# Patient Record
Sex: FEMALE | Race: WHITE | NOT HISPANIC OR LATINO | Employment: OTHER | URBAN - METROPOLITAN AREA
[De-identification: names, ages, dates, MRNs, and addresses within clinical notes are randomized per-mention and may not be internally consistent; named-entity substitution may affect disease eponyms.]

---

## 2017-01-03 ENCOUNTER — HOSPITAL ENCOUNTER (OUTPATIENT)
Dept: RADIOLOGY | Facility: CLINIC | Age: 67
Discharge: HOME | End: 2017-01-03
Payer: MEDICARE

## 2017-01-03 ENCOUNTER — ALLSCRIPTS OFFICE VISIT (OUTPATIENT)
Dept: OTHER | Facility: OTHER | Age: 67
End: 2017-01-03

## 2017-01-03 DIAGNOSIS — K44.9 DIAPHRAGMATIC HERNIA WITHOUT OBSTRUCTION OR GANGRENE: ICD-10-CM

## 2017-01-03 PROCEDURE — 71020 HB CHEST X-RAY 2VW FRONTAL&LATL: CPT

## 2017-01-13 ENCOUNTER — HOSPITAL ENCOUNTER (OUTPATIENT)
Facility: HOSPITAL | Age: 67
Setting detail: OUTPATIENT SURGERY
Discharge: HOME | End: 2017-01-13
Attending: SURGERY | Admitting: SURGERY
Payer: MEDICARE

## 2017-01-13 ENCOUNTER — ANESTHESIA (OUTPATIENT)
Dept: PERIOP | Facility: HOSPITAL | Age: 67
End: 2017-01-13
Payer: MEDICARE

## 2017-01-13 ENCOUNTER — ANESTHESIA EVENT (OUTPATIENT)
Dept: PERIOP | Facility: HOSPITAL | Age: 67
End: 2017-01-13
Payer: MEDICARE

## 2017-01-13 VITALS
HEIGHT: 66 IN | BODY MASS INDEX: 31.82 KG/M2 | HEART RATE: 58 BPM | RESPIRATION RATE: 20 BRPM | TEMPERATURE: 97.4 F | SYSTOLIC BLOOD PRESSURE: 140 MMHG | DIASTOLIC BLOOD PRESSURE: 80 MMHG | OXYGEN SATURATION: 93 % | WEIGHT: 198 LBS

## 2017-01-13 PROCEDURE — C1781 MESH (IMPLANTABLE): HCPCS | Performed by: SURGERY

## 2017-01-13 DEVICE — VENTRALEX HERNIA PATCH, 6.4 CM (2.5"), MEDIUM CIRCLE WITH STRAP
Type: IMPLANTABLE DEVICE | Site: ABDOMEN | Status: FUNCTIONAL
Brand: VENTRALEX

## 2017-01-13 RX ORDER — KETOROLAC TROMETHAMINE 30 MG/ML
INJECTION, SOLUTION INTRAMUSCULAR; INTRAVENOUS AS NEEDED
Status: DISCONTINUED | OUTPATIENT
Start: 2017-01-13 | End: 2017-01-13 | Stop reason: SURG

## 2017-01-13 RX ORDER — LIDOCAINE HYDROCHLORIDE 10 MG/ML
INJECTION, SOLUTION INFILTRATION; PERINEURAL AS NEEDED
Status: DISCONTINUED | OUTPATIENT
Start: 2017-01-13 | End: 2017-01-13 | Stop reason: SURG

## 2017-01-13 RX ORDER — FENTANYL CITRATE/PF 50 MCG/ML
25 SYRINGE (ML) INJECTION
Status: DISCONTINUED | OUTPATIENT
Start: 2017-01-13 | End: 2017-01-13 | Stop reason: HOSPADM

## 2017-01-13 RX ORDER — BUPIVACAINE HYDROCHLORIDE AND EPINEPHRINE 2.5; 5 MG/ML; UG/ML
INJECTION, SOLUTION EPIDURAL; INFILTRATION; INTRACAUDAL; PERINEURAL AS NEEDED
Status: DISCONTINUED | OUTPATIENT
Start: 2017-01-13 | End: 2017-01-13 | Stop reason: HOSPADM

## 2017-01-13 RX ORDER — SODIUM CHLORIDE, SODIUM LACTATE, POTASSIUM CHLORIDE, CALCIUM CHLORIDE 600; 310; 30; 20 MG/100ML; MG/100ML; MG/100ML; MG/100ML
100 INJECTION, SOLUTION INTRAVENOUS CONTINUOUS
Status: DISCONTINUED | OUTPATIENT
Start: 2017-01-13 | End: 2017-01-13 | Stop reason: HOSPADM

## 2017-01-13 RX ORDER — FENTANYL CITRATE 50 UG/ML
INJECTION, SOLUTION INTRAMUSCULAR; INTRAVENOUS AS NEEDED
Status: DISCONTINUED | OUTPATIENT
Start: 2017-01-13 | End: 2017-01-13 | Stop reason: SURG

## 2017-01-13 RX ORDER — OXYCODONE HYDROCHLORIDE AND ACETAMINOPHEN 5; 325 MG/1; MG/1
2 TABLET ORAL EVERY 4 HOURS PRN
Qty: 40 TABLET | Refills: 0 | Status: ON HOLD | OUTPATIENT
Start: 2017-01-13 | End: 2017-10-30 | Stop reason: ALTCHOICE

## 2017-01-13 RX ORDER — MIDAZOLAM HYDROCHLORIDE 1 MG/ML
INJECTION INTRAMUSCULAR; INTRAVENOUS AS NEEDED
Status: DISCONTINUED | OUTPATIENT
Start: 2017-01-13 | End: 2017-01-13 | Stop reason: SURG

## 2017-01-13 RX ORDER — PROPOFOL 10 MG/ML
INJECTION, EMULSION INTRAVENOUS AS NEEDED
Status: DISCONTINUED | OUTPATIENT
Start: 2017-01-13 | End: 2017-01-13 | Stop reason: SURG

## 2017-01-13 RX ORDER — GLYCOPYRROLATE 0.2 MG/ML
INJECTION INTRAMUSCULAR; INTRAVENOUS AS NEEDED
Status: DISCONTINUED | OUTPATIENT
Start: 2017-01-13 | End: 2017-01-13 | Stop reason: SURG

## 2017-01-13 RX ORDER — SODIUM CHLORIDE, SODIUM LACTATE, POTASSIUM CHLORIDE, CALCIUM CHLORIDE 600; 310; 30; 20 MG/100ML; MG/100ML; MG/100ML; MG/100ML
125 INJECTION, SOLUTION INTRAVENOUS CONTINUOUS
Status: DISCONTINUED | OUTPATIENT
Start: 2017-01-13 | End: 2017-01-13 | Stop reason: HOSPADM

## 2017-01-13 RX ORDER — ROCURONIUM BROMIDE 10 MG/ML
INJECTION, SOLUTION INTRAVENOUS AS NEEDED
Status: DISCONTINUED | OUTPATIENT
Start: 2017-01-13 | End: 2017-01-13 | Stop reason: SURG

## 2017-01-13 RX ORDER — MAGNESIUM HYDROXIDE 1200 MG/15ML
LIQUID ORAL AS NEEDED
Status: DISCONTINUED | OUTPATIENT
Start: 2017-01-13 | End: 2017-01-13 | Stop reason: HOSPADM

## 2017-01-13 RX ORDER — OXYCODONE HYDROCHLORIDE AND ACETAMINOPHEN 5; 325 MG/1; MG/1
2 TABLET ORAL EVERY 4 HOURS PRN
Status: DISCONTINUED | OUTPATIENT
Start: 2017-01-13 | End: 2017-01-13 | Stop reason: HOSPADM

## 2017-01-13 RX ORDER — ONDANSETRON 2 MG/ML
4 INJECTION INTRAMUSCULAR; INTRAVENOUS EVERY 4 HOURS PRN
Status: DISCONTINUED | OUTPATIENT
Start: 2017-01-13 | End: 2017-01-13 | Stop reason: HOSPADM

## 2017-01-13 RX ORDER — SODIUM CHLORIDE, SODIUM LACTATE, POTASSIUM CHLORIDE, CALCIUM CHLORIDE 600; 310; 30; 20 MG/100ML; MG/100ML; MG/100ML; MG/100ML
INJECTION, SOLUTION INTRAVENOUS CONTINUOUS PRN
Status: DISCONTINUED | OUTPATIENT
Start: 2017-01-13 | End: 2017-01-13

## 2017-01-13 RX ORDER — ONDANSETRON 2 MG/ML
INJECTION INTRAMUSCULAR; INTRAVENOUS AS NEEDED
Status: DISCONTINUED | OUTPATIENT
Start: 2017-01-13 | End: 2017-01-13 | Stop reason: SURG

## 2017-01-13 RX ORDER — ONDANSETRON 2 MG/ML
4 INJECTION INTRAMUSCULAR; INTRAVENOUS ONCE AS NEEDED
Status: DISCONTINUED | OUTPATIENT
Start: 2017-01-13 | End: 2017-01-13 | Stop reason: HOSPADM

## 2017-01-13 RX ORDER — OXYCODONE HYDROCHLORIDE AND ACETAMINOPHEN 5; 325 MG/1; MG/1
TABLET ORAL
Status: DISCONTINUED
Start: 2017-01-13 | End: 2017-01-13 | Stop reason: HOSPADM

## 2017-01-13 RX ORDER — MORPHINE SULFATE 4 MG/ML
4 INJECTION, SOLUTION INTRAMUSCULAR; INTRAVENOUS
Status: DISCONTINUED | OUTPATIENT
Start: 2017-01-13 | End: 2017-01-13 | Stop reason: HOSPADM

## 2017-01-13 RX ADMIN — FENTANYL CITRATE 100 MCG: 50 INJECTION INTRAMUSCULAR; INTRAVENOUS at 07:55

## 2017-01-13 RX ADMIN — NEOSTIGMINE METHYLSULFATE 2 MG: 1 INJECTION INTRAMUSCULAR; INTRAVENOUS; SUBCUTANEOUS at 08:30

## 2017-01-13 RX ADMIN — LIDOCAINE HYDROCHLORIDE 50 MG: 10 INJECTION, SOLUTION INFILTRATION; PERINEURAL at 07:55

## 2017-01-13 RX ADMIN — PROPOFOL 200 MG: 10 INJECTION, EMULSION INTRAVENOUS at 07:55

## 2017-01-13 RX ADMIN — KETOROLAC TROMETHAMINE 15 MG: 30 INJECTION, SOLUTION INTRAMUSCULAR at 08:24

## 2017-01-13 RX ADMIN — GLYCOPYRROLATE 0.4 MG: 0.2 INJECTION INTRAMUSCULAR; INTRAVENOUS at 08:30

## 2017-01-13 RX ADMIN — CEFAZOLIN SODIUM 2000 MG: 2 SOLUTION INTRAVENOUS at 07:50

## 2017-01-13 RX ADMIN — DEXAMETHASONE SODIUM PHOSPHATE 10 MG: 10 INJECTION INTRAMUSCULAR; INTRAVENOUS at 07:55

## 2017-01-13 RX ADMIN — ROCURONIUM BROMIDE 30 MG: 10 INJECTION, SOLUTION INTRAVENOUS at 07:55

## 2017-01-13 RX ADMIN — SODIUM CHLORIDE, SODIUM LACTATE, POTASSIUM CHLORIDE, AND CALCIUM CHLORIDE: .6; .31; .03; .02 INJECTION, SOLUTION INTRAVENOUS at 07:03

## 2017-01-13 RX ADMIN — ONDANSETRON 4 MG: 2 INJECTION INTRAMUSCULAR; INTRAVENOUS at 08:20

## 2017-01-13 RX ADMIN — MIDAZOLAM HYDROCHLORIDE 2 MG: 1 INJECTION, SOLUTION INTRAMUSCULAR; INTRAVENOUS at 07:49

## 2017-01-13 RX ADMIN — OXYCODONE HYDROCHLORIDE AND ACETAMINOPHEN 2 TABLET: 5; 325 TABLET ORAL at 09:41

## 2017-01-26 ENCOUNTER — LAB CONVERSION - ENCOUNTER (OUTPATIENT)
Dept: OTHER | Facility: OTHER | Age: 67
End: 2017-01-26

## 2017-01-26 LAB — TSH SERPL DL<=0.05 MIU/L-ACNC: 0.69 MIU/L (ref 0.4–4.5)

## 2017-02-07 ENCOUNTER — ALLSCRIPTS OFFICE VISIT (OUTPATIENT)
Dept: OTHER | Facility: OTHER | Age: 67
End: 2017-02-07

## 2017-03-17 ENCOUNTER — ALLSCRIPTS OFFICE VISIT (OUTPATIENT)
Dept: OTHER | Facility: OTHER | Age: 67
End: 2017-03-17

## 2017-03-17 DIAGNOSIS — I10 ESSENTIAL (PRIMARY) HYPERTENSION: ICD-10-CM

## 2017-03-17 DIAGNOSIS — E03.9 HYPOTHYROIDISM: ICD-10-CM

## 2017-03-17 DIAGNOSIS — E78.5 HYPERLIPIDEMIA: ICD-10-CM

## 2017-05-04 ENCOUNTER — LAB CONVERSION - ENCOUNTER (OUTPATIENT)
Dept: OTHER | Facility: OTHER | Age: 67
End: 2017-05-04

## 2017-05-04 LAB
A/G RATIO (HISTORICAL): 1.2 (CALC) (ref 1–2.5)
ALBUMIN SERPL BCP-MCNC: 3.7 G/DL (ref 3.6–5.1)
ALP SERPL-CCNC: 114 U/L (ref 33–130)
ALT SERPL W P-5'-P-CCNC: 16 U/L (ref 6–29)
AST SERPL W P-5'-P-CCNC: 19 U/L (ref 10–35)
BASOPHILS # BLD AUTO: 1.4 %
BASOPHILS # BLD AUTO: 67 CELLS/UL (ref 0–200)
BILIRUB SERPL-MCNC: 0.5 MG/DL (ref 0.2–1.2)
BUN SERPL-MCNC: 14 MG/DL (ref 7–25)
BUN/CREA RATIO (HISTORICAL): ABNORMAL (CALC) (ref 6–22)
CALCIUM SERPL-MCNC: 9.2 MG/DL (ref 8.6–10.4)
CHLORIDE SERPL-SCNC: 104 MMOL/L (ref 98–110)
CHOLEST SERPL-MCNC: 163 MG/DL (ref 125–200)
CHOLEST/HDLC SERPL: 2.4 (CALC)
CO2 SERPL-SCNC: 28 MMOL/L (ref 20–31)
CREAT SERPL-MCNC: 0.84 MG/DL (ref 0.5–0.99)
DEPRECATED RDW RBC AUTO: 14.5 % (ref 11–15)
EGFR AFRICAN AMERICAN (HISTORICAL): 84 ML/MIN/1.73M2
EGFR-AMERICAN CALC (HISTORICAL): 72 ML/MIN/1.73M2
EOSINOPHIL # BLD AUTO: 154 CELLS/UL (ref 15–500)
EOSINOPHIL # BLD AUTO: 3.2 %
GAMMA GLOBULIN (HISTORICAL): 3.1 G/DL (CALC) (ref 1.9–3.7)
GLUCOSE (HISTORICAL): 105 MG/DL (ref 65–99)
HCT VFR BLD AUTO: 37.2 % (ref 35–45)
HDLC SERPL-MCNC: 69 MG/DL
HGB BLD-MCNC: 12.3 G/DL (ref 11.7–15.5)
LDL CHOLESTEROL (HISTORICAL): 72 MG/DL (CALC)
LYMPHOCYTES # BLD AUTO: 1392 CELLS/UL (ref 850–3900)
LYMPHOCYTES # BLD AUTO: 29 %
MCH RBC QN AUTO: 29 PG (ref 27–33)
MCHC RBC AUTO-ENTMCNC: 33.1 G/DL (ref 32–36)
MCV RBC AUTO: 87.7 FL (ref 80–100)
MONOCYTES # BLD AUTO: 566 CELLS/UL (ref 200–950)
MONOCYTES (HISTORICAL): 11.8 %
NEUTROPHILS # BLD AUTO: 2621 CELLS/UL (ref 1500–7800)
NEUTROPHILS # BLD AUTO: 54.6 %
NON-HDL-CHOL (CHOL-HDL) (HISTORICAL): 94 MG/DL (CALC)
PLATELET # BLD AUTO: 192 THOUSAND/UL (ref 140–400)
PMV BLD AUTO: 7.8 FL (ref 7.5–12.5)
POTASSIUM SERPL-SCNC: 3.8 MMOL/L (ref 3.5–5.3)
RBC # BLD AUTO: 4.24 MILLION/UL (ref 3.8–5.1)
SODIUM SERPL-SCNC: 139 MMOL/L (ref 135–146)
TOTAL PROTEIN (HISTORICAL): 6.8 G/DL (ref 6.1–8.1)
TRIGL SERPL-MCNC: 108 MG/DL
TSH SERPL DL<=0.05 MIU/L-ACNC: 0.23 MIU/L (ref 0.4–4.5)
WBC # BLD AUTO: 4.8 THOUSAND/UL (ref 3.8–10.8)

## 2017-05-23 DIAGNOSIS — E78.5 HYPERLIPIDEMIA: ICD-10-CM

## 2017-05-23 DIAGNOSIS — I10 ESSENTIAL (PRIMARY) HYPERTENSION: ICD-10-CM

## 2017-05-23 DIAGNOSIS — E03.9 HYPOTHYROIDISM: ICD-10-CM

## 2017-06-05 ENCOUNTER — HOSPITAL ENCOUNTER (OUTPATIENT)
Dept: SLEEP CENTER | Facility: CLINIC | Age: 67
Discharge: HOME | End: 2017-06-05
Payer: MEDICARE

## 2017-06-05 ENCOUNTER — TRANSCRIBE ORDERS (OUTPATIENT)
Dept: SLEEP CENTER | Facility: CLINIC | Age: 67
End: 2017-06-05

## 2017-06-05 DIAGNOSIS — G47.33 OSA (OBSTRUCTIVE SLEEP APNEA): ICD-10-CM

## 2017-06-05 DIAGNOSIS — G47.33 OBSTRUCTIVE SLEEP APNEA (ADULT) (PEDIATRIC): Primary | ICD-10-CM

## 2017-07-11 ENCOUNTER — ALLSCRIPTS OFFICE VISIT (OUTPATIENT)
Dept: OTHER | Facility: OTHER | Age: 67
End: 2017-07-11

## 2017-09-15 ENCOUNTER — ALLSCRIPTS OFFICE VISIT (OUTPATIENT)
Dept: OTHER | Facility: OTHER | Age: 67
End: 2017-09-15

## 2017-09-20 ENCOUNTER — LAB CONVERSION - ENCOUNTER (OUTPATIENT)
Dept: OTHER | Facility: OTHER | Age: 67
End: 2017-09-20

## 2017-09-20 LAB
A/G RATIO (HISTORICAL): 1.2 (CALC) (ref 1–2.5)
ALBUMIN SERPL BCP-MCNC: 3.9 G/DL (ref 3.6–5.1)
ALP SERPL-CCNC: 99 U/L (ref 33–130)
ALT SERPL W P-5'-P-CCNC: 17 U/L (ref 6–29)
AST SERPL W P-5'-P-CCNC: 19 U/L (ref 10–35)
BILIRUB SERPL-MCNC: 0.6 MG/DL (ref 0.2–1.2)
BUN SERPL-MCNC: 12 MG/DL (ref 7–25)
BUN/CREA RATIO (HISTORICAL): ABNORMAL (CALC) (ref 6–22)
CALCIUM SERPL-MCNC: 9.3 MG/DL (ref 8.6–10.4)
CHLORIDE SERPL-SCNC: 107 MMOL/L (ref 98–110)
CHOLEST SERPL-MCNC: 159 MG/DL
CHOLEST/HDLC SERPL: 2.5 (CALC)
CO2 SERPL-SCNC: 28 MMOL/L (ref 20–31)
CREAT SERPL-MCNC: 0.85 MG/DL (ref 0.5–0.99)
EGFR AFRICAN AMERICAN (HISTORICAL): 82 ML/MIN/1.73M2
EGFR-AMERICAN CALC (HISTORICAL): 71 ML/MIN/1.73M2
GAMMA GLOBULIN (HISTORICAL): 3.2 G/DL (CALC) (ref 1.9–3.7)
GLUCOSE (HISTORICAL): 101 MG/DL (ref 65–99)
HDLC SERPL-MCNC: 63 MG/DL
LDL CHOLESTEROL (HISTORICAL): 77 MG/DL (CALC)
NON-HDL-CHOL (CHOL-HDL) (HISTORICAL): 96 MG/DL (CALC)
POTASSIUM SERPL-SCNC: 3.9 MMOL/L (ref 3.5–5.3)
SODIUM SERPL-SCNC: 141 MMOL/L (ref 135–146)
TOTAL PROTEIN (HISTORICAL): 7.1 G/DL (ref 6.1–8.1)
TRIGL SERPL-MCNC: 108 MG/DL
TSH SERPL DL<=0.05 MIU/L-ACNC: 0.33 MIU/L (ref 0.4–4.5)

## 2017-09-21 ENCOUNTER — GENERIC CONVERSION - ENCOUNTER (OUTPATIENT)
Dept: OTHER | Facility: OTHER | Age: 67
End: 2017-09-21

## 2017-10-06 ENCOUNTER — TRANSCRIBE ORDERS (OUTPATIENT)
Dept: ADMINISTRATIVE | Facility: HOSPITAL | Age: 67
End: 2017-10-06

## 2017-10-06 DIAGNOSIS — K43.9 ABDOMINAL WALL HERNIA: Primary | ICD-10-CM

## 2017-10-12 ENCOUNTER — HOSPITAL ENCOUNTER (OUTPATIENT)
Dept: CT IMAGING | Facility: HOSPITAL | Age: 67
Discharge: HOME | End: 2017-10-12
Attending: SURGERY
Payer: MEDICARE

## 2017-10-12 DIAGNOSIS — K43.9 ABDOMINAL WALL HERNIA: ICD-10-CM

## 2017-10-12 PROCEDURE — 74176 CT ABD & PELVIS W/O CONTRAST: CPT

## 2017-10-18 ENCOUNTER — HOSPITAL ENCOUNTER (OUTPATIENT)
Dept: RADIOLOGY | Facility: HOSPITAL | Age: 67
Discharge: HOME | End: 2017-10-18
Attending: OBSTETRICS & GYNECOLOGY
Payer: MEDICARE

## 2017-10-18 DIAGNOSIS — Z12.31 ENCOUNTER FOR SCREENING MAMMOGRAM FOR MALIGNANT NEOPLASM OF BREAST: ICD-10-CM

## 2017-10-18 PROCEDURE — G0202 SCR MAMMO BI INCL CAD: HCPCS

## 2017-10-29 ENCOUNTER — ANESTHESIA EVENT (OUTPATIENT)
Dept: PERIOP | Facility: HOSPITAL | Age: 67
End: 2017-10-29
Payer: MEDICARE

## 2017-10-29 NOTE — ANESTHESIA PREPROCEDURE EVALUATION
Review of Systems/Medical History  Patient summary reviewed    No history of anesthetic complications     Cardiovascular  EKG reviewed, Exercise tolerance: good,  Hyperlipidemia, Hypertension well controlled,    Pulmonary  Negative pulmonary ROS Sleep apnea CPAP, ,        GI/Hepatic  Dysphagia,   GERD well controlled, Liver disease (liver cyst no dysfunction), ,  Hiatal hernia,        Negative  ROS        Endo/Other  Negative endo/other ROS History of thyroid disease , hypothyroidism,      GYN  Negative gynecology ROS          Hematology  Negative hematology ROS      Musculoskeletal  Negative musculoskeletal ROS        Neurology  Negative neurology ROS   Neuromuscular disease ,    Psychology   Negative psychology ROS            Physical Exam    Airway    Mallampati score: I  TM Distance: >3 FB  Neck ROM: full     Dental   No notable dental hx     Cardiovascular  Cardiovascular exam normal    Pulmonary  Pulmonary exam normal     Other Findings        Anesthesia Plan  ASA Score- 2       Anesthesia Type- general with ASA Monitors  Additional Monitors:   Airway Plan: ETT  Comment:     Induction- intravenous  Informed Consent- Anesthetic plan and risks discussed with patient

## 2017-10-30 ENCOUNTER — ANESTHESIA (OUTPATIENT)
Dept: PERIOP | Facility: HOSPITAL | Age: 67
End: 2017-10-30
Payer: MEDICARE

## 2017-10-30 ENCOUNTER — HOSPITAL ENCOUNTER (OUTPATIENT)
Facility: HOSPITAL | Age: 67
Setting detail: OUTPATIENT SURGERY
Discharge: HOME | End: 2017-10-30
Attending: SURGERY | Admitting: SURGERY
Payer: MEDICARE

## 2017-10-30 VITALS
OXYGEN SATURATION: 95 % | TEMPERATURE: 98.3 F | DIASTOLIC BLOOD PRESSURE: 71 MMHG | HEART RATE: 71 BPM | RESPIRATION RATE: 18 BRPM | SYSTOLIC BLOOD PRESSURE: 124 MMHG

## 2017-10-30 LAB
ANION GAP SERPL CALCULATED.3IONS-SCNC: 9 MMOL/L (ref 4–13)
BASOPHILS # BLD AUTO: 0.02 THOUSANDS/ΜL (ref 0–0.1)
BASOPHILS NFR BLD AUTO: 0 % (ref 0–1)
BUN SERPL-MCNC: 14 MG/DL (ref 5–25)
CALCIUM SERPL-MCNC: 9.1 MG/DL (ref 8.3–10.1)
CHLORIDE SERPL-SCNC: 104 MMOL/L (ref 100–108)
CO2 SERPL-SCNC: 25 MMOL/L (ref 21–32)
CREAT SERPL-MCNC: 0.87 MG/DL (ref 0.6–1.3)
EOSINOPHIL # BLD AUTO: 0.19 THOUSAND/ΜL (ref 0–0.61)
EOSINOPHIL NFR BLD AUTO: 4 % (ref 0–6)
ERYTHROCYTE [DISTWIDTH] IN BLOOD BY AUTOMATED COUNT: 13.7 % (ref 11.6–15.1)
GFR SERPL CREATININE-BSD FRML MDRD: 69 ML/MIN/1.73SQ M
GLUCOSE P FAST SERPL-MCNC: 100 MG/DL (ref 65–99)
GLUCOSE SERPL-MCNC: 100 MG/DL (ref 65–140)
HCT VFR BLD AUTO: 38.2 % (ref 34.8–46.1)
HGB BLD-MCNC: 12.3 G/DL (ref 11.5–15.4)
LYMPHOCYTES # BLD AUTO: 1.49 THOUSANDS/ΜL (ref 0.6–4.47)
LYMPHOCYTES NFR BLD AUTO: 32 % (ref 14–44)
MCH RBC QN AUTO: 29.4 PG (ref 26.8–34.3)
MCHC RBC AUTO-ENTMCNC: 32.2 G/DL (ref 31.4–37.4)
MCV RBC AUTO: 91 FL (ref 82–98)
MONOCYTES # BLD AUTO: 0.5 THOUSAND/ΜL (ref 0.17–1.22)
MONOCYTES NFR BLD AUTO: 11 % (ref 4–12)
NEUTROPHILS # BLD AUTO: 2.48 THOUSANDS/ΜL (ref 1.85–7.62)
NEUTS SEG NFR BLD AUTO: 53 % (ref 43–75)
PLATELET # BLD AUTO: 188 THOUSANDS/UL (ref 149–390)
PMV BLD AUTO: 10 FL (ref 8.9–12.7)
POTASSIUM SERPL-SCNC: 3.7 MMOL/L (ref 3.5–5.3)
RBC # BLD AUTO: 4.18 MILLION/UL (ref 3.81–5.12)
SODIUM SERPL-SCNC: 138 MMOL/L (ref 136–145)
WBC # BLD AUTO: 4.68 THOUSAND/UL (ref 4.31–10.16)

## 2017-10-30 PROCEDURE — C1781 MESH (IMPLANTABLE): HCPCS | Performed by: SURGERY

## 2017-10-30 PROCEDURE — 80048 BASIC METABOLIC PNL TOTAL CA: CPT | Performed by: SURGERY

## 2017-10-30 PROCEDURE — 85025 COMPLETE CBC W/AUTO DIFF WBC: CPT | Performed by: SURGERY

## 2017-10-30 DEVICE — VENTRALIGHT ST MESH
Type: IMPLANTABLE DEVICE | Site: ABDOMEN | Status: FUNCTIONAL
Brand: VENTRALIGHT ST

## 2017-10-30 RX ORDER — LIDOCAINE HYDROCHLORIDE 10 MG/ML
INJECTION, SOLUTION INFILTRATION; PERINEURAL AS NEEDED
Status: DISCONTINUED | OUTPATIENT
Start: 2017-10-30 | End: 2017-10-30 | Stop reason: SURG

## 2017-10-30 RX ORDER — OXYCODONE HYDROCHLORIDE AND ACETAMINOPHEN 5; 325 MG/1; MG/1
1 TABLET ORAL EVERY 4 HOURS PRN
Status: DISCONTINUED | OUTPATIENT
Start: 2017-10-30 | End: 2017-10-30 | Stop reason: HOSPADM

## 2017-10-30 RX ORDER — ONDANSETRON 2 MG/ML
4 INJECTION INTRAMUSCULAR; INTRAVENOUS EVERY 4 HOURS PRN
Status: DISCONTINUED | OUTPATIENT
Start: 2017-10-30 | End: 2017-10-30 | Stop reason: HOSPADM

## 2017-10-30 RX ORDER — FENTANYL CITRATE 50 UG/ML
INJECTION, SOLUTION INTRAMUSCULAR; INTRAVENOUS AS NEEDED
Status: DISCONTINUED | OUTPATIENT
Start: 2017-10-30 | End: 2017-10-30 | Stop reason: SURG

## 2017-10-30 RX ORDER — ONDANSETRON 2 MG/ML
4 INJECTION INTRAMUSCULAR; INTRAVENOUS ONCE AS NEEDED
Status: DISCONTINUED | OUTPATIENT
Start: 2017-10-30 | End: 2017-10-30 | Stop reason: HOSPADM

## 2017-10-30 RX ORDER — SODIUM CHLORIDE, SODIUM LACTATE, POTASSIUM CHLORIDE, CALCIUM CHLORIDE 600; 310; 30; 20 MG/100ML; MG/100ML; MG/100ML; MG/100ML
INJECTION, SOLUTION INTRAVENOUS CONTINUOUS PRN
Status: DISCONTINUED | OUTPATIENT
Start: 2017-10-30 | End: 2017-10-30 | Stop reason: SURG

## 2017-10-30 RX ORDER — OXYCODONE HYDROCHLORIDE AND ACETAMINOPHEN 5; 325 MG/1; MG/1
2 TABLET ORAL EVERY 4 HOURS PRN
Qty: 28 TABLET | Refills: 0 | Status: SHIPPED | OUTPATIENT
Start: 2017-10-30 | End: 2018-04-17 | Stop reason: ALTCHOICE

## 2017-10-30 RX ORDER — MAGNESIUM HYDROXIDE 1200 MG/15ML
LIQUID ORAL AS NEEDED
Status: DISCONTINUED | OUTPATIENT
Start: 2017-10-30 | End: 2017-10-30 | Stop reason: HOSPADM

## 2017-10-30 RX ORDER — GLYCOPYRROLATE 0.2 MG/ML
INJECTION INTRAMUSCULAR; INTRAVENOUS AS NEEDED
Status: DISCONTINUED | OUTPATIENT
Start: 2017-10-30 | End: 2017-10-30 | Stop reason: SURG

## 2017-10-30 RX ORDER — EPHEDRINE SULFATE 50 MG/ML
INJECTION, SOLUTION INTRAVENOUS AS NEEDED
Status: DISCONTINUED | OUTPATIENT
Start: 2017-10-30 | End: 2017-10-30 | Stop reason: SURG

## 2017-10-30 RX ORDER — SUCCINYLCHOLINE/SOD CL,ISO/PF 100 MG/5ML
SYRINGE (ML) INTRAVENOUS AS NEEDED
Status: DISCONTINUED | OUTPATIENT
Start: 2017-10-30 | End: 2017-10-30 | Stop reason: SURG

## 2017-10-30 RX ORDER — MORPHINE SULFATE 10 MG/ML
4 INJECTION, SOLUTION INTRAMUSCULAR; INTRAVENOUS EVERY 4 HOURS PRN
Status: DISCONTINUED | OUTPATIENT
Start: 2017-10-30 | End: 2017-10-30 | Stop reason: HOSPADM

## 2017-10-30 RX ORDER — PROPOFOL 10 MG/ML
INJECTION, EMULSION INTRAVENOUS AS NEEDED
Status: DISCONTINUED | OUTPATIENT
Start: 2017-10-30 | End: 2017-10-30 | Stop reason: SURG

## 2017-10-30 RX ORDER — ACETAMINOPHEN 325 MG/1
650 TABLET ORAL EVERY 6 HOURS PRN
Status: DISCONTINUED | OUTPATIENT
Start: 2017-10-30 | End: 2017-10-30 | Stop reason: HOSPADM

## 2017-10-30 RX ORDER — BUPIVACAINE HYDROCHLORIDE AND EPINEPHRINE 2.5; 5 MG/ML; UG/ML
INJECTION, SOLUTION EPIDURAL; INFILTRATION; INTRACAUDAL; PERINEURAL AS NEEDED
Status: DISCONTINUED | OUTPATIENT
Start: 2017-10-30 | End: 2017-10-30 | Stop reason: HOSPADM

## 2017-10-30 RX ORDER — FENTANYL CITRATE/PF 50 MCG/ML
25 SYRINGE (ML) INJECTION
Status: DISCONTINUED | OUTPATIENT
Start: 2017-10-30 | End: 2017-10-30 | Stop reason: HOSPADM

## 2017-10-30 RX ORDER — ROCURONIUM BROMIDE 10 MG/ML
INJECTION, SOLUTION INTRAVENOUS AS NEEDED
Status: DISCONTINUED | OUTPATIENT
Start: 2017-10-30 | End: 2017-10-30 | Stop reason: SURG

## 2017-10-30 RX ORDER — KETOROLAC TROMETHAMINE 30 MG/ML
INJECTION, SOLUTION INTRAMUSCULAR; INTRAVENOUS AS NEEDED
Status: DISCONTINUED | OUTPATIENT
Start: 2017-10-30 | End: 2017-10-30 | Stop reason: SURG

## 2017-10-30 RX ADMIN — DEXAMETHASONE SODIUM PHOSPHATE 10 MG: 10 INJECTION INTRAMUSCULAR; INTRAVENOUS at 12:21

## 2017-10-30 RX ADMIN — FENTANYL CITRATE 25 MCG: 50 INJECTION INTRAMUSCULAR; INTRAVENOUS at 13:39

## 2017-10-30 RX ADMIN — OXYCODONE HYDROCHLORIDE AND ACETAMINOPHEN 1 TABLET: 5; 325 TABLET ORAL at 14:31

## 2017-10-30 RX ADMIN — Medication 160 MG: at 12:11

## 2017-10-30 RX ADMIN — ROCURONIUM BROMIDE 10 MG: 10 INJECTION, SOLUTION INTRAVENOUS at 12:11

## 2017-10-30 RX ADMIN — LIDOCAINE HYDROCHLORIDE 50 MG: 10 INJECTION, SOLUTION INFILTRATION; PERINEURAL at 12:11

## 2017-10-30 RX ADMIN — FENTANYL CITRATE 100 MCG: 50 INJECTION INTRAMUSCULAR; INTRAVENOUS at 12:07

## 2017-10-30 RX ADMIN — EPHEDRINE SULFATE 10 MG: 50 INJECTION, SOLUTION INTRAMUSCULAR; INTRAVENOUS; SUBCUTANEOUS at 12:18

## 2017-10-30 RX ADMIN — GLYCOPYRROLATE 0.6 MG: 0.2 INJECTION, SOLUTION INTRAMUSCULAR; INTRAVENOUS at 13:12

## 2017-10-30 RX ADMIN — SODIUM CHLORIDE, SODIUM LACTATE, POTASSIUM CHLORIDE, AND CALCIUM CHLORIDE: .6; .31; .03; .02 INJECTION, SOLUTION INTRAVENOUS at 12:07

## 2017-10-30 RX ADMIN — NEOSTIGMINE METHYLSULFATE 3 MG: 1 INJECTION, SOLUTION INTRAMUSCULAR; INTRAVENOUS; SUBCUTANEOUS at 13:12

## 2017-10-30 RX ADMIN — FENTANYL CITRATE 25 MCG: 50 INJECTION INTRAMUSCULAR; INTRAVENOUS at 13:49

## 2017-10-30 RX ADMIN — KETOROLAC TROMETHAMINE 30 MG: 30 INJECTION, SOLUTION INTRAMUSCULAR at 13:12

## 2017-10-30 RX ADMIN — ROCURONIUM BROMIDE 20 MG: 10 INJECTION, SOLUTION INTRAVENOUS at 12:16

## 2017-10-30 RX ADMIN — PROPOFOL 200 MG: 10 INJECTION, EMULSION INTRAVENOUS at 12:11

## 2017-10-30 RX ADMIN — FENTANYL CITRATE 25 MCG: 50 INJECTION INTRAMUSCULAR; INTRAVENOUS at 13:43

## 2017-10-30 RX ADMIN — FENTANYL CITRATE 25 MCG: 50 INJECTION INTRAMUSCULAR; INTRAVENOUS at 13:46

## 2017-10-30 RX ADMIN — CEFAZOLIN SODIUM 2000 MG: 2 SOLUTION INTRAVENOUS at 12:07

## 2017-10-30 NOTE — ANESTHESIA POSTPROCEDURE EVALUATION
Post-Op Assessment Note      CV Status:  Stable    Mental Status:  Alert and awake    Hydration Status:  Stable and euvolemic    PONV Controlled:  Controlled    Airway Patency:  Patent and adequate    Post Op Vitals Reviewed: Yes          Staff: CRNA           /74 (10/30/17 1322)    Temp      Pulse 68 (10/30/17 1322)   Resp 20 (10/30/17 1322)    SpO2 98 % (10/30/17 1322)

## 2017-10-30 NOTE — DISCHARGE INSTRUCTIONS
Please call the office when you leave to schedule an appointment to be seen in 2-3 weeks    Activity:    Do not lift more than 10 pounds (a gallon of milk) for 1-2 weeks post-operatively                 Walking is encouraged  Normal daily activities including climbing steps are okay  Do not engage in strenuous activity or contact sports for 4 weeks post-operatively    Return to work:   Return to work to be discussed at first post-operative visit    Diet:   You may return to your normal heart healthy diet    Wound Care: Your wound is closed with histoacryl  It is okay to shower  Wash incision gently with soap and water and pat dry  Do not soak incisions in bath water or swim for two weeks  Do not apply any creams or ointments  Apply ice as needed to wounds    Pain Medication:   Please take as directed  No driving while taking narcotic pain medications    Other:  If you have questions after discharge please call the office      If you have increased pain, fever >101 5, increased drainage, redness or a bad smell at your surgery site, please call us immediately or come directly to the Emergency Room

## 2017-10-30 NOTE — OP NOTE
OPERATIVE REPORT  PATIENT NAME: Orestes Colin    :  1950  MRN: [id number]  Pt Location: [facility] OR ROOM 01    SURGERY DATE: 10/30/2017    Surgeon(s) and Role:     * Ramon Opitz, DO - Primary     * Renetta Hubbard MD - Assisting    Preop Diagnosis:  Incisional hernia , x2    Post-Op Diagnosis Codes:     * Incisional hernia used in a subcuticular fashion of each of the into, x2    Procedure(s) (LRB):  LAPAROSCOPIC INCISIONAL HERNIA REPAIR X 2 WITH ECHO MESH (N/A)  6 x 8 inch mesh    Specimen(s):  * No specimens in log *    Estimated Blood Loss:   Minimal    Drains:       Anesthesia Type:   General/local    Operative Indications:  Incisional hernia       Operative Findings:  2 incisional hernias as expected  One was near the falciform ligament requiring me to take down the falciform  The other was just above a former piece of mesh at the umbilicus    Complications:   None    Procedure and Technique:  Patient was brought the ER proceed identified by visualization, conversation, by armband  Sequential compression pumps were placed  She received antibiotics perioperatively  Once under anesthesia abdomen is then prepped and draped in a sterile fashion  Time-out was performed and was assured that the prep was dry  Local was initially some left upper quadrant year old scar  Skin incision was made  Using a 0 degree scope in a 5 mm Optiview trocar, the trocar was inserted into the abdomen  Pneumoperitoneum was established  Upon inspection of the underlying viscera this was all intact without any injury  Then looked across the abdomen and another 5 mm trocars placed in the right upper quadrant under direct visualization  I then changed the initial left upper quadrant trocar to an 11 mm trocar  Finally a 5 mm trocar was placed under direct visualization the right lower quadrant  She had some omental adhesions up towards the umbilical area these were taken down with the blunt dissection use of some hot cautery  Could clearly see the hernia noted  Then looked more superiorly and found hernia just to the right of the falciform ligament  This necessitated the removing of the falciform ligament off the abdominal wall to properly see the defect  With both defects adequately dissected out a 6 x 8 cm ventral light mesh from [employer] was chosen  This had the echo deployment system  The mesh was inserted via the left upper quadrant trocar  Using a laparoscopic suture Passer the enclosed tubing was grasped in the middle of the defects  This was brought through the abdominal wall  The Echo deployment system was then insufflated  The mesh was in proper orientation adequately covering both defects  This was anchored at the edge of the mesh in 1 cm increments using both a secure strap as well as the Sorbafix tackers  With the mesh adequately and placed the echo deployment system was then removed via the left upper quadrant trocar site  Again the mesh covered both sites quite well  I did look at the underlying viscera omentum no signs of bleeding were noted  Using 0 Vicryl on the endoscopic suture Passer the left upper quadrant trocar site was then closed  Pneumoperitoneum was then released  Four Monocryl was used in a subcuticular fashion to close the skin of all wounds  Wounds washed and dried  Sterile histoacryl was applied  She was awakened in the operating returned to the recovery area in stable condition having tolerated procedure well     I was present for the entire procedure    Patient Disposition:  PACU     SIGNATURE: Stepan Leal DO  DATE: October 30, 2017  TIME: 1:16 PM

## 2017-10-31 DIAGNOSIS — E03.9 HYPOTHYROIDISM: ICD-10-CM

## 2017-11-14 ENCOUNTER — LAB CONVERSION - ENCOUNTER (OUTPATIENT)
Dept: OTHER | Facility: OTHER | Age: 67
End: 2017-11-14

## 2017-11-14 LAB — TSH SERPL DL<=0.05 MIU/L-ACNC: 2.42 MIU/L (ref 0.4–4.5)

## 2017-11-15 ENCOUNTER — GENERIC CONVERSION - ENCOUNTER (OUTPATIENT)
Dept: OTHER | Facility: OTHER | Age: 67
End: 2017-11-15

## 2017-11-16 ENCOUNTER — GENERIC CONVERSION - ENCOUNTER (OUTPATIENT)
Dept: OTHER | Facility: OTHER | Age: 67
End: 2017-11-16

## 2017-11-27 ENCOUNTER — GENERIC CONVERSION - ENCOUNTER (OUTPATIENT)
Dept: OTHER | Facility: OTHER | Age: 67
End: 2017-11-27

## 2017-12-04 ENCOUNTER — ALLSCRIPTS OFFICE VISIT (OUTPATIENT)
Dept: OTHER | Facility: OTHER | Age: 67
End: 2017-12-04

## 2017-12-21 ENCOUNTER — TRANSCRIBE ORDERS (OUTPATIENT)
Dept: ADMINISTRATIVE | Facility: HOSPITAL | Age: 67
End: 2017-12-21

## 2017-12-21 DIAGNOSIS — J91.8 PLEURAL EFFUSION ASSOCIATED WITH HEPATIC DISORDER: Primary | ICD-10-CM

## 2017-12-21 DIAGNOSIS — K76.9 PLEURAL EFFUSION ASSOCIATED WITH HEPATIC DISORDER: Primary | ICD-10-CM

## 2017-12-27 ENCOUNTER — GENERIC CONVERSION - ENCOUNTER (OUTPATIENT)
Dept: OTHER | Facility: OTHER | Age: 67
End: 2017-12-27

## 2017-12-27 ENCOUNTER — HOSPITAL ENCOUNTER (OUTPATIENT)
Dept: ULTRASOUND IMAGING | Facility: HOSPITAL | Age: 67
Discharge: HOME | End: 2017-12-27
Attending: INTERNAL MEDICINE
Payer: MEDICARE

## 2017-12-27 DIAGNOSIS — K76.9 PLEURAL EFFUSION ASSOCIATED WITH HEPATIC DISORDER: ICD-10-CM

## 2017-12-27 DIAGNOSIS — J91.8 PLEURAL EFFUSION ASSOCIATED WITH HEPATIC DISORDER: ICD-10-CM

## 2017-12-27 PROCEDURE — 76705 ECHO EXAM OF ABDOMEN: CPT

## 2018-01-03 ENCOUNTER — TRANSCRIBE ORDERS (OUTPATIENT)
Dept: ADMINISTRATIVE | Facility: HOSPITAL | Age: 68
End: 2018-01-03

## 2018-01-03 ENCOUNTER — GENERIC CONVERSION - ENCOUNTER (OUTPATIENT)
Dept: FAMILY MEDICINE CLINIC | Facility: CLINIC | Age: 68
End: 2018-01-03

## 2018-01-03 DIAGNOSIS — K76.89 LIVER CYST: Primary | ICD-10-CM

## 2018-01-08 ENCOUNTER — HOSPITAL ENCOUNTER (OUTPATIENT)
Dept: RADIOLOGY | Facility: HOSPITAL | Age: 68
Discharge: HOME | End: 2018-01-08
Attending: INTERNAL MEDICINE
Payer: MEDICARE

## 2018-01-08 ENCOUNTER — GENERIC CONVERSION - ENCOUNTER (OUTPATIENT)
Dept: OTHER | Facility: OTHER | Age: 68
End: 2018-01-08

## 2018-01-08 DIAGNOSIS — K76.89 LIVER CYST: ICD-10-CM

## 2018-01-08 PROCEDURE — A9585 GADOBUTROL INJECTION: HCPCS | Performed by: INTERNAL MEDICINE

## 2018-01-08 PROCEDURE — 74183 MRI ABD W/O CNTR FLWD CNTR: CPT

## 2018-01-08 RX ADMIN — GADOBUTROL 7 ML: 604.72 INJECTION INTRAVENOUS at 16:21

## 2018-01-09 ENCOUNTER — TRANSCRIBE ORDERS (OUTPATIENT)
Dept: ADMINISTRATIVE | Facility: HOSPITAL | Age: 68
End: 2018-01-09

## 2018-01-09 ENCOUNTER — ALLSCRIPTS OFFICE VISIT (OUTPATIENT)
Dept: OTHER | Facility: OTHER | Age: 68
End: 2018-01-09

## 2018-01-09 DIAGNOSIS — IMO0001 LIVER REGENERATION: Primary | ICD-10-CM

## 2018-01-09 NOTE — MISCELLANEOUS
Assessment    1  History of Esophagogastric Fundoplasty Fundoplication Laparoscopic   2  Hiatal hernia (553 3) (K44 9)   3  Hypertension (401 9) (I10)   4  Hypothyroidism (244 9) (E03 9)    Plan  Need for prophylactic vaccination and inoculation against influenza    · Fluzone Quadrivalent 0 5 ML Intramuscular Suspension   For: Need for prophylactic vaccination and inoculation against influenza; Ordered By:Allison Mancia; Effective Date:11Nov2016; Administered by: Katie Georges: 11/11/2016 10:15:00 AM    Discussion/Summary  Discussion Summary:   Patient presents for follow-up after recent hospitalization for hiatal hernia repair  I strongly advised her to follow-up with thoracic surgery and gastroenterology  I advise her to follow bland diet, small portions  Reassurance is provided about expected abdominal post-op bloating  Patient will restart Nexium 20 mg once a day for the next 4-6 weeks  As long as GI and surgery are Marie Micki may discontinue PPI afterwards  Patient will continue same medication regimen for hypertension, hyperlipidemia and hypothyroidism  She will be proceeding with blood work including CBC, CMP, TSH and lipid profile in mid December, follow-up over the phone  Health maintenance-flu vaccine administered today  Counseling Documentation With Imm: The was counseled regarding diagnostic results, instructions for management, impressions  Chief Complaint  Chief Complaint Free Text Note Form: ТАТЬЯНА: Pt was admitted to [facility] from 11/03/2016 through 11/05/2016  Dx: Paraesophageal hernia  spoke with pt who reports she is doing pretty good and was able t o tie her shoelaces this am and walk grand daughter to bus stop  Pt denies N/V, abdominal pain, dysphagia  Incision sites are dry and clean   Follow up with pcp scheduled for Fri, 11/11/16 at 9:30am       History of Present Illness  TCM Communication [facility]: The patient is being contacted for follow-up after hospitalization and 11/07/2016  She was [facility] at [facility]  The date of admission: 11/03/2016, date of discharge: 11/05/2016  Diagnosis: see note  She was discharged to home  Medications were not reviewed today  She scheduled a follow up appointment  The patient is currently asymptomatic  Counseling was provided to the patient  Topics counseled included importance of compliance with treatment  Communication performed and completed by Woo Diamond       HPI: Patient presents for follow-up after recent surgery for hiatal hernia repair  Surgery postop period went well  She is here today complaining of intermittent abdominal bloating   no N/V , no diarrhea or fever  Appetite has improved  passed few BMs  Appetite is not back to normal  off Nexium and famotidine, he should stop discontinue both medications  She denies burping, belching, acid reflux  She remains on the same medications for hypertension, hypothyroidism, hyperlipidemia  Patient does not have pending appointments with surgery or gastroenterology at present time  Review of Systems  Complete-Female:   Constitutional: No fever, no chills, feels well, no tiredness, no recent weight gain or weight loss  Eyes: No complaints of eye pain, no red eyes, no eyesight problems, no discharge, no dry eyes, no itching of eyes  ENT: no complaints of earache, no loss of hearing, no nose bleeds, no nasal discharge, no sore throat, no hoarseness  Cardiovascular: No complaints of slow heart rate, no fast heart rate, no chest pain, no palpitations, no leg claudication, no lower extremity edema  Respiratory: No complaints of shortness of breath, no wheezing, no cough, no SOB on exertion, no orthopnea, no PND  Gastrointestinal: as noted in HPI  Genitourinary: No complaints of dysuria, no incontinence, no pelvic pain, no dysmenorrhea, no vaginal discharge or bleeding     Musculoskeletal: No complaints of arthralgias, no myalgias, no joint swelling or stiffness, no limb pain or swelling  Integumentary: No complaints of skin rash or lesions, no itching, no skin wounds, no breast pain or lump  Neurological: No complaints of headache, no confusion, no convulsions, no numbness, no dizziness or fainting, no tingling, no limb weakness, no difficulty walking  Psychiatric: Not suicidal, no sleep disturbance, no anxiety or depression, no change in personality, no emotional problems  Endocrine: No complaints of proptosis, no hot flashes, no muscle weakness, no deepening of the voice, no feelings of weakness  Hematologic/Lymphatic: No complaints of swollen glands, no swollen glands in the neck, does not bleed easily, does not bruise easily  Active Problems    1  Acute sinusitis (461 9) (J01 90)   2  Allergic rhinitis (477 9) (J30 9)   3  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)   4  Benign essential hypertension (401 1) (I10)   5  Cervical cancer screening (V76 2) (Z12 4)   6  Disc degeneration, lumbar (722 52) (M51 36)   7  Dizziness of unknown cause (780 4) (R42)   8  Dyspareunia (625 0)   9  Dysphagia (787 20) (R13 10)   10  Dystrophy of 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 09) (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 4)   11  Encounter for routine gynecological examination (V72 31) (Z01 419)   12  Encounter for screening mammogram for malignant neoplasm of breast (V76 12)    (Z12 31)   13  Esophageal reflux (530 81) (K21 9)   14  Fatigue (780 79) (R53 83)   15  Glossitis (529 0) (K14 0)   16  Hepatic cyst (573 8) (K76 89)   17  Hiatal hernia (553 3) (K44 9)   18  Hyperlipidemia (272 4) (E78 5)   19  Hypertension (401 9) (I10)   20  Hypothyroidism (244 9) (E03 9)   21  Insomnia (780 52) (G47 00)   22  Iron deficiency anemia due to chronic blood loss (280 0) (D50 0)   23  Limb pain (729 5) (M79 609)   24  Liver enlargement (789 1) (R16 0)   25  Lumbar radiculopathy (724 4) (M54 16)   26  Need for prophylactic vaccination and inoculation against influenza (V04 81) (Z23)   27   Need for vaccination with 13-polyvalent pneumococcal conjugate vaccine (V03 82) (Z23)   28  Osteoporosis (733 00) (M81 0)   29  Pain in joint of right hip (719 45) (M25 551)   30  Pain, joint, shoulder (719 41) (M25 519)   31  Severe obstructive sleep apnea (327 23) (G47 33)   32  Shoulder impingement (726 2) (M75 40)   33  Shoulder pain (719 41) (M25 519)   34  Tinea corporis (110 5) (B35 4)   35  Upper respiratory infection (465 9) (J06 9)   36  Visit for screening mammogram (V76 12) (Z12 31)   37  Vulvar atrophy (624 1) (N90 5)   38  Vulvar itching (698 1) (L29 2)    Past Medical History    1  History of Cystic breast (610 1) (N60 19)   2  History of Fall at home (A702 0,T117 3) ([address]  XXXA,Y92 099)   3  History of hyperlipidemia (V12 29) (Z86 39)   4  History of thyroid disease (V12 29) (Z86 39)   5  Hypertension (401 9) (I10)   6  Insomnia (780 52) (G47 00)   7  History of Varicose Veins Of Lower Extremities (454 9)   8  History of Visit for screening mammogram (V76 12) (Z12 31)    Surgical History    1  History of Esophagogastric Fundoplasty Fundoplication Laparoscopic   2  History of Excision Of Neuroma  Surgical History Reviewed: The surgical history was reviewed and updated today  Family History  Mother    1  Family history of cerebral aneurysm (V17 1) (Z82 49)  Father    2  Family history of Congestive Heart Failure   3  Family history of Hypertension (V17 49)  Family History    4  Family history of Cancer  Family History Reviewed: The family history was reviewed and updated today  Social History    · Being A Social Drinker   · Coffee   · Exercise: Walking   ·    · Never A Smoker   · No drug use  Social History Reviewed: The social history was reviewed and updated today  Current Meds   1  AmLODIPine Besylate 5 MG Oral Tablet; Take 1 tablet daily; Therapy: 36SJT3567 to (Evaluate:28Apr2017)  Requested for: 31Oct2016; Last   Rx:30Oct2016 Ordered   2  Atorvastatin Calcium 10 MG Oral Tablet; Take 1 tablet daily;    Therapy: 21GBD4864 to (53 583 09 76)  Requested for: 91YDB0957; Last   Rx:76Xpe3416 Ordered   3  B-Complex Balanced TABS; Therapy: (Recorded:07Ibj0300) to Recorded   4  Centrum Silver Oral Tablet; Therapy: (Recorded:24May2013) to Recorded   5  Clotrimazole-Betamethasone 1-0 05 % External Cream; APPLY SPARINGLY TO THE   AFFECTED AREA(S) TWICE DAILY; Therapy: 91Yvy4362 to (Renew:15Sep2016)  Requested for: 50Yog4890; Last   Rx:55Lmq0724 Ordered   6  CoQ-10 200 MG CAPS; Therapy: (Recorded:04Sxp0928) to Recorded   7  Estrace 0 1 MG/GM Vaginal Cream; APPLY A PEA-SIZED AMOUNT TO VAGINAL OPENING   EVERY MONDAY, WEDNESDAY, AND FRIDAY EVENING; Therapy: 64Odx3943 to (Last Rx:42Ejf2865)  Requested for: 19Qof9423 Ordered   8  HydroCHLOROthiazide 12 5 MG Oral Capsule; TAKE 1 TO 2 CAPSULES BY MOUTH   DAILY WITH AMLODIPINE; Therapy: 68HJV6698 to (Evaluate:12Oct2016)  Requested for: 24GRA1802; Last   Rx:14Jun2016 Ordered   9  Multivitamins CAPS; Therapy: (Recorded:81Hqe4250) to Recorded   10  Synthroid 150 MCG Oral Tablet; as directed daily; Therapy: 65DDT2654 to (Evaluate:49Sjh1408)  Requested for: 72LCL6535; Last    Rx:28Jun2016 Ordered   11  Vitamin D 2000 UNIT Oral Tablet Recorded  Medication List Reviewed: The medication list was reviewed and updated today  Allergies    1  No Known Drug Allergies    2  Latex    Vitals  Signs   Recorded: 57XGB7010 70:20GP   Systolic: 130  Diastolic: 82  Heart Rate: 84  Respiration: 16  Temperature: 99 8 F  Height: 5 ft 6 in  Weight: 215 lb   BMI Calculated: 34 7  BSA Calculated: 2 07    Physical Exam    Constitutional   General appearance: No acute distress, well appearing and well nourished  Eyes   Conjunctiva and lids: No swelling, erythema or discharge  Pulmonary   Respiratory effort: No increased work of breathing or signs of respiratory distress  Auscultation of lungs: Clear to auscultation      Cardiovascular   Auscultation of heart: Normal rate and rhythm, normal S1 and S2, without murmurs  Examination of extremities for edema and/or varicosities: Normal     Carotid pulses: Normal     Abdomen   Abdomen: Non-tender, no masses  Well-healing incisions, clean, dry, abdomen is soft, nontender, nondistended, normal bowel movements, no abdominal bruit  Musculoskeletal   Gait and station: Normal     Neurologic   Cranial nerves: Cranial nerves 2-12 intact  Psychiatric   Orientation to person, place, and time: Normal     Mood and affect: Normal          Health Management  Cervical cancer screening   (1) THIN PREP PAP WITH IMAGING; every 5 years; Last 26QXZ1384; Next Due: 20Lmb8953; Overdue  Visit for screening mammogram   Digital Bilateral Screening Mammogram With CAD; every 1 year; Last 57Can3532; Next Due:  93Zec8929; Active  Health Maintenance   Medicare Annual Wellness Visit; every 1 year; Next Due: 04YQA6211; Active  PELVIC EXAM; every 1 year; Next Due: 93Gke4744; Overdue    Future Appointments    Date/Time Provider Specialty Site   11/28/2016 07:30 AM LINDA Girard  Family Medicine [address] [place] [address]   03/17/2017 07:30 AM LINDA Girard  [place] [address]   11/22/2016 10:45 AM LINDA Tobias  Thoracic Surgery CT [facility] OFF[facility]   09/20/2017 10:40 AM LINDA Jiang  Obstetrics/Gynecology [facility]     Signatures   Electronically signed by :  LINDA Payne ; Nov 15 2016 11:36AM EST                       (Author)

## 2018-01-09 NOTE — RESULT NOTES
Verified Results  (Q) TSH, 3RD GENERATION 67AKP5736 07:07AM Mark Cruz     Test Name Result Flag Reference   TSH 2 42 mIU/L  0 40-4 50       Signatures   Electronically signed by :  LINDA Martinez ; Nov 16 2017  5:58PM EST                       (Author)

## 2018-01-10 NOTE — CONSULTS
Assessment   1  Hepatic cyst (573 8) (K76 89)    Plan   Hepatic cyst    · (1) BUN; Status:Active; Requested for:01Apr2018; Perform:Formerly [facility]; Due:01Apr2019;Ordered;For:Hepatic cyst; Ordered By:Uriel Gonzalez;   · (1) CREATININE; Status:Active; Requested for:01Apr2018; Perform:Formerly [facility]; Due:01Apr2019;Ordered;For:Hepatic cyst; Ordered By:Uriel Gonzalez;   · * MRI ABDOMEN W WO CONTRAST; Status:Need Information - Financial Authorization; Requested for:01Apr2018; Perform:Formerly [facility] Radiology; PIX:[id number];NRQFHCB;GOI:VSYGVPV cyst; Ordered By:Uriel Gonzalez;   · Follow-up visit in 3 months Evaluation and Treatment  Follow-up  Status: Hold For -    Scheduling  Requested for: 01Apr2018   Ordered; For: Hepatic cyst; Ordered By: Heath Powell Performed:  Due: 91UJB4820    Discussion/Summary   Discussion Summary:    80-year-old female with an enlarging right hepatic lobe cyst that has thin septations  The septations are nonenhancing  There is no soft tissue component  There was also a question of a cholangiocarcinoma related to a dilated duct  I suspect this is related to Surgical Trauma from her hiatal hernia repair  I suspect the cyst is benign and we are not dealing with cystadenoma or cystadenocarcinoma since there is no enhancing nodules  We discussed multiple treatment options including observation, unroofing of the cystic lesion and sending this to pathology because this may be symptomatic in causing her early satiety  We also discussed resection/enucleation of the cyst  Since I suspect this is benign, I have recommended short-term follow-up  I will repeat her imaging when she returns from [place] if the end of March  At that time we can finalize any treatment plans  She is agreeable to this  All of her questions were answered  Goals and Barriers: The patient has the current Goals: Surveillance  The patent has the current Barriers: None      Patient's Capacity to Self-Care: Patient is able to Self-Care  Medication SE Review and Pt Understands Tx: The treatment plan was reviewed with the patient/guardian  The patient/guardian understands and agrees with the treatment plan    Self Referrals:    Self Referrals: No      Chief Complaint   Chief Complaint Free Text Note Form: Pt here for consult for large liver cyst  She reports RUQ pressure, denies any other symptoms  History of Present Illness   HPI: 59-year-old female who was initially noted he in February of 2013 to have a 15 cm simple hepatic cyst  At that time this was asymptomatic  She had yearly follow-up imaging with ultrasound  Her most recent ultrasound from December 27, 2017 revealed that the cyst now measured nearly 25 cm in maximal dimension and appeared to be a complex cyst with multiple thick septations  Follow-up MRI on January 8, 2018 reveals a large right hepatic lobe cyst with multiple thin septations  There was no solid enhancement  The possibility of cystadenoma was raised  There was also a tubular structure extending to the margin of the liver there was a dilated intrahepatic duct which raised the suspicion of cholangiocarcinoma, although no enhancing mass was seen  I personally reviewed the films  Patient comes in now to discuss further therapy  She has had a recent incisional hernia repair with mesh and also had a hiatal hernia repair prior to this MRI  She denies any significant abdominal pain, nausea, vomiting  She does have some early satiety which is new  No unintentional weight loss  Review of Systems   Complete Female ROS SurgOnc:      Constitutional: The patient denies new or recent history of general fatigue, no recent weight loss, no change in appetite  Eyes: No complaints of visual problems, no scleral icterus  ENT: no complaints of ear pain, no hoarseness, no difficulty swallowing,-- no tinnitus-- and-- no new masses in head, oral cavity, or neck        Cardiovascular: No complaints of chest pain, no palpitations, no ankle edema  Respiratory: No complaints of shortness of breath, no cough  Gastrointestinal: No complaints of jaundice, no bloody stools, no pale stools  Genitourinary: No complaints of dysuria, no hematuria, no nocturia, no frequent urination, no urethral discharge  Musculoskeletal: No complaints of weakness, paralysis, joint stiffness or arthralgias,  Integumentary: No complaints of rash, no new lesions  Neurological: No complaints of convulsions, no seizures, no dizziness  Hematologic/Lymphatic: No complaints of easy bruising  ROS Reviewed:    ROS reviewed  Active Problems   1  Allergic rhinitis (477 9) (J30 9)   2  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)   3  Benign essential hypertension (401 1) (I10)   4  Disc degeneration, lumbar (722 52) (M51 36)   5  Dysphagia (787 20) (R13 10)   6  Encounter for routine gynecological examination (V72 31) (Z01 419)   7  Encounter for screening mammogram for malignant neoplasm of breast (V76 12)     (Z12 31)   8  Glossitis (529 0) (K14 0)   9  Hepatic cyst (573 8) (K76 89)   10  Hiatal hernia (553 3) (K44 9)   11  Hyperlipidemia (272 4) (E78 5)   12  Hypertension (401 9) (I10)   13  Hypothyroidism (244 9) (E03 9)   14  Incisional hernia, without obstruction or gangrene (553 21) (K43 2)   15  Infection, upper respiratory (465 9) (J06 9)   16  Insomnia (780 52) (G47 00)   17  Iron deficiency anemia due to chronic blood loss (280 0) (D50 0)   18  Limb pain (729 5) (M79 609)   19  Liver enlargement (789 1) (R16 0)   20  Lumbar radiculopathy (724 4) (M54 16)   21  Need for pneumococcal vaccination (V03 82) (Z23)   22  Need for prophylactic vaccination and inoculation against influenza (V04 81) (Z23)   23  Need for vaccination with 13-polyvalent pneumococcal conjugate vaccine (V03 82) (Z23)   24  Osteoporosis (733 00) (M81 0)   25  Pain in joint of right hip (719 45) (M25 551)   26   Pain, joint, shoulder (719 41) (M25 519)   27  Port-site hernia (553 21) (K45 8)   28  Severe obstructive sleep apnea (327 23) (G47 33)   29  Shoulder impingement (726 2) (M75 40)   30  Shoulder pain (719 41) (M25 519)   31  Tinea corporis (110 5) (B35 4)   32  Visit for screening mammogram (V76 12) (Z12 31)   33  Vulvar atrophy (624 1) (N90 5)   34  Vulvar itching (698 1) (L29 2)    Past Medical History   1  History of Cystic breast (610 1) (N60 19)   2  History of Dizziness of unknown cause (780 4) (R42)   3  History of Fall at home (C819 0,I414 8) ([address]  XXXA,Y92 099)   4  History of dyspareunia (V13 29)   5  History of esophagogastroduodenoscopy (EGD) (V15 29) (Z98 890)   6  History of gastroesophageal reflux (GERD) (V12 79) (Z87 19)   7  History of hyperlipidemia (V12 29) (Z86 39)   8  History of thyroid disease (V12 29) (Z86 39)   9  Hypertension (401 9) (I10)   10  Insomnia (780 52) (G47 00)   11  History of Varicose Veins Of Lower Extremities (454 9)   12  History of Visit for screening mammogram (X96 69) (Z12 31)  Active Problems And Past Medical History Reviewed: The active problems and past medical history were reviewed and updated today  Surgical History   1  History of Complete Colonoscopy   2  History of Esophagogastric Fundoplasty Fundoplication Laparoscopic   3  History of Excision Of Neuroma   4  History of Hernia Repair  Surgical History Reviewed: The surgical history was reviewed and updated today  Family History   Mother    1  Family history of cerebral aneurysm (V17 1) (Z82 49)  Father    2  Family history of Congestive Heart Failure   3  Family history of Hypertension (V17 49)  Family History    4  Family history of Cancer  Family History Reviewed: The family history was reviewed and updated today  Social History    · Being A Social Drinker   · Coffee   · Exercise: Walking   ·    · Never A Smoker   · No drug use  Social History Reviewed: The social history was reviewed and updated today  Current Meds    1  AmLODIPine Besylate 5 MG Oral Tablet; Take 1 tablet daily; Therapy: 45ZDQ4417 to (Gene Ann)  Requested for: 35Hzu2860; Last     Rx:60Jdz8460 Ordered   2  Atorvastatin Calcium 10 MG Oral Tablet; Take 1 tablet daily; Therapy: 14ZVG9172 to (Evaluate:01Qpa1710)  Requested for: 74ZDI3771; Last     Rx:25Jan2017 Ordered   3  B-Complex Balanced TABS; TAKE 1 TABLET DAILY AS DIRECTED; Therapy: (Recorded:28Nov2016) to Recorded   4  Calcium + D3 TABS; 1 tab daily, D3- 2000 units; Therapy: (Recorded:02Kky5655) to Recorded   5  Centrum Silver Oral Tablet; TAKE 1 TABLET DAILY; Therapy: (Recorded:28Nov2016) to Recorded   6  Clotrimazole-Betamethasone 1-0 05 % External Cream; APPLY SPARINGLY TO THE     AFFECTED AREA(S) TWICE DAILY; Therapy: 19Rpq2960 to (Renew:36Nbn2723)  Requested for: 71Hoi9448; Last     Rx:98Kzf5620 Ordered   7  CoQ-10 200 MG CAPS; Take 1 tablet daily; Therapy: (Recorded:17Mar2017) to Recorded   8  Estrace 0 1 MG/GM Vaginal Cream; APPLY A PEA-SIZED AMOUNT TO VAGINAL OPENING     EVERY MONDAY, WEDNESDAY, AND FRIDAY EVENING; Therapy: 12Eqk7289 to (Last Rx:36Tcm7970)  Requested for: 35Wlt4503 Ordered   9  HydroCHLOROthiazide 12 5 MG Oral Capsule; TAKE 1 OR 2 CAPSULES DAILY WITH     AMLODIPINE; Therapy: 70ZGS2607 to (Evaluate:73Ktp2990)  Requested for: 63FAD2743; Last     Rx:13Rca9516 Ordered   10  Levothyroxine Sodium 125 MCG Oral Tablet; TAKE 1 TABLET DAILY ON EMPTY      STOMACH; Therapy: 37ZBN2557 to (Evaluate:17Jun2018)  Requested for: 34Dzt7697; Last      Rx:60Mgl3178 Ordered  Medication List Reviewed: The medication list was reviewed and updated today  Allergies   1  No Known Drug Allergies  2  Adhesive Tape  Denied    3   Latex    Vitals   Vital Signs    Recorded: 35KBU5374 09:39AM   Temperature 97 8 F   Heart Rate 108   Respiration 16   Systolic 407   Diastolic 84   Height 5 ft 6 in   Weight 216 lb 8 0 oz   BMI Calculated 34 94 BSA Calculated 2 08   O2 Saturation 91     Physical Exam        Constitutional: General appearance: The Patient is well-developed, well-nourished female who appears her stated age in no acute distress  She is pleasant and talkative  HEENT: JUDIE, EOMI and the sclerae are anicteric  -- There is no oral pathology noted  -- There is no nasal pathology noted  -- The thyroid is normal to inspection and palpation  -- There is no appreciable cervical adenopathy   -- There are no carotid bruits  -- The Cranial Nerves II - XII are grossly intact  -- Neck is supple without adenopathy  Chest: The chest is normal to inspection  -- The lungs are clear to auscultation  -- There are bilaterally symmetrical breath sounds  -- There is a RRR, normal S1 and S2, without murmurs, rub or gallop  -- The pulses are normal at the radial and dorsalis pedis and symmetrical        Abdomen: The abdomen is normal to inspection and percussion  It is soft, non-tender  There are normal bowel sounds  There are no abnormal masses  -- There is no evidence of hepatosplenomegaly  -- She does not have an umbilical hernia  Extremities: There is no clubbing or cyanosis  -- There is no edema  -- Sensation is intact to light touch  Neuro: Grossly nonfocal        Lymphatic: no evidence of cervical adenopathy bilaterally  -- no evidence of axillary adenopathy bilaterally  -- no evidence of inguinal adenopathy bilaterally  Skin: Warm, anicteric  Results/Data   * MRI ABDOMEN W 222 [facility] 81CHW6790 03:03PM EPIC, Provider   Test ordered by: Brook Schwarz      Test Name Result Flag Reference   MRI ABDOMEN W WO CONTRAST (Report)     This is a summary report  The complete report is available in the patient's medical record  If you cannot access the medical record, please contact the sending organization for a detailed fax or copy             MRI OF THE ABDOMEN (LIVER) WITH AND WITHOUT CONTRAST           INDICATION: Large hepatic cystic lesion  COMPARISON: Ultrasound performed December 27, 2017  CT performed October 12, 2017  MRI performed February 23, 2013  TECHNIQUE: The following pulse sequences were obtained on a 1 5 T scanner: Coronal and axial T2 with TE of 90 and 180 respectively, axial T2 with fat saturation, axial FIESTA fat-sat, axial T1-weighted in-and-out-of phase, axial DWI/ADC, pre-contrast       axial T1 with fat saturation, post-contrast dynamic axial T1 with fat saturation at 20, 70, and 180 seconds, followed by coronal and 7 minute delayed axial T1 with fat saturation  IV Contrast: 7 mL of Gadobutrol injection (SINGLE-DOSE)            FINDINGS:           LIVER:       General: Normal in size and contour  No loss of signal on out-of-phase images to suggest hepatic steatosis  Lesions: Hemangioma is reidentified in segment 1, 2 0 cm in size, increased from 1 7 cm in February 2013  Additional small hepatic cysts           There is a very large mass with its epicenter in the lateral right hepatic lobe measuring 19 x 15 cm in transverse dimensions x 22 cm in craniocaudal dimension increased from 14 x 11 x 15 cm when measured using similar technique on February 23, 2013  The      lesion was done on T1 imaging in February 2013  In addition, the lesion currently contains multiple thin septations  There is, however, no associated solid soft tissue enhancement  There is a tubular curvilinear bright T2 signal structure in the posterior upper left hepatic lobe which appears to extend to the hepatic margin adjacent to the patient's hiatal hernia repair  This extends to the margin of a small left hepatic lobe cyst,      9 mm in size  This cyst was present in 2013 however, the curvilinear tubular structure extending to the margin of the liver was not present  Vasculature: Portal and hepatic veins patent without evidence of thrombosis             BILIARY TREE: See above description of tubular curvilinear bright T2 signal structure which probably represents a solitary dilated bile duct  Otherwise no intra-or extrahepatic biliary dilatation  GALLBLADDER: Collapsed but otherwise unremarkable  PANCREAS: Normal            ADRENAL GLANDS: Normal            SPLEEN: Multiple splenic hemangiomata are similar when compared to February 2013  KIDNEYS: Left renal cysts, largest exophytic at the lower pole measuring up to 4 3 cm  ABDOMINAL CAVITY: No lymphadenopathy or mass  No ascites  BOWEL: Unremarkable MRI appearance  OSSEOUS STRUCTURES: No osseous destruction  EXTRAHEPATIC VASCULAR STRUCTURES: Visualized vasculature is normal            ABDOMINAL WALL: Postsurgical changes noted in the abdominal wall  LOWER CHEST: Unremarkable MRI appearance  IMPRESSION:           Very large right hepatic lobe cyst has increased in size and now demonstrates bright signal on precontrast T1 as well as the presence of multiple thin septations  There is, however, no associated solid enhancement  Hemorrhage into very large otherwise       benign cyst is favored  As hemorrhage into a simple cyst is a rare recurrence in the absence of known trauma, the possibility that this represents cystadenoma cannot be excluded and at minimum, close interval MR follow-up is recommended  Tubular bright T2 signal in the posterior aspect of the left hepatic lobe extending to the margin of the liver adjacent to the patient's hiatal hernia repair is a concerning finding of uncertain etiology  Though possibly related to surgery, focally       dilated intrahepatic duct raises the suspicion of otherwise appreciated intrahepatic mass such as cholangiocarcinoma  However, no such enhancing mass is identified on this examination   Therefore, reaction to focal surgical trauma is considered more       likely but very close three-month interval MRI follow-up for this finding is also recommended  Benign left hepatic lobe hemangioma and numerous splenic granulomata are reidentified  Workstation performed: [id number]           Signed by:      Evie Renner MD      1/8/18      Future Appointments      Date/Time Provider Specialty Site   03/15/2018 09:30 AM Leigh Curling, M D   Family Medicine [address] [place] [address]     Signatures    Electronically signed by : LINDA Madera ; Jan 9 2018 10:24AM EST                       (Author)

## 2018-01-13 VITALS
WEIGHT: 206 LBS | HEIGHT: 66 IN | SYSTOLIC BLOOD PRESSURE: 110 MMHG | RESPIRATION RATE: 16 BRPM | DIASTOLIC BLOOD PRESSURE: 60 MMHG | HEART RATE: 82 BPM | TEMPERATURE: 98.8 F | BODY MASS INDEX: 33.11 KG/M2

## 2018-01-13 VITALS
SYSTOLIC BLOOD PRESSURE: 158 MMHG | HEIGHT: 66 IN | BODY MASS INDEX: 32.78 KG/M2 | OXYGEN SATURATION: 95 % | HEART RATE: 81 BPM | DIASTOLIC BLOOD PRESSURE: 92 MMHG | WEIGHT: 204 LBS | TEMPERATURE: 97.9 F

## 2018-01-13 VITALS
HEIGHT: 66 IN | HEART RATE: 80 BPM | WEIGHT: 214.38 LBS | RESPIRATION RATE: 18 BRPM | TEMPERATURE: 98.4 F | DIASTOLIC BLOOD PRESSURE: 90 MMHG | BODY MASS INDEX: 34.45 KG/M2 | SYSTOLIC BLOOD PRESSURE: 130 MMHG

## 2018-01-13 NOTE — PROGRESS NOTES
Chief Complaint  Pt dropped off Hemoccult slides  Positive x's 3 result  We will discuss with patient and request to schedule ASAP endoscopies  Patient is under care of Dr Chritsel Gr  Active Problems    1  Acute sinusitis (461 9) (J01 90)   2  Allergic rhinitis (477 9) (J30 9)   3  Anemia (285 9) (D64 9)   4  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)   5  Benign essential hypertension (401 1) (I10)   6  Cervical cancer screening (V76 2) (Z12 4)   7  Disc degeneration, lumbar (722 52) (M51 36)   8  Dizziness of unknown cause (780 4) (R42)   9  Dyspareunia (625 0)   10  Dysphagia (787 20) (R13 10)   11  Dystrophy of vulva (624 09) (N90 4)   12  Encounter for routine gynecological examination (V72 31) (Z01 419)   13  Esophageal reflux (530 81) (K21 9)   14  Fatigue (780 79) (R53 83)   15  Glossitis (529 0) (K14 0)   16  Hepatic cyst (573 8) (K76 89)   17  Hiatal hernia (553 3) (K44 9)   18  Hyperlipidemia (272 4) (E78 5)   19  Hypertension (401 9) (I10)   20  Hypothyroidism (244 9) (E03 9)   21  Insomnia (780 52) (G47 00)   22  Limb pain (729 5) (M79 609)   23  Liver enlargement (789 1) (R16 0)   24  Lumbar radiculopathy (724 4) (M54 16)   25  Need for prophylactic vaccination and inoculation against influenza (V04 81) (Z23)   26  Need for vaccination with 13-polyvalent pneumococcal conjugate vaccine (V03 82) (Z23)   27  Osteoporosis (733 00) (M81 0)   28  Pain in joint of right hip (719 45) (M25 551)   29  Pain, joint, shoulder (719 41) (M25 519)   30  Severe obstructive sleep apnea (327 23) (G47 33)   31  Shoulder impingement (726 2) (M75 40)   32  Shoulder pain (719 41) (M25 519)   33  Upper respiratory infection (465 9) (J06 9)   34  Visit for screening mammogram (V76 12) (Z12 31)   35  Vulvar atrophy (624 1) (N90 5)   36  Vulvar itching (698 1) (L29 2)    Current Meds   1  AmLODIPine Besylate 5 MG Oral Tablet; Take 1 tablet daily;    Therapy: 95BZY9652 to (Evaluate:50Mtl4389)  Requested for: 89WGN2787; Last Rx:14Mar2016 Ordered   2  Atorvastatin Calcium 10 MG Oral Tablet; Take 1 tablet daily; Therapy: 29EIM0947 to (Uzma Oris)  Requested for: 78CYL8750; Last   Rx:28Sep2015 Ordered   3  B-Complex Balanced TABS; Therapy: (Recorded:80Jey5595) to Recorded   4  Centrum Silver Oral Tablet; Therapy: (Recorded:88Lms4530) to Recorded   5  Esomeprazole Magnesium 40 MG Oral Capsule Delayed Release; take 1 capsule daily; Therapy: 53Tim6170 to (Evaluate:01Oct2016)  Requested for: 04Apr2016; Last   Rx:04Apr2016 Ordered   6  Famotidine 20 MG Oral Tablet; TAKE 1 TABLET DAILY AT BEDTIME; Therapy: 82HQS0921 to (Complete:79Oam3347)  Requested for: 74AGE0526; Last   Rx:19Jan2016 Ordered   7  Hydrochlorothiazide 12 5 MG Oral Capsule; TAKE 1-2  CAPSULEs  DAILY WITH   AMLODIPINE; Therapy: 47KEX8820 to (Lesly Huddleston)  Requested for: 12LQK4259; Last   Rx:01Jun2015 Ordered   8  Levothyroxine Sodium 150 MCG Oral Tablet; Take 1 tablet daily; Therapy: 97GOC5414 to (Evaluate:28Jun2015)  Requested for: 29Apr2015; Last   Rx:29Apr2015 Ordered   9  Multivitamins CAPS; Therapy: (Recorded:82Kta8486) to Recorded   10  Synthroid 150 MCG Oral Tablet; Take 1 tablet daily; Therapy: 62ZNN7116 to (Uzma Oris)  Requested for: 08WON4196; Last    Rx:23Mar2016 Ordered    Allergies    1  No Known Drug Allergies    2   Latex    Results/Data  Hemoccult Screening - POC 15Apr2016 10:13AM [employer]     Test Name Result Flag Reference   Hemoccult Positive       Hemoccult Screening - POC 15Apr2016 10:13AM [employer]     Test Name Result Flag Reference   Hemoccult Positive       (Q) CBC (H/H, RBC, INDICES, WBC, PLT) 93QUA2274 07:24AM [employer]     Test Name Result Flag Reference   WHITE BLOOD CELL COUNT 5 1 Thousand/uL  3 8-10 8   RED BLOOD CELL COUNT 4 17 Million/uL  3 80-5 10   HEMOGLOBIN 11 5 g/dL L 11 7-15 5   HEMATOCRIT 35 3 %  35 0-45 0   MCV 84 8 fL  80 0-100 0   MCH 27 7 pg  27 0-33 0   MCHC 32 7 g/dL  32 0-36 0   RDW 15 9 % H 11 0-15 0   PLATELET COUNT 113 Thousand/uL  140-400   MPV 8 3 fL  7 5-11 5       Plan  Anemia    · Hemoccult Screening - POC; Status:Resulted - Requires Verification;   Done: 20IID7937  10:13AM    Future Appointments    Date/Time Provider Specialty Site   07/22/2016 07:30 AM LINDA Shah  Family Medicine [address] [place] [address]     Signatures   Electronically signed by :  LINDA Meyer ; Apr 15 2016 11:01AM EST                       (Author)

## 2018-01-14 VITALS
DIASTOLIC BLOOD PRESSURE: 82 MMHG | HEART RATE: 80 BPM | SYSTOLIC BLOOD PRESSURE: 128 MMHG | TEMPERATURE: 99.4 F | OXYGEN SATURATION: 99 % | WEIGHT: 201.25 LBS | BODY MASS INDEX: 32.34 KG/M2 | HEIGHT: 66 IN

## 2018-01-14 VITALS
HEART RATE: 84 BPM | DIASTOLIC BLOOD PRESSURE: 72 MMHG | HEIGHT: 66 IN | OXYGEN SATURATION: 95 % | WEIGHT: 215 LBS | SYSTOLIC BLOOD PRESSURE: 126 MMHG | BODY MASS INDEX: 34.55 KG/M2

## 2018-01-15 NOTE — RESULT NOTES
Verified Results  * MRA HEAD WO CONTRAST 19Apr2016 08:36AM Incoencia Craig Order Number: [id number]     Test Name Result Flag Reference   MRA HEAD WO CONTRAST (Report)     MRA BRAIN     INDICATION: Family history aneurysm     COMPARISON: None  TECHNIQUE: Axial 3-D time-of-flight imaging with 3-D reconstructions  FINDINGS:     IMAGE QUALITY: Diagnostic  ANATOMY     INTERNAL CAROTID ARTERIES: Normal flow related enhancement of the distal cervical, petrous and cavernous segments of the internal carotid arteries  Normal ICA terminus  ANTERIOR CIRCULATION: A1 segments are symmetric  Azygos type AII segment  As the vessel wraps around the anterior course corpus callosum it becomes slightly ectatic at where it gives rise to the contralateral distally 3 vessels discrete aneurysmal is    not identified  MIDDLE CEREBRAL ARTERY CIRCULATION: The M1 segment and middle cerebral artery branches demonstrate normal flow-related enhancement  DISTAL VERTEBRAL ARTERIES: Distal vertebral arteries are patient with a normal vertebrobasilar junction  The posterior inferior cerebellar artery origins are normal       BASILAR ARTERY: Normal      POSTERIOR CEREBRAL ARTERIES: Posterior cerebral arteries are normal  Prominent right posterior communicating artery with hypoplasia right P1 segment  Left P-comm is not identified with certainty  ]       IMPRESSION:     No large vessel flow restrictive disease  Although there is ectasia at termination of the azygos type A2 segment, no discrete aneurysm is detected  Workstation performed: [id number]     Signed by: Austin Smith MD   4/19/16       Signatures   Electronically signed by :  LINDA Rust ; Apr 21 2016  6:29PM EST                       (Author)

## 2018-01-15 NOTE — MISCELLANEOUS
Message  I spoke with patient  Slight decrease of hemoglobin  Positive Hemoccult today  She did notice bright red blood per rectum  I advised patient to contact her gastroenterologist, Dr Main Graham, and schedule EGD and colonoscopy  Patient understands instructions and agrees  Information faxed to Dr Yaneth Ware office      Signatures   Electronically signed by :  LINDA Butterfield ; Apr 15 2016 11:08AM EST                       (Author)

## 2018-01-22 VITALS
BODY MASS INDEX: 34.72 KG/M2 | DIASTOLIC BLOOD PRESSURE: 80 MMHG | WEIGHT: 216 LBS | HEIGHT: 66 IN | SYSTOLIC BLOOD PRESSURE: 130 MMHG

## 2018-01-22 VITALS
SYSTOLIC BLOOD PRESSURE: 122 MMHG | OXYGEN SATURATION: 91 % | DIASTOLIC BLOOD PRESSURE: 84 MMHG | HEART RATE: 108 BPM | RESPIRATION RATE: 16 BRPM | WEIGHT: 216.5 LBS | BODY MASS INDEX: 34.79 KG/M2 | TEMPERATURE: 97.8 F | HEIGHT: 66 IN

## 2018-01-23 VITALS
HEIGHT: 66 IN | WEIGHT: 214.5 LBS | TEMPERATURE: 97.5 F | SYSTOLIC BLOOD PRESSURE: 120 MMHG | DIASTOLIC BLOOD PRESSURE: 80 MMHG | BODY MASS INDEX: 34.47 KG/M2 | HEART RATE: 82 BPM

## 2018-03-01 DIAGNOSIS — E78.5 HYPERLIPIDEMIA: ICD-10-CM

## 2018-03-01 DIAGNOSIS — Z00.00 ENCOUNTER FOR GENERAL ADULT MEDICAL EXAMINATION WITHOUT ABNORMAL FINDINGS: ICD-10-CM

## 2018-03-01 DIAGNOSIS — E03.9 HYPOTHYROIDISM: ICD-10-CM

## 2018-03-01 DIAGNOSIS — I10 ESSENTIAL (PRIMARY) HYPERTENSION: ICD-10-CM

## 2018-04-01 DIAGNOSIS — K76.89 OTHER SPECIFIED DISEASES OF LIVER (CODE): ICD-10-CM

## 2018-04-05 ENCOUNTER — TELEPHONE (OUTPATIENT)
Dept: FAMILY MEDICINE CLINIC | Facility: CLINIC | Age: 68
End: 2018-04-05

## 2018-04-05 LAB
ALBUMIN SERPL-MCNC: 3.8 G/DL (ref 3.6–5.1)
ALBUMIN/GLOB SERPL: 1.2 (CALC) (ref 1–2.5)
ALP SERPL-CCNC: 130 U/L (ref 33–130)
ALT SERPL-CCNC: 15 U/L (ref 6–29)
AST SERPL-CCNC: 18 U/L (ref 10–35)
BILIRUB SERPL-MCNC: 0.8 MG/DL (ref 0.2–1.2)
BUN SERPL-MCNC: 15 MG/DL (ref 7–25)
BUN/CREAT SERPL: ABNORMAL (CALC) (ref 6–22)
CALCIUM SERPL-MCNC: 9.2 MG/DL (ref 8.6–10.4)
CHLORIDE SERPL-SCNC: 105 MMOL/L (ref 98–110)
CHOLEST SERPL-MCNC: 158 MG/DL
CHOLEST/HDLC SERPL: 2.6 (CALC)
CO2 SERPL-SCNC: 29 MMOL/L (ref 20–31)
CREAT SERPL-MCNC: 0.99 MG/DL (ref 0.5–0.99)
ERYTHROCYTE [DISTWIDTH] IN BLOOD BY AUTOMATED COUNT: 13.2 % (ref 11–15)
GLOBULIN SER CALC-MCNC: 3.2 G/DL (CALC) (ref 1.9–3.7)
GLUCOSE SERPL-MCNC: 103 MG/DL (ref 65–99)
HCT VFR BLD AUTO: 34.2 % (ref 35–45)
HDLC SERPL-MCNC: 61 MG/DL
HGB BLD-MCNC: 11.1 G/DL (ref 11.7–15.5)
LDLC SERPL CALC-MCNC: 79 MG/DL (CALC)
MCH RBC QN AUTO: 28.5 PG (ref 27–33)
MCHC RBC AUTO-ENTMCNC: 32.5 G/DL (ref 32–36)
MCV RBC AUTO: 87.7 FL (ref 80–100)
NONHDLC SERPL-MCNC: 97 MG/DL (CALC)
PLATELET # BLD AUTO: 192 THOUSAND/UL (ref 140–400)
PMV BLD REES-ECKER: 10.2 FL (ref 7.5–12.5)
POTASSIUM SERPL-SCNC: 3.8 MMOL/L (ref 3.5–5.3)
PROT SERPL-MCNC: 7 G/DL (ref 6.1–8.1)
RBC # BLD AUTO: 3.9 MILLION/UL (ref 3.8–5.1)
SL AMB EGFR AFRICAN AMERICAN: 68 ML/MIN/1.73M2
SL AMB EGFR NON AFRICAN AMERICAN: 59 ML/MIN/1.73M2
SODIUM SERPL-SCNC: 140 MMOL/L (ref 135–146)
TRIGL SERPL-MCNC: 96 MG/DL
TSH SERPL-ACNC: 1.06 MIU/L (ref 0.4–4.5)
WBC # BLD AUTO: 5.1 THOUSAND/UL (ref 3.8–10.8)

## 2018-04-05 NOTE — TELEPHONE ENCOUNTER
----- Message from Emily Schwartz MD sent at 4/5/2018  1:00 PM EDT -----  Please contact patient  Her blood work was normal aside from slightly decreased hemoglobin, mild anemia  Please schedule checkup office visit within next few weeks  Please continue same daily medications    Thanks

## 2018-04-09 ENCOUNTER — HOSPITAL ENCOUNTER (OUTPATIENT)
Dept: MRI IMAGING | Facility: HOSPITAL | Age: 68
Discharge: HOME | End: 2018-04-09
Attending: SURGERY
Payer: MEDICARE

## 2018-04-09 DIAGNOSIS — IMO0001 LIVER REGENERATION: ICD-10-CM

## 2018-04-09 PROCEDURE — 74183 MRI ABD W/O CNTR FLWD CNTR: CPT

## 2018-04-09 PROCEDURE — A9577 INJ MULTIHANCE: HCPCS | Performed by: SURGERY

## 2018-04-09 RX ADMIN — GADOBENATE DIMEGLUMINE 18 ML: 529 INJECTION, SOLUTION INTRAVENOUS at 08:36

## 2018-04-16 RX ORDER — ESTRADIOL 0.1 MG/G
CREAM VAGINAL
COMMUNITY
Start: 2016-09-15

## 2018-04-17 ENCOUNTER — OFFICE VISIT (OUTPATIENT)
Dept: SURGICAL ONCOLOGY | Facility: CLINIC | Age: 68
End: 2018-04-17
Payer: MEDICARE

## 2018-04-17 VITALS
TEMPERATURE: 98.4 F | BODY MASS INDEX: 33.11 KG/M2 | SYSTOLIC BLOOD PRESSURE: 132 MMHG | HEIGHT: 66 IN | DIASTOLIC BLOOD PRESSURE: 82 MMHG | RESPIRATION RATE: 14 BRPM | HEART RATE: 86 BPM | WEIGHT: 206 LBS

## 2018-04-17 DIAGNOSIS — K76.89 HEPATIC CYST: Primary | ICD-10-CM

## 2018-04-17 PROCEDURE — 99215 OFFICE O/P EST HI 40 MIN: CPT | Performed by: SURGERY

## 2018-04-17 NOTE — LETTER
April 17, 2018     Jesse Patrick MD  [address]    Patient: Yarely Muhammad   YOB: 1950   Date of Visit: 4/17/2018       Dear Dr Patria Rees: Thank you for referring Hayder Mccall to me for evaluation  Below are my notes for this consultation  If you have questions, please do not hesitate to call me  I look forward to following your patient along with you  Sincerely,        Shaheed Costa MD        CC: No Recipients  Shaheed Costa MD  4/17/2018 10:12 AM  Sign at close encounter               Surgical Oncology Follow Up       [address]  1950  [id number]  [address] [facility] [address] [address]  Diagnoses and all orders for this visit:    Hepatic cyst  -     Case request operating room: Laparoscopic marsupialization of liver cyst, possible open; Standing  -     Type and screen; Future  -     Comprehensive metabolic panel; Future  -     CBC and Platelet; Future  -     Protime-INR; Future  -     APTT; Future  -     EKG 12 lead; Future  -     XR chest pa & lateral; Future  -     Case request operating room: Laparoscopic marsupialization of liver cyst, possible open    Other orders  -     Calcium Carb-Cholecalciferol 600-800 MG-UNIT TABS; Take by mouth  -     estradiol (ESTRACE VAGINAL) 0 1 mg/g vaginal cream; Insert into the vagina  -     Incentive spirometry; Standing  -     Insert and maintain IV line; Standing  -     Void On-Call to O R ; Standing  -     Place sequential compression device; Standing  -     ceFAZolin (ANCEF) 2,000 mg in dextrose 5 % 100 mL IVPB; Infuse 2,000 mg into a venous catheter once       Chief Complaint   Patient presents with    Follow-up     Pt here for 3 mo f/u for liver cyst   MRI was done on 4/9  No history exists  History of Present Illness:   Patient returns in follow-up of hepatic cyst   This was initially discovered in February 2013 when it was 15 cm  This has slowly increased in size  MRI from April 9, 2018 reveals that this is nonenhancing and now measures 21 x 16 x 24 cm  This has increased in size by 1-2 cm over the last 3 months  There are several small nonenhancing septations  There are no enhancing or solid components  I personally reviewed the films  She does complain of some he intermittent twinges of right upper quadrant pain just below the ribs  These occur once every 2-3 days  They resolved spontaneously after a few minutes  Review of Systems  Complete ROS Surg Onc:   Complete ROS Surg Onc:   Constitutional: The patient denies new or recent history of general fatigue, no recent weight loss, no change in appetite  Eyes: No complaints of visual problems, no scleral icterus  ENT: no complaints of ear pain, no hoarseness, no difficulty swallowing,  no tinnitus and no new masses in head, oral cavity, or neck  Cardiovascular: No complaints of chest pain, no palpitations, no ankle edema  Respiratory: No complaints of shortness of breath, no cough  Gastrointestinal: No complaints of jaundice, no bloody stools, no pale stools  Genitourinary: No complaints of dysuria, no hematuria, no nocturia, no frequent urination, no urethral discharge  Musculoskeletal: No complaints of weakness, paralysis, joint stiffness or arthralgias  Integumentary: No complaints of rash, no new lesions  Neurological: No complaints of convulsions, no seizures, no dizziness  Hematologic/Lymphatic: No complaints of easy bruising  Endocrine:  No hot or cold intolerance  No polydipsia, polyphagia, or polyuria  Allergy/immunology:  No environmental allergies  No food allergies  Not immunocompromised  Skin:  No pallor or rash  No wound          Patient Active Problem List   Diagnosis    Paraesophageal hernia    Benign essential hypertension    Disc degeneration, lumbar    Dysphagia    Hepatic cyst    Hyperlipidemia    Hypothyroidism    Iron deficiency anemia due to chronic blood loss    Liver enlargement    Osteoporosis    Severe obstructive sleep apnea    Shoulder impingement    Tinea corporis     Past Medical History:   Diagnosis Date    Cystic breast     Disease of thyroid gland     Dyspareunia, female     last assessed 9/4/14    Dysphagia     Esophageal reflux     Hiatal hernia     Hyperlipidemia     Hypertension     Insomnia     Osteoporosis     Sleep apnea     Thyroid disease     Varicose veins of lower extremity     last assessed 1/4/13     Past Surgical History:   Procedure Laterality Date    COLONOSCOPY      complete 5/2016 - Dr Rdz Noe due in 2019 - last assessed  3/17/17    NEUROMA EXCISION      VT ESOPHAGOGASTRODUODENOSCOPY TRANSORAL DIAGNOSTIC N/A 8/10/2016    Procedure: ESOPHAGOGASTRODUODENOSCOPY (EGD); Surgeon: Lukas Buck MD;  Location: [facility]; Service: Gastroenterology    VT ESOPHAGOSCOPY FLEXIBLE TRANSORAL WITH BIOPSY N/A 11/3/2016    Procedure: ESOPHAGOGASTRODUODENOSCOPY (EGD);   Surgeon: Lory Coreas MD;  [facility];  Service: Thoracic    VT LAP, REPAIR PARAESOPHAGEAL HERNIA, INCL FUNDOPLASTY W/ MESH Left 11/3/2016    Procedure: LAPAROSCOPIC PARAESOPHAGEAL HERNIA REPAIR WITH MESH;NISSEN FUNDOPLICATION ;  Surgeon: Lory Coreas MD;  [facility];  Service: Thoracic    VT LAP, VENTRAL HERNIA REPAIR,REDUCIBLE N/A 10/30/2017    Procedure: LAPAROSCOPIC INCISIONAL HERNIA REPAIR X 2 WITH ECHO MESH;  Surgeon: Anne Tierney DO;  [facility];  Service: General    VT REPAIR INCISIONAL HERNIA,REDUCIBLE N/A 1/13/2017    Procedure: INCISIONAL HERNIA REPAIR ;  Surgeon: Anne Tierney DO;  [facility];  Service: General    TUBAL LIGATION       Family History   Problem Relation Age of Onset    Heart disease Mother    Dionna Padron Aneurysm Mother      cerebral    Alcohol abuse Father     Cancer Father     COPD Father     Heart failure Father     Hypertension Father     Cancer Family      Social History     Social History    Marital status: /Civil Union     Spouse name: N/A    Number of children: N/A    Years of education: N/A     Occupational History    Not on file  Social History Main Topics    Smoking status: Never Smoker    Smokeless tobacco: Never Used    Alcohol use Yes      Comment: social    Drug use: No    Sexual activity: Yes     Partners: Male     Birth control/ protection: None     Other Topics Concern    Not on file     Social History Narrative    Coffee    Exercise - walking       Current Outpatient Prescriptions:     estradiol (ESTRACE VAGINAL) 0 1 mg/g vaginal cream, Insert into the vagina, Disp: , Rfl:     amLODIPine (NORVASC) 5 mg tablet, Take 5 mg by mouth daily  , Disp: , Rfl:     atorvastatin (LIPITOR) 10 mg tablet, Take 10 mg by mouth daily  , Disp: , Rfl:     B Complex-C (SUPER B COMPLEX PO), Take 1 capsule by mouth , Disp: , Rfl:     Calcium Carb-Cholecalciferol 600-800 MG-UNIT TABS, Take by mouth, Disp: , Rfl:     Cholecalciferol (VITAMIN D3) 2000 UNITS TABS, Take 1 tablet by mouth daily  , Disp: , Rfl:     co-enzyme Q-10 30 MG capsule, Take 30 mg by mouth 3 (three) times a day , Disp: , Rfl:     hydrochlorothiazide (HYDRODIURIL) 12 5 mg tablet, Take 12 5 mg by mouth daily  , Disp: , Rfl:     levothyroxine 150 mcg tablet, Take 150 mcg by mouth daily  , Disp: , Rfl:     Multiple Vitamins-Minerals (CENTRUM SILVER PO), Take 1 tablet by mouth daily  , Disp: , Rfl:   Allergies   Allergen Reactions    Latex Anaphylaxis and Other (See Comments)     Glue from bandaide, can wear regular clothing and eat bananns    Adhesive  [Medical Tape]     Other Other (See Comments)     Skin breakdown from bandaid on for long time- reddness     Vitals:    04/17/18 0942   BP: 132/82   Pulse: 86   Resp: 14 Temp: 98 4 °F (36 9 °C)       Physical Exam  Constitutional: General appearance: The Patient is well-developed and well-nourished who appears the stated age in no acute distress  Patient is pleasant and talkative  HEENT:  Normocephalic  Sclerae are anicteric  Mucous membranes are moist  Neck is supple without adenopathy  No JVD  Chest: The lungs are clear to auscultation  Cardiac: Heart is regular rate  Abdomen: Abdomen is soft, non-tender, non-distended and without masses  Extremities: There is no clubbing or cyanosis  There is no edema  Symmetric  Neuro: Grossly nonfocal  Gait is normal      Lymphatic: No evidence of cervical adenopathy bilaterally  No evidence of axillary adenopathy bilaterally  No evidence of inguinal adenopathy bilaterally  Skin: Warm, anicteric  Psych:  Patient is pleasant and talkative  Breasts:        Pathology:      Labs:      Imaging  Mri Abdomen W Wo Contrast    Result Date: 4/10/2018  Narrative: MRI OF THE ABDOMEN (LIVER) WITH AND WITHOUT CONTRAST INDICATION:  79-year-old female for follow-up hepatic cyst  COMPARISON:  1/8/2018  TECHNIQUE:  The following pulse sequences were obtained on a 1 5 T scanner:  Coronal and axial T2 with TE of 90 and 180 respectively, axial T2 with fat saturation, axial FIESTA fat-sat, axial T1-weighted in-and-out-of phase, axial DWI/ADC, pre-contrast axial T1 with fat saturation, post-contrast dynamic axial T1 with fat saturation at 20, 70, and 180 seconds, followed by coronal and 7 minute delayed axial T1 with fat saturation  IV Contrast:  18 mL of gadobenate dimeglumine (MULTIHANCE) FINDINGS: LIVER:  General:  Normal in size and contour  No loss of signal on out-of-phase images to suggest hepatic steatosis  2 cm cavernous hemangioma within the left hepatic lobe is a stable finding   Lesions: A very large nonenhancing cyst replacing the majority of the right hepatic lobe is again noted measuring 21 x 16 x 24 cm having measured 19 x 15 x 22 cm previously  There are several thin nonenhancing septations throughout the cyst with no enhancing solid component  As described previously, there are several intrahepatic dilated ducts in the left hepatic lobe at the site of the hiatal hernia repair which do not enhance and may be a sequela of prior surgery  Surveillance recommended  Vasculature:  Portal and hepatic veins patent without evidence of thrombosis  BILIARY TREE:  Normal   GALLBLADDER:  Normal  PANCREAS:  Normal  ADRENAL GLANDS:  Normal  SPLEEN:  Multiple hemangiomas throughout the spleen are stable  KIDNEYS:  No hydronephrosis or solid mass lesions  Stable left hepatic cyst   The right kidney is displaced both inferiorly and medially and very to the large cyst  ABDOMINAL CAVITY:  No lymphadenopathy or mass  No ascites  BOWEL:  Unremarkable MRI appearance  OSSEOUS STRUCTURES:  No osseous destruction  EXTRAHEPATIC VASCULAR STRUCTURES:  Visualized vasculature is normal  ABDOMINAL WALL:  Stable postoperative changes of the ventral abdominal wall  LOWER CHEST:  Unremarkable MRI appearance  Impression: 1  Slight interval enlargement of 21 x 16 x 24 cm nonenhancing right hepatic lobe cyst containing very thin septations  2   Stable nonenhancing focal intrahepatic biliary duct dilatation in the left hepatic lobe abutting the site of hiatal hernia repair, likely postsurgical   Recommend surveillance  Workstation performed: [id number]     I reviewed the above laboratory and imaging data  Discussion/Summary:   42-year-old female with an enlarging right hepatic lobe cyst that has thin septations  The septations are nonenhancing  There is no soft tissue component  There was also a question of a cholangiocarcinoma related to a dilated duct on previous imaging  I suspect this is related to Surgical Trauma from her hiatal hernia repair   I suspect the cyst is benign and we are not dealing with cystadenoma or cystadenocarcinoma since there is no enhancing nodules  We discussed multiple treatment options including observation, unroofing of the cystic lesion and sending this to pathology because this may be symptomatic in causing her early satiety  We also discussed resection/enucleation of the cyst  Since this is increasing in size, even that we feel it is benign we have elected to proceed with laparoscopic marsupialization of the cyst, possible open marsupialization/resection  Risks were explained including bleeding, infection, recurrence, need for further surgery, wound complications, adjacent organ injury, bile leak, sepsis, mi, DVT, stroke, pulmonary embolism, and death  Informed consent was obtained  We will schedule this at our earliest mutual convenience  She does have a history incisional hernia repair with mesh  I told her we will try to avoid the mesh in terms of our port placement  She is agreeable to this  All of her questions were answered

## 2018-04-17 NOTE — PROGRESS NOTES
Surgical Oncology Follow Up       [address] [place] [address] CARE [facility] SURGICAL ONCOLOGY [place] [id number]    Arrie Drilling  1950  [id number]  [address] [place] [address] SURGICAL ONCOLOGY [address] [address]  Diagnoses and all orders for this visit:    Hepatic cyst  -     Case request operating room: Laparoscopic marsupialization of liver cyst, possible open; Standing  -     Type and screen; Future  -     Comprehensive metabolic panel; Future  -     CBC and Platelet; Future  -     Protime-INR; Future  -     APTT; Future  -     EKG 12 lead; Future  -     XR chest pa & lateral; Future  -     Case request operating room: Laparoscopic marsupialization of liver cyst, possible open    Other orders  -     Calcium Carb-Cholecalciferol 600-800 MG-UNIT TABS; Take by mouth  -     estradiol (ESTRACE VAGINAL) 0 1 mg/g vaginal cream; Insert into the vagina  -     Incentive spirometry; Standing  -     Insert and maintain IV line; Standing  -     Void On-Call to O R ; Standing  -     Place sequential compression device; Standing  -     ceFAZolin (ANCEF) 2,000 mg in dextrose 5 % 100 mL IVPB; Infuse 2,000 mg into a venous catheter once       Chief Complaint   Patient presents with    Follow-up     Pt here for 3 mo f/u for liver cyst   MRI was done on 4/9  No history exists  History of Present Illness:   Patient returns in follow-up of hepatic cyst   This was initially discovered in February 2013 when it was 15 cm  This has slowly increased in size  MRI from April 9, 2018 reveals that this is nonenhancing and now measures 21 x 16 x 24 cm  This has increased in size by 1-2 cm over the last 3 months  There are several small nonenhancing septations  There are no enhancing or solid components  I personally reviewed the films    She does complain of some he intermittent twinges of right upper quadrant pain just below the ribs  These occur once every 2-3 days  They resolved spontaneously after a few minutes  Review of Systems  Complete ROS Surg Onc:   Complete ROS Surg Onc:   Constitutional: The patient denies new or recent history of general fatigue, no recent weight loss, no change in appetite  Eyes: No complaints of visual problems, no scleral icterus  ENT: no complaints of ear pain, no hoarseness, no difficulty swallowing,  no tinnitus and no new masses in head, oral cavity, or neck  Cardiovascular: No complaints of chest pain, no palpitations, no ankle edema  Respiratory: No complaints of shortness of breath, no cough  Gastrointestinal: No complaints of jaundice, no bloody stools, no pale stools  Genitourinary: No complaints of dysuria, no hematuria, no nocturia, no frequent urination, no urethral discharge  Musculoskeletal: No complaints of weakness, paralysis, joint stiffness or arthralgias  Integumentary: No complaints of rash, no new lesions  Neurological: No complaints of convulsions, no seizures, no dizziness  Hematologic/Lymphatic: No complaints of easy bruising  Endocrine:  No hot or cold intolerance  No polydipsia, polyphagia, or polyuria  Allergy/immunology:  No environmental allergies  No food allergies  Not immunocompromised  Skin:  No pallor or rash  No wound          Patient Active Problem List   Diagnosis    Paraesophageal hernia    Benign essential hypertension    Disc degeneration, lumbar    Dysphagia    Hepatic cyst    Hyperlipidemia    Hypothyroidism    Iron deficiency anemia due to chronic blood loss    Liver enlargement    Osteoporosis    Severe obstructive sleep apnea    Shoulder impingement    Tinea corporis     Past Medical History:   Diagnosis Date    Cystic breast     Disease of thyroid gland     Dyspareunia, female     last assessed 9/4/14    Dysphagia     Esophageal reflux     Hiatal hernia     Hyperlipidemia     Hypertension     Insomnia  Osteoporosis     Sleep apnea     Thyroid disease     Varicose veins of lower extremity     last assessed 1/4/13     Past Surgical History:   Procedure Laterality Date    COLONOSCOPY      complete 5/2016 - Dr Rena Wasserman due in 2019 - last assessed  3/17/17    NEUROMA EXCISION      MS ESOPHAGOGASTRODUODENOSCOPY TRANSORAL DIAGNOSTIC N/A 8/10/2016    Procedure: ESOPHAGOGASTRODUODENOSCOPY (EGD); Surgeon: Glendy Cadena MD;  [facility]; Service: Gastroenterology    MS ESOPHAGOSCOPY FLEXIBLE TRANSORAL WITH BIOPSY N/A 11/3/2016    Procedure: ESOPHAGOGASTRODUODENOSCOPY (EGD); Surgeon: Annika Ryan MD;  [facility];  Service: Thoracic    MS LAP, REPAIR PARAESOPHAGEAL HERNIA, INCL FUNDOPLASTY W/ MESH Left 11/3/2016    Procedure: LAPAROSCOPIC PARAESOPHAGEAL HERNIA REPAIR WITH MESH;NISSEN FUNDOPLICATION ;  Surgeon: Annika Ryan MD;  [facility];  Service: Thoracic    MS LAP, VENTRAL HERNIA REPAIR,REDUCIBLE N/A 10/30/2017    Procedure: LAPAROSCOPIC INCISIONAL HERNIA REPAIR X 2 WITH ECHO MESH;  Surgeon: Rafia Strong DO;  [facility];  Service: General    MS REPAIR INCISIONAL HERNIA,REDUCIBLE N/A 1/13/2017    Procedure: INCISIONAL HERNIA REPAIR ;  Surgeon: Rafia Strong DO;  [facility];  Service: General    TUBAL LIGATION       Family History   Problem Relation Age of Onset    Heart disease Mother     Aneurysm Mother      cerebral    Alcohol abuse Father     Cancer Father     COPD Father     Heart failure Father     Hypertension Father     Cancer Family      Social History     Social History    Marital status: /Civil Union     Spouse name: N/A    Number of children: N/A    Years of education: N/A     Occupational History    Not on file       Social History Main Topics    Smoking status: Never Smoker    Smokeless tobacco: Never Used    Alcohol use Yes      Comment: social    Drug use: No    Sexual activity: Yes     Partners: Male Birth control/ protection: None     Other Topics Concern    Not on file     Social History Narrative    Coffee    Exercise - walking       Current Outpatient Prescriptions:     estradiol (ESTRACE VAGINAL) 0 1 mg/g vaginal cream, Insert into the vagina, Disp: , Rfl:     amLODIPine (NORVASC) 5 mg tablet, Take 5 mg by mouth daily  , Disp: , Rfl:     atorvastatin (LIPITOR) 10 mg tablet, Take 10 mg by mouth daily  , Disp: , Rfl:     B Complex-C (SUPER B COMPLEX PO), Take 1 capsule by mouth , Disp: , Rfl:     Calcium Carb-Cholecalciferol 600-800 MG-UNIT TABS, Take by mouth, Disp: , Rfl:     Cholecalciferol (VITAMIN D3) 2000 UNITS TABS, Take 1 tablet by mouth daily  , Disp: , Rfl:     co-enzyme Q-10 30 MG capsule, Take 30 mg by mouth 3 (three) times a day , Disp: , Rfl:     hydrochlorothiazide (HYDRODIURIL) 12 5 mg tablet, Take 12 5 mg by mouth daily  , Disp: , Rfl:     levothyroxine 150 mcg tablet, Take 150 mcg by mouth daily  , Disp: , Rfl:     Multiple Vitamins-Minerals (CENTRUM SILVER PO), Take 1 tablet by mouth daily  , Disp: , Rfl:   Allergies   Allergen Reactions    Latex Anaphylaxis and Other (See Comments)     Glue from bandaide, can wear regular clothing and eat bananns    Adhesive  [Medical Tape]     Other Other (See Comments)     Skin breakdown from bandaid on for long time- reddness     Vitals:    04/17/18 0942   BP: 132/82   Pulse: 86   Resp: 14   Temp: 98 4 °F (36 9 °C)       Physical Exam  Constitutional: General appearance: The Patient is well-developed and well-nourished who appears the stated age in no acute distress  Patient is pleasant and talkative  HEENT:  Normocephalic  Sclerae are anicteric  Mucous membranes are moist  Neck is supple without adenopathy  No JVD  Chest: The lungs are clear to auscultation  Cardiac: Heart is regular rate  Abdomen: Abdomen is soft, non-tender, non-distended and without masses  Extremities: There is no clubbing or cyanosis   There is no edema   Symmetric  Neuro: Grossly nonfocal  Gait is normal      Lymphatic: No evidence of cervical adenopathy bilaterally  No evidence of axillary adenopathy bilaterally  No evidence of inguinal adenopathy bilaterally  Skin: Warm, anicteric  Psych:  Patient is pleasant and talkative  Breasts:        Pathology:      Labs:      Imaging  Mri Abdomen W Wo Contrast    Result Date: 4/10/2018  Narrative: MRI OF THE ABDOMEN (LIVER) WITH AND WITHOUT CONTRAST INDICATION:  70-year-old female for follow-up hepatic cyst  COMPARISON:  1/8/2018  TECHNIQUE:  The following pulse sequences were obtained on a 1 5 T scanner:  Coronal and axial T2 with TE of 90 and 180 respectively, axial T2 with fat saturation, axial FIESTA fat-sat, axial T1-weighted in-and-out-of phase, axial DWI/ADC, pre-contrast axial T1 with fat saturation, post-contrast dynamic axial T1 with fat saturation at 20, 70, and 180 seconds, followed by coronal and 7 minute delayed axial T1 with fat saturation  IV Contrast:  18 mL of gadobenate dimeglumine (MULTIHANCE) FINDINGS: LIVER:  General:  Normal in size and contour  No loss of signal on out-of-phase images to suggest hepatic steatosis  2 cm cavernous hemangioma within the left hepatic lobe is a stable finding  Lesions: A very large nonenhancing cyst replacing the majority of the right hepatic lobe is again noted measuring 21 x 16 x 24 cm having measured 19 x 15 x 22 cm previously  There are several thin nonenhancing septations throughout the cyst with no enhancing solid component  As described previously, there are several intrahepatic dilated ducts in the left hepatic lobe at the site of the hiatal hernia repair which do not enhance and may be a sequela of prior surgery  Surveillance recommended  Vasculature:  Portal and hepatic veins patent without evidence of thrombosis   BILIARY TREE:  Normal   GALLBLADDER:  Normal  PANCREAS:  Normal  ADRENAL GLANDS:  Normal  SPLEEN:  Multiple hemangiomas throughout the spleen are stable  KIDNEYS:  No hydronephrosis or solid mass lesions  Stable left hepatic cyst   The right kidney is displaced both inferiorly and medially and very to the large cyst  ABDOMINAL CAVITY:  No lymphadenopathy or mass  No ascites  BOWEL:  Unremarkable MRI appearance  OSSEOUS STRUCTURES:  No osseous destruction  EXTRAHEPATIC VASCULAR STRUCTURES:  Visualized vasculature is normal  ABDOMINAL WALL:  Stable postoperative changes of the ventral abdominal wall  LOWER CHEST:  Unremarkable MRI appearance  Impression: 1  Slight interval enlargement of 21 x 16 x 24 cm nonenhancing right hepatic lobe cyst containing very thin septations  2   Stable nonenhancing focal intrahepatic biliary duct dilatation in the left hepatic lobe abutting the site of hiatal hernia repair, likely postsurgical   Recommend surveillance  Workstation performed: [id number]     I reviewed the above laboratory and imaging data  Discussion/Summary:   51-year-old female with an enlarging right hepatic lobe cyst that has thin septations  The septations are nonenhancing  There is no soft tissue component  There was also a question of a cholangiocarcinoma related to a dilated duct on previous imaging  I suspect this is related to Surgical Trauma from her hiatal hernia repair  I suspect the cyst is benign and we are not dealing with cystadenoma or cystadenocarcinoma since there is no enhancing nodules  We discussed multiple treatment options including observation, unroofing of the cystic lesion and sending this to pathology because this may be symptomatic in causing her early satiety  We also discussed resection/enucleation of the cyst  Since this is increasing in size, even that we feel it is benign we have elected to proceed with laparoscopic marsupialization of the cyst, possible open marsupialization/resection    Risks were explained including bleeding, infection, recurrence, need for further surgery, wound complications, adjacent organ injury, bile leak, sepsis, mi, DVT, stroke, pulmonary embolism, and death  Informed consent was obtained  We will schedule this at our earliest mutual convenience  She does have a history incisional hernia repair with mesh  I told her we will try to avoid the mesh in terms of our port placement  She is agreeable to this  All of her questions were answered

## 2018-04-18 ENCOUNTER — OFFICE VISIT (OUTPATIENT)
Dept: FAMILY MEDICINE CLINIC | Facility: CLINIC | Age: 68
End: 2018-04-18
Payer: MEDICARE

## 2018-04-18 VITALS
WEIGHT: 206 LBS | TEMPERATURE: 97.9 F | HEART RATE: 80 BPM | RESPIRATION RATE: 16 BRPM | DIASTOLIC BLOOD PRESSURE: 80 MMHG | HEIGHT: 65 IN | SYSTOLIC BLOOD PRESSURE: 130 MMHG | BODY MASS INDEX: 34.32 KG/M2

## 2018-04-18 DIAGNOSIS — E78.5 HYPERLIPIDEMIA, UNSPECIFIED HYPERLIPIDEMIA TYPE: ICD-10-CM

## 2018-04-18 DIAGNOSIS — I10 BENIGN ESSENTIAL HYPERTENSION: Primary | ICD-10-CM

## 2018-04-18 DIAGNOSIS — E03.9 HYPOTHYROIDISM, UNSPECIFIED TYPE: ICD-10-CM

## 2018-04-18 DIAGNOSIS — Z00.00 ENCOUNTER FOR MEDICARE ANNUAL WELLNESS EXAM: ICD-10-CM

## 2018-04-18 DIAGNOSIS — K76.89 HEPATIC CYST: ICD-10-CM

## 2018-04-18 DIAGNOSIS — D64.9 ANEMIA, UNSPECIFIED TYPE: ICD-10-CM

## 2018-04-18 PROCEDURE — G0439 PPPS, SUBSEQ VISIT: HCPCS | Performed by: FAMILY MEDICINE

## 2018-04-18 PROCEDURE — 99214 OFFICE O/P EST MOD 30 MIN: CPT | Performed by: FAMILY MEDICINE

## 2018-04-18 NOTE — PROGRESS NOTES
HPI:  eJnnifer Merrill is a 79 y o  female here for her Subsequent Wellness Visit  Patient Active Problem List   Diagnosis    Benign essential hypertension    Disc degeneration, lumbar    Dysphagia    Hepatic cyst    Hyperlipidemia    Hypothyroidism    Liver enlargement    Osteoporosis    Severe obstructive sleep apnea    Shoulder impingement    Tinea corporis     Past Medical History:   Diagnosis Date    Cystic breast     Disease of thyroid gland     Dyspareunia, female     last assessed 9/4/14    Dysphagia     Esophageal reflux     Hiatal hernia     Hyperlipidemia     Hypertension     Insomnia     Osteoporosis     Sleep apnea     Thyroid disease     Varicose veins of lower extremity     last assessed 1/4/13     Past Surgical History:   Procedure Laterality Date    COLONOSCOPY      complete 5/2016 - Dr Lucy Shane due in 2019 - last assessed  3/17/17    NEUROMA EXCISION      AZ ESOPHAGOGASTRODUODENOSCOPY TRANSORAL DIAGNOSTIC N/A 8/10/2016    Procedure: ESOPHAGOGASTRODUODENOSCOPY (EGD); Surgeon: Marixa Perez MD;  [facility]; Service: Gastroenterology    AZ ESOPHAGOSCOPY FLEXIBLE TRANSORAL WITH BIOPSY N/A 11/3/2016    Procedure: ESOPHAGOGASTRODUODENOSCOPY (EGD);   Surgeon: Jaycob Daley MD;  [facility];  Service: Thoracic    AZ LAP, REPAIR PARAESOPHAGEAL HERNIA, INCL FUNDOPLASTY W/ MESH Left 11/3/2016    Procedure: LAPAROSCOPIC PARAESOPHAGEAL HERNIA REPAIR WITH MESH;NISSEN FUNDOPLICATION ;  Surgeon: Jaycob Daley MD;  [facility];  Service: Thoracic    AZ LAP, VENTRAL HERNIA REPAIR,REDUCIBLE N/A 10/30/2017    Procedure: LAPAROSCOPIC INCISIONAL HERNIA REPAIR X 2 WITH ECHO MESH;  Surgeon: Aiyana Herman DO;  [facility];  Service: General    AZ REPAIR INCISIONAL HERNIA,REDUCIBLE N/A 1/13/2017    Procedure: INCISIONAL HERNIA REPAIR ;  Surgeon: Aiyana Herman DO;  [facility];  Service: General    TUBAL LIGATION       Family History Problem Relation Age of Onset    Heart disease Mother     Aneurysm Mother      cerebral    Alcohol abuse Father     Cancer Father     COPD Father     Heart failure Father     Hypertension Father     Cancer Family      History   Smoking Status    Never Smoker   Smokeless Tobacco    Never Used     History   Alcohol Use    Yes     Comment: social      History   Drug Use No     /80 (BP Location: Left arm, Patient Position: Sitting, Cuff Size: Adult)   Pulse 80   Temp 97 9 °F (36 6 °C) (Tympanic)   Resp 16   Ht 5' 5 35" (1 66 m)   Wt 93 4 kg (206 lb)   BMI 33 91 kg/m²       Current Outpatient Prescriptions   Medication Sig Dispense Refill    amLODIPine (NORVASC) 5 mg tablet Take 5 mg by mouth daily   atorvastatin (LIPITOR) 10 mg tablet Take 10 mg by mouth daily   B Complex-C (SUPER B COMPLEX PO) Take 1 capsule by mouth   Calcium Carb-Cholecalciferol 600-800 MG-UNIT TABS Take by mouth      Cholecalciferol (VITAMIN D3) 2000 UNITS TABS Take 1 tablet by mouth daily   co-enzyme Q-10 30 MG capsule Take 30 mg by mouth 3 (three) times a day   estradiol (ESTRACE VAGINAL) 0 1 mg/g vaginal cream Insert into the vagina      hydrochlorothiazide (HYDRODIURIL) 12 5 mg tablet Take 12 5 mg by mouth daily   levothyroxine 150 mcg tablet Take 150 mcg by mouth daily   Multiple Vitamins-Minerals (CENTRUM SILVER PO) Take 1 tablet by mouth daily  No current facility-administered medications for this visit        Allergies   Allergen Reactions    Latex      Added based on information entered during case entry, please review and add reactions, type, and severity as needed    Other Other (See Comments)     Skin breakdown from bandaid on for long time- reddness     Immunization History   Administered Date(s) Administered    Influenza Quadrivalent Preservative Free 3 years and older IM 11/11/2016    Influenza Split High Dose Preservative Free IM 09/21/2015, 09/15/2017    Influenza TIV (IM) 11/01/2010, 01/24/2013, 09/22/2014    Pneumococcal Conjugate 13-Valent 01/19/2016    Pneumococcal Polysaccharide PPV23 09/15/2017    Tdap 11/09/2010       Patient Care Team:  Cherylene Quarry, MD as PCP - General  Hannah Mills, MD Cherylene Quarry, MD Floresita Desai, DO  Phil Disla, MD Jaylen Lux, DO    Medicare Screening Tests and Risk Assessments:  AWV Clinical     ISAR:   Previous hospitalizations?:  Yes   How many hospitalizations have you had in the last year?:  1-2   Additional Comments:  Hernia Surgery       Once in a Lifetime Medicare Screening:   EKG performed?:  No    AAA screening performed? (if performed, please add date to Health Maintenance):  No       Medicare Screening Tests and Risk Assessment:   AAA Risk Assessment    None Indicated:  Yes    Osteoporosis Risk Assessment     Female:  Yes   :  Yes :  No   Age over 48:  Yes Low body weight (<127lbs):  No   Tobacco use:  No Alcohol use:  Yes   Low calcium diet:  No PMHX of fractures:  No   FHX of fractures:  No    HIV Risk Assessment    None indicated:  Yes        Drug and Alcohol Use:   Tobacco use    Cigarettes:  never smoker    Tobacco use duration    Tobacco Cessation Readiness    Alcohol use    Alcohol use:  frequent use    Amount of alcohol consumed:  3 times a week   Concern about alcohol use:  No    Alcohol Treatment Readiness   Illicit Drug Use    Drug use:  never    Drug type:  no sedative use       Diet & Exercise:   Diet   What is your diet?:  Regular   How many servings a day of the following:   Fruits and Vegetables:  3-4 Meat:  1-2   Whole Grains:  1 Simple Carbs:  1   Dairy:  1 Soda:  0   Coffee:  2 Tea:  1   Exercise    Do you currently exercise?:  yes   Times per week:  4 Minutes per week:  900    Type of exercise:  walking       Cognitive Impairment Screening:   Depression screening preformed:  Yes     PHQ-9 Depression scale score:  0   Depression screening results:  negative for symptoms   Cognitive Impairment Screening    Do you have difficulty learning or retaining new information?:  No Do you have difficulty handling new tasks?:  No   Do you have difficulty with reasoning?:  No Do you have difficulty with spatial ability and orientation?:  No   Do you have difficulty with language?:  No Do you have difficulty with behavior?:  No       Functional Ability/Level of Safety:   Hearing    Hearing difficulties:  No Bilateral:  normal   Hearing aid:  No    Hearing Impairment Assessment    Hearing status:  No impairment   Current Activities    Status:  unlimited ADL's, unlimited IADL's, unlimited social activities, unlimited driving   Help needed with the folllowing:    Using the phone:  No Transportation:  No   Shopping:  No Preparing Meals:  No   Doing Housework:  No Doing Laundry:  No   Managing Medications:  No Managing Money:  No   ADL    Feeding:  Independant   Oral hygiene and Facial grooming:  Independant   Bathing:  Independant   Upper Body Dressing:  Independant   Lower Body Dressing:  Independant   Toileting:  Independant   Bed Mobility:  Independant   Fall Risk   Have you fallen in the last 12 months?:  Yes Are you unsteady on your feet?:  No   How many times?:  1 Are you taking any medications that may cause fatigue or dizziness?:  Yes    Do you rush to the bathroom potentially risking a fall?:  No   Injury History   Polypharmacy:  Yes Antidepressant Use:  No   Sedative Use:  No Antihypertensive Use:  Yes   Previous Fall:  Yes Alcohol Use:  Yes   Deconditioning:  No Visual Impairment:  No   Cogitive Impairment:  No Mmobility Impairment:  No   Postural Hypotension:  No Urinary Incontinence:  No       Home Safety:   Are there hazards in your environment?:  No   Home Safety Risk Factors   Unfamilar with surroundings:  No Uneven floors:  No   Stairs or handrail saftey risk:  No Loose rugs:  No   Household clutter:  No Poor household lighting:  No   No grab bars in bathroom:  No Further evaluation needed:  No       Advanced Directives:   Advanced Directives    Living Will:  No Durable POA for healthcare:  No   Advanced directive:  No    Patient's End of Life Decisions    End of life decisions comments:  Papers given and explained  Urinary Incontinence:   Do you have urinary incontinence?:  No        Glaucoma:            Provider Screening     Preventative Screening/Counseling:   Cardiovascular Screening/Counseling:   (Labs Q5 years, EKG optional one-time)   General:  Risks and Benefits Discussed           Diabetes Screening/Counseling:   (2 tests/year if Pre-Diabetes or 1 test/year if no Diabetes)   General:  Screening Current, Risks and Benefits Discussed           Colorectal Cancer Screening/Counseling:   (FOBT Q1 yr; Flex Sig Q4 yrs or Q10 yrs after Screening Colonoscopy; Screening Colonoscpy Q2 yrs High Risk or Q10 yrs Low Risk; Barium Enema Q2 yrs High Risk or Q4 yrs Low Risk)   General:  Risks and Benefits Discussed, Screening Current Counseling:  high fiber diet          Prostate Cancer Screening/Counseling:   (Annual)          Breast Cancer Screening/Counseling:   (Baseline Age 28 - 43; Annual Age 36+)   General:  Risks and Benefits Discussed, Screening Current          Cervical Cancer Screening/Counseling:   (Annual for High Risk or Childbearing Age with Abnormal Pap in Last 3 yrs; Every 2 all others)   General:  Screening Current, Risks and Benefits Discussed           Osteoporosis Screening/Counseling:   (Every 2 Yrs if at risk or more if medically necessary)         AAA Screening/Counseling:   (Once per Lifetime with risk factors)          Glaucoma Screening/Counseling:   (Annual)   General:  Screening Current          HIV Screening/Counseling:   (Voluntary; Once annually for high risk OR 3 times for Pregnancy at diagnosis of IUP; 3rd trimester; and at Labor         Hepatitis C Screening:             Immunizations:   Influenza (annual):   Influenza Recommended Annually, Influenza UTD This Year   Pneumococcal (Once in a Lifetime):  Lifetime Vaccine Completed   Tdap (Non-Medicare Wellness Visit required): Tdap Vaccine UTD       Other Preventative Couseling (Non-Medicare Wellness Visit Required):   nutrition counseling performed, weight reduction was discussed       Referrals (Non-Medicare Wellness Visit Required):   Surgeon (comment)       Medical Equipment/Suppliers:           No exam data present    Physical Exam : MMSE=30  Physical Exam  Patient presents for Medicare wellness exam   She is up-to-date with mammography, colonoscopy and gynecological exam   She has completed pneumonia vaccination  Recent blood work indicates mild anemia, she will proceed with Hemoccult testing now  Reviewed Updated [facility] Prior Wellness Visits:   Last Medicare wellness visit information was reviewed, patient interviewed , no change since last AWVyes  Last Medicare wellness visit information was reviewed, patient interviewed and updates made to the record today yes    Assessment and Plan:  1  Benign essential hypertension     2  Hypothyroidism, unspecified type     3  Hepatic cyst     4  Anemia, unspecified type  Iron Panel    Vitamin B12   5  Hyperlipidemia, unspecified hyperlipidemia type     6   Encounter for Medicare annual wellness exam         Health Maintenance Due   Topic Date Due    Hepatitis C Screening  1950    PAP SMEAR  1950    SLP PLAN OF CARE  1950    COLONOSCOPY  1950    Depression Screening PHQ-9  08/25/1962    Fall Risk  08/25/2015    Urinary Incontinence Screening  08/25/2015    GLAUCOMA SCREENING 67+ YR  08/25/2017

## 2018-04-20 DIAGNOSIS — I10 ESSENTIAL HYPERTENSION: Primary | ICD-10-CM

## 2018-04-20 RX ORDER — AMLODIPINE BESYLATE 5 MG/1
TABLET ORAL
Qty: 30 TABLET | Refills: 6 | Status: ON HOLD | OUTPATIENT
Start: 2018-04-20 | End: 2019-01-10

## 2018-04-21 DIAGNOSIS — E78.5 HYPERLIPIDEMIA, UNSPECIFIED HYPERLIPIDEMIA TYPE: Primary | ICD-10-CM

## 2018-04-21 RX ORDER — ATORVASTATIN CALCIUM 10 MG/1
TABLET, FILM COATED ORAL
Qty: 30 TABLET | Refills: 6 | Status: ON HOLD | OUTPATIENT
Start: 2018-04-21 | End: 2019-01-10

## 2018-04-24 ENCOUNTER — TRANSCRIBE ORDERS (OUTPATIENT)
Dept: LAB | Facility: HOSPITAL | Age: 68
End: 2018-04-24

## 2018-04-24 ENCOUNTER — HOSPITAL ENCOUNTER (OUTPATIENT)
Dept: RADIOLOGY | Facility: HOSPITAL | Age: 68
Discharge: HOME | End: 2018-04-24
Payer: MEDICARE

## 2018-04-24 ENCOUNTER — APPOINTMENT (OUTPATIENT)
Dept: LAB | Facility: HOSPITAL | Age: 68
End: 2018-04-24
Payer: MEDICARE

## 2018-04-24 ENCOUNTER — APPOINTMENT (OUTPATIENT)
Dept: LAB | Facility: HOSPITAL | Age: 68
End: 2018-04-24
Attending: SURGERY
Payer: MEDICARE

## 2018-04-24 ENCOUNTER — ANESTHESIA EVENT (OUTPATIENT)
Dept: PERIOP | Facility: HOSPITAL | Age: 68
DRG: 407 | End: 2018-04-24
Payer: MEDICARE

## 2018-04-24 DIAGNOSIS — K76.89 HEPATIC CYST: ICD-10-CM

## 2018-04-24 DIAGNOSIS — E78.5 HYPERLIPIDEMIA: ICD-10-CM

## 2018-04-24 DIAGNOSIS — I10 ESSENTIAL (PRIMARY) HYPERTENSION: ICD-10-CM

## 2018-04-24 DIAGNOSIS — E03.9 HYPOTHYROIDISM: ICD-10-CM

## 2018-04-24 DIAGNOSIS — K76.89 OTHER SPECIFIED DISEASES OF LIVER (CODE): ICD-10-CM

## 2018-04-24 DIAGNOSIS — R30.0 SCALDING PAIN ON URINATION: Primary | ICD-10-CM

## 2018-04-24 DIAGNOSIS — Z00.00 ENCOUNTER FOR GENERAL ADULT MEDICAL EXAMINATION WITHOUT ABNORMAL FINDINGS: ICD-10-CM

## 2018-04-24 LAB
ABO GROUP BLD: NORMAL
ALBUMIN SERPL BCP-MCNC: 3.5 G/DL (ref 3.5–5)
ALP SERPL-CCNC: 133 U/L (ref 46–116)
ALT SERPL W P-5'-P-CCNC: 22 U/L (ref 12–78)
ANION GAP SERPL CALCULATED.3IONS-SCNC: 6 MMOL/L (ref 4–13)
AST SERPL W P-5'-P-CCNC: 23 U/L (ref 5–45)
BACTERIA UR QL AUTO: ABNORMAL /HPF
BILIRUB SERPL-MCNC: 0.77 MG/DL (ref 0.2–1)
BILIRUB UR QL STRIP: NEGATIVE
BLD GP AB SCN SERPL QL: NEGATIVE
BUN SERPL-MCNC: 14 MG/DL (ref 5–25)
CALCIUM SERPL-MCNC: 9.2 MG/DL (ref 8.3–10.1)
CHLORIDE SERPL-SCNC: 106 MMOL/L (ref 100–108)
CHOLEST SERPL-MCNC: 132 MG/DL (ref 50–200)
CLARITY UR: ABNORMAL
CO2 SERPL-SCNC: 29 MMOL/L (ref 21–32)
COLOR UR: ABNORMAL
CREAT SERPL-MCNC: 0.95 MG/DL (ref 0.6–1.3)
ERYTHROCYTE [DISTWIDTH] IN BLOOD BY AUTOMATED COUNT: 14.5 % (ref 11.6–15.1)
GFR SERPL CREATININE-BSD FRML MDRD: 62 ML/MIN/1.73SQ M
GLUCOSE P FAST SERPL-MCNC: 94 MG/DL (ref 65–99)
GLUCOSE UR STRIP-MCNC: NEGATIVE MG/DL
HCT VFR BLD AUTO: 36.9 % (ref 34.8–46.1)
HDLC SERPL-MCNC: 62 MG/DL (ref 40–60)
HGB BLD-MCNC: 11.5 G/DL (ref 11.5–15.4)
HGB UR QL STRIP.AUTO: ABNORMAL
HYALINE CASTS #/AREA URNS LPF: ABNORMAL /LPF
INR PPP: 1.16 (ref 0.86–1.16)
KETONES UR STRIP-MCNC: ABNORMAL MG/DL
LDLC SERPL CALC-MCNC: 58 MG/DL (ref 0–100)
LEUKOCYTE ESTERASE UR QL STRIP: ABNORMAL
MCH RBC QN AUTO: 28.3 PG (ref 26.8–34.3)
MCHC RBC AUTO-ENTMCNC: 31.2 G/DL (ref 31.4–37.4)
MCV RBC AUTO: 91 FL (ref 82–98)
NITRITE UR QL STRIP: POSITIVE
NON-SQ EPI CELLS URNS QL MICRO: ABNORMAL /HPF
PH UR STRIP.AUTO: 6 [PH] (ref 4.5–8)
PLATELET # BLD AUTO: 196 THOUSANDS/UL (ref 149–390)
PMV BLD AUTO: 9.4 FL (ref 8.9–12.7)
POTASSIUM SERPL-SCNC: 4.1 MMOL/L (ref 3.5–5.3)
PROT SERPL-MCNC: 7.6 G/DL (ref 6.4–8.2)
PROT UR STRIP-MCNC: ABNORMAL MG/DL
PROTHROMBIN TIME: 14.8 SECONDS (ref 12.1–14.4)
RBC # BLD AUTO: 4.07 MILLION/UL (ref 3.81–5.12)
RBC #/AREA URNS AUTO: ABNORMAL /HPF
RH BLD: POSITIVE
SODIUM SERPL-SCNC: 141 MMOL/L (ref 136–145)
SP GR UR STRIP.AUTO: 1.02 (ref 1–1.03)
SPECIMEN EXPIRATION DATE: NORMAL
TRIGL SERPL-MCNC: 62 MG/DL
TSH SERPL DL<=0.05 MIU/L-ACNC: 0.39 UIU/ML (ref 0.36–3.74)
UROBILINOGEN UR QL STRIP.AUTO: 1 E.U./DL
WBC # BLD AUTO: 4.25 THOUSAND/UL (ref 4.31–10.16)
WBC #/AREA URNS AUTO: ABNORMAL /HPF

## 2018-04-24 PROCEDURE — 87077 CULTURE AEROBIC IDENTIFY: CPT

## 2018-04-24 PROCEDURE — 83550 IRON BINDING TEST: CPT

## 2018-04-24 PROCEDURE — 85610 PROTHROMBIN TIME: CPT

## 2018-04-24 PROCEDURE — 81001 URINALYSIS AUTO W/SCOPE: CPT

## 2018-04-24 PROCEDURE — 85730 THROMBOPLASTIN TIME PARTIAL: CPT

## 2018-04-24 PROCEDURE — 83540 ASSAY OF IRON: CPT

## 2018-04-24 PROCEDURE — 86850 RBC ANTIBODY SCREEN: CPT

## 2018-04-24 PROCEDURE — 85027 COMPLETE CBC AUTOMATED: CPT

## 2018-04-24 PROCEDURE — 36415 COLL VENOUS BLD VENIPUNCTURE: CPT

## 2018-04-24 PROCEDURE — 87086 URINE CULTURE/COLONY COUNT: CPT

## 2018-04-24 PROCEDURE — 71046 X-RAY EXAM CHEST 2 VIEWS: CPT

## 2018-04-24 PROCEDURE — 84443 ASSAY THYROID STIM HORMONE: CPT

## 2018-04-24 PROCEDURE — 86900 BLOOD TYPING SEROLOGIC ABO: CPT

## 2018-04-24 PROCEDURE — 87186 SC STD MICRODIL/AGAR DIL: CPT

## 2018-04-24 PROCEDURE — 93010 ELECTROCARDIOGRAM REPORT: CPT | Performed by: INTERNAL MEDICINE

## 2018-04-24 PROCEDURE — 80061 LIPID PANEL: CPT

## 2018-04-24 PROCEDURE — 86901 BLOOD TYPING SEROLOGIC RH(D): CPT

## 2018-04-24 PROCEDURE — 82728 ASSAY OF FERRITIN: CPT

## 2018-04-24 PROCEDURE — 93005 ELECTROCARDIOGRAM TRACING: CPT

## 2018-04-24 PROCEDURE — 80053 COMPREHEN METABOLIC PANEL: CPT

## 2018-04-24 PROCEDURE — 82607 VITAMIN B-12: CPT

## 2018-04-24 NOTE — PRE-PROCEDURE INSTRUCTIONS
Pre-Surgery Instructions:   Medication Instructions    amLODIPine (NORVASC) 5 mg tablet Instructed patient per Anesthesia Guidelines   atorvastatin (LIPITOR) 10 mg tablet Instructed patient per Anesthesia Guidelines   hydrochlorothiazide (HYDRODIURIL) 12 5 mg tablet Instructed patient per Anesthesia Guidelines   levothyroxine 150 mcg tablet Instructed patient per Anesthesia Guidelines  Diuretic Med Class     Continue this medication up to the evening before surgery/procedure, but do not take the morning of the day of surgery  Calcium Channel Blocker Med Class     Continue to take this heart medication on your normal schedule  If this is an oral medication and you take it in the morning, then you may take this medicine with a sip of water  Herbal Med Class     Stop taking this herbal medications at least one week prior to surgery/procedure  HRT Med Class     Continue to take this medication on your normal schedule  If this is an oral medication and you take it in the morning, then you may take this medicine with a sip of water  This medication may need to be discontinued for a least 4 weeks prior to your surgery if the surgical procedure is associated with a high risk of blod clots as in hip or knee replacement for example  Please consult with your Surgeon to discuss discontinuing this medication before your surgery  Statin Med Class     Continue to take this medication on your normal schedule  If this is an oral medication and you take it in the morning, then you may take this medicine with a sip of water  Thyroxine Med Class     Continue to take this medication on your normal schedule  If this is an oral medication and you take it in the morning, then you may take this medicine with a sip of water  Pre op instructions reviewed with office visit on 4/24  Meds bathing and hospital instructions reviewed at this time with understanding

## 2018-04-25 ENCOUNTER — CLINICAL SUPPORT (OUTPATIENT)
Dept: FAMILY MEDICINE CLINIC | Facility: CLINIC | Age: 68
End: 2018-04-25
Payer: MEDICARE

## 2018-04-25 DIAGNOSIS — N30.01 ACUTE CYSTITIS WITH HEMATURIA: Primary | ICD-10-CM

## 2018-04-25 DIAGNOSIS — D64.9 ANEMIA, UNSPECIFIED TYPE: Primary | ICD-10-CM

## 2018-04-25 PROBLEM — N39.0 URINARY TRACT INFECTION: Status: ACTIVE | Noted: 2018-04-25

## 2018-04-25 LAB
ATRIAL RATE: 73 BPM
P AXIS: 28 DEGREES
PR INTERVAL: 144 MS
QRS AXIS: -11 DEGREES
QRSD INTERVAL: 86 MS
QT INTERVAL: 382 MS
QTC INTERVAL: 420 MS
SL AMB POCT FECES OCC BLD: NORMAL
T WAVE AXIS: 15 DEGREES
VENTRICULAR RATE: 73 BPM

## 2018-04-25 PROCEDURE — 82270 OCCULT BLOOD FECES: CPT | Performed by: FAMILY MEDICINE

## 2018-04-25 RX ORDER — CIPROFLOXACIN 500 MG/1
500 TABLET, FILM COATED ORAL EVERY 12 HOURS SCHEDULED
Qty: 6 TABLET | Refills: 0 | Status: SHIPPED | OUTPATIENT
Start: 2018-04-25 | End: 2018-04-28

## 2018-04-27 ENCOUNTER — TELEPHONE (OUTPATIENT)
Dept: FAMILY MEDICINE CLINIC | Facility: CLINIC | Age: 68
End: 2018-04-27

## 2018-04-27 DIAGNOSIS — E61.1 IRON DEFICIENCY: Primary | ICD-10-CM

## 2018-04-27 LAB
BACTERIA UR CULT: ABNORMAL
BACTERIA UR CULT: ABNORMAL

## 2018-04-27 NOTE — TELEPHONE ENCOUNTER
Please contact patient  Her hemoccults were negative x3 which is good! Her hemoglobin on recent recheck him up from 11 1 to 11 5 which is good!    Rest of her blood work was normal   Her iron level is just slightly decreased  I advised her to start using iron tablets over-the-counter every other day    I would advise to repeat CBC and iron level in 2-3 months

## 2018-05-07 ENCOUNTER — HOSPITAL ENCOUNTER (INPATIENT)
Facility: HOSPITAL | Age: 68
LOS: 1 days | Discharge: HOME | DRG: 407 | End: 2018-05-08
Attending: SURGERY | Admitting: SURGERY
Payer: MEDICARE

## 2018-05-07 ENCOUNTER — ANESTHESIA (OUTPATIENT)
Dept: PERIOP | Facility: HOSPITAL | Age: 68
DRG: 407 | End: 2018-05-07
Payer: MEDICARE

## 2018-05-07 DIAGNOSIS — K76.89 HEPATIC CYST: ICD-10-CM

## 2018-05-07 LAB
PLATELET # BLD AUTO: 146 THOUSANDS/UL (ref 149–390)
PMV BLD AUTO: 9.7 FL (ref 8.9–12.7)

## 2018-05-07 PROCEDURE — 0FB04ZZ EXCISION OF LIVER, PERCUTANEOUS ENDOSCOPIC APPROACH: ICD-10-PCS | Performed by: SURGERY

## 2018-05-07 PROCEDURE — 47300 SURGERY FOR LIVER LESION: CPT | Performed by: SURGERY

## 2018-05-07 PROCEDURE — 85049 AUTOMATED PLATELET COUNT: CPT | Performed by: SURGERY

## 2018-05-07 PROCEDURE — 88307 TISSUE EXAM BY PATHOLOGIST: CPT | Performed by: PATHOLOGY

## 2018-05-07 PROCEDURE — C1886 CATHETER, ABLATION: HCPCS | Performed by: SURGERY

## 2018-05-07 PROCEDURE — 94762 N-INVAS EAR/PLS OXIMTRY CONT: CPT

## 2018-05-07 RX ORDER — FENTANYL CITRATE 50 UG/ML
INJECTION, SOLUTION INTRAMUSCULAR; INTRAVENOUS AS NEEDED
Status: DISCONTINUED | OUTPATIENT
Start: 2018-05-07 | End: 2018-05-07 | Stop reason: SURG

## 2018-05-07 RX ORDER — GLYCOPYRROLATE 0.2 MG/ML
INJECTION INTRAMUSCULAR; INTRAVENOUS AS NEEDED
Status: DISCONTINUED | OUTPATIENT
Start: 2018-05-07 | End: 2018-05-07 | Stop reason: SURG

## 2018-05-07 RX ORDER — SODIUM CHLORIDE 9 MG/ML
100 INJECTION, SOLUTION INTRAVENOUS CONTINUOUS
Status: DISCONTINUED | OUTPATIENT
Start: 2018-05-07 | End: 2018-05-08

## 2018-05-07 RX ORDER — MORPHINE SULFATE 2 MG/ML
2 INJECTION, SOLUTION INTRAMUSCULAR; INTRAVENOUS
Status: DISCONTINUED | OUTPATIENT
Start: 2018-05-07 | End: 2018-05-07

## 2018-05-07 RX ORDER — HEPARIN SODIUM 5000 [USP'U]/ML
5000 INJECTION, SOLUTION INTRAVENOUS; SUBCUTANEOUS EVERY 8 HOURS SCHEDULED
Status: DISCONTINUED | OUTPATIENT
Start: 2018-05-07 | End: 2018-05-08 | Stop reason: HOSPADM

## 2018-05-07 RX ORDER — PROPOFOL 10 MG/ML
INJECTION, EMULSION INTRAVENOUS AS NEEDED
Status: DISCONTINUED | OUTPATIENT
Start: 2018-05-07 | End: 2018-05-07 | Stop reason: SURG

## 2018-05-07 RX ORDER — MAGNESIUM HYDROXIDE 1200 MG/15ML
LIQUID ORAL AS NEEDED
Status: DISCONTINUED | OUTPATIENT
Start: 2018-05-07 | End: 2018-05-07 | Stop reason: HOSPADM

## 2018-05-07 RX ORDER — DEXMEDETOMIDINE HYDROCHLORIDE 100 UG/ML
INJECTION, SOLUTION INTRAVENOUS AS NEEDED
Status: DISCONTINUED | OUTPATIENT
Start: 2018-05-07 | End: 2018-05-07 | Stop reason: SURG

## 2018-05-07 RX ORDER — ATORVASTATIN CALCIUM 10 MG/1
10 TABLET, FILM COATED ORAL DAILY
Status: DISCONTINUED | OUTPATIENT
Start: 2018-05-08 | End: 2018-05-08 | Stop reason: HOSPADM

## 2018-05-07 RX ORDER — SODIUM CHLORIDE, SODIUM LACTATE, POTASSIUM CHLORIDE, CALCIUM CHLORIDE 600; 310; 30; 20 MG/100ML; MG/100ML; MG/100ML; MG/100ML
20 INJECTION, SOLUTION INTRAVENOUS CONTINUOUS
Status: DISCONTINUED | OUTPATIENT
Start: 2018-05-07 | End: 2018-05-07

## 2018-05-07 RX ORDER — ONDANSETRON 2 MG/ML
4 INJECTION INTRAMUSCULAR; INTRAVENOUS EVERY 4 HOURS PRN
Status: DISCONTINUED | OUTPATIENT
Start: 2018-05-07 | End: 2018-05-08 | Stop reason: HOSPADM

## 2018-05-07 RX ORDER — SODIUM CHLORIDE 9 MG/ML
INJECTION, SOLUTION INTRAVENOUS CONTINUOUS PRN
Status: DISCONTINUED | OUTPATIENT
Start: 2018-05-07 | End: 2018-05-07 | Stop reason: SURG

## 2018-05-07 RX ORDER — MEPERIDINE HYDROCHLORIDE 25 MG/ML
12.5 INJECTION INTRAMUSCULAR; INTRAVENOUS; SUBCUTANEOUS
Status: DISCONTINUED | OUTPATIENT
Start: 2018-05-07 | End: 2018-05-07

## 2018-05-07 RX ORDER — ROCURONIUM BROMIDE 10 MG/ML
INJECTION, SOLUTION INTRAVENOUS AS NEEDED
Status: DISCONTINUED | OUTPATIENT
Start: 2018-05-07 | End: 2018-05-07 | Stop reason: SURG

## 2018-05-07 RX ORDER — LEVOTHYROXINE SODIUM 0.07 MG/1
150 TABLET ORAL
Status: DISCONTINUED | OUTPATIENT
Start: 2018-05-07 | End: 2018-05-08 | Stop reason: HOSPADM

## 2018-05-07 RX ORDER — ONDANSETRON 2 MG/ML
4 INJECTION INTRAMUSCULAR; INTRAVENOUS ONCE AS NEEDED
Status: DISCONTINUED | OUTPATIENT
Start: 2018-05-07 | End: 2018-05-07

## 2018-05-07 RX ORDER — OXYCODONE HYDROCHLORIDE 5 MG/1
5 TABLET ORAL EVERY 4 HOURS PRN
Status: DISCONTINUED | OUTPATIENT
Start: 2018-05-07 | End: 2018-05-08 | Stop reason: HOSPADM

## 2018-05-07 RX ORDER — MIDAZOLAM HYDROCHLORIDE 1 MG/ML
INJECTION INTRAMUSCULAR; INTRAVENOUS AS NEEDED
Status: DISCONTINUED | OUTPATIENT
Start: 2018-05-07 | End: 2018-05-07 | Stop reason: SURG

## 2018-05-07 RX ORDER — ACETAMINOPHEN 325 MG/1
650 TABLET ORAL EVERY 4 HOURS PRN
Status: DISCONTINUED | OUTPATIENT
Start: 2018-05-07 | End: 2018-05-08 | Stop reason: HOSPADM

## 2018-05-07 RX ORDER — EPHEDRINE SULFATE 50 MG/ML
INJECTION, SOLUTION INTRAVENOUS AS NEEDED
Status: DISCONTINUED | OUTPATIENT
Start: 2018-05-07 | End: 2018-05-07 | Stop reason: SURG

## 2018-05-07 RX ORDER — BUPIVACAINE HYDROCHLORIDE 5 MG/ML
INJECTION, SOLUTION EPIDURAL; INTRACAUDAL AS NEEDED
Status: DISCONTINUED | OUTPATIENT
Start: 2018-05-07 | End: 2018-05-07 | Stop reason: HOSPADM

## 2018-05-07 RX ORDER — AMLODIPINE BESYLATE 5 MG/1
5 TABLET ORAL DAILY
Status: DISCONTINUED | OUTPATIENT
Start: 2018-05-08 | End: 2018-05-08 | Stop reason: HOSPADM

## 2018-05-07 RX ORDER — SODIUM CHLORIDE 9 MG/ML
20 INJECTION, SOLUTION INTRAVENOUS CONTINUOUS
Status: DISCONTINUED | OUTPATIENT
Start: 2018-05-07 | End: 2018-05-07

## 2018-05-07 RX ORDER — OXYCODONE HYDROCHLORIDE 5 MG/1
10 TABLET ORAL EVERY 4 HOURS PRN
Status: DISCONTINUED | OUTPATIENT
Start: 2018-05-07 | End: 2018-05-08 | Stop reason: HOSPADM

## 2018-05-07 RX ORDER — ONDANSETRON 2 MG/ML
INJECTION INTRAMUSCULAR; INTRAVENOUS AS NEEDED
Status: DISCONTINUED | OUTPATIENT
Start: 2018-05-07 | End: 2018-05-07 | Stop reason: SURG

## 2018-05-07 RX ORDER — HYDROCHLOROTHIAZIDE 12.5 MG/1
12.5 TABLET ORAL DAILY
Status: DISCONTINUED | OUTPATIENT
Start: 2018-05-08 | End: 2018-05-08 | Stop reason: HOSPADM

## 2018-05-07 RX ADMIN — ROCURONIUM BROMIDE 40 MG: 10 INJECTION INTRAVENOUS at 11:59

## 2018-05-07 RX ADMIN — SODIUM CHLORIDE: 0.9 INJECTION, SOLUTION INTRAVENOUS at 12:03

## 2018-05-07 RX ADMIN — FENTANYL CITRATE 50 MCG: 50 INJECTION, SOLUTION INTRAMUSCULAR; INTRAVENOUS at 11:59

## 2018-05-07 RX ADMIN — MIDAZOLAM HYDROCHLORIDE 2 MG: 1 INJECTION, SOLUTION INTRAMUSCULAR; INTRAVENOUS at 11:48

## 2018-05-07 RX ADMIN — SODIUM CHLORIDE, SODIUM LACTATE, POTASSIUM CHLORIDE, AND CALCIUM CHLORIDE: .6; .31; .03; .02 INJECTION, SOLUTION INTRAVENOUS at 14:15

## 2018-05-07 RX ADMIN — SODIUM CHLORIDE 100 ML/HR: 0.9 INJECTION, SOLUTION INTRAVENOUS at 15:21

## 2018-05-07 RX ADMIN — DEXMEDETOMIDINE HYDROCHLORIDE 12 MCG: 100 INJECTION, SOLUTION INTRAVENOUS at 12:29

## 2018-05-07 RX ADMIN — Medication 2000 MG: at 12:15

## 2018-05-07 RX ADMIN — FENTANYL CITRATE 25 MCG: 50 INJECTION, SOLUTION INTRAMUSCULAR; INTRAVENOUS at 14:11

## 2018-05-07 RX ADMIN — MORPHINE SULFATE 2 MG: 2 INJECTION, SOLUTION INTRAMUSCULAR; INTRAVENOUS at 14:56

## 2018-05-07 RX ADMIN — FENTANYL CITRATE 50 MCG: 50 INJECTION, SOLUTION INTRAMUSCULAR; INTRAVENOUS at 12:24

## 2018-05-07 RX ADMIN — NEOSTIGMINE METHYLSULFATE 3 MG: 1 INJECTION, SOLUTION INTRAMUSCULAR; INTRAVENOUS; SUBCUTANEOUS at 14:25

## 2018-05-07 RX ADMIN — HEPARIN SODIUM 5000 UNITS: 5000 INJECTION, SOLUTION INTRAVENOUS; SUBCUTANEOUS at 21:40

## 2018-05-07 RX ADMIN — LIDOCAINE HYDROCHLORIDE 100 MG: 20 INJECTION, SOLUTION INTRAVENOUS at 11:59

## 2018-05-07 RX ADMIN — LEVOTHYROXINE SODIUM 150 MCG: 75 TABLET ORAL at 21:40

## 2018-05-07 RX ADMIN — DEXAMETHASONE SODIUM PHOSPHATE 8 MG: 10 INJECTION INTRAMUSCULAR; INTRAVENOUS at 12:05

## 2018-05-07 RX ADMIN — ROCURONIUM BROMIDE 10 MG: 10 INJECTION INTRAVENOUS at 13:43

## 2018-05-07 RX ADMIN — PROPOFOL 200 MG: 10 INJECTION, EMULSION INTRAVENOUS at 11:59

## 2018-05-07 RX ADMIN — ROCURONIUM BROMIDE 10 MG: 10 INJECTION INTRAVENOUS at 12:23

## 2018-05-07 RX ADMIN — ONDANSETRON 4 MG: 2 INJECTION INTRAMUSCULAR; INTRAVENOUS at 14:14

## 2018-05-07 RX ADMIN — EPHEDRINE SULFATE 10 MG: 50 INJECTION, SOLUTION INTRAMUSCULAR; INTRAVENOUS; SUBCUTANEOUS at 12:22

## 2018-05-07 RX ADMIN — SODIUM CHLORIDE, SODIUM LACTATE, POTASSIUM CHLORIDE, AND CALCIUM CHLORIDE 20 ML/HR: .6; .31; .03; .02 INJECTION, SOLUTION INTRAVENOUS at 09:10

## 2018-05-07 RX ADMIN — GLYCOPYRROLATE 0.4 MG: 0.2 INJECTION, SOLUTION INTRAMUSCULAR; INTRAVENOUS at 14:25

## 2018-05-07 RX ADMIN — ROCURONIUM BROMIDE 10 MG: 10 INJECTION INTRAVENOUS at 13:10

## 2018-05-07 RX ADMIN — SODIUM CHLORIDE 100 ML/HR: 0.9 INJECTION, SOLUTION INTRAVENOUS at 21:46

## 2018-05-07 NOTE — OP NOTE
OPERATIVE REPORT  PATIENT NAME: Corin Mcneal    :  1950  MRN: [id number]  Pt Location: [facility] OR ROOM 08    SURGERY DATE: 2018    Surgeon(s) and Role:     * Aneesh Jensen MD - Primary     * Ada Shah MD - Assisting    Preop Diagnosis:  Hepatic cyst [K76 89]    Post-Op Diagnosis Codes:     * Hepatic cyst [K76 89]    Procedure(s) (LRB):  Laparoscopic marsupialization of liver cyst (N/A)    Specimen(s):  ID Type Source Tests Collected by Time Destination   1 : hepatic cyst wall Tissue Cyst TISSUE EXAM Aneesh Jensen MD 2018 1352        Estimated Blood Loss:   Minimal    Drains:  [REMOVED] Urethral Catheter Non-latex 16 Fr  (Removed)   Number of days: 0       Anesthesia Type:   General    Operative Indications:  Hepatic cyst []  27-year-old female with an enlarging hepatic cyst   This was felt to be benign on recent imaging  It was recommended this cyst to be unroofed  Risks and benefits were explained  Informed consent was obtained  Patient was brought to the operating  Operative Findings:  A large hepatic cyst that was contained non bilious fluid  Complications:   None    Procedure and Technique:  After identifying the patient, general anesthesia was achieved  A Pickett catheter was placed  Lines were placed by Anesthesia  Patient was prepped and draped in the usual sterile fashion  A time-out was performed  We started by placing an infraumbilical port using the son technique  The fascia was identified and incised  Once we entered the peritoneal cavity through the trocar was placed and the abdomen was insufflated to 15 mm of mercury of carbon dioxide  Under direct vision 2 right-sided trocars were now placed  The cyst was visualized  We lysed several adhesions of falciform ligament from the mesh using sharp dissection  Once we were able to visualize the cysts better we used the Harmonic scalpel to get into the cyst   The cyst was now aspirated of non bilious fluid    Once we did this we now grasp the cyst and using the Harmonic scalpel we unroofed the vast majority of the cyst   We did not go posteriorly secondary to the patient's body habitus but we unroofed at least 15 cm of cyst wall  Once we completely unroof this medially as well as inferiorly and laterally we took this superiorly as far superiorly as we could go with the trocars in the position they were in and we then connected these 2 areas  The specimen was then placed in an endobag and this was removed through our umbilical port  I did look at the specimen and cyst wall specimen was 15 x 15 cm  At this point the wound was now copiously irrigated  There was excellent hemostasis along the edges of the marsupialization  There was no evidence of any obvious bile draining  At this point I did feel we had done an adequate unroofing of this cyst   Given the size of the unroofing the I would think the risk of recurrence would be low  There was 1 small area of cyst wall that was easily able to be excised and this was excised as well  At this point the wound was copiously irrigated and there was excellent hemostasis and no drainage of bile  The trocars were all removed under direct vision and the abdomen was desufflated  The infraumbilical fascia was approximated with 0 Vicryl suture in a figure-of-eight fashion under direct vision  Skin was approximated with 4 Monocryl suture in a subcuticular fashion  Histocryl was used on the skin  Patient was then extubated having tolerated the procedure well  I was present and participated in all aspects of this procedure         I was present for the entire procedure    Patient Disposition:  PACU     SIGNATURE: Nils Downs MD  DATE: May 7, 2018  TIME: 3:04 PM

## 2018-05-07 NOTE — ANESTHESIA PREPROCEDURE EVALUATION
Review of Systems/Medical History  Patient summary reviewed  Chart reviewed      Cardiovascular  EKG reviewed, Exercise tolerance: good,  Hyperlipidemia, Hypertension controlled,    Pulmonary  Sleep apnea CPAP,        GI/Hepatic    Liver disease ,             Endo/Other  History of thyroid disease , hypothyroidism,      GYN       Hematology   Musculoskeletal    Arthritis     Neurology   Psychology           Physical Exam    Airway    Mallampati score: II  TM Distance: >3 FB  Neck ROM: full     Dental   No notable dental hx     Cardiovascular  Cardiovascular exam normal    Pulmonary  Pulmonary exam normal Breath sounds clear to auscultation,     Other Findings        Anesthesia Plan  ASA Score- 2     Anesthesia Type- general with ASA Monitors  Additional Monitors: arterial line  Airway Plan: ETT  Plan Factors-Patient not instructed to abstain from smoking on day of procedure  Patient did not smoke on day of surgery  Induction- intravenous  Postoperative Plan- Plan for postoperative opioid use  Planned trial extubation    Informed Consent- Anesthetic plan and risks discussed with patient  I personally reviewed this patient with the CRNA  Discussed and agreed on the Anesthesia Plan with the CRNA  Shahbaz Quiroga

## 2018-05-07 NOTE — H&P (VIEW-ONLY)
Surgical Oncology Follow Up       [address] [place] [address]  [facility]ER [facility]ARE [facility] SURGICAL ONCOLOGY [place] [id number]    Joy Goldberg  1950  [id number]  [address] [place] [address]  CANCER [facility] SURGICAL ONCOLOGY [address] [address]  Diagnoses and all orders for this visit:    Hepatic cyst  -     Case request operating room: Laparoscopic marsupialization of liver cyst, possible open; Standing  -     Type and screen; Future  -     Comprehensive metabolic panel; Future  -     CBC and Platelet; Future  -     Protime-INR; Future  -     APTT; Future  -     EKG 12 lead; Future  -     XR chest pa & lateral; Future  -     Case request operating room: Laparoscopic marsupialization of liver cyst, possible open    Other orders  -     Calcium Carb-Cholecalciferol 600-800 MG-UNIT TABS; Take by mouth  -     estradiol (ESTRACE VAGINAL) 0 1 mg/g vaginal cream; Insert into the vagina  -     Incentive spirometry; Standing  -     Insert and maintain IV line; Standing  -     Void On-Call to O R ; Standing  -     Place sequential compression device; Standing  -     ceFAZolin (ANCEF) 2,000 mg in dextrose 5 % 100 mL IVPB; Infuse 2,000 mg into a venous catheter once       Chief Complaint   Patient presents with    Follow-up     Pt here for 3 mo f/u for liver cyst   MRI was done on 4/9  No history exists  History of Present Illness:   Patient returns in follow-up of hepatic cyst   This was initially discovered in February 2013 when it was 15 cm  This has slowly increased in size  MRI from April 9, 2018 reveals that this is nonenhancing and now measures 21 x 16 x 24 cm  This has increased in size by 1-2 cm over the last 3 months  There are several small nonenhancing septations  There are no enhancing or solid components  I personally reviewed the films    She does complain of some he intermittent twinges of right upper quadrant pain just below the ribs  These occur once every 2-3 days  They resolved spontaneously after a few minutes  Review of Systems  Complete ROS Surg Onc:   Complete ROS Surg Onc:   Constitutional: The patient denies new or recent history of general fatigue, no recent weight loss, no change in appetite  Eyes: No complaints of visual problems, no scleral icterus  ENT: no complaints of ear pain, no hoarseness, no difficulty swallowing,  no tinnitus and no new masses in head, oral cavity, or neck  Cardiovascular: No complaints of chest pain, no palpitations, no ankle edema  Respiratory: No complaints of shortness of breath, no cough  Gastrointestinal: No complaints of jaundice, no bloody stools, no pale stools  Genitourinary: No complaints of dysuria, no hematuria, no nocturia, no frequent urination, no urethral discharge  Musculoskeletal: No complaints of weakness, paralysis, joint stiffness or arthralgias  Integumentary: No complaints of rash, no new lesions  Neurological: No complaints of convulsions, no seizures, no dizziness  Hematologic/Lymphatic: No complaints of easy bruising  Endocrine:  No hot or cold intolerance  No polydipsia, polyphagia, or polyuria  Allergy/immunology:  No environmental allergies  No food allergies  Not immunocompromised  Skin:  No pallor or rash  No wound          Patient Active Problem List   Diagnosis    Paraesophageal hernia    Benign essential hypertension    Disc degeneration, lumbar    Dysphagia    Hepatic cyst    Hyperlipidemia    Hypothyroidism    Iron deficiency anemia due to chronic blood loss    Liver enlargement    Osteoporosis    Severe obstructive sleep apnea    Shoulder impingement    Tinea corporis     Past Medical History:   Diagnosis Date    Cystic breast     Disease of thyroid gland     Dyspareunia, female     last assessed 9/4/14    Dysphagia     Esophageal reflux     Hiatal hernia     Hyperlipidemia     Hypertension     Insomnia  Osteoporosis     Sleep apnea     Thyroid disease     Varicose veins of lower extremity     last assessed 1/4/13     Past Surgical History:   Procedure Laterality Date    COLONOSCOPY      complete 5/2016 - Dr Rdz Noe due in 2019 - last assessed  3/17/17    NEUROMA EXCISION      WI ESOPHAGOGASTRODUODENOSCOPY TRANSORAL DIAGNOSTIC N/A 8/10/2016    Procedure: ESOPHAGOGASTRODUODENOSCOPY (EGD); Surgeon: Lukas Buck MD;  Location: [facility]; Service: Gastroenterology    WI ESOPHAGOSCOPY FLEXIBLE TRANSORAL WITH BIOPSY N/A 11/3/2016    Procedure: ESOPHAGOGASTRODUODENOSCOPY (EGD); Surgeon: Lory Coreas MD;  Location[facility];  Service: Thoracic    WI LAP, REPAIR PARAESOPHAGEAL HERNIA, INCL FUNDOPLASTY W/ MESH Left 11/3/2016    Procedure: LAPAROSCOPIC PARAESOPHAGEAL HERNIA REPAIR WITH MESH;NISSEN FUNDOPLICATION ;  Surgeon: Lory Coreas MD;  [facility];  Service: Thoracic    WI LAP, VENTRAL HERNIA REPAIR,REDUCIBLE N/A 10/30/2017    Procedure: LAPAROSCOPIC INCISIONAL HERNIA REPAIR X 2 WITH ECHO MESH;  Surgeon: Anne Tierney DO;  [facility];  Service: General    WI REPAIR INCISIONAL HERNIA,REDUCIBLE N/A 1/13/2017    Procedure: INCISIONAL HERNIA REPAIR ;  Surgeon: Anne Tierney DO;  [facility];  Service: General    TUBAL LIGATION       Family History   Problem Relation Age of Onset    Heart disease Mother     Aneurysm Mother      cerebral    Alcohol abuse Father     Cancer Father     COPD Father     Heart failure Father     Hypertension Father     Cancer Family      Social History     Social History    Marital status: /Civil Union     Spouse name: N/A    Number of children: N/A    Years of education: N/A     Occupational History    Not on file       Social History Main Topics    Smoking status: Never Smoker    Smokeless tobacco: Never Used    Alcohol use Yes      Comment: social    Drug use: No    Sexual activity: Yes     Partners: Male Birth control/ protection: None     Other Topics Concern    Not on file     Social History Narrative    Coffee    Exercise - walking       Current Outpatient Prescriptions:     estradiol (ESTRACE VAGINAL) 0 1 mg/g vaginal cream, Insert into the vagina, Disp: , Rfl:     amLODIPine (NORVASC) 5 mg tablet, Take 5 mg by mouth daily  , Disp: , Rfl:     atorvastatin (LIPITOR) 10 mg tablet, Take 10 mg by mouth daily  , Disp: , Rfl:     B Complex-C (SUPER B COMPLEX PO), Take 1 capsule by mouth , Disp: , Rfl:     Calcium Carb-Cholecalciferol 600-800 MG-UNIT TABS, Take by mouth, Disp: , Rfl:     Cholecalciferol (VITAMIN D3) 2000 UNITS TABS, Take 1 tablet by mouth daily  , Disp: , Rfl:     co-enzyme Q-10 30 MG capsule, Take 30 mg by mouth 3 (three) times a day , Disp: , Rfl:     hydrochlorothiazide (HYDRODIURIL) 12 5 mg tablet, Take 12 5 mg by mouth daily  , Disp: , Rfl:     levothyroxine 150 mcg tablet, Take 150 mcg by mouth daily  , Disp: , Rfl:     Multiple Vitamins-Minerals (CENTRUM SILVER PO), Take 1 tablet by mouth daily  , Disp: , Rfl:   Allergies   Allergen Reactions    Latex Anaphylaxis and Other (See Comments)     Glue from bandaide, can wear regular clothing and eat bananns    Adhesive  [Medical Tape]     Other Other (See Comments)     Skin breakdown from bandaid on for long time- reddness     Vitals:    04/17/18 0942   BP: 132/82   Pulse: 86   Resp: 14   Temp: 98 4 °F (36 9 °C)       Physical Exam  Constitutional: General appearance: The Patient is well-developed and well-nourished who appears the stated age in no acute distress  Patient is pleasant and talkative  HEENT:  Normocephalic  Sclerae are anicteric  Mucous membranes are moist  Neck is supple without adenopathy  No JVD  Chest: The lungs are clear to auscultation  Cardiac: Heart is regular rate  Abdomen: Abdomen is soft, non-tender, non-distended and without masses  Extremities: There is no clubbing or cyanosis   There is no edema   Symmetric  Neuro: Grossly nonfocal  Gait is normal      Lymphatic: No evidence of cervical adenopathy bilaterally  No evidence of axillary adenopathy bilaterally  No evidence of inguinal adenopathy bilaterally  Skin: Warm, anicteric  Psych:  Patient is pleasant and talkative  Breasts:        Pathology:      Labs:      Imaging  Mri Abdomen W Wo Contrast    Result Date: 4/10/2018  Narrative: MRI OF THE ABDOMEN (LIVER) WITH AND WITHOUT CONTRAST INDICATION:  59-year-old female for follow-up hepatic cyst  COMPARISON:  1/8/2018  TECHNIQUE:  The following pulse sequences were obtained on a 1 5 T scanner:  Coronal and axial T2 with TE of 90 and 180 respectively, axial T2 with fat saturation, axial FIESTA fat-sat, axial T1-weighted in-and-out-of phase, axial DWI/ADC, pre-contrast axial T1 with fat saturation, post-contrast dynamic axial T1 with fat saturation at 20, 70, and 180 seconds, followed by coronal and 7 minute delayed axial T1 with fat saturation  IV Contrast:  18 mL of gadobenate dimeglumine (MULTIHANCE) FINDINGS: LIVER:  General:  Normal in size and contour  No loss of signal on out-of-phase images to suggest hepatic steatosis  2 cm cavernous hemangioma within the left hepatic lobe is a stable finding  Lesions: A very large nonenhancing cyst replacing the majority of the right hepatic lobe is again noted measuring 21 x 16 x 24 cm having measured 19 x 15 x 22 cm previously  There are several thin nonenhancing septations throughout the cyst with no enhancing solid component  As described previously, there are several intrahepatic dilated ducts in the left hepatic lobe at the site of the hiatal hernia repair which do not enhance and may be a sequela of prior surgery  Surveillance recommended  Vasculature:  Portal and hepatic veins patent without evidence of thrombosis   BILIARY TREE:  Normal   GALLBLADDER:  Normal  PANCREAS:  Normal  ADRENAL GLANDS:  Normal  SPLEEN:  Multiple hemangiomas throughout the spleen are stable  KIDNEYS:  No hydronephrosis or solid mass lesions  Stable left hepatic cyst   The right kidney is displaced both inferiorly and medially and very to the large cyst  ABDOMINAL CAVITY:  No lymphadenopathy or mass  No ascites  BOWEL:  Unremarkable MRI appearance  OSSEOUS STRUCTURES:  No osseous destruction  EXTRAHEPATIC VASCULAR STRUCTURES:  Visualized vasculature is normal  ABDOMINAL WALL:  Stable postoperative changes of the ventral abdominal wall  LOWER CHEST:  Unremarkable MRI appearance  Impression: 1  Slight interval enlargement of 21 x 16 x 24 cm nonenhancing right hepatic lobe cyst containing very thin septations  2   Stable nonenhancing focal intrahepatic biliary duct dilatation in the left hepatic lobe abutting the site of hiatal hernia repair, likely postsurgical   Recommend surveillance  Workstation performed: [id number]     I reviewed the above laboratory and imaging data  Discussion/Summary:   25-year-old female with an enlarging right hepatic lobe cyst that has thin septations  The septations are nonenhancing  There is no soft tissue component  There was also a question of a cholangiocarcinoma related to a dilated duct on previous imaging  I suspect this is related to Surgical Trauma from her hiatal hernia repair  I suspect the cyst is benign and we are not dealing with cystadenoma or cystadenocarcinoma since there is no enhancing nodules  We discussed multiple treatment options including observation, unroofing of the cystic lesion and sending this to pathology because this may be symptomatic in causing her early satiety  We also discussed resection/enucleation of the cyst  Since this is increasing in size, even that we feel it is benign we have elected to proceed with laparoscopic marsupialization of the cyst, possible open marsupialization/resection    Risks were explained including bleeding, infection, recurrence, need for further surgery, wound complications, adjacent organ injury, bile leak, sepsis, mi, DVT, stroke, pulmonary embolism, and death  Informed consent was obtained  We will schedule this at our earliest mutual convenience  She does have a history incisional hernia repair with mesh  I told her we will try to avoid the mesh in terms of our port placement  She is agreeable to this  All of her questions were answered

## 2018-05-07 NOTE — ANESTHESIA POSTPROCEDURE EVALUATION
Post-Op Assessment Note      CV Status:  Stable    Mental Status:  Alert and awake    Hydration Status:  Euvolemic    PONV Controlled:  Controlled    Airway Patency:  Patent and adequate    Post Op Vitals Reviewed: Yes          Staff: CRNA           BP   133/76   Temp   99 1   Pulse  71   Resp   15   SpO2   99%

## 2018-05-08 VITALS
WEIGHT: 195 LBS | HEIGHT: 66 IN | TEMPERATURE: 98.1 F | BODY MASS INDEX: 31.34 KG/M2 | DIASTOLIC BLOOD PRESSURE: 72 MMHG | HEART RATE: 63 BPM | RESPIRATION RATE: 20 BRPM | SYSTOLIC BLOOD PRESSURE: 132 MMHG | OXYGEN SATURATION: 95 %

## 2018-05-08 LAB
ANION GAP SERPL CALCULATED.3IONS-SCNC: 8 MMOL/L (ref 4–13)
BASOPHILS # BLD AUTO: 0.01 THOUSANDS/ΜL (ref 0–0.1)
BASOPHILS NFR BLD AUTO: 0 % (ref 0–1)
BUN SERPL-MCNC: 8 MG/DL (ref 5–25)
CALCIUM SERPL-MCNC: 8.8 MG/DL (ref 8.3–10.1)
CHLORIDE SERPL-SCNC: 110 MMOL/L (ref 100–108)
CO2 SERPL-SCNC: 22 MMOL/L (ref 21–32)
CREAT SERPL-MCNC: 0.82 MG/DL (ref 0.6–1.3)
EOSINOPHIL # BLD AUTO: 0 THOUSAND/ΜL (ref 0–0.61)
EOSINOPHIL NFR BLD AUTO: 0 % (ref 0–6)
ERYTHROCYTE [DISTWIDTH] IN BLOOD BY AUTOMATED COUNT: 14.2 % (ref 11.6–15.1)
GFR SERPL CREATININE-BSD FRML MDRD: 74 ML/MIN/1.73SQ M
GLUCOSE SERPL-MCNC: 117 MG/DL (ref 65–140)
HCT VFR BLD AUTO: 34.8 % (ref 34.8–46.1)
HGB BLD-MCNC: 11 G/DL (ref 11.5–15.4)
LYMPHOCYTES # BLD AUTO: 0.99 THOUSANDS/ΜL (ref 0.6–4.47)
LYMPHOCYTES NFR BLD AUTO: 12 % (ref 14–44)
MAGNESIUM SERPL-MCNC: 2.3 MG/DL (ref 1.6–2.6)
MCH RBC QN AUTO: 28.4 PG (ref 26.8–34.3)
MCHC RBC AUTO-ENTMCNC: 31.6 G/DL (ref 31.4–37.4)
MCV RBC AUTO: 90 FL (ref 82–98)
MONOCYTES # BLD AUTO: 0.57 THOUSAND/ΜL (ref 0.17–1.22)
MONOCYTES NFR BLD AUTO: 7 % (ref 4–12)
NEUTROPHILS # BLD AUTO: 6.99 THOUSANDS/ΜL (ref 1.85–7.62)
NEUTS SEG NFR BLD AUTO: 81 % (ref 43–75)
NRBC BLD AUTO-RTO: 0 /100 WBCS
PLATELET # BLD AUTO: 172 THOUSANDS/UL (ref 149–390)
PMV BLD AUTO: 10 FL (ref 8.9–12.7)
POTASSIUM SERPL-SCNC: 4.2 MMOL/L (ref 3.5–5.3)
RBC # BLD AUTO: 3.87 MILLION/UL (ref 3.81–5.12)
SODIUM SERPL-SCNC: 140 MMOL/L (ref 136–145)
WBC # BLD AUTO: 8.59 THOUSAND/UL (ref 4.31–10.16)

## 2018-05-08 PROCEDURE — 80048 BASIC METABOLIC PNL TOTAL CA: CPT | Performed by: SURGERY

## 2018-05-08 PROCEDURE — 85025 COMPLETE CBC W/AUTO DIFF WBC: CPT | Performed by: SURGERY

## 2018-05-08 PROCEDURE — 99024 POSTOP FOLLOW-UP VISIT: CPT | Performed by: SURGERY

## 2018-05-08 PROCEDURE — 83735 ASSAY OF MAGNESIUM: CPT | Performed by: SURGERY

## 2018-05-08 RX ADMIN — HYDROCHLOROTHIAZIDE 12.5 MG: 12.5 TABLET ORAL at 10:03

## 2018-05-08 RX ADMIN — OXYCODONE HYDROCHLORIDE 5 MG: 5 TABLET ORAL at 01:27

## 2018-05-08 RX ADMIN — AMLODIPINE BESYLATE 5 MG: 5 TABLET ORAL at 10:03

## 2018-05-08 RX ADMIN — ATORVASTATIN CALCIUM 10 MG: 10 TABLET, FILM COATED ORAL at 10:03

## 2018-05-08 RX ADMIN — HEPARIN SODIUM 5000 UNITS: 5000 INJECTION, SOLUTION INTRAVENOUS; SUBCUTANEOUS at 05:51

## 2018-05-08 NOTE — PLAN OF CARE
GASTROINTESTINAL - ADULT     Maintains or returns to baseline bowel function Progressing     Maintains adequate nutritional intake Progressing        GENITOURINARY - ADULT     Absence of urinary retention Progressing        RESPIRATORY - ADULT     Achieves optimal ventilation and oxygenation Progressing        SKIN/TISSUE INTEGRITY - ADULT     Incision(s), wounds(s) or drain site(s) healing without S/S of infection Progressing

## 2018-05-08 NOTE — PHYSICIAN ADVISOR
Current patient class: Inpatient  The patient is currently on Hospital Day: 2      The patient was admitted to the hospital  on 5/7/18 at [phone] for the following diagnosis:  Hepatic cyst [K76 89]     After review of the relevant documentation, labs, vital signs and test results, the patient is a provider liable case and is most appropriate for OBSERVATION STATUS  In this particular case the patient was admitted to the hospital as an inpatient  The patient however fails to satisfy the 2 midnight benchmark and closer scrutiny of the case is warranted  After review of the patient presentation and relevant labs the patient was most appropriate for observation status on admission  Given that this patient has already been discharged prior to this review they become a provider liable case  Rationale is as follows: The patient is a 27-year-old female who presented to the hospital on May 7, 2018  She had an enlarging hepatic cyst which was felt to be benign on imaging  She proceeded to the OR for elective laparoscopic marsupialization of the liver cyst   This contained nonbilious fluid  This was not an inpatient designated surgery  She did well postoperatively and stabilized for discharge home after one midnight, 27 hour stay  There was no documented anticipated length of stay or early recovery that was unexpected  The patient was most appropriate for outpatient status  She did not require intravenous medication beyond the immediate perioperative period      The patients vitals on arrival were ED Triage Vitals   Temperature Pulse Respirations Blood Pressure SpO2   05/07/18 0800 05/07/18 0800 05/07/18 0800 05/07/18 0800 05/07/18 0800   99 7 °F (37 6 °C) 73 16 142/91 96 %      Temp Source Heart Rate Source Patient Position - Orthostatic VS BP Location FiO2 (%)   05/07/18 0800 05/07/18 0800 05/07/18 1728 05/07/18 1728 --   Tymp Core Monitor Lying Right arm       Pain Score       05/07/18 0844       1 Past Medical History:   Diagnosis Date    Cystic breast     Disease of thyroid gland     Dyspareunia, female     last assessed 9/4/14    Dysphagia     Hyperlipidemia     Hypertension     Insomnia     Osteoporosis     Sleep apnea     Thyroid disease     Varicose veins of lower extremity     last assessed 1/4/13     Past Surgical History:   Procedure Laterality Date    COLONOSCOPY      complete 5/2016 - Dr Franchesca Gross due in 2019 - last assessed  3/17/17    HERNIA REPAIR      LIVER BIOPSY LAPAROSCOPIC N/A 5/7/2018    Procedure: Laparoscopic marsupialization of liver cyst;  Surgeon: Aleja De La Cruz MD;  [facility];  Service: Surgical Oncology    NEUROMA EXCISION      IA ESOPHAGOGASTRODUODENOSCOPY TRANSORAL DIAGNOSTIC N/A 8/10/2016    Procedure: ESOPHAGOGASTRODUODENOSCOPY (EGD); Surgeon: Kiel Young MD;  [facility]; Service: Gastroenterology    IA ESOPHAGOSCOPY FLEXIBLE TRANSORAL WITH BIOPSY N/A 11/3/2016    Procedure: ESOPHAGOGASTRODUODENOSCOPY (EGD);   Surgeon: Shemar Maria MD;  [facility];  Service: Thoracic    IA LAP, REPAIR PARAESOPHAGEAL HERNIA, INCL FUNDOPLASTY W/ MESH Left 11/3/2016    Procedure: LAPAROSCOPIC PARAESOPHAGEAL HERNIA REPAIR WITH MESH;NISSEN FUNDOPLICATION ;  Surgeon: Shemar Maria MD;  [facility];  Service: Thoracic    IA LAP, VENTRAL HERNIA REPAIR,REDUCIBLE N/A 10/30/2017    Procedure: LAPAROSCOPIC INCISIONAL HERNIA REPAIR X 2 WITH ECHO MESH;  Surgeon: Shruti Lancaster DO;  [facility];  Service: General    IA REPAIR INCISIONAL HERNIA,REDUCIBLE N/A 1/13/2017    Procedure: INCISIONAL HERNIA REPAIR ;  Surgeon: Shruti Lancaster DO;  [facility];  Service: General    TUBAL LIGATION             Consults have been placed to:   None    Vitals:    05/07/18 2155 05/07/18 2244 05/08/18 0300 05/08/18 0824   BP:  123/71 137/77 132/72   BP Location:  Right arm Left arm    Pulse:  83 76 63   Resp:  20 20 20   Temp:  98 2 °F (36 8 °C) 97 9 °F (36 6 °C) 98 1 °F (36 7 °C)   TempSrc:  Oral Oral Oral   SpO2: 95% 96% 96% 95%   Weight:       Height:           Most recent labs:    Recent Labs      05/08/18   0603   WBC  8 59   HGB  11 0*   HCT  34 8   PLT  172   K  4 2   NA  140   CALCIUM  8 8   BUN  8   CREATININE  0 82       Scheduled Meds:  Continuous Infusions:  No current facility-administered medications for this encounter  PRN Meds:      Surgical procedures (if appropriate):  Procedure(s):  Laparoscopic marsupialization of liver cyst

## 2018-05-08 NOTE — DISCHARGE SUMMARY
Discharge Summary - Surgical Oncology   Damaris Dobson 79 y o  female MRN: [id number]  Unit/Bed#: [facility] 172-27 Encounter: [id number]    Admission Date: 5/7/2018     Admitting Diagnosis: Hepatic cyst [K76 89]    HPI:  Michael Drew is a pleasant 80 yo woman who returns in follow-up of hepatic cyst   This was initially discovered in February 2013 when it was 15 cm  This has slowly increased in size  MRI from April 9, 2018 reveals that this is nonenhancing and now measures 21 x 16 x 24 cm  This has increased in size by 1-2 cm over the last 3 months  There are several small nonenhancing septations  There are no enhancing or solid components  I personally reviewed the films  She does complain of some he intermittent twinges of right upper quadrant pain just below the ribs  These occur once every 2-3 days  They resolved spontaneously after a few minutes  Procedures Performed:   5/7/18 Laparoscopic marsupialization hepatic cyst - Dr Benjamin Bermudez Course: Patient has done well post operatively  She has very little incisional pain  There is no nausea, no emesis, she is tolerating a house diet well  Patient is not using pain medication  Michael Drew will be discharge to home this am and followed in the office on 5/22/18 at 11:30 am at the [facility] office  Patient refuses a pain medication script and will use tylenol for the discomfort  Patient's trochar incisions are clean and dry  Complications: None    Discharge Diagnosis: Hepatic cyst    Discharge Date:5/8/18    Condition at Discharge: Good     Discharge instructions/Information to patient and family:   See after visit summary for information provided to patient and family  Provisions for Follow-Up Care:  See after visit summary for information related to follow-up care and any pertinent home health orders  Disposition: Home    Planned Readmission: No    Discharge Statement   I spent 20 minutes discharging the patient   This time was spent on the day of discharge  I had direct contact with the patient on the day of discharge  Additional documentation is required if more than 30 minutes were spent on discharge  Discharge Medications:  See after visit summary for reconciled discharge medications provided to patient and family

## 2018-05-08 NOTE — CASE MANAGEMENT
Initial Clinical Review    Age/Sex: 79 y o  female    Surgery DEIJ:[phone]    Procedure: Preop Diagnosis:  Hepatic cyst [K76 89]     Post-Op Diagnosis Codes:     * Hepatic cyst [K76 89]     Procedure(s) (LRB):  Laparoscopic marsupialization of liver cyst (N/A)    Anesthesia: general  Operative Findings:  A large hepatic cyst that was contained non bilious fluid      Admission Orders: Date/Time/Statement: 5/7/18 @ 1455     Orders Placed This Encounter   Procedures    Inpatient Admission     Standing Status:   Standing     Number of Occurrences:   1     Order Specific Question:   Admitting Physician     Answer:   Catalina Slade     Order Specific Question:   Level of Care     Answer:   Med Surg [16]     Order Specific Question:   Estimated length of stay     Answer:   More than 2 Midnights     Order Specific Question:   Certification     Answer:   I certify that inpatient services are medically necessary for this patient for a duration of greater than two midnights  See H&P and MD Progress Notes for additional information about the patient's course of treatment  Vital Signs: /72   Pulse 63   Temp 98 1 °F (36 7 °C) (Oral)   Resp 20   Ht 5' 5 5" (1 664 m)   Wt 88 5 kg (195 lb)   SpO2 95%   BMI 31 96 kg/m²     Diet:        Diet Orders            Start     Ordered    05/08/18 0641  Diet Regular; Regular House  Diet effective now     Question Answer Comment   Diet Type Regular    Regular Regular House    RD to adjust diet per protocol?  Yes        05/08/18 0641          Mobility: ambulate x3    DVT Prophylaxis:scd      Pain Control:   Pain Medications

## 2018-05-08 NOTE — PROGRESS NOTES
Progress Note - General Surgery   Ligia Mayo 79 y o  female MRN: [id number]  Unit/Bed#: [facility] 619-01 Encounter: [id number]    Assessment:  67F w/hepatic cyst s/p lap marsupialization 5/7    Plan:  - advance to reg diet  - dc IVF  - prn pain control  - PT/OT/CM evals      Subjective/Objective   Subjective: tolerating clears, only used one dosage of pain meds since OR  no bowel function yet    Objective:    Blood pressure 137/77, pulse 76, temperature 97 9 °F (36 6 °C), temperature source Oral, resp  rate 20, height 5' 5 5" (1 664 m), weight 88 5 kg (195 lb), SpO2 96 %  ,Body mass index is 31 96 kg/m²  I/O last 24 hours:   In: 4238 3 [P O :1060; I V :3178 3]  Out: 3325 [Urine:3325]    Invasive Devices     Peripheral Intravenous Line            Peripheral IV 05/07/18 Left Wrist less than 1 day                Physical Exam:   NAD  Norm resp effort  RRR  Abd soft, NT/ND, some eccymosis around midline incision, otherwise incisions cdi closed with histoacryl    Lab, Imaging and other studies:  Lab Results   Component Value Date    WBC 8 59 05/08/2018    HGB 11 0 (L) 05/08/2018    HCT 34 8 05/08/2018    MCV 90 05/08/2018     05/08/2018      Lab Results   Component Value Date    GLUCOSE 117 05/08/2018    CALCIUM 8 8 05/08/2018     05/08/2018    K 4 2 05/08/2018    CO2 22 05/08/2018     (H) 05/08/2018    BUN 8 05/08/2018    CREATININE 0 82 05/08/2018       VTE Pharmacologic Prophylaxis: Heparin  VTE Mechanical Prophylaxis: sequential compression device

## 2018-05-09 ENCOUNTER — TRANSITIONAL CARE MANAGEMENT (OUTPATIENT)
Dept: FAMILY MEDICINE CLINIC | Facility: CLINIC | Age: 68
End: 2018-05-09

## 2018-05-22 ENCOUNTER — OFFICE VISIT (OUTPATIENT)
Dept: SURGICAL ONCOLOGY | Facility: CLINIC | Age: 68
End: 2018-05-22

## 2018-05-22 VITALS
WEIGHT: 190 LBS | TEMPERATURE: 98.2 F | RESPIRATION RATE: 12 BRPM | SYSTOLIC BLOOD PRESSURE: 122 MMHG | DIASTOLIC BLOOD PRESSURE: 84 MMHG | HEIGHT: 66 IN | BODY MASS INDEX: 30.53 KG/M2 | HEART RATE: 72 BPM

## 2018-05-22 DIAGNOSIS — K76.89 HEPATIC CYST: Primary | ICD-10-CM

## 2018-05-22 PROBLEM — K43.2 INCISIONAL HERNIA, WITHOUT OBSTRUCTION OR GANGRENE: Status: ACTIVE | Noted: 2017-09-17

## 2018-05-22 PROBLEM — N39.0 URINARY TRACT INFECTION: Status: RESOLVED | Noted: 2018-04-25 | Resolved: 2018-05-22

## 2018-05-22 PROCEDURE — 99024 POSTOP FOLLOW-UP VISIT: CPT | Performed by: SURGERY

## 2018-05-22 NOTE — PROGRESS NOTES
Surgical Oncology Follow Up       [address] [place], [place] [address]Th [facility]  [facility] [address] [address]    [facility]  1950  [id number]  [address] [place] [address],[address] [place] [address]  [facility] [address] [address]    Diagnoses and all orders for this visit:    Hepatic cyst        Chief Complaint   Patient presents with    Post-op     Pt here for post-op visit following marsupialization of liver cyst   Incision is healing well, pt has no complaints  No Follow-up on file  No history exists  History of Present Illness:   Patient returns in follow-up of her laparoscopic marsupialization of her liver cyst   She is doing well at this time with no complaints  There are no fevers or chills  She is no longer having any of the discomfort that she had preoperatively  Her pathology was benign  Review of Systems   Skin: Positive for wound (midline abd incision)  Complete ROS Surg Onc:   Complete ROS Surg Onc:   Constitutional: The patient denies new or recent history of general fatigue, no recent weight loss, no change in appetite  Eyes: No complaints of visual problems, no scleral icterus  ENT: no complaints of ear pain, no hoarseness, no difficulty swallowing,  no tinnitus and no new masses in head, oral cavity, or neck  Cardiovascular: No complaints of chest pain, no palpitations, no ankle edema  Respiratory: No complaints of shortness of breath, no cough  Gastrointestinal: No complaints of jaundice, no bloody stools, no pale stools  Genitourinary: No complaints of dysuria, no hematuria, no nocturia, no frequent urination, no urethral discharge  Musculoskeletal: No complaints of weakness, paralysis, joint stiffness or arthralgias  Integumentary: No complaints of rash, no new lesions  Neurological: No complaints of convulsions, no seizures, no dizziness     Hematologic/Lymphatic: No complaints of easy bruising  Endocrine:  No hot or cold intolerance  No polydipsia, polyphagia, or polyuria  Allergy/immunology:  No environmental allergies  No food allergies  Not immunocompromised  Skin:  No pallor or rash  No wound  Patient Active Problem List   Diagnosis    Benign essential hypertension    Disc degeneration, lumbar    Dysphagia    Hepatic cyst    Hyperlipidemia    Hypothyroidism    Liver enlargement    Osteoporosis    Severe obstructive sleep apnea    Shoulder impingement    Tinea corporis    Incisional hernia, without obstruction or gangrene    Hiatal hernia    Lumbar radiculopathy     Past Medical History:   Diagnosis Date    Cystic breast     Dyspareunia, female     last assessed 9/4/14    Dysphagia     Hyperlipidemia     Insomnia     Osteoporosis     Thyroid disease     Varicose veins of lower extremity     last assessed 1/4/13     Past Surgical History:   Procedure Laterality Date    COLONOSCOPY      complete 5/2016 - Dr Ramesh sommer in 2019 - last assessed  3/17/17    HERNIA REPAIR      LIVER BIOPSY LAPAROSCOPIC N/A 5/7/2018    Procedure: Laparoscopic marsupialization of liver cyst;  Surgeon: Aron Grande MD;  [facility];  Service: Surgical Oncology    NEUROMA EXCISION      OK ESOPHAGOGASTRODUODENOSCOPY TRANSORAL DIAGNOSTIC N/A 8/10/2016    Procedure: ESOPHAGOGASTRODUODENOSCOPY (EGD); Surgeon: Manuela Strauss MD;  [facility]; Service: Gastroenterology    OK ESOPHAGOSCOPY FLEXIBLE TRANSORAL WITH BIOPSY N/A 11/3/2016    Procedure: ESOPHAGOGASTRODUODENOSCOPY (EGD);   Surgeon: Renita Kuhn MD;  [facility];  Service: Thoracic    OK LAP, REPAIR PARAESOPHAGEAL HERNIA, INCL FUNDOPLASTY W/ MESH Left 11/3/2016    Procedure: LAPAROSCOPIC PARAESOPHAGEAL HERNIA REPAIR WITH MESH;NISSEN FUNDOPLICATION ;  Surgeon: Renita Kuhn MD;  [facility];  Service: Thoracic    OK LAP, VENTRAL HERNIA REPAIR,REDUCIBLE N/A 10/30/2017    Procedure: LAPAROSCOPIC INCISIONAL HERNIA REPAIR X 2 WITH ECHO MESH;  Surgeon: Pramod Ly DO;  [facility];  Service: General    HI REPAIR INCISIONAL HERNIA,REDUCIBLE N/A 1/13/2017    Procedure: INCISIONAL HERNIA REPAIR ;  Surgeon: Pramod Ly DO;  [facility];  Service: General    TUBAL LIGATION       Family History   Problem Relation Age of Onset    Heart disease Mother     Aneurysm Mother      cerebral    Alcohol abuse Father     Cancer Father     COPD Father     Heart failure Father     Hypertension Father     Cancer Family      Social History     Social History    Marital status: /Civil Union     Spouse name: N/A    Number of children: N/A    Years of education: N/A     Occupational History    Not on file  Social History Main Topics    Smoking status: Never Smoker    Smokeless tobacco: Never Used    Alcohol use Yes      Comment: social    Drug use: No    Sexual activity: Yes     Partners: Male     Birth control/ protection: None     Other Topics Concern    Not on file     Social History Narrative    Coffee    Exercise - walking       Current Outpatient Prescriptions:     amLODIPine (NORVASC) 5 mg tablet, TAKE 1 TABLET DAILY, Disp: 30 tablet, Rfl: 6    atorvastatin (LIPITOR) 10 mg tablet, TAKE 1 TABLET DAILY (Patient taking differently: TAKE 1 TABlet Mon Wed Fri), Disp: 30 tablet, Rfl: 6    B Complex-C (SUPER B COMPLEX PO), Take 1 capsule by mouth , Disp: , Rfl:     Calcium Carb-Cholecalciferol 600-800 MG-UNIT TABS, Take by mouth, Disp: , Rfl:     Cholecalciferol (VITAMIN D3) 2000 UNITS TABS, Take 1 tablet by mouth daily  , Disp: , Rfl:     co-enzyme Q-10 30 MG capsule, Take 30 mg by mouth 3 (three) times a day , Disp: , Rfl:     estradiol (ESTRACE VAGINAL) 0 1 mg/g vaginal cream, Insert into the vagina, Disp: , Rfl:     hydrochlorothiazide (HYDRODIURIL) 12 5 mg tablet, Take 12 5 mg by mouth daily  , Disp: , Rfl:     levothyroxine 150 mcg tablet, Take 150 mcg by mouth daily at bedtime  , Disp: , Rfl:     Multiple Vitamins-Minerals (CENTRUM SILVER PO), Take 1 tablet by mouth daily  , Disp: , Rfl:   Allergies   Allergen Reactions    Other Other (See Comments)     Skin breakdown from bandaid on for long time- reddness     Vitals:    05/22/18 1124   BP: 122/84   Pulse: 72   Resp: 12   Temp: 98 2 °F (36 8 °C)       Physical Exam  Constitutional: General appearance: The Patient is well-developed and well-nourished who appears the stated age in no acute distress  Patient is pleasant and talkative  HEENT:  Normocephalic  Sclerae are anicteric  Mucous membranes are moist  Neck is supple without adenopathy  No JVD  Abdomen: Abdomen is soft, non-tender, non-distended and without masses  Incisions are C/D/I  Extremities: There is no clubbing or cyanosis  There is no edema  Symmetric  Neuro: Grossly nonfocal  Gait is normal      Skin: Warm, anicteric  Psych:  Patient is pleasant and talkative  Breasts:        Pathology:  Case Report   Surgical Pathology Report                         Case: [id number]                                    Authorizing Provider: Simin Holguin MD           Collected:           05/07/2018 1352               Ordering Location:     [address]      Received:            05/07/2018 [facility]                                      Hospital Operating Room                                                       Pathologist:           Bo Matson MD                                                                Specimen:    Cyst, hepatic cyst wall                                                                    Final Diagnosis   A  Liver cyst (excision):  - Predominantly denuded cyst wall with associated clot and hemosiderin-laden macrophages, most consistent with simple bilary cyst  - No carcinoma identified     Comment:   - Associated liver parenchyma shows mild steatosis      Electronically signed by Bo Matson MD on 5/9/2018 at  1:07 PM         Labs:      Imaging  Xr Chest Pa & Lateral    Result Date: 4/28/2018  Narrative: CHEST INDICATION:   K76 89: Other specified diseases of liver  surgery scheduled for 5/7/18 to have cyst removed from liver; preoperative evaluation  COMPARISON:  1/3/2017 EXAM PERFORMED/VIEWS:  XR CHEST PA & LATERAL FINDINGS: Heart size normal   Aorta uncoiled and atherosclerotic  The lungs are clear  No pneumothorax or pleural effusion  Osseous structures appear within normal limits for patient age  Impression: Atherosclerotic, uncoiled thoracic aorta  Workstation performed: [id number]     I reviewed the above laboratory and imaging data  Discussion/Summary:   28-year-old female with an enlarging right hepatic lobe cyst with thin septations  She underwent laparoscopic marsupialization  The pathology the cyst wall was benign  She is no longer having the discomfort that she had preoperatively  I have recommended observing her  If she develops any abdominal discomfort her notices any of the symptoms that she had preoperatively, I will see her again at that time for another clinical exam   She is agreeable to this  All her questions were answered

## 2018-05-22 NOTE — LETTER
May 22, 2018     Edie Cruz MD  [address]    Patient: David Heaton   YOB: 1950   Date of Visit: 5/22/2018       Dear Dr Jeff Park: Thank you for referring Ligia Whitley to me for evaluation  Below are my notes for this consultation  If you have questions, please do not hesitate to call me  I look forward to following your patient along with you  Sincerely,        Milind Chauhan MD        CC: MD Milind Duque MD  5/22/2018 11:45 AM  Sign at close encounter               Surgical Oncology Follow Up       [address] [place] [address] Tere  1950  [id number]  [address] [facility] SURGICAL ONCOLOGY [address] [address]    Diagnoses and all orders for this visit:    Hepatic cyst        Chief Complaint   Patient presents with    Post-op     Pt here for post-op visit following marsupialization of liver cyst   Incision is healing well, pt has no complaints  No Follow-up on file  No history exists  History of Present Illness:   Patient returns in follow-up of her laparoscopic marsupialization of her liver cyst   She is doing well at this time with no complaints  There are no fevers or chills  She is no longer having any of the discomfort that she had preoperatively  Her pathology was benign  Review of Systems   Skin: Positive for wound (midline abd incision)  Complete ROS Surg Onc:   Complete ROS Surg Onc:   Constitutional: The patient denies new or recent history of general fatigue, no recent weight loss, no change in appetite  Eyes: No complaints of visual problems, no scleral icterus  ENT: no complaints of ear pain, no hoarseness, no difficulty swallowing,  no tinnitus and no new masses in head, oral cavity, or neck     Cardiovascular: No complaints of chest pain, no palpitations, no ankle edema  Respiratory: No complaints of shortness of breath, no cough  Gastrointestinal: No complaints of jaundice, no bloody stools, no pale stools  Genitourinary: No complaints of dysuria, no hematuria, no nocturia, no frequent urination, no urethral discharge  Musculoskeletal: No complaints of weakness, paralysis, joint stiffness or arthralgias  Integumentary: No complaints of rash, no new lesions  Neurological: No complaints of convulsions, no seizures, no dizziness  Hematologic/Lymphatic: No complaints of easy bruising  Endocrine:  No hot or cold intolerance  No polydipsia, polyphagia, or polyuria  Allergy/immunology:  No environmental allergies  No food allergies  Not immunocompromised  Skin:  No pallor or rash  No wound  Patient Active Problem List   Diagnosis    Benign essential hypertension    Disc degeneration, lumbar    Dysphagia    Hepatic cyst    Hyperlipidemia    Hypothyroidism    Liver enlargement    Osteoporosis    Severe obstructive sleep apnea    Shoulder impingement    Tinea corporis    Incisional hernia, without obstruction or gangrene    Hiatal hernia    Lumbar radiculopathy     Past Medical History:   Diagnosis Date    Cystic breast     Dyspareunia, female     last assessed 9/4/14    Dysphagia     Hyperlipidemia     Insomnia     Osteoporosis     Thyroid disease     Varicose veins of lower extremity     last assessed 1/4/13     Past Surgical History:   Procedure Laterality Date    COLONOSCOPY      complete 5/2016 - Dr Jason Garcia due in 2019 - last assessed  3/17/17    HERNIA REPAIR      LIVER BIOPSY LAPAROSCOPIC N/A 5/7/2018    Procedure: Laparoscopic marsupialization of liver cyst;  Surgeon: Skye Bartlett MD;  [facility];  Service: Surgical Oncology    NEUROMA EXCISION      VT ESOPHAGOGASTRODUODENOSCOPY TRANSORAL DIAGNOSTIC N/A 8/10/2016    Procedure: ESOPHAGOGASTRODUODENOSCOPY (EGD);   Surgeon: Ronan Maria MD Dio;  Location: [facility]; Service: Gastroenterology    WA ESOPHAGOSCOPY FLEXIBLE TRANSORAL WITH BIOPSY N/A 11/3/2016    Procedure: ESOPHAGOGASTRODUODENOSCOPY (EGD); Surgeon: Randal Mchugh MD;  [facility];  Service: Thoracic    WA LAP, REPAIR PARAESOPHAGEAL HERNIA, INCL FUNDOPLASTY W/ MESH Left 11/3/2016    Procedure: LAPAROSCOPIC PARAESOPHAGEAL HERNIA REPAIR WITH MESH;NISSEN FUNDOPLICATION ;  Surgeon: Randal Mchugh MD;  [facility];  Service: Thoracic    WA LAP, VENTRAL HERNIA REPAIR,REDUCIBLE N/A 10/30/2017    Procedure: LAPAROSCOPIC INCISIONAL HERNIA REPAIR X 2 WITH ECHO MESH;  Surgeon: Marieta Cushing, DO;  [facility];  Service: General    WA REPAIR INCISIONAL HERNIA,REDUCIBLE N/A 1/13/2017    Procedure: INCISIONAL HERNIA REPAIR ;  Surgeon: Marieta Cushing, DO;  [facility];  Service: General    TUBAL LIGATION       Family History   Problem Relation Age of Onset    Heart disease Mother     Aneurysm Mother      cerebral    Alcohol abuse Father     Cancer Father     COPD Father     Heart failure Father     Hypertension Father     Cancer Family      Social History     Social History    Marital status: /Civil Union     Spouse name: N/A    Number of children: N/A    Years of education: N/A     Occupational History    Not on file       Social History Main Topics    Smoking status: Never Smoker    Smokeless tobacco: Never Used    Alcohol use Yes      Comment: social    Drug use: No    Sexual activity: Yes     Partners: Male     Birth control/ protection: None     Other Topics Concern    Not on file     Social History Narrative    Coffee    Exercise - walking       Current Outpatient Prescriptions:     amLODIPine (NORVASC) 5 mg tablet, TAKE 1 TABLET DAILY, Disp: 30 tablet, Rfl: 6    atorvastatin (LIPITOR) 10 mg tablet, TAKE 1 TABLET DAILY (Patient taking differently: TAKE 1 TABlet Mon Wed Fri), Disp: 30 tablet, Rfl: 6    B Complex-C (SUPER B COMPLEX PO), Take 1 capsule by mouth , Disp: , Rfl:     Calcium Carb-Cholecalciferol 600-800 MG-UNIT TABS, Take by mouth, Disp: , Rfl:     Cholecalciferol (VITAMIN D3) 2000 UNITS TABS, Take 1 tablet by mouth daily  , Disp: , Rfl:     co-enzyme Q-10 30 MG capsule, Take 30 mg by mouth 3 (three) times a day , Disp: , Rfl:     estradiol (ESTRACE VAGINAL) 0 1 mg/g vaginal cream, Insert into the vagina, Disp: , Rfl:     hydrochlorothiazide (HYDRODIURIL) 12 5 mg tablet, Take 12 5 mg by mouth daily  , Disp: , Rfl:     levothyroxine 150 mcg tablet, Take 150 mcg by mouth daily at bedtime  , Disp: , Rfl:     Multiple Vitamins-Minerals (CENTRUM SILVER PO), Take 1 tablet by mouth daily  , Disp: , Rfl:   Allergies   Allergen Reactions    Other Other (See Comments)     Skin breakdown from bandaid on for long time- reddness     Vitals:    05/22/18 1124   BP: 122/84   Pulse: 72   Resp: 12   Temp: 98 2 °F (36 8 °C)       Physical Exam  Constitutional: General appearance: The Patient is well-developed and well-nourished who appears the stated age in no acute distress  Patient is pleasant and talkative  HEENT:  Normocephalic  Sclerae are anicteric  Mucous membranes are moist  Neck is supple without adenopathy  No JVD  Abdomen: Abdomen is soft, non-tender, non-distended and without masses  Incisions are C/D/I  Extremities: There is no clubbing or cyanosis  There is no edema  Symmetric  Neuro: Grossly nonfocal  Gait is normal      Skin: Warm, anicteric  Psych:  Patient is pleasant and talkative    Breasts:        Pathology:  Case Report   Surgical Pathology Report                         Case: [id number]                                    Authorizing Provider: Ani Pacheco MD           Collected:           05/07/2018 1352               Ordering Location:     [address]      Received:            05/07/2018 [facility]                                      [facility] Operating Room                                                       Pathologist:           Fabiola Loza MD                                                                Specimen:    Cyst, hepatic cyst wall                                                                    Final Diagnosis   A  Liver cyst (excision):  - Predominantly denuded cyst wall with associated clot and hemosiderin-laden macrophages, most consistent with simple bilary cyst  - No carcinoma identified     Comment:   - Associated liver parenchyma shows mild steatosis  Electronically signed by Fabiola Loza MD on 5/9/2018 at  1:07 PM         Labs:      Imaging  Xr Chest Pa & Lateral    Result Date: 4/28/2018  Narrative: CHEST INDICATION:   K76 89: Other specified diseases of liver  surgery scheduled for 5/7/18 to have cyst removed from liver; preoperative evaluation  COMPARISON:  1/3/2017 EXAM PERFORMED/VIEWS:  XR CHEST PA & LATERAL FINDINGS: Heart size normal   Aorta uncoiled and atherosclerotic  The lungs are clear  No pneumothorax or pleural effusion  Osseous structures appear within normal limits for patient age  Impression: Atherosclerotic, uncoiled thoracic aorta  Workstation performed: [id number]     I reviewed the above laboratory and imaging data  Discussion/Summary:   57-year-old female with an enlarging right hepatic lobe cyst with thin septations  She underwent laparoscopic marsupialization  The pathology the cyst wall was benign  She is no longer having the discomfort that she had preoperatively  I have recommended observing her  If she develops any abdominal discomfort her notices any of the symptoms that she had preoperatively, I will see her again at that time for another clinical exam   She is agreeable to this  All her questions were answered

## 2018-06-18 ENCOUNTER — OFFICE VISIT (OUTPATIENT)
Dept: SLEEP CENTER | Facility: CLINIC | Age: 68
End: 2018-06-18
Payer: MEDICARE

## 2018-06-18 VITALS
HEIGHT: 66 IN | BODY MASS INDEX: 30.37 KG/M2 | DIASTOLIC BLOOD PRESSURE: 80 MMHG | HEART RATE: 60 BPM | SYSTOLIC BLOOD PRESSURE: 122 MMHG | WEIGHT: 189 LBS

## 2018-06-18 DIAGNOSIS — E66.09 CLASS 1 OBESITY DUE TO EXCESS CALORIES WITHOUT SERIOUS COMORBIDITY WITH BODY MASS INDEX (BMI) OF 30.0 TO 30.9 IN ADULT: ICD-10-CM

## 2018-06-18 DIAGNOSIS — G47.33 OSA ON CPAP: Primary | ICD-10-CM

## 2018-06-18 DIAGNOSIS — Z99.89 OSA ON CPAP: Primary | ICD-10-CM

## 2018-06-18 PROCEDURE — 99214 OFFICE O/P EST MOD 30 MIN: CPT | Performed by: PSYCHIATRY & NEUROLOGY

## 2018-06-18 RX ORDER — LEVOTHYROXINE SODIUM 0.12 MG/1
TABLET ORAL
COMMUNITY
Start: 2018-05-24 | End: 2018-07-28 | Stop reason: SDUPTHER

## 2018-06-18 NOTE — PROGRESS NOTES
Progress Note - Matt [age] y o  female   :1950, MRN: [id number]  2018          Follow Up Evaluation / Problem:     Obstructive Sleep Apnea    HPI: Yuli Guillory is a 79y o  year old female  She is currently using nasal CPAP  Over the last 3 years she has been doing well  Levels of wakefulness have improved since her original evaluation  She is currently having no difficulty with treatment  Review of Systems      Genitourinary need to urinate more than twice a night   Cardiology ankle/leg swelling   Gastrointestinal none   Neurology none   Constitutional none   Integumentary none   Psychiatry none   Musculoskeletal joint pain   Pulmonary none   ENT none   Endocrine none   Hematological none       Current Outpatient Prescriptions:     amLODIPine (NORVASC) 5 mg tablet, TAKE 1 TABLET DAILY, Disp: 30 tablet, Rfl: 6    atorvastatin (LIPITOR) 10 mg tablet, TAKE 1 TABLET DAILY (Patient taking differently: TAKE 1 TABlet ), Disp: 30 tablet, Rfl: 6    Calcium Carb-Cholecalciferol 600-800 MG-UNIT TABS, Take by mouth, Disp: , Rfl:     Cholecalciferol (VITAMIN D3) 2000 UNITS TABS, Take 1 tablet by mouth daily  , Disp: , Rfl:     co-enzyme Q-10 30 MG capsule, Take 30 mg by mouth 3 (three) times a day , Disp: , Rfl:     estradiol (ESTRACE VAGINAL) 0 1 mg/g vaginal cream, Insert into the vagina, Disp: , Rfl:     hydrochlorothiazide (HYDRODIURIL) 12 5 mg tablet, Take 12 5 mg by mouth daily  , Disp: , Rfl:     levothyroxine 150 mcg tablet, Take 150 mcg by mouth daily at bedtime  , Disp: , Rfl:     Multiple Vitamins-Minerals (CENTRUM SILVER PO), Take 1 tablet by mouth daily  , Disp: , Rfl:     Omega-3 Fatty Acids (FISH OIL PO), Take by mouth, Disp: , Rfl:     VITAMIN E PO, Take by mouth, Disp: , Rfl:     B Complex-C (SUPER B COMPLEX PO), Take 1 capsule by mouth , Disp: , Rfl:     levothyroxine 125 mcg tablet, , Disp: , Rfl:     [place] Sleepiness Scale  Sitting and reading: Slight chance of dozing  Watching TV: Slight chance of dozing  Sitting, inactive in a public place (e g  a theatre or a meeting): Would never doze  As a passenger in a car for an hour without a break: Would never doze  Lying down to rest in the afternoon when circumstances permit: Slight chance of dozing  Sitting and talking to someone: Would never doze  Sitting quietly after a lunch without alcohol: Would never doze  In a car, while stopped for a few minutes in traffic: Would never doze  Total score: 3              Vitals:    06/18/18 0900   BP: 122/80   Pulse: 60   Weight: 85 7 kg (189 lb)   Height: 5' 5 5" (1 664 m)       Body mass index is 30 97 kg/m²  Neck Circumference: 33 (cm)       EPWORTH SLEEPINESS SCORE  Total score: 3      Past History Since Last Sleep Center Visit:     She has been using nasal CPAP nightly with good success she reports that she is unable to sleep without CPAP  She is feeling much more awake and alert during the daytime and has fewer nocturnal awakenings  The review of systems and following portions of the patient's history were reviewed and updated as appropriate: allergies, current medications, past family history, past medical history, past social history, past surgical history, and problem list     OBJECTIVE    PAP Pressure: Nasal CPAP set to deliver 8 cm of water pressure  DME Provider: [employer]    Physical Exam:     General Appearance:   Alert, cooperative, no distress, appears stated age, obese     Head:   Normocephalic, without obvious abnormality, atraumatic     Eyes:   PERRL, conjunctiva/corneas clear, EOM's intact          Nose:  Nares normal, septum midline, no drainage or sinus tenderness           Throat:  Lips, teeth and gums normal; tongue normal size and  shape and midline      Neck:  Supple, symmetrical, trachea midline, no adenopathy;  Thyroid: No enlargement, tenderness or nodules; no carotid bruit or JVD Lungs:      Clear to auscultation bilaterally, respirations unlabored     Heart:   Regular rate and rhythm, S1 and S2 normal, no murmur, rub or gallop       Extremities:  Extremities normal, atraumatic, no cyanosis or edema     Pulses:  2+ and symmetric all extremities     Skin:  Skin color, texture, turgor normal, no rashes or lesions       Neurologic:  CNII-XII intact  Normal strength, sensation throughout       ASSESSMENT / PLAN    1  IRINA on CPAP  PAP DME Resupply/Reorder   2  Class 1 obesity due to excess calories without serious comorbidity with body mass index (BMI) of 30 0 to 30 9 in adult             Counseling / Coordination of Care  Total clinic time spent today 25 minutes  Greater than 50% of total time was spent with the patient and / or family counseling and / or coordination of care  A description of the counseling / coordination of care:     Impressions, Diagnostic results, Prognosis, Instructions for management, Risks and benefits of treatment, Risk factor reductions and Importance of compliance with treatment    The following instructions have been given to the patient today:    Patient Instructions   1  Continue nasal CPAP at 8 cm of water  2  Obtain a prescription for new supplies to last over the next 12 months  3  Return for re-evaluation in 1 year  4    Contact [facility] if any problems arise prior to that time        Rj Finley

## 2018-06-18 NOTE — PATIENT INSTRUCTIONS
1   Continue nasal CPAP at 8 cm of water  2  Obtain a prescription for new supplies to last over the next 12 months  3  Return for re-evaluation in 1 year  4    Contact [facility] if any problems arise prior to that time

## 2018-07-27 ENCOUNTER — TELEPHONE (OUTPATIENT)
Dept: FAMILY MEDICINE CLINIC | Facility: CLINIC | Age: 68
End: 2018-07-27

## 2018-07-27 DIAGNOSIS — K44.9 HIATAL HERNIA: ICD-10-CM

## 2018-07-27 DIAGNOSIS — I10 BENIGN ESSENTIAL HYPERTENSION: ICD-10-CM

## 2018-07-27 DIAGNOSIS — E03.9 HYPOTHYROIDISM, UNSPECIFIED TYPE: Primary | ICD-10-CM

## 2018-07-27 DIAGNOSIS — E78.5 HYPERLIPIDEMIA, UNSPECIFIED HYPERLIPIDEMIA TYPE: ICD-10-CM

## 2018-07-27 RX ORDER — LEVOTHYROXINE SODIUM 0.12 MG/1
TABLET ORAL
Qty: 30 TABLET | Refills: 0 | OUTPATIENT
Start: 2018-07-27

## 2018-07-27 NOTE — TELEPHONE ENCOUNTER
Patient's pharmacy is requesting a medication refill for levothyroxine  We have 2 doses in the patient's chart  Should she be taking 125mcg or 150mcg daily  Please advise

## 2018-07-28 RX ORDER — LEVOTHYROXINE SODIUM 0.12 MG/1
125 TABLET ORAL DAILY
Qty: 30 TABLET | Refills: 5 | Status: SHIPPED | OUTPATIENT
Start: 2018-07-28 | End: 2019-02-04 | Stop reason: SDUPTHER

## 2018-07-28 NOTE — TELEPHONE ENCOUNTER
Please rosalia pt  Dose of Synthroid is 125 mcg daily   Rx sent    Patient should be due for labs prior to her OV I 10/ 2018, orders placed, please mail

## 2018-08-08 DIAGNOSIS — I10 ESSENTIAL HYPERTENSION: Primary | ICD-10-CM

## 2018-08-08 RX ORDER — HYDROCHLOROTHIAZIDE 12.5 MG/1
CAPSULE, GELATIN COATED ORAL
Qty: 60 CAPSULE | Refills: 3 | Status: SHIPPED | OUTPATIENT
Start: 2018-08-08 | End: 2018-10-19

## 2018-09-29 LAB
ALBUMIN SERPL-MCNC: 3.9 G/DL (ref 3.6–5.1)
ALBUMIN/GLOB SERPL: 1.3 (CALC) (ref 1–2.5)
ALP SERPL-CCNC: 98 U/L (ref 33–130)
ALT SERPL-CCNC: 18 U/L (ref 6–29)
AST SERPL-CCNC: 19 U/L (ref 10–35)
BASOPHILS # BLD AUTO: 42 CELLS/UL (ref 0–200)
BASOPHILS NFR BLD AUTO: 0.9 %
BILIRUB SERPL-MCNC: 0.7 MG/DL (ref 0.2–1.2)
BUN SERPL-MCNC: 16 MG/DL (ref 7–25)
BUN/CREAT SERPL: ABNORMAL (CALC) (ref 6–22)
CALCIUM SERPL-MCNC: 9.4 MG/DL (ref 8.6–10.4)
CHLORIDE SERPL-SCNC: 105 MMOL/L (ref 98–110)
CHOLEST SERPL-MCNC: 179 MG/DL
CHOLEST/HDLC SERPL: 2.4 (CALC)
CO2 SERPL-SCNC: 30 MMOL/L (ref 20–32)
CREAT SERPL-MCNC: 0.87 MG/DL (ref 0.5–0.99)
EOSINOPHIL # BLD AUTO: 193 CELLS/UL (ref 15–500)
EOSINOPHIL NFR BLD AUTO: 4.1 %
ERYTHROCYTE [DISTWIDTH] IN BLOOD BY AUTOMATED COUNT: 12.9 % (ref 11–15)
GLOBULIN SER CALC-MCNC: 3 G/DL (CALC) (ref 1.9–3.7)
GLUCOSE SERPL-MCNC: 103 MG/DL (ref 65–99)
HCT VFR BLD AUTO: 36.4 % (ref 35–45)
HDLC SERPL-MCNC: 74 MG/DL
HGB BLD-MCNC: 12.2 G/DL (ref 11.7–15.5)
LDLC SERPL CALC-MCNC: 85 MG/DL (CALC)
LYMPHOCYTES # BLD AUTO: 1391 CELLS/UL (ref 850–3900)
LYMPHOCYTES NFR BLD AUTO: 29.6 %
MCH RBC QN AUTO: 30 PG (ref 27–33)
MCHC RBC AUTO-ENTMCNC: 33.5 G/DL (ref 32–36)
MCV RBC AUTO: 89.4 FL (ref 80–100)
MONOCYTES # BLD AUTO: 522 CELLS/UL (ref 200–950)
MONOCYTES NFR BLD AUTO: 11.1 %
NEUTROPHILS # BLD AUTO: 2552 CELLS/UL (ref 1500–7800)
NEUTROPHILS NFR BLD AUTO: 54.3 %
NONHDLC SERPL-MCNC: 105 MG/DL (CALC)
PLATELET # BLD AUTO: 145 THOUSAND/UL (ref 140–400)
PMV BLD REES-ECKER: 10.3 FL (ref 7.5–12.5)
POTASSIUM SERPL-SCNC: 3.9 MMOL/L (ref 3.5–5.3)
PROT SERPL-MCNC: 6.9 G/DL (ref 6.1–8.1)
RBC # BLD AUTO: 4.07 MILLION/UL (ref 3.8–5.1)
SL AMB EGFR AFRICAN AMERICAN: 79 ML/MIN/1.73M2
SL AMB EGFR NON AFRICAN AMERICAN: 68 ML/MIN/1.73M2
SODIUM SERPL-SCNC: 141 MMOL/L (ref 135–146)
TRIGL SERPL-MCNC: 105 MG/DL
TSH SERPL-ACNC: 1.19 MIU/L (ref 0.4–4.5)
WBC # BLD AUTO: 4.7 THOUSAND/UL (ref 3.8–10.8)

## 2018-10-19 ENCOUNTER — OFFICE VISIT (OUTPATIENT)
Dept: FAMILY MEDICINE CLINIC | Facility: CLINIC | Age: 68
End: 2018-10-19
Payer: MEDICARE

## 2018-10-19 VITALS
BODY MASS INDEX: 32.14 KG/M2 | HEIGHT: 66 IN | SYSTOLIC BLOOD PRESSURE: 124 MMHG | RESPIRATION RATE: 16 BRPM | HEART RATE: 68 BPM | DIASTOLIC BLOOD PRESSURE: 80 MMHG | TEMPERATURE: 97.5 F | WEIGHT: 200 LBS

## 2018-10-19 DIAGNOSIS — M54.9 MID BACK PAIN: ICD-10-CM

## 2018-10-19 DIAGNOSIS — Z23 NEED FOR INFLUENZA VACCINATION: Primary | ICD-10-CM

## 2018-10-19 DIAGNOSIS — E03.9 HYPOTHYROIDISM, UNSPECIFIED TYPE: ICD-10-CM

## 2018-10-19 DIAGNOSIS — G47.33 SEVERE OBSTRUCTIVE SLEEP APNEA: ICD-10-CM

## 2018-10-19 DIAGNOSIS — E78.5 HYPERLIPIDEMIA, UNSPECIFIED HYPERLIPIDEMIA TYPE: ICD-10-CM

## 2018-10-19 DIAGNOSIS — M51.36 DISC DEGENERATION, LUMBAR: ICD-10-CM

## 2018-10-19 DIAGNOSIS — R01.1 MURMUR, CARDIAC: ICD-10-CM

## 2018-10-19 DIAGNOSIS — Z12.39 SCREENING FOR BREAST CANCER: ICD-10-CM

## 2018-10-19 DIAGNOSIS — I10 BENIGN ESSENTIAL HYPERTENSION: ICD-10-CM

## 2018-10-19 DIAGNOSIS — M25.551 RIGHT HIP PAIN: ICD-10-CM

## 2018-10-19 PROCEDURE — 90662 IIV NO PRSV INCREASED AG IM: CPT | Performed by: FAMILY MEDICINE

## 2018-10-19 PROCEDURE — G0008 ADMIN INFLUENZA VIRUS VAC: HCPCS | Performed by: FAMILY MEDICINE

## 2018-10-19 PROCEDURE — 99214 OFFICE O/P EST MOD 30 MIN: CPT | Performed by: FAMILY MEDICINE

## 2018-10-19 NOTE — PROGRESS NOTES
FAMILY PRACTICE OFFICE VISIT       NAME: Ling Muhammad  AGE: 76 y o  SEX: female       : 1950        MRN: [id number]        Assessment and Plan     Problem List Items Addressed This Visit     Benign essential hypertension       Continue Norvasc 5 mg once a day and  HCTZ 12 5 mg daily         Relevant Orders    CBC    Comprehensive metabolic panel    Lipid panel    Disc degeneration, lumbar    Hyperlipidemia      Patient uses Lipitor 10 mg every other day         Relevant Orders    Lipid panel    Hypothyroidism - Primary      Continue Synthroid 125 mcg daily         Relevant Orders    Comprehensive metabolic panel    TSH, 3rd generation    Severe obstructive sleep apnea     Patient uses CPAP  DR Chang follows           Other Visit Diagnoses     Screening for breast cancer        Relevant Orders    Mammo screening bilateral w cad    Right hip pain        Relevant Orders    XR hip/pelvis 4+ vw right if performed    Mid back pain        Relevant Orders    XR spine thoracic 3 vw    Murmur, cardiac        Relevant Orders    Echo complete with contrast if indicated       patient presents for follow-up of chronic medical conditions  Will continue same daily medications  Routine blood work in 6 months  Patient is due for mammogram     Will proceed with x-ray of right hip and thoracic spine  I advised patient to discontinue aspirin a use ibuprofen instead as needed  She may try Aspercreme over-the-counter  Patient will schedule echocardiogram due to finding of soft systolic ejection murmur and longstanding history of hypertension  Patient up so up-to-date with health screenings but is due for mammography  Flu vaccine administered today  Follow-up 6 months      Patient Instructions   Asper cream  as needed for joint or back pain  Please take ibuprofen 400-600 mg as needed for hip/back pain, take medication after food          Chief Complaint     Chief Complaint   Patient presents with   [facility] Follow-up     Pt is here for 6 mos f/u  Pain in right hip       History of Present Illness      Patient presents for follow-up of chronic medical conditions  She remains on medications for hypertension, hyperlipidemia and hypothyroidism  She uses Lipitor 10 mg every other day  Patient remains on Norvasc 5 mg once a day, HCTZ 12 5 mg daily and Synthroid 125 mcg once a day  She denies chest pain, palpitations, shortness of breath or dizziness  Patient is due for mammogram     She is up-to-date with gyn exam       Last  Colonoscopy  5/2016 - due in 2019    Patient complains of occasional right hip pain radiating to the right groin  No sciatica  She is also experiencing intermittent mid back pain after gardening or extensive bending  No radiation, no dyspnea, pain is relieved with p r n  Aspirin and massage  Patient uses CPAP as directed, she was diagnosed with severe sleep apnea  She follows up with [facility]  No acid reflux symptoms  Patient underwent surgery for removal of enlarged liver cyst   Earlier this year  Review of Systems   Review of Systems   Constitutional: Negative  HENT: Negative  Respiratory: Negative  Cardiovascular: Negative  Gastrointestinal: Negative  Endocrine: Negative  Genitourinary: Negative  Musculoskeletal: Positive for arthralgias (  Right hip pain) and back pain ( occasional midback pain)  Allergic/Immunologic: Negative  Neurological: Negative  Hematological: Negative  Psychiatric/Behavioral: Negative          Active Problem List     Patient Active Problem List   Diagnosis    Benign essential hypertension    Disc degeneration, lumbar    Dysphagia    Hepatic cyst    Hyperlipidemia    Hypothyroidism    Liver enlargement    Osteoporosis    Severe obstructive sleep apnea    Shoulder impingement    Tinea corporis    Incisional hernia, without obstruction or gangrene    Lumbar radiculopathy Past Medical History:  Past Medical History:   Diagnosis Date    Cystic breast     Dyspareunia, female     last assessed 9/4/14    Dysphagia     Hyperlipidemia     Insomnia     Osteoporosis     Thyroid disease     Varicose veins of lower extremity     last assessed 1/4/13       Past Surgical History:  Past Surgical History:   Procedure Laterality Date    COLONOSCOPY      complete 5/2016 - Dr Praveen Babin due in 2019 - last assessed  3/17/17    HERNIA REPAIR      LIVER BIOPSY LAPAROSCOPIC N/A 5/7/2018    Procedure: Laparoscopic marsupialization of liver cyst;  Surgeon: Sourav Real MD;  [facility];  Service: Surgical Oncology    NEUROMA EXCISION      IN ESOPHAGOGASTRODUODENOSCOPY TRANSORAL DIAGNOSTIC N/A 8/10/2016    Procedure: ESOPHAGOGASTRODUODENOSCOPY (EGD); Surgeon: Gabriel Coleman MD;  Location: [facility]; Service: Gastroenterology    IN ESOPHAGOSCOPY FLEXIBLE TRANSORAL WITH BIOPSY N/A 11/3/2016    Procedure: ESOPHAGOGASTRODUODENOSCOPY (EGD);   Surgeon: Gale Merlos MD;  [facility];  Service: Thoracic    IN LAP, REPAIR PARAESOPHAGEAL HERNIA, INCL FUNDOPLASTY W/ MESH Left 11/3/2016    Procedure: LAPAROSCOPIC PARAESOPHAGEAL HERNIA REPAIR WITH MESH;NISSEN FUNDOPLICATION ;  Surgeon: Gale Merlos MD;  [facility];  Service: Thoracic    IN LAP, VENTRAL HERNIA REPAIR,REDUCIBLE N/A 10/30/2017    Procedure: LAPAROSCOPIC INCISIONAL HERNIA REPAIR X 2 WITH ECHO MESH;  Surgeon: Nanda Velarde DO;  [facility];  Service: General    IN REPAIR INCISIONAL HERNIA,REDUCIBLE N/A 1/13/2017    Procedure: INCISIONAL HERNIA REPAIR ;  Surgeon: Nanda Velarde DO;  [facility];  Service: General    TUBAL LIGATION         Family History:  Family History   Problem Relation Age of Onset    Heart disease Mother     Aneurysm Mother         cerebral    Alcohol abuse Father     Cancer Father     COPD Father     Heart failure Father     Hypertension Father    [facility] Cancer Family        Social History:  Social History     Social History    Marital status: /Civil Union     Spouse name: N/A    Number of children: N/A    Years of education: N/A     Occupational History    Not on file  Social History Main Topics    Smoking status: Never Smoker    Smokeless tobacco: Never Used    Alcohol use Yes      Comment: social    Drug use: No    Sexual activity: Yes     Partners: Male     Birth control/ protection: None     Other Topics Concern    Not on file     Social History Narrative    Coffee    Exercise - walking     PHQ-9 Depression Screening    PHQ-9:    Frequency of the following problems over the past two weeks:       Little interest or pleasure in doing things:  0 - not at all  Feeling down, depressed, or hopeless:  0 - not at all  PHQ-2 Score:  0           Objective     Vitals:    10/19/18 0731 10/19/18 0802   BP: 140/90 124/80   Pulse: 68    Resp: 16    Temp: 97 5 °F (36 4 °C)    TempSrc: Tympanic    Weight: 90 7 kg (200 lb)    Height: 5' 5 5" (1 664 m)      Wt Readings from Last 3 Encounters:   10/19/18 90 7 kg (200 lb)   06/18/18 85 7 kg (189 lb)   05/22/18 86 2 kg (190 lb)       Physical Exam   Constitutional: She is oriented to person, place, and time  She appears well-developed and well-nourished  HENT:   Head: Normocephalic and atraumatic  Eyes: Conjunctivae are normal    Neck: Neck supple  Carotid bruit is not present  No thyromegaly present  Cardiovascular: Normal rate and regular rhythm  Murmur ( soft systolic ejection murmur 1 to 2/6) heard  Pulmonary/Chest: Effort normal and breath sounds normal  No respiratory distress  She has no wheezes  Abdominal: She exhibits no abdominal bruit  Musculoskeletal: Normal range of motion  She exhibits no edema  Tenderness T10, T11-T12 and paraspinal thoracic spine   Neurological: She is alert and oriented to person, place, and time  No cranial nerve deficit   Coordination normal    Psychiatric: She has a normal mood and affect  Her behavior is normal    Nursing note and vitals reviewed        Pertinent Laboratory/Diagnostic Studies:  Lab Results   Component Value Date    BUN 16 09/28/2018    CREATININE 0 82 05/08/2018    CALCIUM 9 4 09/28/2018     05/08/2018    K 3 9 09/28/2018    CO2 30 09/28/2018     09/28/2018     Lab Results   Component Value Date    ALT 22 04/24/2018    AST 23 04/24/2018    ALKPHOS 98 09/28/2018    BILITOT 0 6 09/19/2017       Lab Results   Component Value Date    WBC 4 7 09/28/2018    HGB 12 2 09/28/2018    HCT 36 4 09/28/2018    MCV 89 4 09/28/2018     09/28/2018       Lab Results   Component Value Date    TSH 1 19 09/28/2018       Lab Results   Component Value Date    CHOL 159 09/19/2017     Lab Results   Component Value Date    TRIG 105 09/28/2018     Lab Results   Component Value Date    HDL 74 09/28/2018     Lab Results   Component Value Date    LDLCALC 58 04/24/2018     No results found for: HGBA1C    Results for orders placed or performed in visit on 09/28/18   Lipid panel   Result Value Ref Range    Total Cholesterol 179 <200 mg/dL    HDL 74 >50 mg/dL    Triglycerides 105 <150 mg/dL    LDL Direct 85 mg/dL (calc)    Chol HDLC Ratio 2 4 <5 0 (calc)    Non-HDL Cholesterol 105 <130 mg/dL (calc)   Comprehensive metabolic panel   Result Value Ref Range    Glucose 103 (H) 65 - 99 mg/dL    BUN 16 7 - 25 mg/dL    Creatinine 0 87 0 50 - 0 99 mg/dL    eGFR Non  68 > OR = 60 mL/min/1 73m2    SL AMB EGFR  79 > OR = 60 mL/min/1 73m2    SL AMB BUN/CREATININE RATIO NOT APPLICABLE 6 - 22 (calc)    Sodium 141 135 - 146 mmol/L    SL AMB POTASSIUM 3 9 3 5 - 5 3 mmol/L    Chloride 105 98 - 110 mmol/L    CO2 30 20 - 32 mmol/L    SL AMB CALCIUM 9 4 8 6 - 10 4 mg/dL    SL AMB PROTEIN, TOTAL 6 9 6 1 - 8 1 g/dL    Albumin 3 9 3 6 - 5 1 g/dL    Globulin 3 0 1 9 - 3 7 g/dL (calc)    SL AMB ALBUMIN/GLOBULIN RATIO 1 3 1 0 - 2 5 (calc)    TOTAL BILIRUBIN 0 7 0 2 - 1 2 mg/dL    Alkaline Phosphatase 98 33 - 130 U/L    SL AMB AST 19 10 - 35 U/L    SL AMB ALT 18 6 - 29 U/L   CBC and differential   Result Value Ref Range    White Blood Cell Count 4 7 3 8 - 10 8 Thousand/uL    Red Blood Cell Count 4 07 3 80 - 5 10 Million/uL    Hemoglobin 12 2 11 7 - 15 5 g/dL    HCT 36 4 35 0 - 45 0 %    MCV 89 4 80 0 - 100 0 fL    MCH 30 0 27 0 - 33 0 pg    MCHC 33 5 32 0 - 36 0 g/dL    RDW 12 9 11 0 - 15 0 %    Platelet Count 614 459 - 400 Thousand/uL    SL AMB MPV 10 3 7 5 - 12 5 fL    Neutrophils (Absolute) 2,552 1,500 - 7,800 cells/uL    Lymphocytes (Absolute) 1,391 850 - 3,900 cells/uL    Monocytes (Absolute) 522 200 - 950 cells/uL    Eosinophils (Absolute) 193 15 - 500 cells/uL    Basophils (Absolute) 42 0 - 200 cells/uL    Neutrophils 54 3 %    Lymphocytes 29 6 %    Monocytes 11 1 %    Eosinophils 4 1 %    Basophils Relative 0 9 %   TSH, 3rd generation   Result Value Ref Range    TSH 1 19 0 40 - 4 50 mIU/L       Orders Placed This Encounter   Procedures    Mammo screening bilateral w cad    XR spine thoracic 3 vw    XR hip/pelvis 4+ vw right if performed    CBC    Comprehensive metabolic panel    Lipid panel    TSH, 3rd generation    Echo complete with contrast if indicated       ALLERGIES:  Allergies   Allergen Reactions    Other Other (See Comments)     Skin breakdown from bandaid on for long time- reddness       Current Medications     Current Outpatient Prescriptions   Medication Sig Dispense Refill    amLODIPine (NORVASC) 5 mg tablet TAKE 1 TABLET DAILY 30 tablet 6    atorvastatin (LIPITOR) 10 mg tablet TAKE 1 TABLET DAILY (Patient taking differently: TAKE 1 TABlet Mon Wed Fri) 30 tablet 6    B Complex-C (SUPER B COMPLEX PO) Take 1 capsule by mouth   Calcium Carb-Cholecalciferol 600-800 MG-UNIT TABS Take by mouth      Cholecalciferol (VITAMIN D3) 2000 UNITS TABS Take 1 tablet by mouth daily        co-enzyme Q-10 30 MG capsule Take 30 mg by mouth 3 (three) times a day       estradiol (ESTRACE VAGINAL) 0 1 mg/g vaginal cream Insert into the vagina      hydrochlorothiazide (HYDRODIURIL) 12 5 mg tablet Take 12 5 mg by mouth daily   levothyroxine 125 mcg tablet Take 1 tablet (125 mcg total) by mouth daily 30 tablet 5    Multiple Vitamins-Minerals (CENTRUM SILVER PO) Take 1 tablet by mouth daily   Omega-3 Fatty Acids (FISH OIL PO) Take by mouth      VITAMIN E PO Take by mouth       No current facility-administered medications for this visit            Health Maintenance     Health Maintenance   Topic Date Due    SLP PLAN OF CARE  1950    INFLUENZA VACCINE  07/01/2018    MAMMOGRAM  10/18/2018    Fall Risk  04/18/2019    Urinary Incontinence Screening  04/18/2019    Depression Screening PHQ  10/19/2019    DTaP,Tdap,and Td Vaccines (2 - Td) 11/09/2020    CRC Screening: Colonoscopy  05/12/2026    Pneumococcal PPSV23/PCV13 65+ Years / Low and Medium Risk  Completed     Immunization History   Administered Date(s) Administered    Influenza Quadrivalent Preservative Free 3 years and older IM 11/11/2016    Influenza Split High Dose Preservative Free IM 09/21/2015, 09/15/2017    Influenza TIV (IM) 11/01/2010, 01/24/2013, 09/22/2014    Pneumococcal Conjugate 13-Valent 01/19/2016    Pneumococcal Polysaccharide PPV23 09/15/2017    Tdap 11/09/2010       Bonita Acosta MD

## 2018-10-19 NOTE — PATIENT INSTRUCTIONS
Asper cream  as needed for joint or back pain  Please take ibuprofen 400-600 mg as needed for hip/back pain, take medication after food

## 2018-11-20 ENCOUNTER — HOSPITAL ENCOUNTER (OUTPATIENT)
Dept: RADIOLOGY | Facility: HOSPITAL | Age: 68
Discharge: HOME | End: 2018-11-20
Payer: MEDICARE

## 2018-11-20 ENCOUNTER — HOSPITAL ENCOUNTER (OUTPATIENT)
Dept: NON INVASIVE DIAGNOSTICS | Facility: HOSPITAL | Age: 68
Discharge: HOME | End: 2018-11-20
Payer: MEDICARE

## 2018-11-20 ENCOUNTER — TRANSCRIBE ORDERS (OUTPATIENT)
Dept: RADIOLOGY | Facility: HOSPITAL | Age: 68
End: 2018-11-20

## 2018-11-20 VITALS — HEIGHT: 66 IN | BODY MASS INDEX: 31.34 KG/M2 | WEIGHT: 195 LBS

## 2018-11-20 DIAGNOSIS — Z12.39 SCREENING FOR BREAST CANCER: ICD-10-CM

## 2018-11-20 DIAGNOSIS — M25.551 RIGHT HIP PAIN: ICD-10-CM

## 2018-11-20 DIAGNOSIS — R01.1 MURMUR, CARDIAC: ICD-10-CM

## 2018-11-20 DIAGNOSIS — M54.9 MID BACK PAIN: ICD-10-CM

## 2018-11-20 PROCEDURE — 93306 TTE W/DOPPLER COMPLETE: CPT

## 2018-11-20 PROCEDURE — 93306 TTE W/DOPPLER COMPLETE: CPT | Performed by: INTERNAL MEDICINE

## 2018-11-20 PROCEDURE — 73503 X-RAY EXAM HIP UNI 4/> VIEWS: CPT

## 2018-11-20 PROCEDURE — 77067 SCR MAMMO BI INCL CAD: CPT

## 2018-11-20 PROCEDURE — 72072 X-RAY EXAM THORAC SPINE 3VWS: CPT

## 2018-11-21 ENCOUNTER — TELEPHONE (OUTPATIENT)
Dept: FAMILY MEDICINE CLINIC | Facility: CLINIC | Age: 68
End: 2018-11-21

## 2018-11-21 DIAGNOSIS — I34.0 NON-RHEUMATIC MITRAL REGURGITATION: ICD-10-CM

## 2018-11-21 DIAGNOSIS — I34.1 MITRAL VALVE PROLAPSE: Primary | ICD-10-CM

## 2018-11-21 NOTE — TELEPHONE ENCOUNTER
I left message for patient to call us back regarding results of echocardiogram   I would be happy to talk to her myself regarding results of this study but unfortunately I was not able to reach her in person this morning  Please let her know that echocardiogram revealed mitral valve prolapse with significant leakage of mitral valve  I was informed that one of Cardiology Peck spoke with her yesterday at [facility] cardiovascular [place] after echocardiogram was completed  Patient needs to schedule evaluation with [facility]  I will place orders  Please advise her to continue same daily medications  Please let me know if she has any questions or concerns I would be happy to call her back      Thank you

## 2018-11-28 ENCOUNTER — TELEPHONE (OUTPATIENT)
Dept: FAMILY MEDICINE CLINIC | Facility: CLINIC | Age: 68
End: 2018-11-28

## 2018-11-28 DIAGNOSIS — M16.11 PRIMARY OSTEOARTHRITIS OF RIGHT HIP: Primary | ICD-10-CM

## 2018-11-28 NOTE — TELEPHONE ENCOUNTER
I spoke with patient regarding results of echocardiogram and x-ray of thoracic spine and right hip  Echocardiogram reveals severe moderate regurgitation and normal ejection fraction  Patient will be seeing Cardiology on November 30th, Dr Indu Josue  She is asymptomatic  Patient states that midback pain has essentially resolved  Intermittent discomfort in the right hip  X-ray reveals arthritic changes  If right hip pain will be persisting-patient will proceed with MRI for further evaluation    I advised patient to call me with any questions or concerns after cardiac consult    Nurses:  Please mail MRI slip to patient

## 2018-11-29 ENCOUNTER — TRANSCRIBE ORDERS (OUTPATIENT)
Dept: ADMINISTRATIVE | Facility: HOSPITAL | Age: 68
End: 2018-11-29

## 2018-11-29 DIAGNOSIS — M16.11 PRIMARY OSTEOARTHRITIS OF RIGHT HIP: Primary | ICD-10-CM

## 2018-11-30 ENCOUNTER — OFFICE VISIT (OUTPATIENT)
Dept: CARDIOLOGY CLINIC | Facility: CLINIC | Age: 68
End: 2018-11-30
Payer: MEDICARE

## 2018-11-30 ENCOUNTER — TELEPHONE (OUTPATIENT)
Dept: NON INVASIVE DIAGNOSTICS | Facility: HOSPITAL | Age: 68
End: 2018-11-30

## 2018-11-30 VITALS
WEIGHT: 199.2 LBS | DIASTOLIC BLOOD PRESSURE: 84 MMHG | HEIGHT: 66 IN | SYSTOLIC BLOOD PRESSURE: 130 MMHG | BODY MASS INDEX: 32.02 KG/M2 | HEART RATE: 70 BPM

## 2018-11-30 DIAGNOSIS — I34.0 NON-RHEUMATIC MITRAL REGURGITATION: ICD-10-CM

## 2018-11-30 DIAGNOSIS — I34.1 MITRAL VALVE PROLAPSE: ICD-10-CM

## 2018-11-30 PROCEDURE — 99204 OFFICE O/P NEW MOD 45 MIN: CPT | Performed by: INTERNAL MEDICINE

## 2018-11-30 NOTE — PROGRESS NOTES
Cardiology Consultation     Ashvin Castillo  [id number]  1950  HEART & VASCULAR [facility]  [address] [facility]    This pleasant 71-year-old woman is seen for evaluation of murmur  He was 1st noted about 2 years ago  A recent transthoracic echo shows severe mitral regurgitation, anterior jet regurgitates into the pulmonary veins  She has mild to moderate left atrial enlargement no left ventricular dilatation  Functional capacity remains normal     She has been hypertensive, well controlled on current regimen  Lipids abnormal, she is able to safely take statin 3 days a week  A true pregnancies were uneventful  She was not hypertensive or diabetic that time  Labs reviewed with her showing impaired fasting glucose  She does not smoke and has a occasional drink of wine  She walks her dogs faithfully for 1-2 miles 3 times a week  Going up hill she does not look limitation  She has sleep apnea which is severe she uses a CPAP mask  She has not had overt symptoms of a arrhythmia  Three previous operations went well, hiatal hernia repair 2017 incisional hernias and then liver cyst was roofed earlier this year        1   Mitral valve prolapse  Ambulatory referral to Cardiology    DORI   2  Non-rheumatic mitral regurgitation  Ambulatory referral to Cardiology     Patient Active Problem List   Diagnosis    Benign essential hypertension    Disc degeneration, lumbar    Dysphagia    Hepatic cyst    Hyperlipidemia    Hypothyroidism    Liver enlargement    Osteoporosis    Severe obstructive sleep apnea    Shoulder impingement    Tinea corporis    Incisional hernia, without obstruction or gangrene    Lumbar radiculopathy    Non-rheumatic mitral regurgitation    Mitral valve prolapse     Past Medical History:   Diagnosis Date    Cystic breast     Dyspareunia, female     last assessed 9/4/14    Dysphagia     Hyperlipidemia     Insomnia     Osteoporosis     Thyroid disease     Varicose veins of lower extremity     last assessed 1/4/13     Social History     Social History    Marital status: /Civil Union     Spouse name: N/A    Number of children: N/A    Years of education: N/A     Occupational History    Not on file  Social History Main Topics    Smoking status: Never Smoker    Smokeless tobacco: Never Used    Alcohol use Yes      Comment: social    Drug use: No    Sexual activity: Yes     Partners: Male     Birth control/ protection: None     Other Topics Concern    Not on file     Social History Narrative    Coffee    Exercise - walking      Family History   Problem Relation Age of Onset    Heart disease Mother     Aneurysm Mother         cerebral    Alcohol abuse Father     Cancer Father     COPD Father     Heart failure Father     Hypertension Father     Cancer Family     Breast cancer Paternal Grandmother 80     Past Surgical History:   Procedure Laterality Date    COLONOSCOPY      complete 5/2016 - Dr Juan Francisco Armenta due in 2019 - last assessed  3/17/17    HERNIA REPAIR      LIVER BIOPSY LAPAROSCOPIC N/A 5/7/2018    Procedure: Laparoscopic marsupialization of liver cyst;  Surgeon: Dwayne Winn MD;  [facility];  Service: Surgical Oncology    NEUROMA EXCISION      NC ESOPHAGOGASTRODUODENOSCOPY TRANSORAL DIAGNOSTIC N/A 8/10/2016    Procedure: ESOPHAGOGASTRODUODENOSCOPY (EGD); Surgeon: Noelle Sim MD;  [facility]; Service: Gastroenterology    NC ESOPHAGOSCOPY FLEXIBLE TRANSORAL WITH BIOPSY N/A 11/3/2016    Procedure: ESOPHAGOGASTRODUODENOSCOPY (EGD);   Surgeon: Markell Reynaga MD;  [facility];  Service: Thoracic    NC LAP, REPAIR PARAESOPHAGEAL HERNIA, INCL FUNDOPLASTY W/ MESH Left 11/3/2016    Procedure: LAPAROSCOPIC PARAESOPHAGEAL HERNIA REPAIR WITH MESH;NISSEN FUNDOPLICATION ;  Surgeon: Markell Reynaga MD;  [facility]; Service: Thoracic    FL LAP, VENTRAL HERNIA REPAIR,REDUCIBLE N/A 10/30/2017    Procedure: LAPAROSCOPIC INCISIONAL HERNIA REPAIR X 2 WITH ECHO MESH;  Surgeon: Davonte Godinez DO;  [facility];  Service: General    FL REPAIR INCISIONAL HERNIA,REDUCIBLE N/A 1/13/2017    Procedure: INCISIONAL HERNIA REPAIR ;  Surgeon: Davonte Godinez DO;  [facility];  Service: General    TUBAL LIGATION         Current Outpatient Prescriptions:     amLODIPine (NORVASC) 5 mg tablet, TAKE 1 TABLET DAILY, Disp: 30 tablet, Rfl: 6    atorvastatin (LIPITOR) 10 mg tablet, TAKE 1 TABLET DAILY (Patient taking differently: TAKE 1 TABlet Mon Wed Fri), Disp: 30 tablet, Rfl: 6    B Complex-C (SUPER B COMPLEX PO), Take 1 capsule by mouth , Disp: , Rfl:     Calcium Carb-Cholecalciferol 600-800 MG-UNIT TABS, Take by mouth, Disp: , Rfl:     Cholecalciferol (VITAMIN D3) 2000 UNITS TABS, Take 1 tablet by mouth daily  , Disp: , Rfl:     co-enzyme Q-10 30 MG capsule, Take 30 mg by mouth 3 (three) times a day , Disp: , Rfl:     estradiol (ESTRACE VAGINAL) 0 1 mg/g vaginal cream, Insert into the vagina, Disp: , Rfl:     hydrochlorothiazide (HYDRODIURIL) 12 5 mg tablet, Take 12 5 mg by mouth daily  , Disp: , Rfl:     levothyroxine 125 mcg tablet, Take 1 tablet (125 mcg total) by mouth daily, Disp: 30 tablet, Rfl: 5    Multiple Vitamins-Minerals (CENTRUM SILVER PO), Take 1 tablet by mouth daily  , Disp: , Rfl:     Omega-3 Fatty Acids (FISH OIL PO), Take by mouth, Disp: , Rfl:     VITAMIN E PO, Take by mouth, Disp: , Rfl:   Allergies   Allergen Reactions    Other Other (See Comments)     Skin breakdown from bandaid on for long time- reddness     Vitals:    11/30/18 1423   BP: 130/84   BP Location: Right arm   Patient Position: Sitting   Cuff Size: Standard   Pulse: 70   Weight: 90 4 kg (199 lb 3 2 oz)   Height: 5' 5 5" (1 664 m)       Labs:not applicable  Imaging: Xr Spine Thoracic 3 Vw    Result Date: 11/23/2018  Narrative: THORACIC SPINE INDICATION:   M54 9: Dorsalgia, unspecified  COMPARISON:  None VIEWS:  XR SPINE THORACIC 3 VW Images: 4 FINDINGS: There is no fracture or pathologic bone lesion  Mild smooth mid thoracic dextroscoliosis  No evidence of subluxation  No significant degenerative changes  There is no displacement of the paraspinal line  The pedicles appear intact  Impression: Mild smooth mid thoracic dextroscoliosis No acute osseous abnormality  Workstation performed: [id number]     Mammo Screening Bilateral W Cad    Result Date: 11/21/2018  Narrative: DIAGNOSIS: Screening for breast cancer RELEVANT HISTORY: Family Breast Cancer History: History of breast cancer in Paternal Grandmother  Family Medical history: Family medical history includes breast cancer in paternal grandmother  Personal History: No relevant hormone history has been documented for this patient  No relevant surgical history has been documented for this patient  Medical history includes fibrocystic breast  COMPARISONS: Compared to: 10/18/2017 Mammo screening bilateral w cad, 08/05/2016 Mammo screening bilateral w cad, 07/31/2015 Mammo screening bilateral w cad, and 07/25/2014 Mammo screening bilateral w cad INDICATION: Fer Stuart is a 76 y o  female presenting for screening  TECHNOLOGIST: Giuseppe Max VIEWS: 2D views: CC, MLO, XCCL views of left, right breast(s) were taken  2D synth views: No views of the breast(s) were taken  3D views: No views of the breast(s) were taken  TECHNIQUE: The current study was evaluated with Computer Aided Detection  TISSUE DENSITY: There are scattered areas of fibroglandular density  The patient is scheduled in a reminder system for screening mammography  8-10% of cancers will be missed on mammography  Management of a palpable abnormality must be based on clinical grounds  Patients will be notified of their results via letter from our facility  Accredited by [address] [place] [address] of Radiology and FDA   RISK ASSESSMENT: 5 Year Tyrer-Cuzick: 3 02 % 10 Year Tyrer-Cuzick: 6 09 % Lifetime Tyrer-Cuzick: 10 85 % FINDINGS: Bilateral There are no suspicious masses, grouped microcalcifications or areas of architectural distortion  The skin and nipple areolar complex are unremarkable  Impression: No Mammographic evidence of malignancy  ASSESSMENT/BI-RADS CATEGORY: Left: 2 - Benign Right: 2 - Benign Overall: 2 - Benign RECOMMENDATION:      - Routine Screening Mammogram in 1 year for both breasts  Workstation ID: [id number]     Xr Hip/pelvis 4+ Vw Right If Performed    Result Date: 11/23/2018  Narrative: RIGHT HIP INDICATION:   M25 551: Pain in right hip  COMPARISON:  None VIEWS:  XR HIP/PELV 4+ VW RIGHT W PELVIS Images: 4 FINDINGS: There is no acute fracture or dislocation  Mild degenerative changes both hips No lytic or blastic osseous lesions  Soft tissues are unremarkable  Degenerative spondylosis L5-S1     Impression: Mild degenerative changes both hips No acute osseous abnormality  Workstation performed: [id number]       Review of Systems:  Review of Systems   Respiratory: Negative  Cardiovascular: Negative  Neurological: Negative for dizziness, syncope, weakness and light-headedness  Physical Exam:  Physical Exam   Constitutional: She appears well-nourished  Neck: JVD (JVP seems to be upper limits normal, 3-6 cm water) present  Normal carotid pulses and no hepatojugular reflux present  Carotid bruit is not present  Cardiovascular: Normal rate and regular rhythm  Murmur (Grade 3 mitral murmur which radiates to the anterior base  No click heard  Pansystolic) heard  Systolic murmur is present with a grade of 3/6   Pulses:       Posterior tibial pulses are 2+ on the right side, and 2+ on the left side  Pulmonary/Chest: Effort normal and breath sounds normal  No respiratory distress  She has no wheezes  She has no rales  She exhibits no tenderness  Abdominal: Soft   Normal aorta and bowel sounds are normal  She exhibits no distension  There is no hepatosplenomegaly  Musculoskeletal: She exhibits no edema  Discussion/Summary:  Mitral valve prolapse, severe MR      Hypertension, well controlled    Lipids, treated

## 2018-12-03 ENCOUNTER — ANESTHESIA EVENT (OUTPATIENT)
Dept: NON INVASIVE DIAGNOSTICS | Facility: HOSPITAL | Age: 68
End: 2018-12-03

## 2018-12-03 ENCOUNTER — HOSPITAL ENCOUNTER (OUTPATIENT)
Dept: NON INVASIVE DIAGNOSTICS | Facility: HOSPITAL | Age: 68
Discharge: HOME | End: 2018-12-03
Attending: INTERNAL MEDICINE
Payer: MEDICARE

## 2018-12-03 ENCOUNTER — ANESTHESIA (OUTPATIENT)
Dept: NON INVASIVE DIAGNOSTICS | Facility: HOSPITAL | Age: 68
End: 2018-12-03

## 2018-12-03 VITALS
HEART RATE: 59 BPM | OXYGEN SATURATION: 96 % | DIASTOLIC BLOOD PRESSURE: 71 MMHG | RESPIRATION RATE: 18 BRPM | SYSTOLIC BLOOD PRESSURE: 103 MMHG

## 2018-12-03 DIAGNOSIS — I34.1 MITRAL VALVE PROLAPSE: ICD-10-CM

## 2018-12-03 PROCEDURE — 93325 DOPPLER ECHO COLOR FLOW MAPG: CPT | Performed by: INTERNAL MEDICINE

## 2018-12-03 PROCEDURE — 93312 ECHO TRANSESOPHAGEAL: CPT | Performed by: INTERNAL MEDICINE

## 2018-12-03 PROCEDURE — 93312 ECHO TRANSESOPHAGEAL: CPT

## 2018-12-03 PROCEDURE — 93320 DOPPLER ECHO COMPLETE: CPT | Performed by: INTERNAL MEDICINE

## 2018-12-03 PROCEDURE — 76376 3D RENDER W/INTRP POSTPROCES: CPT

## 2018-12-03 RX ORDER — SODIUM CHLORIDE 9 MG/ML
INJECTION, SOLUTION INTRAVENOUS CONTINUOUS PRN
Status: DISCONTINUED | OUTPATIENT
Start: 2018-12-03 | End: 2018-12-03 | Stop reason: SURG

## 2018-12-03 RX ORDER — PROPOFOL 10 MG/ML
INJECTION, EMULSION INTRAVENOUS CONTINUOUS PRN
Status: DISCONTINUED | OUTPATIENT
Start: 2018-12-03 | End: 2018-12-03 | Stop reason: SURG

## 2018-12-03 RX ORDER — PROPOFOL 10 MG/ML
INJECTION, EMULSION INTRAVENOUS AS NEEDED
Status: DISCONTINUED | OUTPATIENT
Start: 2018-12-03 | End: 2018-12-03 | Stop reason: SURG

## 2018-12-03 RX ADMIN — SODIUM CHLORIDE: 9 INJECTION, SOLUTION INTRAVENOUS at 09:39

## 2018-12-03 RX ADMIN — PROPOFOL 100 MG: 10 INJECTION, EMULSION INTRAVENOUS at 10:00

## 2018-12-03 RX ADMIN — PROPOFOL 100 MCG/KG/MIN: 10 INJECTION, EMULSION INTRAVENOUS at 10:00

## 2018-12-03 NOTE — ANESTHESIA POSTPROCEDURE EVALUATION
Post-Op Assessment Note      CV Status:  Stable    Mental Status:  Somnolent    Hydration Status:  Stable    PONV Controlled:  None    Airway Patency:  Patent    Post Op Vitals Reviewed: Yes          Staff: Anesthesiologist, CRNA           BP   104/61   Temp      Pulse  55   Resp   16   SpO2   99%

## 2018-12-03 NOTE — ANESTHESIA PREPROCEDURE EVALUATION
Review of Systems/Medical History    Chart reviewed  No history of anesthetic complications     Cardiovascular  EKG reviewed, Exercise tolerance (METS): >4, Exercise comment: METS 4-10, pt very active and asymptomatic Hyperlipidemia, Hypertension ,   Comment: SUMMARY     LEFT VENTRICLE:  Size was normal   Systolic function was normal  Ejection fraction was estimated to be 65 %  There were no regional wall motion abnormalities      RIGHT VENTRICLE:  The size was normal   Systolic function was normal      MITRAL VALVE:  There was holosystolic prolapse involving the posterior leaflet  There was severe regurgitation  The regurgitant jet was eccentric and directed anteriorly  The effective orifice of mitral regurgitation by proximal isovelocity surface area was 0 48 cm squared  The volume of mitral regurgitation by proximal isovelocity surface area was 93 ml      PULMONARY VEINS:  There was systolic flow reversal in the pulmonary vein(s), indicative of severe mitral regurgitation    ,  Pulmonary  Sleep apnea CPAP,        GI/Hepatic    Liver disease ,   Comment: H/o liver cyst          Endo/Other  History of thyroid disease , hypothyroidism,      GYN       Hematology   Musculoskeletal    Arthritis     Neurology  Negative neurology ROS      Psychology           Physical Exam    Airway    Mallampati score: I  TM Distance: >3 FB  Neck ROM: full     Dental   Comment: Multiple permanent caps, No notable dental hx     Cardiovascular      Pulmonary      Other Findings        Anesthesia Plan  ASA Score- 3     Anesthesia Type- IV sedation with anesthesia with ASA Monitors  Additional Monitors:   Airway Plan:     Comment: GA backup  Plan Factors-    Induction- intravenous  Postoperative Plan-     Informed Consent- Anesthetic plan and risks discussed with patient  I personally reviewed this patient with the CRNA  Discussed and agreed on the Anesthesia Plan with the CRNA  Wenceslao Diaz

## 2018-12-03 NOTE — H&P (VIEW-ONLY)
Cardiology Consultation     Thompson Ralph  [id number]  1950  HEART & VASCULAR [facility]  [address] [facility]    This pleasant 28-year-old woman is seen for evaluation of murmur  He was 1st noted about 2 years ago  A recent transthoracic echo shows severe mitral regurgitation, anterior jet regurgitates into the pulmonary veins  She has mild to moderate left atrial enlargement no left ventricular dilatation  Functional capacity remains normal     She has been hypertensive, well controlled on current regimen  Lipids abnormal, she is able to safely take statin 3 days a week  A true pregnancies were uneventful  She was not hypertensive or diabetic that time  Labs reviewed with her showing impaired fasting glucose  She does not smoke and has a occasional drink of wine  She walks her dogs faithfully for 1-2 miles 3 times a week  Going up hill she does not look limitation  She has sleep apnea which is severe she uses a CPAP mask  She has not had overt symptoms of a arrhythmia  Three previous operations went well, hiatal hernia repair 2017 incisional hernias and then liver cyst was roofed earlier this year        1   Mitral valve prolapse  Ambulatory referral to Cardiology    DORI   2  Non-rheumatic mitral regurgitation  Ambulatory referral to Cardiology     Patient Active Problem List   Diagnosis    Benign essential hypertension    Disc degeneration, lumbar    Dysphagia    Hepatic cyst    Hyperlipidemia    Hypothyroidism    Liver enlargement    Osteoporosis    Severe obstructive sleep apnea    Shoulder impingement    Tinea corporis    Incisional hernia, without obstruction or gangrene    Lumbar radiculopathy    Non-rheumatic mitral regurgitation    Mitral valve prolapse     Past Medical History:   Diagnosis Date    Cystic breast     Dyspareunia, female     last assessed 9/4/14    Dysphagia     Hyperlipidemia     Insomnia     Osteoporosis     Thyroid disease     Varicose veins of lower extremity     last assessed 1/4/13     Social History     Social History    Marital status: /Civil Union     Spouse name: N/A    Number of children: N/A    Years of education: N/A     Occupational History    Not on file  Social History Main Topics    Smoking status: Never Smoker    Smokeless tobacco: Never Used    Alcohol use Yes      Comment: social    Drug use: No    Sexual activity: Yes     Partners: Male     Birth control/ protection: None     Other Topics Concern    Not on file     Social History Narrative    Coffee    Exercise - walking      Family History   Problem Relation Age of Onset    Heart disease Mother     Aneurysm Mother         cerebral    Alcohol abuse Father     Cancer Father     COPD Father     Heart failure Father     Hypertension Father     Cancer Family     Breast cancer Paternal Grandmother 80     Past Surgical History:   Procedure Laterality Date    COLONOSCOPY      complete 5/2016 - Dr Monica Emmanuel due in 2019 - last assessed  3/17/17    HERNIA REPAIR      LIVER BIOPSY LAPAROSCOPIC N/A 5/7/2018    Procedure: Laparoscopic marsupialization of liver cyst;  Surgeon: Gabe Palma MD;  [facility];  Service: Surgical Oncology    NEUROMA EXCISION      MO ESOPHAGOGASTRODUODENOSCOPY TRANSORAL DIAGNOSTIC N/A 8/10/2016    Procedure: ESOPHAGOGASTRODUODENOSCOPY (EGD); Surgeon: Kaitlyn Peterson MD;  [facility]; Service: Gastroenterology    MO ESOPHAGOSCOPY FLEXIBLE TRANSORAL WITH BIOPSY N/A 11/3/2016    Procedure: ESOPHAGOGASTRODUODENOSCOPY (EGD);   Surgeon: Susu Marina MD;  [facility];  Service: Thoracic    MO LAP, REPAIR PARAESOPHAGEAL HERNIA, INCL FUNDOPLASTY W/ MESH Left 11/3/2016    Procedure: LAPAROSCOPIC PARAESOPHAGEAL HERNIA REPAIR WITH MESH;NISSEN FUNDOPLICATION ;  Surgeon: Susu Marina MD;  [facility]; Service: Thoracic    MD LAP, VENTRAL HERNIA REPAIR,REDUCIBLE N/A 10/30/2017    Procedure: LAPAROSCOPIC INCISIONAL HERNIA REPAIR X 2 WITH ECHO MESH;  Surgeon: Nanda Velarde DO;  [facility];  Service: General    MD REPAIR INCISIONAL HERNIA,REDUCIBLE N/A 1/13/2017    Procedure: INCISIONAL HERNIA REPAIR ;  Surgeon: Nanda Velarde DO;  [facility];  Service: General    TUBAL LIGATION         Current Outpatient Prescriptions:     amLODIPine (NORVASC) 5 mg tablet, TAKE 1 TABLET DAILY, Disp: 30 tablet, Rfl: 6    atorvastatin (LIPITOR) 10 mg tablet, TAKE 1 TABLET DAILY (Patient taking differently: TAKE 1 TABlet Mon Wed Fri), Disp: 30 tablet, Rfl: 6    B Complex-C (SUPER B COMPLEX PO), Take 1 capsule by mouth , Disp: , Rfl:     Calcium Carb-Cholecalciferol 600-800 MG-UNIT TABS, Take by mouth, Disp: , Rfl:     Cholecalciferol (VITAMIN D3) 2000 UNITS TABS, Take 1 tablet by mouth daily  , Disp: , Rfl:     co-enzyme Q-10 30 MG capsule, Take 30 mg by mouth 3 (three) times a day , Disp: , Rfl:     estradiol (ESTRACE VAGINAL) 0 1 mg/g vaginal cream, Insert into the vagina, Disp: , Rfl:     hydrochlorothiazide (HYDRODIURIL) 12 5 mg tablet, Take 12 5 mg by mouth daily  , Disp: , Rfl:     levothyroxine 125 mcg tablet, Take 1 tablet (125 mcg total) by mouth daily, Disp: 30 tablet, Rfl: 5    Multiple Vitamins-Minerals (CENTRUM SILVER PO), Take 1 tablet by mouth daily  , Disp: , Rfl:     Omega-3 Fatty Acids (FISH OIL PO), Take by mouth, Disp: , Rfl:     VITAMIN E PO, Take by mouth, Disp: , Rfl:   Allergies   Allergen Reactions    Other Other (See Comments)     Skin breakdown from bandaid on for long time- reddness     Vitals:    11/30/18 1423   BP: 130/84   BP Location: Right arm   Patient Position: Sitting   Cuff Size: Standard   Pulse: 70   Weight: 90 4 kg (199 lb 3 2 oz)   Height: 5' 5 5" (1 664 m)       Labs:not applicable  Imaging: Xr Spine Thoracic 3 Vw    Result Date: 11/23/2018  Narrative: THORACIC SPINE INDICATION:   M54 9: Dorsalgia, unspecified  COMPARISON:  None VIEWS:  XR SPINE THORACIC 3 VW Images: 4 FINDINGS: There is no fracture or pathologic bone lesion  Mild smooth mid thoracic dextroscoliosis  No evidence of subluxation  No significant degenerative changes  There is no displacement of the paraspinal line  The pedicles appear intact  Impression: Mild smooth mid thoracic dextroscoliosis No acute osseous abnormality  Workstation performed: [id number]     Mammo Screening Bilateral W Cad    Result Date: 11/21/2018  Narrative: DIAGNOSIS: Screening for breast cancer RELEVANT HISTORY: Family Breast Cancer History: History of breast cancer in Paternal Grandmother  Family Medical history: Family medical history includes breast cancer in paternal grandmother  Personal History: No relevant hormone history has been documented for this patient  No relevant surgical history has been documented for this patient  Medical history includes fibrocystic breast  COMPARISONS: Compared to: 10/18/2017 Mammo screening bilateral w cad, 08/05/2016 Mammo screening bilateral w cad, 07/31/2015 Mammo screening bilateral w cad, and 07/25/2014 Mammo screening bilateral w cad INDICATION: Carolina Kumar is a 76 y o  female presenting for screening  TECHNOLOGIST: Reynaldo Blount VIEWS: 2D views: CC, MLO, XCCL views of left, right breast(s) were taken  2D synth views: No views of the breast(s) were taken  3D views: No views of the breast(s) were taken  TECHNIQUE: The current study was evaluated with Computer Aided Detection  TISSUE DENSITY: There are scattered areas of fibroglandular density  The patient is scheduled in a reminder system for screening mammography  8-10% of cancers will be missed on mammography  Management of a palpable abnormality must be based on clinical grounds  Patients will be notified of their results via letter from our facility  Accredited by [employer] of Radiology and FDA   RISK ASSESSMENT: 5 Year Tyrer-Cuzick: 3 02 % 10 Year Tyrer-Cuzick: 6 09 % Lifetime Tyrer-Cuzick: 10 85 % FINDINGS: Bilateral There are no suspicious masses, grouped microcalcifications or areas of architectural distortion  The skin and nipple areolar complex are unremarkable  Impression: No Mammographic evidence of malignancy  ASSESSMENT/BI-RADS CATEGORY: Left: 2 - Benign Right: 2 - Benign Overall: 2 - Benign RECOMMENDATION:      - Routine Screening Mammogram in 1 year for both breasts  Workstation ID: [id number]     Xr Hip/pelvis 4+ Vw Right If Performed    Result Date: 11/23/2018  Narrative: RIGHT HIP INDICATION:   M25 551: Pain in right hip  COMPARISON:  None VIEWS:  XR HIP/PELV 4+ VW RIGHT W PELVIS Images: 4 FINDINGS: There is no acute fracture or dislocation  Mild degenerative changes both hips No lytic or blastic osseous lesions  Soft tissues are unremarkable  Degenerative spondylosis L5-S1     Impression: Mild degenerative changes both hips No acute osseous abnormality  Workstation performed: [id number]       Review of Systems:  Review of Systems   Respiratory: Negative  Cardiovascular: Negative  Neurological: Negative for dizziness, syncope, weakness and light-headedness  Physical Exam:  Physical Exam   Constitutional: She appears well-nourished  Neck: JVD (JVP seems to be upper limits normal, 3-6 cm water) present  Normal carotid pulses and no hepatojugular reflux present  Carotid bruit is not present  Cardiovascular: Normal rate and regular rhythm  Murmur (Grade 3 mitral murmur which radiates to the anterior base  No click heard  Pansystolic) heard  Systolic murmur is present with a grade of 3/6   Pulses:       Posterior tibial pulses are 2+ on the right side, and 2+ on the left side  Pulmonary/Chest: Effort normal and breath sounds normal  No respiratory distress  She has no wheezes  She has no rales  She exhibits no tenderness  Abdominal: Soft   Normal aorta and bowel sounds are normal  She exhibits no distension  There is no hepatosplenomegaly  Musculoskeletal: She exhibits no edema  Discussion/Summary:  Mitral valve prolapse, severe MR      Hypertension, well controlled    Lipids, treated

## 2018-12-03 NOTE — INTERVAL H&P NOTE
Update: (This section must be completed if the H&P was completed greater than 24 hrs to procedure or admission)    H&P reviewed  After examining the patient, I find no changed to the H&P since it had been written  Patient re-evaluated   Accept as history and physical     Seb Oliveira MD/December 3, 2018/9:40 AM

## 2018-12-03 NOTE — DISCHARGE INSTRUCTIONS
Transesophageal Echocardiogram   WHAT YOU NEED TO KNOW:   A transesophageal echocardiogram (DORI) is a procedure used to check for problems with your heart  It will also show any problems in the blood vessels near your heart  Sound waves are sent to the heart through a tube inserted into your throat  The sound waves show the structure and function of your heart through pictures on a monitor  DISCHARGE INSTRUCTIONS:   Rest:  Rest until you are fully awake  You may be sleepy for a while after your procedure  Do not eat or drink until you are able to swallow normally:  Start with soft foods, such as oatmeal, yogurt, or applesauce  Once you can eat soft foods easily, you may begin to eat solid foods  Follow up with your healthcare provider as directed:  Write down your questions so you remember to ask them during your visits  Contact your healthcare provider if:   · You have a fever or chills  · You taste blood in your mouth  · You have a severe sore throat or difficulty swallowing  · You have questions or concerns about your condition or care  Seek care immediately or call 911 if:   · You have any of the following signs of a heart attack:      ¨ Squeezing, pressure, or pain in your chest that lasts longer than 5 minutes or returns    ¨ Discomfort or pain in your back, neck, jaw, stomach, or arm     ¨ Trouble breathing    ¨ Nausea or vomiting    ¨ Lightheadedness or a sudden cold sweat, especially with chest pain or trouble breathing    © 2017 [address] [place] [address] Street is for End User's use only and may not be sold, redistributed or otherwise used for commercial purposes  All illustrations and images included in CareNotes® are the copyrighted property of A D A M [employer]  or Rodo Jeffries  The above information is an  only  It is not intended as medical advice for individual conditions or treatments   Talk to your doctor, nurse or pharmacist before following any medical regimen to see if it is safe and effective for you  Moderate Sedation   WHAT YOU NEED TO KNOW:   Moderate sedation, or conscious sedation, is medicine used during procedures to help you feel relaxed and calm  You will be awake and able to follow directions without anxiety or pain  You will remember little to none of the procedure  You may feel tired, weak, or unsteady on your feet after you get sedation  You may also have trouble concentrating or short-term memory loss  These symptoms should go away in 24 hours or less  DISCHARGE INSTRUCTIONS:   Call 911 or have someone else call for any of the following:   · You have sudden trouble breathing  · You cannot be woken  Seek care immediately if:   · You have a severe headache or dizziness  · Your heart is beating faster than usual   Contact your healthcare provider if:   · You have a fever  · You have nausea or are vomiting for more than 8 hours after the procedure  · Your skin is itchy, swollen, or you have a rash  · You have questions or concerns about your condition or care  Self-care:   · Have someone stay with you for 24 hours  This person can drive you to errands and help you do things around the house  This person can also watch for problems  · Rest and do quiet activities for 24 hours  Do not exercise, ride a bike, or play sports  Stand up slowly to prevent dizziness and falls  Take short walks around the house with another person  Slowly return to your usual activities the next day  · Do not drive or use dangerous machines or tools for 24 hours  You may injure yourself or others  Examples include a lawnmower, saw, or drill  Do not return to work for 24 hours if you use dangerous machines or tools for work  · Do not make important decisions for 24 hours  For example, do not sign important papers or invest money  · Drink liquids as directed  Liquids help flush the sedation medicine out of your body   Ask how much liquid to drink each day and which liquids are best for you  · Eat small, frequent meals to prevent nausea and vomiting  Start with clear liquids such as juice or broth  If you do not vomit after clear liquids, you can eat your usual foods  · Do not drink alcohol or take medicines that make you drowsy  This includes medicines that help you sleep and anxiety medicines  Ask your healthcare provider if it is safe for you to take pain medicine  Follow up with your healthcare provider as directed:  Write down your questions so you remember to ask them during your visits  © 2017 [address] Information is for End User's use only and may not be sold, redistributed or otherwise used for commercial purposes  All illustrations and images included in CareNotes® are the copyrighted property of A D A [employer]  or Rodo Jeffries  The above information is an  only  It is not intended as medical advice for individual conditions or treatments  Talk to your doctor, nurse or pharmacist before following any medical regimen to see if it is safe and effective for you

## 2018-12-04 ENCOUNTER — TELEPHONE (OUTPATIENT)
Dept: NON INVASIVE DIAGNOSTICS | Facility: HOSPITAL | Age: 68
End: 2018-12-04

## 2018-12-13 ENCOUNTER — APPOINTMENT (OUTPATIENT)
Dept: LAB | Facility: HOSPITAL | Age: 68
End: 2018-12-13
Payer: MEDICARE

## 2018-12-13 ENCOUNTER — OFFICE VISIT (OUTPATIENT)
Dept: CARDIAC SURGERY | Facility: CLINIC | Age: 68
End: 2018-12-13
Payer: MEDICARE

## 2018-12-13 VITALS
WEIGHT: 200.9 LBS | HEART RATE: 72 BPM | HEIGHT: 66 IN | BODY MASS INDEX: 32.29 KG/M2 | DIASTOLIC BLOOD PRESSURE: 86 MMHG | TEMPERATURE: 98.7 F | RESPIRATION RATE: 20 BRPM | SYSTOLIC BLOOD PRESSURE: 140 MMHG

## 2018-12-13 DIAGNOSIS — I34.0 NON-RHEUMATIC MITRAL REGURGITATION: Primary | ICD-10-CM

## 2018-12-13 DIAGNOSIS — R79.89 OTHER SPECIFIED ABNORMAL FINDINGS OF BLOOD CHEMISTRY: ICD-10-CM

## 2018-12-13 DIAGNOSIS — I34.0 NON-RHEUMATIC MITRAL REGURGITATION: ICD-10-CM

## 2018-12-13 DIAGNOSIS — E78.5 HYPERLIPIDEMIA, UNSPECIFIED HYPERLIPIDEMIA TYPE: ICD-10-CM

## 2018-12-13 DIAGNOSIS — E61.1 IRON DEFICIENCY: ICD-10-CM

## 2018-12-13 DIAGNOSIS — E03.9 HYPOTHYROIDISM, UNSPECIFIED TYPE: ICD-10-CM

## 2018-12-13 DIAGNOSIS — I10 BENIGN ESSENTIAL HYPERTENSION: ICD-10-CM

## 2018-12-13 DIAGNOSIS — Z01.818 ENCOUNTER FOR OTHER PREPROCEDURAL EXAMINATION: ICD-10-CM

## 2018-12-13 DIAGNOSIS — I10 ESSENTIAL (PRIMARY) HYPERTENSION: ICD-10-CM

## 2018-12-13 LAB
ALBUMIN SERPL BCP-MCNC: 3.6 G/DL (ref 3.5–5)
ALP SERPL-CCNC: 93 U/L (ref 46–116)
ALT SERPL W P-5'-P-CCNC: 27 U/L (ref 12–78)
ANION GAP SERPL CALCULATED.3IONS-SCNC: 6 MMOL/L (ref 4–13)
AST SERPL W P-5'-P-CCNC: 20 U/L (ref 5–45)
BILIRUB SERPL-MCNC: 0.47 MG/DL (ref 0.2–1)
BUN SERPL-MCNC: 15 MG/DL (ref 5–25)
CALCIUM SERPL-MCNC: 10 MG/DL (ref 8.3–10.1)
CHLORIDE SERPL-SCNC: 104 MMOL/L (ref 100–108)
CO2 SERPL-SCNC: 28 MMOL/L (ref 21–32)
CREAT SERPL-MCNC: 0.88 MG/DL (ref 0.6–1.3)
ERYTHROCYTE [DISTWIDTH] IN BLOOD BY AUTOMATED COUNT: 13.2 % (ref 11.6–15.1)
GFR SERPL CREATININE-BSD FRML MDRD: 68 ML/MIN/1.73SQ M
GLUCOSE P FAST SERPL-MCNC: 89 MG/DL (ref 65–99)
HCT VFR BLD AUTO: 38.7 % (ref 34.8–46.1)
HGB BLD-MCNC: 12.5 G/DL (ref 11.5–15.4)
MCH RBC QN AUTO: 30.3 PG (ref 26.8–34.3)
MCHC RBC AUTO-ENTMCNC: 32.3 G/DL (ref 31.4–37.4)
MCV RBC AUTO: 94 FL (ref 82–98)
PLATELET # BLD AUTO: 161 THOUSANDS/UL (ref 149–390)
PMV BLD AUTO: 9.8 FL (ref 8.9–12.7)
POTASSIUM SERPL-SCNC: 3.7 MMOL/L (ref 3.5–5.3)
PROT SERPL-MCNC: 7.7 G/DL (ref 6.4–8.2)
RBC # BLD AUTO: 4.13 MILLION/UL (ref 3.81–5.12)
SODIUM SERPL-SCNC: 138 MMOL/L (ref 136–145)
TSH SERPL DL<=0.05 MIU/L-ACNC: 1.2 UIU/ML (ref 0.36–3.74)
WBC # BLD AUTO: 5.85 THOUSAND/UL (ref 4.31–10.16)

## 2018-12-13 PROCEDURE — 36415 COLL VENOUS BLD VENIPUNCTURE: CPT

## 2018-12-13 PROCEDURE — 1124F ACP DISCUSS-NO DSCNMKR DOCD: CPT | Performed by: ANESTHESIOLOGY

## 2018-12-13 PROCEDURE — 85027 COMPLETE CBC AUTOMATED: CPT

## 2018-12-13 PROCEDURE — 99215 OFFICE O/P EST HI 40 MIN: CPT | Performed by: PHYSICIAN ASSISTANT

## 2018-12-13 PROCEDURE — 80053 COMPREHEN METABOLIC PANEL: CPT

## 2018-12-13 PROCEDURE — 84443 ASSAY THYROID STIM HORMONE: CPT

## 2018-12-13 RX ORDER — CHLORHEXIDINE GLUCONATE 0.12 MG/ML
15 RINSE ORAL ONCE
Status: CANCELLED | OUTPATIENT
Start: 2018-12-13

## 2018-12-13 RX ORDER — CEFAZOLIN SODIUM 2 G/50ML
2000 SOLUTION INTRAVENOUS ONCE
Status: CANCELLED | OUTPATIENT
Start: 2018-12-13 | End: 2018-12-13

## 2018-12-13 NOTE — PATIENT INSTRUCTIONS
Preoperative instructions:  1  You will receive a phone call from the hospital between 2:00 PM and 8:00 PM the day prior to surgery to confirm arrival time and location  For surgery on Mondays, you will receive a call on Friday  2  Do not drink or eat anything after midnight the night before surgery  That includes no water, candy, gum, lozenges, Lifesavers, etc  We recommend you not eat any "junk" food, consume alcohol or smoke the night before surgery  3  Continue taking aspirin but only 81 mg daily  4  If you take Coumadin and/or Plavix, discontinue it 5 days before surgery  5  If you are diabetic, do not take any of your diabetic pills the morning of surgery  If you take Lantus insulin, you may take it at your regularly scheduled time the day before surgery  Do not take any other insulins the morning of surgery  6  The 2 nights before surgery, take a shower each night using the special antiseptic soap or soap sponges you received from the office or hospital  [place] Mustache your hair with regular shampoo and rinse completely before using the antiseptic sponges  Use the sponge to wash from your neck down, with special attention to the armpits and groin area  Do not use any other soap or cleanser on your skin  Do not use lotions, powder, deodorant or perfume of any kind on your skin after you shower  Use clean bed linens and wear clean pajamas after your shower  7  You will be prescribed Mupirocin nasal ointment  Apply to both nostrils twice a day for 5 days prior to surgery  8  Do not take a shower the morning of her surgery; you'll be given a special" bath" at the hospital   9  Notify the CT surgery office if you develop a cold, sore throat, cough, fever or other health issues before your surgery    10  Other medication changes included the following: Stop Vitamin E, Fish Oil, Multivitamins and all OTC supplements NOW

## 2018-12-13 NOTE — PROGRESS NOTES
Consultation - Cardiothoracic Surgery   Ana Lobato 76 y o  female MRN: [id number]    Physician Requesting Consult: Dr Sheila Daigle    Reason for Consult / Principal Problem: Mitral regurgitation    History of Present Illness: Ana Lobato is a 76y o  year old female referred to us by Dr Sheila Daigle for the evaluation of severe MR  The patient reports she first had a murmur detected about 2 years ago  She had a recent routine echo performed, which revealed severe MR, for which she has been referred for evaluation  The patient reports she is asymptomatic  She denies shortness of breath, chest pain, fatigue, LE edema, palpitations, PND, orthopnea, or syncope  She is a lifelong non-smoker and occasionally drinks alcohol  She is active and lives with her family  She obtains routine dental care  Past Medical History:  Past Medical History:   Diagnosis Date    Cystic breast     Dyspareunia, female     last assessed 9/4/14    Dysphagia     Hyperlipidemia     Insomnia     Osteoporosis     Thyroid disease     Varicose veins of lower extremity     last assessed 1/4/13         Past Surgical History:   Past Surgical History:   Procedure Laterality Date    COLONOSCOPY      complete 5/2016 - Dr Jayson Jeong due in 2019 - last assessed  3/17/17    HERNIA REPAIR      LIVER BIOPSY LAPAROSCOPIC N/A 5/7/2018    Procedure: Laparoscopic marsupialization of liver cyst;  Surgeon: Elmer Pryor MD;  [facility];  Service: Surgical Oncology    NEUROMA EXCISION      MO ESOPHAGOGASTRODUODENOSCOPY TRANSORAL DIAGNOSTIC N/A 8/10/2016    Procedure: ESOPHAGOGASTRODUODENOSCOPY (EGD); Surgeon: Dm Aquino MD;  Location: [facility]; Service: Gastroenterology    MO ESOPHAGOSCOPY FLEXIBLE TRANSORAL WITH BIOPSY N/A 11/3/2016    Procedure: ESOPHAGOGASTRODUODENOSCOPY (EGD);   Surgeon: Dafne Hansen MD;  [facility];  Service: Thoracic    MO LAP, REPAIR PARAESOPHAGEAL HERNIA, INCL FUNDOPLASTY W/ MESH Left 11/3/2016 Procedure: LAPAROSCOPIC PARAESOPHAGEAL HERNIA REPAIR WITH MESH;NISSEN FUNDOPLICATION ;  Surgeon: Frankey Goldberg, MD;  [facility];  Service: Thoracic    [place] LAP, VENTRAL HERNIA REPAIR,REDUCIBLE N/A 10/30/2017    Procedure: LAPAROSCOPIC INCISIONAL HERNIA REPAIR X 2 WITH ECHO MESH;  Surgeon: Davonte Godinez DO;  [facility];  Service: General    [address] [place] [address] N/A 1/13/2017    Procedure: INCISIONAL HERNIA REPAIR ;  Surgeon: Davonte Godinez DO;  [facility];  Service: General    TUBAL LIGATION           Family History:  Family History   Problem Relation Age of Onset    Heart disease Mother     Aneurysm Mother         cerebral    Alcohol abuse Father     Cancer Father     COPD Father     Heart failure Father     Hypertension Father     Cancer Family     Breast cancer Paternal Grandmother 80         Social History:      History   Alcohol Use    Yes     Comment: social     History   Drug Use No     History   Smoking Status    Never Smoker   Smokeless Tobacco    Never Used       Home Medications:   Prior to Admission medications    Medication Sig Start Date End Date Taking? Authorizing Provider   amLODIPine (NORVASC) 5 mg tablet TAKE 1 TABLET DAILY 4/20/18  Yes Melissa Vinson MD   atorvastatin (LIPITOR) 10 mg tablet TAKE 1 TABLET DAILY  Patient taking differently: TAKE 1 TABlet Mon Wed Fri 4/21/18  Yes Melissa Vinson MD   B Complex-C (SUPER B COMPLEX PO) Take 1 capsule by mouth  Yes Historical Provider, MD   Calcium Carb-Cholecalciferol 600-800 MG-UNIT TABS Take by mouth   Yes Historical Provider, MD   Cholecalciferol (VITAMIN D3) 2000 UNITS TABS Take 1 tablet by mouth daily  Yes Historical Provider, MD   co-enzyme Q-10 30 MG capsule Take 30 mg by mouth 3 (three) times a day     Yes Historical Provider, MD   estradiol (ESTRACE VAGINAL) 0 1 mg/g vaginal cream Insert into the vagina 9/15/16  Yes Historical Provider, MD   hydrochlorothiazide (HYDRODIURIL) 12 5 mg tablet Take 12 5 mg by mouth daily  Yes Historical Provider, MD   levothyroxine 125 mcg tablet Take 1 tablet (125 mcg total) by mouth daily 7/28/18  Yes Floresita Vick MD   Multiple Vitamins-Minerals (CENTRUM SILVER PO) Take 1 tablet by mouth daily  Yes Historical Provider, MD   Omega-3 Fatty Acids (FISH OIL PO) Take by mouth   Yes Historical Provider, MD   VITAMIN E PO Take by mouth   Yes Historical Provider, MD   mupirocin (BACTROBAN) 2 % ointment Apply inside both nostrils two times per day  Start 5 days prior to surgery  12/13/18   Cassia Vega PA-C       Allergies: Allergies   Allergen Reactions    Other Other (See Comments)     Skin breakdown from bandaid on for long time- reddness       Review of Systems:  Review of Systems   Constitutional: Negative for activity change, appetite change, chills, diaphoresis, fatigue, fever and unexpected weight change  HENT: Negative for dental problem, nosebleeds and sore throat  Eyes: Negative  Respiratory: Negative for cough, chest tightness, shortness of breath and wheezing  Cardiovascular: Negative for chest pain, palpitations and leg swelling  Gastrointestinal: Negative for abdominal pain, blood in stool, constipation, diarrhea, nausea and vomiting  Endocrine: Negative  Genitourinary: Negative  Musculoskeletal: Negative  Allergic/Immunologic: Negative  Neurological: Negative for dizziness, seizures, syncope, weakness, light-headedness and numbness  Hematological: Negative  Psychiatric/Behavioral: Negative  All other systems reviewed and are negative        Vital Signs:     Vitals:    12/13/18 1041   BP: 140/86   BP Location: Left arm   Patient Position: Sitting   Cuff Size: Standard   Pulse: 72   Resp: 20   Temp: 98 7 °F (37 1 °C)   TempSrc: Tympanic   Weight: 91 1 kg (200 lb 14 4 oz)   Height: 5' 5 5" (1 664 m)       Physical Exam:  Physical Exam   Constitutional: She is oriented to person, place, and time  She appears well-developed and well-nourished  No distress  HENT:   Head: Normocephalic and atraumatic  Right Ear: External ear normal    Left Ear: External ear normal    Nose: Nose normal    Mouth/Throat: Oropharynx is clear and moist  No oropharyngeal exudate  Eyes: Pupils are equal, round, and reactive to light  Conjunctivae and EOM are normal  Right eye exhibits no discharge  No scleral icterus  Neck: Normal range of motion  Neck supple  No JVD present  No tracheal deviation present  Cardiovascular: Normal rate, regular rhythm and intact distal pulses  Exam reveals no gallop and no friction rub  Murmur heard  Systolic murmur is present with a grade of 3/6   Pulmonary/Chest: Effort normal and breath sounds normal  No respiratory distress  She has no wheezes  She has no rales  Abdominal: Soft  Bowel sounds are normal  She exhibits no distension  There is no tenderness  There is no rebound and no guarding  Musculoskeletal: Normal range of motion  She exhibits no edema, tenderness or deformity  Neurological: She is alert and oriented to person, place, and time  She has normal reflexes  She displays normal reflexes  No cranial nerve deficit  She exhibits normal muscle tone  Coordination normal    Skin: Skin is warm and dry  No rash noted  She is not diaphoretic  No erythema  No pallor  Psychiatric: She has a normal mood and affect  Her behavior is normal  Judgment and thought content normal    Nursing note and vitals reviewed  Lab Results:               Invalid input(s): LABGLOM      No results found for: HGBA1C  No results found for: CKTOTAL, CKMB, CKMBINDEX, TROPONINI    Imaging Studies:     DORI 12/3/18: LEFT VENTRICLE: Size was normal   The end systolic dimension was 30 mm  Systolic function was normal  Ejection fraction was estimated to be 60 %  There were no regional wall motion abnormalities   Wall thickness was normal      RIGHT VENTRICLE: The size was normal  Systolic function was normal      LEFT ATRIUM: The atrium was mildly dilated  No thrombus was identified  APPENDAGE: The size was normal  No thrombus was identified  DOPPLER: The function was normal (normal emptying velocity)      ATRIAL SEPTUM: No defect or patent foramen ovale was identified by color Doppler      RIGHT ATRIUM: Size was normal  No thrombus was identified      MITRAL VALVE: There was normal leaflet separation  There was a marked, holosystolic prolapse involving the medial scallop of the posterior leaflet  The maximum prolapse dimension was 8 mm  There was no echocardiographic evidence of  vegetation  DOPPLER: The transmitral velocity was within the normal range  There was no evidence for stenosis  There was severe regurgitation  PISA could not be done due to the eccentric nature of the regurgitation jet  The regurgitant jet was eccentric and directed anteriorly      AORTIC VALVE: The valve was trileaflet  Leaflets exhibited normal thickness and normal cuspal separation  DOPPLER: There was no regurgitation      TRICUSPID VALVE: The valve structure was normal  There was normal leaflet separation  There was no echocardiographic evidence of vegetation  DOPPLER: There was mild to moderate regurgitation  The regurgitant jet was toward the septum  Pulmonary artery systolic pressure was within the normal range  Estimated peak PA pressure was 26 mmHg      PULMONIC VALVE: Leaflets exhibited normal thickness, no calcification, and normal cuspal separation  DOPPLER: There was no significant regurgitation      PERICARDIUM: There was no pericardial effusion  The pericardium was normal in appearance      AORTA: The root exhibited normal size  There was no atheroma  There was no evidence for dissection  There was no evidence for aneurysm      PULMONARY VEINS: DOPPLER: There was systolic blunting in the pulmonary vein(s), indicative of significant mitral regurgitation   There was no flow reversal seen and this could be due to low blood pressure of the patient during the study      MEASUREMENT TABLES     2D MEASUREMENTS  LVOT   (Reference normals)  Diam   21 mm   (--)  Mitral valve   (Reference normals)  Mean annulus diam   33 mm   (--)     DOPPLER MEASUREMENTS  LVOT   (Reference normals)  VTI   20 cm   (--)  Stroke vol   69 27 ml   (--)  Stroke index   0 35 ml/m squared   (--)  Mitral valve   (Reference normals)  VTI at annulus   25 cm   (--)  Vol, (flow)   213 82 ml   (--)  Regurg vol, LVOT cont   145 ml   (--)  RF, LVOT cont   68 %   (--)  Area, ERO, LVOT cont   5 8 cm squared   (--)    I have personally reviewed pertinent films in PACS    Assessment:  Patient Active Problem List    Diagnosis Date Noted    Non-rheumatic mitral regurgitation 11/21/2018    Mitral valve prolapse 11/21/2018    Incisional hernia, without obstruction or gangrene 09/17/2017    Tinea corporis 08/25/2016    Dysphagia 01/18/2016    Osteoporosis 01/18/2016    Hepatic cyst 09/21/2015    Severe obstructive sleep apnea 08/24/2015    Shoulder impingement 09/29/2014    Liver enlargement 04/08/2014    Disc degeneration, lumbar 07/17/2013    Lumbar radiculopathy 01/24/2013    Hyperlipidemia 10/05/2012    Hypothyroidism 08/07/2012    Benign essential hypertension 06/05/2012     Severe mitral regurgitation; Ongoing MVR workup    Plan:  Risks and benefits of mitral valve repair vs replacement were discussed in detail today with the patient  They understand and wish to proceed with further workup and ultimately surgical intervention  We have ordered routine preoperative laboratory and vascular studies  Pending the results of these tests, they will be scheduled for surgery with LINDA Orosco was comfortable with our recommendations, and their questions were answered to their satisfaction  Thank you for allowing us to participate in the care of this patient       SIGNATURE: [place], [place]  DATE: December 13, 2018  TIME: 11:23 AM

## 2018-12-13 NOTE — LETTER
December 13, 2018     [place] [id number]    Patient: Megan Pascual   YOB: 1950   Date of Visit: 12/13/2018       Dear Dr Jurgen Jeong: Thank you for referring Yuniel Cooper to me for evaluation  Below are my notes for this consultation  If you have questions, please do not hesitate to call me  I look forward to following your patient along with you  Sincerely,        Nikole Platt MD        CC: MD Liberty Akins PA-C  12/13/2018 11:41 AM  Attested  Consultation - Cardiothoracic Surgery   Sandyrose marie [facility] 76 y o  female MRN: [id number]    Physician Requesting Consult: Dr Jurgen Jeong    Reason for Consult / Principal Problem: Mitral regurgitation    History of Present Illness: Megan Pascual is a 76y o  year old female referred to us by Dr Jurgen Jeong for the evaluation of severe MR  The patient reports she first had a murmur detected about 2 years ago  She had a recent routine echo performed, which revealed severe MR, for which she has been referred for evaluation  The patient reports she is asymptomatic  She denies shortness of breath, chest pain, fatigue, LE edema, palpitations, PND, orthopnea, or syncope  She is a lifelong non-smoker and occasionally drinks alcohol  She is active and lives with her family  She obtains routine dental care      Past Medical History:  Past Medical History:   Diagnosis Date    Cystic breast     Dyspareunia, female     last assessed 9/4/14    Dysphagia     Hyperlipidemia     Insomnia     Osteoporosis     Thyroid disease     Varicose veins of lower extremity     last assessed 1/4/13         Past Surgical History:   Past Surgical History:   Procedure Laterality Date    COLONOSCOPY      complete 5/2016 - Dr Luther Smith due in 2019 - last assessed  3/17/17    HERNIA REPAIR      LIVER BIOPSY LAPAROSCOPIC N/A 5/7/2018    Procedure: Laparoscopic marsupialization of liver cyst;  Surgeon: Talha Mills MD; [facility];  Service: Surgical Oncology    NEUROMA EXCISION      WA ESOPHAGOGASTRODUODENOSCOPY TRANSORAL DIAGNOSTIC N/A 8/10/2016    Procedure: ESOPHAGOGASTRODUODENOSCOPY (EGD); Surgeon: Eleanor Coffman MD;  Location: [facility]; Service: Gastroenterology    WA ESOPHAGOSCOPY FLEXIBLE TRANSORAL WITH BIOPSY N/A 11/3/2016    Procedure: ESOPHAGOGASTRODUODENOSCOPY (EGD); Surgeon: Jag Parikh MD;  [facility];  Service: Thoracic    WA LAP, REPAIR PARAESOPHAGEAL HERNIA, INCL FUNDOPLASTY W/ MESH Left 11/3/2016    Procedure: LAPAROSCOPIC PARAESOPHAGEAL HERNIA REPAIR WITH MESH;NISSEN FUNDOPLICATION ;  Surgeon: Jag Parikh MD;  [facility];  Service: Thoracic    WA LAP, VENTRAL HERNIA REPAIR,REDUCIBLE N/A 10/30/2017    Procedure: LAPAROSCOPIC INCISIONAL HERNIA REPAIR X 2 WITH ECHO MESH;  Surgeon: Sarah Silver DO;  [facility];  Service: General    [address] [place] [address] N/A 1/13/2017    Procedure: INCISIONAL HERNIA REPAIR ;  Surgeon: Sarah Silver DO;  [facility];  Service: General    TUBAL LIGATION           Family History:  Family History   Problem Relation Age of Onset    Heart disease Mother     Aneurysm Mother         cerebral    Alcohol abuse Father     Cancer Father     COPD Father     Heart failure Father     Hypertension Father     Cancer Family     Breast cancer Paternal Grandmother 80         Social History:      History   Alcohol Use    Yes     Comment: social     History   Drug Use No     History   Smoking Status    Never Smoker   Smokeless Tobacco    Never Used       Home Medications:   Prior to Admission medications    Medication Sig Start Date End Date Taking?  Authorizing Provider   amLODIPine (NORVASC) 5 mg tablet TAKE 1 TABLET DAILY 4/20/18  Yes Melva Schaumann, MD   atorvastatin (LIPITOR) 10 mg tablet TAKE 1 TABLET DAILY  Patient taking differently: TAKE 1 TABlet Mon Wed Fri 4/21/18  Yes Melva Schaumann, MD   B Complex-C (SUPER B COMPLEX PO) Take 1 capsule by mouth  Yes Historical Provider, MD   Calcium Carb-Cholecalciferol 600-800 MG-UNIT TABS Take by mouth   Yes Historical Provider, MD   Cholecalciferol (VITAMIN D3) 2000 UNITS TABS Take 1 tablet by mouth daily  Yes Historical Provider, MD   co-enzyme Q-10 30 MG capsule Take 30 mg by mouth 3 (three) times a day  Yes Historical Provider, MD   estradiol (ESTRACE VAGINAL) 0 1 mg/g vaginal cream Insert into the vagina 9/15/16  Yes Historical Provider, MD   hydrochlorothiazide (HYDRODIURIL) 12 5 mg tablet Take 12 5 mg by mouth daily  Yes Historical Provider, MD   levothyroxine 125 mcg tablet Take 1 tablet (125 mcg total) by mouth daily 7/28/18  Yes Prisca Worthington MD   Multiple Vitamins-Minerals (CENTRUM SILVER PO) Take 1 tablet by mouth daily  Yes Historical Provider, MD   Omega-3 Fatty Acids (FISH OIL PO) Take by mouth   Yes Historical Provider, MD   VITAMIN E PO Take by mouth   Yes Historical Provider, MD   mupirocin (BACTROBAN) 2 % ointment Apply inside both nostrils two times per day  Start 5 days prior to surgery  12/13/18   Eleanor Hinton PA-C       Allergies: Allergies   Allergen Reactions    Other Other (See Comments)     Skin breakdown from bandaid on for long time- reddness       Review of Systems:  Review of Systems   Constitutional: Negative for activity change, appetite change, chills, diaphoresis, fatigue, fever and unexpected weight change  HENT: Negative for dental problem, nosebleeds and sore throat  Eyes: Negative  Respiratory: Negative for cough, chest tightness, shortness of breath and wheezing  Cardiovascular: Negative for chest pain, palpitations and leg swelling  Gastrointestinal: Negative for abdominal pain, blood in stool, constipation, diarrhea, nausea and vomiting  Endocrine: Negative  Genitourinary: Negative  Musculoskeletal: Negative  Allergic/Immunologic: Negative      Neurological: Negative for dizziness, seizures, syncope, weakness, light-headedness and numbness  Hematological: Negative  Psychiatric/Behavioral: Negative  All other systems reviewed and are negative  Vital Signs:     Vitals:    12/13/18 1041   BP: 140/86   BP Location: Left arm   Patient Position: Sitting   Cuff Size: Standard   Pulse: 72   Resp: 20   Temp: 98 7 °F (37 1 °C)   TempSrc: Tympanic   Weight: 91 1 kg (200 lb 14 4 oz)   Height: 5' 5 5" (1 664 m)       Physical Exam:  Physical Exam   Constitutional: She is oriented to person, place, and time  She appears well-developed and well-nourished  No distress  HENT:   Head: Normocephalic and atraumatic  Right Ear: External ear normal    Left Ear: External ear normal    Nose: Nose normal    Mouth/Throat: Oropharynx is clear and moist  No oropharyngeal exudate  Eyes: Pupils are equal, round, and reactive to light  Conjunctivae and EOM are normal  Right eye exhibits no discharge  No scleral icterus  Neck: Normal range of motion  Neck supple  No JVD present  No tracheal deviation present  Cardiovascular: Normal rate, regular rhythm and intact distal pulses  Exam reveals no gallop and no friction rub  Murmur heard  Systolic murmur is present with a grade of 3/6   Pulmonary/Chest: Effort normal and breath sounds normal  No respiratory distress  She has no wheezes  She has no rales  Abdominal: Soft  Bowel sounds are normal  She exhibits no distension  There is no tenderness  There is no rebound and no guarding  Musculoskeletal: Normal range of motion  She exhibits no edema, tenderness or deformity  Neurological: She is alert and oriented to person, place, and time  She has normal reflexes  She displays normal reflexes  No cranial nerve deficit  She exhibits normal muscle tone  Coordination normal    Skin: Skin is warm and dry  No rash noted  She is not diaphoretic  No erythema  No pallor  Psychiatric: She has a normal mood and affect   Her behavior is normal  Judgment and thought content normal    Nursing note and vitals reviewed  Lab Results:               Invalid input(s): LABGLOM      No results found for: HGBA1C  No results found for: CKTOTAL, CKMB, CKMBINDEX, TROPONINI    Imaging Studies:     DORI 12/3/18: LEFT VENTRICLE: Size was normal   The end systolic dimension was 30 mm  Systolic function was normal  Ejection fraction was estimated to be 60 %  There were no regional wall motion abnormalities  Wall thickness was normal      RIGHT VENTRICLE: The size was normal  Systolic function was normal      LEFT ATRIUM: The atrium was mildly dilated  No thrombus was identified  APPENDAGE: The size was normal  No thrombus was identified  DOPPLER: The function was normal (normal emptying velocity)      ATRIAL SEPTUM: No defect or patent foramen ovale was identified by color Doppler      RIGHT ATRIUM: Size was normal  No thrombus was identified      MITRAL VALVE: There was normal leaflet separation  There was a marked, holosystolic prolapse involving the medial scallop of the posterior leaflet  The maximum prolapse dimension was 8 mm  There was no echocardiographic evidence of  vegetation  DOPPLER: The transmitral velocity was within the normal range  There was no evidence for stenosis  There was severe regurgitation  PISA could not be done due to the eccentric nature of the regurgitation jet  The regurgitant jet was eccentric and directed anteriorly      AORTIC VALVE: The valve was trileaflet  Leaflets exhibited normal thickness and normal cuspal separation  DOPPLER: There was no regurgitation      TRICUSPID VALVE: The valve structure was normal  There was normal leaflet separation  There was no echocardiographic evidence of vegetation  DOPPLER: There was mild to moderate regurgitation  The regurgitant jet was toward the septum  Pulmonary artery systolic pressure was within the normal range   Estimated peak PA pressure was 26 mmHg      PULMONIC VALVE: Leaflets exhibited normal thickness, no calcification, and normal cuspal separation  DOPPLER: There was no significant regurgitation      PERICARDIUM: There was no pericardial effusion  The pericardium was normal in appearance      AORTA: The root exhibited normal size  There was no atheroma  There was no evidence for dissection  There was no evidence for aneurysm      PULMONARY VEINS: DOPPLER: There was systolic blunting in the pulmonary vein(s), indicative of significant mitral regurgitation  There was no flow reversal seen and this could be due to low blood pressure of the patient during the study      MEASUREMENT TABLES     2D MEASUREMENTS  LVOT   (Reference normals)  Diam   21 mm   (--)  Mitral valve   (Reference normals)  Mean annulus diam   33 mm   (--)     DOPPLER MEASUREMENTS  LVOT   (Reference normals)  VTI   20 cm   (--)  Stroke vol   69 27 ml   (--)  Stroke index   0 35 ml/m squared   (--)  Mitral valve   (Reference normals)  VTI at annulus   25 cm   (--)  Vol, (flow)   213 82 ml   (--)  Regurg vol, LVOT cont   145 ml   (--)  RF, LVOT cont   68 %   (--)  Area, ERO, LVOT cont   5 8 cm squared   (--)    I have personally reviewed pertinent films in PACS    Assessment:  Patient Active Problem List    Diagnosis Date Noted    Non-rheumatic mitral regurgitation 11/21/2018    Mitral valve prolapse 11/21/2018    Incisional hernia, without obstruction or gangrene 09/17/2017    Tinea corporis 08/25/2016    Dysphagia 01/18/2016    Osteoporosis 01/18/2016    Hepatic cyst 09/21/2015    Severe obstructive sleep apnea 08/24/2015    Shoulder impingement 09/29/2014    Liver enlargement 04/08/2014    Disc degeneration, lumbar 07/17/2013    Lumbar radiculopathy 01/24/2013    Hyperlipidemia 10/05/2012    Hypothyroidism 08/07/2012    Benign essential hypertension 06/05/2012     Severe mitral regurgitation;  Ongoing MVR workup    Plan:  Risks and benefits of mitral valve repair vs replacement were discussed in detail today with the patient  They understand and wish to proceed with further workup and ultimately surgical intervention  We have ordered routine preoperative laboratory and vascular studies  Pending the results of these tests, they will be scheduled for surgery with LINDA Martinez  Ling Sabina was comfortable with our recommendations, and their questions were answered to their satisfaction  Thank you for allowing us to participate in the care of this patient  SIGNATURE: Rashid Witt  DATE: December 13, 2018  TIME: 11:23 AM  Attestation signed by Omar Marshall MD at 12/13/2018 11:41 AM:  Patient seen and evaluated with Dilip Thompson / JACKI  I agree with the above assessment and plan with the following additions  Patient is a 19-year-old female with asymptomatic severe mitral regurgitation, her ejection fraction has decreased from 65% to 60%, the mitral regurgitation is caused by P2 prolapse, the likelihood of successful repair is greater than 95% and the expected mortality is less than 1% therefore I recommend to proceed with mitral valve repair  I explained the procedure, benefits, risks and possible complications  She understand and agrees to proceed with surgery  Will send him for preoperative testing and will schedule the surgery

## 2018-12-13 NOTE — H&P
Consultation - Cardiothoracic Surgery   Rosanna Disla 76 y o  female MRN: [id number]    Physician Requesting Consult: Dr Griselda Birchwood    Reason for Consult / Principal Problem: Mitral regurgitation    History of Present Illness: Rosanna Disla is a 76y o  year old female referred to us by Dr Griselda Birchwood for the evaluation of severe MR  The patient reports she first had a murmur detected about 2 years ago  She had a recent routine echo performed, which revealed severe MR, for which she has been referred for evaluation  The patient reports she is asymptomatic  She denies shortness of breath, chest pain, fatigue, LE edema, palpitations, PND, orthopnea, or syncope  She is a lifelong non-smoker and occasionally drinks alcohol  She is active and lives with her family  She obtains routine dental care  Past Medical History:  Past Medical History:   Diagnosis Date    Cystic breast     Dyspareunia, female     last assessed 9/4/14    Dysphagia     Hyperlipidemia     Insomnia     Osteoporosis     Thyroid disease     Varicose veins of lower extremity     last assessed 1/4/13         Past Surgical History:   Past Surgical History:   Procedure Laterality Date    COLONOSCOPY      complete 5/2016 - Dr Edil Dumont due in 2019 - last assessed  3/17/17    HERNIA REPAIR      LIVER BIOPSY LAPAROSCOPIC N/A 5/7/2018    Procedure: Laparoscopic marsupialization of liver cyst;  Surgeon: Abiel Green MD;  [facility];  Service: Surgical Oncology    NEUROMA EXCISION      CT ESOPHAGOGASTRODUODENOSCOPY TRANSORAL DIAGNOSTIC N/A 8/10/2016    Procedure: ESOPHAGOGASTRODUODENOSCOPY (EGD); Surgeon: Kaylan Blount MD;  [facility]; Service: Gastroenterology    CT ESOPHAGOSCOPY FLEXIBLE TRANSORAL WITH BIOPSY N/A 11/3/2016    Procedure: ESOPHAGOGASTRODUODENOSCOPY (EGD);   Surgeon: Kerrie Sinha MD;  [facility];  Service: Thoracic    CT LAP, REPAIR PARAESOPHAGEAL HERNIA, INCL FUNDOPLASTY W/ MESH Left 11/3/2016 Procedure: LAPAROSCOPIC PARAESOPHAGEAL HERNIA REPAIR WITH MESH;NISSEN FUNDOPLICATION ;  Surgeon: Frankey Goldberg, MD;  [facility];  Service: Thoracic    [place] LAP, VENTRAL HERNIA REPAIR,REDUCIBLE N/A 10/30/2017    Procedure: LAPAROSCOPIC INCISIONAL HERNIA REPAIR X 2 WITH ECHO MESH;  Surgeon: Davonte Godinez DO;  [facility];  Service: General    [address] [place] [address] N/A 1/13/2017    Procedure: INCISIONAL HERNIA REPAIR ;  Surgeon: Davonte Godinez DO;  [facility];  Service: General    TUBAL LIGATION           Family History:  Family History   Problem Relation Age of Onset    Heart disease Mother     Aneurysm Mother         cerebral    Alcohol abuse Father     Cancer Father     COPD Father     Heart failure Father     Hypertension Father     Cancer Family     Breast cancer Paternal Grandmother 80         Social History:      History   Alcohol Use    Yes     Comment: social     History   Drug Use No     History   Smoking Status    Never Smoker   Smokeless Tobacco    Never Used       Home Medications:   Prior to Admission medications    Medication Sig Start Date End Date Taking? Authorizing Provider   amLODIPine (NORVASC) 5 mg tablet TAKE 1 TABLET DAILY 4/20/18  Yes Melissa Vinson MD   atorvastatin (LIPITOR) 10 mg tablet TAKE 1 TABLET DAILY  Patient taking differently: TAKE 1 TABlet Mon Wed Fri 4/21/18  Yes Melissa Vinson MD   B Complex-C (SUPER B COMPLEX PO) Take 1 capsule by mouth  Yes Historical Provider, MD   Calcium Carb-Cholecalciferol 600-800 MG-UNIT TABS Take by mouth   Yes Historical Provider, MD   Cholecalciferol (VITAMIN D3) 2000 UNITS TABS Take 1 tablet by mouth daily  Yes Historical Provider, MD   co-enzyme Q-10 30 MG capsule Take 30 mg by mouth 3 (three) times a day     Yes Historical Provider, MD   estradiol (ESTRACE VAGINAL) 0 1 mg/g vaginal cream Insert into the vagina 9/15/16  Yes Historical Provider, MD   hydrochlorothiazide (HYDRODIURIL) 12 5 mg tablet Take 12 5 mg by mouth daily  Yes Historical Provider, MD   levothyroxine 125 mcg tablet Take 1 tablet (125 mcg total) by mouth daily 7/28/18  Yes Olivia Holman MD   Multiple Vitamins-Minerals (CENTRUM SILVER PO) Take 1 tablet by mouth daily  Yes Historical Provider, MD   Omega-3 Fatty Acids (FISH OIL PO) Take by mouth   Yes Historical Provider, MD   VITAMIN E PO Take by mouth   Yes Historical Provider, MD   mupirocin (BACTROBAN) 2 % ointment Apply inside both nostrils two times per day  Start 5 days prior to surgery  12/13/18   Gerson Todd PA-C       Allergies: Allergies   Allergen Reactions    Other Other (See Comments)     Skin breakdown from bandaid on for long time- reddness       Review of Systems:  Review of Systems   Constitutional: Negative for activity change, appetite change, chills, diaphoresis, fatigue, fever and unexpected weight change  HENT: Negative for dental problem, nosebleeds and sore throat  Eyes: Negative  Respiratory: Negative for cough, chest tightness, shortness of breath and wheezing  Cardiovascular: Negative for chest pain, palpitations and leg swelling  Gastrointestinal: Negative for abdominal pain, blood in stool, constipation, diarrhea, nausea and vomiting  Endocrine: Negative  Genitourinary: Negative  Musculoskeletal: Negative  Allergic/Immunologic: Negative  Neurological: Negative for dizziness, seizures, syncope, weakness, light-headedness and numbness  Hematological: Negative  Psychiatric/Behavioral: Negative  All other systems reviewed and are negative        Vital Signs:     Vitals:    12/13/18 1041   BP: 140/86   BP Location: Left arm   Patient Position: Sitting   Cuff Size: Standard   Pulse: 72   Resp: 20   Temp: 98 7 °F (37 1 °C)   TempSrc: Tympanic   Weight: 91 1 kg (200 lb 14 4 oz)   Height: 5' 5 5" (1 664 m)       Physical Exam:  Physical Exam   Constitutional: She is oriented to person, place, and time  She appears well-developed and well-nourished  No distress  HENT:   Head: Normocephalic and atraumatic  Right Ear: External ear normal    Left Ear: External ear normal    Nose: Nose normal    Mouth/Throat: Oropharynx is clear and moist  No oropharyngeal exudate  Eyes: Pupils are equal, round, and reactive to light  Conjunctivae and EOM are normal  Right eye exhibits no discharge  No scleral icterus  Neck: Normal range of motion  Neck supple  No JVD present  No tracheal deviation present  Cardiovascular: Normal rate, regular rhythm and intact distal pulses  Exam reveals no gallop and no friction rub  Murmur heard  Systolic murmur is present with a grade of 3/6   Pulmonary/Chest: Effort normal and breath sounds normal  No respiratory distress  She has no wheezes  She has no rales  Abdominal: Soft  Bowel sounds are normal  She exhibits no distension  There is no tenderness  There is no rebound and no guarding  Musculoskeletal: Normal range of motion  She exhibits no edema, tenderness or deformity  Neurological: She is alert and oriented to person, place, and time  She has normal reflexes  She displays normal reflexes  No cranial nerve deficit  She exhibits normal muscle tone  Coordination normal    Skin: Skin is warm and dry  No rash noted  She is not diaphoretic  No erythema  No pallor  Psychiatric: She has a normal mood and affect  Her behavior is normal  Judgment and thought content normal    Nursing note and vitals reviewed  Lab Results:               Invalid input(s): LABGLOM      No results found for: HGBA1C  No results found for: CKTOTAL, CKMB, CKMBINDEX, TROPONINI    Imaging Studies:     DORI 12/3/18: LEFT VENTRICLE: Size was normal   The end systolic dimension was 30 mm  Systolic function was normal  Ejection fraction was estimated to be 60 %  There were no regional wall motion abnormalities   Wall thickness was normal      RIGHT VENTRICLE: The size was normal  Systolic function was normal      LEFT ATRIUM: The atrium was mildly dilated  No thrombus was identified  APPENDAGE: The size was normal  No thrombus was identified  DOPPLER: The function was normal (normal emptying velocity)      ATRIAL SEPTUM: No defect or patent foramen ovale was identified by color Doppler      RIGHT ATRIUM: Size was normal  No thrombus was identified      MITRAL VALVE: There was normal leaflet separation  There was a marked, holosystolic prolapse involving the medial scallop of the posterior leaflet  The maximum prolapse dimension was 8 mm  There was no echocardiographic evidence of  vegetation  DOPPLER: The transmitral velocity was within the normal range  There was no evidence for stenosis  There was severe regurgitation  PISA could not be done due to the eccentric nature of the regurgitation jet  The regurgitant jet was eccentric and directed anteriorly      AORTIC VALVE: The valve was trileaflet  Leaflets exhibited normal thickness and normal cuspal separation  DOPPLER: There was no regurgitation      TRICUSPID VALVE: The valve structure was normal  There was normal leaflet separation  There was no echocardiographic evidence of vegetation  DOPPLER: There was mild to moderate regurgitation  The regurgitant jet was toward the septum  Pulmonary artery systolic pressure was within the normal range  Estimated peak PA pressure was 26 mmHg      PULMONIC VALVE: Leaflets exhibited normal thickness, no calcification, and normal cuspal separation  DOPPLER: There was no significant regurgitation      PERICARDIUM: There was no pericardial effusion  The pericardium was normal in appearance      AORTA: The root exhibited normal size  There was no atheroma  There was no evidence for dissection  There was no evidence for aneurysm      PULMONARY VEINS: DOPPLER: There was systolic blunting in the pulmonary vein(s), indicative of significant mitral regurgitation   There was no flow reversal seen and this could be due to low blood pressure of the patient during the study      MEASUREMENT TABLES     2D MEASUREMENTS  LVOT   (Reference normals)  Diam   21 mm   (--)  Mitral valve   (Reference normals)  Mean annulus diam   33 mm   (--)     DOPPLER MEASUREMENTS  LVOT   (Reference normals)  VTI   20 cm   (--)  Stroke vol   69 27 ml   (--)  Stroke index   0 35 ml/m squared   (--)  Mitral valve   (Reference normals)  VTI at annulus   25 cm   (--)  Vol, (flow)   213 82 ml   (--)  Regurg vol, LVOT cont   145 ml   (--)  RF, LVOT cont   68 %   (--)  Area, ERO, LVOT cont   5 8 cm squared   (--)    I have personally reviewed pertinent films in PACS    Assessment:  Patient Active Problem List    Diagnosis Date Noted    Non-rheumatic mitral regurgitation 11/21/2018    Mitral valve prolapse 11/21/2018    Incisional hernia, without obstruction or gangrene 09/17/2017    Tinea corporis 08/25/2016    Dysphagia 01/18/2016    Osteoporosis 01/18/2016    Hepatic cyst 09/21/2015    Severe obstructive sleep apnea 08/24/2015    Shoulder impingement 09/29/2014    Liver enlargement 04/08/2014    Disc degeneration, lumbar 07/17/2013    Lumbar radiculopathy 01/24/2013    Hyperlipidemia 10/05/2012    Hypothyroidism 08/07/2012    Benign essential hypertension 06/05/2012     Severe mitral regurgitation; Ongoing MVR workup    Plan:  Risks and benefits of mitral valve repair vs replacement were discussed in detail today with the patient  They understand and wish to proceed with further workup and ultimately surgical intervention  We have ordered routine preoperative laboratory and vascular studies  Pending the results of these tests, they will be scheduled for surgery with LINDA Goins was comfortable with our recommendations, and their questions were answered to their satisfaction  Thank you for allowing us to participate in the care of this patient       SIGNATURE: Rashid Ware  DATE: December 13, 2018  TIME: 11:23 AM

## 2018-12-14 ENCOUNTER — TELEPHONE (OUTPATIENT)
Dept: PREADMISSION TESTING | Facility: HOSPITAL | Age: 68
End: 2018-12-14

## 2018-12-14 ENCOUNTER — TELEPHONE (OUTPATIENT)
Dept: FAMILY MEDICINE CLINIC | Facility: CLINIC | Age: 68
End: 2018-12-14

## 2018-12-14 ENCOUNTER — TELEPHONE (OUTPATIENT)
Dept: CARDIOLOGY CLINIC | Facility: CLINIC | Age: 68
End: 2018-12-14

## 2018-12-14 NOTE — TELEPHONE ENCOUNTER
----- Message from Reilly Gallo MD sent at 12/14/2018  5:59 AM EST -----  Please contact patient, all blood work is normal, thank you

## 2018-12-17 ENCOUNTER — HOSPITAL ENCOUNTER (OUTPATIENT)
Dept: RADIOLOGY | Facility: HOSPITAL | Age: 68
Discharge: HOME | End: 2018-12-17
Payer: MEDICARE

## 2018-12-17 DIAGNOSIS — I34.0 NON-RHEUMATIC MITRAL REGURGITATION: ICD-10-CM

## 2018-12-17 DIAGNOSIS — Z01.818 ENCOUNTER FOR OTHER PREPROCEDURAL EXAMINATION: ICD-10-CM

## 2018-12-17 PROCEDURE — 71250 CT THORAX DX C-: CPT

## 2018-12-20 ENCOUNTER — TELEPHONE (OUTPATIENT)
Dept: INPATIENT UNIT | Facility: HOSPITAL | Age: 68
End: 2018-12-20

## 2018-12-20 RX ORDER — ASPIRIN 81 MG/1
324 TABLET, CHEWABLE ORAL ONCE
Status: CANCELLED | OUTPATIENT
Start: 2018-12-20 | End: 2018-12-20

## 2018-12-20 RX ORDER — SODIUM CHLORIDE 9 MG/ML
125 INJECTION, SOLUTION INTRAVENOUS CONTINUOUS
Status: CANCELLED | OUTPATIENT
Start: 2018-12-20

## 2018-12-21 ENCOUNTER — HOSPITAL ENCOUNTER (OUTPATIENT)
Dept: NON INVASIVE DIAGNOSTICS | Facility: HOSPITAL | Age: 68
Discharge: HOME | End: 2018-12-21
Attending: INTERNAL MEDICINE | Admitting: INTERNAL MEDICINE
Payer: MEDICARE

## 2018-12-21 VITALS
HEART RATE: 58 BPM | DIASTOLIC BLOOD PRESSURE: 65 MMHG | OXYGEN SATURATION: 94 % | SYSTOLIC BLOOD PRESSURE: 114 MMHG | RESPIRATION RATE: 18 BRPM | TEMPERATURE: 97.7 F

## 2018-12-21 DIAGNOSIS — I34.0 NON-RHEUMATIC MITRAL REGURGITATION: ICD-10-CM

## 2018-12-21 LAB
ANION GAP SERPL CALCULATED.3IONS-SCNC: 5 MMOL/L (ref 4–13)
ATRIAL RATE: 74 BPM
BUN SERPL-MCNC: 19 MG/DL (ref 5–25)
CALCIUM SERPL-MCNC: 9.1 MG/DL (ref 8.3–10.1)
CHLORIDE SERPL-SCNC: 107 MMOL/L (ref 100–108)
CHOLEST SERPL-MCNC: 170 MG/DL (ref 50–200)
CO2 SERPL-SCNC: 27 MMOL/L (ref 21–32)
CREAT SERPL-MCNC: 0.86 MG/DL (ref 0.6–1.3)
ERYTHROCYTE [DISTWIDTH] IN BLOOD BY AUTOMATED COUNT: 13.3 % (ref 11.6–15.1)
GFR SERPL CREATININE-BSD FRML MDRD: 70 ML/MIN/1.73SQ M
GLUCOSE P FAST SERPL-MCNC: 108 MG/DL (ref 65–99)
GLUCOSE SERPL-MCNC: 108 MG/DL (ref 65–140)
HCT VFR BLD AUTO: 36.8 % (ref 34.8–46.1)
HDLC SERPL-MCNC: 66 MG/DL (ref 40–60)
HGB BLD-MCNC: 11.8 G/DL (ref 11.5–15.4)
INR PPP: 1.06 (ref 0.86–1.17)
LDLC SERPL CALC-MCNC: 85 MG/DL (ref 0–100)
MAGNESIUM SERPL-MCNC: 2.3 MG/DL (ref 1.6–2.6)
MCH RBC QN AUTO: 29.9 PG (ref 26.8–34.3)
MCHC RBC AUTO-ENTMCNC: 32.1 G/DL (ref 31.4–37.4)
MCV RBC AUTO: 93 FL (ref 82–98)
NONHDLC SERPL-MCNC: 104 MG/DL
P AXIS: 41 DEGREES
PLATELET # BLD AUTO: 141 THOUSANDS/UL (ref 149–390)
PMV BLD AUTO: 9.8 FL (ref 8.9–12.7)
POTASSIUM SERPL-SCNC: 3.5 MMOL/L (ref 3.5–5.3)
PR INTERVAL: 150 MS
PROTHROMBIN TIME: 13.9 SECONDS (ref 11.8–14.2)
QRS AXIS: 5 DEGREES
QRSD INTERVAL: 86 MS
QT INTERVAL: 380 MS
QTC INTERVAL: 421 MS
RBC # BLD AUTO: 3.95 MILLION/UL (ref 3.81–5.12)
SODIUM SERPL-SCNC: 139 MMOL/L (ref 136–145)
T WAVE AXIS: 20 DEGREES
TRIGL SERPL-MCNC: 96 MG/DL
VENTRICULAR RATE: 74 BPM
WBC # BLD AUTO: 5 THOUSAND/UL (ref 4.31–10.16)

## 2018-12-21 PROCEDURE — 93005 ELECTROCARDIOGRAM TRACING: CPT

## 2018-12-21 PROCEDURE — C1769 GUIDE WIRE: HCPCS | Performed by: PHYSICIAN ASSISTANT

## 2018-12-21 PROCEDURE — 80061 LIPID PANEL: CPT | Performed by: INTERNAL MEDICINE

## 2018-12-21 PROCEDURE — C1894 INTRO/SHEATH, NON-LASER: HCPCS | Performed by: PHYSICIAN ASSISTANT

## 2018-12-21 PROCEDURE — 93458 L HRT ARTERY/VENTRICLE ANGIO: CPT | Performed by: PHYSICIAN ASSISTANT

## 2018-12-21 PROCEDURE — 85027 COMPLETE CBC AUTOMATED: CPT | Performed by: INTERNAL MEDICINE

## 2018-12-21 PROCEDURE — 85610 PROTHROMBIN TIME: CPT | Performed by: INTERNAL MEDICINE

## 2018-12-21 PROCEDURE — 99152 MOD SED SAME PHYS/QHP 5/>YRS: CPT | Performed by: PHYSICIAN ASSISTANT

## 2018-12-21 PROCEDURE — 83735 ASSAY OF MAGNESIUM: CPT | Performed by: INTERNAL MEDICINE

## 2018-12-21 PROCEDURE — C1887 CATHETER, GUIDING: HCPCS | Performed by: PHYSICIAN ASSISTANT

## 2018-12-21 PROCEDURE — 80048 BASIC METABOLIC PNL TOTAL CA: CPT | Performed by: INTERNAL MEDICINE

## 2018-12-21 PROCEDURE — 93458 L HRT ARTERY/VENTRICLE ANGIO: CPT | Performed by: INTERNAL MEDICINE

## 2018-12-21 RX ORDER — NITROGLYCERIN 20 MG/100ML
INJECTION INTRAVENOUS CODE/TRAUMA/SEDATION MEDICATION
Status: COMPLETED | OUTPATIENT
Start: 2018-12-21 | End: 2018-12-21

## 2018-12-21 RX ORDER — FENTANYL CITRATE 50 UG/ML
INJECTION, SOLUTION INTRAMUSCULAR; INTRAVENOUS CODE/TRAUMA/SEDATION MEDICATION
Status: COMPLETED | OUTPATIENT
Start: 2018-12-21 | End: 2018-12-21

## 2018-12-21 RX ORDER — SODIUM CHLORIDE 9 MG/ML
125 INJECTION, SOLUTION INTRAVENOUS CONTINUOUS
Status: DISCONTINUED | OUTPATIENT
Start: 2018-12-21 | End: 2018-12-21

## 2018-12-21 RX ORDER — VERAPAMIL HYDROCHLORIDE 2.5 MG/ML
INJECTION, SOLUTION INTRAVENOUS CODE/TRAUMA/SEDATION MEDICATION
Status: COMPLETED | OUTPATIENT
Start: 2018-12-21 | End: 2018-12-21

## 2018-12-21 RX ORDER — LIDOCAINE HYDROCHLORIDE 10 MG/ML
INJECTION, SOLUTION INFILTRATION; PERINEURAL CODE/TRAUMA/SEDATION MEDICATION
Status: COMPLETED | OUTPATIENT
Start: 2018-12-21 | End: 2018-12-21

## 2018-12-21 RX ORDER — HEPARIN SODIUM 1000 [USP'U]/ML
INJECTION, SOLUTION INTRAVENOUS; SUBCUTANEOUS CODE/TRAUMA/SEDATION MEDICATION
Status: COMPLETED | OUTPATIENT
Start: 2018-12-21 | End: 2018-12-21

## 2018-12-21 RX ORDER — SODIUM CHLORIDE 9 MG/ML
100 INJECTION, SOLUTION INTRAVENOUS CONTINUOUS
Status: DISCONTINUED | OUTPATIENT
Start: 2018-12-21 | End: 2018-12-21 | Stop reason: HOSPADM

## 2018-12-21 RX ORDER — ASPIRIN 81 MG/1
324 TABLET, CHEWABLE ORAL ONCE
Status: COMPLETED | OUTPATIENT
Start: 2018-12-21 | End: 2018-12-21

## 2018-12-21 RX ORDER — MIDAZOLAM HYDROCHLORIDE 1 MG/ML
INJECTION INTRAMUSCULAR; INTRAVENOUS CODE/TRAUMA/SEDATION MEDICATION
Status: COMPLETED | OUTPATIENT
Start: 2018-12-21 | End: 2018-12-21

## 2018-12-21 RX ADMIN — NITROGLYCERIN 200 MCG: 20 INJECTION INTRAVENOUS at 08:16

## 2018-12-21 RX ADMIN — FENTANYL CITRATE 50 MCG: 50 INJECTION, SOLUTION INTRAMUSCULAR; INTRAVENOUS at 08:11

## 2018-12-21 RX ADMIN — IOHEXOL 40 ML: 350 INJECTION, SOLUTION INTRAVENOUS at 08:21

## 2018-12-21 RX ADMIN — LIDOCAINE HYDROCHLORIDE 1 ML: 10 INJECTION, SOLUTION INFILTRATION; PERINEURAL at 08:12

## 2018-12-21 RX ADMIN — ASPIRIN 81 MG 324 MG: 81 TABLET ORAL at 07:18

## 2018-12-21 RX ADMIN — HEPARIN SODIUM 4000 UNITS: 1000 INJECTION INTRAVENOUS; SUBCUTANEOUS at 08:16

## 2018-12-21 RX ADMIN — SODIUM CHLORIDE 125 ML/HR: 0.9 INJECTION, SOLUTION INTRAVENOUS at 07:19

## 2018-12-21 RX ADMIN — MIDAZOLAM 2 MG: 1 INJECTION INTRAMUSCULAR; INTRAVENOUS at 08:11

## 2018-12-21 RX ADMIN — VERAPAMIL HYDROCHLORIDE 2.5 MG: 2.5 INJECTION INTRAVENOUS at 08:16

## 2018-12-21 NOTE — DISCHARGE INSTRUCTIONS
1  Please see the post cardiac catheterization dishcarge instructions  No heavy lifting, greater than 10 lbs  or strenuous  activity for 48 hrs  2 Remove band aid tomorrow  Shower and wash wrist area gently with soap and water- beginning tomorrow  Rinse and pat dry  Apply new water seal band aid  Repeat this process for 5 days  No powders, creams lotions or antibiotic ointments  for 5 days  No tub baths, hot tubs or swimming for 5 days  3  Please call our office ([phone]) if you have any fever, redness, swelling, discharge from your wrist access site      4 No driving for 1 day

## 2018-12-21 NOTE — INTERVAL H&P NOTE
Update:  H&P reviewed  After examining the patient, I find no changed to the H&P since it had been written  Patient is here for an elective LHC prior to mitral valve repair  Patient re-evaluated   Accept as history and physical     Martin Weathers MD/December 21, 2018/8:01 AM

## 2018-12-21 NOTE — H&P
HISTORY AND PHYSICAL - Cardiology   Ezekiel Moraes 76 y o  female MRN: [id number]  Unit/Bed#: [facility] 07-01 Encounter: [id number]      Assessment:  1  Elective cath pre mv surgery    Plan:  Cath today    History of Present Illness     HPI: Ezekiel Moraes is a 76y o  year old female who presents for elective cath pre MV surgery from Dr Umberto Roberts office  Review of Systems:  Review of Systems    14 systems reviewed and negative with the exception      Current Facility-Administered Medications:  sodium chloride 100 mL/hr Intravenous Continuous Papo Mai MD       sodium chloride 100 mL/hr           Historical Information   Past Medical History:   Diagnosis Date    CPAP (continuous positive airway pressure) dependence     Cystic breast     Dyspareunia, female     last assessed 9/4/14    Hyperlipidemia     Insomnia     Osteoporosis     Sleep apnea     Thyroid disease     Varicose veins of lower extremity     last assessed 1/4/13     Past Surgical History:   Procedure Laterality Date    COLONOSCOPY      complete 5/2016 - Dr Reinaldo Fuentes due in 2019 - last assessed  3/17/17    HERNIA REPAIR      LIVER BIOPSY LAPAROSCOPIC N/A 5/7/2018    Procedure: Laparoscopic marsupialization of liver cyst;  Surgeon: Abhijeet Moreno MD;  [facility];  Service: Surgical Oncology    NEUROMA EXCISION      NE ESOPHAGOGASTRODUODENOSCOPY TRANSORAL DIAGNOSTIC N/A 8/10/2016    Procedure: ESOPHAGOGASTRODUODENOSCOPY (EGD); Surgeon: Aroldo Baxter MD;  [facility]; Service: Gastroenterology    NE ESOPHAGOSCOPY FLEXIBLE TRANSORAL WITH BIOPSY N/A 11/3/2016    Procedure: ESOPHAGOGASTRODUODENOSCOPY (EGD);   Surgeon: Alisia Gooden MD;  [facility];  Service: Thoracic    NE LAP, REPAIR PARAESOPHAGEAL HERNIA, INCL FUNDOPLASTY W/ MESH Left 11/3/2016    Procedure: LAPAROSCOPIC PARAESOPHAGEAL HERNIA REPAIR WITH MESH;NISSEN FUNDOPLICATION ;  Surgeon: Alisia Gooden MD;  [facility];  Service: Thoracic  MD LAP, VENTRAL HERNIA REPAIR,REDUCIBLE N/A 10/30/2017    Procedure: LAPAROSCOPIC INCISIONAL HERNIA REPAIR X 2 WITH ECHO MESH;  Surgeon: Letty Diego DO;  [facility];  Service: General    MD REPAIR INCISIONAL HERNIA,REDUCIBLE N/A 1/13/2017    Procedure: INCISIONAL HERNIA REPAIR ;  Surgeon: Letty Diego DO;  [facility];  Service: General    TUBAL LIGATION       History   Alcohol Use    Yes     Comment: social     History   Drug Use No     History   Smoking Status    Never Smoker   Smokeless Tobacco    Never Used     Family History:   Family History   Problem Relation Age of Onset    Heart disease Mother     Aneurysm Mother         cerebral    Alcohol abuse Father     Cancer Father     COPD Father     Heart failure Father     Hypertension Father     Cancer Family     Breast cancer Paternal Grandmother 80       Meds/Allergies         Allergies   Allergen Reactions    Other Other (See Comments)     Skin breakdown from bandaid on for long time- reddness       Objective   Vitals: Blood pressure 143/80, pulse 81, resp  rate 18, SpO2 95 %, not currently breastfeeding  , There is no height or weight on file to calculate BMI , Orthostatic Blood Pressures      Most Recent Value   Blood Pressure  143/80 filed at 12/21/2018 0700   Patient Position - Orthostatic VS  Lying filed at 12/21/2018 0700          No intake or output data in the 24 hours ending 12/21/18 0841    Invasive Devices     Peripheral Intravenous Line            Peripheral IV 12/03/18 Right Wrist 17 days    Peripheral IV 12/21/18 Left Forearm less than 1 day                    Physical Exam:  Physical Exam    Gen: No acute distress  HEENT: anicteric, mucous membranes moist  Neck: supple, no jugular venous distention, or carotid bruit  Heart: regular, normal s1 and s2, no murmur/rub or gallop  Lungs :clear to auscultation bilaterally, no rales/rhonchi or wheeze  Abdomen: soft nontender, normoactive bowel sounds, no organomegaly  Ext: warm and perfused, normal femoral pulses, no edema, clubbing  Skin: warm, no rashes  Neuro: AAO x 3, no focal findings  Psychiatric: normal affect  Musculoskeletal: no obvious joint deformities      Lab Results:       Results from last 7 days  Lab Units 12/21/18  0722   POTASSIUM mmol/L 3 5   CHLORIDE mmol/L 107   CO2 mmol/L 27   BUN mg/dL 19   CREATININE mg/dL 0 86   CALCIUM mg/dL 9 1               Results from last 7 days  Lab Units 12/21/18  0722   WBC Thousand/uL 5 00   HEMOGLOBIN g/dL 11 8   HEMATOCRIT % 36 8   PLATELETS Thousands/uL 141*       Lab Results   Component Value Date    CHOL 159 09/19/2017    CHOL 163 05/03/2017     Lab Results   Component Value Date    HDL 66 (H) 12/21/2018    HDL 74 09/28/2018     Lab Results   Component Value Date    LDLCALC 85 12/21/2018    LDLCALC 58 04/24/2018     Lab Results   Component Value Date    TRIG 96 12/21/2018    TRIG 105 09/28/2018       Lab Results   Component Value Date    ALT 27 12/13/2018    ALT 22 04/24/2018    AST 20 12/13/2018    AST 23 04/24/2018         Results from last 7 days  Lab Units 12/21/18  0722   INR  1 06       No results found for: NTBNP    No results found for: HGBA1C

## 2018-12-27 ENCOUNTER — HOSPITAL ENCOUNTER (OUTPATIENT)
Dept: NON INVASIVE DIAGNOSTICS | Facility: CLINIC | Age: 68
Discharge: HOME | End: 2018-12-27
Payer: MEDICARE

## 2018-12-27 DIAGNOSIS — I34.0 NON-RHEUMATIC MITRAL REGURGITATION: ICD-10-CM

## 2018-12-27 DIAGNOSIS — Z01.818 ENCOUNTER FOR OTHER PREPROCEDURAL EXAMINATION: ICD-10-CM

## 2018-12-27 PROCEDURE — 93880 EXTRACRANIAL BILAT STUDY: CPT | Performed by: SURGERY

## 2018-12-27 PROCEDURE — 93971 EXTREMITY STUDY: CPT

## 2018-12-27 PROCEDURE — 93880 EXTRACRANIAL BILAT STUDY: CPT

## 2018-12-27 PROCEDURE — 93971 EXTREMITY STUDY: CPT | Performed by: SURGERY

## 2018-12-31 ENCOUNTER — APPOINTMENT (OUTPATIENT)
Dept: LAB | Facility: HOSPITAL | Age: 68
End: 2018-12-31
Payer: MEDICARE

## 2018-12-31 ENCOUNTER — APPOINTMENT (OUTPATIENT)
Dept: LAB | Facility: HOSPITAL | Age: 68
DRG: 219 | End: 2018-12-31
Payer: MEDICARE

## 2018-12-31 DIAGNOSIS — Z01.818 ENCOUNTER FOR OTHER PREPROCEDURAL EXAMINATION: ICD-10-CM

## 2018-12-31 DIAGNOSIS — I10 BENIGN ESSENTIAL HYPERTENSION: ICD-10-CM

## 2018-12-31 DIAGNOSIS — E78.5 HYPERLIPIDEMIA, UNSPECIFIED HYPERLIPIDEMIA TYPE: ICD-10-CM

## 2018-12-31 DIAGNOSIS — I34.0 NON-RHEUMATIC MITRAL REGURGITATION: ICD-10-CM

## 2018-12-31 DIAGNOSIS — R79.89 OTHER SPECIFIED ABNORMAL FINDINGS OF BLOOD CHEMISTRY: ICD-10-CM

## 2018-12-31 DIAGNOSIS — I10 ESSENTIAL (PRIMARY) HYPERTENSION: ICD-10-CM

## 2018-12-31 DIAGNOSIS — E03.9 HYPOTHYROIDISM, UNSPECIFIED TYPE: ICD-10-CM

## 2018-12-31 LAB
ABO GROUP BLD: NORMAL
BACTERIA UR QL AUTO: ABNORMAL /HPF
BILIRUB UR QL STRIP: NEGATIVE
BLD GP AB SCN SERPL QL: NEGATIVE
CHOLEST SERPL-MCNC: 168 MG/DL (ref 50–200)
CLARITY UR: ABNORMAL
COLOR UR: ABNORMAL
ERYTHROCYTE [DISTWIDTH] IN BLOOD BY AUTOMATED COUNT: 13.1 % (ref 11.6–15.1)
EST. AVERAGE GLUCOSE BLD GHB EST-MCNC: 111 MG/DL
FERRITIN SERPL-MCNC: 37 NG/ML (ref 8–388)
GLUCOSE UR STRIP-MCNC: NEGATIVE MG/DL
HBA1C MFR BLD: 5.5 % (ref 4.2–6.3)
HCT VFR BLD AUTO: 38.5 % (ref 34.8–46.1)
HDLC SERPL-MCNC: 67 MG/DL (ref 40–60)
HGB BLD-MCNC: 12.3 G/DL (ref 11.5–15.4)
HGB UR QL STRIP.AUTO: NEGATIVE
HYALINE CASTS #/AREA URNS LPF: ABNORMAL /LPF
INR PPP: 1.06 (ref 0.86–1.17)
IRON SATN MFR SERPL: 34 %
IRON SERPL-MCNC: 86 UG/DL (ref 50–170)
KETONES UR STRIP-MCNC: NEGATIVE MG/DL
LDLC SERPL CALC-MCNC: 80 MG/DL (ref 0–100)
LEUKOCYTE ESTERASE UR QL STRIP: ABNORMAL
MCH RBC QN AUTO: 29.9 PG (ref 26.8–34.3)
MCHC RBC AUTO-ENTMCNC: 31.9 G/DL (ref 31.4–37.4)
MCV RBC AUTO: 93 FL (ref 82–98)
NITRITE UR QL STRIP: NEGATIVE
NON-SQ EPI CELLS URNS QL MICRO: ABNORMAL /HPF
NONHDLC SERPL-MCNC: 101 MG/DL
PH UR STRIP.AUTO: 6.5 [PH] (ref 4.5–8)
PLATELET # BLD AUTO: 174 THOUSANDS/UL (ref 149–390)
PMV BLD AUTO: 9.5 FL (ref 8.9–12.7)
PROT UR STRIP-MCNC: NEGATIVE MG/DL
PROTHROMBIN TIME: 13.9 SECONDS (ref 11.8–14.2)
RBC # BLD AUTO: 4.12 MILLION/UL (ref 3.81–5.12)
RBC #/AREA URNS AUTO: ABNORMAL /HPF
RH BLD: POSITIVE
SP GR UR STRIP.AUTO: 1.02 (ref 1–1.03)
SPECIMEN EXPIRATION DATE: NORMAL
TIBC SERPL-MCNC: 252 UG/DL (ref 250–450)
TRIGL SERPL-MCNC: 104 MG/DL
UROBILINOGEN UR QL STRIP.AUTO: 1 E.U./DL
WBC # BLD AUTO: 4.58 THOUSAND/UL (ref 4.31–10.16)
WBC #/AREA URNS AUTO: ABNORMAL /HPF

## 2018-12-31 PROCEDURE — 86923 COMPATIBILITY TEST ELECTRIC: CPT

## 2018-12-31 PROCEDURE — 86901 BLOOD TYPING SEROLOGIC RH(D): CPT

## 2018-12-31 PROCEDURE — 85027 COMPLETE CBC AUTOMATED: CPT

## 2018-12-31 PROCEDURE — 86850 RBC ANTIBODY SCREEN: CPT

## 2018-12-31 PROCEDURE — 83550 IRON BINDING TEST: CPT

## 2018-12-31 PROCEDURE — 80061 LIPID PANEL: CPT

## 2018-12-31 PROCEDURE — 83036 HEMOGLOBIN GLYCOSYLATED A1C: CPT

## 2018-12-31 PROCEDURE — 82728 ASSAY OF FERRITIN: CPT

## 2018-12-31 PROCEDURE — 83540 ASSAY OF IRON: CPT

## 2018-12-31 PROCEDURE — 86900 BLOOD TYPING SEROLOGIC ABO: CPT

## 2018-12-31 PROCEDURE — 85610 PROTHROMBIN TIME: CPT

## 2018-12-31 PROCEDURE — 87081 CULTURE SCREEN ONLY: CPT

## 2018-12-31 PROCEDURE — 81001 URINALYSIS AUTO W/SCOPE: CPT | Performed by: THORACIC SURGERY (CARDIOTHORACIC VASCULAR SURGERY)

## 2018-12-31 PROCEDURE — 36415 COLL VENOUS BLD VENIPUNCTURE: CPT

## 2019-01-01 LAB — MRSA NOSE QL CULT: NORMAL

## 2019-01-04 RX ORDER — HEPARIN SODIUM 1000 [USP'U]/ML
10000 INJECTION, SOLUTION INTRAVENOUS; SUBCUTANEOUS ONCE
Status: CANCELLED | OUTPATIENT
Start: 2019-01-07

## 2019-01-04 RX ORDER — HEPARIN SODIUM 1000 [USP'U]/ML
400 INJECTION, SOLUTION INTRAVENOUS; SUBCUTANEOUS ONCE
Status: CANCELLED | OUTPATIENT
Start: 2019-01-07

## 2019-01-06 ENCOUNTER — ANESTHESIA EVENT (OUTPATIENT)
Dept: PERIOP | Facility: HOSPITAL | Age: 69
DRG: 219 | End: 2019-01-06
Payer: MEDICARE

## 2019-01-07 ENCOUNTER — APPOINTMENT (INPATIENT)
Dept: RADIOLOGY | Facility: HOSPITAL | Age: 69
DRG: 219 | End: 2019-01-07
Payer: MEDICARE

## 2019-01-07 ENCOUNTER — APPOINTMENT (OUTPATIENT)
Dept: NON INVASIVE DIAGNOSTICS | Facility: HOSPITAL | Age: 69
DRG: 219 | End: 2019-01-07
Payer: MEDICARE

## 2019-01-07 ENCOUNTER — HOSPITAL ENCOUNTER (INPATIENT)
Facility: HOSPITAL | Age: 69
LOS: 3 days | Discharge: HOME HEALTH | DRG: 219 | End: 2019-01-10
Attending: THORACIC SURGERY (CARDIOTHORACIC VASCULAR SURGERY) | Admitting: THORACIC SURGERY (CARDIOTHORACIC VASCULAR SURGERY)
Payer: MEDICARE

## 2019-01-07 ENCOUNTER — ANESTHESIA (OUTPATIENT)
Dept: PERIOP | Facility: HOSPITAL | Age: 69
DRG: 219 | End: 2019-01-07
Payer: MEDICARE

## 2019-01-07 DIAGNOSIS — I34.9 NONRHEUMATIC MITRAL VALVE DISORDER: ICD-10-CM

## 2019-01-07 DIAGNOSIS — Z98.890 S/P MVR (MITRAL VALVE REPAIR): Primary | ICD-10-CM

## 2019-01-07 DIAGNOSIS — I34.0 NON-RHEUMATIC MITRAL REGURGITATION: ICD-10-CM

## 2019-01-07 DIAGNOSIS — I10 ESSENTIAL HYPERTENSION: ICD-10-CM

## 2019-01-07 DIAGNOSIS — E78.5 HYPERLIPIDEMIA, UNSPECIFIED HYPERLIPIDEMIA TYPE: ICD-10-CM

## 2019-01-07 LAB
ANION GAP SERPL CALCULATED.3IONS-SCNC: 7 MMOL/L (ref 4–13)
ARTERIAL PATENCY WRIST A: ABNORMAL
ATRIAL RATE: 89 BPM
BASE EXCESS BLDA CALC-SCNC: -1 MMOL/L (ref -2–3)
BASE EXCESS BLDA CALC-SCNC: 0 MMOL/L (ref -2–3)
BASE EXCESS BLDA CALC-SCNC: 0 MMOL/L (ref -2–3)
BASE EXCESS BLDA CALC-SCNC: 1 MMOL/L (ref -2–3)
BASE EXCESS BLDA CALC-SCNC: 2 MMOL/L (ref -2–3)
BASE EXCESS BLDA CALC-SCNC: 3 MMOL/L (ref -2–3)
BUN SERPL-MCNC: 14 MG/DL (ref 5–25)
CA-I BLD-SCNC: 0.93 MMOL/L (ref 1.12–1.32)
CA-I BLD-SCNC: 1.01 MMOL/L (ref 1.12–1.32)
CA-I BLD-SCNC: 1.03 MMOL/L (ref 1.12–1.32)
CA-I BLD-SCNC: 1.16 MMOL/L (ref 1.12–1.32)
CA-I BLD-SCNC: 1.18 MMOL/L (ref 1.12–1.32)
CA-I BLD-SCNC: 1.26 MMOL/L (ref 1.12–1.32)
CALCIUM SERPL-MCNC: 7.6 MG/DL (ref 8.3–10.1)
CHLORIDE SERPL-SCNC: 111 MMOL/L (ref 100–108)
CO2 SERPL-SCNC: 24 MMOL/L (ref 21–32)
CREAT SERPL-MCNC: 0.85 MG/DL (ref 0.6–1.3)
FIO2 GAS DIL.REBREATH: 60 L
GFR SERPL CREATININE-BSD FRML MDRD: 71 ML/MIN/1.73SQ M
GLUCOSE SERPL-MCNC: 105 MG/DL (ref 65–140)
GLUCOSE SERPL-MCNC: 112 MG/DL (ref 65–140)
GLUCOSE SERPL-MCNC: 128 MG/DL (ref 65–140)
GLUCOSE SERPL-MCNC: 134 MG/DL (ref 65–140)
GLUCOSE SERPL-MCNC: 138 MG/DL (ref 65–140)
GLUCOSE SERPL-MCNC: 143 MG/DL (ref 65–140)
GLUCOSE SERPL-MCNC: 143 MG/DL (ref 65–140)
GLUCOSE SERPL-MCNC: 145 MG/DL (ref 65–140)
GLUCOSE SERPL-MCNC: 146 MG/DL (ref 65–140)
GLUCOSE SERPL-MCNC: 154 MG/DL (ref 65–140)
GLUCOSE SERPL-MCNC: 161 MG/DL (ref 65–140)
GLUCOSE SERPL-MCNC: 166 MG/DL (ref 65–140)
GLUCOSE SERPL-MCNC: 168 MG/DL (ref 65–140)
GLUCOSE SERPL-MCNC: 195 MG/DL (ref 65–140)
HCO3 BLDA-SCNC: 24.3 MMOL/L (ref 22–28)
HCO3 BLDA-SCNC: 25.5 MMOL/L (ref 22–28)
HCO3 BLDA-SCNC: 25.9 MMOL/L (ref 22–28)
HCO3 BLDA-SCNC: 26.3 MMOL/L (ref 24–30)
HCO3 BLDA-SCNC: 27.4 MMOL/L (ref 22–28)
HCO3 BLDA-SCNC: 28.2 MMOL/L (ref 22–28)
HCT VFR BLD AUTO: 32.9 % (ref 34.8–46.1)
HCT VFR BLD AUTO: 33.3 % (ref 34.8–46.1)
HCT VFR BLD CALC: 21 % (ref 34.8–46.1)
HCT VFR BLD CALC: 22 % (ref 34.8–46.1)
HCT VFR BLD CALC: 23 % (ref 34.8–46.1)
HCT VFR BLD CALC: 24 % (ref 34.8–46.1)
HCT VFR BLD CALC: 30 % (ref 34.8–46.1)
HCT VFR BLD CALC: 30 % (ref 34.8–46.1)
HGB BLD-MCNC: 10.6 G/DL (ref 11.5–15.4)
HGB BLD-MCNC: 10.7 G/DL (ref 11.5–15.4)
HGB BLDA-MCNC: 10.2 G/DL (ref 11.5–15.4)
HGB BLDA-MCNC: 10.2 G/DL (ref 11.5–15.4)
HGB BLDA-MCNC: 7.1 G/DL (ref 11.5–15.4)
HGB BLDA-MCNC: 7.5 G/DL (ref 11.5–15.4)
HGB BLDA-MCNC: 7.8 G/DL (ref 11.5–15.4)
HGB BLDA-MCNC: 8.2 G/DL (ref 11.5–15.4)
KCT BLD-ACNC: 121 SEC (ref 89–137)
KCT BLD-ACNC: 132 SEC (ref 89–137)
KCT BLD-ACNC: 399 SEC (ref 89–137)
KCT BLD-ACNC: 502 SEC (ref 89–137)
KCT BLD-ACNC: 743 SEC (ref 89–137)
KCT BLD-ACNC: 760 SEC (ref 89–137)
P AXIS: 72 DEGREES
PCO2 BLD: 26 MMOL/L (ref 21–32)
PCO2 BLD: 27 MMOL/L (ref 21–32)
PCO2 BLD: 27 MMOL/L (ref 21–32)
PCO2 BLD: 28 MMOL/L (ref 21–32)
PCO2 BLD: 29 MMOL/L (ref 21–32)
PCO2 BLD: 29 MMOL/L (ref 21–32)
PCO2 BLD: 43.3 MM HG (ref 36–44)
PCO2 BLD: 43.3 MM HG (ref 36–44)
PCO2 BLD: 43.7 MM HG (ref 36–44)
PCO2 BLD: 44.4 MM HG (ref 36–44)
PCO2 BLD: 45.2 MM HG (ref 42–50)
PCO2 BLD: 48.8 MM HG (ref 36–44)
PH BLD: 7.33 [PH] (ref 7.35–7.45)
PH BLD: 7.35 [PH] (ref 7.35–7.45)
PH BLD: 7.37 [PH] (ref 7.35–7.45)
PH BLD: 7.37 [PH] (ref 7.3–7.4)
PH BLD: 7.41 [PH] (ref 7.35–7.45)
PH BLD: 7.42 [PH] (ref 7.35–7.45)
PLATELET # BLD AUTO: 101 THOUSANDS/UL (ref 149–390)
PLATELET # BLD AUTO: 146 THOUSANDS/UL (ref 149–390)
PMV BLD AUTO: 9.2 FL (ref 8.9–12.7)
PMV BLD AUTO: 9.9 FL (ref 8.9–12.7)
PO2 BLD: 169 MM HG (ref 75–129)
PO2 BLD: 228 MM HG (ref 75–129)
PO2 BLD: 267 MM HG (ref 75–129)
PO2 BLD: 328 MM HG (ref 75–129)
PO2 BLD: 37 MM HG (ref 35–45)
PO2 BLD: 92 MM HG (ref 75–129)
POTASSIUM BLD-SCNC: 3.1 MMOL/L (ref 3.5–5.3)
POTASSIUM BLD-SCNC: 3.2 MMOL/L (ref 3.5–5.3)
POTASSIUM BLD-SCNC: 3.3 MMOL/L (ref 3.5–5.3)
POTASSIUM BLD-SCNC: 3.7 MMOL/L (ref 3.5–5.3)
POTASSIUM BLD-SCNC: 4 MMOL/L (ref 3.5–5.3)
POTASSIUM BLD-SCNC: 4.7 MMOL/L (ref 3.5–5.3)
POTASSIUM SERPL-SCNC: 3.2 MMOL/L (ref 3.5–5.3)
POTASSIUM SERPL-SCNC: 3.3 MMOL/L (ref 3.5–5.3)
POTASSIUM SERPL-SCNC: 4.5 MMOL/L (ref 3.5–5.3)
PR INTERVAL: 154 MS
QRS AXIS: 47 DEGREES
QRSD INTERVAL: 125 MS
QT INTERVAL: 396 MS
QTC INTERVAL: 482 MS
SAMPLE SITE: ABNORMAL
SAO2 % BLD FROM PO2: 100 % (ref 95–98)
SAO2 % BLD FROM PO2: 68 % (ref 95–98)
SAO2 % BLD FROM PO2: 97 % (ref 95–98)
SAO2 % BLD FROM PO2: 99 % (ref 95–98)
SODIUM BLD-SCNC: 138 MMOL/L (ref 136–145)
SODIUM BLD-SCNC: 138 MMOL/L (ref 136–145)
SODIUM BLD-SCNC: 139 MMOL/L (ref 136–145)
SODIUM BLD-SCNC: 140 MMOL/L (ref 136–145)
SODIUM BLD-SCNC: 142 MMOL/L (ref 136–145)
SODIUM BLD-SCNC: 144 MMOL/L (ref 136–145)
SODIUM SERPL-SCNC: 142 MMOL/L (ref 136–145)
SPECIMEN SOURCE: ABNORMAL
SPECIMEN SOURCE: NORMAL
SPECIMEN SOURCE: NORMAL
T WAVE AXIS: 33 DEGREES
VENTRICULAR RATE: 89 BPM

## 2019-01-07 PROCEDURE — 85049 AUTOMATED PLATELET COUNT: CPT | Performed by: THORACIC SURGERY (CARDIOTHORACIC VASCULAR SURGERY)

## 2019-01-07 PROCEDURE — 30233N0 TRANSFUSION OF AUTOLOGOUS RED BLOOD CELLS INTO PERIPHERAL VEIN, PERCUTANEOUS APPROACH: ICD-10-PCS | Performed by: THORACIC SURGERY (CARDIOTHORACIC VASCULAR SURGERY)

## 2019-01-07 PROCEDURE — 82803 BLOOD GASES ANY COMBINATION: CPT

## 2019-01-07 PROCEDURE — 80048 BASIC METABOLIC PNL TOTAL CA: CPT | Performed by: PHYSICIAN ASSISTANT

## 2019-01-07 PROCEDURE — 85018 HEMOGLOBIN: CPT | Performed by: PHYSICIAN ASSISTANT

## 2019-01-07 PROCEDURE — 88311 DECALCIFY TISSUE: CPT | Performed by: PATHOLOGY

## 2019-01-07 PROCEDURE — 88305 TISSUE EXAM BY PATHOLOGIST: CPT | Performed by: PATHOLOGY

## 2019-01-07 PROCEDURE — 93005 ELECTROCARDIOGRAM TRACING: CPT

## 2019-01-07 PROCEDURE — 33427 REPAIR OF MITRAL VALVE: CPT | Performed by: THORACIC SURGERY (CARDIOTHORACIC VASCULAR SURGERY)

## 2019-01-07 PROCEDURE — 02QG0ZZ REPAIR MITRAL VALVE, OPEN APPROACH: ICD-10-PCS | Performed by: THORACIC SURGERY (CARDIOTHORACIC VASCULAR SURGERY)

## 2019-01-07 PROCEDURE — 85347 COAGULATION TIME ACTIVATED: CPT

## 2019-01-07 PROCEDURE — 02BG0ZZ EXCISION OF MITRAL VALVE, OPEN APPROACH: ICD-10-PCS | Performed by: THORACIC SURGERY (CARDIOTHORACIC VASCULAR SURGERY)

## 2019-01-07 PROCEDURE — 02UG0JZ SUPPLEMENT MITRAL VALVE WITH SYNTHETIC SUBSTITUTE, OPEN APPROACH: ICD-10-PCS | Performed by: THORACIC SURGERY (CARDIOTHORACIC VASCULAR SURGERY)

## 2019-01-07 PROCEDURE — 93010 ELECTROCARDIOGRAM REPORT: CPT | Performed by: INTERNAL MEDICINE

## 2019-01-07 PROCEDURE — 84295 ASSAY OF SERUM SODIUM: CPT

## 2019-01-07 PROCEDURE — 82948 REAGENT STRIP/BLOOD GLUCOSE: CPT

## 2019-01-07 PROCEDURE — 76376 3D RENDER W/INTRP POSTPROCES: CPT

## 2019-01-07 PROCEDURE — 84132 ASSAY OF SERUM POTASSIUM: CPT | Performed by: PHYSICIAN ASSISTANT

## 2019-01-07 PROCEDURE — 82947 ASSAY GLUCOSE BLOOD QUANT: CPT

## 2019-01-07 PROCEDURE — 99223 1ST HOSP IP/OBS HIGH 75: CPT | Performed by: ANESTHESIOLOGY

## 2019-01-07 PROCEDURE — 93312 ECHO TRANSESOPHAGEAL: CPT

## 2019-01-07 PROCEDURE — 85014 HEMATOCRIT: CPT | Performed by: PHYSICIAN ASSISTANT

## 2019-01-07 PROCEDURE — 33427 REPAIR OF MITRAL VALVE: CPT | Performed by: PHYSICIAN ASSISTANT

## 2019-01-07 PROCEDURE — 94660 CPAP INITIATION&MGMT: CPT

## 2019-01-07 PROCEDURE — 88313 SPECIAL STAINS GROUP 2: CPT | Performed by: PATHOLOGY

## 2019-01-07 PROCEDURE — 02HV33Z INSERTION OF INFUSION DEVICE INTO SUPERIOR VENA CAVA, PERCUTANEOUS APPROACH: ICD-10-PCS | Performed by: ANESTHESIOLOGY

## 2019-01-07 PROCEDURE — 5A1221Z PERFORMANCE OF CARDIAC OUTPUT, CONTINUOUS: ICD-10-PCS | Performed by: THORACIC SURGERY (CARDIOTHORACIC VASCULAR SURGERY)

## 2019-01-07 PROCEDURE — 94760 N-INVAS EAR/PLS OXIMETRY 1: CPT

## 2019-01-07 PROCEDURE — 85014 HEMATOCRIT: CPT

## 2019-01-07 PROCEDURE — 85049 AUTOMATED PLATELET COUNT: CPT | Performed by: PHYSICIAN ASSISTANT

## 2019-01-07 PROCEDURE — 94002 VENT MGMT INPAT INIT DAY: CPT

## 2019-01-07 PROCEDURE — 71045 X-RAY EXAM CHEST 1 VIEW: CPT

## 2019-01-07 PROCEDURE — 5A1223Z PERFORMANCE OF CARDIAC PACING, CONTINUOUS: ICD-10-PCS | Performed by: THORACIC SURGERY (CARDIOTHORACIC VASCULAR SURGERY)

## 2019-01-07 PROCEDURE — 82330 ASSAY OF CALCIUM: CPT

## 2019-01-07 PROCEDURE — 84132 ASSAY OF SERUM POTASSIUM: CPT

## 2019-01-07 DEVICE — RING ANNULOPLASTY CG FUTURE 30MM: Type: IMPLANTABLE DEVICE | Site: HEART | Status: FUNCTIONAL

## 2019-01-07 RX ORDER — POTASSIUM CHLORIDE 14.9 MG/ML
20 INJECTION INTRAVENOUS
Status: COMPLETED | OUTPATIENT
Start: 2019-01-07 | End: 2019-01-07

## 2019-01-07 RX ORDER — CHLORHEXIDINE GLUCONATE 0.12 MG/ML
15 RINSE ORAL ONCE
Status: COMPLETED | OUTPATIENT
Start: 2019-01-07 | End: 2019-01-07

## 2019-01-07 RX ORDER — SODIUM CHLORIDE, SODIUM LACTATE, POTASSIUM CHLORIDE, CALCIUM CHLORIDE 600; 310; 30; 20 MG/100ML; MG/100ML; MG/100ML; MG/100ML
INJECTION, SOLUTION INTRAVENOUS CONTINUOUS PRN
Status: DISCONTINUED | OUTPATIENT
Start: 2019-01-07 | End: 2019-01-07 | Stop reason: SURG

## 2019-01-07 RX ORDER — ALBUMIN, HUMAN INJ 5% 5 %
12.5 SOLUTION INTRAVENOUS ONCE
Status: COMPLETED | OUTPATIENT
Start: 2019-01-07 | End: 2019-01-07

## 2019-01-07 RX ORDER — ONDANSETRON 2 MG/ML
4 INJECTION INTRAMUSCULAR; INTRAVENOUS EVERY 6 HOURS PRN
Status: DISCONTINUED | OUTPATIENT
Start: 2019-01-07 | End: 2019-01-10 | Stop reason: HOSPADM

## 2019-01-07 RX ORDER — LEVOTHYROXINE SODIUM 0.12 MG/1
125 TABLET ORAL
Status: DISCONTINUED | OUTPATIENT
Start: 2019-01-08 | End: 2019-01-10 | Stop reason: HOSPADM

## 2019-01-07 RX ORDER — ROCURONIUM BROMIDE 10 MG/ML
INJECTION, SOLUTION INTRAVENOUS AS NEEDED
Status: DISCONTINUED | OUTPATIENT
Start: 2019-01-07 | End: 2019-01-07 | Stop reason: SURG

## 2019-01-07 RX ORDER — SODIUM CHLORIDE 450 MG/100ML
20 INJECTION, SOLUTION INTRAVENOUS CONTINUOUS
Status: DISCONTINUED | OUTPATIENT
Start: 2019-01-07 | End: 2019-01-08

## 2019-01-07 RX ORDER — FENTANYL CITRATE 50 UG/ML
50 INJECTION, SOLUTION INTRAMUSCULAR; INTRAVENOUS
Status: DISCONTINUED | OUTPATIENT
Start: 2019-01-07 | End: 2019-01-07

## 2019-01-07 RX ORDER — HYDROMORPHONE HCL/PF 1 MG/ML
1 SYRINGE (ML) INJECTION
Status: DISCONTINUED | OUTPATIENT
Start: 2019-01-07 | End: 2019-01-07

## 2019-01-07 RX ORDER — FONDAPARINUX SODIUM 2.5 MG/.5ML
2.5 INJECTION SUBCUTANEOUS DAILY
Status: DISCONTINUED | OUTPATIENT
Start: 2019-01-08 | End: 2019-01-08

## 2019-01-07 RX ORDER — CHLORHEXIDINE GLUCONATE 0.12 MG/ML
15 RINSE ORAL 2 TIMES DAILY
Status: DISCONTINUED | OUTPATIENT
Start: 2019-01-07 | End: 2019-01-07

## 2019-01-07 RX ORDER — AMINOCAPROIC ACID 250 MG/ML
INJECTION, SOLUTION INTRAVENOUS AS NEEDED
Status: DISCONTINUED | OUTPATIENT
Start: 2019-01-07 | End: 2019-01-07 | Stop reason: SURG

## 2019-01-07 RX ORDER — CEFAZOLIN SODIUM 1 G/3ML
INJECTION, POWDER, FOR SOLUTION INTRAMUSCULAR; INTRAVENOUS AS NEEDED
Status: DISCONTINUED | OUTPATIENT
Start: 2019-01-07 | End: 2019-01-07 | Stop reason: SURG

## 2019-01-07 RX ORDER — PANTOPRAZOLE SODIUM 40 MG/1
40 TABLET, DELAYED RELEASE ORAL
Status: DISCONTINUED | OUTPATIENT
Start: 2019-01-07 | End: 2019-01-08

## 2019-01-07 RX ORDER — MIDAZOLAM HYDROCHLORIDE 1 MG/ML
INJECTION INTRAMUSCULAR; INTRAVENOUS AS NEEDED
Status: DISCONTINUED | OUTPATIENT
Start: 2019-01-07 | End: 2019-01-07 | Stop reason: SURG

## 2019-01-07 RX ORDER — ALBUMIN, HUMAN INJ 5% 5 %
SOLUTION INTRAVENOUS
Status: COMPLETED
Start: 2019-01-07 | End: 2019-01-07

## 2019-01-07 RX ORDER — POTASSIUM CHLORIDE 14.9 MG/ML
20 INJECTION INTRAVENOUS ONCE AS NEEDED
Status: COMPLETED | OUTPATIENT
Start: 2019-01-07 | End: 2019-01-07

## 2019-01-07 RX ORDER — FENTANYL CITRATE 50 UG/ML
50 INJECTION, SOLUTION INTRAMUSCULAR; INTRAVENOUS ONCE
Status: DISCONTINUED | OUTPATIENT
Start: 2019-01-07 | End: 2019-01-07

## 2019-01-07 RX ORDER — EPHEDRINE SULFATE 50 MG/ML
INJECTION, SOLUTION INTRAVENOUS AS NEEDED
Status: DISCONTINUED | OUTPATIENT
Start: 2019-01-07 | End: 2019-01-07 | Stop reason: SURG

## 2019-01-07 RX ORDER — POTASSIUM CHLORIDE 14.9 MG/ML
20 INJECTION INTRAVENOUS
Status: DISCONTINUED | OUTPATIENT
Start: 2019-01-07 | End: 2019-01-08

## 2019-01-07 RX ORDER — CEFAZOLIN SODIUM 2 G/50ML
2000 SOLUTION INTRAVENOUS ONCE
Status: DISCONTINUED | OUTPATIENT
Start: 2019-01-07 | End: 2019-01-07 | Stop reason: HOSPADM

## 2019-01-07 RX ORDER — NEOSTIGMINE METHYLSULFATE 1 MG/ML
INJECTION INTRAVENOUS AS NEEDED
Status: DISCONTINUED | OUTPATIENT
Start: 2019-01-07 | End: 2019-01-07 | Stop reason: SURG

## 2019-01-07 RX ORDER — HYDROMORPHONE HCL/PF 1 MG/ML
0.5 SYRINGE (ML) INJECTION
Status: DISCONTINUED | OUTPATIENT
Start: 2019-01-07 | End: 2019-01-08

## 2019-01-07 RX ORDER — PROTAMINE SULFATE 10 MG/ML
INJECTION, SOLUTION INTRAVENOUS AS NEEDED
Status: DISCONTINUED | OUTPATIENT
Start: 2019-01-07 | End: 2019-01-07 | Stop reason: SURG

## 2019-01-07 RX ORDER — ATORVASTATIN CALCIUM 80 MG/1
80 TABLET, FILM COATED ORAL
Status: DISCONTINUED | OUTPATIENT
Start: 2019-01-07 | End: 2019-01-08

## 2019-01-07 RX ORDER — MAGNESIUM SULFATE HEPTAHYDRATE 40 MG/ML
2 INJECTION, SOLUTION INTRAVENOUS ONCE
Status: COMPLETED | OUTPATIENT
Start: 2019-01-07 | End: 2019-01-07

## 2019-01-07 RX ORDER — FENTANYL CITRATE 50 UG/ML
INJECTION, SOLUTION INTRAMUSCULAR; INTRAVENOUS AS NEEDED
Status: DISCONTINUED | OUTPATIENT
Start: 2019-01-07 | End: 2019-01-07 | Stop reason: SURG

## 2019-01-07 RX ORDER — HEPARIN SODIUM 1000 [USP'U]/ML
INJECTION, SOLUTION INTRAVENOUS; SUBCUTANEOUS AS NEEDED
Status: DISCONTINUED | OUTPATIENT
Start: 2019-01-07 | End: 2019-01-07 | Stop reason: SURG

## 2019-01-07 RX ORDER — GLYCOPYRROLATE 0.2 MG/ML
INJECTION INTRAMUSCULAR; INTRAVENOUS AS NEEDED
Status: DISCONTINUED | OUTPATIENT
Start: 2019-01-07 | End: 2019-01-07 | Stop reason: SURG

## 2019-01-07 RX ORDER — ACETAMINOPHEN 650 MG/1
650 SUPPOSITORY RECTAL EVERY 4 HOURS PRN
Status: DISCONTINUED | OUTPATIENT
Start: 2019-01-07 | End: 2019-01-07

## 2019-01-07 RX ORDER — ONDANSETRON 2 MG/ML
INJECTION INTRAMUSCULAR; INTRAVENOUS AS NEEDED
Status: DISCONTINUED | OUTPATIENT
Start: 2019-01-07 | End: 2019-01-07 | Stop reason: SURG

## 2019-01-07 RX ORDER — CALCIUM CHLORIDE 100 MG/ML
1 INJECTION INTRAVENOUS; INTRAVENTRICULAR ONCE
Status: DISCONTINUED | OUTPATIENT
Start: 2019-01-07 | End: 2019-01-07

## 2019-01-07 RX ORDER — CEFAZOLIN SODIUM 1 G/50ML
1000 SOLUTION INTRAVENOUS EVERY 8 HOURS
Status: DISCONTINUED | OUTPATIENT
Start: 2019-01-07 | End: 2019-01-08

## 2019-01-07 RX ORDER — MAGNESIUM HYDROXIDE 1200 MG/15ML
LIQUID ORAL AS NEEDED
Status: DISCONTINUED | OUTPATIENT
Start: 2019-01-07 | End: 2019-01-07 | Stop reason: HOSPADM

## 2019-01-07 RX ORDER — FUROSEMIDE 10 MG/ML
40 INJECTION INTRAMUSCULAR; INTRAVENOUS EVERY 6 HOURS PRN
Status: DISCONTINUED | OUTPATIENT
Start: 2019-01-07 | End: 2019-01-08

## 2019-01-07 RX ORDER — ACETAMINOPHEN 325 MG/1
650 TABLET ORAL EVERY 4 HOURS PRN
Status: DISCONTINUED | OUTPATIENT
Start: 2019-01-07 | End: 2019-01-08

## 2019-01-07 RX ORDER — BISACODYL 10 MG
10 SUPPOSITORY, RECTAL RECTAL DAILY PRN
Status: DISCONTINUED | OUTPATIENT
Start: 2019-01-07 | End: 2019-01-08

## 2019-01-07 RX ORDER — OXYCODONE HYDROCHLORIDE AND ACETAMINOPHEN 5; 325 MG/1; MG/1
1 TABLET ORAL EVERY 4 HOURS PRN
Status: DISCONTINUED | OUTPATIENT
Start: 2019-01-07 | End: 2019-01-10 | Stop reason: HOSPADM

## 2019-01-07 RX ORDER — ASPIRIN 325 MG
325 TABLET ORAL DAILY
Status: DISCONTINUED | OUTPATIENT
Start: 2019-01-07 | End: 2019-01-08

## 2019-01-07 RX ORDER — OXYCODONE HYDROCHLORIDE AND ACETAMINOPHEN 5; 325 MG/1; MG/1
2 TABLET ORAL EVERY 6 HOURS PRN
Status: DISCONTINUED | OUTPATIENT
Start: 2019-01-07 | End: 2019-01-10 | Stop reason: HOSPADM

## 2019-01-07 RX ORDER — PROPOFOL 10 MG/ML
INJECTION, EMULSION INTRAVENOUS CONTINUOUS PRN
Status: DISCONTINUED | OUTPATIENT
Start: 2019-01-07 | End: 2019-01-07 | Stop reason: SURG

## 2019-01-07 RX ORDER — PROPOFOL 10 MG/ML
INJECTION, EMULSION INTRAVENOUS AS NEEDED
Status: DISCONTINUED | OUTPATIENT
Start: 2019-01-07 | End: 2019-01-07 | Stop reason: SURG

## 2019-01-07 RX ORDER — AMIODARONE HYDROCHLORIDE 200 MG/1
200 TABLET ORAL EVERY 8 HOURS SCHEDULED
Status: DISCONTINUED | OUTPATIENT
Start: 2019-01-07 | End: 2019-01-08

## 2019-01-07 RX ORDER — POLYETHYLENE GLYCOL 3350 17 G/17G
17 POWDER, FOR SOLUTION ORAL DAILY
Status: DISCONTINUED | OUTPATIENT
Start: 2019-01-07 | End: 2019-01-08

## 2019-01-07 RX ORDER — SODIUM CHLORIDE 9 MG/ML
INJECTION, SOLUTION INTRAVENOUS CONTINUOUS PRN
Status: DISCONTINUED | OUTPATIENT
Start: 2019-01-07 | End: 2019-01-07 | Stop reason: SURG

## 2019-01-07 RX ORDER — VANCOMYCIN HYDROCHLORIDE 1 G/20ML
INJECTION, POWDER, LYOPHILIZED, FOR SOLUTION INTRAVENOUS AS NEEDED
Status: DISCONTINUED | OUTPATIENT
Start: 2019-01-07 | End: 2019-01-07 | Stop reason: HOSPADM

## 2019-01-07 RX ADMIN — SODIUM CHLORIDE 2 UNITS/HR: 9 INJECTION, SOLUTION INTRAVENOUS at 09:35

## 2019-01-07 RX ADMIN — SODIUM CHLORIDE 20 ML/HR: 0.45 INJECTION, SOLUTION INTRAVENOUS at 11:45

## 2019-01-07 RX ADMIN — ONDANSETRON 4 MG: 2 INJECTION INTRAMUSCULAR; INTRAVENOUS at 10:14

## 2019-01-07 RX ADMIN — PROPOFOL 40 MG: 10 INJECTION, EMULSION INTRAVENOUS at 08:43

## 2019-01-07 RX ADMIN — GLYCOPYRROLATE 0.4 MG: 0.2 INJECTION, SOLUTION INTRAMUSCULAR; INTRAVENOUS at 10:45

## 2019-01-07 RX ADMIN — MIDAZOLAM 2 MG: 1 INJECTION INTRAMUSCULAR; INTRAVENOUS at 07:29

## 2019-01-07 RX ADMIN — SODIUM CHLORIDE, SODIUM LACTATE, POTASSIUM CHLORIDE, AND CALCIUM CHLORIDE: .6; .31; .03; .02 INJECTION, SOLUTION INTRAVENOUS at 10:40

## 2019-01-07 RX ADMIN — PROPOFOL 30 MCG/KG/MIN: 10 INJECTION, EMULSION INTRAVENOUS at 10:37

## 2019-01-07 RX ADMIN — POTASSIUM CHLORIDE 20 MEQ: 200 INJECTION, SOLUTION INTRAVENOUS at 19:14

## 2019-01-07 RX ADMIN — ALBUMIN (HUMAN) 12.5 G: 12.5 INJECTION, SOLUTION INTRAVENOUS at 12:41

## 2019-01-07 RX ADMIN — FENTANYL CITRATE 500 MCG: 50 INJECTION, SOLUTION INTRAMUSCULAR; INTRAVENOUS at 07:35

## 2019-01-07 RX ADMIN — CHLORHEXIDINE GLUCONATE 0.12% ORAL RINSE 15 ML: 1.2 LIQUID ORAL at 06:30

## 2019-01-07 RX ADMIN — SODIUM CHLORIDE 2.5 MG/HR: 0.9 INJECTION, SOLUTION INTRAVENOUS at 20:41

## 2019-01-07 RX ADMIN — CEFAZOLIN 2000 MG: 1 INJECTION, POWDER, FOR SOLUTION INTRAVENOUS at 08:10

## 2019-01-07 RX ADMIN — SODIUM CHLORIDE 2 MG/HR: 0.9 INJECTION, SOLUTION INTRAVENOUS at 08:34

## 2019-01-07 RX ADMIN — NEOSTIGMINE METHYLSULFATE 3 MG: 1 INJECTION INTRAVENOUS at 10:45

## 2019-01-07 RX ADMIN — CHLORHEXIDINE GLUCONATE 0.12% ORAL RINSE 15 ML: 1.2 LIQUID ORAL at 20:03

## 2019-01-07 RX ADMIN — METOPROLOL TARTRATE 12.5 MG: 25 TABLET ORAL at 06:30

## 2019-01-07 RX ADMIN — AMIODARONE HYDROCHLORIDE 200 MG: 200 TABLET ORAL at 22:03

## 2019-01-07 RX ADMIN — MAGNESIUM SULFATE HEPTAHYDRATE 2 G: 40 INJECTION, SOLUTION INTRAVENOUS at 11:51

## 2019-01-07 RX ADMIN — ALBUMIN, HUMAN INJ 5% 12.5 G: 5 SOLUTION at 13:28

## 2019-01-07 RX ADMIN — POTASSIUM CHLORIDE 20 MEQ: 200 INJECTION, SOLUTION INTRAVENOUS at 12:23

## 2019-01-07 RX ADMIN — POTASSIUM CHLORIDE 20 MEQ: 200 INJECTION, SOLUTION INTRAVENOUS at 20:42

## 2019-01-07 RX ADMIN — AMINOCAPROIC ACID 5 G: 250 INJECTION, SOLUTION INTRAVENOUS at 08:15

## 2019-01-07 RX ADMIN — CEFAZOLIN SODIUM 1000 MG: 1 SOLUTION INTRAVENOUS at 17:18

## 2019-01-07 RX ADMIN — EPHEDRINE SULFATE 10 MG: 50 INJECTION, SOLUTION INTRAMUSCULAR; INTRAVENOUS; SUBCUTANEOUS at 11:01

## 2019-01-07 RX ADMIN — ALBUMIN, HUMAN INJ 5% 12.5 G: 5 SOLUTION at 12:41

## 2019-01-07 RX ADMIN — FENTANYL CITRATE 400 MCG: 50 INJECTION, SOLUTION INTRAMUSCULAR; INTRAVENOUS at 08:24

## 2019-01-07 RX ADMIN — HEPARIN SODIUM 35800 UNITS: 1000 INJECTION INTRAVENOUS; SUBCUTANEOUS at 08:41

## 2019-01-07 RX ADMIN — ROCURONIUM BROMIDE 100 MG: 10 INJECTION INTRAVENOUS at 07:35

## 2019-01-07 RX ADMIN — MUPIROCIN 1 APPLICATION: 20 OINTMENT TOPICAL at 06:30

## 2019-01-07 RX ADMIN — PROTAMINE SULFATE 300 MG: 10 INJECTION, SOLUTION INTRAVENOUS at 10:10

## 2019-01-07 RX ADMIN — EPHEDRINE SULFATE 5 MG: 50 INJECTION, SOLUTION INTRAMUSCULAR; INTRAVENOUS; SUBCUTANEOUS at 07:35

## 2019-01-07 RX ADMIN — PROPOFOL 50 MG: 10 INJECTION, EMULSION INTRAVENOUS at 07:35

## 2019-01-07 RX ADMIN — FENTANYL CITRATE 10 MCG: 50 INJECTION, SOLUTION INTRAMUSCULAR; INTRAVENOUS at 10:45

## 2019-01-07 RX ADMIN — OXYCODONE HYDROCHLORIDE AND ACETAMINOPHEN 1 TABLET: 5; 325 TABLET ORAL at 20:03

## 2019-01-07 RX ADMIN — EPINEPHRINE 2 MCG/MIN: 1 INJECTION, SOLUTION, CONCENTRATE INTRAVENOUS at 10:02

## 2019-01-07 RX ADMIN — SODIUM CHLORIDE, SODIUM LACTATE, POTASSIUM CHLORIDE, AND CALCIUM CHLORIDE: .6; .31; .03; .02 INJECTION, SOLUTION INTRAVENOUS at 08:07

## 2019-01-07 RX ADMIN — AMINOCAPROIC ACID 2 G/HR: 250 INJECTION, SOLUTION INTRAVENOUS at 08:15

## 2019-01-07 RX ADMIN — EPHEDRINE SULFATE 10 MG: 50 INJECTION, SOLUTION INTRAMUSCULAR; INTRAVENOUS; SUBCUTANEOUS at 07:46

## 2019-01-07 RX ADMIN — DEXAMETHASONE SODIUM PHOSPHATE 4 MG: 10 INJECTION INTRAMUSCULAR; INTRAVENOUS at 10:14

## 2019-01-07 RX ADMIN — MIDAZOLAM 4 MG: 1 INJECTION INTRAMUSCULAR; INTRAVENOUS at 07:35

## 2019-01-07 RX ADMIN — CEFAZOLIN 2000 MG: 1 INJECTION, POWDER, FOR SOLUTION INTRAVENOUS at 10:12

## 2019-01-07 RX ADMIN — EPHEDRINE SULFATE 10 MG: 50 INJECTION, SOLUTION INTRAMUSCULAR; INTRAVENOUS; SUBCUTANEOUS at 07:59

## 2019-01-07 RX ADMIN — ALBUMIN (HUMAN) 12.5 G: 12.5 SOLUTION INTRAVENOUS at 13:28

## 2019-01-07 RX ADMIN — SODIUM CHLORIDE: 0.9 INJECTION, SOLUTION INTRAVENOUS at 07:27

## 2019-01-07 RX ADMIN — EPHEDRINE SULFATE 5 MG: 50 INJECTION, SOLUTION INTRAMUSCULAR; INTRAVENOUS; SUBCUTANEOUS at 10:25

## 2019-01-07 RX ADMIN — MUPIROCIN 1 APPLICATION: 20 OINTMENT TOPICAL at 20:03

## 2019-01-07 RX ADMIN — SODIUM CHLORIDE: 0.9 INJECTION, SOLUTION INTRAVENOUS at 08:07

## 2019-01-07 RX ADMIN — PHENYLEPHRINE HYDROCHLORIDE 50 MCG/MIN: 10 INJECTION INTRAVENOUS at 08:47

## 2019-01-07 NOTE — ANESTHESIA PROCEDURE NOTES
Central Line Insertion  Performed by: Margoth Larsen  Authorized by: Margoth Larsen   Date/Time: 1/7/2019 7:50 AM  Catheter Type:  triple lumen  Consent: Verbal consent obtained  Written consent obtained  Risks and benefits: risks, benefits and alternatives were discussed  Consent given by: patient  Patient understanding: patient states understanding of the procedure being performed  Patient consent: the patient's understanding of the procedure matches consent given  Required items: required blood products, implants, devices, and special equipment available  Patient identity confirmed: verbally with patient and arm band  Time out: Immediately prior to procedure a "time out" was called to verify the correct patient, procedure, equipment, support staff and site/side marked as required  Indications: vascular access and central pressure monitoring  Location details: right internal jugular  Catheter size: 7 Fr  Patient position: Trendelenburg  Anesthesia method: ga    Assessment: blood return through all ports and free fluid flow  Preparation: skin prepped with ChloraPrep  Skin prep agent dried: skin prep agent completely dried prior to procedure  Sterile barriers: all five maximum sterile barriers used - cap, mask, sterile gown, sterile gloves, and large sterile sheet  Hand hygiene: hand hygiene performed prior to central venous catheter insertion  Ultrasound guidance: yes  sterile gel and probe cover used in ultrasound-guided central venous catheter insertionSite selection rationale: mvr  Vessel of Catheter Tip End: central venous  Number of attempts: 1  Successful placement: yes  Post-procedure: chlorhexidine patch applied,  dressing applied and line sutured  Patient tolerance: Patient tolerated the procedure well with no immediate complications

## 2019-01-07 NOTE — RESPIRATORY THERAPY NOTE
RT Protocol Note  [facility] 76 y o  female MRN: [id number]  Unit/Bed#: [facility] 417-01 Encounter: [id number]    Assessment    Principal Problem:    Non-rheumatic mitral regurgitation  Active Problems:    S/P MVR (mitral valve repair)      Home Pulmonary Medications:  na  Home Devices/Therapy: (P) BiPAP/CPAP    Past Medical History:   Diagnosis Date    CPAP (continuous positive airway pressure) dependence     Cystic breast     Dyspareunia, female     last assessed 9/4/14    Hyperlipidemia     Insomnia     Osteoporosis     Sleep apnea     Thyroid disease     Varicose veins of lower extremity     last assessed 1/4/13     Social History     Social History    Marital status: /Civil Union     Spouse name: N/A    Number of children: N/A    Years of education: N/A     Social History Main Topics    Smoking status: Never Smoker    Smokeless tobacco: Never Used    Alcohol use Yes      Comment: social    Drug use: No    Sexual activity: Yes     Partners: Male     Birth control/ protection: None     Other Topics Concern    None     Social History Narrative    Coffee    Exercise - walking       Subjective         Objective    Physical Exam:   Assessment Type: (P) Assess only  General Appearance: (P) Alert, Awake  Respiratory Pattern: (P) Normal  Chest Assessment: (P) Chest expansion symmetrical  Bilateral Breath Sounds: (P) Clear, Diminished  Cough: (P) None  O2 Device: (P) nc    Vitals:  Blood pressure 146/82, pulse 68, temperature (!) 97 3 °F (36 3 °C), temperature source Probe, resp  rate 22, height 5' 5" (1 651 m), weight 89 8 kg (198 lb), SpO2 100 %, not currently breastfeeding  Results from last 7 days  Lab Units 01/07/19  1140   GIOVANA TEST  Postive Giovana Test       Imaging and other studies: I have personally reviewed pertinent reports        O2 Device: (P) nc     Plan    Respiratory Plan: (P) No distress/Pulmonary history, Discontinue Protocol  Airway Clearance Plan: (P) Incentive Spirometer Resp Comments: (P) per pt/family, no smoking hx, pmhx of IRINA, pt wears +8cpap at HS, no resp hx, no resp meds at home, cxr shows left basilar atelectasis otherwise clear, d/c'd resp protocol, no indication for udn/mdi therapy

## 2019-01-07 NOTE — ANESTHESIA PROCEDURE NOTES
Arterial Line Insertion  Date/Time: 1/7/2019 7:31 AM  Performed by: Beverley Gimenez  Authorized by: Beverley Gimenez   Consent: Verbal consent obtained  Written consent obtained  Risks and benefits: risks, benefits and alternatives were discussed  Consent given by: patient  Patient understanding: patient states understanding of the procedure being performed  Patient consent: the patient's understanding of the procedure matches consent given  Required items: required blood products, implants, devices, and special equipment available  Patient identity confirmed: verbally with patient  Time out: Immediately prior to procedure a "time out" was called to verify the correct patient, procedure, equipment, support staff and site/side marked as required  Preparation: Patient was prepped and draped in the usual sterile fashion  Indications: multiple ABGs and hemodynamic monitoring  Orientation:  Right  Location: radial artery  Anesthesia: local infiltration  Local Anesthetic: lidocaine 1% without epinephrine  Anesthetic total: 0 2 mL    Sedation:  Patient sedated: yes  Analgesia: see MAR for details  Vitals: Vital signs were monitored during sedation      Procedure Details:  Needle gauge: 20  Seldinger technique: Seldinger technique used  Number of attempts: 1    Post-procedure:  Post-procedure: dressing applied  Waveform: good waveform and waveform confirmed  Post-procedure CNS: unchanged  Patient tolerance: Patient tolerated the procedure well with no immediate complications  Comments: Pre-induction, [id number]

## 2019-01-07 NOTE — ANESTHESIA POSTPROCEDURE EVALUATION
Post-Op Assessment Note      CV Status:  Stable  Airway: intubated    Post Op Vitals Reviewed: Yes          Staff: Anesthesiologist       Comments: see intra-op quick note for details of ICU hand off          BP      Temp      Pulse     Resp      SpO2

## 2019-01-07 NOTE — RESPIRATORY THERAPY NOTE
RT Ventilator Management Note  Calin Morel 76 y o  female MRN: [id number]  Unit/Bed#: [facility] 417-01 Encounter: [id number]      Daily Screen       1/7/2019 1116             Patient safety screen outcome[de-identified] -            Physical Exam:          Resp Comments: (P) pt extubated to 6lnc, 02 sat 99%, no stridor noted, prior to extubation + cuff leak, pt appears comfortable will continue to monitor

## 2019-01-07 NOTE — CONSULTS
Consult - Critical Care    Gulshan Butterfield 76 y o  female MRN: [id number]  Unit/Bed#: [facility] 417-01 Encounter: [id number]      Physician Requesting Consult: Genaro Mckee MD    Reason for Consult / Principal Problem: Non-rheumatic mitral regurgitation    HPI: Gulshan Butterfield is a 76 y o  female who was referred as an outpatient to Dr Era Edwards office for evaluation of severe MR  Recent echocardiogram revealed severe MR with an EF of 60% and P2 prolapse  She presents to the ICU today s/p mitral valve repair with P2 triangular resection and 30mm medtronic CG future mitral annuloplasty ring  PMHx: HTN, HLD, osteoporosis, hypothyroidism    CARDIOPULMONARY BYPASS TIME: 68 minutes  CROSSCLAMP TIME: 60 minutes  History obtained from chart review due to patient being intubated and sedated  PMH:   Past Medical History:   Diagnosis Date    CPAP (continuous positive airway pressure) dependence     Cystic breast     Dyspareunia, female     last assessed 9/4/14    Hyperlipidemia     Insomnia     Osteoporosis     Sleep apnea     Thyroid disease     Varicose veins of lower extremity     last assessed 1/4/13       PSH:   Past Surgical History:   Procedure Laterality Date    COLONOSCOPY      complete 5/2016 - Dr Franklin Chaudhari due in 2019 - last assessed  3/17/17    HERNIA REPAIR      LIVER BIOPSY LAPAROSCOPIC N/A 5/7/2018    Procedure: Laparoscopic marsupialization of liver cyst;  Surgeon: Vikki Peña MD;  Location: [facility];  Service: Surgical Oncology    NEUROMA EXCISION      NJ ESOPHAGOGASTRODUODENOSCOPY TRANSORAL DIAGNOSTIC N/A 8/10/2016    Procedure: ESOPHAGOGASTRODUODENOSCOPY (EGD); Surgeon: Marianela Alonzo MD;  Location: [facility]; Service: Gastroenterology    NJ ESOPHAGOSCOPY FLEXIBLE TRANSORAL WITH BIOPSY N/A 11/3/2016    Procedure: ESOPHAGOGASTRODUODENOSCOPY (EGD);   Surgeon: Corie Horn MD;  [facility];  Service: Thoracic    NJ LAP, REPAIR PARAESOPHAGEAL HERNIA, INCL FUNDOPLASTY W/ MESH Left 11/3/2016    Procedure: LAPAROSCOPIC PARAESOPHAGEAL HERNIA REPAIR WITH MESH;NISSEN FUNDOPLICATION ;  Surgeon: Lexus Hernandes MD;  Location: [facility];  Service: Thoracic    IL LAP, VENTRAL HERNIA REPAIR,REDUCIBLE N/A 10/30/2017    Procedure: LAPAROSCOPIC INCISIONAL HERNIA REPAIR X 2 WITH ECHO MESH;  Surgeon: Erika Guerrier DO;  [facility];  Service: General    IL REPAIR INCISIONAL HERNIA,REDUCIBLE N/A 1/13/2017    Procedure: INCISIONAL HERNIA REPAIR ;  Surgeon: Erika Guerrier DO;  [facility];  Service: General    TUBAL LIGATION         Family History:   Family History   Problem Relation Age of Onset    Heart disease Mother     Aneurysm Mother         cerebral    Alcohol abuse Father     Cancer Father     COPD Father     Heart failure Father     Hypertension Father     Cancer Family     Breast cancer Paternal Grandmother 80     Social History:   History   Smoking Status    Never Smoker   Smokeless Tobacco    Never Used      History   Alcohol Use    Yes     Comment: social      Marital Status: /Civil Union    ROS: ROS unable to be obtained secondary to intubation and sedation  Allergies: Allergies   Allergen Reactions    Other Other (See Comments)     Skin breakdown from bandaid on for long time- reddness       Home Medications:   Prior to Admission medications    Medication Sig Start Date End Date Taking? Authorizing Provider   amLODIPine (NORVASC) 5 mg tablet TAKE 1 TABLET DAILY 4/20/18  Yes Olivia Holman MD   atorvastatin (LIPITOR) 10 mg tablet TAKE 1 TABLET DAILY  Patient taking differently: TAKE 1 TABlet Mon Wed Fri 4/21/18  Yes Olivia Holman MD   B Complex-C (SUPER B COMPLEX PO) Take 1 capsule by mouth  Yes Historical Provider, MD   Calcium Carb-Cholecalciferol 600-800 MG-UNIT TABS Take by mouth   Yes Historical Provider, MD   Cholecalciferol (VITAMIN D3) 2000 UNITS TABS Take 1 tablet by mouth daily     Yes Historical Provider, MD co-enzyme Q-10 30 MG capsule Take 30 mg by mouth 3 (three) times a day  Yes Historical Provider, MD   hydrochlorothiazide (HYDRODIURIL) 12 5 mg tablet Take 12 5 mg by mouth daily  Yes Historical Provider, MD   levothyroxine 125 mcg tablet Take 1 tablet (125 mcg total) by mouth daily 7/28/18  Yes Pancho Bray MD   Multiple Vitamins-Minerals (CENTRUM SILVER PO) Take 1 tablet by mouth daily  Yes Historical Provider, MD   mupirocin (BACTROBAN) 2 % ointment Apply inside both nostrils two times per day  Start 5 days prior to surgery   12/13/18  Yes Pooja Khoury PA-C   Omega-3 Fatty Acids (FISH OIL PO) Take by mouth   Yes Historical Provider, MD   VITAMIN E PO Take by mouth   Yes Historical Provider, MD   estradiol (ESTRACE VAGINAL) 0 1 mg/g vaginal cream Insert into the vagina 9/15/16   Historical Provider, MD       Inpatient Medications:  Scheduled Meds:    Current Facility-Administered Medications:  acetaminophen 650 mg Rectal Q4H PRN Tom Collazo PA-C    acetaminophen 650 mg Oral Q4H PRN Tom Collazo PA-C    amiodarone 200 mg Oral [facility] Tom Collazo PA-C    aspirin 325 mg Oral Daily Tom Collazo PA-C    atorvastatin 80 mg Oral Daily With Con-jami York PA-C    bisacodyl 10 mg Rectal Daily PRN Tom Collazo PA-C    calcium chloride 1 g Intravenous Once Tom Collazo PA-C    cefazolin 1,000 mg Intravenous Q8H Tom Collazo PA-C    chlorhexidine 15 mL Swish & Spit BID Tom Collazo PA-C    epinephrine 1-20 mcg/min Intravenous Titrated Tom Collazo PA-C    fentanyl citrate (PF) 50 mcg Intravenous Once Tom Collazo PA-C    fentanyl citrate (PF) 50 mcg Intravenous Q1H PRN Tom Collazo PA-C    [START ON 1/8/2019] fondaparinux 2 5 mg Subcutaneous Daily Tom Collazo PA-C    furosemide 40 mg Intravenous Q6H PRN Tom Collazo PA-C    HYDROmorphone 0 5 mg Intravenous Q1H PRN Tom Collazo PA-C    HYDROmorphone 1 mg Intravenous Q1H PRN Tom Collazo PA-C    insulin regular (HumuLIN R,NovoLIN R) infusion 0 3-21 Units/hr Intravenous Titrated Madalyn Guzman PA-C Last Rate: 3 Units/hr (01/07/19 1220)   lactated ringers 500 mL Intravenous Q30 Min PRN Mardy Najjar, PA-C    [START ON 1/8/2019] levothyroxine 125 mcg Oral Early Morning Mardy Najjar, PA-ROSIO    lidocaine (cardiac) 100 mg Intravenous Q30 Min PRN Mardy Najjar, PA-C    magnesium sulfate 2 g Intravenous Once Mardy Najjar, PA-C    mupirocin 1 application [facility]al [facility]O [facility]    niCARdipine 2 5-15 mg/hr Intravenous Titrated Mardy Najjar, PA-C    ondansetron 4 mg Intravenous Q6H PRN Mardy Najjar, PA-C    oxyCODONE-acetaminophen 1 tablet Oral Q4H PRN Mardy Najjar, PA-ROSIO    oxyCODONE-acetaminophen 2 tablet Oral Q6H PRN Mardy Najjar, PA-C    pantoprazole 40 mg Oral Early Morning Mardy Najjar, PA-ROSIO    polyethylene glycol 17 g Oral Daily Mardy Najjar, PA-ROSIO    potassium chloride 20 mEq Intravenous Once PRN Mardy Najjar, PA-C Last Rate: 20 mEq (01/07/19 1223)   potassium chloride 20 mEq Intravenous Q1H PRN Mardy Najjar, PA-C    potassium chloride 20 mEq Intravenous Q30 Min PRN Mardy Najjar, PA-C    sodium chloride 20 mL/hr Intravenous Continuous Mardy Najjar, PA-C Last Rate: 20 mL/hr (01/07/19 1145)     Continuous Infusions:    epinephrine 1-20 mcg/min    insulin regular (HumuLIN R,NovoLIN R) infusion 0 3-21 Units/hr Last Rate: 3 Units/hr (01/07/19 1220)   niCARdipine 2 5-15 mg/hr    sodium chloride 20 mL/hr Last Rate: 20 mL/hr (01/07/19 1145)     PRN Meds:    acetaminophen 650 mg Q4H PRN   acetaminophen 650 mg Q4H PRN   bisacodyl 10 mg Daily PRN   fentanyl citrate (PF) 50 mcg Q1H PRN   furosemide 40 mg Q6H PRN   HYDROmorphone 0 5 mg Q1H PRN   HYDROmorphone 1 mg Q1H PRN   lactated ringers 500 mL Q30 Min PRN   lidocaine (cardiac) 100 mg Q30 Min PRN   ondansetron 4 mg Q6H PRN   oxyCODONE-acetaminophen 1 tablet Q4H PRN   oxyCODONE-acetaminophen 2 tablet Q6H PRN   potassium chloride 20 mEq Once PRN   potassium chloride 20 mEq Q1H PRN   potassium chloride 20 mEq Q30 Min PRN       VTE Pharmacologic Prophylaxis: Fondaparinux (Arixtra)  VTE Mechanical Prophylaxis: sequential compression device    Invasive lines and devices: Invasive Devices     Central Venous Catheter Line            CVC Central Lines 19 Triple less than 1 day    Introducer 19 less than 1 day          Peripheral Intravenous Line            Peripheral IV 19 Left Hand less than 1 day          Arterial Line            Arterial Line 19 Right Radial less than 1 day          Line            Pacer Wires less than 1 day    Pacer Wires less than 1 day          Drain            Chest Tube 1 Anterior Mediastinal 32 Fr  less than 1 day    Chest Tube 2 Posterior Mediastinal 32 Fr  less than 1 day    Urethral Catheter Non-latex; Temperature probe 16 Fr  less than 1 day          Airway            ETT  Hi-Lo; Oral;Cuffed 8 mm less than 1 day                Vitals:   Vitals:    19 1130 19 1145 19 1200 19 1215   BP:       Pulse: 84 80 80 76   Resp: 14 18 14 15   Temp:   (!) 96 8 °F (36 °C)    TempSrc:   Probe    SpO2: 97% 98% 98% 98%   Weight:       Height:         Arterial Line BP: 94/56  Arterial Line MAP (mmHg): 70 mmHg    Temperature: Temp (24hrs), Av 7 °F (36 5 °C), Min:96 8 °F (36 °C), Max:99 1 °F (37 3 °C)  Current: Temperature: (!) 96 8 °F (36 °C)    Weights: IBW: 57 kg  Body mass index is 32 95 kg/m²  Hemodynamic Monitoring:  PAP: PAP: 23/14, CVP: CVP (mean): 8 mmHg, CI: CI (L/min/m2): 3 6 L/min/m2, SVR: SVR (dyne*sec)/cm5: 744 (dyne*sec)/cm5    Ventilator Settings:   Vent Mode: AC/VC  Resp Rate (BPM): 15 BPM  Vt (mL): 450 mL  FIO2 (%): 60 %  PEEP (cmH2O): 5 cmH2O  SpO2: 98 %    Physical Exam:    Constitutional: Appears well-developed and well-nourished  HENT: Normocephalic and atraumatic  Intubated  Eyes: No scleral icterus  Neck: Neck supple  Patient intubated  Cardiovascular: Regular rate and regular rhythm  + murmur heard   Exam reveals + rub   Pulmonary/Chest: Breath sounds clear bilaterally  Abdominal: Soft  No distension  Musculoskeletal: No edema  Neurological: Patient is sedated  Skin: Skin is warm and dry  No rash noted  Psychiatric: Patient is sedated and intubated  Labs/Imaging:     Results from last 7 days  Lab Units 19  1140 19  1118 19  1027 19  1000   HEMOGLOBIN g/dL  --  10 6*  --   --    I STAT HEMOGLOBIN g/dl 10 2*  --  8 2*  --    HEMATOCRIT %  --  33 3*  --   --    HEMATOCRIT, ISTAT % 30*  --  24*  --    PLATELETS Thousands/uL  --  101*  --  146*       Results from last 7 days  Lab Units 19  1140 19  1118 19  1027 19  0956   POTASSIUM mmol/L  --  3 2*  --   --    CHLORIDE mmol/L  --  111*  --   --    CO2 mmol/L  --  24  --   --    CO2, I-STAT mmol/L 26  --  27 29   BUN mg/dL  --  14  --   --    CREATININE mg/dL  --  0 85  --   --    CALCIUM mg/dL  --  7 6*  --   --    GLUCOSE, ISTAT mg/dl 146*  --  166* 195*               Post-op AB 34/44 / 3/97%  Post-op CXR: Lines and tubes intact, no pneumothorax  Small L pleural effusion  I have personally reviewed pertinent films in PACS  Post-op EKG: NSR w/ RBBB  This was personally reviewed by myself  Impression:  Patient Active Problem List   Diagnosis    Benign essential hypertension    Disc degeneration, lumbar    Dysphagia    Hepatic cyst    Hyperlipidemia    Hypothyroidism    Liver enlargement    Osteoporosis    Severe obstructive sleep apnea    Shoulder impingement    Tinea corporis    Incisional hernia, without obstruction or gangrene    Lumbar radiculopathy    Non-rheumatic mitral regurgitation    Mitral valve prolapse       Plan:    Neuro: D/C sedation  PRN dilaudid and percocet for pain  CV: MAP goal >65  SBP goal <130  CI>2 2  Post-op medications: Epinephrine, 1 mcg/min  Wean inotropes and vasopressors as able  Volume resuscitation as needed  Monitor rhythm   Epicardial pacing wires in place  Will pace as needed for bradycardia  Intra-op DORI LVEF 60%  Lung: Check STAT post-op ABG and CXR  Wean vent to extubate  GI: GI prophylaxis/PPI  Bowel regimen  FEN: NPO  Replete K >4 0 and Mag >2 0  Replete calcium as needed  : [place]  Monitor UOP with goal >40/hour  Lasix prn  Volume resuscitate as needed  ID: Prophylactic post-op abx  Maintain normothermia  Heme: Check STAT post-op H/H and platelets  Monitor incision site, invasive lines, and chest tube outputs for bleeding  Send coags if CT outputs high  Endo: Insulin gtt    Disposition: ICU Care    Counseling / Coordination of Care  Total Critical Care time spent 0 minutes excluding procedures, teaching and family updates        SIGNATURE: Gwen Thomas PA-C  DATE: January 7, 2019  TIME: 12:53 PM

## 2019-01-07 NOTE — RESPIRATORY THERAPY NOTE
RT Ventilator Management Note  Angelina Cisse 76 y o  female MRN: [id number]  Unit/Bed#: [facility] 417-01 Encounter: [id number]      Daily Screen       1/7/2019 1116             Patient safety screen outcome[de-identified] -            Physical Exam:          Resp Comments: (P) pt arrived from OR placed on vent, pt sedated, will continue to monitor

## 2019-01-07 NOTE — RESPIRATORY THERAPY NOTE
RT Ventilator Management Note  Sharifa Mera 76 y o  female MRN: [id number]  Unit/Bed#: [facility] 417-01 Encounter: [id number]      Daily Screen       1/7/2019 1116             Patient safety screen outcome[de-identified] -            Physical Exam:          Resp Comments: (P) pt tolerating cpap/ps wean well, will continue to monitor

## 2019-01-07 NOTE — ANESTHESIA PREPROCEDURE EVALUATION
Review of Systems/Medical History          Cardiovascular  Hyperlipidemia, Hypertension , Valvular heart disease , mitral regurgitation,    Pulmonary  Sleep apnea CPAP,        GI/Hepatic    Liver disease ,   Comment: Hepatic cysts          Endo/Other  History of thyroid disease , hypothyroidism,      GYN       Hematology   Musculoskeletal    Arthritis     Neurology   Psychology           Physical Exam    Airway       Dental       Cardiovascular      Pulmonary      Other Findings  Prior easy ETT placement      Anesthesia Plan  ASA Score- 4     Anesthesia Type- general with ASA Monitors  Additional Monitors: arterial line, central venous line and pulmonary artery catheter  Airway Plan: ETT  Comment:   General anesthesia, endotracheal Intubation  Standard ASA monitors  Large bore peripheral IV  Pre-induction arterial line  CVP (Triple Lumen) and Cordis with PAC  DORI for perioperative monitoring and diagnosis  Post-op ICU/vent  Risks and benefits discussed with patient; patient consented and agrees to proceed        Plan Factors-    Induction- intravenous  Postoperative Plan-     Informed Consent- Anesthetic plan and risks discussed with patient  I personally reviewed this patient with the CRNA  Discussed and agreed on the Anesthesia Plan with the CRNA  Eleonora Melgoza

## 2019-01-07 NOTE — OP NOTE
OPERATIVE REPORT  PATIENT NAME: Carolina Kumar    :  1950  MRN: [id number]      SURGERY DATE: 2019    SURGEON: Alice Hernandez MD    ASSISTANT: Fan Lopez PA-C    ADDITIONAL ASSISTANT: n/a    PREOPERATIVE DIAGNOSIS: Severe mitral regurgitation    POSTOPERATIVE DIAGNOSES: Severe mitral regurgitation    NYHA CLASS: 3    CCS CLASS: 1    PROCEDURE:  Mitral valve repair with P2 triangular ressection and 30 mm Medtronic CG Future mitral annuloplasty ring    CARDIOPULMONARY BYPASS TIME: 68 minutes  CROSSCLAMP TIME: 60 minutes  ANESTHESIA: General endotracheal anesthesia with transesophageal echocardiogram guidance, Dr Jimmy uQan     INDICATIONS:  The patient is a 76y o  year-old female with clinical and echocardiographic findings consistent with severe mitral regurgitation  I saw the patient in consultation and recommended the procedure described previously  FINDINGS:  · Intraoperative transesophageal echocardiogram revealed EF 60%, severe MR  · Epiaortic ultrasound showed less than 5 mm non-mobile plaque  · Inspection of the mitral valve reveals  Type II mitral valve disease with P2 prolapse secondary to ruptured chord  · Final transesophageal echocardiogram demonstrated succesful repair of the mitral valve without evidence of regurgitation  Normal EF      OPERATIVE TECHNIQUE:   The patient was taken to the operating room and placed supine on the operating table  Following the satisfactory induction of general anesthesia and the placement of monitoring lines the patient was prepped and draped in the usual sterile fashion  A time-out procedure was performed  The patient underwent median sternotomy, pericardiotomy, and systemic heparinization  Epiaortic ultrasound showed less than 5 mm non-mobile plaque  Cannulation for cardiopulmonary bypass was performed with an arterial dispersion cannula, bicaval single stage venous cannulas and a vented antegrade cardioplegia cannula   Once the ACT was greater than 480 seconds we placed the patient on cardiopulmonary bypass, the ascending aorta was crossclamped and cardiac arrest was obtained without complications with [place] cardioplegia  A CO2 flooded field was used during the entire case  Left atriotomy was performed and the mitral valve was exposed with a self-retaining retractor  Thorough mitral valve analysis was performed  There was Type IImitral valve disease with P2 prolapse secondary to ruptured chord  I felt that the valve was therefore repairable  Annuloplasty sutures were placed with 2-0 Ethibond  The following techniques were used for valve repair: P2 triangular ressection, the resultant defect was closed with 2 layers of running 5-0 Prolene suture and placement of a true-size 30 mm Medtronic CG Future mitral annuloplasty ring   The 2-0 Ethibond sutures were passed through the ring, the ring was seated and the sutures were tied down  The valve was tested and found to be competent  While de-airing the heart the left atriotomy was closed with  2 layers of running 4-0 Prolene suture  The heart was extensively de-aired and the crossclamp was removed  Atrial and ventricular pacing wires were placed  Following a period of reperfusion the patient was weaned from cardiopulmonary bypass and decannulated  Protamine was administered with normalization of the ACT  Hemostasis was confirmed in all fields  Thoracostomy tubes were placed  The sternum was closed with stainless steel wires  The fascia, subdermis and skin were closed with multiple layers of running absorbable suture  COMPLICATIONS: none    PACKS/TUBES/DRAINS: Chest tubes x 2  EBL: 200 cc  TRANSFUSION: none  SPECIMENS: P2 segment     As the attending surgeon, I was present and scrubbed for all critical portions of this procedure  There was no qualified surgical resident available  Sponge, needle and instrument counts were reported as correct by the nursing staff   The assistance of florecita PA was required to complete this case, specifically for assistance with cannulation, decannulation, and exposure during mitral valve repair           SIGNATURE: Omar Marshall MD  DATE: January 7, 2019  TIME: 10:44 AM

## 2019-01-07 NOTE — ANESTHESIA PROCEDURE NOTES
Alivia/Louis  Performed by: William Aguayo  Authorized by: William Aguayo   Date/Time: 1/7/2019 7:51 AM  Consent: Verbal consent obtained  Risks and benefits: risks, benefits and alternatives were discussed  Consent given by: patient  Patient understanding: patient states understanding of the procedure being performed  Patient consent: the patient's understanding of the procedure matches consent given  Required items: required blood products, implants, devices, and special equipment available  Patient identity confirmed: verbally with patient and arm band  Time out: Immediately prior to procedure a "time out" was called to verify the correct patient, procedure, equipment, support staff and site/side marked as required  Indications: vascular access and central pressure monitoring  Location details: right internal jugular  Catheter size: 9 Fr  Patient position: Trendelenburg  Anesthesia method: ga    Assessment: blood return through all ports and free fluid flow  Preparation: skin prepped with ChloraPrep  Skin prep agent dried: skin prep agent completely dried prior to procedure  Sterile barriers: all five maximum sterile barriers used - cap, mask, sterile gown, sterile gloves, and large sterile sheet  Hand hygiene: hand hygiene performed prior to central venous catheter insertion  Ultrasound guidance: yes  Site selection rationale: mvr  Number of attempts: 1  Successful placement: yes  Post-procedure: line sutured and dressing applied  Patient tolerance: Patient tolerated the procedure well with no immediate complications

## 2019-01-07 NOTE — ANESTHESIA PROCEDURE NOTES
Procedure Performed: DORI Anesthesia  Start Time:  1/7/2019 8:15 AM        Preanesthesia Checklist    Patient identified, IV assessed, risks and benefits discussed, monitors and equipment assessed, procedure being performed at surgeon's request and anesthesia consent obtained  Procedure    Diagnostic Indications for DORI:  assessment of ascending aorta, assessment of surgical repair, defect repair evaluation and hemodynamic monitoring  Type of DORI: complete DORI with interpretation  Images Saved: ultrasound permanent image saved  Physician Requesting Echo: Florin Celina  Location performed: OR  Intubated  Bite block not placed  Heart visualized  Probe Type:  Epiaortic and multiplane  Modalities:  3D, pulse wave Doppler, color flow mapping and continuous wave Doppler  Echocardiographic and Doppler Measurements    PREPROCEDURE    LEFT VENTRICLE:  Systolic Function: normal  Ejection Fraction: 60%  RIGHT VENTRICLE:  Systolic Function: normal   Cavity size normal              AORTIC VALVE:  Leaflets: normal  Stenosis: none  Regurgitation: none  MITRAL VALVE:  Leaflets: normal  Leaflet Motions: flail  Stenosis: none and by cfd  Specific leaflet segments with abnormal motions are described in the following comments: medial aspect of P2 flail, anterior jet, no PV reversal in LUPV or RUPV  TRICUSPID VALVE:  Leaflets: normal  Regurgitation: trace  ASCENDING AORTA:  Dissection not present  AORTIC ARCH:  dissection not present  Grade 2: severe intimal thickening without protruding atheroma  DESCENDING AORTA:  Dissection not present  Grade 2: severe intimal thickening without protruding atheroma  LEFT ATRIAL APPENDAGE:  No spontaneous echo contrast     OTHER ATRIAL FINDINGS:  No AKASH clot  ATRIAL SEPTUM:  Intra-atrial septal morphology: normal and no PFO by CFD  EPIAORTIC:  Plaque Thickness: 0-5 mm          POSTPROCEDURE    LEFT VENTRICLE: Unchanged [facility] RIGHT VENTRICLE: Unchanged   AORTIC VALVE: Unchanged   MITRAL VALVE:   Leaflets: ring  Regurgitation: trace  Stenosis: none  Mean Gradient: 5     TRICUSPID VALVE:   Regurgitation: mild-mod (resuscitating post-CPB, PAC in place) and mild  AORTA: Unchanged   Dissection: Dissection not present  REMOVAL:  Probe Removal: atraumatic

## 2019-01-07 NOTE — H&P (VIEW-ONLY)
Consultation - Cardiothoracic Surgery   Ivonne Lr 76 y o  female MRN: [id number]    Physician Requesting Consult: Dr Martha Lyman    Reason for Consult / Principal Problem: Mitral regurgitation    History of Present Illness: Ivonne Lr is a 76y o  year old female referred to us by Dr Martha Lyman for the evaluation of severe MR  The patient reports she first had a murmur detected about 2 years ago  She had a recent routine echo performed, which revealed severe MR, for which she has been referred for evaluation  The patient reports she is asymptomatic  She denies shortness of breath, chest pain, fatigue, LE edema, palpitations, PND, orthopnea, or syncope  She is a lifelong non-smoker and occasionally drinks alcohol  She is active and lives with her family  She obtains routine dental care  Past Medical History:  Past Medical History:   Diagnosis Date    Cystic breast     Dyspareunia, female     last assessed 9/4/14    Dysphagia     Hyperlipidemia     Insomnia     Osteoporosis     Thyroid disease     Varicose veins of lower extremity     last assessed 1/4/13         Past Surgical History:   Past Surgical History:   Procedure Laterality Date    COLONOSCOPY      complete 5/2016 - Dr Ann Kawasaki due in 2019 - last assessed  3/17/17    HERNIA REPAIR      LIVER BIOPSY LAPAROSCOPIC N/A 5/7/2018    Procedure: Laparoscopic marsupialization of liver cyst;  Surgeon: Aleksey Philip MD;  [facility];  Service: Surgical Oncology    NEUROMA EXCISION      WY ESOPHAGOGASTRODUODENOSCOPY TRANSORAL DIAGNOSTIC N/A 8/10/2016    Procedure: ESOPHAGOGASTRODUODENOSCOPY (EGD); Surgeon: Karen Fan MD;  Location: [facility]; Service: Gastroenterology    WY ESOPHAGOSCOPY FLEXIBLE TRANSORAL WITH BIOPSY N/A 11/3/2016    Procedure: ESOPHAGOGASTRODUODENOSCOPY (EGD);   Surgeon: Frankey Goldberg, MD;  [facility];  Service: Thoracic    WY LAP, REPAIR PARAESOPHAGEAL HERNIA, INCL FUNDOPLASTY W/ MESH Left 11/3/2016 Procedure: LAPAROSCOPIC PARAESOPHAGEAL HERNIA REPAIR WITH MESH;NISSEN FUNDOPLICATION ;  Surgeon: David Morse MD;  [facility];  Service: Thoracic    ID LAP, VENTRAL HERNIA REPAIR,REDUCIBLE N/A 10/30/2017    Procedure: LAPAROSCOPIC INCISIONAL HERNIA REPAIR X 2 WITH ECHO MESH;  Surgeon: Ligia Werner DO;  [facility];  Service: General    [address] [place] [address] N/A 1/13/2017    Procedure: INCISIONAL HERNIA REPAIR ;  Surgeon: Ligia Werner DO;  [facility];  Service: General    TUBAL LIGATION           Family History:  Family History   Problem Relation Age of Onset    Heart disease Mother     Aneurysm Mother         cerebral    Alcohol abuse Father     Cancer Father     COPD Father     Heart failure Father     Hypertension Father     Cancer Family     Breast cancer Paternal Grandmother 80         Social History:      History   Alcohol Use    Yes     Comment: social     History   Drug Use No     History   Smoking Status    Never Smoker   Smokeless Tobacco    Never Used       Home Medications:   Prior to Admission medications    Medication Sig Start Date End Date Taking? Authorizing Provider   amLODIPine (NORVASC) 5 mg tablet TAKE 1 TABLET DAILY 4/20/18  Yes Marco Coronel MD   atorvastatin (LIPITOR) 10 mg tablet TAKE 1 TABLET DAILY  Patient taking differently: TAKE 1 TABlet Mon Wed Fri 4/21/18  Yes Marco Coronel MD   B Complex-C (SUPER B COMPLEX PO) Take 1 capsule by mouth  Yes Historical Provider, MD   Calcium Carb-Cholecalciferol 600-800 MG-UNIT TABS Take by mouth   Yes Historical Provider, MD   Cholecalciferol (VITAMIN D3) 2000 UNITS TABS Take 1 tablet by mouth daily  Yes Historical Provider, MD   co-enzyme Q-10 30 MG capsule Take 30 mg by mouth 3 (three) times a day     Yes Historical Provider, MD   estradiol (ESTRACE VAGINAL) 0 1 mg/g vaginal cream Insert into the vagina 9/15/16  Yes Historical Provider, MD   hydrochlorothiazide (HYDRODIURIL) 12 5 mg tablet Take 12 5 mg by mouth daily  Yes Historical Provider, MD   levothyroxine 125 mcg tablet Take 1 tablet (125 mcg total) by mouth daily 7/28/18  Yes Latasha Holguin MD   Multiple Vitamins-Minerals (CENTRUM SILVER PO) Take 1 tablet by mouth daily  Yes Historical Provider, MD   Omega-3 Fatty Acids (FISH OIL PO) Take by mouth   Yes Historical Provider, MD   VITAMIN E PO Take by mouth   Yes Historical Provider, MD   mupirocin (BACTROBAN) 2 % ointment Apply inside both nostrils two times per day  Start 5 days prior to surgery  12/13/18   Estefania Rodriguez PA-C       Allergies: Allergies   Allergen Reactions    Other Other (See Comments)     Skin breakdown from bandaid on for long time- reddness       Review of Systems:  Review of Systems   Constitutional: Negative for activity change, appetite change, chills, diaphoresis, fatigue, fever and unexpected weight change  HENT: Negative for dental problem, nosebleeds and sore throat  Eyes: Negative  Respiratory: Negative for cough, chest tightness, shortness of breath and wheezing  Cardiovascular: Negative for chest pain, palpitations and leg swelling  Gastrointestinal: Negative for abdominal pain, blood in stool, constipation, diarrhea, nausea and vomiting  Endocrine: Negative  Genitourinary: Negative  Musculoskeletal: Negative  Allergic/Immunologic: Negative  Neurological: Negative for dizziness, seizures, syncope, weakness, light-headedness and numbness  Hematological: Negative  Psychiatric/Behavioral: Negative  All other systems reviewed and are negative        Vital Signs:     Vitals:    12/13/18 1041   BP: 140/86   BP Location: Left arm   Patient Position: Sitting   Cuff Size: Standard   Pulse: 72   Resp: 20   Temp: 98 7 °F (37 1 °C)   TempSrc: Tympanic   Weight: 91 1 kg (200 lb 14 4 oz)   Height: 5' 5 5" (1 664 m)       Physical Exam:  Physical Exam   Constitutional: She is oriented to person, place, and time  She appears well-developed and well-nourished  No distress  HENT:   Head: Normocephalic and atraumatic  Right Ear: External ear normal    Left Ear: External ear normal    Nose: Nose normal    Mouth/Throat: Oropharynx is clear and moist  No oropharyngeal exudate  Eyes: Pupils are equal, round, and reactive to light  Conjunctivae and EOM are normal  Right eye exhibits no discharge  No scleral icterus  Neck: Normal range of motion  Neck supple  No JVD present  No tracheal deviation present  Cardiovascular: Normal rate, regular rhythm and intact distal pulses  Exam reveals no gallop and no friction rub  Murmur heard  Systolic murmur is present with a grade of 3/6   Pulmonary/Chest: Effort normal and breath sounds normal  No respiratory distress  She has no wheezes  She has no rales  Abdominal: Soft  Bowel sounds are normal  She exhibits no distension  There is no tenderness  There is no rebound and no guarding  Musculoskeletal: Normal range of motion  She exhibits no edema, tenderness or deformity  Neurological: She is alert and oriented to person, place, and time  She has normal reflexes  She displays normal reflexes  No cranial nerve deficit  She exhibits normal muscle tone  Coordination normal    Skin: Skin is warm and dry  No rash noted  She is not diaphoretic  No erythema  No pallor  Psychiatric: She has a normal mood and affect  Her behavior is normal  Judgment and thought content normal    Nursing note and vitals reviewed  Lab Results:               Invalid input(s): LABGLOM      No results found for: HGBA1C  No results found for: CKTOTAL, CKMB, CKMBINDEX, TROPONINI    Imaging Studies:     DORI 12/3/18: LEFT VENTRICLE: Size was normal   The end systolic dimension was 30 mm  Systolic function was normal  Ejection fraction was estimated to be 60 %  There were no regional wall motion abnormalities   Wall thickness was normal      RIGHT VENTRICLE: The size was normal  Systolic function was normal      LEFT ATRIUM: The atrium was mildly dilated  No thrombus was identified  APPENDAGE: The size was normal  No thrombus was identified  DOPPLER: The function was normal (normal emptying velocity)      ATRIAL SEPTUM: No defect or patent foramen ovale was identified by color Doppler      RIGHT ATRIUM: Size was normal  No thrombus was identified      MITRAL VALVE: There was normal leaflet separation  There was a marked, holosystolic prolapse involving the medial scallop of the posterior leaflet  The maximum prolapse dimension was 8 mm  There was no echocardiographic evidence of  vegetation  DOPPLER: The transmitral velocity was within the normal range  There was no evidence for stenosis  There was severe regurgitation  PISA could not be done due to the eccentric nature of the regurgitation jet  The regurgitant jet was eccentric and directed anteriorly      AORTIC VALVE: The valve was trileaflet  Leaflets exhibited normal thickness and normal cuspal separation  DOPPLER: There was no regurgitation      TRICUSPID VALVE: The valve structure was normal  There was normal leaflet separation  There was no echocardiographic evidence of vegetation  DOPPLER: There was mild to moderate regurgitation  The regurgitant jet was toward the septum  Pulmonary artery systolic pressure was within the normal range  Estimated peak PA pressure was 26 mmHg      PULMONIC VALVE: Leaflets exhibited normal thickness, no calcification, and normal cuspal separation  DOPPLER: There was no significant regurgitation      PERICARDIUM: There was no pericardial effusion  The pericardium was normal in appearance      AORTA: The root exhibited normal size  There was no atheroma  There was no evidence for dissection  There was no evidence for aneurysm      PULMONARY VEINS: DOPPLER: There was systolic blunting in the pulmonary vein(s), indicative of significant mitral regurgitation   There was no flow reversal seen and this could be due to low blood pressure of the patient during the study      MEASUREMENT TABLES     2D MEASUREMENTS  LVOT   (Reference normals)  Diam   21 mm   (--)  Mitral valve   (Reference normals)  Mean annulus diam   33 mm   (--)     DOPPLER MEASUREMENTS  LVOT   (Reference normals)  VTI   20 cm   (--)  Stroke vol   69 27 ml   (--)  Stroke index   0 35 ml/m squared   (--)  Mitral valve   (Reference normals)  VTI at annulus   25 cm   (--)  Vol, (flow)   213 82 ml   (--)  Regurg vol, LVOT cont   145 ml   (--)  RF, LVOT cont   68 %   (--)  Area, ERO, LVOT cont   5 8 cm squared   (--)    I have personally reviewed pertinent films in PACS    Assessment:  Patient Active Problem List    Diagnosis Date Noted    Non-rheumatic mitral regurgitation 11/21/2018    Mitral valve prolapse 11/21/2018    Incisional hernia, without obstruction or gangrene 09/17/2017    Tinea corporis 08/25/2016    Dysphagia 01/18/2016    Osteoporosis 01/18/2016    Hepatic cyst 09/21/2015    Severe obstructive sleep apnea 08/24/2015    Shoulder impingement 09/29/2014    Liver enlargement 04/08/2014    Disc degeneration, lumbar 07/17/2013    Lumbar radiculopathy 01/24/2013    Hyperlipidemia 10/05/2012    Hypothyroidism 08/07/2012    Benign essential hypertension 06/05/2012     Severe mitral regurgitation; Ongoing MVR workup    Plan:  Risks and benefits of mitral valve repair vs replacement were discussed in detail today with the patient  They understand and wish to proceed with further workup and ultimately surgical intervention  We have ordered routine preoperative laboratory and vascular studies  Pending the results of these tests, they will be scheduled for surgery with LINDA Garcia was comfortable with our recommendations, and their questions were answered to their satisfaction  Thank you for allowing us to participate in the care of this patient       SIGNATURE: Rashid Somers  DATE: December 13, 2018  TIME: 11:23 AM

## 2019-01-07 NOTE — UTILIZATION REVIEW
Initial Clinical Review    Age/Sex: 76 y o  female    Surgery Date: 01/07/2019    Procedure: Mitral valve repair with P2 triangular ressection and 30 mm [employer] CG Future mitral annuloplasty ring    Anesthesia: General endotracheal anesthesia with transesophageal echocardiogram guidance, Dr Radha Antonio     Admission Orders: Date/Time/Statement: 1/7/19 @ 1112  Orders Placed This Encounter   Procedures    Inpatient Admission     Standing Status:   Standing     Number of Occurrences:   1     Order Specific Question:   Admitting Physician     Answer:   Ming Arm     Order Specific Question:   Level of Care     Answer:   Critical Care [15]     Order Specific Question:   Estimated length of stay     Answer:   More than 2 Midnights     Order Specific Question:   Certification     Answer:   I certify that inpatient services are medically necessary for this patient for a duration of greater than two midnights  See H&P and MD Progress Notes for additional information about the patient's course of treatment  Vital Signs: /82   Pulse 68   Temp (!) 97 3 °F (36 3 °C) (Probe)   Resp 22   Ht 5' 5" (1 651 m)   Wt 89 8 kg (198 lb)   SpO2 100%   BMI 32 95 kg/m²     Diet:        Diet Orders            Start     Ordered    01/08/19 0000  Diet Clear Liquid  Diet effective 0500     Question Answer Comment   Diet Type Clear Liquid    RD to adjust diet per protocol? Yes        01/07/19 1112    01/07/19 1113  Diet NPO; Sips with meds  Diet effective now     Question Answer Comment   Diet Type NPO    NPO Except: Sips with meds    RD to adjust diet per protocol? Yes        01/07/19 1112      Introducer / Triple CVC Line / A  Line radial  Chest Tubes #1,2, (-20cm)  Epicardial pacing wires A/V    Mobility: Up with assistance / Up as tolerated / POD#1 OOB to chair and for all meals    DVT Prophylaxis: Shantanu SCDs    Medication:  Scheduled Meds:  Current Facility-Administered Medications:  acetaminophen 650 mg Rectal Q4H PRN Hany Mathur PA-C    acetaminophen 650 mg Oral [facility]4H [facility]N [facility]    amiodarone 200 mg Oral [facility] Hany Mathur PA-C    aspirin 325 mg Oral Daily Hany Mathur PA-C    atorvastatin 80 mg Oral Daily With [employer] JaylenJACKI    bisacodyl 10 mg Rectal Daily PRN Hany Mathur PA-C    calcium chloride 1 g Intravenous Once [facility]    cefazolin 1,000 mg Intravenous Q8H [facility] retana    chlorhexidine 15 mL Swish & Spit BID [facility]    epinephrine 1-20 mcg/min Intravenous Titrated Hany Mathur PA-C Last Rate: Stopped (01/07/19 1210)   fentanyl citrate (PF) 50 mcg Intravenous Once Hany Mathur PA-C    fentanyl citrate (PF) 50 mcg Intravenous Q1H PRN Hany Mathur PA-C    [START ON 1/8/2019] fondaparinux 2 5 mg Subcutaneous Daily [facility]    furosemide 40 mg Intravenous Q6H PRN [facility]    HYDROmorphone 0 5 mg Intravenous Q1H PRN [facility]    HYDROmorphone 1 mg Intravenous Q1H PRN [facility]    insulin regular (HumuLIN R,NovoLIN R) infusion 0 3-21 Units/hr Intravenous Titrated [facility], JACKI Last Rate: 1 5 Units/hr (01/07/19 1435)   lactated ringers 500 mL Intravenous Q30 Min PRN Hany Mathur PA-C    [START ON 1/8/2019] levothyroxine 125 mcg Oral Early Morning Hany Mathur PA-C    lidocaine (cardiac) 100 mg Intravenous Q30 Min PRN [facility]    mupirocin 1 application Nasal [id number] [facility], JACKI    niCARdipine 2 5-15 mg/hr Intravenous Titrated Hany Mathur PA-C    ondansetron 4 mg Intravenous Q6H PRN [facility] retnaa    oxyCODONE-acetaminophen 1 tablet Oral Q4H PRN [facility]    oxyCODONE-acetaminophen 2 tablet Oral Q6H PRN Hany Mathur PA-C    pantoprazole 40 mg Oral Early Morning Hany Mathur PA-C    phenylephine  mcg/min Intravenous Titrated Elisabeth Velazquez PA-C Last Rate: 10 mcg/min (01/07/19 1510)   polyethylene glycol 17 g Oral Daily Hany Mathur PA-C    potassium chloride 20 mEq Intravenous Q1H PRN [facility] Elyssa, PA-C    potassium chloride 20 mEq Intravenous Q30 Min PRN Ellyn Elyssa, PA-C    sodium chloride 20 mL/hr Intravenous Continuous Ellyn Elyssa, PA-C Last Rate: 20 mL/hr (01/07/19 1200)     Continuous Infusions:  epinephrine 1-20 mcg/min Last Rate: Stopped (01/07/19 1210)   insulin regular (HumuLIN R,NovoLIN R) infusion 0 3-21 Units/hr Last Rate: 1 5 Units/hr (01/07/19 1435)   niCARdipine 2 5-15 mg/hr    phenylephine  mcg/min Last Rate: 10 mcg/min (01/07/19 1510)   sodium chloride 20 mL/hr Last Rate: 20 mL/hr (01/07/19 1200)

## 2019-01-08 ENCOUNTER — APPOINTMENT (INPATIENT)
Dept: RADIOLOGY | Facility: HOSPITAL | Age: 69
DRG: 219 | End: 2019-01-08
Payer: MEDICARE

## 2019-01-08 PROBLEM — D69.6 THROMBOCYTOPENIA (HCC): Status: ACTIVE | Noted: 2019-01-08

## 2019-01-08 PROBLEM — E87.8 HYPOCHLOREMIA: Status: ACTIVE | Noted: 2019-01-08

## 2019-01-08 PROBLEM — D72.829 LEUKOCYTOSIS: Status: ACTIVE | Noted: 2019-01-08

## 2019-01-08 LAB
ABO GROUP BLD BPU: NORMAL
ANION GAP SERPL CALCULATED.3IONS-SCNC: 7 MMOL/L (ref 4–13)
ATRIAL RATE: 60 BPM
BPU ID: NORMAL
BUN SERPL-MCNC: 13 MG/DL (ref 5–25)
CALCIUM SERPL-MCNC: 7.9 MG/DL (ref 8.3–10.1)
CHLORIDE SERPL-SCNC: 111 MMOL/L (ref 100–108)
CO2 SERPL-SCNC: 24 MMOL/L (ref 21–32)
CREAT SERPL-MCNC: 0.73 MG/DL (ref 0.6–1.3)
ERYTHROCYTE [DISTWIDTH] IN BLOOD BY AUTOMATED COUNT: 13.3 % (ref 11.6–15.1)
GFR SERPL CREATININE-BSD FRML MDRD: 85 ML/MIN/1.73SQ M
GLUCOSE SERPL-MCNC: 119 MG/DL (ref 65–140)
GLUCOSE SERPL-MCNC: 122 MG/DL (ref 65–140)
GLUCOSE SERPL-MCNC: 133 MG/DL (ref 65–140)
GLUCOSE SERPL-MCNC: 135 MG/DL (ref 65–140)
GLUCOSE SERPL-MCNC: 137 MG/DL (ref 65–140)
GLUCOSE SERPL-MCNC: 141 MG/DL (ref 65–140)
GLUCOSE SERPL-MCNC: 142 MG/DL (ref 65–140)
GLUCOSE SERPL-MCNC: 145 MG/DL (ref 65–140)
HCT VFR BLD AUTO: 31.7 % (ref 34.8–46.1)
HGB BLD-MCNC: 10.2 G/DL (ref 11.5–15.4)
MAGNESIUM SERPL-MCNC: 2.7 MG/DL (ref 1.6–2.6)
MCH RBC QN AUTO: 30.2 PG (ref 26.8–34.3)
MCHC RBC AUTO-ENTMCNC: 32.2 G/DL (ref 31.4–37.4)
MCV RBC AUTO: 94 FL (ref 82–98)
P AXIS: 69 DEGREES
PLATELET # BLD AUTO: 133 THOUSANDS/UL (ref 149–390)
PMV BLD AUTO: 11.1 FL (ref 8.9–12.7)
POTASSIUM SERPL-SCNC: 4.1 MMOL/L (ref 3.5–5.3)
PR INTERVAL: 142 MS
QRS AXIS: 49 DEGREES
QRSD INTERVAL: 96 MS
QT INTERVAL: 454 MS
QTC INTERVAL: 454 MS
RBC # BLD AUTO: 3.38 MILLION/UL (ref 3.81–5.12)
SODIUM SERPL-SCNC: 142 MMOL/L (ref 136–145)
T WAVE AXIS: 56 DEGREES
UNIT DISPENSE STATUS: NORMAL
UNIT PRODUCT CODE: NORMAL
UNIT RH: NORMAL
VENTRICULAR RATE: 60 BPM
WBC # BLD AUTO: 15.13 THOUSAND/UL (ref 4.31–10.16)

## 2019-01-08 PROCEDURE — 85027 COMPLETE CBC AUTOMATED: CPT | Performed by: PHYSICIAN ASSISTANT

## 2019-01-08 PROCEDURE — G8980 MOBILITY D/C STATUS: HCPCS

## 2019-01-08 PROCEDURE — 97162 PT EVAL MOD COMPLEX 30 MIN: CPT

## 2019-01-08 PROCEDURE — 82948 REAGENT STRIP/BLOOD GLUCOSE: CPT

## 2019-01-08 PROCEDURE — 80048 BASIC METABOLIC PNL TOTAL CA: CPT | Performed by: PHYSICIAN ASSISTANT

## 2019-01-08 PROCEDURE — 71045 X-RAY EXAM CHEST 1 VIEW: CPT

## 2019-01-08 PROCEDURE — 93005 ELECTROCARDIOGRAM TRACING: CPT

## 2019-01-08 PROCEDURE — 94760 N-INVAS EAR/PLS OXIMETRY 1: CPT

## 2019-01-08 PROCEDURE — 99222 1ST HOSP IP/OBS MODERATE 55: CPT | Performed by: INTERNAL MEDICINE

## 2019-01-08 PROCEDURE — G8978 MOBILITY CURRENT STATUS: HCPCS

## 2019-01-08 PROCEDURE — G8979 MOBILITY GOAL STATUS: HCPCS

## 2019-01-08 PROCEDURE — 99024 POSTOP FOLLOW-UP VISIT: CPT | Performed by: THORACIC SURGERY (CARDIOTHORACIC VASCULAR SURGERY)

## 2019-01-08 PROCEDURE — 83735 ASSAY OF MAGNESIUM: CPT | Performed by: PHYSICIAN ASSISTANT

## 2019-01-08 PROCEDURE — 94660 CPAP INITIATION&MGMT: CPT

## 2019-01-08 PROCEDURE — 93010 ELECTROCARDIOGRAM REPORT: CPT | Performed by: INTERNAL MEDICINE

## 2019-01-08 RX ORDER — FUROSEMIDE 10 MG/ML
40 INJECTION INTRAMUSCULAR; INTRAVENOUS ONCE
Status: COMPLETED | OUTPATIENT
Start: 2019-01-08 | End: 2019-01-08

## 2019-01-08 RX ORDER — FONDAPARINUX SODIUM 2.5 MG/.5ML
2.5 INJECTION SUBCUTANEOUS DAILY
Status: DISCONTINUED | OUTPATIENT
Start: 2019-01-08 | End: 2019-01-08

## 2019-01-08 RX ORDER — ATORVASTATIN CALCIUM 40 MG/1
40 TABLET, FILM COATED ORAL
Status: DISCONTINUED | OUTPATIENT
Start: 2019-01-08 | End: 2019-01-08

## 2019-01-08 RX ORDER — ACETAMINOPHEN 325 MG/1
650 TABLET ORAL EVERY 6 HOURS PRN
Status: DISCONTINUED | OUTPATIENT
Start: 2019-01-08 | End: 2019-01-10 | Stop reason: HOSPADM

## 2019-01-08 RX ORDER — AMLODIPINE BESYLATE 5 MG/1
5 TABLET ORAL DAILY
Status: DISCONTINUED | OUTPATIENT
Start: 2019-01-08 | End: 2019-01-10 | Stop reason: HOSPADM

## 2019-01-08 RX ORDER — ATORVASTATIN CALCIUM 10 MG/1
10 TABLET, FILM COATED ORAL
Status: DISCONTINUED | OUTPATIENT
Start: 2019-01-08 | End: 2019-01-10 | Stop reason: HOSPADM

## 2019-01-08 RX ORDER — CEFAZOLIN SODIUM 2 G/50ML
2000 SOLUTION INTRAVENOUS EVERY 8 HOURS
Status: COMPLETED | OUTPATIENT
Start: 2019-01-08 | End: 2019-01-08

## 2019-01-08 RX ORDER — POTASSIUM CHLORIDE 20 MEQ/1
20 TABLET, EXTENDED RELEASE ORAL DAILY
Status: DISCONTINUED | OUTPATIENT
Start: 2019-01-08 | End: 2019-01-10 | Stop reason: HOSPADM

## 2019-01-08 RX ORDER — FUROSEMIDE 10 MG/ML
40 INJECTION INTRAMUSCULAR; INTRAVENOUS DAILY
Status: DISCONTINUED | OUTPATIENT
Start: 2019-01-08 | End: 2019-01-10 | Stop reason: HOSPADM

## 2019-01-08 RX ORDER — DOCUSATE SODIUM 100 MG/1
100 CAPSULE, LIQUID FILLED ORAL 2 TIMES DAILY
Status: DISCONTINUED | OUTPATIENT
Start: 2019-01-08 | End: 2019-01-10 | Stop reason: HOSPADM

## 2019-01-08 RX ORDER — TEMAZEPAM 15 MG/1
15 CAPSULE ORAL
Status: DISCONTINUED | OUTPATIENT
Start: 2019-01-08 | End: 2019-01-10 | Stop reason: HOSPADM

## 2019-01-08 RX ORDER — PANTOPRAZOLE SODIUM 40 MG/1
40 TABLET, DELAYED RELEASE ORAL
Status: DISCONTINUED | OUTPATIENT
Start: 2019-01-08 | End: 2019-01-10 | Stop reason: HOSPADM

## 2019-01-08 RX ADMIN — POTASSIUM CHLORIDE 20 MEQ: 1500 TABLET, EXTENDED RELEASE ORAL at 08:01

## 2019-01-08 RX ADMIN — OXYCODONE HYDROCHLORIDE AND ACETAMINOPHEN 1 TABLET: 5; 325 TABLET ORAL at 21:18

## 2019-01-08 RX ADMIN — POLYETHYLENE GLYCOL 3350 17 G: 17 POWDER, FOR SOLUTION ORAL at 08:01

## 2019-01-08 RX ADMIN — OXYCODONE HYDROCHLORIDE AND ACETAMINOPHEN 1 TABLET: 5; 325 TABLET ORAL at 14:37

## 2019-01-08 RX ADMIN — AMIODARONE HYDROCHLORIDE 200 MG: 200 TABLET ORAL at 06:26

## 2019-01-08 RX ADMIN — ATORVASTATIN CALCIUM 10 MG: 10 TABLET, FILM COATED ORAL at 17:19

## 2019-01-08 RX ADMIN — SODIUM CHLORIDE 7.5 MG/HR: 0.9 INJECTION, SOLUTION INTRAVENOUS at 02:29

## 2019-01-08 RX ADMIN — ONDANSETRON 4 MG: 2 INJECTION INTRAMUSCULAR; INTRAVENOUS at 04:10

## 2019-01-08 RX ADMIN — MUPIROCIN 1 APPLICATION: 20 OINTMENT TOPICAL at 08:02

## 2019-01-08 RX ADMIN — DOCUSATE SODIUM 100 MG: 100 CAPSULE, LIQUID FILLED ORAL at 17:19

## 2019-01-08 RX ADMIN — CEFAZOLIN SODIUM 1000 MG: 1 SOLUTION INTRAVENOUS at 02:06

## 2019-01-08 RX ADMIN — FUROSEMIDE 40 MG: 10 INJECTION, SOLUTION INTRAMUSCULAR; INTRAVENOUS at 04:13

## 2019-01-08 RX ADMIN — PANTOPRAZOLE SODIUM 40 MG: 40 TABLET, DELAYED RELEASE ORAL at 06:26

## 2019-01-08 RX ADMIN — ASPIRIN 325 MG ORAL TABLET 325 MG: 325 PILL ORAL at 08:01

## 2019-01-08 RX ADMIN — CEFAZOLIN SODIUM 2000 MG: 2 SOLUTION INTRAVENOUS at 10:40

## 2019-01-08 RX ADMIN — DOCUSATE SODIUM 100 MG: 100 CAPSULE, LIQUID FILLED ORAL at 08:01

## 2019-01-08 RX ADMIN — AMLODIPINE BESYLATE 5 MG: 5 TABLET ORAL at 08:01

## 2019-01-08 RX ADMIN — LEVOTHYROXINE SODIUM 125 MCG: 125 TABLET ORAL at 06:26

## 2019-01-08 RX ADMIN — PANTOPRAZOLE SODIUM 40 MG: 40 TABLET, DELAYED RELEASE ORAL at 08:01

## 2019-01-08 RX ADMIN — SODIUM CHLORIDE 7.5 MG/HR: 0.9 INJECTION, SOLUTION INTRAVENOUS at 06:26

## 2019-01-08 RX ADMIN — FUROSEMIDE 40 MG: 10 INJECTION, SOLUTION INTRAMUSCULAR; INTRAVENOUS at 08:01

## 2019-01-08 RX ADMIN — FONDAPARINUX SODIUM 2.5 MG: 2.5 INJECTION, SOLUTION SUBCUTANEOUS at 08:01

## 2019-01-08 NOTE — CONSULTS
Consultation - Cardiology   Megan Pascual 76 y o  female MRN: [id number]  Unit/Bed#: [facility] 422-01 Encounter: [id number]    Assessment/Plan     Principal Problem:    Non-rheumatic mitral regurgitation  Active Problems:    S/P MVR (mitral valve repair)    Leukocytosis    Thrombocytopenia (HCC)    Hypochloremia      Assessment/Plan    1  Severe MR s/p  mitral valve ring  POD #1  Rhythm stable  BB held d/t earlier bradycardia  Hemodynamically stable  On Lasix IV 40 daily for postop volume overload    2  HTN-controlled  On Norvasc 5    3  IRINA-uses CPAP      History of Present Illness   Physician Requesting Consult: Nikole Platt MD  Reason for Consult / Principal Problem:  Postop open heart surgery    HPI: Megan Pascual is a 76y o  year old female with HTN, HLD , severe IRINA and uses CPAP an severe symptomatic mitral regurgitation who underwent mitral valve repair with P2 triangular resection and 30 mm Medtronic CAG future mitral annuloplasty ring  Preoperative echo showed severe MR with EF of 60% and P2 prolapse  Final DORI demonstrated successful repair of mitral valve without evidence of regurgitation  Patient had been referred to CT surgery by Dr Jurgen Jeong  Preoperative cardiac catheterization showed minor luminal irregularities in no angiographic evidence for occlusive coronary artery disease  Inpatient consult to Cardiology  Consult performed by: Zeenat Geiger ordered by: Jodie Ogden          Review of Systems   Constitutional: Negative  HENT: Negative  Eyes: Negative  Respiratory: Negative  Cardiovascular: Negative  Gastrointestinal: Negative  Genitourinary: Negative  Neurological: Negative  Hematological: Negative  Psychiatric/Behavioral: Negative          Historical Information   Past Medical History:   Diagnosis Date    Cystic breast     Dyspareunia, female     last assessed 9/4/14    Hyperlipidemia     Hypothyroidism     Insomnia     IRINA on CPAP     Osteoporosis  Severe mitral regurgitation     Varicose veins of lower extremity     last assessed 1/4/13     Past Surgical History:   Procedure Laterality Date    COLONOSCOPY      complete 5/2016 - Dr Yancy De La Cruz due in 2019 - last assessed  3/17/17    HERNIA REPAIR      LIVER BIOPSY LAPAROSCOPIC N/A 5/7/2018    Procedure: Laparoscopic marsupialization of liver cyst;  Surgeon: Savana Kirby MD;  [facility];  Service: Surgical Oncology    NEUROMA EXCISION      VA ECHO Im [address] N/A 1/7/2019    Procedure: TRANSESOPHAGEAL ECHOCARDIOGRAM (DORI); Surgeon: Eliseo Lopes MD;  [facility];  Service: Cardiac Surgery    VA ESOPHAGOGASTRODUODENOSCOPY TRANSORAL DIAGNOSTIC N/A 8/10/2016    Procedure: ESOPHAGOGASTRODUODENOSCOPY (EGD); Surgeon: Mai Mandujano MD;  Location: [facility]; Service: Gastroenterology    VA ESOPHAGOSCOPY FLEXIBLE TRANSORAL WITH BIOPSY N/A 11/3/2016    Procedure: ESOPHAGOGASTRODUODENOSCOPY (EGD);   Surgeon: Glori Fothergill, MD;  [facility];  Service: Thoracic    VA LAP, REPAIR PARAESOPHAGEAL HERNIA, INCL FUNDOPLASTY W/ MESH Left 11/3/2016    Procedure: LAPAROSCOPIC PARAESOPHAGEAL HERNIA REPAIR WITH MESH;NISSEN FUNDOPLICATION ;  Surgeon: Glori Fothergill, MD;  [facility];  Service: Thoracic    VA LAP, VENTRAL HERNIA REPAIR,REDUCIBLE N/A 10/30/2017    Procedure: LAPAROSCOPIC INCISIONAL HERNIA REPAIR X 2 WITH ECHO MESH;  Surgeon: Myke Carter DO;  [facility];  Service: General    VA REPAIR INCISIONAL HERNIA,REDUCIBLE N/A 1/13/2017    Procedure: INCISIONAL HERNIA REPAIR ;  Surgeon: Myke Carter DO;  [facility];  Service: General    VA REPLACEMENT OF MITRAL VALVE N/A 1/7/2019    Procedure: REPLACEMENT VALVE MITRAL (MVR), repair with 30mm CG Future ring;  Surgeon: Eliseo Lopes MD;  [facility];  Service: Cardiac Surgery    TUBAL LIGATION       History   Alcohol Use    Yes     Comment: social     History Drug Use No     History   Smoking Status    Never Smoker   Smokeless Tobacco    Never Used     Family History:   Family History   Problem Relation Age of Onset    Heart disease Mother     Aneurysm Mother         cerebral    Alcohol abuse Father     Cancer Father     COPD Father     Heart failure Father     Hypertension Father     Cancer Family     Breast cancer Paternal Grandmother 80       Meds/Allergies   current meds:   Current Facility-Administered Medications   Medication Dose Route Frequency    acetaminophen (TYLENOL) tablet 650 mg  650 mg Oral Q6H PRN    amLODIPine (NORVASC) tablet 5 mg  5 mg Oral Daily    atorvastatin (LIPITOR) tablet 10 mg  10 mg Oral Daily With Dinner    docusate sodium (COLACE) capsule 100 mg  100 mg Oral BID    furosemide (LASIX) injection 40 mg  40 mg Intravenous Daily    insulin lispro (HumaLOG) 100 units/mL subcutaneous injection 1-5 Units  1-5 Units Subcutaneous TID AC    insulin lispro (HumaLOG) 100 units/mL subcutaneous injection 1-5 Units  1-5 Units Subcutaneous HS    levothyroxine tablet 125 mcg  125 mcg Oral Early Morning    ondansetron (ZOFRAN) injection 4 mg  4 mg Intravenous Q6H PRN    oxyCODONE-acetaminophen (PERCOCET) 5-325 mg per tablet 1 tablet  1 tablet Oral Q4H PRN    oxyCODONE-acetaminophen (PERCOCET) 5-325 mg per tablet 2 tablet  2 tablet Oral Q6H PRN    pantoprazole (PROTONIX) EC tablet 40 mg  40 mg Oral Early Morning    potassium chloride (K-DUR,KLOR-CON) CR tablet 20 mEq  20 mEq Oral Daily    temazepam (RESTORIL) capsule 15 mg  15 mg Oral HS PRN    and PTA meds:  Prescriptions Prior to Admission   Medication    amLODIPine (NORVASC) 5 mg tablet    atorvastatin (LIPITOR) 10 mg tablet    B Complex-C (SUPER B COMPLEX PO)    Calcium Carb-Cholecalciferol 600-800 MG-UNIT TABS    Cholecalciferol (VITAMIN D3) 2000 UNITS TABS    co-enzyme Q-10 30 MG capsule    hydrochlorothiazide (HYDRODIURIL) 12 5 mg tablet    levothyroxine 125 mcg tablet  Multiple Vitamins-Minerals (CENTRUM SILVER PO)    mupirocin (BACTROBAN) 2 % ointment    Omega-3 Fatty Acids (FISH OIL PO)    VITAMIN E PO    estradiol (ESTRACE VAGINAL) 0 1 mg/g vaginal cream     Allergies   Allergen Reactions    Other Other (See Comments)     Skin breakdown from bandaid on for long time- reddness       Objective   Vitals: Blood pressure 111/71, pulse 68, temperature (!) 97 4 °F (36 3 °C), temperature source Oral, resp  rate 22, height 5' 5" (1 651 m), weight 92 7 kg (204 lb 5 9 oz), SpO2 95 %, not currently breastfeeding    Orthostatic Blood Pressures      Most Recent Value   Blood Pressure  111/71 [Map 88] filed at 01/08/2019 1139   Patient Position - Orthostatic VS  Sitting filed at 01/08/2019 1139            Intake/Output Summary (Last 24 hours) at 01/08/19 1414  Last data filed at 01/08/19 1242   Gross per 24 hour   Intake          1562 57 ml   Output             2390 ml   Net          -827 43 ml       Invasive Devices     Central Venous Catheter Line            CVC Central Lines 01/07/19 Triple 1 day          Peripheral Intravenous Line            Peripheral IV 01/07/19 Left Hand 1 day          Line            Pacer Wires 1 day    Pacer Wires 1 day          Drain            Chest Tube 1 Anterior Mediastinal 32 Fr  1 day    Chest Tube 2 Posterior Mediastinal 32 Fr  1 day                Physical Exam: /71 (BP Location: Right arm) Comment: Map 88  Pulse 68   Temp (!) 97 4 °F (36 3 °C) (Oral)   Resp 22   Ht 5' 5" (1 651 m)   Wt 92 7 kg (204 lb 5 9 oz)   SpO2 95%   BMI 34 01 kg/m²   General Appearance:    Alert, cooperative, no distress, appears stated age   Head:    Normocephalic, no scleral icterus   Eyes:    PERRL   Nose:   Nares normal, septum midline, mucosa normal, no drainage    Throat:   Lips, mucosa, and tongue normal   Neck:   Supple, symmetrical, trachea midline           Lungs:     Clear to auscultation bilaterally, respirations unlabored   Chest Wall:    Chest, which are still dressing  Chest tube intact  Heart:    Regular rate and rhythm, S1 and S2 normal, no murmur, rub   or gallop       Extremities:   Extremities normal, atraumatic, no cyanosis or edema   Pulses:   2+ and symmetric all extremities   Skin:   Skin color, texture, turgor normal, no rashes or lesions   Neurologic:   Alert and oriented to person place and time   No focal deficits       Lab Results:   Recent Results (from the past 72 hour(s))   POCT activated clotting time    Collection Time: 01/07/19  8:19 AM   Result Value Ref Range    Activated Clotting Time, i-STAT 132 89 - 137 sec    Specimen Type ARTERIAL    POCT Blood Gas (CG8+)    Collection Time: 01/07/19  8:20 AM   Result Value Ref Range    pH, Art i-STAT 7 332 (L) 7 350 - 7 450    pCO2, Art i-STAT 48 8 (H) 36 0 - 44 0 mm HG    pO2, ART i-STAT 169 0 (H) 75 0 - 129 0 mm HG    BE, i-STAT 0 -2 - 3 mmol/L    HCO3, Art i-STAT 25 9 22 0 - 28 0 mmol/L    CO2, i-STAT 27 21 - 32 mmol/L    O2 Sat, i-STAT 99 (H) 95 - 98 %    SODIUM, I-STAT 138 136 - 145 mmol/l    Potassium, i-STAT 3 1 (L) 3 5 - 5 3 mmol/L    Calcium, Ionized i-STAT 1 16 1 12 - 1 32 mmol/L    Hct, i-STAT 30 (L) 34 8 - 46 1 %    Hgb, i-STAT 10 2 (L) 11 5 - 15 4 g/dl    Glucose, i-STAT 112 65 - 140 mg/dl    Specimen Type ARTERIAL    POCT activated clotting time    Collection Time: 01/07/19  8:48 AM   Result Value Ref Range    Activated Clotting Time, i-STAT 760 (H) 89 - 137 sec    Specimen Type ARTERIAL    POCT Blood Gas (CG8+)    Collection Time: 01/07/19  9:09 AM   Result Value Ref Range    pH, Art i-STAT 7 421 7 350 - 7 450    pCO2, Art i-STAT 43 3 36 0 - 44 0 mm HG    pO2, ART i-STAT 328 0 (H) 75 0 - 129 0 mm HG    BE, i-STAT 3 -2 - 3 mmol/L    HCO3, Art i-STAT 28 2 (H) 22 0 - 28 0 mmol/L    CO2, i-STAT 29 21 - 32 mmol/L    O2 Sat, i-STAT 100 (H) 95 - 98 %    SODIUM, I-STAT 138 136 - 145 mmol/l    Potassium, i-STAT 4 7 3 5 - 5 3 mmol/L    Calcium, Ionized i-STAT 0 93 (L) 1 12 - 1 32 mmol/L Hct, i-STAT 21 (L) 34 8 - 46 1 %    Hgb, i-STAT 7 1 (L) 11 5 - 15 4 g/dl    Glucose, i-STAT 105 65 - 140 mg/dl    Specimen Type ARTERIAL    POCT activated clotting time    Collection Time: 01/07/19  9:09 AM   Result Value Ref Range    Activated Clotting Time, i-STAT 743 (H) 89 - 137 sec    Specimen Type ARTERIAL    Tissue Exam    Collection Time: 01/07/19  9:27 AM   Result Value Ref Range    Case Report       Surgical Pathology Report                         Case: [id number]                                   Authorizing Provider:  Andrez Lindsey MD         Collected:           01/07/2019 4612              Ordering Location:     [address] [place] [address]      Received:            01/07/2019 Susan Franklin [address] Operating Room                                                      Pathologist:           Oksana Patel MD                                                               Specimen:    Heart Valve, P-2 leaflet Mitral valve                                                      Final Diagnosis       A  P2 mitral valve leaflet (excision):  - Mitral valvular leaflet with myxomatous degeneration (increased mucopolysaccharide ground substance) of spongiosa and fibrosa valve layers by Jose Bran elastin and trichrome cytochemical stains, consistent with prolapse  No neovascularization or fibrinoinflammatory abnormalities seen  Additional Information       All controls performed with the immunohistochemical stains reported above reacted appropriately  These tests were developed and their performance characteristics determined by [address] [place] [address] or [facility]  They may not be cleared or approved by the [place]  Food and Drug Administration  The FDA has determined that such clearance or approval is not necessary  These tests are used for clinical purposes  They should not be regarded as investigational or for research   This laboratory has been approved by [facility], designated as a high-complexity laboratory and is qualified to perform these tests  Gross Description       A  The specimen is received in formalin, labeled with the patient's name and medical record number, and is designated "P2 leaflet mitral valve  The specimen consists of a tan to pale tan rubbery fibromembranous appearing soft tissue fragment measuring 1 5 cm in greatest dimension  The fragment is trisected lengthwise revealing unremarkable pale tan cut surfaces  Entirely submitted  One cassette  Note: The estimated total formalin fixation time based upon information provided by the submitting clinician and the standard processing schedule is under 72 hours      MCrites        POCT activated clotting time    Collection Time: 01/07/19  9:31 AM   Result Value Ref Range    Activated Clotting Time, i-STAT 502 (H) 89 - 137 sec    Specimen Type VENOUS    POCT Blood Gas (CG8+)    Collection Time: 01/07/19  9:32 AM   Result Value Ref Range    ph, Eligio ISTAT 7 374 7 300 - 7 400    pCO2, Eligio i-STAT 45 2 42 0 - 50 0 mm HG    pO2, Eligio i-STAT 37 0 35 0 - 45 0 mm HG    BE, i-STAT 1 -2 - 3 mmol/L    HCO3, Eligio i-STAT 26 3 24 0 - 30 0 mmol/L    CO2, i-STAT 28 21 - 32 mmol/L    O2 Sat, i-STAT 68 (L) 95 - 98 %    SODIUM, I-STAT 140 136 - 145 mmol/l    Potassium, i-STAT 4 0 3 5 - 5 3 mmol/L    Calcium, Ionized i-STAT 1 01 (L) 1 12 - 1 32 mmol/L    Hct, i-STAT 22 (L) 34 8 - 46 1 %    Hgb, i-STAT 7 5 (L) 11 5 - 15 4 g/dl    Glucose, i-STAT 154 (H) 65 - 140 mg/dl    Specimen Type VENOUS    POCT activated clotting time    Collection Time: 01/07/19  9:55 AM   Result Value Ref Range    Activated Clotting Time, i-STAT 399 (H) 89 - 137 sec    Specimen Type ARTERIAL    POCT Blood Gas (CG8+)    Collection Time: 01/07/19  9:56 AM   Result Value Ref Range    pH, Art i-STAT 7 408 7 350 - 7 450    pCO2, Art i-STAT 43 3 36 0 - 44 0 mm HG    pO2, ART i-STAT 228 0 (H) 75 0 - 129 0 mm HG    BE, i-STAT 2 -2 - 3 mmol/L    HCO3, Art i-STAT 27 4 22 0 - 28 0 mmol/L    CO2, i-STAT 29 21 - 32 mmol/L    O2 Sat, i-STAT 100 (H) 95 - 98 %    SODIUM, I-STAT 139 136 - 145 mmol/l    Potassium, i-STAT 3 7 3 5 - 5 3 mmol/L    Calcium, Ionized i-STAT 1 03 (L) 1 12 - 1 32 mmol/L    Hct, i-STAT 23 (L) 34 8 - 46 1 %    Hgb, i-STAT 7 8 (L) 11 5 - 15 4 g/dl    Glucose, i-STAT 195 (H) 65 - 140 mg/dl    Specimen Type ARTERIAL    Platelet count    Collection Time: 01/07/19 10:00 AM   Result Value Ref Range    Platelets 886 (L) 073 - 390 Thousands/uL    MPV 9 9 8 9 - 12 7 fL   POCT activated clotting time    Collection Time: 01/07/19 10:26 AM   Result Value Ref Range    Activated Clotting Time, i-STAT 121 89 - 137 sec    Specimen Type ARTERIAL    POCT Blood Gas (CG8+)    Collection Time: 01/07/19 10:27 AM   Result Value Ref Range    pH, Art i-STAT 7 374 7 350 - 7 450    pCO2, Art i-STAT 43 7 36 0 - 44 0 mm HG    pO2, ART i-STAT 267 0 (H) 75 0 - 129 0 mm HG    BE, i-STAT 0 -2 - 3 mmol/L    HCO3, Art i-STAT 25 5 22 0 - 28 0 mmol/L    CO2, i-STAT 27 21 - 32 mmol/L    O2 Sat, i-STAT 100 (H) 95 - 98 %    SODIUM, I-STAT 142 136 - 145 mmol/l    Potassium, i-STAT 3 3 (L) 3 5 - 5 3 mmol/L    Calcium, Ionized i-STAT 1 26 1 12 - 1 32 mmol/L    Hct, i-STAT 24 (L) 34 8 - 46 1 %    Hgb, i-STAT 8 2 (L) 11 5 - 15 4 g/dl    Glucose, i-STAT 166 (H) 65 - 140 mg/dl    Specimen Type ARTERIAL    ECG 12 lead    Collection Time: 01/07/19 11:14 AM   Result Value Ref Range    Ventricular Rate 89 BPM    Atrial Rate 89 BPM    RI Interval 154 ms    QRSD Interval 125 ms    QT Interval 396 ms    QTC Interval 482 ms    P [place] 72 degrees    QRS Axis 47 degrees    T Wave Axis 33 degrees   Fingerstick Glucose (POCT)    Collection Time: 01/07/19 11:17 AM   Result Value Ref Range    POC Glucose 145 (H) 65 - 140 mg/dl   Platelets Count - If Chest Tube Losses > 200 mL/hr in First Hour Postop    Collection Time: 01/07/19 11:18 AM   Result Value Ref Range    Platelets 856 (L) 576 - 390 Thousands/uL    MPV 9 2 8 9 - 12 7 fL   Basic metabolic panel    Collection Time: 01/07/19 11:18 AM   Result Value Ref Range    Sodium 142 136 - 145 mmol/L    Potassium 3 2 (L) 3 5 - 5 3 mmol/L    Chloride 111 (H) 100 - 108 mmol/L    CO2 24 21 - 32 mmol/L    ANION GAP 7 4 - 13 mmol/L    BUN 14 5 - 25 mg/dL    Creatinine 0 85 0 60 - 1 30 mg/dL    Glucose 143 (H) 65 - 140 mg/dL    Calcium 7 6 (L) 8 3 - 10 1 mg/dL    eGFR 71 ml/min/1 73sq m   Hemoglobin and hematocrit, blood    Collection Time: 01/07/19 11:18 AM   Result Value Ref Range    Hemoglobin 10 6 (L) 11 5 - 15 4 g/dL    Hematocrit 33 3 (L) 34 8 - 46 1 %   POCT Blood Gas (CG8+)    Collection Time: 01/07/19 11:40 AM   Result Value Ref Range    pH, Art i-STAT 7 347 (L) 7 350 - 7 450    pCO2, Art i-STAT 44 4 (H) 36 0 - 44 0 mm HG    pO2, ART i-STAT 92 0 75 0 - 129 0 mm HG    BE, i-STAT -1 -2 - 3 mmol/L    HCO3, Art i-STAT 24 3 22 0 - 28 0 mmol/L    CO2, i-STAT 26 21 - 32 mmol/L    O2 Sat, i-STAT 97 95 - 98 %    SODIUM, I-STAT 144 136 - 145 mmol/l    Potassium, i-STAT 3 2 (L) 3 5 - 5 3 mmol/L    Calcium, Ionized i-STAT 1 18 1 12 - 1 32 mmol/L    Hct, i-STAT 30 (L) 34 8 - 46 1 %    Hgb, i-STAT 10 2 (L) 11 5 - 15 4 g/dl    Glucose, i-STAT 146 (H) 65 - 140 mg/dl    POC FIO2 60 L    Specimen Type ARTERIAL     SITE Right Radial     GIOVANA TEST Postive Giovana Test    Fingerstick Glucose (POCT)    Collection Time: 01/07/19 12:16 PM   Result Value Ref Range    POC Glucose 143 (H) 65 - 140 mg/dl   Fingerstick Glucose (POCT)    Collection Time: 01/07/19  2:03 PM   Result Value Ref Range    POC Glucose 138 65 - 140 mg/dl   Potassium    Collection Time: 01/07/19  3:45 PM   Result Value Ref Range    Potassium 3 3 (L) 3 5 - 5 3 mmol/L   Fingerstick Glucose (POCT)    Collection Time: 01/07/19  4:00 PM   Result Value Ref Range    POC Glucose 128 65 - 140 mg/dl   Fingerstick Glucose (POCT)    Collection Time: 01/07/19  5:59 PM   Result Value Ref Range    POC Glucose 168 (H) 65 - 140 mg/dl   Fingerstick Glucose (POCT)    Collection Time: 01/07/19  8:06 PM   Result Value Ref Range    POC Glucose 161 (H) 65 - 140 mg/dl   Hemoglobin and hematocrit, blood    Collection Time: 01/07/19 10:00 PM   Result Value Ref Range    Hemoglobin 10 7 (L) 11 5 - 15 4 g/dL    Hematocrit 32 9 (L) 34 8 - 46 1 %   Potassium    Collection Time: 01/07/19 10:00 PM   Result Value Ref Range    Potassium 4 5 3 5 - 5 3 mmol/L   Fingerstick Glucose (POCT)    Collection Time: 01/07/19 10:01 PM   Result Value Ref Range    POC Glucose 134 65 - 140 mg/dl   Fingerstick Glucose (POCT)    Collection Time: 01/08/19 12:25 AM   Result Value Ref Range    POC Glucose 119 65 - 140 mg/dl   Fingerstick Glucose (POCT)    Collection Time: 01/08/19  2:06 AM   Result Value Ref Range    POC Glucose 122 65 - 140 mg/dl   Basic Metabolic Panel -AM POD #1    Collection Time: 01/08/19  3:59 AM   Result Value Ref Range    Sodium 142 136 - 145 mmol/L    Potassium 4 1 3 5 - 5 3 mmol/L    Chloride 111 (H) 100 - 108 mmol/L    CO2 24 21 - 32 mmol/L    ANION GAP 7 4 - 13 mmol/L    BUN 13 5 - 25 mg/dL    Creatinine 0 73 0 60 - 1 30 mg/dL    Glucose 137 65 - 140 mg/dL    Calcium 7 9 (L) 8 3 - 10 1 mg/dL    eGFR 85 ml/min/1 73sq m   Magnesium -AM POD #1    Collection Time: 01/08/19  3:59 AM   Result Value Ref Range    Magnesium 2 7 (H) 1 6 - 2 6 mg/dL   CBC-AM POD #1    Collection Time: 01/08/19  3:59 AM   Result Value Ref Range    WBC 15 13 (H) 4 31 - 10 16 Thousand/uL    RBC 3 38 (L) 3 81 - 5 12 Million/uL    Hemoglobin 10 2 (L) 11 5 - 15 4 g/dL    Hematocrit 31 7 (L) 34 8 - 46 1 %    MCV 94 82 - 98 fL    MCH 30 2 26 8 - 34 3 pg    MCHC 32 2 31 4 - 37 4 g/dL    RDW 13 3 11 6 - 15 1 %    Platelets 337 (L) 612 - 390 Thousands/uL    MPV 11 1 8 9 - 12 7 fL   Fingerstick Glucose (POCT)    Collection Time: 01/08/19  4:08 AM   Result Value Ref Range    POC Glucose 145 (H) 65 - 140 mg/dl   ECG 12 lead    Collection Time: 01/08/19  5:46 AM   Result Value Ref Range    Ventricular Rate 60 BPM    Atrial Rate 60 BPM    TX Interval 142 ms    QRSD Interval 96 ms    QT Interval 454 ms    QTC Interval 454 ms    P [place] 69 degrees    QRS Axis 49 degrees    T Wave Axis 56 degrees   Fingerstick Glucose (POCT)    Collection Time: 01/08/19  6:28 AM   Result Value Ref Range    POC Glucose 135 65 - 140 mg/dl   Prepare RBC:Special Requirements: Leukoreduced; Has consent been obtained? Yes; Where is the Surgery Scheduled? [address] [facility], 3 Units    Collection Time: 01/08/19  9:17 AM   Result Value Ref Range    Unit Product Code Q3500Q24     Unit Number [id number]     Unit ABO O     Unit [facility] POS     Unit Dispense Status Return to [facility]     Unit Product Code X6629H08     Unit Number [id number]     Unit ABO O     Unit [facility] POS     Unit Dispense Status Return to [facility]     Unit Product Code A6788S36     Unit Number [id number]     Unit ABO O     Unit [facility] POS     Unit Dispense Status Return to [facility]     Unit Product Code S0848E81     Unit Number [id number]     Unit ABO O     Unit [facility] POS     Unit Dispense Status Return to [facility]      Imaging: I have personally reviewed pertinent reports  EKG:  NSR  VTE Prophylaxis: Fondaparinux (Arixtra)    Code Status: Level 1 - Full Code  Advance Directive and Living Will:      Power of :    POLST:      Counseling / Coordination of Care  Total floor / unit time spent today 30 minutes  Greater than 50% of total time was spent with the patient and / or family counseling and / or coordination of care

## 2019-01-08 NOTE — SOCIAL WORK
CM introduced self to pt and her  Filipe Victor [phone] who is her emergency contact at the bedside and explained role; no POA available  Pt lives with Filipe Victor in a [facility] with 1 vick and 12 steps up to bedroom; bathroom has no grab bars  PTA pt was independent with ADL's and ambulation; no DME required or available  PCP is Dr Jarett Ling and preferred pharmacy is [facility] on [facility] [address]  Pt drives herself but is not allowed for 1 month and is retired, states  will provide transportation when medically stable for d/c  Pt denies etoh/drug abuse or tx and no mental health dx  No hx of VNA/HHC  CM informed pt of required VNA needs at d/c for post op care  Pt agreeable to [facility]  CM made referral to [facility] in [facility]  CM reviewed d/c planning process including the following: identifying help at home, patient preference for d/c planning needs, Discharge Lounge, [facility] Meds to Bed program, availability of treatment team to discuss questions or concerns patient and/or family may have regarding understanding medications and recognizing signs and symptoms once discharged  CM also encouraged patient to follow up with all recommended appointments after discharge  Patient advised of importance for patient and family to participate in managing patients medical well being        Luis F Cody,  [phone]

## 2019-01-08 NOTE — PROGRESS NOTES
Progress Note - Cardiothoracic Surgery   Keyona Cota 76 y o  female MRN: [id number]  Unit/Bed#: [facility] 417-01 Encounter: [id number]  Mitral regurgitation  S/P mitral valve repair; POD # 1    24 Hour Events: Exubated & weaned to [facility]  Started on cardene for HTN, now at 5  Delined  Given Lasix 40mg IV x1 for low UOP w/ response  No other events  C/o fatigue  Tolerating sips & chips  OOB to chair  (-) flatus/BM  Pain controlled w/ PRN medications      Medications:   Scheduled Meds:  Current Facility-Administered Medications:  acetaminophen 650 mg Oral Q4H PRN Eugene Guardado PA-C    amiodarone 200 mg Oral [facility] Eugene Guardado PA-C    aspirin 325 mg Oral Daily Eugene Guardado PA-C    atorvastatin 80 mg Oral Daily With [employer] JaylenJACKI    bisacodyl 10 mg Rectal Daily PRN Eugene Guardado PA-C    cefazolin 1,000 mg Intravenous Q8H Eugene Guardado PA-C Last Rate: Stopped (01/08/19 0236)   fondaparinux 2 5 mg Subcutaneous Daily Eugene Guardado PA-C    furosemide 40 mg Intravenous Q6H PRN Eugene Guardado PA-C    HYDROmorphone 0 5 mg Intravenous Q1H PRN Eugene Guardado PA-C    insulin regular (HumuLIN R,NovoLIN R) infusion 0 3-21 Units/hr Intravenous Titrated Rajan De Paz PA-C Last Rate: 1 5 Units/hr (01/08/19 7754)   lactated ringers 500 mL Intravenous Q30 Min PRN Eugene Guardado PA-C    levothyroxine 125 mcg Oral Early Morning Eugene Guardado PA-C    lidocaine (cardiac) 100 mg Intravenous Q30 Min PRN Eugene Guardado PA-C    [facility]irocin [facility]    niCARdipine 1-15 mg/hr Intravenous Titrated Perry Kelley PA-C Last Rate: 5 mg/hr (01/08/19 0639)   ondansetron 4 mg Intravenous Q6H PRN Eugene Guardado PA-C    oxyCODONE-acetaminophen 1 tablet Oral Q4H PRN Eugene Guardado PA-C    oxyCODONE-acetaminophen 2 tablet Oral Q6H PRN Eugene Guardado PA-C    pantoprazole 40 mg Oral Early Morning Eugene Guardado PA-C    polyethylene glycol 17 g Oral Daily Eugene Guardado PA-C    potassium chloride 20 mEq Intravenous Q1H PRN Suellen Console, PA-C    sodium chloride 20 mL/hr Intravenous Continuous Suellen Console, PA-C Last Rate: 20 mL/hr (01/07/19 1200)     Continuous Infusions:  insulin regular (HumuLIN R,NovoLIN R) infusion 0 3-21 Units/hr Last Rate: 1 5 Units/hr (01/08/19 1207)   niCARdipine 1-15 mg/hr Last Rate: 5 mg/hr (01/08/19 0639)   sodium chloride 20 mL/hr Last Rate: 20 mL/hr (01/07/19 1200)     PRN Meds:   acetaminophen    bisacodyl    furosemide    HYDROmorphone    lactated ringers    lidocaine (cardiac)    ondansetron    oxyCODONE-acetaminophen    oxyCODONE-acetaminophen    potassium chloride    Vitals:   Vitals:    01/08/19 0300 01/08/19 0400 01/08/19 0500 01/08/19 0600   BP:       Pulse: 58 60 60 60   Resp: 14 20 18 (!) 24   Temp: 98 4 °F (36 9 °C) 98 2 °F (36 8 °C) 98 4 °F (36 9 °C)    TempSrc: Probe Probe Probe    SpO2: 97% 96% 92% 96%   Weight:    92 7 kg (204 lb 5 9 oz)   Height:         Bp x24hrs: 110-130/50-60    Telem: NSR, rate 59    Hemodynamics: PRIOR TO DELINING  PAP: (22-49)/(11-22) 35/22  CO:  [4 6 L/min-7 2 L/min] 5 7 L/min  CI:  [2 3 L/min/m2-3 6 L/min/m2] 2 9 L/min/m2  CVP: 18  SVR: 827    Respiratory:   SpO2: SpO2: 96 %, SpO2 Activity: SpO2 Activity: At Rest, SpO2 Device: O2 Device: Nasal cannula; 2 LPM --> RA during exam thsi AM    Intake/Output:   I/O       01/06 0701 - 01/07 0700 01/07 0701 - 01/08 0700    I V  (mL/kg)  4565 7 (49 3)    IV Piggyback  850    Cell Saver  750    Total Intake(mL/kg)  6165 7 (66 5)    Urine (mL/kg/hr)  2890 (1 3)    Blood  750    Chest Tube  350    Total Output   3990    Net   +2175 7              UOP - 1140cc/8hr; 2890cc/24hrs    Chest tube Output:    Mediastinal tubes: 130 mL/8 hours  350 mL/24 hours          Weights:   Weight (last 2 days)     Date/Time   Weight    01/08/19 0600  92 7 (204 37)    01/07/19 0606  89 8 (198)            Admit Weight: 89 8kg - up 3kg from admission weight    Results:     Results from last 7 days  Lab Units 01/08/19  0359 01/07/19  2200 01/07/19  1140 01/07/19  1118  01/07/19  1000   WBC Thousand/uL 15 13*  --   --   --   --   --    HEMOGLOBIN g/dL 10 2* 10 7*  --  10 6*  --   --    I STAT HEMOGLOBIN g/dl  --   --  10 2*  --   < >  --    HEMATOCRIT % 31 7* 32 9*  --  33 3*  --   --    HEMATOCRIT, ISTAT %  --   --  30*  --   < >  --    PLATELETS Thousands/uL 133*  --   --  101*  --  146*   < > = values in this interval not displayed  Results from last 7 days  Lab Units 01/08/19  0359 01/07/19 2200 01/07/19  1545 01/07/19  1140 01/07/19  1118   SODIUM mmol/L 142  --   --   --  142   POTASSIUM mmol/L 4 1 4 5 3 3*  --  3 2*   CHLORIDE mmol/L 111*  --   --   --  111*   CO2 mmol/L 24  --   --   --  24   CO2, I-STAT mmol/L  --   --   --  26  --    BUN mg/dL 13  --   --   --  14   CREATININE mg/dL 0 73  --   --   --  0 85   GLUCOSE, ISTAT mg/dl  --   --   --  146*  --    CALCIUM mg/dL 7 9*  --   --   --  7 6*           Studies:  CXR: lines in place, improved vascular congestion, no PTX, small left pleural effusion w/ bibasilar atelectasis  EKG: NSR, rate 60    Invasive Lines/Tubes:  Invasive Devices     Central Venous Catheter Line            CVC Central Lines 01/07/19 Triple less than 1 day          Peripheral Intravenous Line            Peripheral IV 01/07/19 Left Hand less than 1 day          Arterial Line            Arterial Line 01/07/19 Right Radial less than 1 day          Line            Pacer Wires less than 1 day    Pacer Wires less than 1 day          Drain            Chest Tube 1 Anterior Mediastinal 32 Fr  less than 1 day    Chest Tube 2 Posterior Mediastinal 32 Fr  less than 1 day    Urethral Catheter Non-latex; Temperature probe 16 Fr  less than 1 day                Physical Exam:    HEENT/NECK:  PERRLA  No jugular venous distention  Cardiac: Regular rate and rhythm  No rubs/murmurs/gallops  Pulmonary:  Breath sounds slightly diminished at the bases bilaterally  Abdomen:  Non-tender, Non-distended    Positive bowel sounds  Incisions: Sternum is stable  Incision dressed with Acticoat  No erythema or drainage   Lower extremities: Extremities warm/dry  Radial/PT/DP pulses 2+ bilaterally  Trace edema B/L  Neuro: Alert and oriented X 3  Sensation is grossly intact  No focal deficits  Skin: Warm/Dry, without rashes or lesions  Assessment:  Patient Active Problem List   Diagnosis    Benign essential hypertension    Disc degeneration, lumbar    Dysphagia    Hepatic cyst    Hyperlipidemia    Hypothyroidism    Liver enlargement    Osteoporosis    Severe obstructive sleep apnea    Shoulder impingement    Tinea corporis    Incisional hernia, without obstruction or gangrene    Lumbar radiculopathy    Non-rheumatic mitral regurgitation    Mitral valve prolapse    S/P MVR (mitral valve repair)       Mitral regurgitation  S/P mitral valve repair; POD # 1    Plan:    1  Cardiac:   Cardiac infusions: Cardene, 5 mg/hour -- weaned to 2 5 during exam  Cardiac index > 2 2   [place] Hemalatha catheter removed   D/C Arterial line once off cardene  NSR; Hypertensive  Lopressor, 12 5 mg PO BID - d/w attending as pt is borderline bradycardic this AM    Norvasc 5mg PO QDay (home med)  Continue ASA and Statin therapy  Maintain epicardial pacing wires for PRN pacing  Maintain central IV access today for blood draws/Cardene infusion  Continue DVT prophylaxis  Consult cardiology for postoperative medical management    2  Pulmonary:   Extubated  CXR findings: as above  Acute post-op pulmonary insufficiency; Requiring 2 Liters via nasal cannula, secondary to atelectasis, pulmonary vascular congestion and poor inspiratory effort secondary to pain  Continue incentive spirometry/coughing/deep breathing exercises  Wean supplemental oxygen as tolerated for saturation > 90%  Chest tube output remains persistently high; Continue chest tubes to suction today    3  Renal:   Intake/Output net: (+)2175 7 mL/24 hours  Post op volume overload:     Add Lasix, 40 mg IV QDay  Add Potassium supplementation, 20 mEq QDay  Post op Creatinine stable; Follow up labs prn  Discontinue potassium replacement scales  Discontinue torrez catheter    4  Neuro:  Neurologically intact; No active issues  Incisional pain well-controlled; Continue prn Percocet    5  GI:  Tolerating clear liquid diet, without evidence of dysphagia; Initiate TLC 2 3 gm sodium diet with consistent carbohydrate modifier  Maintain 1800 mL daily fluid restriction   Continue stool softeners and prn suppository  Continue GI prophylaxis    6  Endo:    No history of diabetes: Glucose well-controlled  Discontinue continuous insulin infusion and add Insulin sliding scale coverage   Continue Levothyroxine    7  Hematology:   Post-operative acute blood loss anemia; Hemoglobin 10 2; trend prn  Leukocytosis, afebrile   Thrombocytopenia, no active bleeding   Hypochloremia   Hypermagnesia    8   Disposition:  Transfer from ICU to telemetry today    VTE Pharmacologic Prophylaxis: Fondaparinux (Arixtra)  VTE Mechanical Prophylaxis: sequential compression device    Collaborative rounds completed with LINDA Barney , and ICU, RN    SIGNATURE: Yoko Simental PA-C  DATE: January 8, 2019  TIME: 6:53 AM

## 2019-01-09 LAB
ANION GAP SERPL CALCULATED.3IONS-SCNC: 4 MMOL/L (ref 4–13)
BUN SERPL-MCNC: 13 MG/DL (ref 5–25)
CALCIUM SERPL-MCNC: 7.8 MG/DL (ref 8.3–10.1)
CHLORIDE SERPL-SCNC: 105 MMOL/L (ref 100–108)
CO2 SERPL-SCNC: 28 MMOL/L (ref 21–32)
CREAT SERPL-MCNC: 0.73 MG/DL (ref 0.6–1.3)
ERYTHROCYTE [DISTWIDTH] IN BLOOD BY AUTOMATED COUNT: 13.6 % (ref 11.6–15.1)
GFR SERPL CREATININE-BSD FRML MDRD: 85 ML/MIN/1.73SQ M
GLUCOSE SERPL-MCNC: 110 MG/DL (ref 65–140)
GLUCOSE SERPL-MCNC: 112 MG/DL (ref 65–140)
GLUCOSE SERPL-MCNC: 126 MG/DL (ref 65–140)
GLUCOSE SERPL-MCNC: 130 MG/DL (ref 65–140)
GLUCOSE SERPL-MCNC: 133 MG/DL (ref 65–140)
HCT VFR BLD AUTO: 28.4 % (ref 34.8–46.1)
HGB BLD-MCNC: 9 G/DL (ref 11.5–15.4)
MCH RBC QN AUTO: 30.3 PG (ref 26.8–34.3)
MCHC RBC AUTO-ENTMCNC: 31.7 G/DL (ref 31.4–37.4)
MCV RBC AUTO: 96 FL (ref 82–98)
PLATELET # BLD AUTO: 93 THOUSANDS/UL (ref 149–390)
PMV BLD AUTO: 10.4 FL (ref 8.9–12.7)
POTASSIUM SERPL-SCNC: 3.6 MMOL/L (ref 3.5–5.3)
RBC # BLD AUTO: 2.97 MILLION/UL (ref 3.81–5.12)
SODIUM SERPL-SCNC: 137 MMOL/L (ref 136–145)
WBC # BLD AUTO: 11.25 THOUSAND/UL (ref 4.31–10.16)

## 2019-01-09 PROCEDURE — 99232 SBSQ HOSP IP/OBS MODERATE 35: CPT | Performed by: INTERNAL MEDICINE

## 2019-01-09 PROCEDURE — 99024 POSTOP FOLLOW-UP VISIT: CPT | Performed by: THORACIC SURGERY (CARDIOTHORACIC VASCULAR SURGERY)

## 2019-01-09 PROCEDURE — 85027 COMPLETE CBC AUTOMATED: CPT | Performed by: PHYSICIAN ASSISTANT

## 2019-01-09 PROCEDURE — 82948 REAGENT STRIP/BLOOD GLUCOSE: CPT

## 2019-01-09 PROCEDURE — 80048 BASIC METABOLIC PNL TOTAL CA: CPT | Performed by: PHYSICIAN ASSISTANT

## 2019-01-09 RX ORDER — POTASSIUM CHLORIDE 20 MEQ/1
20 TABLET, EXTENDED RELEASE ORAL ONCE
Status: COMPLETED | OUTPATIENT
Start: 2019-01-09 | End: 2019-01-09

## 2019-01-09 RX ORDER — FONDAPARINUX SODIUM 2.5 MG/.5ML
2.5 INJECTION SUBCUTANEOUS EVERY 24 HOURS
Status: DISCONTINUED | OUTPATIENT
Start: 2019-01-09 | End: 2019-01-10 | Stop reason: HOSPADM

## 2019-01-09 RX ORDER — POLYETHYLENE GLYCOL 3350 17 G/17G
17 POWDER, FOR SOLUTION ORAL DAILY
Status: DISCONTINUED | OUTPATIENT
Start: 2019-01-09 | End: 2019-01-10 | Stop reason: HOSPADM

## 2019-01-09 RX ADMIN — FUROSEMIDE 40 MG: 10 INJECTION, SOLUTION INTRAMUSCULAR; INTRAVENOUS at 08:18

## 2019-01-09 RX ADMIN — POTASSIUM CHLORIDE 20 MEQ: 1500 TABLET, EXTENDED RELEASE ORAL at 08:19

## 2019-01-09 RX ADMIN — AMLODIPINE BESYLATE 5 MG: 5 TABLET ORAL at 08:18

## 2019-01-09 RX ADMIN — DOCUSATE SODIUM 100 MG: 100 CAPSULE, LIQUID FILLED ORAL at 17:04

## 2019-01-09 RX ADMIN — POLYETHYLENE GLYCOL 3350 17 G: 17 POWDER, FOR SOLUTION ORAL at 11:20

## 2019-01-09 RX ADMIN — DOCUSATE SODIUM 100 MG: 100 CAPSULE, LIQUID FILLED ORAL at 08:19

## 2019-01-09 RX ADMIN — ATORVASTATIN CALCIUM 10 MG: 10 TABLET, FILM COATED ORAL at 17:04

## 2019-01-09 RX ADMIN — METOPROLOL TARTRATE 12.5 MG: 25 TABLET ORAL at 11:20

## 2019-01-09 RX ADMIN — OXYCODONE HYDROCHLORIDE AND ACETAMINOPHEN 1 TABLET: 5; 325 TABLET ORAL at 11:01

## 2019-01-09 RX ADMIN — FONDAPARINUX SODIUM 2.5 MG: 2.5 INJECTION, SOLUTION SUBCUTANEOUS at 12:40

## 2019-01-09 RX ADMIN — LEVOTHYROXINE SODIUM 125 MCG: 125 TABLET ORAL at 05:10

## 2019-01-09 RX ADMIN — PANTOPRAZOLE SODIUM 40 MG: 40 TABLET, DELAYED RELEASE ORAL at 05:10

## 2019-01-09 RX ADMIN — METOPROLOL TARTRATE 12.5 MG: 25 TABLET ORAL at 21:24

## 2019-01-09 RX ADMIN — POTASSIUM CHLORIDE 20 MEQ: 1500 TABLET, EXTENDED RELEASE ORAL at 12:40

## 2019-01-09 NOTE — RESTORATIVE TECHNICIAN NOTE
Restorative Specialist Mobility Note       Activity: Ambulate in queen, Chair     Assistive Device: Front wheel walker

## 2019-01-09 NOTE — PROGRESS NOTES
Progress Note - Cardiothoracic Surgery   Diogo Heller 76 y o  female MRN: [id number]  Unit/Bed#: [facility] 422-01 Encounter: [id number]  Mitral regurgitation  S/P mitral valve repair; POD # 2    24 Hour Events: No events overnight  No complaints  Ambulating well  Tolerating diet  Passing Flatus No BM  Medications:   Scheduled Meds:  Current Facility-Administered Medications:  acetaminophen 650 mg Oral Q6H PRN Denzel García PA-C   amLODIPine 5 mg Oral Daily Denzel García PA-C   atorvastatin 10 mg Oral Daily With [employer] Denzel García PA-C   docusate sodium 100 mg Oral BID Denzel García PA-C   furosemide 40 mg Intravenous Daily Denzel García PA-C   insulin lispro 1-5 Units Subcutaneous TID AC Tiff Hutson PA-C   insulin lispro 1-5 Units Subcutaneous HS Denzel García PA-C   levothyroxine 125 mcg Oral Early Morning Voncille Brim, JACKI   ondansetron 4 mg Intravenous Q6H PRN Voncille BrimJACKI   oxyCODONE-acetaminophen 1 tablet Oral Q4H PRN Voncille BrimJACKI   oxyCODONE-acetaminophen 2 tablet Oral Q6H PRN Voncille Brim, JACKI   pantoprazole 40 mg Oral Early Morning Denzel García PA-C   potassium chloride 20 mEq Oral Daily Denzel García PA-C   temazepam 15 mg Oral HS PRN Denzel García PA-C     Continuous Infusions:   PRN Meds:   acetaminophen    ondansetron    oxyCODONE-acetaminophen    oxyCODONE-acetaminophen    temazepam    Vitals:   Vitals:    01/08/19 2326 01/09/19 0343 01/09/19 0600 01/09/19 0731   BP: 100/55 120/68  118/72   BP Location: Right arm Right arm  Right arm   Pulse: 71 71  68   Resp: 18 18  18   Temp: 99 3 °F (37 4 °C) 98 1 °F (36 7 °C)  98 1 °F (36 7 °C)   TempSrc: Oral Oral  Oral   SpO2: 93% 92%  97%   Weight:   92 6 kg (204 lb 3 2 oz)    Height:           Telemetry: NSR; Heart Rate: 68    Respiratory:   SpO2: SpO2: 97 %; Room Air    Intake/Output:   I/O       01/07 0701 - 01/08 0700 01/08 0701 - 01/09 0700 01/09 0701 - 01/10 0700    P  O   980     I V  (mL/kg) 4565 7 (49 3) 114 7 (1 2)     IV Piggyback 850 50     Cell Saver 750      Total Intake(mL/kg) 6165 7 (66 5) 1144 7 (12 4)     Urine (mL/kg/hr) 2890 (1 3) 1105 (0 5) 500 (2 4)    Blood 750      Chest Tube 350 160     Total Output 3990 1265 500    Net +2175 7 -120 3 -500                 Chest tube Output:    Mediastinal tubes: 70 mL/8 hours  160 mL/24 hours          Weights:   Weight (last 2 days)     Date/Time   Weight    01/09/19 0600  92 6 (204 2)    01/08/19 0600  92 7 (204 37)    01/07/19 0606  89 8 (198)            Admit weight: 89 8    Results:     Results from last 7 days  Lab Units 01/09/19 0510 01/08/19 0359 01/07/19 2200 01/07/19  1118  01/07/19  1000   WBC Thousand/uL 11 25* 15 13*  --   --   --   --   --    HEMOGLOBIN g/dL 9 0* 10 2* 10 7*  --  10 6*  < >  --    I STAT HEMOGLOBIN   --   --   --   < >  --   < >  --    HEMATOCRIT % 28 4* 31 7* 32 9*  --  33 3*  < >  --    HEMATOCRIT, ISTAT   --   --   --   < >  --   < >  --    PLATELETS Thousands/uL  --  133*  --   --  101*  --  146*   < > = values in this interval not displayed  Results from last 7 days  Lab Units 01/09/19 0510 01/08/19 0359 01/07/19 2200 01/07/19  1140 01/07/19  1118   SODIUM mmol/L 137 142  --   --   --  142   POTASSIUM mmol/L 3 6 4 1 4 5  < >  --  3 2*   CHLORIDE mmol/L 105 111*  --   --   --  111*   CO2 mmol/L 28 24  --   --   --  24   CO2, I-STAT mmol/L  --   --   --   --  26  --    BUN mg/dL 13 13  --   --   --  14   CREATININE mg/dL 0 73 0 73  --   --   --  0 85   GLUCOSE, ISTAT mg/dl  --   --   --   --  146*  --    CALCIUM mg/dL 7 8* 7 9*  --   --   --  7 6*   < > = values in this interval not displayed        Point of care glucose: 130-142    Studies:  No new studies     Invasive Lines/Tubes:  Invasive Devices     Central Venous Catheter Line            CVC Central Lines 01/07/19 Triple 2 days          Peripheral Intravenous Line            Peripheral IV 01/07/19 Left Hand 2 days          Line            Pacer Wires 1 day Pacer Wires 1 day          Drain            Chest Tube 1 Anterior Mediastinal 32 Fr  1 day    Chest Tube 2 Posterior Mediastinal 32 Fr  1 day                Physical Exam:    HEENT/NECK:  PERRLA  No jugular venous distention  Cardiac: Regular rate and rhythm  No rubs/murmurs/gallops  Pulmonary:  Breath sounds slightly diminished at the bases bilaterally  Abdomen:  Non-tender, Non-distended  Positive bowel sounds  Incisions: Sternum is stable  Incision dressed with Acticoat  No erythema or drainage    Lower extremities: Extremities warm/dry  Radial/PT/DP pulses 2+ bilaterally  No edema B/L  Neuro: Alert and oriented X 3  Sensation is grossly intact  No focal deficits  Skin: Warm/Dry, without rashes or lesions  Assessment:  Patient Active Problem List   Diagnosis    Benign essential hypertension    Disc degeneration, lumbar    Dysphagia    Hepatic cyst    Hyperlipidemia    Hypothyroidism    Liver enlargement    Osteoporosis    Severe obstructive sleep apnea    Shoulder impingement    Tinea corporis    Incisional hernia, without obstruction or gangrene    Lumbar radiculopathy    Non-rheumatic mitral regurgitation    Mitral valve prolapse    S/P MVR (mitral valve repair)    Leukocytosis    Thrombocytopenia (HCC)    Hypochloremia       Mitral regurgitation  S/P mitral valve repair; POD # 2    Plan:    1  Cardiac:   NSR; HR/BP well-controlled  Try Lopressor 12 5 mg PO BID  Continue ASA and Statin therapy  Maintain epicardial pacing wires for trial of BB  Maintain central IV access today for today  Continue DVT prophylaxis    2  Pulmonary:   Good Room air oxygen saturation; Continue incentive spirometry/Coughing/Deep breathing exercises  Chest tube output marginal may be able to d/c today     3  Renal:   Intake/Output net: -120 mL/24 hours  Continue diuresis   Lasix 40 mg IV QD  Potassium Chloride 20 mEq PO QD  Post op Creatinine stable; Follow up labs prn    4   Neuro:  Neurologically intact; No active issues  Incisional pain well-controlled; Continue prn Percocet    5  GI:  Tolerating TLC 2 3 gm sodium diet  Maintain 1800 mL daily fluid restriction   Continue stool softeners and prn suppository  Continue GI prophylaxis    6  Endo:    No history of diabetes; Glucose well-controlled with sliding scale coverage    7  Hematology:   Post-operative acute blood loss anemia; Hemoglobin 9; trend prn    8   Disposition:  Anticipate discharge to home when medically ready     VTE Pharmacologic Prophylaxis: Fondaparinux (Arixtra)  VTE Mechanical Prophylaxis: sequential compression device    Collaborative rounds completed with LINDA Richardson , and Miriam Be RN    SIGNATURE: Rashid Ramos  DATE: January 9, 2019  TIME: 9:13 AM

## 2019-01-09 NOTE — PROGRESS NOTES
01/09/19    Procedure: Chest tube removal    Chest tubes removed in routine fashion without incident  Insertion site dressed with Acticoat  Sandy Shipley tolerated the procedure well  Nurse notified  SIGNATURE: Lesly Cervantes PA-C  DATE: January 9, 2019  TIME: 1:39 PM      01/09/19    Procedure: Epicardial Pacing Wire removal    Sandy Adler was returned to bed and informed of mandatory one hour post-procedure bed rest   The assigned nurse was notified  Epicardial pacing wires removed in routine fashion, without incident  The patient tolerated the procedure well  Vital signs ordered  q 15 minutes for one hour, as per protocol      SIGNATURE: Lesly Cervantes PA-C  DATE: January 9, 2019  TIME: 1:39 PM

## 2019-01-09 NOTE — PROGRESS NOTES
General Cardiology Progress Note   [facility] 76 y o  female MRN: [id number]  Unit/Bed#: [facility] 422-01 Encounter: [id number]      Assessment:  Principal Problem:    Non-rheumatic mitral regurgitation  Active Problems:    S/P MVR (mitral valve repair)    Leukocytosis    Thrombocytopenia (HCC)    Hypochloremia      Impression:    Moderate to severe MR  Status post mitral valve repair  Postop day 2  Chest tubes removed  Epicardial wires removed    Postoperative volume overload  Diuresing appropriate    Postoperative arrhythmia suppression  With amiodarone, no rhythm is noted    Plan:    Continue usual postop care  Continue telemetry  Continue diuresis    Subjective:   Patient seen and examined  Some chest soreness remains, no shortness of breath, no lightheadedness, no palpitations, no lower extremity edema, appetite is good    Objective   Vitals: Blood pressure 105/64, pulse 68, temperature 97 8 °F (36 6 °C), temperature source Oral, resp  rate 18, height 5' 5" (1 651 m), weight 92 6 kg (204 lb 3 2 oz), SpO2 (!) 88 %, not currently breastfeeding , Body mass index is 33 98 kg/m² , I/O last 3 completed shifts: In: 2149 5 [P O :980; I V :969 5; IV Piggyback:200]  Out: 8270 [Urine:2380; Chest Tube:330]  I/O this shift:  In: 380 [P O :380]  Out: 1346 [Urine:1575]  Wt Readings from Last 3 Encounters:   01/09/19 92 6 kg (204 lb 3 2 oz)   12/13/18 91 1 kg (200 lb 14 4 oz)   11/30/18 90 4 kg (199 lb 3 2 oz)       Intake/Output Summary (Last 24 hours) at 01/09/19 1634  Last data filed at 01/09/19 1407   Gross per 24 hour   Intake              580 ml   Output             2335 ml   Net            -1755 ml     I/O last 3 completed shifts: In: 2149 5 [P O :980; I V :969 5; IV Piggyback:200]  Out: 8418 [Urine:2380; Chest Tube:330]    No significant arrhythmias seen on telemetry review       Physical Exam:    GEN: [facility] appears well, alert and oriented x 3, pleasant and cooperative   NECK: supple, no carotid bruits, no JVD or HJR  HEART: normal rate, regular rhythm, normal S1 and S2, no murmurs, clicks, gallops or rubs   LUNGS: clear to auscultation bilaterally; no wheezes, rales, or rhonchi   ABDOMEN: normal bowel sounds, soft, no tenderness, no distention  EXTREMITIES: peripheral pulses normal; no clubbing, cyanosis, or edema      Meds/Allergies   Allergies   Allergen Reactions    Other Other (See Comments)     Skin breakdown from bandaid on for long time- reddness       Current Facility-Administered Medications:     acetaminophen (TYLENOL) tablet 650 mg, 650 mg, Oral, Q6H PRN, Rene Land PA-C    amLODIPine (NORVASC) tablet 5 mg, 5 mg, Oral, Daily, Rene Land PA-C, 5 mg at 01/09/19 0818    atorvastatin (LIPITOR) tablet 10 mg, 10 mg, Oral, Daily With Dinner, Rene Land PA-C, 10 mg at 01/08/19 1719    docusate sodium (COLACE) capsule 100 mg, 100 mg, Oral, BID, Rene Land PA-C, 100 mg at 01/09/19 0819    fondaparinux (ARIXTRA) subcutaneous injection 2 5 mg, 2 5 mg, Subcutaneous, Q24H, Radha Wagner PA-C, 2 5 mg at 01/09/19 1240    furosemide (LASIX) injection 40 mg, 40 mg, Intravenous, Daily, Rene Land PA-C, 40 mg at 01/09/19 0818    insulin lispro (HumaLOG) 100 units/mL subcutaneous injection 1-5 Units, 1-5 Units, Subcutaneous, TID AC **AND** Fingerstick Glucose (POCT), , , TID AC, Rene Land PA-C    insulin lispro (HumaLOG) 100 units/mL subcutaneous injection 1-5 Units, 1-5 Units, Subcutaneous, HS, Rene Land PA-C    levothyroxine tablet 125 mcg, 125 mcg, Oral, Early Morning, Wally Boggs PA-C, 125 mcg at 01/09/19 0510    metoprolol tartrate (LOPRESSOR) partial tablet 12 5 mg, 12 5 mg, Oral, Q12H [facility], Radha Morelos PA-C, 12 5 mg at 01/09/19 1120    ondansetron (ZOFRAN) injection 4 mg, 4 mg, Intravenous, Q6H PRN, Wally Boggs PA-C, 4 mg at 01/08/19 0410    oxyCODONE-acetaminophen (PERCOCET) 5-325 mg per tablet 1 tablet, 1 tablet, Oral, Q4H PRN, Wally Boggs, JACKI, 1 tablet at 01/09/19 1101    oxyCODONE-acetaminophen (PERCOCET) 5-325 mg per tablet 2 tablet, 2 tablet, Oral, Q6H PRN, Hany Mathur PA-C    pantoprazole (PROTONIX) EC tablet 40 mg, 40 mg, Oral, Early Morning, Carmen Obrien PA-C, 40 mg at 01/09/19 0510    polyethylene glycol (MIRALAX) packet 17 g, 17 g, Oral, Daily, Manju SorianoJACKI shields, 17 g at 01/09/19 1120    potassium chloride (K-DUR,KLOR-CON) CR tablet 20 mEq, 20 mEq, Oral, Daily, Carmen Obrien PA-C, 20 mEq at 01/09/19 0819    temazepam (RESTORIL) capsule 15 mg, 15 mg, Oral, HS PRN, Carmen Obrien PA-C    Laboratory Results:        CBC with diff:   Results from last 7 days  Lab Units 01/09/19  0510 01/08/19  0359 01/07/19  2200 01/07/19  1140 01/07/19  1118 01/07/19  1027 01/07/19  1000 01/07/19  0956   WBC Thousand/uL 11 25* 15 13*  --   --   --   --   --   --    HEMOGLOBIN g/dL 9 0* 10 2* 10 7*  --  10 6*  --   --   --    I STAT HEMOGLOBIN g/dl  --   --   --  10 2*  --  8 2*  --  7 8*   HEMATOCRIT % 28 4* 31 7* 32 9*  --  33 3*  --   --   --    HEMATOCRIT, ISTAT %  --   --   --  30*  --  24*  --  23*   MCV fL 96 94  --   --   --   --   --   --    PLATELETS Thousands/uL 93* 133*  --   --  101*  --  146*  --    MCH pg 30 3 30 2  --   --   --   --   --   --    MCHC g/dL 31 7 32 2  --   --   --   --   --   --    RDW % 13 6 13 3  --   --   --   --   --   --    MPV fL 10 4 11 1  --   --  9 2  --  9 9  --        CMP:  Results from last 7 days  Lab Units 01/09/19  0510 01/08/19  0359 01/07/19  2200 01/07/19  1545 01/07/19  1140 01/07/19  1118 01/07/19  1027 01/07/19  0956 01/07/19  0932 01/07/19  0909 01/07/19  0820   POTASSIUM mmol/L 3 6 4 1 4 5 3 3*  --  3 2*  --   --   --   --   --    CHLORIDE mmol/L 105 111*  --   --   --  111*  --   --   --   --   --    CO2 mmol/L 28 24  --   --   --  24  --   --   --   --   --    CO2, I-STAT mmol/L  --   --   --   --  26  --  27 29 28 29 27   BUN mg/dL 13 13  --   --   --  14  --   --   --   --   -- CREATININE mg/dL 0 73 0 73  --   --   --  0 85  --   --   --   --   --    GLUCOSE, ISTAT mg/dl  --   --   --   --  146*  --  166* 195* 154* 105 112   CALCIUM mg/dL 7 8* 7 9*  --   --   --  7 6*  --   --   --   --   --    EGFR ml/min/1 73sq m 85 85  --   --   --  71  --   --   --   --   --        BMP:  Results from last 7 days  Lab Units 01/09/19  0510 01/08/19  0359 01/07/19  2200 01/07/19  1545 01/07/19  1140 01/07/19  1118 01/07/19  1027 01/07/19  0956 01/07/19  0932   POTASSIUM mmol/L 3 6 4 1 4 5 3 3*  --  3 2*  --   --   --    CHLORIDE mmol/L 105 111*  --   --   --  111*  --   --   --    CO2 mmol/L 28 24  --   --   --  24  --   --   --    CO2, I-STAT mmol/L  --   --   --   --  26  --  27 29 28   BUN mg/dL 13 13  --   --   --  14  --   --   --    CREATININE mg/dL 0 73 0 73  --   --   --  0 85  --   --   --    GLUCOSE, ISTAT mg/dl  --   --   --   --  146*  --  166* 195* 154*   CALCIUM mg/dL 7 8* 7 9*  --   --   --  7 6*  --   --   --        NT-proBNP: No results for input(s): NTBNP in the last 72 hours       Magnesium:   Results from last 7 days  Lab Units 01/08/19  0359   MAGNESIUM mg/dL 2 7*       Coags:       TSH:        Hemoglobin A1C )      Lipid Profile:   Lab Results   Component Value Date    CHOL 159 09/19/2017    CHOL 163 05/03/2017    CHOL 146 12/12/2016     Lab Results   Component Value Date    HDL 67 (H) 12/31/2018    HDL 66 (H) 12/21/2018    HDL 74 09/28/2018     Lab Results   Component Value Date    LDLCALC 80 12/31/2018    LDLCALC 85 12/21/2018    LDLCALC 58 04/24/2018     Lab Results   Component Value Date    LDLDIRECT 110 04/05/2016    LDLDIRECT 105 09/16/2015    LDLDIRECT 98 12/09/2013     Lab Results   Component Value Date    TRIG 104 12/31/2018    TRIG 96 12/21/2018    TRIG 105 09/28/2018       Cardiac testing:     Results for orders placed during the hospital encounter of 11/20/18   Echo complete with contrast if indicated    Narrative [address]  [address] [facility]  [phone]    Transthoracic Echocardiogram  2D, M-mode, Doppler, and Color Doppler    Study date:  2018    Patient: Martin Salgado  MR number: [id number]  Account number: [de-identified]  : 1950  Age: 76 years  Gender: Female  Status: Outpatient  Location: Echo lab  Height: 65 in  Weight: 200 lb  BP: 140/ 90 mmHg    Indications: Murmur    Diagnoses: R01 1 - Cardiac murmur, unspecified    Sonographer:  ZEV Metz  Primary Physician:  Amina Gallagher MD  Referring Physician:  Amina Gallagher MD  Group:  [facility]  Cardiology Fellow:  Dale Ling MD  Interpreting Physician:  Lakeisha Guardado MD    SUMMARY    LEFT VENTRICLE:  Size was normal   Systolic function was normal  Ejection fraction was estimated to be 65 %  There were no regional wall motion abnormalities  RIGHT VENTRICLE:  The size was normal   Systolic function was normal     MITRAL VALVE:  There was holosystolic prolapse involving the posterior leaflet  There was severe regurgitation  The regurgitant jet was eccentric and directed anteriorly  The effective orifice of mitral regurgitation by proximal isovelocity surface area was 0 48 cm squared  The volume of mitral regurgitation by proximal isovelocity surface area was 93 ml  PULMONARY VEINS:  There was systolic flow reversal in the pulmonary vein(s), indicative of severe mitral regurgitation  RECOMMENDATIONS:  Discussed with the patient and the patient's primary care for out patient cardiology follow-up  The attending physician was notified of these results  HISTORY: PRIOR HISTORY: HTN, HLD, IRINA    PROCEDURE: The procedure was performed in the echo lab  This was a routine study  The transthoracic approach was used  The study included complete 2D imaging, M-mode, complete spectral Doppler, and color Doppler  The heart rate was 65 bpm,  at the start of the study   Images were obtained from the parasternal, apical, subcostal, and suprasternal notch acoustic windows  Image quality was adequate  LEFT VENTRICLE: Size was normal  Systolic function was normal  Ejection fraction was estimated to be 65 %  There were no regional wall motion abnormalities  Wall thickness was normal  DOPPLER: Left ventricular diastolic function parameters  were normal     RIGHT VENTRICLE: The size was normal  Systolic function was normal     LEFT ATRIUM: The atrium was mildly to moderately dilated  RIGHT ATRIUM: Size was normal     MITRAL VALVE: There was mild annular calcification  There was mild diffuse thickening of the valve  There was holosystolic prolapse involving the posterior leaflet  DOPPLER: There was no evidence for stenosis  There was severe  regurgitation  The regurgitant jet was eccentric and directed anteriorly  AORTIC VALVE: The valve was trileaflet  Leaflets exhibited normal thickness and normal cuspal separation  DOPPLER: There was no evidence for stenosis  There was no regurgitation  TRICUSPID VALVE: The valve structure was normal  There was normal leaflet separation  DOPPLER: There was no evidence for stenosis  There was mild regurgitation  Estimated peak PA pressure was 35 mmHg  PULMONIC VALVE: DOPPLER: The transpulmonic velocity was within the normal range  There was no evidence for stenosis  There was trace regurgitation  PERICARDIUM: There was no pericardial effusion  The pericardium was normal in appearance  AORTA: The root exhibited normal size  SYSTEMIC VEINS: IVC: The inferior vena cava was normal in size and course  Respirophasic changes were normal     PULMONARY VEINS: DOPPLER: There was systolic flow reversal in the pulmonary vein(s), indicative of severe mitral regurgitation      MEASUREMENT TABLES    DOPPLER MEASUREMENTS  Mitral valve   (Reference normals)  Regurg alias darrick   38 cm/s   (--)  Regurg PISA radius   11 mm   (--)  Max MR darrick   598 cm/s   (--)  Regurg VTI   194 cm   (--)  Max MR flow rate   288 9 ml/s   (--)  Area, ERO, PISA   0 48 cm squared   (--)  Regurg vol, PISA   93 ml   (--)    SYSTEM MEASUREMENT TABLES    2D  %FS: 40 38 %  Ao Diam: 2 74 cm  EDV(Teich): 118 21 ml  EF(Cube): 78 81 %  EF(Teich): 70 86 %  ESV(Cube): 26 48 ml  ESV(Teich): 34 45 ml  IVSd: 1 08 cm  LA Area: 27 71 cm2  LA Diam: 4 4 cm  LVEDV MOD A4C: 121 26 ml  LVEF MOD A4C: 62 99 %  LVESV MOD A4C: 44 88 ml  LVIDd: 5 cm  LVIDs: 2 98 cm  LVLd A4C: 8 01 cm  LVLs A4C: 6 37 cm  LVPWd: 1 1 cm  RA Area: 16 49 cm2  SV MOD A4C: 76 38 ml  SV(Cube): 98 48 ml  SV(Teich): 83 76 ml  rv diam: 3 86 cm    CF  MR Als  Keven: 0 38 m/s  MR Flow: 393 24 ml/s  MR Rad: 1 28 cm    CW  TR Vmax: 2 65 m/s  TR maxP 18 mmHg    MM  TAPSE: 2 23 cm    PW  E': 0 09 m/s  E/E': 13 56  MV A Keven: 0 81 m/s  MV Dec [place]: 7 83 m/s2  MV DecT: 158 22 ms  MV E Keven: 1 24 m/s  MV E/A Ratio: 1 53    [facility]    Prepared and electronically signed by    Santi Taylor MD  Signed [id number] 11:47:14       Results for orders placed during the hospital encounter of 18   DORI    Narrative [address]  [address] [facility]  [phone]    Transesophageal Echocardiogram  2D, 3D, M-mode, Doppler, and Color Doppler    Study date:  03-Dec-2018    Patient: Js Faust  MR number: [id number]  Account number: [de-identified]  : 1950  Age: 76 years  Gender: Female  Status: Outpatient  Location: Echo lab  Height: 66 in  Weight: 200 lb  BP: 118/ 72 mmHg    Indications: MVP    Diagnoses: I34 1 - Nonrheumatic mitral (valve) prolapse    Sonographer:  Floresita Raring, RCS  Interpreting Physician:  Michel Blanchard MD  Primary Physician:  Krishna Pimentel MD  Referring Physician:  Elida Mckee MD  Group:  [facility]  Cardiology Fellow:  Ofelia Gore MD  RN:  Tushar Nascimento RN    SUMMARY    LEFT VENTRICLE:  Size was normal   The end systolic dimension was 30 mm  Systolic function was normal  Ejection fraction was estimated to be 60 %  There were no regional wall motion abnormalities  LEFT ATRIUM:  The atrium was mildly dilated  ATRIAL SEPTUM:  No defect or patent foramen ovale was identified by color Doppler  MITRAL VALVE:  There was a marked, holosystolic prolapse involving the medial scallop of the posterior leaflet  The maximum prolapse dimension was 8 mm  There was severe regurgitation  PISA could not be done due to the eccentric nature of the regurgitation jet  The regurgitant jet was eccentric and directed anteriorly  The mitral regurgitant volume (by LVOT continuity) was 145 ml  The mitral regurgitant volume-regurgitation fraction (by LVOT continuity) was 68 %  TRICUSPID VALVE:  There was mild to moderate regurgitation  Pulmonary artery systolic pressure was within the normal range  PULMONARY VEINS:  There was systolic blunting in the pulmonary vein(s), indicative of significant mitral regurgitation  There was no flow reversal seen and this could be due to low blood pressure of the patient during the study  HISTORY: PRIOR HISTORY: HTN, HLD, IRINA    PROCEDURE: The procedure was performed in the echo lab  This was a routine study  The risks and alternatives of the procedure were explained to the patient and informed consent was obtained  The transesophageal approach was used  The study  included complete 2D imaging, 3D imaging, M-mode, complete spectral Doppler, and color Doppler  The heart rate was 68 bpm, at the start of the study  An adult omniplane probe was inserted by the cardiology fellow under direct supervision  of the attending cardiologist  Intubated with ease  One intubation attempt(s)  There was no blood detected on the probe  Image quality was adequate  There were no complications during the procedure   MEDICATIONS: Anesthesia administered by  anesthesia team     LEFT VENTRICLE: Size was normal   The end systolic dimension was 30 mm  Systolic function was normal  Ejection fraction was estimated to be 60 %  There were no regional wall motion abnormalities  Wall thickness was normal     RIGHT VENTRICLE: The size was normal  Systolic function was normal     LEFT ATRIUM: The atrium was mildly dilated  No thrombus was identified  APPENDAGE: The size was normal  No thrombus was identified  DOPPLER: The function was normal (normal emptying velocity)  ATRIAL SEPTUM: No defect or patent foramen ovale was identified by color Doppler  RIGHT ATRIUM: Size was normal  No thrombus was identified  MITRAL VALVE: There was normal leaflet separation  There was a marked, holosystolic prolapse involving the medial scallop of the posterior leaflet  The maximum prolapse dimension was 8 mm  There was no echocardiographic evidence of  vegetation  DOPPLER: The transmitral velocity was within the normal range  There was no evidence for stenosis  There was severe regurgitation  PISA could not be done due to the eccentric nature of the regurgitation jet  The regurgitant jet was eccentric and directed anteriorly  AORTIC VALVE: The valve was trileaflet  Leaflets exhibited normal thickness and normal cuspal separation  DOPPLER: There was no regurgitation  TRICUSPID VALVE: The valve structure was normal  There was normal leaflet separation  There was no echocardiographic evidence of vegetation  DOPPLER: There was mild to moderate regurgitation  The regurgitant jet was toward the septum  Pulmonary artery systolic pressure was within the normal range  Estimated peak PA pressure was 26 mmHg  PULMONIC VALVE: Leaflets exhibited normal thickness, no calcification, and normal cuspal separation  DOPPLER: There was no significant regurgitation  PERICARDIUM: There was no pericardial effusion  The pericardium was normal in appearance  AORTA: The root exhibited normal size  There was no atheroma  There was no evidence for dissection  There was no evidence for aneurysm  PULMONARY VEINS: DOPPLER: There was systolic blunting in the pulmonary vein(s), indicative of significant mitral regurgitation  There was no flow reversal seen and this could be due to low blood pressure of the patient during the study  MEASUREMENT TABLES    2D MEASUREMENTS  LVOT   (Reference normals)  Diam   21 mm   (--)  Mitral valve   (Reference normals)  Mean annulus diam   33 mm   (--)    DOPPLER MEASUREMENTS  LVOT   (Reference normals)  VTI   20 cm   (--)  Stroke vol   69 27 ml   (--)  Stroke index   0 35 ml/m squared   (--)  Mitral valve   (Reference normals)  VTI at annulus   25 cm   (--)  Vol, (flow)   213 82 ml   (--)  Regurg vol, LVOT cont   145 ml   (--)  RF, LVOT cont   68 %   (--)  Area, ERO, LVOT cont   5 8 cm squared   (--)    [facility] Accredited Echocardiography Laboratory    Prepared and electronically signed by    Michel Blanchard MD  Signed 03-Dec-2018 16:47:18       No results found for this or any previous visit  No results found for this or any previous visit  No results found for this or any previous visit  No results found for this or any previous visit  Michel Blanchard MD    Portions of the record may have been created with voice recognition software   Occasional wrong word or "sound a like" substitutions may have occurred due to the inherent limitations of voice recognition software   Read the chart carefully and recognize, using context, where substitutions have occurred

## 2019-01-09 NOTE — PHYSICAL THERAPY NOTE
Physical Therapy Evaluation    Patient's Name: Fran Hodgkins    Admitting Diagnosis  Non-rheumatic mitral regurgitation [I34 0]    Problem List  Patient Active Problem List   Diagnosis    Benign essential hypertension    Disc degeneration, lumbar    Dysphagia    Hepatic cyst    Hyperlipidemia    Hypothyroidism    Liver enlargement    Osteoporosis    Severe obstructive sleep apnea    Shoulder impingement    Tinea corporis    Incisional hernia, without obstruction or gangrene    Lumbar radiculopathy    Non-rheumatic mitral regurgitation    Mitral valve prolapse    S/P MVR (mitral valve repair)    Leukocytosis    Thrombocytopenia ([address] )    Hypochloremia       Past Medical History  Past Medical History:   Diagnosis Date    Cystic breast     Dyspareunia, female     last assessed 9/4/14    Hyperlipidemia     Hypothyroidism     Insomnia     IRINA on CPAP     Osteoporosis     Severe mitral regurgitation     Varicose veins of lower extremity     last assessed 1/4/13       Past Surgical History  Past Surgical History:   Procedure Laterality Date    COLONOSCOPY      complete 5/2016 - Dr Monica Emmanuel due in 2019 - last assessed  3/17/17    HERNIA REPAIR      LIVER BIOPSY LAPAROSCOPIC N/A 5/7/2018    Procedure: Laparoscopic marsupialization of liver cyst;  Surgeon: Gabe Palma MD;  [facility];  Service: Surgical Oncology    NEUROMA EXCISION      AK ECHO TRANSESOPHAG [address] N/A 1/7/2019    Procedure: TRANSESOPHAGEAL ECHOCARDIOGRAM (DORI); Surgeon: Samira Khoury MD;  [facility];  Service: Cardiac Surgery    AK ESOPHAGOGASTRODUODENOSCOPY TRANSORAL DIAGNOSTIC N/A 8/10/2016    Procedure: ESOPHAGOGASTRODUODENOSCOPY (EGD); Surgeon: Kaitlyn Peterson MD;  [facility]: [facility]; Service: Gastroenterology    AK ESOPHAGOSCOPY FLEXIBLE TRANSORAL WITH BIOPSY N/A 11/3/2016    Procedure: ESOPHAGOGASTRODUODENOSCOPY (EGD);   Surgeon: Susu Marina MD;  [facility]; Service: Thoracic    MI LAP, REPAIR PARAESOPHAGEAL HERNIA, INCL FUNDOPLASTY W/ MESH Left 11/3/2016    Procedure: LAPAROSCOPIC PARAESOPHAGEAL HERNIA REPAIR WITH MESH;NISSEN FUNDOPLICATION ;  Surgeon: Riki Perla MD;  [facility];  Service: Thoracic    MI LAP, VENTRAL HERNIA REPAIR,REDUCIBLE N/A 10/30/2017    Procedure: LAPAROSCOPIC INCISIONAL HERNIA REPAIR X 2 WITH ECHO MESH;  Surgeon: Yaneyl Carrington DO;  [facility];  Service: General    MI REPAIR INCISIONAL HERNIA,REDUCIBLE N/A 1/13/2017    Procedure: INCISIONAL HERNIA REPAIR ;  Surgeon: Yanely Carrington DO;  [facility];  Service: General    MI REPLACEMENT OF MITRAL VALVE N/A 1/7/2019    Procedure: REPLACEMENT VALVE MITRAL (MVR), repair with 30mm CG Future ring;  Surgeon: Nikole Platt MD;  [facility];  Service: Cardiac Surgery    TUBAL LIGATION            01/08/19 1530   Note Type   Note type Eval only   Pain Assessment   Pain Assessment 0-10   Pain Score 5   Pain Type Acute pain;Surgical pain   Pain Location Chest;Incision   Patient's Stated Pain Goal No pain   Hospital Pain Intervention(s) Ambulation/increased activity   Response to Interventions unchanged   Home Living   Type of Home House   Additional Comments Resides w/  in 2 story home  Normally indep slelf care  ambulates w/ out device     Prior Function   Level of [place] Independent with ADLs and functional mobility   Falls in the last 6 months 0   Restrictions/Precautions   Weight Bearing Precautions Per Order No   Other Precautions Cardiac/sternal;Pain   General   Family/Caregiver Present Yes   Cognition   Overall Cognitive Status WFL   Arousal/Participation Alert   Orientation Level Oriented X4   Memory Unable to assess   Following Commands Follows all commands and directions without difficulty   RLE Assessment   RLE Assessment (strength grossly 4-/5)   LLE Assessment   LLE Assessment (strength grossly 4-/5)   Transfers   Sit to Stand 5 Supervision   Additional items Assist x 1   Stand to Sit 5  Supervision   Additional items Assist x 1   Ambulation/Elevation   Gait pattern (slow, no LOB)   Gait Assistance 5  Supervision   Additional items Assist x 1   Assistive Device Rolling walker   Distance 560'   Balance   Static Sitting Normal   Dynamic Sitting Good   Static Standing Good   Dynamic Standing Good   Ambulatory Good   Endurance Deficit   Endurance Deficit No   Activity Tolerance   Activity Tolerance Patient tolerated treatment well;Patient limited by pain   Nurse Made Aware yes   Assessment   Prognosis Good   Assessment Pt seen for moderate complexity physical therapy evaluation  Pt is a 75 y/o female w/ history/comorbidities of cystic breast, dysphagia, HLD, OP, IRINA, HTN who is now admitted w/ known asymptomatic severe mitral regurgitation now admitted for elective MVR done 1/7/19  Due to post op pain, need for post op ICU monitoring, note evolving clinical picture  PT consulted to assess post op mobility  At present time, note pt to be functioning at S/mod I level  No continued skilled acute care PT needs identified  will sign off  Pt may mobilize w/ P4 restorative while here, and may d/c home w/ family when stable    consider RW if still using at time of d/c   Plan   PT Frequency (d/c PT)   Recommendation   Equipment Recommended (RW if still using at d/c)   PT - OK to Discharge (when stable to home, RW if still using at d/c)   Modified [place] Scale   Modified Dereck Scale 4   Barthel Index   Feeding 10   Bathing 5   Grooming Score 5   Dressing Score 5   Bladder Score 10   Bowels Score 10   Toilet Use Score 10   Transfers (Bed/Chair) Score 15   Mobility (Level Surface) Score 10   Stairs Score 5   Barthel Index Score 85         El Velasquez PT, DPT, CSRS

## 2019-01-10 VITALS
BODY MASS INDEX: 33.34 KG/M2 | SYSTOLIC BLOOD PRESSURE: 138 MMHG | DIASTOLIC BLOOD PRESSURE: 83 MMHG | RESPIRATION RATE: 18 BRPM | HEIGHT: 65 IN | OXYGEN SATURATION: 96 % | HEART RATE: 66 BPM | WEIGHT: 200.1 LBS | TEMPERATURE: 98.4 F

## 2019-01-10 LAB
ANION GAP SERPL CALCULATED.3IONS-SCNC: 5 MMOL/L (ref 4–13)
BUN SERPL-MCNC: 11 MG/DL (ref 5–25)
CALCIUM SERPL-MCNC: 8.4 MG/DL (ref 8.3–10.1)
CHLORIDE SERPL-SCNC: 105 MMOL/L (ref 100–108)
CO2 SERPL-SCNC: 29 MMOL/L (ref 21–32)
CREAT SERPL-MCNC: 0.66 MG/DL (ref 0.6–1.3)
ERYTHROCYTE [DISTWIDTH] IN BLOOD BY AUTOMATED COUNT: 13.4 % (ref 11.6–15.1)
GFR SERPL CREATININE-BSD FRML MDRD: 91 ML/MIN/1.73SQ M
GLUCOSE SERPL-MCNC: 104 MG/DL (ref 65–140)
GLUCOSE SERPL-MCNC: 105 MG/DL (ref 65–140)
GLUCOSE SERPL-MCNC: 93 MG/DL (ref 65–140)
GLUCOSE SERPL-MCNC: 97 MG/DL (ref 65–140)
HCT VFR BLD AUTO: 36.6 % (ref 34.8–46.1)
HGB BLD-MCNC: 11.2 G/DL (ref 11.5–15.4)
MCH RBC QN AUTO: 29.5 PG (ref 26.8–34.3)
MCHC RBC AUTO-ENTMCNC: 30.6 G/DL (ref 31.4–37.4)
MCV RBC AUTO: 96 FL (ref 82–98)
PLATELET # BLD AUTO: 91 THOUSANDS/UL (ref 149–390)
PMV BLD AUTO: 10 FL (ref 8.9–12.7)
POTASSIUM SERPL-SCNC: 3.8 MMOL/L (ref 3.5–5.3)
RBC # BLD AUTO: 3.8 MILLION/UL (ref 3.81–5.12)
SODIUM SERPL-SCNC: 139 MMOL/L (ref 136–145)
WBC # BLD AUTO: 6.11 THOUSAND/UL (ref 4.31–10.16)

## 2019-01-10 PROCEDURE — 80048 BASIC METABOLIC PNL TOTAL CA: CPT | Performed by: PHYSICIAN ASSISTANT

## 2019-01-10 PROCEDURE — 99232 SBSQ HOSP IP/OBS MODERATE 35: CPT | Performed by: INTERNAL MEDICINE

## 2019-01-10 PROCEDURE — 82948 REAGENT STRIP/BLOOD GLUCOSE: CPT

## 2019-01-10 PROCEDURE — 99024 POSTOP FOLLOW-UP VISIT: CPT | Performed by: THORACIC SURGERY (CARDIOTHORACIC VASCULAR SURGERY)

## 2019-01-10 PROCEDURE — 85027 COMPLETE CBC AUTOMATED: CPT | Performed by: PHYSICIAN ASSISTANT

## 2019-01-10 RX ORDER — POTASSIUM CHLORIDE 750 MG/1
10 TABLET, EXTENDED RELEASE ORAL DAILY
Qty: 5 TABLET | Refills: 1 | Status: SHIPPED | OUTPATIENT
Start: 2019-01-11 | End: 2019-01-18 | Stop reason: ALTCHOICE

## 2019-01-10 RX ORDER — AMLODIPINE BESYLATE 5 MG/1
5 TABLET ORAL DAILY
Qty: 30 TABLET | Refills: 2 | Status: SHIPPED | OUTPATIENT
Start: 2019-01-10 | End: 2019-02-19 | Stop reason: SDUPTHER

## 2019-01-10 RX ORDER — OXYCODONE HYDROCHLORIDE AND ACETAMINOPHEN 5; 325 MG/1; MG/1
TABLET ORAL
Qty: 60 TABLET | Refills: 0 | Status: SHIPPED | OUTPATIENT
Start: 2019-01-10 | End: 2019-01-18

## 2019-01-10 RX ORDER — ASPIRIN 325 MG
325 TABLET ORAL DAILY
Status: DISCONTINUED | OUTPATIENT
Start: 2019-01-10 | End: 2019-01-10 | Stop reason: HOSPADM

## 2019-01-10 RX ORDER — ASPIRIN 325 MG
325 TABLET ORAL DAILY
Qty: 30 TABLET | Refills: 2 | Status: SHIPPED | OUTPATIENT
Start: 2019-01-11 | End: 2019-08-29

## 2019-01-10 RX ORDER — POLYETHYLENE GLYCOL 3350 17 G/17G
17 POWDER, FOR SOLUTION ORAL DAILY
Qty: 30 EACH | Refills: 0 | Status: SHIPPED | OUTPATIENT
Start: 2019-01-11 | End: 2019-01-18

## 2019-01-10 RX ORDER — ACETAMINOPHEN 325 MG/1
TABLET ORAL
Qty: 30 TABLET | Refills: 0 | COMMUNITY
Start: 2019-01-10 | End: 2019-08-29

## 2019-01-10 RX ORDER — ATORVASTATIN CALCIUM 10 MG/1
10 TABLET, FILM COATED ORAL DAILY
Qty: 30 TABLET | Refills: 2 | Status: SHIPPED | OUTPATIENT
Start: 2019-01-10 | End: 2019-09-27 | Stop reason: SDUPTHER

## 2019-01-10 RX ORDER — POTASSIUM CHLORIDE 20 MEQ/1
20 TABLET, EXTENDED RELEASE ORAL ONCE
Status: COMPLETED | OUTPATIENT
Start: 2019-01-10 | End: 2019-01-10

## 2019-01-10 RX ORDER — TORSEMIDE 10 MG/1
10 TABLET ORAL DAILY
Qty: 5 TABLET | Refills: 1 | Status: SHIPPED | OUTPATIENT
Start: 2019-01-10 | End: 2019-01-18 | Stop reason: ALTCHOICE

## 2019-01-10 RX ORDER — SENNOSIDES 8.6 MG
1 TABLET ORAL
Status: DISCONTINUED | OUTPATIENT
Start: 2019-01-10 | End: 2019-01-10 | Stop reason: HOSPADM

## 2019-01-10 RX ORDER — DOCUSATE SODIUM 100 MG/1
100 CAPSULE, LIQUID FILLED ORAL 2 TIMES DAILY
Qty: 60 CAPSULE | Refills: 0 | Status: SHIPPED | OUTPATIENT
Start: 2019-01-10 | End: 2019-01-18

## 2019-01-10 RX ORDER — PANTOPRAZOLE SODIUM 40 MG/1
40 TABLET, DELAYED RELEASE ORAL
Qty: 30 TABLET | Refills: 0 | Status: SHIPPED | OUTPATIENT
Start: 2019-01-11 | End: 2019-04-04 | Stop reason: ALTCHOICE

## 2019-01-10 RX ADMIN — METOPROLOL TARTRATE 12.5 MG: 25 TABLET ORAL at 08:08

## 2019-01-10 RX ADMIN — SENNOSIDES 8.6 MG: 8.6 TABLET, FILM COATED ORAL at 10:14

## 2019-01-10 RX ADMIN — PANTOPRAZOLE SODIUM 40 MG: 40 TABLET, DELAYED RELEASE ORAL at 05:11

## 2019-01-10 RX ADMIN — FONDAPARINUX SODIUM 2.5 MG: 2.5 INJECTION, SOLUTION SUBCUTANEOUS at 12:41

## 2019-01-10 RX ADMIN — DOCUSATE SODIUM 100 MG: 100 CAPSULE, LIQUID FILLED ORAL at 08:08

## 2019-01-10 RX ADMIN — POTASSIUM CHLORIDE 20 MEQ: 1500 TABLET, EXTENDED RELEASE ORAL at 08:08

## 2019-01-10 RX ADMIN — AMLODIPINE BESYLATE 5 MG: 5 TABLET ORAL at 08:08

## 2019-01-10 RX ADMIN — POTASSIUM CHLORIDE 20 MEQ: 1500 TABLET, EXTENDED RELEASE ORAL at 10:15

## 2019-01-10 RX ADMIN — LEVOTHYROXINE SODIUM 125 MCG: 125 TABLET ORAL at 05:11

## 2019-01-10 RX ADMIN — ASPIRIN 325 MG ORAL TABLET 325 MG: 325 PILL ORAL at 12:41

## 2019-01-10 RX ADMIN — FUROSEMIDE 40 MG: 10 INJECTION, SOLUTION INTRAMUSCULAR; INTRAVENOUS at 08:08

## 2019-01-10 RX ADMIN — OXYCODONE HYDROCHLORIDE AND ACETAMINOPHEN 1 TABLET: 5; 325 TABLET ORAL at 04:55

## 2019-01-10 RX ADMIN — POLYETHYLENE GLYCOL 3350 17 G: 17 POWDER, FOR SOLUTION ORAL at 08:08

## 2019-01-10 NOTE — DISCHARGE INSTRUCTIONS
Sternal Precautions   WHAT YOU NEED TO KNOW:   What are sternal precautions? Sternal precautions are used to help protect your sternum (breastbone) after open chest surgery  Wires are placed during surgery to hold the sternum together as it heals  Sternal precautions help prevent the wires from cutting through the sternum  The precautions also help prevent the sternum from coming apart from an injury, and prevent pain and bleeding  You may need to use the precautions for up to 12 weeks after surgery  Your surgeon will give you specific instructions based on the type of surgery you had  It is important to follow the instructions carefully  An injury to the healing sternum can be life-threatening  What are some general sternal precautions? Start slowly and do more as you get stronger  Pain medicine might make it harder for you to know when to slow down or be careful  Stop immediately if you hear a crunch or pop in your sternum  · Protect your sternum  Hug a pillow to your chest or cross your arms over your chest when you laugh, sneeze, or cough  · Be careful when you get into or out of a chair or bed  Hug a pillow or cross your arms when you stand or sit  Do not twist as you move  Use only your legs to sit and stand  You may need to use a raised toilet seat if you have trouble standing up without using your arms  Your healthcare provider may teach you to use your elbow for support as you move from lying to sitting  · Ask when you may take a bath or shower  You may need to use a bath chair if you have trouble getting into or out of the tub  Do not use a grab bar  · Do not lift or carry anything heavier than 10 pounds  For example, a gallon of milk weighs 8 pounds  · Keep your arms down as much as possible  Do not put your arms out to the side, behind you, or over your head  Do not let anyone pull your arms to help you move or dress  Do not reach for items  · Do not push or pull anything  Examples include a car door or a vacuum   · Do not drive while you are healing  Your surgeon will tell you when it is safe for you to start driving again  How do I care for my surgery wound? Always wash your hands before you care for your wound  Wash your wound as directed  Do not rub the wound as you wash or dry the area  Pat the area dry with a clean towel  Change the bandages as directed and when they get wet or dirty  Do not smoke:  Nicotine can damage blood vessels and make it more difficult to heal  Do not use e-cigarettes or smokeless tobacco in place of cigarettes or to help you quit  They still contain nicotine  Ask your healthcare provider for information if you currently smoke and need help quitting  Call 911 for any of the following:   · You have sudden pain in your sternum and hear a crunch or pop  · You have bleeding that does not stop even after you apply pressure for 5 minutes  When should I seek immediate care? · You hear crunching or grinding in your sternum  · You have signs of an infection, such as a fever, red or warm skin, or pus in the surgery wound  When should I contact my healthcare provider? · You continue to feel pain, even after you take your pain medicine  · You have new or worsening pain, or any pain with movement  · You have questions or concerns about your condition or care  CARE AGREEMENT:   You have the right to help plan your care  Learn about your health condition and how it may be treated  Discuss treatment options with your caregivers to decide what care you want to receive  You always have the right to refuse treatment  The above information is an  only  It is not intended as medical advice for individual conditions or treatments  Talk to your doctor, nurse or pharmacist before following any medical regimen to see if it is safe and effective for you    © 2017 [employer] Information is for End User's use only and may not be sold, redistributed or otherwise used for commercial purposes  All illustrations and images included in CareNotes® are the copyrighted property of [employer]  or [employer]o Jeffries  Heart Healthy Diet   WHAT YOU NEED TO KNOW:   A heart healthy diet is an eating plan low in total fat, unhealthy fats, and sodium (salt)  A heart healthy diet helps decrease your risk for heart disease and stroke  Limit the amount of fat you eat to 25% to 35% of your total daily calories  Limit sodium to less than 2,300 mg each day  DISCHARGE INSTRUCTIONS:   Healthy fats:  Healthy fats can help improve cholesterol levels  The risk for heart disease is decreased when cholesterol levels are normal  Choose healthy fats, such as the following:  · Unsaturated fat  is found in foods such as soybean, canola, olive, corn, and safflower oils  It is also found in soft tub margarine that is made with liquid vegetable oil  · Omega-3 fat  is found in certain fish, such as salmon, tuna, and trout, and in walnuts and flaxseed  Unhealthy fats:  Unhealthy fats can cause unhealthy cholesterol levels in your blood and increase your risk of heart disease  Limit unhealthy fats, such as the following:  · Cholesterol  is found in animal foods, such as eggs and lobster, and in dairy products made from whole milk  Limit cholesterol to less than 300 milligrams (mg) each day  You may need to limit cholesterol to 200 mg each day if you have heart disease  · Saturated fat  is found in meats, such as morrison and hamburger  It is also found in chicken or turkey skin, whole milk, and butter  Limit saturated fat to less than 7% of your total daily calories  Limit saturated fat to less than 6% if you have heart disease or are at increased risk for it  · Trans fat  is found in packaged foods, such as potato chips and cookies  It is also in hard margarine, some fried foods, and shortening   Avoid trans fats as much as possible    Heart healthy foods and drinks to include:  Ask your dietitian or healthcare provider how many servings to have from each of the following food groups:  · Grains:      ¨ Whole-wheat breads, cereals, and pastas, and brown rice    ¨ Low-fat, low-sodium crackers and chips    · Vegetables:      ¨ Broccoli, green beans, green peas, and spinach    ¨ Collards, kale, and lima beans    ¨ Carrots, sweet potatoes, tomatoes, and peppers    ¨ Canned vegetables with no salt added    · Fruits:      ¨ Bananas, peaches, pears, and pineapple    ¨ Grapes, raisins, and dates    ¨ Oranges, tangerines, grapefruit, orange juice, and grapefruit juice    ¨ Apricots, mangoes, melons, and papaya    ¨ Raspberries and strawberries    ¨ Canned fruit with no added sugar    · Low-fat dairy products:      ¨ Nonfat (skim) milk, 1% milk, and low-fat almond, cashew, or soy milks fortified with calcium    ¨ Low-fat cheese, regular or frozen yogurt, and cottage cheese    · Meats and proteins , such as lean cuts of beef and pork (loin, leg, round), skinless chicken and turkey, legumes, soy products, egg whites, and nuts  Foods and drinks to limit or avoid:  Ask your dietitian or healthcare provider about these and other foods that are high in unhealthy fat, sodium, and sugar:  · Snack or packaged foods , such as frozen dinners, cookies, macaroni and cheese, and cereals with more than 300 mg of sodium per serving    · Canned or dry mixes  for cakes, soups, sauces, or gravies    · Vegetables with added sodium , such as instant potatoes, vegetables with added sauces, or regular canned vegetables    · Other foods high in sodium , such as ketchup, barbecue sauce, salad dressing, pickles, olives, soy sauce, and miso    · High-fat dairy foods  such as whole or 2% milk, cream cheese, or sour cream, and cheeses     · High-fat protein foods  such as high-fat cuts of beef (T-bone steaks, ribs), chicken or turkey with skin, and organ meats, such as liver    · Cured or smoked meats , such as hot dogs, morrison, and sausage    · Unhealthy fats and oils , such as butter, stick margarine, shortening, and cooking oils such as coconut or palm oil    · Food and drinks high in sugar , such as soft drinks (soda), sports drinks, sweetened tea, candy, cake, cookies, pies, and doughnuts  Other diet guidelines to follow:   · Eat more foods containing omega-3 fats  Eat fish high in omega-3 fats at least 2 times a week  · Limit alcohol  Too much alcohol can damage your heart and raise your blood pressure  Women should limit alcohol to 1 drink a day  Men should limit alcohol to 2 drinks a day  A drink of alcohol is 12 ounces of beer, 5 ounces of wine, or 1½ ounces of liquor  · Choose low-sodium foods  High-sodium foods can lead to high blood pressure  Add little or no salt to food you prepare  Use herbs and spices in place of salt  · Eat more fiber  to help lower cholesterol levels  Eat at least 5 servings of fruits and vegetables each day  Eat 3 ounces of whole-grain foods each day  Legumes (beans) are also a good source of fiber  Lifestyle guidelines:   · Do not smoke  Nicotine and other chemicals in cigarettes and cigars can cause lung and heart damage  Ask your healthcare provider for information if you currently smoke and need help to quit  E-cigarettes or smokeless tobacco still contain nicotine  Talk to your healthcare provider before you use these products  · Exercise regularly  to help you maintain a healthy weight and improve your blood pressure and cholesterol levels  Ask your healthcare provider about the best exercise plan for you  Do not start an exercise program without asking your healthcare provider  Follow up with your healthcare provider as directed:  Write down your questions so you remember to ask them during your visits     © 2017 [employer] Information is for End User's use only and may not be sold, redistributed or otherwise used for commercial purposes  All illustrations and images included in CareNotes® are the copyrighted property of [employer]  or [employer]o Jeffries  The above information is an  only  It is not intended as medical advice for individual conditions or treatments  Talk to your doctor, nurse or pharmacist before following any medical regimen to see if it is safe and effective for you  Narcotic Pain Management   WHAT YOU NEED TO KNOW:   It is important to manage your pain so you can rest and heal  It will also help you return to your normal activities  DISCHARGE INSTRUCTIONS:   Call 911 or have someone call 911 for any of the following:   · You are breathing slower than normal, or you have trouble breathing  · You cannot be woken  · You have a seizure  Seek care immediately if:   · Your heart is beating slower than usual     · Your heart feels like it is jumping or fluttering  · You have trouble staying awake  · You have severe muscle pain or weakness  · You see or hear things that are not real   Contact your healthcare provider if:   · You are too dizzy to stand up  · Your pain gets worse or you have new pain  · Your pain does not get better after you use your narcotic medicine  · You cannot do your usual activities because of side effects from the narcotic  · You are constipated or have abdominal pain  · You have questions or concerns about your condition or care  Follow up with your healthcare provider as directed: You may be referred to a pain specialist for more tests and treatment  Write down your questions so you remember to ask them during your visits  Narcotic safety:   · Take your medicine as directed  Ask if you need more information on how to take your medicine correctly  Follow up with your healthcare provider regularly  You may need to have your dose adjusted  Do not use narcotic medicine if you are pregnant or breastfeeding  Narcotic medicines can be transferred to your baby through your blood and breast milk  · Give your healthcare provider a list of all your medicines  Include any over-the-counter medicines, vitamins, and herbs  It can be dangerous to take narcotics with certain other medicines, such as antihistamines  · Keep your medicine in a safe place  Store your narcotic medicine in a locked cabinet to keep it away from children and others  · Do not drink alcohol while you use narcotics  Alcohol use with a narcotic medicine can make you sleepy and slow your breathing rate  You may stop breathing completely  · Do not drive or operate heavy machinery after you take narcotic medicine  Narcotic medicine can make you drowsy and make it hard to concentrate  You may injure yourself or others if you drive or operate heavy machinery while taking your medicine  Drink more liquids and eat high-fiber foods:  Liquids and fiber will help prevent constipation  Ask your healthcare provider what liquids are right for you and how much you should drink  Also ask for a list of foods that contain fiber  © 2017 [employer] [employer] Information is for End User's use only and may not be sold, redistributed or otherwise used for commercial purposes  All illustrations and images included in CareNotes® are the copyrighted property of [employer]  or [employer] Jeffries  The above information is an  only  It is not intended as medical advice for individual conditions or treatments  Talk to your doctor, nurse or pharmacist before following any medical regimen to see if it is safe and effective for you

## 2019-01-10 NOTE — RESTORATIVE TECHNICIAN NOTE
Restorative Specialist Mobility Note       Activity: Ambulate in queen, Chair     Assistive Device: None

## 2019-01-10 NOTE — PROGRESS NOTES
Progress Note - Cardiothoracic Surgery   Su Mclaughlin 76 y o  female MRN: [id number]  Unit/Bed#: [facility] 422-01 Encounter: [id number]  Mitral regurgitation  S/P mitral valve repair; POD # 3    24 Hour Events: No events overnight  No complaints  Ambulating well  Tolerating diet  +flatus no BM yet      Medications:   Scheduled Meds:  Current Facility-Administered Medications:  acetaminophen 650 mg Oral Q6H PRN Zaria Pipes, PA-C   amLODIPine 5 mg Oral Daily Zaria Pipes, PA-C   atorvastatin 10 mg Oral Daily With Dinner Zaria Krissyes, PA-C   docusate sodium 100 mg Oral BID Zaria Reyes, PA-C   fondaparinux 2 5 mg Subcutaneous Q24H Kushal Cunningham Iris Cardenas, PA-C   furosemide 40 mg Intravenous Daily Zaria Pipes, PA-C   insulin lispro 1-5 Units Subcutaneous TID AC Tiff Cross, PA-C   insulin lispro 1-5 Units Subcutaneous HS Zaria Reyes, PA-C   levothyroxine 125 mcg Oral Early Morning Jacquelyn Appiah, PA-C   metoprolol tartrate 12 5 mg Oral Q12H [address] Kushal Cunningham Jethro, PA-C   ondansetron 4 mg Intravenous Q6H PRN Jacquelyn Td, PA-C   oxyCODONE-acetaminophen 1 tablet Oral Q4H PRN Jacquelyn Td, PA-C   oxyCODONE-acetaminophen 2 tablet Oral Q6H PRN Jacquelyn Td, PA-C   pantoprazole 40 mg Oral Early Morning Zaria Pipes, PA-C   polyethylene glycol 17 g Oral Daily Kushal Sorianoo, PA-C   potassium chloride 20 mEq Oral Daily Zaria Pipes, PA-C   temazepam 15 mg Oral HS PRN Zaria Pipes, PA-C     Continuous Infusions:   PRN Meds:   acetaminophen    ondansetron    oxyCODONE-acetaminophen    oxyCODONE-acetaminophen    temazepam    Vitals:   Vitals:    01/09/19 2317 01/10/19 0300 01/10/19 0544 01/10/19 0717   BP: 124/70 122/70  120/74   BP Location: Right arm Right arm  Right arm   Pulse: 70 68  65   Resp: 18 18  18   Temp: 99 °F (37 2 °C) 98 7 °F (37 1 °C)  98 3 °F (36 8 °C)   TempSrc: Oral Oral  Oral   SpO2: 96% 95%  96%   Weight:   90 8 kg (200 lb 1 6 oz)    Height:           Telemetry: NSR; Heart Rate: 67    Respiratory:   SpO2: SpO2: 96 %; Room Air    Intake/Output:   I/O       01/08 0701 - 01/09 0700 01/09 0701 - 01/10 0700 01/10 0701 - 01/11 0700    P  O  980 500 120    I V  (mL/kg) 114 7 (1 2)      IV Piggyback 50      Total Intake(mL/kg) 1144 7 (12 4) 500 (5 5) 120 (1 3)    Urine (mL/kg/hr) 1105 (0 5) 3175 (1 5)     Chest Tube 160      Total Output 1265 3175      Net -120 3 -2675 +120               Weights:   Weight (last 2 days)     Date/Time   Weight    01/10/19 0544  90 8 (200 1)    01/09/19 0600  92 6 (204 2)    01/08/19 0600  92 7 (204 37)            Admit weight: 89 8    Results:     Results from last 7 days  Lab Units 01/10/19  0454 01/09/19  0510 01/08/19  0359   WBC Thousand/uL 6 11 11 25* 15 13*   HEMOGLOBIN g/dL 11 2* 9 0* 10 2*   HEMATOCRIT % 36 6 28 4* 31 7*   PLATELETS Thousands/uL 91* 93* 133*       Results from last 7 days  Lab Units 01/10/19  0454 01/09/19  0510 01/08/19  0359  01/07/19  1140   SODIUM mmol/L 139 137 142  --   --    POTASSIUM mmol/L 3 8 3 6 4 1  < >  --    CHLORIDE mmol/L 105 105 111*  --   --    CO2 mmol/L 29 28 24  --   --    CO2, I-STAT mmol/L  --   --   --   --  26   BUN mg/dL 11 13 13  --   --    CREATININE mg/dL 0 66 0 73 0 73  --   --    GLUCOSE, ISTAT mg/dl  --   --   --   --  146*   CALCIUM mg/dL 8 4 7 8* 7 9*  --   --    < > = values in this interval not displayed  Point of care glucose: 110-130    Studies:  No new studies     Invasive Lines/Tubes:  Invasive Devices     Central Venous Catheter Line            CVC Central Lines 01/07/19 Triple 3 days          Peripheral Intravenous Line            Peripheral IV 01/07/19 Left Hand 3 days                Physical Exam:    HEENT/NECK:  PERRLA  No jugular venous distention  Cardiac: Regular rate and rhythm  No rubs/murmurs/gallops  Pulmonary:  Breath sounds slightly diminished at the bases bilaterally  Abdomen:  Non-tender, Non-distended  Positive bowel sounds  Incisions: Sternum is stable    Incision is clean, dry, and intact  Lower extremities: Extremities warm/dry  Radial/PT/DP pulses 2+ bilaterally  Trace edema B/L  Neuro: Alert and oriented X 3  Sensation is grossly intact  No focal deficits  Skin: Warm/Dry, without rashes or lesions  Assessment:  Patient Active Problem List   Diagnosis    Benign essential hypertension    Disc degeneration, lumbar    Dysphagia    Hepatic cyst    Hyperlipidemia    Hypothyroidism    Liver enlargement    Osteoporosis    Severe obstructive sleep apnea    Shoulder impingement    Tinea corporis    Incisional hernia, without obstruction or gangrene    Lumbar radiculopathy    Non-rheumatic mitral regurgitation    Mitral valve prolapse    S/P MVR (mitral valve repair)    Leukocytosis    Thrombocytopenia (HCC)    Hypochloremia       Mitral regurgitation  S/P mitral valve repair; POD # 3    Plan:    1  Cardiac:   NSR; HR/BP well-controlled  Lopressor 12 5 mg PO BID  Continue ASA and Statin therapy  Epicardial pacing wires out  Central IV access no longer required; Remove central venous catheter today  Continue DVT prophylaxis    2  Pulmonary:   Good Room air oxygen saturation; Continue incentive spirometry/Coughing/Deep breathing exercises  Chest tubes have been discontinued    3  Renal:   Intake/Output net: -2675 mL/24 hours  Continue diuresis   Lasix 40 mg IV QD  Potassium Chloride 20 mEq PO QD  Post op Creatinine stable; Follow up labs prn    4  Neuro:  Neurologically intact; No active issues  Incisional pain well-controlled; Continue prn Percocet    5  GI:  Tolerating TLC 2 3 gm sodium diet  Maintain 1800 mL daily fluid restriction   Continue stool softeners and prn suppository  Continue GI prophylaxis    6  Endo:    No history of diabetes; Glucose well-controlled with sliding scale coverage    7  Hematology:   Post-operative blood count acceptable; Trend prn    8   Disposition:  Anticipate discharge to home      VTE Pharmacologic Prophylaxis: Fondaparinux (Arixtra)  VTE Mechanical Prophylaxis: sequential compression device    Collaborative rounds completed with LINDA Barney , and Nirmal Gurrola RN    SIGNATURE: Rashid Cadet  DATE: January 10, 2019  TIME: 8:33 AM

## 2019-01-10 NOTE — SOCIAL WORK
Pt is cleared for d/c by Cardiothoracic Surgery JACKI Sutton  RN Meghann Mushtaq was notified of d/c order  Pt is accepted for services by [facility] for her aftercare plan  The pt and her  Sandhya De La Paz were both informed of d/c   will transport pt home later this day, pickup time TBD  IMM signed  No chart copy required  CM to follow

## 2019-01-10 NOTE — PROGRESS NOTES
Progress Note - Cardiology   Bright Guillen 76 y o  female MRN: [id number]  Unit/Bed#: [facility] 422-01 Encounter: [id number]        Principal Problem:    Non-rheumatic mitral regurgitation  Active Problems:    S/P MVR (mitral valve repair)    Leukocytosis    Thrombocytopenia (HCC)    Hypochloremia      Assessment/Plan     1  Severe MR s/p  mitral valve repair  DORI normal LV function  POD 3  Rhythm stable  Tolerating low-dose Lopressor 12 5 b i d  Hemodynamically stable  On Lasix IV 40 daily for postop volume overload  Negative 2,675 last 24 hr   Weight down 4 lb  Close to pre op weight 198  Asa 325mg daily     2  HTN-controlled  On Norvasc 5, BB     3  IRINA-uses CPAP    F/U with Dr Veronica Smith on d/c    Subjective/Objective   Chief Complaint/Subjective    No complaints  Patient is hoping to go home    No cp, sob    Vitals: BP 98/65 (BP Location: Right arm)   Pulse 66   Temp 98 4 °F (36 9 °C) (Oral)   Resp 18   Ht 5' 5" (1 651 m)   Wt 90 8 kg (200 lb 1 6 oz)   SpO2 94%   BMI 33 30 kg/m²     Vitals:    01/09/19 0600 01/10/19 0544   Weight: 92 6 kg (204 lb 3 2 oz) 90 8 kg (200 lb 1 6 oz)     Orthostatic Blood Pressures      Most Recent Value   Blood Pressure  98/65 filed at 01/10/2019 1103   Patient Position - Orthostatic VS  Sitting filed at 01/10/2019 1103            Intake/Output Summary (Last 24 hours) at 01/10/19 1137  Last data filed at 01/10/19 0930   Gross per 24 hour   Intake              240 ml   Output             2650 ml   Net            -2410 ml       Invasive Devices     Central Venous Catheter Line            CVC Central Lines 01/07/19 Triple 3 days          Peripheral Intravenous Line            Peripheral IV 01/07/19 Left Hand 3 days                Current Facility-Administered Medications   Medication Dose Route Frequency    acetaminophen (TYLENOL) tablet 650 mg  650 mg Oral Q6H PRN    amLODIPine (NORVASC) tablet 5 mg  5 mg Oral Daily    atorvastatin (LIPITOR) tablet 10 mg  10 mg Oral Daily With Dinner    docusate sodium (COLACE) capsule 100 mg  100 mg Oral BID    fondaparinux (ARIXTRA) subcutaneous injection 2 5 mg  2 5 mg Subcutaneous Q24H    furosemide (LASIX) injection 40 mg  40 mg Intravenous Daily    insulin lispro (HumaLOG) 100 units/mL subcutaneous injection 1-5 Units  1-5 Units Subcutaneous TID AC    insulin lispro (HumaLOG) 100 units/mL subcutaneous injection 1-5 Units  1-5 Units Subcutaneous HS    levothyroxine tablet 125 mcg  125 mcg Oral Early Morning    metoprolol tartrate (LOPRESSOR) partial tablet 12 5 mg  12 5 mg Oral Q12H DANISH    ondansetron (ZOFRAN) injection 4 mg  4 mg Intravenous Q6H PRN    oxyCODONE-acetaminophen (PERCOCET) 5-325 mg per tablet 1 tablet  1 tablet Oral Q4H PRN    oxyCODONE-acetaminophen (PERCOCET) 5-325 mg per tablet 2 tablet  2 tablet Oral Q6H PRN    pantoprazole (PROTONIX) EC tablet 40 mg  40 mg Oral Early Morning    polyethylene glycol (MIRALAX) packet 17 g  17 g Oral Daily    potassium chloride (K-DUR,KLOR-CON) CR tablet 20 mEq  20 mEq Oral Daily    senna (SENOKOT) tablet 8 6 mg  1 tablet Oral HS    temazepam (RESTORIL) capsule 15 mg  15 mg Oral HS PRN         Physical Exam: BP 98/65 (BP Location: Right arm)   Pulse 66   Temp 98 4 °F (36 9 °C) (Oral)   Resp 18   Ht 5' 5" (1 651 m)   Wt 90 8 kg (200 lb 1 6 oz)   SpO2 94%   BMI 33 30 kg/m²     General Appearance:    Alert, cooperative, no distress, appears stated age   Head:    Normocephalic, no scleral icterus   Eyes:    PERRL   Nose:   Nares normal, septum midline, no drainage    Throat:   Lips, mucosa, and tongue normal   Neck:   Supple, symmetrical, trachea midline,           Lungs:     Clear to auscultation bilaterally, respirations unlabored   Chest Wall:    Sternal incision covered dry sterile dressing    Heart:    Regular rate and rhythm, S1 and S2 normal, no murmur, rub   or gallop   Abdomen:     Soft, non-tender, bowel sounds active all four quadrants,     no masses, no organomegaly Extremities:   Extremities normal, atraumatic, no cyanosis or edema   Pulses:   2+ and symmetric all extremities   Skin:   Skin color, texture, turgor normal, no rashes or lesions   Neurologic:   Alert and oriented to person place and time, no focal deficits                 Lab Results:   Recent Results (from the past 72 hour(s))   POCT Blood Gas (CG8+)    Collection Time: 01/07/19 11:40 AM   Result Value Ref Range    pH, Art i-STAT 7 347 (L) 7 350 - 7 450    pCO2, Art i-STAT 44 4 (H) 36 0 - 44 0 mm HG    pO2, ART i-STAT 92 0 75 0 - 129 0 mm HG    BE, i-STAT -1 -2 - 3 mmol/L    HCO3, Art i-STAT 24 3 22 0 - 28 0 mmol/L    CO2, i-STAT 26 21 - 32 mmol/L    O2 Sat, i-STAT 97 95 - 98 %    SODIUM, I-STAT 144 136 - 145 mmol/l    Potassium, i-STAT 3 2 (L) 3 5 - 5 3 mmol/L    Calcium, Ionized i-STAT 1 18 1 12 - 1 32 mmol/L    Hct, i-STAT 30 (L) 34 8 - 46 1 %    Hgb, i-STAT 10 2 (L) 11 5 - 15 4 g/dl    Glucose, i-STAT 146 (H) 65 - 140 mg/dl    POC FIO2 60 L    Specimen Type ARTERIAL     SITE Right Radial     GIOVANA TEST Postive Giovana Test    Fingerstick Glucose (POCT)    Collection Time: 01/07/19 12:16 PM   Result Value Ref Range    POC Glucose 143 (H) 65 - 140 mg/dl   Fingerstick Glucose (POCT)    Collection Time: 01/07/19  2:03 PM   Result Value Ref Range    POC Glucose 138 65 - 140 mg/dl   Potassium    Collection Time: 01/07/19  3:45 PM   Result Value Ref Range    Potassium 3 3 (L) 3 5 - 5 3 mmol/L   Fingerstick Glucose (POCT)    Collection Time: 01/07/19  4:00 PM   Result Value Ref Range    POC Glucose 128 65 - 140 mg/dl   Fingerstick Glucose (POCT)    Collection Time: 01/07/19  5:59 PM   Result Value Ref Range    POC Glucose 168 (H) 65 - 140 mg/dl   Fingerstick Glucose (POCT)    Collection Time: 01/07/19  8:06 PM   Result Value Ref Range    POC Glucose 161 (H) 65 - 140 mg/dl   Hemoglobin and hematocrit, blood    Collection Time: 01/07/19 10:00 PM   Result Value Ref Range    Hemoglobin 10 7 (L) 11 5 - 15 4 g/dL Hematocrit 32 9 (L) 34 8 - 46 1 %   Potassium    Collection Time: 01/07/19 10:00 PM   Result Value Ref Range    Potassium 4 5 3 5 - 5 3 mmol/L   Fingerstick Glucose (POCT)    Collection Time: 01/07/19 10:01 PM   Result Value Ref Range    POC Glucose 134 65 - 140 mg/dl   Fingerstick Glucose (POCT)    Collection Time: 01/08/19 12:25 AM   Result Value Ref Range    POC Glucose 119 65 - 140 mg/dl   Fingerstick Glucose (POCT)    Collection Time: 01/08/19  2:06 AM   Result Value Ref Range    POC Glucose 122 65 - 140 mg/dl   Basic Metabolic Panel -AM POD #1    Collection Time: 01/08/19  3:59 AM   Result Value Ref Range    Sodium 142 136 - 145 mmol/L    Potassium 4 1 3 5 - 5 3 mmol/L    Chloride 111 (H) 100 - 108 mmol/L    CO2 24 21 - 32 mmol/L    ANION GAP 7 4 - 13 mmol/L    BUN 13 5 - 25 mg/dL    Creatinine 0 73 0 60 - 1 30 mg/dL    Glucose 137 65 - 140 mg/dL    Calcium 7 9 (L) 8 3 - 10 1 mg/dL    eGFR 85 ml/min/1 73sq m   Magnesium -AM POD #1    Collection Time: 01/08/19  3:59 AM   Result Value Ref Range    Magnesium 2 7 (H) 1 6 - 2 6 mg/dL   CBC-AM POD #1    Collection Time: 01/08/19  3:59 AM   Result Value Ref Range    WBC 15 13 (H) 4 31 - 10 16 Thousand/uL    RBC 3 38 (L) 3 81 - 5 12 Million/uL    Hemoglobin 10 2 (L) 11 5 - 15 4 g/dL    Hematocrit 31 7 (L) 34 8 - 46 1 %    MCV 94 82 - 98 fL    MCH 30 2 26 8 - 34 3 pg    MCHC 32 2 31 4 - 37 4 g/dL    RDW 13 3 11 6 - 15 1 %    Platelets 526 (L) 120 - 390 Thousands/uL    MPV 11 1 8 9 - 12 7 fL   Fingerstick Glucose (POCT)    Collection Time: 01/08/19  4:08 AM   Result Value Ref Range    POC Glucose 145 (H) 65 - 140 mg/dl   ECG 12 lead    Collection Time: 01/08/19  5:46 AM   Result Value Ref Range    Ventricular Rate 60 BPM    Atrial Rate 60 BPM    OK Interval 142 ms    QRSD Interval 96 ms    QT Interval 454 ms    QTC Interval 454 ms    P [place] 69 degrees    QRS Axis 49 degrees    T Wave Axis 56 degrees   Fingerstick Glucose (POCT)    Collection Time: 01/08/19  6:28 AM Result Value Ref Range    POC Glucose 135 65 - 140 mg/dl   Prepare RBC:Special Requirements: Leukoreduced; Has consent been obtained? Yes; Where is the Surgery Scheduled?  [address] [facility], 3 Units    Collection Time: 01/08/19  9:17 AM   Result Value Ref Range    Unit Product Code T2488U47     Unit Number [id number]     Unit ABO O     Unit [facility] POS     Unit Dispense Status Return to [facility]     Unit Product Code N7536E78     Unit Number [id number]     Unit ABO O     Unit [facility] POS     Unit Dispense Status Return to [facility]     Unit Product Code W4171R54     Unit Number [id number]     Unit ABO O     Unit [facility] POS     Unit Dispense Status Return to [facility]     Unit Product Code G7330P72     Unit Number [id number]     Unit ABO O     Unit [facility] POS     Unit Dispense Status Return to Inv    Fingerstick Glucose (POCT)    Collection Time: 01/08/19 11:49 AM   Result Value Ref Range    POC Glucose 142 (H) 65 - 140 mg/dl   Fingerstick Glucose (POCT)    Collection Time: 01/08/19  4:18 PM   Result Value Ref Range    POC Glucose 133 65 - 140 mg/dl   Fingerstick Glucose (POCT)    Collection Time: 01/08/19  9:04 PM   Result Value Ref Range    POC Glucose 141 (H) 65 - 140 mg/dl   Basic metabolic panel    Collection Time: 01/09/19  5:10 AM   Result Value Ref Range    Sodium 137 136 - 145 mmol/L    Potassium 3 6 3 5 - 5 3 mmol/L    Chloride 105 100 - 108 mmol/L    CO2 28 21 - 32 mmol/L    ANION GAP 4 4 - 13 mmol/L    BUN 13 5 - 25 mg/dL    Creatinine 0 73 0 60 - 1 30 mg/dL    Glucose 126 65 - 140 mg/dL    Calcium 7 8 (L) 8 3 - 10 1 mg/dL    eGFR 85 ml/min/1 73sq m   CBC (With Platelets)    Collection Time: 01/09/19  5:10 AM   Result Value Ref Range    WBC 11 25 (H) 4 31 - 10 16 Thousand/uL    RBC 2 97 (L) 3 81 - 5 12 Million/uL    Hemoglobin 9 0 (L) 11 5 - 15 4 g/dL    Hematocrit 28 4 (L) 34 8 - 46 1 %    MCV 96 82 - 98 fL    MCH 30 3 26 8 - 34 3 pg    MCHC 31 7 31 4 - 37 4 g/dL    RDW 13 6 11 6 - 15 1 %    Platelets 93 (L) 666 - 390 Thousands/uL    MPV 10 4 8 9 - 12 7 fL   Fingerstick Glucose (POCT)    Collection Time: 01/09/19  6:05 AM   Result Value Ref Range    POC Glucose 130 65 - 140 mg/dl   Fingerstick Glucose (POCT)    Collection Time: 01/09/19 10:56 AM   Result Value Ref Range    POC Glucose 133 65 - 140 mg/dl   Fingerstick Glucose (POCT)    Collection Time: 01/09/19  3:39 PM   Result Value Ref Range    POC Glucose 110 65 - 140 mg/dl   Fingerstick Glucose (POCT)    Collection Time: 01/09/19  9:13 PM   Result Value Ref Range    POC Glucose 112 65 - 140 mg/dl   CBC and Platelet    Collection Time: 01/10/19  4:54 AM   Result Value Ref Range    WBC 6 11 4 31 - 10 16 Thousand/uL    RBC 3 80 (L) 3 81 - 5 12 Million/uL    Hemoglobin 11 2 (L) 11 5 - 15 4 g/dL    Hematocrit 36 6 34 8 - 46 1 %    MCV 96 82 - 98 fL    MCH 29 5 26 8 - 34 3 pg    MCHC 30 6 (L) 31 4 - 37 4 g/dL    RDW 13 4 11 6 - 15 1 %    Platelets 91 (L) 721 - 390 Thousands/uL    MPV 10 0 8 9 - 12 7 fL   Basic metabolic panel    Collection Time: 01/10/19  4:54 AM   Result Value Ref Range    Sodium 139 136 - 145 mmol/L    Potassium 3 8 3 5 - 5 3 mmol/L    Chloride 105 100 - 108 mmol/L    CO2 29 21 - 32 mmol/L    ANION GAP 5 4 - 13 mmol/L    BUN 11 5 - 25 mg/dL    Creatinine 0 66 0 60 - 1 30 mg/dL    Glucose 105 65 - 140 mg/dL    Calcium 8 4 8 3 - 10 1 mg/dL    eGFR 91 ml/min/1 73sq m   Fingerstick Glucose (POCT)    Collection Time: 01/10/19  5:41 AM   Result Value Ref Range    POC Glucose 104 65 - 140 mg/dl   Fingerstick Glucose (POCT)    Collection Time: 01/10/19 11:05 AM   Result Value Ref Range    POC Glucose 93 65 - 140 mg/dl     Imaging: I have personally reviewed pertinent reports  Telemetry-sinus  VTE Pharmacologic Prophylaxis: Fondaparinux (Arixtra)  VTE Mechanical Prophylaxis: sequential compression device    Counseling / Coordination of Care  Total time spent today 25 minutes   Greater than 50% of total time was spent with the patient and / or family counseling and / or coordination of care

## 2019-01-10 NOTE — PLAN OF CARE
CARDIOVASCULAR - ADULT     Maintains optimal cardiac output and hemodynamic stability Progressing     Absence of cardiac dysrhythmias or at baseline rhythm Progressing        DISCHARGE PLANNING - CARE MANAGEMENT     Discharge to post-acute care or home with appropriate resources Progressing        GASTROINTESTINAL - ADULT     Maintains or returns to baseline bowel function Progressing        Potential for Falls     Patient will remain free of falls Progressing        Prexisting or High Potential for Compromised Skin Integrity     Skin integrity is maintained or improved Progressing        RESPIRATORY - ADULT     Achieves optimal ventilation and oxygenation Progressing

## 2019-01-10 NOTE — DISCHARGE SUMMARY
Discharge Summary - Cardiothoracic Surgery   Soren Silver 76 y o  female MRN: [id number]  Unit/Bed#: [facility] 422-01 Encounter: [id number]    Admission Date: 1/7/2019     Discharge Date: 01/10/19    Admitting Diagnosis: Non-rheumatic mitral regurgitation [I34 0]    Primary Discharge Diagnosis:   Mitral regurgitation  S/P mitral valve repair;    Secondary Discharge Diagnosis:   1  Hiatal hernia  2  HTN  3  Varicose veins  4  DJD  5  Hypothyroidism    Attending: LINDA Mayfield  Consulting Physician(s):   Cardiology  Medical/Critical Care  Occupational Therapy  Physical Therapy    Procedures Performed:   No orders of the defined types were placed in this encounter  1/7/19: Mitral valve repair with P2 triangular ressection and 30 mm Med[employer] CG Future mitral annuloplasty ring    Hospital Course:   1/7: Elective admission, Mitral valve repair with P2 triangular ressection and 30 mm Medtronic CG Future mitral annuloplasty ring  Transferred to ICU on Epi @1  NSR, No bleeding  Wean to extubate  PM: Extubated  1/8: Delined  Weaned to RA  Stared on cardene for HTN, on 2 5 this AM, resume home Norvasc 5mg QDay, hold beta blocker for borderline bradycardia  Given Lasix 40mg IV QDay x1 for low UOP, start Lasix 40mg IV QDay, discontinue torrez catheter  Discontinue insulin gtt, transition to [facility]  Transfer to telem  1/9: NSR rate of 68 - start Lopressor 12 5 mg bid and did well  D/C  CT and EPW  Plt 93k will f/u tomorrow  Doing well possible home tomorrow  1/10:  No events  Stable for discharge to home  Condition at Discharge:   good     Discharge Physical Exam:  DISCHARGE PHYSICAL EXAM PERFORMED BY RACHANA Kurzt PA-C  HEENT/NECK:  PERRLA  No jugular venous distention  Cardiac: Regular rate and rhythm  No rubs/murmurs/gallops  Pulmonary:  Breath sounds slightly diminished at the bases bilaterally  Abdomen:  Non-tender, Non-distended  Positive bowel sounds  Incisions: Sternum is stable    Incision is clean, dry, and intact  Lower extremities: Extremities warm/dry  Radial/PT/DP pulses 2+ bilaterally  Trace edema B/L  Neuro: Alert and oriented X 3  Sensation is grossly intact  No focal deficits  Skin: Warm/Dry, without rashes or lesions  Discharge Data:    Results from last 7 days  Lab Units 01/10/19  0454 01/09/19  0510 01/08/19  0359   WBC Thousand/uL 6 11 11 25* 15 13*   HEMOGLOBIN g/dL 11 2* 9 0* 10 2*   HEMATOCRIT % 36 6 28 4* 31 7*   PLATELETS Thousands/uL 91* 93* 133*       Results from last 7 days  Lab Units 01/10/19  0454 01/09/19  0510 01/08/19  0359  01/07/19  1140   POTASSIUM mmol/L 3 8 3 6 4 1  < >  --    CHLORIDE mmol/L 105 105 111*  --   --    CO2 mmol/L 29 28 24  --   --    CO2, I-STAT mmol/L  --   --   --   --  26   BUN mg/dL 11 13 13  --   --    CREATININE mg/dL 0 66 0 73 0 73  --   --    GLUCOSE, ISTAT mg/dl  --   --   --   --  146*   CALCIUM mg/dL 8 4 7 8* 7 9*  --   --    < > = values in this interval not displayed  Discharge instructions/Information to patient and family:   See after visit summary for information provided to patient and family  Thompson Ralph was educated on restrictions regarding driving and lifting, and techniques of proper incisional care  They were specifically counselled on signs and symptoms of a sternal wound infection, and advised to contact our service immediately should they develop fevers, sweats, chill, redness or drainage at the site of any incisions  Provisions for Follow-Up Care:  See after visit summary for information related to follow-up care and any pertinent home health orders  Disposition:  Home w/ [facility]    Planned Readmission:   No    Discharge Medications:  See after visit summary for reconciled discharge medications provided to patient and family  Thompson Ralph was provided contact information and scheduled a follow up appointment with LINDA Orellana    Additionally, they were advised to schedule follow up appointments to be seen by their primary care physician within 7-10 days, and their cardiologist within 2-3 weeks  Contact information was provided  Gracielaaleja Martinez was counseled on the importance of avoiding tobacco products  As with all patients whom have undergone open heart surgery, tobacco cessation medication was contraindicated at the time of discharge  ACE/ARB was Contraindicated secondary to EF > 40% and no history of heart failure      The patient was discharged on ongoing diuretic therapy with Torsemide 10 mg, PO QD and Potassium Chloride 20 mEq, PO QD  They were advised to continue these medications for 5 days, unless otherwise directed  Narcotic pain medication was prescribed in the form of Percocet  Prior to prescribing, their prescription profile was reviewed on the [facility] of health prescription drug monitoring program     The patient was informed that following their postoperative surgical evaluation, they will be referred to outpatient cardiac rehabilitation  They were counseled that this program is run by specialists who will help them safely strengthen their heart and prevent more heart disease  Cardiac rehabilitation will include exercise, relaxation, stress management, and heart-healthy nutrition  Caregivers will also check to make sure their medication regimen is working  I spent 30 minutes discharging the patient  This time was spent on the day of discharge  I had direct contact with the patient on the day of discharge  Additional documentation is required if more than 30 minutes were spent on discharge       SIGNATURE: Frances Eller PA-C  DATE: January 10, 2019  TIME: 1:27 PM

## 2019-01-11 ENCOUNTER — TRANSITIONAL CARE MANAGEMENT (OUTPATIENT)
Dept: FAMILY MEDICINE CLINIC | Facility: CLINIC | Age: 69
End: 2019-01-11

## 2019-01-15 ENCOUNTER — TELEPHONE (OUTPATIENT)
Dept: CARDIAC SURGERY | Facility: CLINIC | Age: 69
End: 2019-01-15

## 2019-01-15 NOTE — TELEPHONE ENCOUNTER
Call placed to patient to follow-up after discharge from hospital, post-op  Spoke to: Patient   Procedure & Date: MV Repair   Surgeon: Mary       Pre-op wt: 198 lb  D/C wt: 200 lb   Current wt: 196 lb     Fever/chills: No     Lightheadedness/dizziness: No     Angina: No     SOB: No     Pain: No    Edema: No    Incision: Clean/dry/intact, no s/s of infection     GI: BM stable, having loose bowels advised to stop stool softener/laxative and to only use PRN     Activity: Walking with no complaints    Follow-up APPTs: PCP: 1/18/19, Cardiology:  CT surgery: 2/7/19    Comments: Patient states she had one episode of blurry vision on Sunday that lasted about 30 seconds, no other symptoms and did not reoccur  Advised to discuss with her PCP on 1/18 and to call PCP if this happens again  Otherwise, she states she is doing great, had no questions/complaints  Education:  1  Daily AM weight, call for weight gain of 2 lbs or more in 24 hours  2  Take frequent short walks, increase activity as tolerated  3  Maintain sternal precautions: No strenuous activity; no lifting more than 10 lbs;  no driving  4  Continue to use Incentive Spirometer frequently throughout the day  5  Shower daily; Cleanse incisions with antibacterial soap and water  6  No ointments, powder or lotions on surgical incisions  7   Call [address] [facility] office with questions or concerns

## 2019-01-18 ENCOUNTER — OFFICE VISIT (OUTPATIENT)
Dept: FAMILY MEDICINE CLINIC | Facility: CLINIC | Age: 69
End: 2019-01-18
Payer: MEDICARE

## 2019-01-18 VITALS
WEIGHT: 203.2 LBS | HEART RATE: 72 BPM | RESPIRATION RATE: 14 BRPM | HEIGHT: 65 IN | BODY MASS INDEX: 33.85 KG/M2 | DIASTOLIC BLOOD PRESSURE: 80 MMHG | TEMPERATURE: 99.1 F | SYSTOLIC BLOOD PRESSURE: 108 MMHG

## 2019-01-18 DIAGNOSIS — I10 BENIGN ESSENTIAL HYPERTENSION: ICD-10-CM

## 2019-01-18 DIAGNOSIS — I34.0 NON-RHEUMATIC MITRAL REGURGITATION: ICD-10-CM

## 2019-01-18 DIAGNOSIS — E78.5 HYPERLIPIDEMIA, UNSPECIFIED HYPERLIPIDEMIA TYPE: ICD-10-CM

## 2019-01-18 DIAGNOSIS — E03.9 HYPOTHYROIDISM, UNSPECIFIED TYPE: ICD-10-CM

## 2019-01-18 DIAGNOSIS — I34.1 MITRAL VALVE PROLAPSE: ICD-10-CM

## 2019-01-18 DIAGNOSIS — Z98.890 S/P MVR (MITRAL VALVE REPAIR): Primary | ICD-10-CM

## 2019-01-18 DIAGNOSIS — D69.6 THROMBOCYTOPENIA (HCC): ICD-10-CM

## 2019-01-18 PROCEDURE — 99495 TRANSJ CARE MGMT MOD F2F 14D: CPT | Performed by: FAMILY MEDICINE

## 2019-01-18 NOTE — PROGRESS NOTES
FAMILY PRACTICE OFFICE VISIT       NAME: Rosanna Disla  AGE: 76 y o  SEX: female       : 1950        MRN: [id number]        Assessment and Plan     Problem List Items Addressed This Visit     Benign essential hypertension     Amlodipine 5 mg daily, I advised patient to use half a tablet twice a day to minimize orthostatics symptoms         Hyperlipidemia     Continue Lipitor 10 mg daily         Hypothyroidism     Levothyroxine 125 mcg once a day  TSH within normal limits 2018         Non-rheumatic mitral regurgitation    Relevant Orders    CBC    Mitral valve prolapse    S/P MVR (mitral valve repair) - Primary     Patient is under care of [facility] cardiothoracic surgery in [facility]  Pending hospital follow-up since start of cardiac rehab  Postop pain is well controlled  Incision is clean and dry  Patient uses Tylenol as needed  Relevant Orders    CBC    Thrombocytopenia (HCC)     Decreased platelet counts postop, most recent value 91,000 on   Patient remains on aspirin 325 mg daily, will continue monitoring         Relevant Orders    CBC       Patient presents for follow-up after recent hospitalization for mitral valve repair  She is recovering well after recent surgery with pending follow-ups with Cardiology, Cardiothoracic surgery in start of cardiac rehab  Incision is clean and dry, pain is well controlled with p r n  Tylenol, no requirements for opioid analgesics  Patient remains on medications for hypertension, hyperlipidemia, hypothyroidism  Patient reports 2 episodes of double vision that lasted 30 sec and occurred without any associated symptoms  Interestingly, both episodes occurred after start of Lopressor, I wonder if patient might of been experiencing orthostatic symptoms ? She denies any recurrences and has no symptoms of dyspnea, palpitations, leg edema, chest tightness headaches, or dizziness    Since she has been asymptomatic for almost a week, I will hold off any workup at present time  I strongly advised patient to contact me with any recurrences of double vision or any new symptoms  She will use amlodipine as half a tablet twice a day along with Lopressor 12 5 mg twice a day  Thrombocytopenia postop, possibly due to start of aspirin therapy  Patient is currently on 325 mg daily  Will discuss possible does decrease with surgery and Cardiology teams  Will monitor CBC with platelets  Follow up pending labs, updates, as needed and routine checkup in 3 months  Patient Instructions   Please check with Cardiology and cardiothoracic surgery about dose of aspirin, I would like to reduce it back to 81 mg daily    Once dose of aspirin is decreased, you may discontinue acid reflux medication, pantoprazole    Please continue metoprolol, half a tablet twice a day, please call me at any time if you have dizziness or lightheadedness    Please take amlodipine as half a tablet twice a day      M*Your Policy Manager software was used to dictate this note  It may contain errors with dictating incorrect words/spelling  Please contact provider directly with any questions  Chief Complaint     Chief Complaint   Patient presents with    Transition of Care Management       History of Present Illness     Patient presents for follow-up after recent hospitalization for mitral valve pair  1/7/19: Mitral valve repair with P2 triangular ressection and 30 mm [employer] CG Future mitral annuloplasty ring    Surgery went well, uneventful recovery  Patient is home under [facility] care  No driving, she is here accompanied by her   She denies any dyspnea, palpitations or dizziness  Patient's incisional chest pain is overall well controlled with p r n  Tylenol  She is scheduled for follow-up with [facility]'s Cardiology and cardiothoracic surgery and will be planning to start cardiac rehab    Patient remains on same medications for chronic medical conditions including amlodipine, levothyroxine and Lipitor  She is currently on aspirin 325 mg daily  New medications include pantoprazole, patient denies any symptoms of burping, belching or acid reflux  Patient reports 2 episodes of double vision that she experienced on January 9th h and then January 12th , both episodes lasted 30 sec, patient denies any headache, numbness, tingling, paresthesias or any other associated symptoms  Both episodes resolved spontaneously, no recurrences for almost a week  Patient is currently on Lopressor 12 5 mg twice a day, therapy was started post up on January 9th  Patient has completed course of torsemide and potassium chloride for 5 days postop  Discharge summary reviewed  Patient's blood work was performed on January 10th and revealed hemoglobin of 11 2 and platelet count of 96,208  BMP was normal     TCM Call (since 12/20/2018)     Date and time call was made  1/11/2019  3:29 PM    Hospital care reviewed  Records reviewed    Patient was hospitialized at  [facility]    Date of Admission  01/07/19    Date of discharge  01/10/19    Diagnosis  Mitral Valve Repair    Disposition  Home    Were the patients medications reviewed and updated  No    Current Symptoms  Fatigue    Fatigue severity  Mild      TCM Call (since 12/20/2018)     Post hospital issues  Reduced activity    Should patient be enrolled in anticoag monitoring? No    Scheduled for follow up?   Yes    Patients specialists  Cardiologist; Other (comment)    Cardiologist name  Dr Isaac Rubio    Other specialists names  Dr Boni Finney  CardioThoracic Surgery    Did you obtain your prescribed medications  Yes    Do you need help managing your prescriptions or medications  No    Is transportation to your appointment needed  Yes    Specify why  Unable to drive due to surgery    I have advised the patient to call PCP with any new or worsening symptoms    Paula Chiang LPN    Living Arrangements  Spouse or Significiant other    Support System  Spouse The type of support provided  Physical; Emotional    Do you have social support  Yes, as much as I need    Are you recieving any outpatient services  No    Are you recieving home care services  Yes    Types of home care services  Nurse visit    Are you using any community resources  No    Current waiver services  No    Have you fallen in the last 12 months  No    Interperter language line needed  No    Counseling  Patient    Counseling topics  instructions for management; Importance of RX   compliance    Comments  Appt with Dr Nery Nunez 1/18/19@ 8am              Review of Systems   Review of Systems   Constitutional: Positive for fatigue  HENT: Negative  Eyes: Positive for visual disturbance ( as outlined in HPI)  Respiratory: Negative  Cardiovascular: Negative for palpitations and leg swelling  Chest pain: Incisional    Gastrointestinal: Negative  Endocrine: Negative  Genitourinary: Negative  Musculoskeletal: Negative  Skin: Negative  Allergic/Immunologic: Negative  Neurological: Negative  Hematological: Negative  Psychiatric/Behavioral: Negative          Active Problem List     Patient Active Problem List   Diagnosis    Benign essential hypertension    Disc degeneration, lumbar    Dysphagia    Hepatic cyst    Hyperlipidemia    Hypothyroidism    Liver enlargement    Osteoporosis    Severe obstructive sleep apnea    Shoulder impingement    Tinea corporis    Incisional hernia, without obstruction or gangrene    Lumbar radiculopathy    Non-rheumatic mitral regurgitation    Mitral valve prolapse    S/P MVR (mitral valve repair)    Leukocytosis    Thrombocytopenia (HCC)    Hypochloremia       Past Medical History:  Past Medical History:   Diagnosis Date    Cystic breast     Dyspareunia, female     last assessed 9/4/14    Hyperlipidemia     Hypothyroidism     Insomnia     IRINA on CPAP     Osteoporosis     Severe mitral regurgitation     Varicose veins of lower extremity     last assessed 1/4/13       Past Surgical History:  Past Surgical History:   Procedure Laterality Date    COLONOSCOPY      complete 5/2016 - Dr Monica Emmanuel due in 2019 - last assessed  3/17/17    HERNIA REPAIR      LIVER BIOPSY LAPAROSCOPIC N/A 5/7/2018    Procedure: Laparoscopic marsupialization of liver cyst;  Surgeon: Gabe Palma MD;  [facility];  Service: Surgical Oncology    NEUROMA EXCISION      OR ECHO Im [address] N/A 1/7/2019    Procedure: TRANSESOPHAGEAL ECHOCARDIOGRAM (DORI); Surgeon: Samira Khoury MD;  [facility];  Service: Cardiac Surgery    OR ESOPHAGOGASTRODUODENOSCOPY TRANSORAL DIAGNOSTIC N/A 8/10/2016    Procedure: ESOPHAGOGASTRODUODENOSCOPY (EGD); Surgeon: Kaitlyn Peterson MD;  Location: [facility]; Service: Gastroenterology    OR ESOPHAGOSCOPY FLEXIBLE TRANSORAL WITH BIOPSY N/A 11/3/2016    Procedure: ESOPHAGOGASTRODUODENOSCOPY (EGD);   Surgeon: Susu Marina MD;  [facility];  Service: Thoracic    OR LAP, REPAIR PARAESOPHAGEAL HERNIA, INCL FUNDOPLASTY W/ MESH Left 11/3/2016    Procedure: LAPAROSCOPIC PARAESOPHAGEAL HERNIA REPAIR WITH MESH;NISSEN FUNDOPLICATION ;  Surgeon: Susu Mraina MD;  [facility];  Service: Thoracic    OR LAP, VENTRAL HERNIA REPAIR,REDUCIBLE N/A 10/30/2017    Procedure: LAPAROSCOPIC INCISIONAL HERNIA REPAIR X 2 WITH ECHO MESH;  Surgeon: Carlos Esparza DO;  [facility];  Service: General    OR REPAIR INCISIONAL HERNIA,REDUCIBLE N/A 1/13/2017    Procedure: INCISIONAL HERNIA REPAIR ;  Surgeon: Carlos Esparza DO;  [facility];  Service: General    OR REPLACEMENT OF MITRAL VALVE N/A 1/7/2019    Procedure: REPLACEMENT VALVE MITRAL (MVR), repair with 30mm CG Future ring;  Surgeon: Samira Khoury MD;  [facility];  Service: Cardiac Surgery    TUBAL LIGATION         Family History:  Family History   Problem Relation Age of Onset    Heart disease Mother    [facility] Aneurysm Mother         cerebral    Alcohol abuse Father     Cancer Father     COPD Father     Heart failure Father     Hypertension Father     Cancer Family     Breast cancer Paternal Grandmother 80       Social History:  Social History     Social History    Marital status: /Civil Union     Spouse name: N/A    Number of children: N/A    Years of education: N/A     Occupational History    Not on file       Social History Main Topics    Smoking status: Never Smoker    Smokeless tobacco: Never Used    Alcohol use Yes      Comment: social    Drug use: No    Sexual activity: Yes     Partners: Male     Birth control/ protection: None     Other Topics Concern    Not on file     Social History Narrative    Coffee    Exercise - walking     PHQ-9 Depression Screening    PHQ-9:    Frequency of the following problems over the past two weeks:                    Objective     Vitals:    01/18/19 0735 01/18/19 0821   BP: 106/82 108/80   Pulse: 76 72   Resp: 14    Temp: 99 1 °F (37 3 °C)    TempSrc: Tympanic    Weight: 92 2 kg (203 lb 3 2 oz)    Height: 5' 5" (1 651 m)      Wt Readings from Last 3 Encounters:   01/18/19 92 2 kg (203 lb 3 2 oz)   01/10/19 90 8 kg (200 lb 1 6 oz)   12/13/18 91 1 kg (200 lb 14 4 oz)       Physical Exam    Pertinent Laboratory/Diagnostic Studies:  Lab Results   Component Value Date    GLUCOSE 146 (H) 01/07/2019    BUN 11 01/10/2019    CREATININE 0 66 01/10/2019    CALCIUM 8 4 01/10/2019     09/19/2017    K 3 8 01/10/2019    CO2 29 01/10/2019     01/10/2019     Lab Results   Component Value Date    ALT 27 12/13/2018    AST 20 12/13/2018    ALKPHOS 93 12/13/2018    BILITOT 0 6 09/19/2017       Lab Results   Component Value Date    WBC 6 11 01/10/2019    HGB 11 2 (L) 01/10/2019    HCT 36 6 01/10/2019    MCV 96 01/10/2019    PLT 91 (L) 01/10/2019       Lab Results   Component Value Date    TSH 1 19 09/28/2018       Lab Results   Component Value Date    CHOL 159 09/19/2017     Lab Results   Component Value Date    TRIG 104 12/31/2018     Lab Results   Component Value Date    HDL 67 (H) 12/31/2018     Lab Results   Component Value Date    LDLCALC 80 12/31/2018     Lab Results   Component Value Date    HGBA1C 5 5 12/31/2018       Results for orders placed or performed during the hospital encounter of 01/07/19   Platelet count   Result Value Ref Range    Platelets 146 (L) 519 - 390 Thousands/uL    MPV 9 9 8 9 - 12 7 fL   Platelets Count - If Chest Tube Losses > 200 mL/hr in First Hour Postop   Result Value Ref Range    Platelets 821 (L) 138 - 390 Thousands/uL    MPV 9 2 8 9 - 12 7 fL   Basic metabolic panel   Result Value Ref Range    Sodium 142 136 - 145 mmol/L    Potassium 3 2 (L) 3 5 - 5 3 mmol/L    Chloride 111 (H) 100 - 108 mmol/L    CO2 24 21 - 32 mmol/L    ANION GAP 7 4 - 13 mmol/L    BUN 14 5 - 25 mg/dL    Creatinine 0 85 0 60 - 1 30 mg/dL    Glucose 143 (H) 65 - 140 mg/dL    Calcium 7 6 (L) 8 3 - 10 1 mg/dL    eGFR 71 ml/min/1 73sq m   Hemoglobin and hematocrit, blood   Result Value Ref Range    Hemoglobin 10 6 (L) 11 5 - 15 4 g/dL    Hematocrit 33 3 (L) 34 8 - 46 1 %   Potassium   Result Value Ref Range    Potassium 3 3 (L) 3 5 - 5 3 mmol/L   Hemoglobin and hematocrit, blood   Result Value Ref Range    Hemoglobin 10 7 (L) 11 5 - 15 4 g/dL    Hematocrit 32 9 (L) 34 8 - 46 1 %   Potassium   Result Value Ref Range    Potassium 4 5 3 5 - 5 3 mmol/L   Basic Metabolic Panel -AM POD #1   Result Value Ref Range    Sodium 142 136 - 145 mmol/L    Potassium 4 1 3 5 - 5 3 mmol/L    Chloride 111 (H) 100 - 108 mmol/L    CO2 24 21 - 32 mmol/L    ANION GAP 7 4 - 13 mmol/L    BUN 13 5 - 25 mg/dL    Creatinine 0 73 0 60 - 1 30 mg/dL    Glucose 137 65 - 140 mg/dL    Calcium 7 9 (L) 8 3 - 10 1 mg/dL    eGFR 85 ml/min/1 73sq m   Magnesium -AM POD #1   Result Value Ref Range    Magnesium 2 7 (H) 1 6 - 2 6 mg/dL   CBC-AM POD #1   Result Value Ref Range    WBC 15 13 (H) 4 31 - 10 16 Thousand/uL    RBC 3 38 (L) 3 81 - 5 12 Million/uL    Hemoglobin 10 2 (L) 11 5 - 15 4 g/dL    Hematocrit 31 7 (L) 34 8 - 46 1 %    MCV 94 82 - 98 fL    MCH 30 2 26 8 - 34 3 pg    MCHC 32 2 31 4 - 37 4 g/dL    RDW 13 3 11 6 - 15 1 %    Platelets 406 (L) 513 - 390 Thousands/uL    MPV 11 1 8 9 - 12 7 fL   Basic metabolic panel   Result Value Ref Range    Sodium 137 136 - 145 mmol/L    Potassium 3 6 3 5 - 5 3 mmol/L    Chloride 105 100 - 108 mmol/L    CO2 28 21 - 32 mmol/L    ANION GAP 4 4 - 13 mmol/L    BUN 13 5 - 25 mg/dL    Creatinine 0 73 0 60 - 1 30 mg/dL    Glucose 126 65 - 140 mg/dL    Calcium 7 8 (L) 8 3 - 10 1 mg/dL    eGFR 85 ml/min/1 73sq m   CBC (With Platelets)   Result Value Ref Range    WBC 11 25 (H) 4 31 - 10 16 Thousand/uL    RBC 2 97 (L) 3 81 - 5 12 Million/uL    Hemoglobin 9 0 (L) 11 5 - 15 4 g/dL    Hematocrit 28 4 (L) 34 8 - 46 1 %    MCV 96 82 - 98 fL    MCH 30 3 26 8 - 34 3 pg    MCHC 31 7 31 4 - 37 4 g/dL    RDW 13 6 11 6 - 15 1 %    Platelets 93 (L) 922 - 390 Thousands/uL    MPV 10 4 8 9 - 12 7 fL   CBC and Platelet   Result Value Ref Range    WBC 6 11 4 31 - 10 16 Thousand/uL    RBC 3 80 (L) 3 81 - 5 12 Million/uL    Hemoglobin 11 2 (L) 11 5 - 15 4 g/dL    Hematocrit 36 6 34 8 - 46 1 %    MCV 96 82 - 98 fL    MCH 29 5 26 8 - 34 3 pg    MCHC 30 6 (L) 31 4 - 37 4 g/dL    RDW 13 4 11 6 - 15 1 %    Platelets 91 (L) 963 - 390 Thousands/uL    MPV 10 0 8 9 - 12 7 fL   Basic metabolic panel   Result Value Ref Range    Sodium 139 136 - 145 mmol/L    Potassium 3 8 3 5 - 5 3 mmol/L    Chloride 105 100 - 108 mmol/L    CO2 29 21 - 32 mmol/L    ANION GAP 5 4 - 13 mmol/L    BUN 11 5 - 25 mg/dL    Creatinine 0 66 0 60 - 1 30 mg/dL    Glucose 105 65 - 140 mg/dL    Calcium 8 4 8 3 - 10 1 mg/dL    eGFR 91 ml/min/1 73sq m   ECG 12 lead   Result Value Ref Range    Ventricular Rate 89 BPM    Atrial Rate 89 BPM    WA Interval 154 ms    QRSD Interval 125 ms    QT Interval 396 ms    QTC Interval 482 ms    P Axis 72 degrees    QRS Axis 47 degrees    T Wave Axis 33 degrees   ECG 12 lead   Result Value Ref Range    Ventricular Rate 60 BPM    Atrial Rate 60 BPM    WA Interval 142 ms    QRSD Interval 96 ms    QT Interval 454 ms    QTC Interval 454 ms    P [place] 69 degrees    QRS Axis 49 degrees    T Wave Axis 56 degrees   Prepare RBC:Special Requirements: Leukoreduced; Has consent been obtained? Yes; Where is the Surgery Scheduled? [facility], 4 Units   Result Value Ref Range    Unit Product Code A5508L29     Unit Number [id number]     Unit ABO O     Unit [facility] POS     Unit Dispense Status Return to [facility]     Unit Product Code W7107H90     Unit Number [id number]     Unit ABO O     Unit [facility] POS     Unit Dispense Status Return to [facility]     Unit Product Code R5130U42     Unit Number [id number]     Unit ABO O     Unit [facility] POS     Unit Dispense Status Return to [facility]     Unit Product Code G8885D20     Unit Number [id number]     Unit ABO O     Unit [facility] POS     Unit Dispense Status Return to [facility]    Tissue Exam   Result Value Ref Range    Case Report       Surgical Pathology Report                         Case: [id number]                                   Authorizing Provider:  Karlo Plaza MD         Collected:           01/07/2019 0035              Ordering Location:     [address] [place] [address]      Received:            01/07/2019 [facility] Operating Room                                                      Pathologist:           Tim Gurrola MD                                                               Specimen:    Heart Valve, P-2 leaflet Mitral valve                                                      Final Diagnosis       A   P2 mitral valve leaflet (excision):  - Mitral valvular leaflet with myxomatous degeneration (increased mucopolysaccharide ground substance) of spongiosa and fibrosa valve layers by Jen Jono elastin and trichrome cytochemical stains, consistent with prolapse  No neovascularization or fibrinoinflammatory abnormalities seen  Additional Information       All controls performed with the immunohistochemical stains reported above reacted appropriately  These tests were developed and their performance characteristics determined by [facility] or [facility]  They may not be cleared or approved by the [place]  Food and Drug Administration  The FDA has determined that such clearance or approval is not necessary  These tests are used for clinical purposes  They should not be regarded as investigational or for research  This laboratory has been approved by [place] [address], designated as a high-complexity laboratory and is qualified to perform these tests  Gross Description       A  The specimen is received in formalin, labeled with the patient's name and medical record number, and is designated "P2 leaflet mitral valve  The specimen consists of a tan to pale tan rubbery fibromembranous appearing soft tissue fragment measuring 1 5 cm in greatest dimension  The fragment is trisected lengthwise revealing unremarkable pale tan cut surfaces  Entirely submitted  One cassette  Note: The estimated total formalin fixation time based upon information provided by the submitting clinician and the standard processing schedule is under 72 hours      [facility]        POCT Blood Gas (CG8+)   Result Value Ref Range    pH, Art i-STAT 7 332 (L) 7 350 - 7 450    pCO2, Art i-STAT 48 8 (H) 36 0 - 44 0 mm HG    pO2, ART i-STAT 169 0 (H) 75 0 - 129 0 mm HG    BE, i-STAT 0 -2 - 3 mmol/L    HCO3, Art i-STAT 25 9 22 0 - 28 0 mmol/L    CO2, i-STAT 27 21 - 32 mmol/L    O2 Sat, i-STAT 99 (H) 95 - 98 %    SODIUM, I-STAT 138 136 - 145 mmol/l    Potassium, i-STAT 3 1 (L) 3 5 - 5 3 mmol/L    Calcium, Ionized i-STAT 1 16 1 12 - 1 32 mmol/L    Hct, i-STAT 30 (L) 34 8 - 46 1 %    Hgb, i-STAT 10 2 (L) 11 5 - 15 4 g/dl    Glucose, i-STAT 112 65 - 140 mg/dl    Specimen Type ARTERIAL    POCT Blood Gas (CG8+)   Result Value Ref Range    pH, Art i-STAT 7 421 7 350 - 7 450    pCO2, Art i-STAT 43 3 36 0 - 44 0 mm HG    pO2, ART i-STAT 328 0 (H) 75 0 - 129 0 mm HG    BE, i-STAT 3 -2 - 3 mmol/L    HCO3, Art i-STAT 28 2 (H) 22 0 - 28 0 mmol/L    CO2, i-STAT 29 21 - 32 mmol/L    O2 Sat, i-STAT 100 (H) 95 - 98 %    SODIUM, I-STAT 138 136 - 145 mmol/l    Potassium, i-STAT 4 7 3 5 - 5 3 mmol/L    Calcium, Ionized i-STAT 0 93 (L) 1 12 - 1 32 mmol/L    Hct, i-STAT 21 (L) 34 8 - 46 1 %    Hgb, i-STAT 7 1 (L) 11 5 - 15 4 g/dl    Glucose, i-STAT 105 65 - 140 mg/dl    Specimen Type ARTERIAL    POCT activated clotting time   Result Value Ref Range    Activated Clotting Time, i-STAT 132 89 - 137 sec    Specimen Type ARTERIAL    POCT activated clotting time   Result Value Ref Range    Activated Clotting Time, i-STAT 760 (H) 89 - 137 sec    Specimen Type ARTERIAL    POCT activated clotting time   Result Value Ref Range    Activated Clotting Time, i-STAT 743 (H) 89 - 137 sec    Specimen Type ARTERIAL    POCT activated clotting time   Result Value Ref Range    Activated Clotting Time, i-STAT 502 (H) 89 - 137 sec    Specimen Type VENOUS    POCT Blood Gas (CG8+)   Result Value Ref Range    ph, Eligio ISTAT 7 374 7 300 - 7 400    pCO2, Eligio i-STAT 45 2 42 0 - 50 0 mm HG    pO2, Eligio i-STAT 37 0 35 0 - 45 0 mm HG    BE, i-STAT 1 -2 - 3 mmol/L    HCO3, Eligio i-STAT 26 3 24 0 - 30 0 mmol/L    CO2, i-STAT 28 21 - 32 mmol/L    O2 Sat, i-STAT 68 (L) 95 - 98 %    SODIUM, I-STAT 140 136 - 145 mmol/l    Potassium, i-STAT 4 0 3 5 - 5 3 mmol/L    Calcium, Ionized i-STAT 1 01 (L) 1 12 - 1 32 mmol/L    Hct, i-STAT 22 (L) 34 8 - 46 1 %    Hgb, i-STAT 7 5 (L) 11 5 - 15 4 g/dl    Glucose, i-STAT 154 (H) 65 - 140 mg/dl    Specimen Type VENOUS    POCT Blood Gas (CG8+)   Result Value Ref Range    pH, Art i-STAT 7 408 7 350 - 7 450    pCO2, Art i-STAT 43 3 36 0 - 44 0 mm HG    pO2, ART i-STAT 228 0 (H) 75 0 - 129 0 mm HG    BE, i-STAT 2 -2 - 3 mmol/L    HCO3, Art i-STAT 27 4 22 0 - 28 0 mmol/L    CO2, i-STAT 29 21 - 32 mmol/L    O2 Sat, i-STAT 100 (H) 95 - 98 %    SODIUM, I-STAT 139 136 - 145 mmol/l    Potassium, i-STAT 3 7 3 5 - 5 3 mmol/L    Calcium, Ionized i-STAT 1 03 (L) 1 12 - 1 32 mmol/L    Hct, i-STAT 23 (L) 34 8 - 46 1 %    Hgb, i-STAT 7 8 (L) 11 5 - 15 4 g/dl    Glucose, i-STAT 195 (H) 65 - 140 mg/dl    Specimen Type ARTERIAL    POCT activated clotting time   Result Value Ref Range    Activated Clotting Time, i-STAT 399 (H) 89 - 137 sec    Specimen Type ARTERIAL    POCT activated clotting time   Result Value Ref Range    Activated Clotting Time, i-STAT 121 89 - 137 sec    Specimen Type ARTERIAL    POCT Blood Gas (CG8+)   Result Value Ref Range    pH, Art i-STAT 7 374 7 350 - 7 450    pCO2, Art i-STAT 43 7 36 0 - 44 0 mm HG    pO2, ART i-STAT 267 0 (H) 75 0 - 129 0 mm HG    BE, i-STAT 0 -2 - 3 mmol/L    HCO3, Art i-STAT 25 5 22 0 - 28 0 mmol/L    CO2, i-STAT 27 21 - 32 mmol/L    O2 Sat, i-STAT 100 (H) 95 - 98 %    SODIUM, I-STAT 142 136 - 145 mmol/l    Potassium, i-STAT 3 3 (L) 3 5 - 5 3 mmol/L    Calcium, Ionized i-STAT 1 26 1 12 - 1 32 mmol/L    Hct, i-STAT 24 (L) 34 8 - 46 1 %    Hgb, i-STAT 8 2 (L) 11 5 - 15 4 g/dl    Glucose, i-STAT 166 (H) 65 - 140 mg/dl    Specimen Type ARTERIAL    POCT Blood Gas (CG8+)   Result Value Ref Range    pH, Art i-STAT 7 347 (L) 7 350 - 7 450    pCO2, Art i-STAT 44 4 (H) 36 0 - 44 0 mm HG    pO2, ART i-STAT 92 0 75 0 - 129 0 mm HG    BE, i-STAT -1 -2 - 3 mmol/L    HCO3, Art i-STAT 24 3 22 0 - 28 0 mmol/L    CO2, i-STAT 26 21 - 32 mmol/L    O2 Sat, i-STAT 97 95 - 98 %    SODIUM, I-STAT 144 136 - 145 mmol/l    Potassium, i-STAT 3 2 (L) 3 5 - 5 3 mmol/L    Calcium, Ionized i-STAT 1 18 1 12 - 1 32 mmol/L    Hct, i-STAT 30 (L) 34 8 - 46 1 %    Hgb, i-STAT 10 2 (L) 11 5 - 15 4 g/dl    Glucose, i-STAT 146 (H) 65 - 140 mg/dl    POC FIO2 60 L    Specimen Type ARTERIAL     SITE Right Radial     GIOVANA TEST Postive Ramon Test    Fingerstick Glucose (POCT)   Result Value Ref Range    POC Glucose 145 (H) 65 - 140 mg/dl   Fingerstick Glucose (POCT)   Result Value Ref Range    POC Glucose 143 (H) 65 - 140 mg/dl   Fingerstick Glucose (POCT)   Result Value Ref Range    POC Glucose 138 65 - 140 mg/dl   Fingerstick Glucose (POCT)   Result Value Ref Range    POC Glucose 128 65 - 140 mg/dl   Fingerstick Glucose (POCT)   Result Value Ref Range    POC Glucose 168 (H) 65 - 140 mg/dl   Fingerstick Glucose (POCT)   Result Value Ref Range    POC Glucose 161 (H) 65 - 140 mg/dl   Fingerstick Glucose (POCT)   Result Value Ref Range    POC Glucose 134 65 - 140 mg/dl   Fingerstick Glucose (POCT)   Result Value Ref Range    POC Glucose 119 65 - 140 mg/dl   Fingerstick Glucose (POCT)   Result Value Ref Range    POC Glucose 122 65 - 140 mg/dl   Fingerstick Glucose (POCT)   Result Value Ref Range    POC Glucose 145 (H) 65 - 140 mg/dl   Fingerstick Glucose (POCT)   Result Value Ref Range    POC Glucose 135 65 - 140 mg/dl   Fingerstick Glucose (POCT)   Result Value Ref Range    POC Glucose 142 (H) 65 - 140 mg/dl   Fingerstick Glucose (POCT)   Result Value Ref Range    POC Glucose 133 65 - 140 mg/dl   Fingerstick Glucose (POCT)   Result Value Ref Range    POC Glucose 141 (H) 65 - 140 mg/dl   Fingerstick Glucose (POCT)   Result Value Ref Range    POC Glucose 130 65 - 140 mg/dl   Fingerstick Glucose (POCT)   Result Value Ref Range    POC Glucose 133 65 - 140 mg/dl   Fingerstick Glucose (POCT)   Result Value Ref Range    POC Glucose 110 65 - 140 mg/dl   Fingerstick Glucose (POCT)   Result Value Ref Range    POC Glucose 112 65 - 140 mg/dl   Fingerstick Glucose (POCT)   Result Value Ref Range    POC Glucose 104 65 - 140 mg/dl   Fingerstick Glucose (POCT)   Result Value Ref Range    POC Glucose 93 65 - 140 mg/dl   Fingerstick Glucose (POCT)   Result Value Ref Range    POC Glucose 97 65 - 140 mg/dl       Orders Placed This Encounter   Procedures    CBC ALLERGIES:  Allergies   Allergen Reactions    Other Other (See Comments)     Skin breakdown from bandaid on for long time- reddness       Current Medications     Current Outpatient Prescriptions   Medication Sig Dispense Refill    acetaminophen (TYLENOL) 325 mg tablet Take 1-2 tablets Q4-6 hours PRN pain  Do not take with Percocet  30 tablet 0    amLODIPine (NORVASC) 5 mg tablet Take 1 tablet (5 mg total) by mouth daily 30 tablet 2    aspirin 325 mg tablet Take 1 tablet (325 mg total) by mouth daily 30 tablet 2    atorvastatin (LIPITOR) 10 mg tablet Take 1 tablet (10 mg total) by mouth daily 30 tablet 2    Cholecalciferol (VITAMIN D3) 2000 UNITS TABS Take 1 tablet by mouth daily   levothyroxine 125 mcg tablet Take 1 tablet (125 mcg total) by mouth daily 30 tablet 5    metoprolol tartrate (LOPRESSOR) 25 mg tablet Take 0 5 tablets (12 5 mg total) by mouth every 12 (twelve) hours 60 tablet 2    pantoprazole (PROTONIX) 40 mg tablet Take 1 tablet (40 mg total) by mouth daily in the early morning for 30 days 30 tablet 0    estradiol (ESTRACE VAGINAL) 0 1 mg/g vaginal cream Insert into the vagina       No current facility-administered medications for this visit            Health Maintenance     Health Maintenance   Topic Date Due   [facility] Annual Wellness Visit (AWV)  1950    SLP PLAN OF CARE  1950    Hepatitis C Screening  03/30/2019 (Originally 1950)    Fall Risk  04/18/2019    Urinary Incontinence Screening  04/18/2019    Depression Screening PHQ  10/19/2019    MAMMOGRAM  11/20/2019    DTaP,Tdap,and Td Vaccines (2 - Td) 11/09/2020    CRC Screening: Colonoscopy  05/12/2026    INFLUENZA VACCINE  Completed    Pneumococcal PPSV23/PCV13 65+ Years / Low and Medium Risk  Completed     Immunization History   Administered Date(s) Administered    Influenza Quadrivalent Preservative Free 3 years and older IM 11/11/2016    Influenza Split High Dose Preservative Free IM 09/21/2015, 09/15/2017    Influenza TIV (IM) 11/01/2010, 01/24/2013, 09/22/2014    Influenza, high dose seasonal 0 5 mL 10/19/2018    Pneumococcal Conjugate 13-Valent 01/19/2016    Pneumococcal Polysaccharide PPV23 09/15/2017    Tdap 11/09/2010       Latasha Holguin MD

## 2019-01-18 NOTE — PATIENT INSTRUCTIONS
Please check with Cardiology and cardiothoracic surgery about dose of aspirin, I would like to reduce it back to 81 mg daily    Once dose of aspirin is decreased, you may discontinue acid reflux medication, pantoprazole    Please continue metoprolol, half a tablet twice a day, please call me at any time if you have dizziness or lightheadedness    Please take amlodipine as half a tablet twice a day

## 2019-01-20 ENCOUNTER — TELEPHONE (OUTPATIENT)
Dept: FAMILY MEDICINE CLINIC | Facility: CLINIC | Age: 69
End: 2019-01-20

## 2019-01-21 ENCOUNTER — TELEPHONE (OUTPATIENT)
Dept: FAMILY MEDICINE CLINIC | Facility: CLINIC | Age: 69
End: 2019-01-21

## 2019-01-21 ENCOUNTER — HOSPITAL ENCOUNTER (OUTPATIENT)
Dept: RADIOLOGY | Facility: HOSPITAL | Age: 69
Discharge: HOME | End: 2019-01-21
Payer: MEDICARE

## 2019-01-21 ENCOUNTER — TRANSCRIBE ORDERS (OUTPATIENT)
Dept: RADIOLOGY | Facility: HOSPITAL | Age: 69
End: 2019-01-21

## 2019-01-21 DIAGNOSIS — I34.0 NON-RHEUMATIC MITRAL REGURGITATION: Primary | ICD-10-CM

## 2019-01-21 DIAGNOSIS — Z98.890 S/P MVR (MITRAL VALVE REPAIR): ICD-10-CM

## 2019-01-21 DIAGNOSIS — D69.6 THROMBOCYTOPENIA (HCC): ICD-10-CM

## 2019-01-21 DIAGNOSIS — H53.9 VISUAL DISTURBANCE: ICD-10-CM

## 2019-01-21 DIAGNOSIS — I34.0 NON-RHEUMATIC MITRAL REGURGITATION: ICD-10-CM

## 2019-01-21 DIAGNOSIS — H53.2 DOUBLE VISION: ICD-10-CM

## 2019-01-21 PROCEDURE — 70496 CT ANGIOGRAPHY HEAD: CPT

## 2019-01-21 RX ADMIN — IOHEXOL 85 ML: 350 INJECTION, SOLUTION INTRAVENOUS at 18:30

## 2019-01-21 NOTE — TELEPHONE ENCOUNTER
Judith Paige is the nurse from [facility] and she was at patients home today and her BP was 140/98  Patient also reported 20 seconds of double vision 1 time this morning but was feeling fine since then  If you need anything else from Judith Paige please contact her

## 2019-01-21 NOTE — ASSESSMENT & PLAN NOTE
Decreased platelet counts postop, most recent value 91,000 on January 10th  Patient remains on aspirin 325 mg daily, will continue monitoring

## 2019-01-21 NOTE — TELEPHONE ENCOUNTER
Patient scheduled for CT tonight at 630 and Holter monitor tomorrow morning at 9 30am  Patient aware

## 2019-01-21 NOTE — TELEPHONE ENCOUNTER
Please contact [facility] at [facility]  I would like them to draw CBC sometime this week, nonfasting, I just placed orders  I am following up on patient's thrombocytopenia/decreased platelet count    I did place orders  Thank you

## 2019-01-21 NOTE — ASSESSMENT & PLAN NOTE
Amlodipine 5 mg daily, I advised patient to use half a tablet twice a day to minimize orthostatics symptoms

## 2019-01-21 NOTE — ASSESSMENT & PLAN NOTE
Patient is under care of [facility] cardiothoracic surgery in [facility]  Pending hospital follow-up since start of cardiac rehab  Postop pain is well controlled  Incision is clean and dry  Patient uses Tylenol as needed

## 2019-01-21 NOTE — TELEPHONE ENCOUNTER
Please contact patient  At this time I would like to proceed with ASAP ophthalmology evaluation, CTA brain and Holter monitor  I will order testing ASAP, nurses please try to arrange CT for tomorrow or later tonight  We also need to arrange evaluation by [facility] ASAP  Please let patient know that I have discussed pace with nurse practitioner at  [address] [facility] thoracic surgery  Patient has to stay on aspirin 325 mg daily for the next few months      Thanks

## 2019-01-22 ENCOUNTER — HOSPITAL ENCOUNTER (OUTPATIENT)
Dept: NON INVASIVE DIAGNOSTICS | Facility: HOSPITAL | Age: 69
Discharge: HOME | End: 2019-01-22
Payer: MEDICARE

## 2019-01-22 DIAGNOSIS — H53.2 DOUBLE VISION: ICD-10-CM

## 2019-01-22 DIAGNOSIS — H53.9 VISUAL DISTURBANCE: ICD-10-CM

## 2019-01-22 DIAGNOSIS — Z98.890 S/P MVR (MITRAL VALVE REPAIR): ICD-10-CM

## 2019-01-22 DIAGNOSIS — I34.0 NON-RHEUMATIC MITRAL REGURGITATION: ICD-10-CM

## 2019-01-22 DIAGNOSIS — D69.6 THROMBOCYTOPENIA (HCC): ICD-10-CM

## 2019-01-22 PROCEDURE — 93225 XTRNL ECG REC<48 HRS REC: CPT

## 2019-01-22 PROCEDURE — 93226 XTRNL ECG REC<48 HR SCAN A/R: CPT

## 2019-01-28 ENCOUNTER — TELEPHONE (OUTPATIENT)
Dept: FAMILY MEDICINE CLINIC | Facility: CLINIC | Age: 69
End: 2019-01-28

## 2019-01-28 NOTE — TELEPHONE ENCOUNTER
F/p is 148/90 and at r/c 134/90  Per dominic patient is cutting her b/p pill in half but it is not scored and may not be getting full amount  Taking lipitor daily and having some muscle aches  She used to take Monday, wed, Friday    Please advise

## 2019-01-30 ENCOUNTER — TELEPHONE (OUTPATIENT)
Dept: FAMILY MEDICINE CLINIC | Facility: CLINIC | Age: 69
End: 2019-01-30

## 2019-01-30 PROCEDURE — 93227 XTRNL ECG REC<48 HR R&I: CPT | Performed by: INTERNAL MEDICINE

## 2019-01-30 NOTE — TELEPHONE ENCOUNTER
----- Message from Amina Gallagher MD sent at 1/30/2019  9:50 AM EST -----  Please contact patient  Her Holter monitor was normal   Please make sure that she follows up with Ophthalmology regarding intermittent symptoms of double vision and discusses it with her cardiologist at forthcoming appointment    Thank you

## 2019-02-01 ENCOUNTER — OFFICE VISIT (OUTPATIENT)
Dept: CARDIOLOGY CLINIC | Facility: CLINIC | Age: 69
End: 2019-02-01
Payer: MEDICARE

## 2019-02-01 VITALS
DIASTOLIC BLOOD PRESSURE: 82 MMHG | OXYGEN SATURATION: 97 % | WEIGHT: 206.3 LBS | SYSTOLIC BLOOD PRESSURE: 114 MMHG | HEIGHT: 65 IN | HEART RATE: 76 BPM | BODY MASS INDEX: 34.37 KG/M2

## 2019-02-01 DIAGNOSIS — I34.1 MITRAL VALVE PROLAPSE: ICD-10-CM

## 2019-02-01 DIAGNOSIS — I34.0 NON-RHEUMATIC MITRAL REGURGITATION: ICD-10-CM

## 2019-02-01 DIAGNOSIS — G47.33 SEVERE OBSTRUCTIVE SLEEP APNEA: ICD-10-CM

## 2019-02-01 DIAGNOSIS — Z98.890 S/P MVR (MITRAL VALVE REPAIR): ICD-10-CM

## 2019-02-01 DIAGNOSIS — E78.2 MIXED HYPERLIPIDEMIA: Primary | ICD-10-CM

## 2019-02-01 DIAGNOSIS — I10 BENIGN ESSENTIAL HYPERTENSION: ICD-10-CM

## 2019-02-01 PROCEDURE — 99214 OFFICE O/P EST MOD 30 MIN: CPT | Performed by: INTERNAL MEDICINE

## 2019-02-01 NOTE — PROGRESS NOTES
[facility]  Follow up note  Darrel Solitario 76 y o  female MRN: [id number]        Problems    1  Mixed hyperlipidemia     2  Mitral valve prolapse     3  Non-rheumatic mitral regurgitation     4  Benign essential hypertension     5  S/P MVR (mitral valve repair)     6  Severe obstructive sleep apnea         Impression:    Moderate to severe mitral regurgitation/mitral valve prolapse  Status post mitral valve repair  Now about 1 month postop  Normal coronaries on preop catheterization  Mixed hyperlipidemia  Taking low-dose 10 mg atorvastatin 3 times a week  Lipids under excellent control as of 12/31/2018, HDL 67, LDL 80    Obstructive sleep apnea  Stable    Double vision  Sporadic, lasted 30 sec, CTA head and neck neg  Holter normal      Plan:    Limited echo to reassess valve and establish new baseline  Maintain current antihypertensive regimen  Follow-up in 3 months, at that point will discuss discontinuation of metoprolol if blood pressures remain normal   Continue aspirin for now, reduced to baby aspirin dose in 3 months  HPI:   Darrel Solitario is a 76y o  year old female with a history of asymptomatic but severe mitral regurgitation due to P2 prolapse of the mitral valve who underwent class 2A indication mitral valve repair, did well with surgery, had some fatigue postoperatively, and has had 2 episodes, 1 as an inpatient was an outpatient of very brief 30 sec of double vision which spontaneously resolved  She denies chest pain, shortness of breath, lightheadedness, palpitations  She had no postoperative arrhythmias  She has been on metoprolol  She has very mild hyperlipidemia, she takes atorvastatin 30 mg 3 times a week, LDL is 80, HDL is 67, and her pre operative cardiac catheterization showed completely normal coronaries at 76years of age  She was previously on hydrochlorothiazide for hypertension, currently only on amlodipine 2 5 mg b i d   And metoprolol 12 5 mg b i d         Review of Systems   Constitutional: Negative  HENT: Negative  Eyes: Negative  Respiratory: Negative  Cardiovascular: Negative  Gastrointestinal: Negative  Endocrine: Negative  Genitourinary: Negative  Musculoskeletal: Negative  Skin: Negative  Neurological: Negative  Hematological: Negative  Psychiatric/Behavioral: Negative  Past Medical History:   Diagnosis Date    Cystic breast     Dyspareunia, female     last assessed 9/4/14    Hyperlipidemia     Hypothyroidism     Insomnia     IRINA on CPAP     Osteoporosis     Severe mitral regurgitation     Varicose veins of lower extremity     last assessed 1/4/13     History   Alcohol Use    Yes     Comment: social     History   Drug Use No     History   Smoking Status    Never Smoker   Smokeless Tobacco    Never Used       Allergies: Allergies   Allergen Reactions    Other Other (See Comments)     Skin breakdown from bandaid on for long time- reddness       Medications:     Current Outpatient Prescriptions:     acetaminophen (TYLENOL) 325 mg tablet, Take 1-2 tablets Q4-6 hours PRN pain  Do not take with Percocet , Disp: 30 tablet, Rfl: 0    amLODIPine (NORVASC) 5 mg tablet, Take 1 tablet (5 mg total) by mouth daily (Patient taking differently: Take 2 5 mg by mouth 2 (two) times a day  ), Disp: 30 tablet, Rfl: 2    aspirin 325 mg tablet, Take 1 tablet (325 mg total) by mouth daily, Disp: 30 tablet, Rfl: 2    atorvastatin (LIPITOR) 10 mg tablet, Take 1 tablet (10 mg total) by mouth daily (Patient taking differently: Take 10 mg by mouth daily Patient taking 3xs per week Monday, Wednesday, and Friday ), Disp: 30 tablet, Rfl: 2    Cholecalciferol (VITAMIN D3) 2000 UNITS TABS, Take 1 tablet by mouth daily  , Disp: , Rfl:     estradiol (ESTRACE VAGINAL) 0 1 mg/g vaginal cream, Insert into the vagina, Disp: , Rfl:     levothyroxine 125 mcg tablet, Take 1 tablet (125 mcg total) by mouth daily, Disp: 30 tablet, Rfl: 5   metoprolol tartrate (LOPRESSOR) 25 mg tablet, Take 0 5 tablets (12 5 mg total) by mouth every 12 (twelve) hours (Patient taking differently: Take 25 mg by mouth every 12 (twelve) hours  ), Disp: 60 tablet, Rfl: 2    pantoprazole (PROTONIX) 40 mg tablet, Take 1 tablet (40 mg total) by mouth daily in the early morning for 30 days, Disp: 30 tablet, Rfl: 0      Vitals:    02/01/19 1023   BP: 114/82   Pulse: 76   SpO2: 97%     Weight (last 2 days)     Date/Time   Weight    02/01/19 1023  93 6 (206 3)            Physical Exam   Constitutional: No distress  HENT:   Head: Normocephalic and atraumatic  Eyes: Conjunctivae are normal  No scleral icterus  Neck: Normal range of motion  No JVD present  Cardiovascular: Normal rate, regular rhythm, normal heart sounds and intact distal pulses  No murmur heard  Pulmonary/Chest: Effort normal and breath sounds normal  No respiratory distress  She has no wheezes  She has no rales  Musculoskeletal: She exhibits no edema or tenderness  Skin: Skin is warm and dry  She is not diaphoretic           Laboratory Studies:  Chem:   Lab Results   Component Value Date    HGBA1C 5 5 12/31/2018     09/19/2017     05/03/2017     12/12/2016    K 3 8 01/10/2019    K 3 6 01/09/2019    K 4 1 01/08/2019    K 3 9 09/28/2018    K 3 8 04/04/2018    K 3 9 09/19/2017    K 3 8 05/03/2017    K 3 7 12/12/2016     01/10/2019     01/09/2019     (H) 01/08/2019     09/28/2018     04/04/2018     09/19/2017     05/03/2017     12/12/2016    CO2 29 01/10/2019    CO2 28 01/09/2019    CO2 24 01/08/2019    CO2 26 01/07/2019    CO2 27 01/07/2019    CO2 29 01/07/2019    CO2 30 09/28/2018    CO2 29 04/04/2018    GLUCOSE 146 (H) 01/07/2019    GLUCOSE 166 (H) 01/07/2019    GLUCOSE 195 (H) 01/07/2019    CREATININE 0 66 01/10/2019    CREATININE 0 73 01/09/2019    CREATININE 0 73 01/08/2019    CREATININE 0 85 09/19/2017    CREATININE 0 84 05/03/2017 CREATININE 0 84 2016    BUN 11 01/10/2019    BUN 13 2019    BUN 13 2019    BUN 16 2018    BUN 15 2018    BUN 12 2017    BUN 14 2017    BUN 15 2016    MG 2 7 (H) 2019    MG 2 3 2018    MG 2 3 2018     NT-proBNP:   Coags:  Lipid Profile:   Lab Results   Component Value Date    CHOL 159 2017    CHOL 163 2017    CHOL 146 2016    LDLCALC 80 2018    LDLCALC 85 2018    LDLCALC 58 2018    LDLDIRECT 110 2016    LDLDIRECT 105 2015    LDLDIRECT 98 2013    HDL 67 (H) 2018    HDL 66 (H) 2018    HDL 74 2018    HDL 62 (H) 2018    HDL 61 2018    HDL 63 2017    HDL 69 2017    HDL 54 2016    TRIG 104 2018    TRIG 96 2018    TRIG 105 2018    TRIG 62 2018    TRIG 96 2018    TRIG 108 2017    TRIG 108 2017    TRIG 118 2016       Cardiac testing:   EKG reviewed personally:     Results for orders placed during the hospital encounter of 18   Echo complete with contrast if indicated    Narrative [facility] [address]  [address]  [phone]    Transthoracic Echocardiogram  2D, M-mode, Doppler, and Color Doppler    Study date:  2018    Patient: Karen Castillo  MR number: [id number]  Account number: [de-identified]  : 1950  Age: 76 years  Gender: Female  Status: Outpatient  Location: Echo lab  Height: 65 in  Weight: 200 lb  BP: 140/ 90 mmHg    Indications: Murmur    Diagnoses: R01 1 - Cardiac murmur, unspecified    Sonographer:  ZEV Contreras  Primary Physician:  Mraco Coronel MD  Referring Physician:  Marco Coronel MD  Group:  [facility]  Cardiology Fellow:  Reji Rivera MD  Interpreting Physician:  Mykel Ahumada MD    SUMMARY    LEFT VENTRICLE:  Size was normal   Systolic function was normal  Ejection fraction was estimated to be 65 %  There were no regional wall motion abnormalities  RIGHT VENTRICLE:  The size was normal   Systolic function was normal     MITRAL VALVE:  There was holosystolic prolapse involving the posterior leaflet  There was severe regurgitation  The regurgitant jet was eccentric and directed anteriorly  The effective orifice of mitral regurgitation by proximal isovelocity surface area was 0 48 cm squared  The volume of mitral regurgitation by proximal isovelocity surface area was 93 ml  PULMONARY VEINS:  There was systolic flow reversal in the pulmonary vein(s), indicative of severe mitral regurgitation  RECOMMENDATIONS:  Discussed with the patient and the patient's primary care for out patient cardiology follow-up  The attending physician was notified of these results  HISTORY: PRIOR HISTORY: HTN, HLD, IRINA    PROCEDURE: The procedure was performed in the echo lab  This was a routine study  The transthoracic approach was used  The study included complete 2D imaging, M-mode, complete spectral Doppler, and color Doppler  The heart rate was 65 bpm,  at the start of the study  Images were obtained from the parasternal, apical, subcostal, and suprasternal notch acoustic windows  Image quality was adequate  LEFT VENTRICLE: Size was normal  Systolic function was normal  Ejection fraction was estimated to be 65 %  There were no regional wall motion abnormalities  Wall thickness was normal  DOPPLER: Left ventricular diastolic function parameters  were normal     RIGHT VENTRICLE: The size was normal  Systolic function was normal     LEFT ATRIUM: The atrium was mildly to moderately dilated  RIGHT ATRIUM: Size was normal     MITRAL VALVE: There was mild annular calcification  There was mild diffuse thickening of the valve  There was holosystolic prolapse involving the posterior leaflet  DOPPLER: There was no evidence for stenosis  There was severe  regurgitation   The regurgitant jet was eccentric and directed anteriorly  AORTIC VALVE: The valve was trileaflet  Leaflets exhibited normal thickness and normal cuspal separation  DOPPLER: There was no evidence for stenosis  There was no regurgitation  TRICUSPID VALVE: The valve structure was normal  There was normal leaflet separation  DOPPLER: There was no evidence for stenosis  There was mild regurgitation  Estimated peak PA pressure was 35 mmHg  PULMONIC VALVE: DOPPLER: The transpulmonic velocity was within the normal range  There was no evidence for stenosis  There was trace regurgitation  PERICARDIUM: There was no pericardial effusion  The pericardium was normal in appearance  AORTA: The root exhibited normal size  SYSTEMIC VEINS: IVC: The inferior vena cava was normal in size and course  Respirophasic changes were normal     PULMONARY VEINS: DOPPLER: There was systolic flow reversal in the pulmonary vein(s), indicative of severe mitral regurgitation  MEASUREMENT TABLES    DOPPLER MEASUREMENTS  Mitral valve   (Reference normals)  Regurg alias keven   38 cm/s   (--)  Regurg PISA radius   11 mm   (--)  Max MR keven   598 cm/s   (--)  Regurg VTI   194 cm   (--)  Max MR flow rate   288 9 ml/s   (--)  Area, ERO, PISA   0 48 cm squared   (--)  Regurg vol, PISA   93 ml   (--)    SYSTEM MEASUREMENT TABLES    2D  %FS: 40 38 %  Ao Diam: 2 74 cm  EDV(Teich): 118 21 ml  EF(Cube): 78 81 %  EF(Teich): 70 86 %  ESV(Cube): 26 48 ml  ESV(Teich): 34 45 ml  IVSd: 1 08 cm  LA Area: 27 71 cm2  LA Diam: 4 4 cm  LVEDV MOD A4C: 121 26 ml  LVEF MOD A4C: 62 99 %  LVESV MOD A4C: 44 88 ml  LVIDd: 5 cm  LVIDs: 2 98 cm  LVLd A4C: 8 01 cm  LVLs A4C: 6 37 cm  LVPWd: 1 1 cm  RA Area: 16 49 cm2  SV MOD A4C: 76 38 ml  SV(Cube): 98 48 ml  SV(Teich): 83 76 ml  rv diam: 3 86 cm    CF  MR Als  Keven: 0 38 m/s  MR Flow: 393 24 ml/s  MR Rad: 1 28 cm    CW  TR Vmax: 2 65 m/s  TR maxP 18 mmHg    MM  TAPSE: 2 23 cm    PW  E': 0 09 m/s  E/E': 13 56  MV A Keven: 0 81 m/s  MV Dec [place]: 7 83 m/s2  MV DecT: 158 22 ms  MV E Keven: 1 24 m/s  MV E/A Ratio: 1 53    [facility] [place] [facility] Accredited Echocardiography Laboratory    Prepared and electronically signed by    Galina Daley MD  Signed [id number] 11:47:14       Results for orders placed during the hospital encounter of 18   DORI    Narrative [facility]  [facility], [address] [facility]  [phone]    Transesophageal Echocardiogram  2D, 3D, M-mode, Doppler, and Color Doppler    Study date:  03-Dec-2018    Patient: Ngoc Pham  MR number: [id number]  Account number: [de-identified]  : 1950  Age: 76 years  Gender: Female  Status: Outpatient  Location: Echo lab  Height: 66 in  Weight: 200 lb  BP: 118/ 72 mmHg    Indications: MVP    Diagnoses: I34 1 - Nonrheumatic mitral (valve) prolapse    Sonographer:  ZEV Dexter  Interpreting Physician:  Nahun Spears MD  Primary Physician:  Porsha Worrell MD  Referring Physician:  Dionna Mccall MD  Group:  [facility]  Cardiology Fellow:  Sunday Mendoza MD  RN:  Jefe Watters RN    SUMMARY    LEFT VENTRICLE:  Size was normal   The end systolic dimension was 30 mm  Systolic function was normal  Ejection fraction was estimated to be 60 %  There were no regional wall motion abnormalities  LEFT ATRIUM:  The atrium was mildly dilated  ATRIAL SEPTUM:  No defect or patent foramen ovale was identified by color Doppler  MITRAL VALVE:  There was a marked, holosystolic prolapse involving the medial scallop of the posterior leaflet  The maximum prolapse dimension was 8 mm  There was severe regurgitation  PISA could not be done due to the eccentric nature of the regurgitation jet  The regurgitant jet was eccentric and directed anteriorly  The mitral regurgitant volume (by LVOT continuity) was 145 ml  The mitral regurgitant volume-regurgitation fraction (by LVOT continuity) was 68 %      TRICUSPID VALVE:  There was mild to moderate regurgitation  Pulmonary artery systolic pressure was within the normal range  PULMONARY VEINS:  There was systolic blunting in the pulmonary vein(s), indicative of significant mitral regurgitation  There was no flow reversal seen and this could be due to low blood pressure of the patient during the study  HISTORY: PRIOR HISTORY: HTN, HLD, IRINA    PROCEDURE: The procedure was performed in the echo lab  This was a routine study  The risks and alternatives of the procedure were explained to the patient and informed consent was obtained  The transesophageal approach was used  The study  included complete 2D imaging, 3D imaging, M-mode, complete spectral Doppler, and color Doppler  The heart rate was 68 bpm, at the start of the study  An adult omniplane probe was inserted by the cardiology fellow under direct supervision  of the attending cardiologist  Intubated with ease  One intubation attempt(s)  There was no blood detected on the probe  Image quality was adequate  There were no complications during the procedure  MEDICATIONS: Anesthesia administered by  anesthesia team     LEFT VENTRICLE: Size was normal   The end systolic dimension was 30 mm  Systolic function was normal  Ejection fraction was estimated to be 60 %  There were no regional wall motion abnormalities  Wall thickness was normal     RIGHT VENTRICLE: The size was normal  Systolic function was normal     LEFT ATRIUM: The atrium was mildly dilated  No thrombus was identified  APPENDAGE: The size was normal  No thrombus was identified  DOPPLER: The function was normal (normal emptying velocity)  ATRIAL SEPTUM: No defect or patent foramen ovale was identified by color Doppler  RIGHT ATRIUM: Size was normal  No thrombus was identified  MITRAL VALVE: There was normal leaflet separation  There was a marked, holosystolic prolapse involving the medial scallop of the posterior leaflet   The maximum prolapse dimension was 8 mm  There was no echocardiographic evidence of  vegetation  DOPPLER: The transmitral velocity was within the normal range  There was no evidence for stenosis  There was severe regurgitation  PISA could not be done due to the eccentric nature of the regurgitation jet  The regurgitant jet was eccentric and directed anteriorly  AORTIC VALVE: The valve was trileaflet  Leaflets exhibited normal thickness and normal cuspal separation  DOPPLER: There was no regurgitation  TRICUSPID VALVE: The valve structure was normal  There was normal leaflet separation  There was no echocardiographic evidence of vegetation  DOPPLER: There was mild to moderate regurgitation  The regurgitant jet was toward the septum  Pulmonary artery systolic pressure was within the normal range  Estimated peak PA pressure was 26 mmHg  PULMONIC VALVE: Leaflets exhibited normal thickness, no calcification, and normal cuspal separation  DOPPLER: There was no significant regurgitation  PERICARDIUM: There was no pericardial effusion  The pericardium was normal in appearance  AORTA: The root exhibited normal size  There was no atheroma  There was no evidence for dissection  There was no evidence for aneurysm  PULMONARY VEINS: DOPPLER: There was systolic blunting in the pulmonary vein(s), indicative of significant mitral regurgitation  There was no flow reversal seen and this could be due to low blood pressure of the patient during the study      MEASUREMENT TABLES    2D MEASUREMENTS  LVOT   (Reference normals)  Diam   21 mm   (--)  Mitral valve   (Reference normals)  Mean annulus diam   33 mm   (--)    DOPPLER MEASUREMENTS  LVOT   (Reference normals)  VTI   20 cm   (--)  Stroke vol   69 27 ml   (--)  Stroke index   0 35 ml/m squared   (--)  Mitral valve   (Reference normals)  VTI at annulus   25 cm   (--)  Vol, (flow)   213 82 ml   (--)  Regurg vol, LVOT cont   145 ml   (--)  RF, LVOT cont   68 %   (--)  Area, ERO, LVOT cont   5 8 cm squared   (--)    [address] Accredited Echocardiography Laboratory    Prepared and electronically signed by    Estephania Pina MD  Signed 03-Dec-2018 16:47:18       No results found for this or any previous visit  No results found for this or any previous visit  Estephania Pina MD    Portions of the record may have been created with voice recognition software   Occasional wrong word or "sound a like" substitutions may have occurred due to the inherent limitations of voice recognition software   Read the chart carefully and recognize, using context, where substitutions have occurred

## 2019-02-04 DIAGNOSIS — E03.9 HYPOTHYROIDISM, UNSPECIFIED TYPE: ICD-10-CM

## 2019-02-04 RX ORDER — LEVOTHYROXINE SODIUM 0.12 MG/1
TABLET ORAL
Qty: 30 TABLET | Refills: 5 | Status: SHIPPED | OUTPATIENT
Start: 2019-02-04 | End: 2019-04-04

## 2019-02-07 ENCOUNTER — OFFICE VISIT (OUTPATIENT)
Dept: CARDIAC SURGERY | Facility: CLINIC | Age: 69
End: 2019-02-07

## 2019-02-07 VITALS
BODY MASS INDEX: 34.66 KG/M2 | WEIGHT: 208 LBS | HEIGHT: 65 IN | SYSTOLIC BLOOD PRESSURE: 122 MMHG | OXYGEN SATURATION: 98 % | TEMPERATURE: 97.2 F | DIASTOLIC BLOOD PRESSURE: 72 MMHG | RESPIRATION RATE: 14 BRPM | HEART RATE: 82 BPM

## 2019-02-07 DIAGNOSIS — Z98.890 S/P MVR (MITRAL VALVE REPAIR): Primary | ICD-10-CM

## 2019-02-07 PROCEDURE — 99024 POSTOP FOLLOW-UP VISIT: CPT | Performed by: THORACIC SURGERY (CARDIOTHORACIC VASCULAR SURGERY)

## 2019-02-07 NOTE — LETTER
February 7, 2019     Yosi Langston, [address]  [address] [place] [address]    Patient: Ivonne Lr   YOB: 1950   Date of Visit: 2/7/2019       Dear Dr Martha Lyman: Thank you for referring Jess Mayuri to me for evaluation  Below are my notes for this consultation  If you have questions, please do not hesitate to call me  I look forward to following your patient along with you  Sincerely,        Mitchel Jorge MD        CC: MD Li Carroll PA-C  2/7/2019  3:05 PM  Attested  Procedure: S/P Mitral valve repair with P2 triangular ressection and 30 mm Medtronic CG Future mitral annuloplasty ring, performed on 1/7/2019    History: Ivonne Lr is a 76y o  year old female who presents to our office today for routine follow up care from Mitral valve repair with P2 triangular ressection and 30 mm Medtronic CG Future mitral annuloplasty ring  Her postoperative course was uneventful and she was discharged to home on POD 3 in stable condition  She did have complaints in an outpatient setting of double vision evaluated by opthalmology, CTA head and neck and Holter monitor with No findings  She presents to the office today for her follow up from her operation  She has been doing well and ambulating daily outside with her  about 1/2 mile without any complaints  She has returned to her ADLs independently  She is tolerating a cardiac diet  She has no incisional pain  She is present in consultation with her   Vital Signs:   Vitals:    02/07/19 1400 02/07/19 1442   BP: 122/82 122/72   BP Location: Left arm Right arm   Cuff Size: Adult    Pulse: 82    Resp: 14    Temp: (!) 97 2 °F (36 2 °C)    TempSrc: Oral    SpO2: 98%    Weight: 94 3 kg (208 lb)    Height: 5' 5" (1 651 m)        Home Medications:   Prior to Admission medications    Medication Sig Start Date End Date Taking?  Authorizing Provider   acetaminophen (TYLENOL) 325 mg tablet Take 1-2 tablets Q4-6 hours PRN pain  Do not take with Percocet  1/10/19  Yes Imelda Aguilar PA-C   amLODIPine (NORVASC) 5 mg tablet Take 1 tablet (5 mg total) by mouth daily  Patient taking differently: Take 2 5 mg by mouth 2 (two) times a day   1/10/19  Yes Imelda Aguilar PA-C   aspirin 325 mg tablet Take 1 tablet (325 mg total) by mouth daily 1/11/19  Yes Imelda Aguilar PA-C   atorvastatin (LIPITOR) 10 mg tablet Take 1 tablet (10 mg total) by mouth daily  Patient taking differently: Take 10 mg by mouth daily Patient taking 3xs per week Monday, Wednesday, and Friday  1/10/19  Yes Imelda Aguilar PA-C   Cholecalciferol (VITAMIN D3) 2000 UNITS TABS Take 1 tablet by mouth daily  Yes Historical Provider, MD   estradiol (ESTRACE VAGINAL) 0 1 mg/g vaginal cream Insert into the vagina 9/15/16  Yes Historical Provider, MD   levothyroxine 125 mcg tablet TAKE 1 TABLET DAILY ON EMPTY STOMACH 2/4/19  Yes Mauro Ward MD   metoprolol tartrate (LOPRESSOR) 25 mg tablet Take 0 5 tablets (12 5 mg total) by mouth every 12 (twelve) hours  Patient taking differently: Take 25 mg by mouth every 12 (twelve) hours   1/10/19  Yes Imelda Aguilar PA-C   pantoprazole (PROTONIX) 40 mg tablet Take 1 tablet (40 mg total) by mouth daily in the early morning for 30 days 1/11/19 2/10/19 Yes Imelda Aguilar PA-C       Physical Exam:    HEENT/NECK:  PERRLA  No jugular venous distention  Cardiac:Regular rate and rhythm  Pulmonary:Breath sounds clear bilaterally  Abdomen:  Non-tender, Non-distended  Positive bowel sounds  Lower extremities: Extremities warm/dry  Radial/PT/DP pules 2+ bilaterally  No edema B/L  Incisions: Sternum is stable  Incision is clean, dry, and intact  Neuro: Alert and oriented X 3  Sensation is grossly intact  No focal deficits  Skin: Warm/Dry, without rashes or lesions      Assessment: severe mitral regurgitation S/P Mitral valve repair with P2 triangular ressection and 30 mm Medtronic CG Future mitral annuloplasty ring    Plan:     Fran Hodgkins continues to recover well following Mitral valve repair with P2 triangular ressection and 30 mm Medtronic CG Future mitral annuloplasty ring  To date they have made progressive improvements physical rehabilitation  At this point I have cleared them to begin outpatient cardiac rehabilitation and have encouraged them to to do so  Fran Hodgkins has also been cleared to resume driving at this point  I asked that them do so in progressive increments  I did remind them of their ongoing lifting restrictions of 25 pounds for an additional 2 months  Fran Hodgkins has already been evaluated by their primary care physician cardiologist for ongoing medical care  At this point I have not scheduled them for routine followup care with our office  I have advised them to call with any new concerns that may arise  Fran Hodgkins was comfortable with our recommendations and their questions were answered to their satisfaction  Vane Thomas PA-C  02/07/19  Attestation signed by Samira Khoury MD at 2/7/2019  3:17 PM:  Patient seen and evaluated with [address] Jos[address] Thompson / JACKI  I agree with the above assessment and plan with the following additions      Normal postop  No complications    Plan:  Cardiac rehab  Follow up with cardiology and PCP

## 2019-02-07 NOTE — PROGRESS NOTES
Procedure: S/P Mitral valve repair with P2 triangular ressection and 30 mm Medtronic CG Future mitral annuloplasty ring, performed on 1/7/2019    History: Calin Morel is a 76y o  year old female who presents to our office today for routine follow up care from Mitral valve repair with P2 triangular ressection and 30 mm Medtronic CG Future mitral annuloplasty ring  Her postoperative course was uneventful and she was discharged to home on POD 3 in stable condition  She did have complaints in an outpatient setting of double vision evaluated by opthalmology, CTA head and neck and Holter monitor with No findings  She presents to the office today for her follow up from her operation  She has been doing well and ambulating daily outside with her  about 1/2 mile without any complaints  She has returned to her ADLs independently  She is tolerating a cardiac diet  She has no incisional pain  She is present in consultation with her   Vital Signs:   Vitals:    02/07/19 1400 02/07/19 1442   BP: 122/82 122/72   BP Location: Left arm Right arm   Cuff Size: Adult    Pulse: 82    Resp: 14    Temp: (!) 97 2 °F (36 2 °C)    TempSrc: Oral    SpO2: 98%    Weight: 94 3 kg (208 lb)    Height: 5' 5" (1 651 m)        Home Medications:   Prior to Admission medications    Medication Sig Start Date End Date Taking? Authorizing Provider   acetaminophen (TYLENOL) 325 mg tablet Take 1-2 tablets Q4-6 hours PRN pain  Do not take with Percocet   1/10/19  Yes Yunier Paovn PA-C   amLODIPine (NORVASC) 5 mg tablet Take 1 tablet (5 mg total) by mouth daily  Patient taking differently: Take 2 5 mg by mouth 2 (two) times a day   1/10/19  Yes Yunier Pavon PA-C   aspirin 325 mg tablet Take 1 tablet (325 mg total) by mouth daily 1/11/19  Yes Yunier Pavon PA-C   atorvastatin (LIPITOR) 10 mg tablet Take 1 tablet (10 mg total) by mouth daily  Patient taking differently: Take 10 mg by mouth daily Patient taking 3xs per week Monday, Wednesday, and Friday  1/10/19  Yes Lizz Sutton PA-C   Cholecalciferol (VITAMIN D3) 2000 UNITS TABS Take 1 tablet by mouth daily  Yes Historical Provider, MD   estradiol (ESTRACE VAGINAL) 0 1 mg/g vaginal cream Insert into the vagina 9/15/16  Yes Historical Provider, MD   levothyroxine 125 mcg tablet TAKE 1 TABLET DAILY ON EMPTY STOMACH 2/4/19  Yes Peterson Gallo MD   metoprolol tartrate (LOPRESSOR) 25 mg tablet Take 0 5 tablets (12 5 mg total) by mouth every 12 (twelve) hours  Patient taking differently: Take 25 mg by mouth every 12 (twelve) hours   1/10/19  Yes Lizz Sutton PA-C   pantoprazole (PROTONIX) 40 mg tablet Take 1 tablet (40 mg total) by mouth daily in the early morning for 30 days 1/11/19 2/10/19 Yes Lizz Sutton PA-C       Physical Exam:    HEENT/NECK:  PERRLA  No jugular venous distention  Cardiac:Regular rate and rhythm  Pulmonary:Breath sounds clear bilaterally  Abdomen:  Non-tender, Non-distended  Positive bowel sounds  Lower extremities: Extremities warm/dry  Radial/PT/DP pules 2+ bilaterally  No edema B/L  Incisions: Sternum is stable  Incision is clean, dry, and intact  Neuro: Alert and oriented X 3  Sensation is grossly intact  No focal deficits  Skin: Warm/Dry, without rashes or lesions  Assessment: severe mitral regurgitation S/P Mitral valve repair with P2 triangular ressection and 30 mm Medtronic CG Future mitral annuloplasty ring    Plan:     Mamadou Roman continues to recover well following Mitral valve repair with P2 triangular ressection and 30 mm Medtronic CG Future mitral annuloplasty ring  To date they have made progressive improvements physical rehabilitation  At this point I have cleared them to begin outpatient cardiac rehabilitation and have encouraged them to to do so  Mamadou Roman has also been cleared to resume driving at this point  I asked that them do so in progressive increments   I did remind them of their ongoing lifting restrictions of 25 pounds for an additional 2 months  Fer Stuart has already been evaluated by their primary care physician cardiologist for ongoing medical care  At this point I have not scheduled them for routine followup care with our office  I have advised them to call with any new concerns that may arise  Tomrose marie Sade was comfortable with our recommendations and their questions were answered to their satisfaction      Dash Whitfield PA-C  02/07/19

## 2019-02-19 DIAGNOSIS — I10 ESSENTIAL HYPERTENSION: ICD-10-CM

## 2019-02-19 RX ORDER — AMLODIPINE BESYLATE 2.5 MG/1
2.5 TABLET ORAL DAILY
Qty: 30 TABLET | Refills: 3 | Status: SHIPPED | OUTPATIENT
Start: 2019-02-19 | End: 2019-04-04

## 2019-02-21 ENCOUNTER — TELEPHONE (OUTPATIENT)
Dept: FAMILY MEDICINE CLINIC | Facility: CLINIC | Age: 69
End: 2019-02-21

## 2019-02-21 DIAGNOSIS — I10 BENIGN ESSENTIAL HYPERTENSION: Primary | ICD-10-CM

## 2019-02-21 DIAGNOSIS — Z98.890 S/P MVR (MITRAL VALVE REPAIR): ICD-10-CM

## 2019-02-21 NOTE — TELEPHONE ENCOUNTER
Patient called and stated that she is taking the Metoprolol and ran out of pills since she is taking double the dose  She needs a new script with the proper dose on it sent to the pharmacy  The one that she has now is too soon to fill     Please call to advise

## 2019-02-25 ENCOUNTER — CLINICAL SUPPORT (OUTPATIENT)
Dept: CARDIAC REHAB | Facility: CLINIC | Age: 69
End: 2019-02-25
Payer: MEDICARE

## 2019-02-25 DIAGNOSIS — Z98.890 S/P MVR (MITRAL VALVE REPAIR): ICD-10-CM

## 2019-02-25 PROCEDURE — 93797 PHYS/QHP OP CAR RHAB WO ECG: CPT

## 2019-02-25 NOTE — PROGRESS NOTES
Cardiac Rehabilitation Plan of Care   Care Plan       Today's date: 2019   Visits: 1  Patient name: Thompson Ralph      : 1950  Age: 76 y o  MRN: [id number]  Referring Physician: Noman Witt PA-C  Provider: [address] [place] [address] Cardiopulmonary Rehab   Clinician: Juan Wills MS, CEP     Dx:   Encounter Diagnosis   Name Primary?  S/P MVR (mitral valve repair)      Date of onset: 19      SUMMARY OF PROGRESS:  Today is Sandy's initial evaluation for cardiac rehab  Sandy completed her first initial sub-maximal treadmill test walking a total of 11 minutes at 5 8 METs  Sandy reports to have no cardiac complications during exercise  Sandy has normal hemodynamic response to exercise and her telemetry was NSR  Sandy's biggest goal for cardiac rehab is to be able to get back to normal and be ready for both her vacations in April  Sandy reports to not be following the heart healthy diet but is willing to make some changes and try to be better  Sandy reports to have excellent social support from friends and family and denies and added stress in her life  Overall, Sandy is excited to start cardiac rehab and will begin   Medication compliance: Yes   Comments:   Fall Risk: Low   Comments:     EKG changes: NSR      EXERCISE ASSESSMENT and PLAN    Current Exercise Program in Rehab:       Frequency: 3 days/week        Minutes: 30-40         METS: 5-7            HR:    RPE: 4-6         Modalities: Treadmill, Airdyne bike, UBE, Lifecycle and Eliptical       Exercise Progression 30 Day Goals :    Frequency: 3 days/week   Minutes: 30-40   METS: 5-7   HR:     RPE: 4-6   Modalities: Treadmill, Airdyne bike, UBE, Lifecycle and Eliptical     Strength training: Will be added following at least 8 weeks post surgery and 8-10 monitored sessions   Modalities: Leg Press, Chest Press, Pull Downs, Arm Curl and Seated Row    Progressing:   In Progress will begin     Home Exercise: Currently not doing any exercise at home but would like to get back to walking around her neighborhood     Goals: 10% improvement in functional capacity, improved DASI score by 10%, Increase in peak CR METs by 40%, Resume ADLs with increased strength and Exercise 5 days/wk, >150mins/wk  Education: Benefit of exercise for CAD risk factors, home exercise guidelines, signs and sxs, RPE scale and class: Risk Factors for Heart Disease   Plan:home exercise 30+ mins 2 days opposite CR  Readiness to change: Preparation      NUTRITION ASSESSMENT AND PLAN    Weight control:    Starting weight: 205 4 lbs   Current weight:     Waist circumference:    Startin 5 in   Current:    Diabetes: N/A  Lipid management: Discussed diet and lipid management and Last lipid profile 18  Chol 168    HDL 67  LDL 80, A1c%: 5 5, Fasting B  Goals:reduced BMI to < 25, decreased body fat % <33%, reduced waist circumference <35 inches, Improved Rate Your Plate score  >56 and Wt  loss 1-2 ppw goal of 50 lbs  Education: heart healthy eating  low sodium diet  hydration  diet and lipid management  wt  loss  healthy choices while dining out  portion control  class: Heart Healthy Eating  class:  Label Reading  Progressing: In Progress will begin   Plan: Increase PUFA and MUFA, Decrease SFA, Increase whole grains, increase fruits/vegs, eliminate processed meats, reduce red meat 1x/wk, swtich to low fat dairy and Reduce added sugars <25g/day  Readiness to change: Preparation      PSYCHOSOCIAL ASSESSMENT AND PLAN    Emotional: Patient reports he/she is coping well with good social support and denies depression or anxiety            1-4 = Minimal Depression  Self-reported stress level: 2   Social support: Excellent  Goals:  [facility] Score < 3, Daily Activity in [place] Score < 3 and Pain in [place] Score < 3  Education: class:  Stress and Your Health  and class:  Relaxation  Progressing: In Progress will begin   Plan: Practice relaxation techniques  Readiness to change: Preparation      OTHER CORE COMPONENTS     Tobacco:   Social History     Tobacco Use   Smoking Status Never Smoker   Smokeless Tobacco Never Used       Tobacco Use Intervention: Referral to tobacco expert:   Brief counseling by cardiac rehab professional  Date: 19    Blood pressure:    Restin/88   Exercise: 138/72    Goals: consistent BP < 130/80, reduced dietary sodium <2300mg and consistent exercise >150 mins/wk  Education:  understanding HTN and CAD, low sodium diet and HTN, Education class:  Common Heart Medications and Education class: Understanding Heart Disease  Progressing: In Progress will begin   Plan: Class: Understanding Heart Disease, Class: Common Heart Medications and Monitor BP daily  Readiness to change: Preparation

## 2019-02-25 NOTE — PROGRESS NOTES
CARDIAC REHAB ASSESSMENT    Today's date: 2019  Patient name: Mirian Rodgers     :        MRN: [id number]  PCP: Agnse Granados MD  Referring Physician: Mercedes Alexis PA-C  Cardiologist: Qi Piedra MD  Surgeon:   Dx:   Encounter Diagnosis   Name Primary?  S/P MVR (mitral valve repair)        Date of onset: 19  Cultural needs:     Height:    Wt Readings from Last 1 Encounters:   19 94 3 kg (208 lb)      Weight:   Ht Readings from Last 1 Encounters:   19 5' 5" (1 651 m)     Medical History:   Past Medical History:   Diagnosis Date    Cystic breast     Dyspareunia, female     last assessed 14    Hyperlipidemia     Hypothyroidism     Insomnia     IRINA on CPAP     Osteoporosis     Severe mitral regurgitation     Varicose veins of lower extremity     last assessed 13         Physical Limitations: N/A    Risk Factors   Cholesterol: Yes  Smoking: Never used  HTN: Yes  DM: No  Obesity: Yes   Inactivity: Yes  Family History:  Family History   Problem Relation Age of Onset    Heart disease Mother     Aneurysm Mother         cerebral    Alcohol abuse Father     Cancer Father     COPD Father     Heart failure Father     Hypertension Father     Cancer Family     Breast cancer Paternal Grandmother 80       Allergies: Other  ETOH:   Social History     Substance and Sexual Activity   Alcohol Use Yes    Comment: social         Current Medications:   Current Outpatient Medications   Medication Sig Dispense Refill    acetaminophen (TYLENOL) 325 mg tablet Take 1-2 tablets Q4-6 hours PRN pain  Do not take with Percocet   30 tablet 0    amLODIPine (NORVASC) 2 5 mg tablet Take 1 tablet (2 5 mg total) by mouth daily 30 tablet 3    aspirin 325 mg tablet Take 1 tablet (325 mg total) by mouth daily 30 tablet 2    atorvastatin (LIPITOR) 10 mg tablet Take 1 tablet (10 mg total) by mouth daily (Patient taking differently: Take 10 mg by mouth daily Patient taking 3xs per week Monday, Wednesday, and Friday ) 30 tablet 2    Cholecalciferol (VITAMIN D3) 2000 UNITS TABS Take 1 tablet by mouth daily   estradiol (ESTRACE VAGINAL) 0 1 mg/g vaginal cream Insert into the vagina      levothyroxine 125 mcg tablet TAKE 1 TABLET DAILY ON EMPTY STOMACH 30 tablet 5    metoprolol tartrate (LOPRESSOR) 25 mg tablet Take 1 tablet (25 mg total) by mouth 2 (two) times a day 60 tablet 5    pantoprazole (PROTONIX) 40 mg tablet Take 1 tablet (40 mg total) by mouth daily in the early morning for 30 days 30 tablet 0     No current facility-administered medications for this visit  Functional Status Prior to Diagnosis for Treatment   Occupation: retired  Recreation: hiking, walking the dogs, swimming, horseback riding, bird watching   ADLs: resumed all ADLs  [place]: No limitations  Exercise: none  Other:     Current Functional Status  Occupation: retired  Recreation: hiking, walking the dogs, swimming, bird watching   ADLs:resumed all ADLs within sternal precautions  [place]: No limitations  Exercise: none  Other:     Short Term Program Goals: dietary modifications increased strength improved energy/stamina with ADLs exercise 120-150 mins/wk    Long Term Goals: increased maximal walking duration  increased intial training workload  Improved [facility] Activity Status score  Improved functional capacity  Improved Quality of Life - [facility] score reduced  Reduced body fat%  Reduced waist circumference  improved Rate Your Plate Score    Ability to reach goals/rehabilitation potential:  Excellent    Projected return to function: 12 weeks  Objective tests: sub-max TM ETT      Nutritional   Reviewed details of Rate your Plate  Discussed key elements of heart healthy eating  Reviewed patient goals for dietary modifications and their clinical implications  Reviewed most recent lipid profile       Goals for dietary modification: choose lean cuts of meat  poultry without the skin  low fat ground meat and poultry  eliminate processed meats  reduce portions of meat to 3 oz  increase fish intake  more meatless meals  low fat dairy   reduced fat cheese  increase whole grains  increase fruits and vegetables  eliminate butter  low sodium  improved snack choices  more nuts/seeds  reduce sweets/frozen desserts  heathier choices while dining out      Emotional/Social  Patient reports he/she is coping well with good social support and denies depression or anxiety    SOCIAL SUPPORT NETWORK    Marital status:     Rate 1-5:    Marriage: 5   Family: 5   Financial: 5   Relationships: 5   Spirituality: 5   Intellectual: 5    Perceived Stress: 2/10   Stressors:no major life stressors    Goals for Stress Management:Increase sense of well-being     Domestic Violence Screening: No    Comments:

## 2019-02-28 ENCOUNTER — CLINICAL SUPPORT (OUTPATIENT)
Dept: CARDIAC REHAB | Facility: CLINIC | Age: 69
End: 2019-02-28
Payer: MEDICARE

## 2019-02-28 DIAGNOSIS — Z98.890 S/P MVR (MITRAL VALVE REPAIR): ICD-10-CM

## 2019-02-28 PROCEDURE — 93798 PHYS/QHP OP CAR RHAB W/ECG: CPT

## 2019-03-01 ENCOUNTER — CLINICAL SUPPORT (OUTPATIENT)
Dept: CARDIAC REHAB | Facility: CLINIC | Age: 69
End: 2019-03-01
Payer: MEDICARE

## 2019-03-01 DIAGNOSIS — Z98.890 S/P MVR (MITRAL VALVE REPAIR): ICD-10-CM

## 2019-03-01 PROCEDURE — 93798 PHYS/QHP OP CAR RHAB W/ECG: CPT

## 2019-03-05 ENCOUNTER — CLINICAL SUPPORT (OUTPATIENT)
Dept: CARDIAC REHAB | Facility: CLINIC | Age: 69
End: 2019-03-05
Payer: MEDICARE

## 2019-03-05 DIAGNOSIS — Z98.890 S/P MVR (MITRAL VALVE REPAIR): ICD-10-CM

## 2019-03-05 PROCEDURE — 93798 PHYS/QHP OP CAR RHAB W/ECG: CPT

## 2019-03-06 ENCOUNTER — HOSPITAL ENCOUNTER (OUTPATIENT)
Dept: NON INVASIVE DIAGNOSTICS | Facility: CLINIC | Age: 69
Discharge: HOME | End: 2019-03-06
Payer: MEDICARE

## 2019-03-06 DIAGNOSIS — I34.1 MITRAL VALVE PROLAPSE: ICD-10-CM

## 2019-03-06 DIAGNOSIS — Z98.890 S/P MVR (MITRAL VALVE REPAIR): ICD-10-CM

## 2019-03-06 PROCEDURE — 93308 TTE F-UP OR LMTD: CPT | Performed by: INTERNAL MEDICINE

## 2019-03-06 PROCEDURE — 93308 TTE F-UP OR LMTD: CPT

## 2019-03-06 PROCEDURE — 93321 DOPPLER ECHO F-UP/LMTD STD: CPT | Performed by: INTERNAL MEDICINE

## 2019-03-06 PROCEDURE — 93325 DOPPLER ECHO COLOR FLOW MAPG: CPT | Performed by: INTERNAL MEDICINE

## 2019-03-07 ENCOUNTER — CLINICAL SUPPORT (OUTPATIENT)
Dept: CARDIAC REHAB | Facility: CLINIC | Age: 69
End: 2019-03-07
Payer: MEDICARE

## 2019-03-07 DIAGNOSIS — Z98.890 S/P MVR (MITRAL VALVE REPAIR): ICD-10-CM

## 2019-03-07 PROCEDURE — 93798 PHYS/QHP OP CAR RHAB W/ECG: CPT

## 2019-03-08 ENCOUNTER — CLINICAL SUPPORT (OUTPATIENT)
Dept: CARDIAC REHAB | Facility: CLINIC | Age: 69
End: 2019-03-08
Payer: MEDICARE

## 2019-03-08 DIAGNOSIS — Z98.890 S/P MVR (MITRAL VALVE REPAIR): ICD-10-CM

## 2019-03-08 PROCEDURE — 93798 PHYS/QHP OP CAR RHAB W/ECG: CPT

## 2019-03-11 ENCOUNTER — TELEPHONE (OUTPATIENT)
Dept: CARDIOLOGY CLINIC | Facility: CLINIC | Age: 69
End: 2019-03-11

## 2019-03-11 NOTE — TELEPHONE ENCOUNTER
Pt called complaining of double vision that last for about 1 min  Pt states it happened twice and she is worried  Pt was seen by pcp and eye doctor who advised her to see cardiology because they didn't find anything wrong  Pt states she goes to cardiac rehab and her BP is always good      Please Advise    Currently taking    Metoprolol tartrate 25mg daily  Amlodipine 2 5mg daily

## 2019-03-12 ENCOUNTER — CLINICAL SUPPORT (OUTPATIENT)
Dept: CARDIAC REHAB | Facility: CLINIC | Age: 69
End: 2019-03-12
Payer: MEDICARE

## 2019-03-12 DIAGNOSIS — Z98.890 S/P MVR (MITRAL VALVE REPAIR): ICD-10-CM

## 2019-03-12 PROCEDURE — 93798 PHYS/QHP OP CAR RHAB W/ECG: CPT

## 2019-03-14 ENCOUNTER — CLINICAL SUPPORT (OUTPATIENT)
Dept: CARDIAC REHAB | Facility: CLINIC | Age: 69
End: 2019-03-14
Payer: MEDICARE

## 2019-03-14 DIAGNOSIS — Z98.890 S/P MVR (MITRAL VALVE REPAIR): ICD-10-CM

## 2019-03-14 PROCEDURE — 93798 PHYS/QHP OP CAR RHAB W/ECG: CPT

## 2019-03-15 ENCOUNTER — CLINICAL SUPPORT (OUTPATIENT)
Dept: CARDIAC REHAB | Facility: CLINIC | Age: 69
End: 2019-03-15
Payer: MEDICARE

## 2019-03-15 DIAGNOSIS — Z98.890 S/P MVR (MITRAL VALVE REPAIR): ICD-10-CM

## 2019-03-15 PROCEDURE — 93798 PHYS/QHP OP CAR RHAB W/ECG: CPT

## 2019-03-19 ENCOUNTER — CLINICAL SUPPORT (OUTPATIENT)
Dept: CARDIAC REHAB | Facility: CLINIC | Age: 69
End: 2019-03-19
Payer: MEDICARE

## 2019-03-19 DIAGNOSIS — Z98.890 S/P MVR (MITRAL VALVE REPAIR): ICD-10-CM

## 2019-03-19 PROCEDURE — 93798 PHYS/QHP OP CAR RHAB W/ECG: CPT

## 2019-03-21 ENCOUNTER — CLINICAL SUPPORT (OUTPATIENT)
Dept: CARDIAC REHAB | Facility: CLINIC | Age: 69
End: 2019-03-21
Payer: MEDICARE

## 2019-03-21 DIAGNOSIS — Z98.890 S/P MVR (MITRAL VALVE REPAIR): ICD-10-CM

## 2019-03-21 PROCEDURE — 93798 PHYS/QHP OP CAR RHAB W/ECG: CPT

## 2019-03-22 ENCOUNTER — CLINICAL SUPPORT (OUTPATIENT)
Dept: CARDIAC REHAB | Facility: CLINIC | Age: 69
End: 2019-03-22
Payer: MEDICARE

## 2019-03-22 DIAGNOSIS — Z98.890 S/P MVR (MITRAL VALVE REPAIR): ICD-10-CM

## 2019-03-22 PROCEDURE — 93798 PHYS/QHP OP CAR RHAB W/ECG: CPT

## 2019-03-26 ENCOUNTER — TELEPHONE (OUTPATIENT)
Dept: FAMILY MEDICINE CLINIC | Facility: CLINIC | Age: 69
End: 2019-03-26

## 2019-03-26 ENCOUNTER — CLINICAL SUPPORT (OUTPATIENT)
Dept: CARDIAC REHAB | Facility: CLINIC | Age: 69
End: 2019-03-26
Payer: MEDICARE

## 2019-03-26 DIAGNOSIS — Z98.890 S/P MVR (MITRAL VALVE REPAIR): Primary | ICD-10-CM

## 2019-03-26 DIAGNOSIS — E78.2 MIXED HYPERLIPIDEMIA: ICD-10-CM

## 2019-03-26 DIAGNOSIS — E03.9 HYPOTHYROIDISM, UNSPECIFIED TYPE: Primary | ICD-10-CM

## 2019-03-26 DIAGNOSIS — I10 BENIGN ESSENTIAL HYPERTENSION: ICD-10-CM

## 2019-03-26 PROCEDURE — 93798 PHYS/QHP OP CAR RHAB W/ECG: CPT

## 2019-03-26 NOTE — TELEPHONE ENCOUNTER
Patient called and stated that she has an appointment on 4/4/2019 and wanted to know if she needs any labs done prior to this appt    Please call to advise

## 2019-03-26 NOTE — PROGRESS NOTES
Cardiac Rehabilitation Plan of Care   30 day       Today's date: 3/26/2019   Visits: 13  Patient name: Keyona Cota      : 1950  Age: 76 y o  MRN: [id number]  Referring Physician: Rosaline Real MD  Provider: [address] [place] [address] Cardiopulmonary Rehab   Clinician: Mima Hsu MS, CEP     Dx:   Encounter Diagnosis   Name Primary?  S/P MVR (mitral valve repair) Yes     Date of onset: 19      SUMMARY OF PROGRESS:  Today is Sandy's 30 day note for cardiac rehab  Sandy has completed 13 sessions so far for cardiac rehab  Sandy has normal hemodynamic responses to exercise and her telemetry reads NSR  Sandy has progressed to 40 minutes of cardio at 3-4 METs plus weight training  [place] reports that she does not have any cardiac complications during exercise but does state that she has been more fatigue lately and believes it is her medications  [place] reports that she has completely stopped taking amlodipine and she use to take both metoprolol and amlodipine together  Since, Lashae Lancaster stopped taking amlodipine she has not had any more episodes of double vision so she is wondering if that medication was causing it  Sandy reports that she bought a bike for at home and has been riding it for 10 minutes on opposite days of rehab  [place] reports that she has increased her ADLs  Sandy reports that she knows when she over does it, for example over the weekend she was playing catch with her grandson and for the next two days she was sore in her chest  [place] reports to be more aware of her diet and has been trying to follow the heart healthy diet  She states that she increased her whole grains, increased her fruit and veggie intake, and now is eating salads everyday  Sandy reports to have excellent social support from friends and family and denies any added stress  Overall, Sandy is excited to keep progressing in cardiac rehab         Medication compliance: Yes   Comments:   Fall Risk: Low   Comments: EKG changes: NSR      EXERCISE ASSESSMENT and PLAN    Current Exercise Program in Rehab:       Frequency: 4 days/week        Minutes: 40        METS: 3-4            HR:    RPE: 4-6         Modalities: Treadmill, UBE and Lifecycle      Exercise Progression 30 Day Goals :    Frequency: 5 days/week   Minutes: 40-45   METS: 5-7   HR:     RPE: 4-6   Modalities: Treadmill, Airdyne bike, UBE, Lifecycle and Eliptical     Strength trainin-3 days / week, 12-15 repitations  and 1-2 sets per modality    Modalities: Leg Press, Chest Press, Pull Downs, Arm Curl and Seated Row    Progressing: Yes has met her 30 day goal and will continue to progress on her current program     Home Exercise: Currently riding her bike 10 minutes on opposites days of rehab and is hoping to increase duration to 30-40 minutes     Goals: 10% improvement in functional capacity, improved DASI score by 10%, Increase in peak CR METs by 40%, Resume ADLs with increased strength and Exercise 5 days/wk, >150mins/wk  Education: Benefit of exercise for CAD risk factors, home exercise guidelines, signs and sxs, RPE scale and class: Risk Factors for Heart Disease   Plan:home exercise 30+ mins 2 days opposite CR  Readiness to change: Action      NUTRITION ASSESSMENT AND PLAN    Weight control:    Starting weight: 205 4 lbs   Current weight: 208 lbs   Waist circumference:    Startin 5 in   Current:    Diabetes: N/A  Lipid management: Discussed diet and lipid management and Last lipid profile 18  Chol 168    HDL 67  LDL 80, A1c%: 5 5, Fasting B  Goals:reduced BMI to < 25, decreased body fat % <33%, reduced waist circumference <35 inches, Improved Rate Your Plate score  >91 and Wt  loss 1-2 ppw goal of 50 lbs  Education: heart healthy eating  low sodium diet  hydration  diet and lipid management  wt  loss  healthy choices while dining out  portion control  class: Heart Healthy Eating  class:  Label Reading  Progressing:  Yes has met her 30 day goal and states to be more aware of her diet and trying to follow the heart healthy diet   Plan: Increase PUFA and MUFA, Decrease SFA, Increase whole grains, increase fruits/vegs, eliminate processed meats, reduce red meat 1x/wk, swtich to low fat dairy and Reduce added sugars <25g/day  Readiness to change: Action      PSYCHOSOCIAL ASSESSMENT AND PLAN    Emotional: Patient reports he/she is coping well with good social support and denies depression or anxiety            1-4 = Minimal Depression  Self-reported stress level: 2   Social support: Excellent  Goals:  Physical Fitness in [facility] Score < 3, Daily Activity in [facility] Score < 3 and Pain in [facility] Score < 3  Education: class:  Stress and Your Health  and class:  Relaxation  Progressing: Yes has met her 30 day goal and denies any depression/stress/anxiety   Plan: Practice relaxation techniques  Readiness to change: Action      OTHER CORE COMPONENTS     Tobacco:   Social History     Tobacco Use   Smoking Status Never Smoker   Smokeless Tobacco Never Used       Tobacco Use Intervention: Referral to tobacco expert:   Brief counseling by cardiac rehab professional  Date: 19    Blood pressure:    Restin/82   Exercise: 152/80    Goals: consistent BP < 130/80, reduced dietary sodium <2300mg and consistent exercise >150 mins/wk  Education:  understanding HTN and CAD, low sodium diet and HTN, Education class:  Common Heart Medications and Education class: Understanding Heart Disease  Progressing: Yes has met her 30 day goal and her BP is extremely well controlled     Plan: Class: Understanding Heart Disease, Class: Common Heart Medications and Monitor BP daily  Readiness to change: Action

## 2019-03-28 ENCOUNTER — CLINICAL SUPPORT (OUTPATIENT)
Dept: CARDIAC REHAB | Facility: CLINIC | Age: 69
End: 2019-03-28
Payer: MEDICARE

## 2019-03-28 DIAGNOSIS — Z98.890 S/P MVR (MITRAL VALVE REPAIR): ICD-10-CM

## 2019-03-28 PROCEDURE — 93798 PHYS/QHP OP CAR RHAB W/ECG: CPT

## 2019-03-29 ENCOUNTER — CLINICAL SUPPORT (OUTPATIENT)
Dept: CARDIAC REHAB | Facility: CLINIC | Age: 69
End: 2019-03-29
Payer: MEDICARE

## 2019-03-29 DIAGNOSIS — Z98.890 S/P MVR (MITRAL VALVE REPAIR): ICD-10-CM

## 2019-03-29 PROCEDURE — 93798 PHYS/QHP OP CAR RHAB W/ECG: CPT

## 2019-04-02 ENCOUNTER — CLINICAL SUPPORT (OUTPATIENT)
Dept: CARDIAC REHAB | Facility: CLINIC | Age: 69
End: 2019-04-02
Payer: MEDICARE

## 2019-04-02 DIAGNOSIS — Z98.890 S/P MVR (MITRAL VALVE REPAIR): ICD-10-CM

## 2019-04-02 LAB
ALBUMIN SERPL-MCNC: 3.7 G/DL (ref 3.6–5.1)
ALBUMIN/GLOB SERPL: 1.2 (CALC) (ref 1–2.5)
ALP SERPL-CCNC: 91 U/L (ref 33–130)
ALT SERPL-CCNC: 11 U/L (ref 6–29)
AST SERPL-CCNC: 15 U/L (ref 10–35)
BASOPHILS # BLD AUTO: 39 CELLS/UL (ref 0–200)
BASOPHILS NFR BLD AUTO: 0.8 %
BILIRUB SERPL-MCNC: 0.4 MG/DL (ref 0.2–1.2)
BUN SERPL-MCNC: 15 MG/DL (ref 7–25)
BUN/CREAT SERPL: ABNORMAL (CALC) (ref 6–22)
CALCIUM SERPL-MCNC: 9 MG/DL (ref 8.6–10.4)
CHLORIDE SERPL-SCNC: 106 MMOL/L (ref 98–110)
CHOLEST SERPL-MCNC: 155 MG/DL
CHOLEST/HDLC SERPL: 2.9 (CALC)
CO2 SERPL-SCNC: 30 MMOL/L (ref 20–32)
CREAT SERPL-MCNC: 0.97 MG/DL (ref 0.5–0.99)
EOSINOPHIL # BLD AUTO: 240 CELLS/UL (ref 15–500)
EOSINOPHIL NFR BLD AUTO: 4.9 %
ERYTHROCYTE [DISTWIDTH] IN BLOOD BY AUTOMATED COUNT: 12.8 % (ref 11–15)
GLOBULIN SER CALC-MCNC: 3 G/DL (CALC) (ref 1.9–3.7)
GLUCOSE SERPL-MCNC: 101 MG/DL (ref 65–99)
HCT VFR BLD AUTO: 35.7 % (ref 35–45)
HDLC SERPL-MCNC: 53 MG/DL
HGB BLD-MCNC: 11.7 G/DL (ref 11.7–15.5)
LDLC SERPL CALC-MCNC: 82 MG/DL (CALC)
LYMPHOCYTES # BLD AUTO: 1558 CELLS/UL (ref 850–3900)
LYMPHOCYTES NFR BLD AUTO: 31.8 %
MCH RBC QN AUTO: 29.3 PG (ref 27–33)
MCHC RBC AUTO-ENTMCNC: 32.8 G/DL (ref 32–36)
MCV RBC AUTO: 89.3 FL (ref 80–100)
MONOCYTES # BLD AUTO: 495 CELLS/UL (ref 200–950)
MONOCYTES NFR BLD AUTO: 10.1 %
NEUTROPHILS # BLD AUTO: 2568 CELLS/UL (ref 1500–7800)
NEUTROPHILS NFR BLD AUTO: 52.4 %
NONHDLC SERPL-MCNC: 102 MG/DL (CALC)
PLATELET # BLD AUTO: 175 THOUSAND/UL (ref 140–400)
PMV BLD REES-ECKER: 10.1 FL (ref 7.5–12.5)
POTASSIUM SERPL-SCNC: 4.2 MMOL/L (ref 3.5–5.3)
PROT SERPL-MCNC: 6.7 G/DL (ref 6.1–8.1)
RBC # BLD AUTO: 4 MILLION/UL (ref 3.8–5.1)
SL AMB EGFR AFRICAN AMERICAN: 70 ML/MIN/1.73M2
SL AMB EGFR NON AFRICAN AMERICAN: 60 ML/MIN/1.73M2
SODIUM SERPL-SCNC: 140 MMOL/L (ref 135–146)
TRIGL SERPL-MCNC: 107 MG/DL
TSH SERPL-ACNC: 4.11 MIU/L (ref 0.4–4.5)
WBC # BLD AUTO: 4.9 THOUSAND/UL (ref 3.8–10.8)

## 2019-04-02 PROCEDURE — 93798 PHYS/QHP OP CAR RHAB W/ECG: CPT

## 2019-04-02 RX ORDER — POLYETHYLENE GLYCOL 3350 17 G/17G
POWDER, FOR SOLUTION ORAL
Refills: 0 | COMMUNITY
Start: 2019-01-10 | End: 2019-08-29

## 2019-04-02 RX ORDER — POTASSIUM CHLORIDE 750 MG/1
TABLET, FILM COATED, EXTENDED RELEASE ORAL
Refills: 1 | COMMUNITY
Start: 2019-01-10 | End: 2019-04-04

## 2019-04-04 ENCOUNTER — CLINICAL SUPPORT (OUTPATIENT)
Dept: CARDIAC REHAB | Facility: CLINIC | Age: 69
End: 2019-04-04
Payer: MEDICARE

## 2019-04-04 ENCOUNTER — APPOINTMENT (OUTPATIENT)
Dept: CARDIAC REHAB | Facility: CLINIC | Age: 69
End: 2019-04-04
Payer: MEDICARE

## 2019-04-04 ENCOUNTER — OFFICE VISIT (OUTPATIENT)
Dept: FAMILY MEDICINE CLINIC | Facility: CLINIC | Age: 69
End: 2019-04-04
Payer: MEDICARE

## 2019-04-04 VITALS
HEART RATE: 72 BPM | WEIGHT: 209 LBS | TEMPERATURE: 99 F | RESPIRATION RATE: 16 BRPM | BODY MASS INDEX: 34.82 KG/M2 | DIASTOLIC BLOOD PRESSURE: 82 MMHG | HEIGHT: 65 IN | SYSTOLIC BLOOD PRESSURE: 112 MMHG

## 2019-04-04 DIAGNOSIS — I10 BENIGN ESSENTIAL HYPERTENSION: Primary | ICD-10-CM

## 2019-04-04 DIAGNOSIS — E03.9 ACQUIRED HYPOTHYROIDISM: ICD-10-CM

## 2019-04-04 DIAGNOSIS — E03.9 HYPOTHYROIDISM, UNSPECIFIED TYPE: ICD-10-CM

## 2019-04-04 DIAGNOSIS — Z11.59 NEED FOR HEPATITIS C SCREENING TEST: ICD-10-CM

## 2019-04-04 DIAGNOSIS — Z98.890 S/P MVR (MITRAL VALVE REPAIR): ICD-10-CM

## 2019-04-04 DIAGNOSIS — E78.2 MIXED HYPERLIPIDEMIA: ICD-10-CM

## 2019-04-04 DIAGNOSIS — G47.33 SEVERE OBSTRUCTIVE SLEEP APNEA: ICD-10-CM

## 2019-04-04 DIAGNOSIS — E66.09 CLASS 1 OBESITY DUE TO EXCESS CALORIES WITHOUT SERIOUS COMORBIDITY WITH BODY MASS INDEX (BMI) OF 34.0 TO 34.9 IN ADULT: ICD-10-CM

## 2019-04-04 DIAGNOSIS — Z00.00 ENCOUNTER FOR MEDICARE ANNUAL WELLNESS EXAM: ICD-10-CM

## 2019-04-04 PROCEDURE — 93798 PHYS/QHP OP CAR RHAB W/ECG: CPT

## 2019-04-04 PROCEDURE — G0439 PPPS, SUBSEQ VISIT: HCPCS | Performed by: FAMILY MEDICINE

## 2019-04-04 PROCEDURE — 99213 OFFICE O/P EST LOW 20 MIN: CPT | Performed by: FAMILY MEDICINE

## 2019-04-04 RX ORDER — AMOXICILLIN 500 MG
1 CAPSULE ORAL DAILY
COMMUNITY

## 2019-04-04 RX ORDER — ASCORBIC ACID 1000 MG
1 TABLET ORAL DAILY
COMMUNITY
End: 2019-08-29

## 2019-04-04 RX ORDER — LEVOTHYROXINE SODIUM 0.12 MG/1
TABLET ORAL
Qty: 35 TABLET | Refills: 5 | Status: SHIPPED | OUTPATIENT
Start: 2019-04-04 | End: 2019-08-27 | Stop reason: SDUPTHER

## 2019-04-05 ENCOUNTER — CLINICAL SUPPORT (OUTPATIENT)
Dept: CARDIAC REHAB | Facility: CLINIC | Age: 69
End: 2019-04-05
Payer: MEDICARE

## 2019-04-05 DIAGNOSIS — Z98.890 S/P MVR (MITRAL VALVE REPAIR): ICD-10-CM

## 2019-04-05 PROCEDURE — 93798 PHYS/QHP OP CAR RHAB W/ECG: CPT

## 2019-04-07 PROBLEM — I34.1 MITRAL VALVE PROLAPSE: Status: RESOLVED | Noted: 2018-11-21 | Resolved: 2019-04-07

## 2019-04-07 PROBLEM — E87.8 HYPOCHLOREMIA: Status: RESOLVED | Noted: 2019-01-08 | Resolved: 2019-04-07

## 2019-04-07 PROBLEM — Z98.890 S/P MVR (MITRAL VALVE REPAIR): Status: RESOLVED | Noted: 2019-01-07 | Resolved: 2019-04-07

## 2019-04-07 PROBLEM — K43.2 INCISIONAL HERNIA, WITHOUT OBSTRUCTION OR GANGRENE: Status: RESOLVED | Noted: 2017-09-17 | Resolved: 2019-04-07

## 2019-04-07 PROBLEM — D72.829 LEUKOCYTOSIS: Status: RESOLVED | Noted: 2019-01-08 | Resolved: 2019-04-07

## 2019-04-07 PROBLEM — I34.0 NON-RHEUMATIC MITRAL REGURGITATION: Status: RESOLVED | Noted: 2018-11-21 | Resolved: 2019-04-07

## 2019-04-09 ENCOUNTER — CLINICAL SUPPORT (OUTPATIENT)
Dept: CARDIAC REHAB | Facility: CLINIC | Age: 69
End: 2019-04-09
Payer: MEDICARE

## 2019-04-09 DIAGNOSIS — Z98.890 S/P MVR (MITRAL VALVE REPAIR): ICD-10-CM

## 2019-04-09 PROCEDURE — 93798 PHYS/QHP OP CAR RHAB W/ECG: CPT

## 2019-04-11 ENCOUNTER — CLINICAL SUPPORT (OUTPATIENT)
Dept: CARDIAC REHAB | Facility: CLINIC | Age: 69
End: 2019-04-11
Payer: MEDICARE

## 2019-04-11 DIAGNOSIS — Z98.890 S/P MVR (MITRAL VALVE REPAIR): ICD-10-CM

## 2019-04-11 PROCEDURE — 93798 PHYS/QHP OP CAR RHAB W/ECG: CPT

## 2019-04-12 ENCOUNTER — CLINICAL SUPPORT (OUTPATIENT)
Dept: CARDIAC REHAB | Facility: CLINIC | Age: 69
End: 2019-04-12
Payer: MEDICARE

## 2019-04-12 DIAGNOSIS — Z98.890 S/P MVR (MITRAL VALVE REPAIR): ICD-10-CM

## 2019-04-12 PROCEDURE — 93798 PHYS/QHP OP CAR RHAB W/ECG: CPT

## 2019-04-16 ENCOUNTER — CLINICAL SUPPORT (OUTPATIENT)
Dept: CARDIAC REHAB | Facility: CLINIC | Age: 69
End: 2019-04-16
Payer: MEDICARE

## 2019-04-16 DIAGNOSIS — Z98.890 S/P MVR (MITRAL VALVE REPAIR): ICD-10-CM

## 2019-04-16 PROCEDURE — 93798 PHYS/QHP OP CAR RHAB W/ECG: CPT

## 2019-04-18 ENCOUNTER — APPOINTMENT (OUTPATIENT)
Dept: CARDIAC REHAB | Facility: CLINIC | Age: 69
End: 2019-04-18
Payer: MEDICARE

## 2019-04-19 ENCOUNTER — APPOINTMENT (OUTPATIENT)
Dept: CARDIAC REHAB | Facility: CLINIC | Age: 69
End: 2019-04-19
Payer: MEDICARE

## 2019-04-23 ENCOUNTER — APPOINTMENT (OUTPATIENT)
Dept: CARDIAC REHAB | Facility: CLINIC | Age: 69
End: 2019-04-23
Payer: MEDICARE

## 2019-04-25 ENCOUNTER — APPOINTMENT (OUTPATIENT)
Dept: CARDIAC REHAB | Facility: CLINIC | Age: 69
End: 2019-04-25
Payer: MEDICARE

## 2019-04-26 ENCOUNTER — APPOINTMENT (OUTPATIENT)
Dept: CARDIAC REHAB | Facility: CLINIC | Age: 69
End: 2019-04-26
Payer: MEDICARE

## 2019-04-30 ENCOUNTER — CLINICAL SUPPORT (OUTPATIENT)
Dept: CARDIAC REHAB | Facility: CLINIC | Age: 69
End: 2019-04-30
Payer: MEDICARE

## 2019-04-30 DIAGNOSIS — Z98.890 S/P MVR (MITRAL VALVE REPAIR): ICD-10-CM

## 2019-04-30 PROCEDURE — 93798 PHYS/QHP OP CAR RHAB W/ECG: CPT

## 2019-05-02 ENCOUNTER — CLINICAL SUPPORT (OUTPATIENT)
Dept: CARDIAC REHAB | Facility: CLINIC | Age: 69
End: 2019-05-02
Payer: MEDICARE

## 2019-05-02 DIAGNOSIS — Z98.890 S/P MVR (MITRAL VALVE REPAIR): ICD-10-CM

## 2019-05-02 PROCEDURE — 93798 PHYS/QHP OP CAR RHAB W/ECG: CPT

## 2019-05-03 ENCOUNTER — APPOINTMENT (OUTPATIENT)
Dept: CARDIAC REHAB | Facility: CLINIC | Age: 69
End: 2019-05-03
Payer: MEDICARE

## 2019-05-07 ENCOUNTER — APPOINTMENT (OUTPATIENT)
Dept: CARDIAC REHAB | Facility: CLINIC | Age: 69
End: 2019-05-07
Payer: MEDICARE

## 2019-05-09 ENCOUNTER — APPOINTMENT (OUTPATIENT)
Dept: CARDIAC REHAB | Facility: CLINIC | Age: 69
End: 2019-05-09
Payer: MEDICARE

## 2019-05-10 ENCOUNTER — APPOINTMENT (OUTPATIENT)
Dept: CARDIAC REHAB | Facility: CLINIC | Age: 69
End: 2019-05-10
Payer: MEDICARE

## 2019-05-14 ENCOUNTER — CLINICAL SUPPORT (OUTPATIENT)
Dept: CARDIAC REHAB | Facility: CLINIC | Age: 69
End: 2019-05-14
Payer: MEDICARE

## 2019-05-14 DIAGNOSIS — Z98.890 S/P MVR (MITRAL VALVE REPAIR): ICD-10-CM

## 2019-05-14 PROCEDURE — 93798 PHYS/QHP OP CAR RHAB W/ECG: CPT

## 2019-05-16 ENCOUNTER — CLINICAL SUPPORT (OUTPATIENT)
Dept: CARDIAC REHAB | Facility: CLINIC | Age: 69
End: 2019-05-16
Payer: MEDICARE

## 2019-05-16 DIAGNOSIS — Z98.890 S/P MVR (MITRAL VALVE REPAIR): ICD-10-CM

## 2019-05-16 PROCEDURE — 93798 PHYS/QHP OP CAR RHAB W/ECG: CPT

## 2019-05-17 ENCOUNTER — CLINICAL SUPPORT (OUTPATIENT)
Dept: CARDIAC REHAB | Facility: CLINIC | Age: 69
End: 2019-05-17
Payer: MEDICARE

## 2019-05-17 DIAGNOSIS — Z98.890 S/P MVR (MITRAL VALVE REPAIR): ICD-10-CM

## 2019-05-17 PROCEDURE — 93798 PHYS/QHP OP CAR RHAB W/ECG: CPT

## 2019-05-21 ENCOUNTER — CLINICAL SUPPORT (OUTPATIENT)
Dept: CARDIAC REHAB | Facility: CLINIC | Age: 69
End: 2019-05-21
Payer: MEDICARE

## 2019-05-21 DIAGNOSIS — Z98.890 S/P MVR (MITRAL VALVE REPAIR): ICD-10-CM

## 2019-05-21 PROCEDURE — 93798 PHYS/QHP OP CAR RHAB W/ECG: CPT

## 2019-05-23 ENCOUNTER — CLINICAL SUPPORT (OUTPATIENT)
Dept: CARDIAC REHAB | Facility: CLINIC | Age: 69
End: 2019-05-23
Payer: MEDICARE

## 2019-05-23 DIAGNOSIS — Z98.890 S/P MVR (MITRAL VALVE REPAIR): ICD-10-CM

## 2019-05-23 PROCEDURE — 93798 PHYS/QHP OP CAR RHAB W/ECG: CPT

## 2019-05-24 ENCOUNTER — CLINICAL SUPPORT (OUTPATIENT)
Dept: CARDIAC REHAB | Facility: CLINIC | Age: 69
End: 2019-05-24
Payer: MEDICARE

## 2019-05-24 DIAGNOSIS — Z98.890 S/P MVR (MITRAL VALVE REPAIR): ICD-10-CM

## 2019-05-24 PROCEDURE — 93798 PHYS/QHP OP CAR RHAB W/ECG: CPT

## 2019-05-28 ENCOUNTER — CLINICAL SUPPORT (OUTPATIENT)
Dept: CARDIAC REHAB | Facility: CLINIC | Age: 69
End: 2019-05-28
Payer: MEDICARE

## 2019-05-28 DIAGNOSIS — Z98.890 S/P MVR (MITRAL VALVE REPAIR): ICD-10-CM

## 2019-05-28 PROCEDURE — 93798 PHYS/QHP OP CAR RHAB W/ECG: CPT

## 2019-05-28 RX ORDER — HYDROCHLOROTHIAZIDE 12.5 MG/1
CAPSULE, GELATIN COATED ORAL
Refills: 4 | COMMUNITY
Start: 2019-04-09 | End: 2019-08-08 | Stop reason: SDUPTHER

## 2019-05-29 ENCOUNTER — OFFICE VISIT (OUTPATIENT)
Dept: CARDIOLOGY CLINIC | Facility: CLINIC | Age: 69
End: 2019-05-29
Payer: MEDICARE

## 2019-05-29 VITALS
SYSTOLIC BLOOD PRESSURE: 124 MMHG | BODY MASS INDEX: 34.89 KG/M2 | HEART RATE: 74 BPM | DIASTOLIC BLOOD PRESSURE: 78 MMHG | WEIGHT: 209.4 LBS | HEIGHT: 65 IN

## 2019-05-29 DIAGNOSIS — I10 BENIGN ESSENTIAL HYPERTENSION: ICD-10-CM

## 2019-05-29 DIAGNOSIS — E78.2 MIXED HYPERLIPIDEMIA: Primary | ICD-10-CM

## 2019-05-29 DIAGNOSIS — G47.33 SEVERE OBSTRUCTIVE SLEEP APNEA: ICD-10-CM

## 2019-05-29 DIAGNOSIS — Z98.890 S/P MVR (MITRAL VALVE REPAIR): ICD-10-CM

## 2019-05-29 PROCEDURE — 99214 OFFICE O/P EST MOD 30 MIN: CPT | Performed by: INTERNAL MEDICINE

## 2019-05-29 PROCEDURE — 1124F ACP DISCUSS-NO DSCNMKR DOCD: CPT | Performed by: INTERNAL MEDICINE

## 2019-05-29 RX ORDER — AMLODIPINE BESYLATE 5 MG/1
5 TABLET ORAL DAILY
Qty: 90 TABLET | Refills: 3 | Status: SHIPPED | OUTPATIENT
Start: 2019-05-29 | End: 2020-08-04 | Stop reason: SDUPTHER

## 2019-05-30 ENCOUNTER — CLINICAL SUPPORT (OUTPATIENT)
Dept: CARDIAC REHAB | Facility: CLINIC | Age: 69
End: 2019-05-30
Payer: MEDICARE

## 2019-05-30 ENCOUNTER — TELEPHONE (OUTPATIENT)
Dept: FAMILY MEDICINE CLINIC | Facility: CLINIC | Age: 69
End: 2019-05-30

## 2019-05-30 DIAGNOSIS — I10 BENIGN ESSENTIAL HYPERTENSION: ICD-10-CM

## 2019-05-30 DIAGNOSIS — E03.9 HYPOTHYROIDISM, UNSPECIFIED TYPE: Primary | ICD-10-CM

## 2019-05-30 DIAGNOSIS — E78.2 MIXED HYPERLIPIDEMIA: ICD-10-CM

## 2019-05-30 DIAGNOSIS — Z98.890 S/P MVR (MITRAL VALVE REPAIR): ICD-10-CM

## 2019-05-30 LAB — TSH SERPL-ACNC: 1.61 MIU/L (ref 0.4–4.5)

## 2019-05-30 PROCEDURE — 93798 PHYS/QHP OP CAR RHAB W/ECG: CPT

## 2019-05-31 ENCOUNTER — CLINICAL SUPPORT (OUTPATIENT)
Dept: CARDIAC REHAB | Facility: CLINIC | Age: 69
End: 2019-05-31
Payer: MEDICARE

## 2019-05-31 DIAGNOSIS — Z98.890 S/P MVR (MITRAL VALVE REPAIR): ICD-10-CM

## 2019-05-31 PROCEDURE — 93798 PHYS/QHP OP CAR RHAB W/ECG: CPT

## 2019-06-04 ENCOUNTER — CLINICAL SUPPORT (OUTPATIENT)
Dept: CARDIAC REHAB | Facility: CLINIC | Age: 69
End: 2019-06-04
Payer: MEDICARE

## 2019-06-04 DIAGNOSIS — Z98.890 S/P MVR (MITRAL VALVE REPAIR): ICD-10-CM

## 2019-06-04 PROCEDURE — 93798 PHYS/QHP OP CAR RHAB W/ECG: CPT

## 2019-06-06 ENCOUNTER — APPOINTMENT (OUTPATIENT)
Dept: CARDIAC REHAB | Facility: CLINIC | Age: 69
End: 2019-06-06
Payer: MEDICARE

## 2019-06-07 ENCOUNTER — CLINICAL SUPPORT (OUTPATIENT)
Dept: CARDIAC REHAB | Facility: CLINIC | Age: 69
End: 2019-06-07
Payer: MEDICARE

## 2019-06-07 DIAGNOSIS — Z98.890 S/P MVR (MITRAL VALVE REPAIR): ICD-10-CM

## 2019-06-07 PROCEDURE — 93798 PHYS/QHP OP CAR RHAB W/ECG: CPT

## 2019-06-11 ENCOUNTER — CLINICAL SUPPORT (OUTPATIENT)
Dept: CARDIAC REHAB | Facility: CLINIC | Age: 69
End: 2019-06-11
Payer: MEDICARE

## 2019-06-11 DIAGNOSIS — Z98.890 S/P MVR (MITRAL VALVE REPAIR): ICD-10-CM

## 2019-06-11 PROCEDURE — 93798 PHYS/QHP OP CAR RHAB W/ECG: CPT

## 2019-06-12 RX ORDER — HYDROCHLOROTHIAZIDE 12.5 MG/1
CAPSULE, GELATIN COATED ORAL
Qty: 60 CAPSULE | OUTPATIENT
Start: 2019-06-12

## 2019-06-27 ENCOUNTER — OFFICE VISIT (OUTPATIENT)
Dept: SLEEP CENTER | Facility: CLINIC | Age: 69
End: 2019-06-27
Payer: MEDICARE

## 2019-06-27 VITALS
SYSTOLIC BLOOD PRESSURE: 112 MMHG | DIASTOLIC BLOOD PRESSURE: 72 MMHG | BODY MASS INDEX: 32.95 KG/M2 | HEART RATE: 70 BPM | WEIGHT: 205 LBS | HEIGHT: 66 IN

## 2019-06-27 DIAGNOSIS — G47.33 SEVERE OBSTRUCTIVE SLEEP APNEA: Primary | ICD-10-CM

## 2019-06-27 DIAGNOSIS — I10 BENIGN ESSENTIAL HYPERTENSION: ICD-10-CM

## 2019-06-27 DIAGNOSIS — Z98.890 S/P MVR (MITRAL VALVE REPAIR): ICD-10-CM

## 2019-06-27 PROCEDURE — 99214 OFFICE O/P EST MOD 30 MIN: CPT | Performed by: PSYCHIATRY & NEUROLOGY

## 2019-07-12 LAB
ALBUMIN SERPL-MCNC: 3.9 G/DL (ref 3.6–5.1)
ALBUMIN/GLOB SERPL: 1.3 (CALC) (ref 1–2.5)
ALP SERPL-CCNC: 85 U/L (ref 33–130)
ALT SERPL-CCNC: 15 U/L (ref 6–29)
AST SERPL-CCNC: 17 U/L (ref 10–35)
BASOPHILS # BLD AUTO: 61 CELLS/UL (ref 0–200)
BASOPHILS NFR BLD AUTO: 1.3 %
BILIRUB SERPL-MCNC: 0.8 MG/DL (ref 0.2–1.2)
BUN SERPL-MCNC: 13 MG/DL (ref 7–25)
BUN/CREAT SERPL: ABNORMAL (CALC) (ref 6–22)
CALCIUM SERPL-MCNC: 9.2 MG/DL (ref 8.6–10.4)
CHLORIDE SERPL-SCNC: 106 MMOL/L (ref 98–110)
CHOLEST SERPL-MCNC: 176 MG/DL
CHOLEST/HDLC SERPL: 2.9 (CALC)
CO2 SERPL-SCNC: 25 MMOL/L (ref 20–32)
CREAT SERPL-MCNC: 0.92 MG/DL (ref 0.5–0.99)
EOSINOPHIL # BLD AUTO: 188 CELLS/UL (ref 15–500)
EOSINOPHIL NFR BLD AUTO: 4 %
ERYTHROCYTE [DISTWIDTH] IN BLOOD BY AUTOMATED COUNT: 13.6 % (ref 11–15)
GLOBULIN SER CALC-MCNC: 2.9 G/DL (CALC) (ref 1.9–3.7)
GLUCOSE SERPL-MCNC: 107 MG/DL (ref 65–99)
HCT VFR BLD AUTO: 36.7 % (ref 35–45)
HDLC SERPL-MCNC: 61 MG/DL
HGB BLD-MCNC: 12.2 G/DL (ref 11.7–15.5)
LDLC SERPL CALC-MCNC: 91 MG/DL (CALC)
LYMPHOCYTES # BLD AUTO: 1429 CELLS/UL (ref 850–3900)
LYMPHOCYTES NFR BLD AUTO: 30.4 %
MCH RBC QN AUTO: 29.8 PG (ref 27–33)
MCHC RBC AUTO-ENTMCNC: 33.2 G/DL (ref 32–36)
MCV RBC AUTO: 89.7 FL (ref 80–100)
MONOCYTES # BLD AUTO: 508 CELLS/UL (ref 200–950)
MONOCYTES NFR BLD AUTO: 10.8 %
NEUTROPHILS # BLD AUTO: 2515 CELLS/UL (ref 1500–7800)
NEUTROPHILS NFR BLD AUTO: 53.5 %
NONHDLC SERPL-MCNC: 115 MG/DL (CALC)
PLATELET # BLD AUTO: 183 THOUSAND/UL (ref 140–400)
PMV BLD REES-ECKER: 10.1 FL (ref 7.5–12.5)
POTASSIUM SERPL-SCNC: 3.6 MMOL/L (ref 3.5–5.3)
PROT SERPL-MCNC: 6.8 G/DL (ref 6.1–8.1)
RBC # BLD AUTO: 4.09 MILLION/UL (ref 3.8–5.1)
SL AMB EGFR AFRICAN AMERICAN: 74 ML/MIN/1.73M2
SL AMB EGFR NON AFRICAN AMERICAN: 64 ML/MIN/1.73M2
SODIUM SERPL-SCNC: 140 MMOL/L (ref 135–146)
TRIGL SERPL-MCNC: 138 MG/DL
TSH SERPL-ACNC: 3.48 MIU/L (ref 0.4–4.5)
WBC # BLD AUTO: 4.7 THOUSAND/UL (ref 3.8–10.8)

## 2019-08-08 DIAGNOSIS — I10 BENIGN ESSENTIAL HYPERTENSION: Primary | ICD-10-CM

## 2019-08-08 RX ORDER — HYDROCHLOROTHIAZIDE 12.5 MG/1
CAPSULE, GELATIN COATED ORAL
Qty: 60 CAPSULE | Refills: 3 | Status: SHIPPED | OUTPATIENT
Start: 2019-08-08 | End: 2020-03-04

## 2019-08-27 DIAGNOSIS — E03.9 HYPOTHYROIDISM, UNSPECIFIED TYPE: ICD-10-CM

## 2019-08-27 RX ORDER — LEVOTHYROXINE SODIUM 0.12 MG/1
TABLET ORAL
Qty: 35 TABLET | Refills: 3 | Status: SHIPPED | OUTPATIENT
Start: 2019-08-27 | End: 2020-01-08 | Stop reason: SDUPTHER

## 2019-08-29 ENCOUNTER — OFFICE VISIT (OUTPATIENT)
Dept: FAMILY MEDICINE CLINIC | Facility: CLINIC | Age: 69
End: 2019-08-29
Payer: MEDICARE

## 2019-08-29 VITALS
TEMPERATURE: 98.2 F | HEART RATE: 77 BPM | BODY MASS INDEX: 34.99 KG/M2 | DIASTOLIC BLOOD PRESSURE: 78 MMHG | WEIGHT: 210 LBS | RESPIRATION RATE: 18 BRPM | OXYGEN SATURATION: 97 % | HEIGHT: 65 IN | SYSTOLIC BLOOD PRESSURE: 120 MMHG

## 2019-08-29 DIAGNOSIS — Z98.890 S/P MVR (MITRAL VALVE REPAIR): ICD-10-CM

## 2019-08-29 DIAGNOSIS — M25.551 PAIN OF RIGHT HIP JOINT: ICD-10-CM

## 2019-08-29 DIAGNOSIS — I10 BENIGN ESSENTIAL HYPERTENSION: Primary | ICD-10-CM

## 2019-08-29 DIAGNOSIS — Z12.39 SCREENING FOR BREAST CANCER: ICD-10-CM

## 2019-08-29 DIAGNOSIS — E03.9 HYPOTHYROIDISM, UNSPECIFIED TYPE: ICD-10-CM

## 2019-08-29 DIAGNOSIS — G47.33 SEVERE OBSTRUCTIVE SLEEP APNEA: ICD-10-CM

## 2019-08-29 DIAGNOSIS — E78.2 MIXED HYPERLIPIDEMIA: ICD-10-CM

## 2019-08-29 DIAGNOSIS — E55.9 VITAMIN D DEFICIENCY: ICD-10-CM

## 2019-08-29 PROCEDURE — 99214 OFFICE O/P EST MOD 30 MIN: CPT | Performed by: FAMILY MEDICINE

## 2019-08-29 NOTE — PROGRESS NOTES
FAMILY PRACTICE OFFICE VISIT       NAME: Orestes Colin  AGE: 71 y o  SEX: female       : 1950        MRN: [id number]        Assessment and Plan     Problem List Items Addressed This Visit        Endocrine    Hypothyroidism     · Patient remains on levothyroxine 125 mcg daily  · Recent TSH is normal            Relevant Orders    TSH, 3rd generation       Respiratory    Severe obstructive sleep apnea      On CPAP   Dr Chang            Cardiovascular and Mediastinum    Benign essential hypertension - Primary     · Amlodipine 5 mg daily         Relevant Orders    CBC and differential    Comprehensive metabolic panel    Lipid Panel with Direct LDL reflex       Other    Hyperlipidemia     · Patient remains on atorvastatin 10 mg 3 days a week         Relevant Orders    Comprehensive metabolic panel    Lipid Panel with Direct LDL reflex    S/P MVR (mitral valve repair)     · Status post mitral valve repair in 2019  · Patient is doing well  · She remains under care of [facility]'s Cardiology         Relevant Medications    aspirin 81 MG tablet      Other Visit Diagnoses     Pain of right hip joint        Relevant Orders    Ambulatory referral to Orthopedic Surgery    Screening for breast cancer        Relevant Orders    Mammo screening bilateral w cad    Vitamin D deficiency        Relevant Orders    Vitamin D 25 hydroxy        Patient presents for follow-up of chronic medical conditions  Assessment and plan as outlined above  She will continue daily medications for hypertension, hyperlipidemia, hypothyroidism  Patient has recovered well after mitral valve repair in 2019 and should have pending follow-up with Cardiology in January  She will be proceeding with routine blood work at that time  Will be planning on follow-up office with PCP in May of 2020 unless patient has any questions, concerns in the interim  Patient will observe chronic right hip pain    If symptoms worsen-will proceed with consultation by [facility]'s Orthopedic Surgical team   She is up-to-date with health screenings aside from colonoscopy  I strongly advised her to set up this fall  We discussed high-fiber diet  Varicose veins lower extremities:  Patient should start using compression stockings during prolonged travel   Patient Instructions     · Call DR Usama Kowalski RE: colonoscopy  · Call Dr Rahman office RE ; next OV  · Please set up mammogram in late 11/2019  · Please follow  High fiber diet  · Compression  Stocking  · Please repeat complete blood work in January, if you do not have follow-up with cardiologist at that time, please schedule follow-up with me  [employer]  8/29/2019      Recommended Total Fiber Intake**   AGE MEN WOMAN   19-50 38 grams/day 25 grams/day   Over 50 30 grams/day 21 grams/day     Fiber Sources in Common Foods  Use this guide to find out if you have enough fiber in you diet     Food Size of Serving Fiber Grams/Servings Calories/  Serving Food Size of Serving [employer]  Serving   Fruits:  (raw unless otherwise noted Vegetables:  (cooked, unless otherwise noted)   Apple (w/peel) 1 medium 3 7 81 Artichoke 1 globe 6 5 60   Apricots 1 cup 3 7 74 Asparagus ½ cup 1 8 25   Banana 1 medium 2 7 105 Beans:      Blackberries 1 cup 7 2 75 Green  (canned)                       ½ cup 1 3 14   Blueberries 1 cup 3 9 81 Kidney ½ cup 5 7 114   Cantaloupe 1 cup 1 3 56 Ferguson ½ cup 6 1 85   Grapefruit 1 medium 2 8 82 De Leon ½ cup 7 4 118   Grapes 1 cup 1 6 114 White ½ cup 5 5 122   Orange 1 medium 3 1 62 Beets ½ cup 1 6 37   Pear (with peel) 1 medium 4 0 98 Broccoli ½ cup 2 8 26   Pineapple 1 cup 1 9 76 Cabbage, green ½ cup 2 1 16   Plums 1 medium 1 0 36 Cabbage, green (raw) ½ cup 0 8 9   Prunes (dried) 1 cup 11 4 386 Carrots ½ cup 2 6 35   Raspberries 1 cup 8 4 60 Cauliflower ½ cup 2 0 17   Strawberries 1 cup 3 4 45 Cauliflower (raw) ½ cup 1 3 13   Watermelon 1 slice 0 8 51 Celery (raw) ½ cup 1 0 10   GRAIN PRODUCTS AND OTHERS: Corn ½ cup 2 0 66   Bread:    Cucumber (raw) ½ cup 0 4 7   Persian 1 slice 0 8 68 Eggplant ½ cup 1 2 13   Rye 1 slice 1 6 67 Green Peas ½ cup 4 4 62   White 1 slice 0 6 67 Lettuce, iceberg (raw) ½ cup 0 4 4   Whole      Wheat 1 slice 2 0 70 Onions (raw) ½ cup 1 4 30   Cereal:    Potato (baked with skin) ½ cup 1 5 66   Bran 1 ounce 9 7 70 Spinach ½ cup 2 7 25   Corn Flakes 1 ounce 1 0 110 Tomato ½ cup 1 0 19   Oat Bran 1 ounce 4 3 69 Zucchini ½ cup 1 3 14   Oatmeal 1 ounce 3 0 109 METAMUCIL:   Shredded Wheat 1 ounce 2 8 102 Capsules 6 capsules 3 0 10   Crackers:    Smooth Texture Orange (sugar free) 1 tsp 3 0 20   Javier 1 square 0 1 27 Smooth Texture Orange (with sugar) 1 tbsp 3 0 45   Saltine 1 regular 0 1 13 Wafers 2 wafers 3 0 120   Rice:          Brown ½ cup 1 8 108       White ½ cup 0 3 103       Spaghetti 2 ounces 2 1 225       Almonds (roasted) ½ cup 6 4 351       Peanuts (roasted) ½ cup 6 1 388         ** [employer], 2002    Track your fiber intake for five days  Use the Fiber Source Guide to find out how much fiber is in common food  If youre not getting your recommended amount of fiber each day, talk to your doctor about how you can increase the fiber in your diet  Example Monday Tuesday Wednesday Thursday Friday   Food Oatmeal        Fiber Grams 2 8        Food Blueberries        Fiber Grams 3 9        Food W W  Bread        Fiber Grams 1 9        Food W W  Bread        Fiber Grams 1 9        Food Apple        Fiber Grams 3 7        Food Spaghetti        Fiber Grams   14        Food Corn        Fiber Grams 2 0        Food White Bread        Fiber Grams   6        Add numbers in each column to find your daily fiber intake  Total Daily Fiber Intake 18 2            Too Low - Like most Americans, this example is not enough fiber  Talk to your doctor about how to add fiber to your diet      Quick Fiber Facts  · Most Americans consume only about half of the recommended fiber they need each day  · Fiber helps maintain normal bowel function, and helps prevent constipation and its potential complications  Straining and pressure from constipation may lead to diverticular disease and hemorrhoids  · Stool softeners or stimulant laxatives only offer short-term relief of constipation, while dietary changes or fiber therapies help break the cycle of irregularity  · Diets low in saturated fat and cholesterol that include 7 grams of soluble fiber per day from psyllium husk, as in Metamucil, may reduce the risk of heart disease by lowering cholesterol  One adult dose of Metamucil has 2 4 grams of this soluble fiber  · Increase fiber intake gradually, giving the body time to adjust           Discussed with the patient and all questioned fully answered  She will call me if any problems arise  M*Modal software was used to dictate this note  It may contain errors with dictating incorrect words/spelling  Please contact provider directly with any questions  Chief Complaint     Chief Complaint   Patient presents with    Follow-up     4 months       History of Present Illness      Patient presents for follow-up of chronic medical conditions  She is accompanied by her   Results of recent blood work discussed  Patient remains on medications for hypertension, hyperlipidemia, hypothyroidism  She has recovered well after mitral valve replacement surgery  Patient denies symptoms of chest wall discomfort  She denies symptoms of chest pain, palpitations, shortness of breath or dizziness  Patient has been compliant with CPAP for known obstructive sleep apnea  Patient is up-to-date with gyn, Dr Mathur and annual mammogram   Patient is due for colonoscopy  Patient has been experiencing chronic right hip pain  Occasional radiation to the groin  Patient is able to walk and stay active most of the time    Groin pain is occurring with certain turns and bending  Overall pain has been quite tolerable and patient prefers to hold off any further evaluation  She does have known DJD of hip         Review of Systems   Review of Systems   Constitutional: Negative  HENT: Negative  Eyes: Negative  Respiratory: Negative  Gastrointestinal: Positive for constipation (Occasional)  Negative for abdominal distention, abdominal pain, blood in stool, nausea and vomiting  Endocrine: Negative  Genitourinary: Negative  Musculoskeletal: Positive for arthralgias  Skin: Negative  Allergic/Immunologic: Negative  Neurological: Negative  Hematological: Negative  Psychiatric/Behavioral: Negative  Active Problem List     Patient Active Problem List   Diagnosis    Benign essential hypertension    Disc degeneration, lumbar    Hyperlipidemia    Hypothyroidism    Liver enlargement    Osteoporosis    Severe obstructive sleep apnea    Shoulder impingement    Tinea corporis    Lumbar radiculopathy    S/P MVR (mitral valve repair)    Thrombocytopenia (HCC)    Double vision       Past Medical History:  Past Medical History:   Diagnosis Date    Cystic breast     Dyspareunia, female     last assessed 9/4/14    Hepatic cyst 9/21/2015    Hyperlipidemia     Hypothyroidism     Insomnia     IRIAN on CPAP     Osteoporosis     Severe mitral regurgitation     Varicose veins of lower extremity     last assessed 1/4/13       Past Surgical History:  Past Surgical History:   Procedure Laterality Date    COLONOSCOPY      complete 5/2016 - Dr Sugar Morgan due in 2019 - last assessed  3/17/17    HERNIA REPAIR      LIVER BIOPSY LAPAROSCOPIC N/A 5/7/2018    Procedure: Laparoscopic marsupialization of liver cyst;  Surgeon: aMrta Dailey MD;  [facility];  Service: Surgical Oncology    NEUROMA EXCISION      CO ECHO Im [address] N/A 1/7/2019    Procedure: TRANSESOPHAGEAL ECHOCARDIOGRAM (DORI);   Surgeon: Zach Guzman MD; [facility];  Service: Cardiac Surgery    AL ESOPHAGOGASTRODUODENOSCOPY TRANSORAL DIAGNOSTIC N/A 8/10/2016    Procedure: ESOPHAGOGASTRODUODENOSCOPY (EGD); Surgeon: Shyla Lassiter MD;  [facility]; Service: Gastroenterology    AL ESOPHAGOSCOPY FLEXIBLE TRANSORAL WITH BIOPSY N/A 11/3/2016    Procedure: ESOPHAGOGASTRODUODENOSCOPY (EGD);   Surgeon: Thong Torres MD;  [facility];  Service: Thoracic    AL LAP, REPAIR PARAESOPHAGEAL HERNIA, INCL FUNDOPLASTY W/ MESH Left 11/3/2016    Procedure: LAPAROSCOPIC PARAESOPHAGEAL HERNIA REPAIR WITH MESH;NISSEN FUNDOPLICATION ;  Surgeon: Thong Torres MD;  [facility];  Service: Thoracic    AL LAP, VENTRAL HERNIA REPAIR,REDUCIBLE N/A 10/30/2017    Procedure: LAPAROSCOPIC INCISIONAL HERNIA REPAIR X 2 WITH ECHO MESH;  Surgeon: Tr Diaz DO;  [facility];  Service: General    AL REPAIR INCISIONAL HERNIA,REDUCIBLE N/A 1/13/2017    Procedure: INCISIONAL HERNIA REPAIR ;  Surgeon: Tr Diaz DO;  [facility];  Service: General    AL REPLACEMENT OF MITRAL VALVE N/A 1/7/2019    Procedure: REPLACEMENT VALVE MITRAL (MVR), repair with 30mm CG Future ring;  Surgeon: Teresa Stuart MD;  [facility];  Service: Cardiac Surgery    TUBAL LIGATION         Family History:  Family History   Problem Relation Age of Onset    Heart disease Mother     Aneurysm Mother         cerebral    Alcohol abuse Father     Cancer Father     COPD Father     Heart failure Father     Hypertension Father     Cancer Family     Breast cancer Paternal Grandmother 80       Social History:  Social History     Socioeconomic History    Marital status: /Civil Union     Spouse name: Not on file    Number of children: Not on file    Years of education: Not on file    Highest education level: Not on file   Occupational History    Not on file   Social Needs    Financial resource strain: Not on file    Food insecurity: Worry: Not on file     Inability: Not on file    Transportation needs:     Medical: Not on file     Non-medical: Not on file   Tobacco Use    Smoking status: Never Smoker    Smokeless tobacco: Never Used   Substance and Sexual Activity    Alcohol use: Yes     Comment: social    Drug use: No    Sexual activity: Yes     Partners: Male     Birth control/protection: None   Lifestyle    Physical activity:     Days per week: Not on file     Minutes per session: Not on file    Stress: Not on file   Relationships    Social connections:     Talks on phone: Not on file     Gets together: Not on file     Attends Zoroastrian service: Not on file     Active member of club or organization: Not on file     Attends meetings of clubs or organizations: Not on file     Relationship status: Not on file    Intimate partner violence:     Fear of current or ex partner: Not on file     Emotionally abused: Not on file     Physically abused: Not on file     Forced sexual activity: Not on file   Other Topics Concern    Not on file   Social History Narrative    Coffee    Exercise - walking         Objective     Vitals:    08/29/19 1025   BP: 120/78   BP Location: Left arm   Patient Position: Sitting   Cuff Size: Adult   Pulse: 77   Resp: 18   Temp: 98 2 °F (36 8 °C)   TempSrc: Tympanic   SpO2: 97%   Weight: 95 3 kg (210 lb)   Height: 5' 5" (1 651 m)     Wt Readings from Last 3 Encounters:   08/29/19 95 3 kg (210 lb)   06/27/19 93 kg (205 lb)   05/29/19 95 kg (209 lb 6 4 oz)       Physical Exam   Constitutional: She is oriented to person, place, and time  She appears well-developed and well-nourished  HENT:   Head: Normocephalic and atraumatic  Eyes: Conjunctivae are normal    Neck: Neck supple  No JVD present  Carotid bruit is not present  No thyromegaly present  Cardiovascular: Normal rate, regular rhythm and normal heart sounds  No murmur heard    Pulmonary/Chest: Effort normal and breath sounds normal  No respiratory distress  She has no wheezes  Abdominal: Soft  Bowel sounds are normal  She exhibits no distension and no abdominal bruit  Musculoskeletal: Normal range of motion  She exhibits no edema  Varicose veins   Left leg  No calf tenderness or discoloration   Neurological: She is alert and oriented to person, place, and time  No cranial nerve deficit  Coordination normal    Psychiatric: She has a normal mood and affect  Her behavior is normal    Nursing note and vitals reviewed        Pertinent Laboratory/Diagnostic Studies:  Lab Results   Component Value Date    GLUCOSE 146 (H) 01/07/2019    BUN 13 07/11/2019    CREATININE 0 92 07/11/2019    CALCIUM 9 2 07/11/2019     09/19/2017    K 3 6 07/11/2019    CO2 25 07/11/2019     07/11/2019     Lab Results   Component Value Date    ALT 15 07/11/2019    AST 17 07/11/2019    ALKPHOS 85 07/11/2019    BILITOT 0 6 09/19/2017       Lab Results   Component Value Date    WBC 4 7 07/11/2019    HGB 12 2 07/11/2019    HCT 36 7 07/11/2019    MCV 89 7 07/11/2019     07/11/2019       Lab Results   Component Value Date    TSH 3 48 07/11/2019       Lab Results   Component Value Date    CHOL 159 09/19/2017     Lab Results   Component Value Date    TRIG 138 07/11/2019     Lab Results   Component Value Date    HDL 61 07/11/2019     Lab Results   Component Value Date    LDLCALC 80 12/31/2018     Lab Results   Component Value Date    HGBA1C 5 5 12/31/2018       Results for orders placed or performed in visit on 07/11/19   Lipid Panel with Direct LDL reflex   Result Value Ref Range    Total Cholesterol 176 <200 mg/dL    HDL 61 >50 mg/dL    Triglycerides 138 <150 mg/dL    LDL Direct 91 mg/dL (calc)    Chol HDLC Ratio 2 9 <5 0 (calc)    Non-HDL Cholesterol 115 <130 mg/dL (calc)   Comprehensive metabolic panel   Result Value Ref Range    Glucose, Random 107 (H) 65 - 99 mg/dL    BUN 13 7 - 25 mg/dL    Creatinine 0 92 0 50 - 0 99 mg/dL    eGFR Non  64 > OR = 60 mL/min/1 73m2    eGFR  74 > OR = 60 mL/min/1 73m2    SL AMB BUN/CREATININE RATIO NOT APPLICABLE 6 - 22 (calc)    Sodium 140 135 - 146 mmol/L    Potassium 3 6 3 5 - 5 3 mmol/L    Chloride 106 98 - 110 mmol/L    CO2 25 20 - 32 mmol/L    SL AMB CALCIUM 9 2 8 6 - 10 4 mg/dL    Protein, Total 6 8 6 1 - 8 1 g/dL    Albumin 3 9 3 6 - 5 1 g/dL    Globulin 2 9 1 9 - 3 7 g/dL (calc)    Albumin/Globulin Ratio 1 3 1 0 - 2 5 (calc)    TOTAL BILIRUBIN 0 8 0 2 - 1 2 mg/dL    Alkaline Phosphatase 85 33 - 130 U/L    AST 17 10 - 35 U/L    ALT 15 6 - 29 U/L   CBC and differential   Result Value Ref Range    White Blood Cell Count 4 7 3 8 - 10 8 Thousand/uL    Red Blood Cell Count 4 09 3 80 - 5 10 Million/uL    Hemoglobin 12 2 11 7 - 15 5 g/dL    HCT 36 7 35 0 - 45 0 %    MCV 89 7 80 0 - 100 0 fL    MCH 29 8 27 0 - 33 0 pg    MCHC 33 2 32 0 - 36 0 g/dL    RDW 13 6 11 0 - 15 0 %    Platelet Count 854 213 - 400 Thousand/uL    SL AMB MPV 10 1 7 5 - 12 5 fL    Neutrophils (Absolute) 2,515 1,500 - 7,800 cells/uL    Lymphocytes (Absolute) 1,429 850 - 3,900 cells/uL    Monocytes (Absolute) 508 200 - 950 cells/uL    Eosinophils (Absolute) 188 15 - 500 cells/uL    Basophils ABS 61 0 - 200 cells/uL    Neutrophils 53 5 %    Lymphocytes 30 4 %    Monocytes 10 8 %    Eosinophils 4 0 %    Basophils PCT 1 3 %   TSH, 3rd generation   Result Value Ref Range    TSH 3 48 0 40 - 4 50 mIU/L       Orders Placed This Encounter   Procedures    Mammo screening bilateral w cad    CBC and differential    Comprehensive metabolic panel    Lipid Panel with Direct LDL reflex    TSH, 3rd generation    Vitamin D 25 hydroxy    Ambulatory referral to Orthopedic Surgery       ALLERGIES:  Allergies   Allergen Reactions    Other Other (See Comments)     Skin breakdown from bandaid on for long time- reddness       Current Medications     Current Outpatient Medications   Medication Sig Dispense Refill    amLODIPine (NORVASC) 5 mg tablet Take 1 tablet (5 mg total) by mouth daily 90 tablet 3    aspirin 81 MG tablet Take 1 tablet (81 mg total) by mouth daily 30 tablet 11    atorvastatin (LIPITOR) 10 mg tablet Take 1 tablet (10 mg total) by mouth daily (Patient taking differently: Take 10 mg by mouth daily Patient taking 3xs per week Monday, Wednesday, and Friday ) 30 tablet 2    Cholecalciferol (VITAMIN D3) 2000 UNITS TABS Take 1 tablet by mouth daily   estradiol (ESTRACE VAGINAL) 0 1 mg/g vaginal cream Insert into the vagina      hydrochlorothiazide (MICROZIDE) 12 5 mg capsule TAKE 1 TO 2 CAPSULES DAILY WITH AMLODIPINE 60 capsule 3    levothyroxine 125 mcg tablet Takes 2 tabs once a week and 1 tab daily for the rest of the week 35 tablet 3    Omega-3 Fatty Acids (FISH OIL) 1200 MG CAPS Take 1 capsule by mouth daily       No current facility-administered medications for this visit          Medications Discontinued During This Encounter   Medication Reason    Coenzyme Q10 (CO Q 10) 10 MG CAPS     Multiple Vitamins-Minerals (CENTRUM SILVER 50+WOMEN) TABS     polyethylene glycol (GLYCOLAX) powder     acetaminophen (TYLENOL) 325 mg tablet     aspirin 325 mg tablet        Health Maintenance     Health Maintenance   Topic Date Due    Hepatitis C Screening  1950    [facility] PLAN OF CARE  1950    CRC Screening: Colonoscopy  05/12/2019    INFLUENZA VACCINE  07/01/2019    MAMMOGRAM  11/20/2019    Fall Risk  04/04/2020    Depression Screening PHQ  04/04/2020    Urinary Incontinence Screening  04/04/2020    Medicare Annual Wellness Visit (AWV)  04/04/2020    BMI: Followup Plan  04/07/2020    BMI: Adult  08/29/2020    DTaP,Tdap,and Td Vaccines (2 - Td) 11/09/2020    Pneumococcal Vaccine: 65+ Years  Completed    Pneumococcal Vaccine: Pediatrics (0 to 5 Years) and At-Risk Patients (6 to 59 Years)  Aged Out    HEPATITIS B VACCINES  Aged [employer] History   Administered Date(s) Administered    Influenza Quadrivalent Preservative Free 3 years and older IM 11/11/2016    Influenza Split High Dose Preservative Free IM 09/21/2015, 09/15/2017    Influenza TIV (IM) 11/01/2010, 01/24/2013, 09/22/2014    Influenza, high dose seasonal 0 5 mL 10/19/2018    Pneumococcal Conjugate 13-Valent 01/19/2016    Pneumococcal Polysaccharide PPV23 09/15/2017    Tdap 11/09/2010       Cherylene Quarry, MD

## 2019-08-29 NOTE — PATIENT INSTRUCTIONS
· Call DR Martínez Glasgow RE: colonoscopy  · Call Dr Rahman office RE ; next OV  · Please set up mammogram in late 11/2019  · Please follow  High fiber diet  · Compression  Stocking  · Please repeat complete blood work in January, if you do not have follow-up with cardiologist at that time, please schedule follow-up with emy Forrester  8/29/2019      Recommended Total Fiber Intake**   AGE MEN WOMAN   19-50 38 grams/day 25 grams/day   Over 50 30 grams/day 21 grams/day     Fiber Sources in Common Foods  Use this guide to find out if you have enough fiber in you diet     Food Size of Serving Fiber Grams/Servings Calories/  Serving Food Size of Serving [employer]  Serving   Fruits:  (raw unless otherwise noted Vegetables:  (cooked, unless otherwise noted)   Apple (w/peel) 1 medium 3 7 81 Artichoke 1 globe 6 5 60   Apricots 1 cup 3 7 74 Asparagus ½ cup 1 8 25   Banana 1 medium 2 7 105 Beans:      Blackberries 1 cup 7 2 75 Green  (canned)                       ½ cup 1 3 14   Blueberries 1 cup 3 9 81 Kidney ½ cup 5 7 114   Cantaloupe 1 cup 1 3 56 Ferguson ½ cup 6 1 85   Grapefruit 1 medium 2 8 82 De Leon ½ cup 7 4 118   Grapes 1 cup 1 6 114 White ½ cup 5 5 122   Orange 1 medium 3 1 62 Beets ½ cup 1 6 37   Pear (with peel) 1 medium 4 0 98 Broccoli ½ cup 2 8 26   Pineapple 1 cup 1 9 76 Cabbage, green ½ cup 2 1 16   Plums 1 medium 1 0 36 Cabbage, green (raw) ½ cup 0 8 9   Prunes (dried) 1 cup 11 4 386 Carrots ½ cup 2 6 35   Raspberries 1 cup 8 4 60 Cauliflower ½ cup 2 0 17   Strawberries 1 cup 3 4 45 Cauliflower (raw) ½ cup 1 3 13   Watermelon 1 slice 0 8 51 Celery (raw) ½ cup 1 0 10   GRAIN PRODUCTS AND OTHERS: Corn ½ cup 2 0 66   Bread:    Cucumber (raw) ½ cup 0 4 7   Honduran 1 slice 0 8 68 Eggplant ½ cup 1 2 13   Rye 1 slice 1 6 67 Green Peas ½ cup 4 4 62   White 1 slice 0 6 67 Lettuce, iceberg (raw) ½ cup 0 4 4   Whole      Wheat 1 slice 2 0 70 Onions (raw) ½ cup 1 4 30   Cereal:    Potato (baked with skin) ½ cup 1 5 66   Bran 1 ounce 9 7 70 Spinach ½ cup 2 7 25   Corn Flakes 1 ounce 1 0 110 Tomato ½ cup 1 0 19   Oat Bran 1 ounce 4 3 69 Zucchini ½ cup 1 3 14   Oatmeal 1 ounce 3 0 109 METAMUCIL:   Shredded Wheat 1 ounce 2 8 102 Capsules 6 capsules 3 0 10   Crackers:    Smooth Texture Orange (sugar free) 1 tsp 3 0 20   Javier 1 square 0 1 27 Smooth Texture Orange (with sugar) 1 tbsp 3 0 45   Saltine 1 regular 0 1 13 Wafers 2 wafers 3 0 120   Rice:          Brown ½ cup 1 8 108       White ½ cup 0 3 103       Spaghetti 2 ounces 2 1 225       Almonds (roasted) ½ cup 6 4 351       Peanuts (roasted) ½ cup 6 1 388         ** [place] [employer], [facility], 2002    Track your fiber intake for five days  Use the Fiber Source Guide to find out how much fiber is in common food  If youre not getting your recommended amount of fiber each day, talk to your doctor about how you can increase the fiber in your diet  Example Monday Tuesday Wednesday Thursday Friday   Food Oatmeal        Fiber Grams 2 8        Food Blueberries        Fiber Grams 3 9        Food W W  Bread        Fiber Grams 1 9        Food W W  Bread        Fiber Grams 1 9        Food Apple        Fiber Grams 3 7        Food Spaghetti        Fiber Grams   14        Food Corn        Fiber Grams 2 0        Food White Bread        Fiber Grams   6        Add numbers in each column to find your daily fiber intake  Total Daily Fiber Intake 18 2            Too Low - Like most Americans, this example is not enough fiber  Talk to your doctor about how to add fiber to your diet  Quick Fiber Facts  · Most Americans consume only about half of the recommended fiber they need each day  · Fiber helps maintain normal bowel function, and helps prevent constipation and its potential complications  Straining and pressure from constipation may lead to diverticular disease and hemorrhoids    · Stool softeners or stimulant laxatives only offer short-term relief of constipation, while dietary changes or fiber therapies help break the cycle of irregularity  · Diets low in saturated fat and cholesterol that include 7 grams of soluble fiber per day from psyllium husk, as in Metamucil, may reduce the risk of heart disease by lowering cholesterol  One adult dose of Metamucil has 2 4 grams of this soluble fiber    · Increase fiber intake gradually, giving the body time to adjust

## 2019-09-02 NOTE — ASSESSMENT & PLAN NOTE
· Status post mitral valve repair in January 2019  · Patient is doing well  · She remains under care of [facility]

## 2019-09-26 ENCOUNTER — LAB REQUISITION (OUTPATIENT)
Dept: LAB | Facility: HOSPITAL | Age: 69
End: 2019-09-26
Payer: MEDICARE

## 2019-09-26 DIAGNOSIS — D12.0 BENIGN NEOPLASM OF CECUM: ICD-10-CM

## 2019-09-26 DIAGNOSIS — Z86.010 HISTORY OF COLONIC POLYPS: ICD-10-CM

## 2019-09-26 DIAGNOSIS — D12.3 BENIGN NEOPLASM OF TRANSVERSE COLON: ICD-10-CM

## 2019-09-26 PROCEDURE — 88305 TISSUE EXAM BY PATHOLOGIST: CPT | Performed by: PATHOLOGY

## 2019-09-27 DIAGNOSIS — E78.5 HYPERLIPIDEMIA, UNSPECIFIED HYPERLIPIDEMIA TYPE: ICD-10-CM

## 2019-09-29 RX ORDER — ATORVASTATIN CALCIUM 10 MG/1
10 TABLET, FILM COATED ORAL DAILY
Qty: 30 TABLET | Refills: 2 | Status: SHIPPED | OUTPATIENT
Start: 2019-09-29 | End: 2020-03-04

## 2019-11-07 DIAGNOSIS — G47.33 SEVERE OBSTRUCTIVE SLEEP APNEA: Primary | ICD-10-CM

## 2019-11-08 ENCOUNTER — TELEPHONE (OUTPATIENT)
Dept: SLEEP CENTER | Facility: CLINIC | Age: 69
End: 2019-11-08

## 2019-11-12 ENCOUNTER — OFFICE VISIT (OUTPATIENT)
Dept: OBGYN CLINIC | Facility: CLINIC | Age: 69
End: 2019-11-12
Payer: MEDICARE

## 2019-11-12 VITALS
WEIGHT: 208 LBS | SYSTOLIC BLOOD PRESSURE: 125 MMHG | HEART RATE: 87 BPM | BODY MASS INDEX: 34.66 KG/M2 | DIASTOLIC BLOOD PRESSURE: 87 MMHG | HEIGHT: 65 IN

## 2019-11-12 DIAGNOSIS — M16.11 PRIMARY OSTEOARTHRITIS OF RIGHT HIP: Primary | ICD-10-CM

## 2019-11-12 PROCEDURE — 99203 OFFICE O/P NEW LOW 30 MIN: CPT | Performed by: ORTHOPAEDIC SURGERY

## 2019-11-12 NOTE — PROGRESS NOTES
Patient Name:  Valarie Simon  MRN:  [id number]    [address] [place] [address]     Right Hip, Mild to Moderate Osteoarthritis    1  Recommended patient begin OTC anti inflammatories and ice prn for pain relief as her pain is tolerable on a daily basis  2  Next step in treatment would be to perform an intra articular hip injection  She was instructed to call office for referral if needed  3  May perform activities as tolerated  Avoid painful maneuvers  4  She will follow up on an as needed basis      Chief Complaint     Right Hip Pain      History of the Present Illness     Valarie Simon is a 71 y o  female presents today for an orthopedic consultation visit from her PCP (Dr Caprice Waddell) on 8/29/19 for her right hip  Patient states that she has had intermittent symptoms about her right hip/groin ongoing for several months without any certain mechanism of injury  Today, patient states that her pain has not been too bad lately  She has been renovating her sons home and knows that she has been overdoing it lately  She states that she rests and ices with appreciable benefit  She reports when her pain is present she turns or moves in a certain way and has a "sharp" pain in her groin  Denies numbness and tingling, fevers or chills  Physical Exam     /87   Pulse 87   Ht 5' 5" (1 651 m)   Wt 94 3 kg (208 lb)   BMI 34 61 kg/m²     Right hip:  Deformity: None  Tenderness to palpation: None  Range of motion:  Flexion: Normal  ER: Normal  IR: Normal  Strength:  Flexion: 5/5  Abduction: 5/5  FADDIR test: Negative  JOSE JUAN test: Positive  Passive supine rotation test: Positive  Straight leg raise against resistance: Negative    Eyes: Anicteric sclerae  Neck: Supple  Lungs: Unlabored breathing  Cardiovascular: Capillary refill is less than 2 seconds  Skin: Intact without erythema  Neurologic: Sensation intact to light touch  Psychiatric: Mood and affect are appropriate        Data Review     I have personally reviewed pertinent films in PACS, and my interpretation follows:    X Ray Right Hip performed on 11/20/18: Mild to moderate osteoarthritic changes  No other acute osseous abnormality      Past Medical History:   Diagnosis Date    Cystic breast     Dyspareunia, female     last assessed 9/4/14    Hepatic cyst 9/21/2015    Hyperlipidemia     Hypothyroidism     Insomnia     IRINA on CPAP     Osteoporosis     Severe mitral regurgitation     Varicose veins of lower extremity     last assessed 1/4/13       Past Surgical History:   Procedure Laterality Date    COLONOSCOPY      complete 5/2016 - Dr Juanito Stephenson due in 2019 - last assessed  3/17/17    HERNIA REPAIR      LIVER BIOPSY LAPAROSCOPIC N/A 5/7/2018    Procedure: Laparoscopic marsupialization of liver cyst;  Surgeon: Jenny Lundy MD;  [facility];  Service: Surgical Oncology    NEUROMA EXCISION      ND ECHO Im [address] N/A 1/7/2019    Procedure: TRANSESOPHAGEAL ECHOCARDIOGRAM (DORI); Surgeon: Maria G Park MD;  [facility];  Service: Cardiac Surgery    ND ESOPHAGOGASTRODUODENOSCOPY TRANSORAL DIAGNOSTIC N/A 8/10/2016    Procedure: ESOPHAGOGASTRODUODENOSCOPY (EGD); Surgeon: Steve Mulligan MD;  [facility]; Service: Gastroenterology    ND ESOPHAGOSCOPY FLEXIBLE TRANSORAL WITH BIOPSY N/A 11/3/2016    Procedure: ESOPHAGOGASTRODUODENOSCOPY (EGD);   Surgeon: Laurita Daly MD;  [facility];  Service: Thoracic    ND LAP, REPAIR PARAESOPHAGEAL HERNIA, INCL FUNDOPLASTY W/ MESH Left 11/3/2016    Procedure: LAPAROSCOPIC PARAESOPHAGEAL HERNIA REPAIR WITH MESH;NISSEN FUNDOPLICATION ;  Surgeon: Laurita Daly MD;  [facility];  Service: Thoracic    ND LAP, VENTRAL HERNIA REPAIR,REDUCIBLE N/A 10/30/2017    Procedure: LAPAROSCOPIC INCISIONAL HERNIA REPAIR X 2 WITH ECHO MESH;  Surgeon: Cherelle Hidalgo DO;  [facility];  Service: General    ND REPAIR INCISIONAL HERNIA,REDUCIBLE N/A 1/13/2017    Procedure: INCISIONAL HERNIA REPAIR ;  Surgeon: Ac Mcgee DO;  [facility];  Service: General    ME REPLACEMENT OF MITRAL VALVE N/A 1/7/2019    Procedure: REPLACEMENT VALVE MITRAL (MVR), repair with 30mm CG Future ring;  Surgeon: Genesis Villatoro MD;  [facility];  Service: Cardiac Surgery    TUBAL LIGATION         Allergies   Allergen Reactions    Other Other (See Comments)     Skin breakdown from bandaid on for long time- reddness       Current Outpatient Medications on File Prior to Visit   Medication Sig Dispense Refill    amLODIPine (NORVASC) 5 mg tablet Take 1 tablet (5 mg total) by mouth daily 90 tablet 3    aspirin 81 MG tablet Take 1 tablet (81 mg total) by mouth daily 30 tablet 11    atorvastatin (LIPITOR) 10 mg tablet Take 1 tablet (10 mg total) by mouth daily 30 tablet 2    Cholecalciferol (VITAMIN D3) 2000 UNITS TABS Take 1 tablet by mouth daily   estradiol (ESTRACE VAGINAL) 0 1 mg/g vaginal cream Insert into the vagina      hydrochlorothiazide (MICROZIDE) 12 5 mg capsule TAKE 1 TO 2 CAPSULES DAILY WITH AMLODIPINE 60 capsule 3    levothyroxine 125 mcg tablet Takes 2 tabs once a week and 1 tab daily for the rest of the week 35 tablet 3    Omega-3 Fatty Acids (FISH OIL) 1200 MG CAPS Take 1 capsule by mouth daily       No current facility-administered medications on file prior to visit  Social History     Tobacco Use    Smoking status: Never Smoker    Smokeless tobacco: Never Used   Substance Use Topics    Alcohol use: Yes     Comment: social    Drug use: No       Family History   Problem Relation Age of Onset    Heart disease Mother     Aneurysm Mother         cerebral    Alcohol abuse Father     Cancer Father     COPD Father     Heart failure Father     Hypertension Father     Cancer Family     Breast cancer Paternal Grandmother 80       Review of Systems     As stated in the HPI   All other systems were reviewed and are negative        Scribe Attestation    I,:   Moise Rios am acting as a scribe while in the presence of the attending physician :        I,:   Ruthie Bumpers, MD personally performed the services described in this documentation    as scribed in my presence :

## 2019-11-25 NOTE — H&P (VIEW-ONLY)
Consultation - Cardiothoracic Surgery   Jenni Roberts 76 y o  female MRN: [id number]    Physician Requesting Consult: Dr Igor Moore    Reason for Consult / Principal Problem: Mitral regurgitation    History of Present Illness: Jenni Roberts is a 76y o  year old female referred to us by Dr Igor Moore for the evaluation of severe MR  The patient reports she first had a murmur detected about 2 years ago  She had a recent routine echo performed, which revealed severe MR, for which she has been referred for evaluation  The patient reports she is asymptomatic  She denies shortness of breath, chest pain, fatigue, LE edema, palpitations, PND, orthopnea, or syncope  She is a lifelong non-smoker and occasionally drinks alcohol  She is active and lives with her family  She obtains routine dental care  Past Medical History:  Past Medical History:   Diagnosis Date    Cystic breast     Dyspareunia, female     last assessed 9/4/14    Dysphagia     Hyperlipidemia     Insomnia     Osteoporosis     Thyroid disease     Varicose veins of lower extremity     last assessed 1/4/13         Past Surgical History:   Past Surgical History:   Procedure Laterality Date    COLONOSCOPY      complete 5/2016 - Dr Savana Solano due in 2019 - last assessed  3/17/17    HERNIA REPAIR      LIVER BIOPSY LAPAROSCOPIC N/A 5/7/2018    Procedure: Laparoscopic marsupialization of liver cyst;  Surgeon: Nya Moore MD;  [facility];  Service: Surgical Oncology    NEUROMA EXCISION      MI ESOPHAGOGASTRODUODENOSCOPY TRANSORAL DIAGNOSTIC N/A 8/10/2016    Procedure: ESOPHAGOGASTRODUODENOSCOPY (EGD); Surgeon: Noah Pickens MD;  [facility]; Service: Gastroenterology    MI ESOPHAGOSCOPY FLEXIBLE TRANSORAL WITH BIOPSY N/A 11/3/2016    Procedure: ESOPHAGOGASTRODUODENOSCOPY (EGD);   Surgeon: Dolores Newell MD;  [facility];  Service: Thoracic    MI LAP, REPAIR PARAESOPHAGEAL HERNIA, INCL FUNDOPLASTY W/ MESH Left 11/3/2016 97.7 Procedure: LAPAROSCOPIC PARAESOPHAGEAL HERNIA REPAIR WITH MESH;NISSEN FUNDOPLICATION ;  Surgeon: Alisia Gooden MD;  [facility];  Service: Thoracic    [place] LAP, VENTRAL HERNIA REPAIR,REDUCIBLE N/A 10/30/2017    Procedure: LAPAROSCOPIC INCISIONAL HERNIA REPAIR X 2 WITH ECHO MESH;  Surgeon: Amanda Marin DO;  [facility];  Service: General    [address] [place] [address] N/A 1/13/2017    Procedure: INCISIONAL HERNIA REPAIR ;  Surgeon: Amanda Marin DO;  [facility];  Service: General    TUBAL LIGATION           Family History:  Family History   Problem Relation Age of Onset    Heart disease Mother     Aneurysm Mother         cerebral    Alcohol abuse Father     Cancer Father     COPD Father     Heart failure Father     Hypertension Father     Cancer Family     Breast cancer Paternal Grandmother 80         Social History:      History   Alcohol Use    Yes     Comment: social     History   Drug Use No     History   Smoking Status    Never Smoker   Smokeless Tobacco    Never Used       Home Medications:   Prior to Admission medications    Medication Sig Start Date End Date Taking? Authorizing Provider   amLODIPine (NORVASC) 5 mg tablet TAKE 1 TABLET DAILY 4/20/18  Yes Katya Hyatt MD   atorvastatin (LIPITOR) 10 mg tablet TAKE 1 TABLET DAILY  Patient taking differently: TAKE 1 TABlet Mon Wed Fri 4/21/18  Yes Katya Hyatt MD   B Complex-C (SUPER B COMPLEX PO) Take 1 capsule by mouth  Yes Historical Provider, MD   Calcium Carb-Cholecalciferol 600-800 MG-UNIT TABS Take by mouth   Yes Historical Provider, MD   Cholecalciferol (VITAMIN D3) 2000 UNITS TABS Take 1 tablet by mouth daily  Yes Historical Provider, MD   co-enzyme Q-10 30 MG capsule Take 30 mg by mouth 3 (three) times a day     Yes Historical Provider, MD   estradiol (ESTRACE VAGINAL) 0 1 mg/g vaginal cream Insert into the vagina 9/15/16  Yes Historical Provider, MD   hydrochlorothiazide (HYDRODIURIL) 12 5 mg tablet Take 12 5 mg by mouth daily  Yes Historical Provider, MD   levothyroxine 125 mcg tablet Take 1 tablet (125 mcg total) by mouth daily 7/28/18  Yes Melva Schaumann, MD   Multiple Vitamins-Minerals (CENTRUM SILVER PO) Take 1 tablet by mouth daily  Yes Historical Provider, MD   Omega-3 Fatty Acids (FISH OIL PO) Take by mouth   Yes Historical Provider, MD   VITAMIN E PO Take by mouth   Yes Historical Provider, MD   mupirocin (BACTROBAN) 2 % ointment Apply inside both nostrils two times per day  Start 5 days prior to surgery  12/13/18   Anthony Camarillo PA-C       Allergies: Allergies   Allergen Reactions    Other Other (See Comments)     Skin breakdown from bandaid on for long time- reddness       Review of Systems:  Review of Systems   Constitutional: Negative for activity change, appetite change, chills, diaphoresis, fatigue, fever and unexpected weight change  HENT: Negative for dental problem, nosebleeds and sore throat  Eyes: Negative  Respiratory: Negative for cough, chest tightness, shortness of breath and wheezing  Cardiovascular: Negative for chest pain, palpitations and leg swelling  Gastrointestinal: Negative for abdominal pain, blood in stool, constipation, diarrhea, nausea and vomiting  Endocrine: Negative  Genitourinary: Negative  Musculoskeletal: Negative  Allergic/Immunologic: Negative  Neurological: Negative for dizziness, seizures, syncope, weakness, light-headedness and numbness  Hematological: Negative  Psychiatric/Behavioral: Negative  All other systems reviewed and are negative        Vital Signs:     Vitals:    12/13/18 1041   BP: 140/86   BP Location: Left arm   Patient Position: Sitting   Cuff Size: Standard   Pulse: 72   Resp: 20   Temp: 98 7 °F (37 1 °C)   TempSrc: Tympanic   Weight: 91 1 kg (200 lb 14 4 oz)   Height: 5' 5 5" (1 664 m)       Physical Exam:  Physical Exam   Constitutional: She is oriented to person, place, and time  She appears well-developed and well-nourished  No distress  HENT:   Head: Normocephalic and atraumatic  Right Ear: External ear normal    Left Ear: External ear normal    Nose: Nose normal    Mouth/Throat: Oropharynx is clear and moist  No oropharyngeal exudate  Eyes: Pupils are equal, round, and reactive to light  Conjunctivae and EOM are normal  Right eye exhibits no discharge  No scleral icterus  Neck: Normal range of motion  Neck supple  No JVD present  No tracheal deviation present  Cardiovascular: Normal rate, regular rhythm and intact distal pulses  Exam reveals no gallop and no friction rub  Murmur heard  Systolic murmur is present with a grade of 3/6   Pulmonary/Chest: Effort normal and breath sounds normal  No respiratory distress  She has no wheezes  She has no rales  Abdominal: Soft  Bowel sounds are normal  She exhibits no distension  There is no tenderness  There is no rebound and no guarding  Musculoskeletal: Normal range of motion  She exhibits no edema, tenderness or deformity  Neurological: She is alert and oriented to person, place, and time  She has normal reflexes  She displays normal reflexes  No cranial nerve deficit  She exhibits normal muscle tone  Coordination normal    Skin: Skin is warm and dry  No rash noted  She is not diaphoretic  No erythema  No pallor  Psychiatric: She has a normal mood and affect  Her behavior is normal  Judgment and thought content normal    Nursing note and vitals reviewed  Lab Results:               Invalid input(s): LABGLOM      No results found for: HGBA1C  No results found for: CKTOTAL, CKMB, CKMBINDEX, TROPONINI    Imaging Studies:     DORI 12/3/18: LEFT VENTRICLE: Size was normal   The end systolic dimension was 30 mm  Systolic function was normal  Ejection fraction was estimated to be 60 %  There were no regional wall motion abnormalities   Wall thickness was normal      RIGHT VENTRICLE: The size was normal  Systolic function was normal      LEFT ATRIUM: The atrium was mildly dilated  No thrombus was identified  APPENDAGE: The size was normal  No thrombus was identified  DOPPLER: The function was normal (normal emptying velocity)      ATRIAL SEPTUM: No defect or patent foramen ovale was identified by color Doppler      RIGHT ATRIUM: Size was normal  No thrombus was identified      MITRAL VALVE: There was normal leaflet separation  There was a marked, holosystolic prolapse involving the medial scallop of the posterior leaflet  The maximum prolapse dimension was 8 mm  There was no echocardiographic evidence of  vegetation  DOPPLER: The transmitral velocity was within the normal range  There was no evidence for stenosis  There was severe regurgitation  PISA could not be done due to the eccentric nature of the regurgitation jet  The regurgitant jet was eccentric and directed anteriorly      AORTIC VALVE: The valve was trileaflet  Leaflets exhibited normal thickness and normal cuspal separation  DOPPLER: There was no regurgitation      TRICUSPID VALVE: The valve structure was normal  There was normal leaflet separation  There was no echocardiographic evidence of vegetation  DOPPLER: There was mild to moderate regurgitation  The regurgitant jet was toward the septum  Pulmonary artery systolic pressure was within the normal range  Estimated peak PA pressure was 26 mmHg      PULMONIC VALVE: Leaflets exhibited normal thickness, no calcification, and normal cuspal separation  DOPPLER: There was no significant regurgitation      PERICARDIUM: There was no pericardial effusion  The pericardium was normal in appearance      AORTA: The root exhibited normal size  There was no atheroma  There was no evidence for dissection  There was no evidence for aneurysm      PULMONARY VEINS: DOPPLER: There was systolic blunting in the pulmonary vein(s), indicative of significant mitral regurgitation   There was no flow reversal seen and this could be due to low blood pressure of the patient during the study      MEASUREMENT TABLES     2D MEASUREMENTS  LVOT   (Reference normals)  Diam   21 mm   (--)  Mitral valve   (Reference normals)  Mean annulus diam   33 mm   (--)     DOPPLER MEASUREMENTS  LVOT   (Reference normals)  VTI   20 cm   (--)  Stroke vol   69 27 ml   (--)  Stroke index   0 35 ml/m squared   (--)  Mitral valve   (Reference normals)  VTI at annulus   25 cm   (--)  Vol, (flow)   213 82 ml   (--)  Regurg vol, LVOT cont   145 ml   (--)  RF, LVOT cont   68 %   (--)  Area, ERO, LVOT cont   5 8 cm squared   (--)    I have personally reviewed pertinent films in PACS    Assessment:  Patient Active Problem List    Diagnosis Date Noted    Non-rheumatic mitral regurgitation 11/21/2018    Mitral valve prolapse 11/21/2018    Incisional hernia, without obstruction or gangrene 09/17/2017    Tinea corporis 08/25/2016    Dysphagia 01/18/2016    Osteoporosis 01/18/2016    Hepatic cyst 09/21/2015    Severe obstructive sleep apnea 08/24/2015    Shoulder impingement 09/29/2014    Liver enlargement 04/08/2014    Disc degeneration, lumbar 07/17/2013    Lumbar radiculopathy 01/24/2013    Hyperlipidemia 10/05/2012    Hypothyroidism 08/07/2012    Benign essential hypertension 06/05/2012     Severe mitral regurgitation; Ongoing MVR workup    Plan:  Risks and benefits of mitral valve repair vs replacement were discussed in detail today with the patient  They understand and wish to proceed with further workup and ultimately surgical intervention  We have ordered routine preoperative laboratory and vascular studies  Pending the results of these tests, they will be scheduled for surgery with LINDA Morgan was comfortable with our recommendations, and their questions were answered to their satisfaction  Thank you for allowing us to participate in the care of this patient       SIGNATURE: Rashid Archibald  DATE: December 13, 2018  TIME: 11:23 AM

## 2019-12-11 ENCOUNTER — HOSPITAL ENCOUNTER (OUTPATIENT)
Dept: RADIOLOGY | Age: 69
Discharge: HOME | End: 2019-12-11
Payer: MEDICARE

## 2019-12-11 VITALS — HEIGHT: 65 IN | BODY MASS INDEX: 34.99 KG/M2 | WEIGHT: 210 LBS

## 2019-12-11 DIAGNOSIS — Z12.39 SCREENING FOR BREAST CANCER: ICD-10-CM

## 2019-12-11 PROCEDURE — 77067 SCR MAMMO BI INCL CAD: CPT

## 2020-01-08 ENCOUNTER — TELEPHONE (OUTPATIENT)
Dept: FAMILY MEDICINE CLINIC | Facility: CLINIC | Age: 70
End: 2020-01-08

## 2020-01-08 DIAGNOSIS — E03.9 HYPOTHYROIDISM, UNSPECIFIED TYPE: ICD-10-CM

## 2020-01-08 RX ORDER — LEVOTHYROXINE SODIUM 137 UG/1
TABLET ORAL
Qty: 30 TABLET | Refills: 3 | Status: SHIPPED | OUTPATIENT
Start: 2020-01-08 | End: 2020-04-21

## 2020-01-08 NOTE — TELEPHONE ENCOUNTER
Please contact patient  All her blood work was stable overall but thyroid function is under active  We need to increase dose of levothyroxine from 125 mcgs daily to 137 mcg is once a day, I did send new prescription to the pharmacy  Will discuss further at her follow-up office visit next week    Thank you

## 2020-01-09 LAB
25(OH)D3 SERPL-MCNC: 35 NG/ML (ref 30–100)
ALBUMIN SERPL-MCNC: 3.9 G/DL (ref 3.6–5.1)
ALBUMIN/GLOB SERPL: 1.5 (CALC) (ref 1–2.5)
ALP SERPL-CCNC: 75 U/L (ref 33–130)
ALT SERPL-CCNC: 17 U/L (ref 6–29)
AST SERPL-CCNC: 19 U/L (ref 10–35)
BASOPHILS # BLD AUTO: 59 CELLS/UL (ref 0–200)
BASOPHILS NFR BLD AUTO: 1.1 %
BILIRUB SERPL-MCNC: 0.6 MG/DL (ref 0.2–1.2)
BUN SERPL-MCNC: 16 MG/DL (ref 7–25)
BUN/CREAT SERPL: ABNORMAL (CALC) (ref 6–22)
CALCIUM SERPL-MCNC: 9.1 MG/DL (ref 8.6–10.4)
CHLORIDE SERPL-SCNC: 105 MMOL/L (ref 98–110)
CHOLEST SERPL-MCNC: 157 MG/DL
CHOLEST/HDLC SERPL: 2.7 (CALC)
CO2 SERPL-SCNC: 31 MMOL/L (ref 20–32)
CREAT SERPL-MCNC: 0.99 MG/DL (ref 0.5–0.99)
EOSINOPHIL # BLD AUTO: 292 CELLS/UL (ref 15–500)
EOSINOPHIL NFR BLD AUTO: 5.4 %
ERYTHROCYTE [DISTWIDTH] IN BLOOD BY AUTOMATED COUNT: 12.8 % (ref 11–15)
GLOBULIN SER CALC-MCNC: 2.6 G/DL (CALC) (ref 1.9–3.7)
GLUCOSE SERPL-MCNC: 98 MG/DL (ref 65–99)
HCT VFR BLD AUTO: 36.4 % (ref 35–45)
HDLC SERPL-MCNC: 58 MG/DL
HGB BLD-MCNC: 11.9 G/DL (ref 11.7–15.5)
LDLC SERPL CALC-MCNC: 76 MG/DL (CALC)
LYMPHOCYTES # BLD AUTO: 1777 CELLS/UL (ref 850–3900)
LYMPHOCYTES NFR BLD AUTO: 32.9 %
MCH RBC QN AUTO: 30.2 PG (ref 27–33)
MCHC RBC AUTO-ENTMCNC: 32.7 G/DL (ref 32–36)
MCV RBC AUTO: 92.4 FL (ref 80–100)
MONOCYTES # BLD AUTO: 502 CELLS/UL (ref 200–950)
MONOCYTES NFR BLD AUTO: 9.3 %
NEUTROPHILS # BLD AUTO: 2770 CELLS/UL (ref 1500–7800)
NEUTROPHILS NFR BLD AUTO: 51.3 %
NONHDLC SERPL-MCNC: 99 MG/DL (CALC)
PLATELET # BLD AUTO: 181 THOUSAND/UL (ref 140–400)
PMV BLD REES-ECKER: 9.9 FL (ref 7.5–12.5)
POTASSIUM SERPL-SCNC: 3.8 MMOL/L (ref 3.5–5.3)
PROT SERPL-MCNC: 6.5 G/DL (ref 6.1–8.1)
RBC # BLD AUTO: 3.94 MILLION/UL (ref 3.8–5.1)
SL AMB EGFR AFRICAN AMERICAN: 67 ML/MIN/1.73M2
SL AMB EGFR NON AFRICAN AMERICAN: 58 ML/MIN/1.73M2
SODIUM SERPL-SCNC: 140 MMOL/L (ref 135–146)
TRIGL SERPL-MCNC: 144 MG/DL
TSH SERPL-ACNC: 8.59 MIU/L (ref 0.4–4.5)
WBC # BLD AUTO: 5.4 THOUSAND/UL (ref 3.8–10.8)

## 2020-01-14 ENCOUNTER — OFFICE VISIT (OUTPATIENT)
Dept: FAMILY MEDICINE CLINIC | Facility: CLINIC | Age: 70
End: 2020-01-14
Payer: MEDICARE

## 2020-01-14 VITALS
DIASTOLIC BLOOD PRESSURE: 82 MMHG | TEMPERATURE: 97.2 F | WEIGHT: 216.2 LBS | SYSTOLIC BLOOD PRESSURE: 124 MMHG | HEIGHT: 65 IN | OXYGEN SATURATION: 98 % | BODY MASS INDEX: 36.02 KG/M2 | RESPIRATION RATE: 18 BRPM | HEART RATE: 87 BPM

## 2020-01-14 DIAGNOSIS — Z00.00 HEALTHCARE MAINTENANCE: ICD-10-CM

## 2020-01-14 DIAGNOSIS — Z98.890 S/P MVR (MITRAL VALVE REPAIR): ICD-10-CM

## 2020-01-14 DIAGNOSIS — G47.33 SEVERE OBSTRUCTIVE SLEEP APNEA: ICD-10-CM

## 2020-01-14 DIAGNOSIS — M85.89 OSTEOPENIA OF MULTIPLE SITES: ICD-10-CM

## 2020-01-14 DIAGNOSIS — E78.5 HYPERLIPIDEMIA, UNSPECIFIED HYPERLIPIDEMIA TYPE: ICD-10-CM

## 2020-01-14 DIAGNOSIS — E03.9 HYPOTHYROIDISM, UNSPECIFIED TYPE: Primary | ICD-10-CM

## 2020-01-14 DIAGNOSIS — I10 BENIGN ESSENTIAL HYPERTENSION: ICD-10-CM

## 2020-01-14 PROCEDURE — 99214 OFFICE O/P EST MOD 30 MIN: CPT | Performed by: FAMILY MEDICINE

## 2020-01-14 NOTE — PROGRESS NOTES
FAMILY PRACTICE OFFICE VISIT       NAME: Anthony Haro  AGE: 71 y o  SEX: female       : 1950        MRN: [id number]        Assessment and Plan     Problem List Items Addressed This Visit        Endocrine    Hypothyroidism - Primary     · Recent TSH is elevated  · Patient has been experiencing symptoms of fatigue and cold intolerance  · Dose of levothyroxine was increased from 125-137 mcg daily  · Will repeat TSH in Q 2 months  for further monitoring  · Patient will be due for complete blood work panel in July         Relevant Orders    TSH, 3rd generation    TSH, 3rd generation    TSH, 3rd generation       Respiratory    Severe obstructive sleep apnea     · CPAP , Dr Chang            Cardiovascular and Mediastinum    Benign essential hypertension     · Blood pressure is well controlled, continue amlodipine and HCTZ         Relevant Orders    CBC    Comprehensive metabolic panel       Musculoskeletal and Integument    Osteopenia of multiple sites    Relevant Orders    DXA bone density spine hip and pelvis       Other    Hyperlipidemia     · Continue atorvastatin 10 mg 3 days per week  · Monitor lipid panel and LFTs         Relevant Orders    Lipid Panel with Direct LDL reflex    S/P MVR (mitral valve repair)     · [facility] follows, Dr Rahman  · Most recent echocardiogram 2019  · Dose of aspirin was reduced to 81 mg daily  · Patient denies symptoms of chest pain, dyspnea, dizziness or palpitations           Other Visit Diagnoses     Healthcare maintenance        Relevant Orders    Ambulatory referral to Obstetrics / Gynecology       Patient presents for follow-up of chronic medical conditions   Assessment and plan as outlined above  Dose of levothyroxine was increased based on results of recent blood work  Patient will repeat TSH in March and then in May  She will be proceeding with complete blood work panel in July    Patient prefers to hold off further treatment of chronic low back/right hip pain and will continue with p r n  Anti-inflammatories  She has been compliant with CPAP for treatment of known obstructive sleep apnea  I advised patient to schedule gyn exam and bone density scan  Patient is up-to-date with pneumococcal vaccinations and flu vaccine  Will schedule routine checkup in September and earlier if needed  There are no Patient Instructions on file for this visit  Discussed with the patient and all questioned fully answered  She will call me if any problems arise  M*Modal software was used to dictate this note  It may contain errors with dictating incorrect words/spelling  Please contact provider directly with any questions  Chief Complaint     Chief Complaint   Patient presents with    Follow-up       History of Present Illness     Patient presents for follow-up of chronic medical conditions  She is accompanied by her   Patient has been experiencing symptoms of fatigue and 8 lb weight gain  She is complaining of feeling cold all the time  Recent blood work results reviewed  TSH is elevated  Dose of levothyroxine was increased from 125-137 a week ago  LDL is excellent, 74, patient remains on low-dose of Lipitor 10 mg 3 days per week  Patient is up-to-date with colonoscopy and mammogram        She remains on amlodipine and HCTZ for hypertension  Patient is past due DEXA scan and gyn exam   Patient denies symptoms of chest pain, palpitations, shortness of breath or dizziness  Patient does not have pending follow-up with [facility] at present time  Last echocardiogram was performed in March of 2019  Echo cardiogram revealed normal ejection fraction of 65% and mild mitral stenosis, status postsurgical repair with mitral annuloplasty ring  Patient is compliant with CPAP for treatment of known obstructive sleep apnea    Chronic right hip pain  Patient uses anti-inflammatories as needed    She was recently evaluated by [facility] team, Dr Edvin Marie, who has recommended intra-articular injection if symptoms persist         Review of Systems   Review of Systems   Constitutional: Positive for fatigue and unexpected weight change  HENT: Negative  Eyes: Negative  Respiratory: Negative  Cardiovascular: Negative  Gastrointestinal: Negative  Endocrine: Positive for cold intolerance  Genitourinary: Negative  Musculoskeletal: Positive for arthralgias (Right hip pain) and back pain  Skin: Negative  Allergic/Immunologic: Negative  Neurological: Negative  Hematological: Negative  Psychiatric/Behavioral: Negative  Active Problem List     Patient Active Problem List   Diagnosis    Benign essential hypertension    Disc degeneration, lumbar    Hyperlipidemia    Hypothyroidism    Liver enlargement    Osteopenia of multiple sites    Severe obstructive sleep apnea    Shoulder impingement    Tinea corporis    Lumbar radiculopathy    S/P MVR (mitral valve repair)       Past Medical History:  Past Medical History:   Diagnosis Date    Cystic breast     Dyspareunia, female     last assessed 9/4/14    Hepatic cyst 9/21/2015    Hyperlipidemia     Hypothyroidism     Insomnia     IRINA on CPAP     Osteoporosis     Severe mitral regurgitation     Thrombocytopenia ([address] ) 1/8/2019    Varicose veins of lower extremity     last assessed 1/4/13       Past Surgical History:  Past Surgical History:   Procedure Laterality Date    COLONOSCOPY      complete 5/2016 - Dr Yvette Borjas due in 2019 - last assessed  3/17/17    HERNIA REPAIR      LIVER BIOPSY LAPAROSCOPIC N/A 5/7/2018    Procedure: Laparoscopic marsupialization of liver cyst;  Surgeon: Dilia Stanton MD;  [facility];  Service: Surgical Oncology    NEUROMA EXCISION      OH ECHO [address] N/A 1/7/2019    Procedure: TRANSESOPHAGEAL ECHOCARDIOGRAM (DORI);   Surgeon: Wild Bedolla MD;  [facility]; Service: Cardiac Surgery    GA ESOPHAGOGASTRODUODENOSCOPY TRANSORAL DIAGNOSTIC N/A 8/10/2016    Procedure: ESOPHAGOGASTRODUODENOSCOPY (EGD); Surgeon: Deonna Moore MD;  Location[facility]; Service: Gastroenterology    GA ESOPHAGOSCOPY FLEXIBLE TRANSORAL WITH BIOPSY N/A 11/3/2016    Procedure: ESOPHAGOGASTRODUODENOSCOPY (EGD);   Surgeon: Renny Morales MD;  [facility];  Service: Thoracic    GA LAP, REPAIR PARAESOPHAGEAL HERNIA, INCL FUNDOPLASTY W/ MESH Left 11/3/2016    Procedure: LAPAROSCOPIC PARAESOPHAGEAL HERNIA REPAIR WITH MESH;NISSEN FUNDOPLICATION ;  Surgeon: Renny Morales MD;  [facility];  Service: Thoracic    GA LAP, VENTRAL HERNIA REPAIR,REDUCIBLE N/A 10/30/2017    Procedure: LAPAROSCOPIC INCISIONAL HERNIA REPAIR X 2 WITH ECHO MESH;  Surgeon: Peggy Moser DO;  [facility];  Service: General    [address] [place] [address] N/A 1/13/2017    Procedure: INCISIONAL HERNIA REPAIR ;  Surgeon: Peggy Moser DO;  [facility];  Service: General    GA REPLACEMENT OF MITRAL VALVE N/A 1/7/2019    Procedure: REPLACEMENT VALVE MITRAL (MVR), repair with 30mm CG Future ring;  Surgeon: Stiven Taylor MD;  [facility];  Service: Cardiac Surgery    TUBAL LIGATION         Family History:  Family History   Problem Relation Age of Onset    Heart disease Mother     Aneurysm Mother         cerebral    Alcohol abuse Father     Cancer Father     COPD Father     Heart failure Father     Hypertension Father     Cancer Family     Breast cancer Paternal Grandmother 80    No Known Problems Sister     No Known Problems Daughter     No Known Problems Maternal Grandmother     No Known Problems Maternal Grandfather     No Known Problems Paternal Grandfather        Social History:  Social History     Socioeconomic History    Marital status: /Civil Union     Spouse name: Not on file    Number of children: Not on file    Years of education: Not on file    Highest education level: Not on file   Occupational History    Not on file   Social Needs    Financial resource strain: Not on file    Food insecurity:     Worry: Not on file     Inability: Not on file    Transportation needs:     Medical: Not on file     Non-medical: Not on file   Tobacco Use    Smoking status: Never Smoker    Smokeless tobacco: Never Used   Substance and Sexual Activity    Alcohol use: Yes     Comment: social    Drug use: No    Sexual activity: Yes     Partners: Male     Birth control/protection: None   Lifestyle    Physical activity:     Days per week: Not on file     Minutes per session: Not on file    Stress: Not on file   Relationships    Social connections:     Talks on phone: Not on file     Gets together: Not on file     Attends [employer] service: Not on file     Active member of club or organization: Not on file     Attends meetings of clubs or organizations: Not on file     Relationship status: Not on file    Intimate partner violence:     Fear of current or ex partner: Not on file     Emotionally abused: Not on file     Physically abused: Not on file     Forced sexual activity: Not on file   Other Topics Concern    Not on file   Social History Narrative    Coffee    Exercise - walking           Objective     Vitals:    01/14/20 0952   BP: 124/82   BP Location: Left arm   Patient Position: Sitting   Cuff Size: Adult   Pulse: 87   Resp: 18   Temp: (!) 97 2 °F (36 2 °C)   TempSrc: Tympanic   SpO2: 98%   Weight: 98 1 kg (216 lb 3 2 oz)   Height: 5' 5" (1 651 m)     Wt Readings from Last 3 Encounters:   01/14/20 98 1 kg (216 lb 3 2 oz)   12/11/19 95 3 kg (210 lb)   11/12/19 94 3 kg (208 lb)       Physical Exam   Constitutional: She is oriented to person, place, and time  She appears well-developed and well-nourished  HENT:   Head: Normocephalic and atraumatic  Eyes: Conjunctivae are normal    Neck: Neck supple  No JVD present  Carotid bruit is not present   No thyromegaly present  Cardiovascular: Normal rate, regular rhythm and normal heart sounds  No murmur heard  Pulmonary/Chest: Effort normal and breath sounds normal  No respiratory distress  She has no wheezes  Abdominal: She exhibits no abdominal bruit  Musculoskeletal: Normal range of motion  She exhibits no edema  Neurological: She is alert and oriented to person, place, and time  No cranial nerve deficit  Coordination normal    Psychiatric: She has a normal mood and affect  Her behavior is normal    Nursing note and vitals reviewed        Pertinent Laboratory/Diagnostic Studies:  Lab Results   Component Value Date    GLUCOSE 146 (H) 01/07/2019    BUN 16 01/07/2020    CREATININE 0 99 01/07/2020    CALCIUM 9 1 01/07/2020     09/19/2017    K 3 8 01/07/2020    CO2 31 01/07/2020     01/07/2020     Lab Results   Component Value Date    ALT 17 01/07/2020    AST 19 01/07/2020    ALKPHOS 75 01/07/2020    BILITOT 0 6 09/19/2017       Lab Results   Component Value Date    WBC 5 4 01/07/2020    HGB 11 9 01/07/2020    HCT 36 4 01/07/2020    MCV 92 4 01/07/2020     01/07/2020       Lab Results   Component Value Date    TSH 8 59 (H) 01/07/2020       Lab Results   Component Value Date    CHOL 159 09/19/2017     Lab Results   Component Value Date    TRIG 144 01/07/2020     Lab Results   Component Value Date    HDL 58 01/07/2020     Lab Results   Component Value Date    LDLCALC 80 12/31/2018     Lab Results   Component Value Date    HGBA1C 5 5 12/31/2018       Results for orders placed or performed in visit on 01/07/20   Lipid Panel with Direct LDL reflex   Result Value Ref Range    Total Cholesterol 157 <200 mg/dL    HDL 58 >50 mg/dL    Triglycerides 144 <150 mg/dL    LDL Direct 76 mg/dL (calc)    Chol HDLC Ratio 2 7 <5 0 (calc)    Non-HDL Cholesterol 99 <130 mg/dL (calc)   Comprehensive metabolic panel   Result Value Ref Range    Glucose, Random 98 65 - 99 mg/dL    BUN 16 7 - 25 mg/dL    Creatinine 0  99 0 50 - 0 99 mg/dL    eGFR Non  58 (L) > OR = 60 mL/min/1 73m2    eGFR  67 > OR = 60 mL/min/1 73m2    SL AMB BUN/CREATININE RATIO NOT APPLICABLE 6 - 22 (calc)    Sodium 140 135 - 146 mmol/L    Potassium 3 8 3 5 - 5 3 mmol/L    Chloride 105 98 - 110 mmol/L    CO2 31 20 - 32 mmol/L    SL AMB CALCIUM 9 1 8 6 - 10 4 mg/dL    Protein, Total 6 5 6 1 - 8 1 g/dL    Albumin 3 9 3 6 - 5 1 g/dL    Globulin 2 6 1 9 - 3 7 g/dL (calc)    Albumin/Globulin Ratio 1 5 1 0 - 2 5 (calc)    TOTAL BILIRUBIN 0 6 0 2 - 1 2 mg/dL    Alkaline Phosphatase 75 33 - 130 U/L    AST 19 10 - 35 U/L    ALT 17 6 - 29 U/L   CBC and differential   Result Value Ref Range    White Blood Cell Count 5 4 3 8 - 10 8 Thousand/uL    Red Blood Cell Count 3 94 3 80 - 5 10 Million/uL    Hemoglobin 11 9 11 7 - 15 5 g/dL    HCT 36 4 35 0 - 45 0 %    MCV 92 4 80 0 - 100 0 fL    MCH 30 2 27 0 - 33 0 pg    MCHC 32 7 32 0 - 36 0 g/dL    RDW 12 8 11 0 - 15 0 %    Platelet Count 810 875 - 400 Thousand/uL    SL AMB MPV 9 9 7 5 - 12 5 fL    Neutrophils (Absolute) 2,770 1,500 - 7,800 cells/uL    Lymphocytes (Absolute) 1,777 850 - 3,900 cells/uL    Monocytes (Absolute) 502 200 - 950 cells/uL    Eosinophils (Absolute) 292 15 - 500 cells/uL    Basophils ABS 59 0 - 200 cells/uL    Neutrophils 51 3 %    Lymphocytes 32 9 %    Monocytes 9 3 %    Eosinophils 5 4 %    Basophils PCT 1 1 %   TSH, 3rd generation   Result Value Ref Range    TSH 8 59 (H) 0 40 - 4 50 mIU/L   Vitamin D 25 hydroxy   Result Value Ref Range    Vitamin D, 25-Hydroxy, Serum 35 30 - 100 ng/mL       Orders Placed This Encounter   Procedures    DXA bone density spine hip and pelvis    TSH, 3rd generation    TSH, 3rd generation    CBC    Comprehensive metabolic panel    Lipid Panel with Direct LDL reflex    TSH, 3rd generation    Ambulatory referral to Obstetrics / Gynecology       ALLERGIES:  Allergies   Allergen Reactions    Other Other (See Comments)     Skin breakdown from bandaid on for long time- reddness       Current Medications     Current Outpatient Medications   Medication Sig Dispense Refill    amLODIPine (NORVASC) 5 mg tablet Take 1 tablet (5 mg total) by mouth daily 90 tablet 3    aspirin 81 MG tablet Take 1 tablet (81 mg total) by mouth daily 30 tablet 11    atorvastatin (LIPITOR) 10 mg tablet Take 1 tablet (10 mg total) by mouth daily 30 tablet 2    Cholecalciferol (VITAMIN D3) 2000 UNITS TABS Take 1 tablet by mouth daily   estradiol (ESTRACE VAGINAL) 0 1 mg/g vaginal cream Insert into the vagina      hydrochlorothiazide (MICROZIDE) 12 5 mg capsule TAKE 1 TO 2 CAPSULES DAILY WITH AMLODIPINE 60 capsule 3    levothyroxine 137 mcg tablet Take 1 tablet once a day 30 tablet 3    Omega-3 Fatty Acids (FISH OIL) 1200 MG CAPS Take 1 capsule by mouth daily       No current facility-administered medications for this visit  There are no discontinued medications      Health Maintenance     Health Maintenance   Topic Date Due    Hepatitis C Screening  1950    [facility] PLAN OF CARE  1950    Fall Risk  04/04/2020    Depression Screening PHQ  04/04/2020    Medicare Annual Wellness Visit (AWV)  04/04/2020    BMI: Followup Plan  04/07/2020    DTaP,Tdap,and Td Vaccines (2 - Td) 11/09/2020    BMI: Adult  01/14/2021    MAMMOGRAM  12/11/2021    CRC Screening: Colonoscopy  09/26/2022    Influenza Vaccine  Completed    Pneumococcal Vaccine: 65+ Years  Completed    Pneumococcal Vaccine: Pediatrics (0 to 5 Years) and At-Risk Patients (6 to 59 Years)  Aged Out    HIB Vaccine  Aged Out    Hepatitis B Vaccine  Aged Out    IPV Vaccine  Aged Out    Hepatitis A Vaccine  Aged Out    Meningococcal ACWY Vaccine  Aged Out    HPV Vaccine  Aged [employer] History   Administered Date(s) Administered    Influenza Quadrivalent Preservative Free 3 years and older IM 11/11/2016    Influenza Split High Dose Preservative Free IM 09/21/2015, 09/15/2017, 10/21/2019    Influenza TIV (IM) 11/01/2010, 01/24/2013, 09/22/2014    Influenza, high dose seasonal 0 5 mL 10/19/2018    Pneumococcal Conjugate 13-Valent 01/19/2016    Pneumococcal Polysaccharide PPV23 09/15/2017    Tdap 11/09/2010       Dominique Godinez MD

## 2020-01-18 PROBLEM — D69.6 THROMBOCYTOPENIA (HCC): Status: RESOLVED | Noted: 2019-01-08 | Resolved: 2020-01-18

## 2020-01-18 PROBLEM — H53.2 DOUBLE VISION: Status: RESOLVED | Noted: 2019-01-21 | Resolved: 2020-01-18

## 2020-01-19 NOTE — ASSESSMENT & PLAN NOTE
· Recent TSH is elevated  · Patient has been experiencing symptoms of fatigue and cold intolerance  · Dose of levothyroxine was increased from 125-137 mcg daily  · Will repeat TSH in Q 2 months  for further monitoring      · Patient will be due for complete blood work panel in July

## 2020-01-19 NOTE — ASSESSMENT & PLAN NOTE
· [facility] follows, Dr Rahman  · Most recent echocardiogram March 2019  · Dose of aspirin was reduced to 81 mg daily    · Patient denies symptoms of chest pain, dyspnea, dizziness or palpitations

## 2020-03-04 DIAGNOSIS — I10 BENIGN ESSENTIAL HYPERTENSION: ICD-10-CM

## 2020-03-04 DIAGNOSIS — E78.5 HYPERLIPIDEMIA, UNSPECIFIED HYPERLIPIDEMIA TYPE: ICD-10-CM

## 2020-03-04 RX ORDER — HYDROCHLOROTHIAZIDE 12.5 MG/1
CAPSULE, GELATIN COATED ORAL
Qty: 60 CAPSULE | Refills: 5 | Status: SHIPPED | OUTPATIENT
Start: 2020-03-04 | End: 2021-02-26

## 2020-03-04 RX ORDER — ATORVASTATIN CALCIUM 10 MG/1
10 TABLET, FILM COATED ORAL DAILY
Qty: 30 TABLET | Refills: 4 | Status: SHIPPED | OUTPATIENT
Start: 2020-03-04 | End: 2021-03-02

## 2020-03-07 LAB — TSH SERPL-ACNC: 3.05 MIU/L (ref 0.4–4.5)

## 2020-03-09 ENCOUNTER — TELEPHONE (OUTPATIENT)
Dept: FAMILY MEDICINE CLINIC | Facility: CLINIC | Age: 70
End: 2020-03-09

## 2020-04-21 DIAGNOSIS — E03.9 HYPOTHYROIDISM, UNSPECIFIED TYPE: ICD-10-CM

## 2020-04-21 RX ORDER — LEVOTHYROXINE SODIUM 137 UG/1
TABLET ORAL
Qty: 30 TABLET | Refills: 3 | Status: SHIPPED | OUTPATIENT
Start: 2020-04-21 | End: 2020-05-13

## 2020-05-12 LAB — TSH SERPL-ACNC: 4.83 MIU/L (ref 0.4–4.5)

## 2020-05-13 ENCOUNTER — TELEPHONE (OUTPATIENT)
Dept: FAMILY MEDICINE CLINIC | Facility: CLINIC | Age: 70
End: 2020-05-13

## 2020-05-13 DIAGNOSIS — E03.9 HYPOTHYROIDISM, UNSPECIFIED TYPE: ICD-10-CM

## 2020-05-13 RX ORDER — LEVOTHYROXINE SODIUM 0.15 MG/1
TABLET ORAL
Qty: 30 TABLET | Refills: 3 | Status: SHIPPED | OUTPATIENT
Start: 2020-05-13 | End: 2020-08-12

## 2020-05-27 ENCOUNTER — TELEMEDICINE (OUTPATIENT)
Dept: CARDIOLOGY CLINIC | Facility: CLINIC | Age: 70
End: 2020-05-27
Payer: MEDICARE

## 2020-05-27 VITALS — BODY MASS INDEX: 35.61 KG/M2 | WEIGHT: 214 LBS

## 2020-05-27 DIAGNOSIS — E78.2 MIXED HYPERLIPIDEMIA: ICD-10-CM

## 2020-05-27 DIAGNOSIS — I10 BENIGN ESSENTIAL HYPERTENSION: ICD-10-CM

## 2020-05-27 DIAGNOSIS — Z98.890 S/P MVR (MITRAL VALVE REPAIR): Primary | ICD-10-CM

## 2020-05-27 DIAGNOSIS — G47.33 SEVERE OBSTRUCTIVE SLEEP APNEA: ICD-10-CM

## 2020-05-27 PROCEDURE — 99442 PR PHYS/QHP TELEPHONE EVALUATION 11-20 MIN: CPT | Performed by: INTERNAL MEDICINE

## 2020-07-07 LAB — TSH SERPL-ACNC: 0.98 MIU/L (ref 0.4–4.5)

## 2020-08-04 DIAGNOSIS — I10 BENIGN ESSENTIAL HYPERTENSION: ICD-10-CM

## 2020-08-06 RX ORDER — AMLODIPINE BESYLATE 5 MG/1
5 TABLET ORAL DAILY
Qty: 90 TABLET | Refills: 3 | Status: SHIPPED | OUTPATIENT
Start: 2020-08-06 | End: 2021-06-28

## 2020-08-11 DIAGNOSIS — E03.9 HYPOTHYROIDISM, UNSPECIFIED TYPE: ICD-10-CM

## 2020-08-12 RX ORDER — LEVOTHYROXINE SODIUM 0.15 MG/1
TABLET ORAL
Qty: 30 TABLET | Refills: 3 | Status: SHIPPED | OUTPATIENT
Start: 2020-08-12 | End: 2021-01-19

## 2020-09-02 LAB
ALBUMIN SERPL-MCNC: 3.8 G/DL (ref 3.6–5.1)
ALBUMIN/GLOB SERPL: 1.3 (CALC) (ref 1–2.5)
ALP SERPL-CCNC: 79 U/L (ref 37–153)
ALT SERPL-CCNC: 14 U/L (ref 6–29)
AST SERPL-CCNC: 16 U/L (ref 10–35)
BILIRUB SERPL-MCNC: 0.5 MG/DL (ref 0.2–1.2)
BUN SERPL-MCNC: 13 MG/DL (ref 7–25)
BUN/CREAT SERPL: 14 (CALC) (ref 6–22)
CALCIUM SERPL-MCNC: 9.6 MG/DL (ref 8.6–10.4)
CHLORIDE SERPL-SCNC: 105 MMOL/L (ref 98–110)
CHOLEST SERPL-MCNC: 173 MG/DL
CHOLEST/HDLC SERPL: 3 (CALC)
CO2 SERPL-SCNC: 29 MMOL/L (ref 20–32)
CREAT SERPL-MCNC: 0.96 MG/DL (ref 0.6–0.93)
ERYTHROCYTE [DISTWIDTH] IN BLOOD BY AUTOMATED COUNT: 12.5 % (ref 11–15)
GLOBULIN SER CALC-MCNC: 3 G/DL (CALC) (ref 1.9–3.7)
GLUCOSE SERPL-MCNC: 107 MG/DL (ref 65–99)
HCT VFR BLD AUTO: 39.4 % (ref 35–45)
HDLC SERPL-MCNC: 57 MG/DL
HGB BLD-MCNC: 12.7 G/DL (ref 11.7–15.5)
LDLC SERPL CALC-MCNC: 92 MG/DL (CALC)
MCH RBC QN AUTO: 29.3 PG (ref 27–33)
MCHC RBC AUTO-ENTMCNC: 32.2 G/DL (ref 32–36)
MCV RBC AUTO: 90.8 FL (ref 80–100)
NONHDLC SERPL-MCNC: 116 MG/DL (CALC)
PLATELET # BLD AUTO: 195 THOUSAND/UL (ref 140–400)
PMV BLD REES-ECKER: 10.2 FL (ref 7.5–12.5)
POTASSIUM SERPL-SCNC: 3.8 MMOL/L (ref 3.5–5.3)
PROT SERPL-MCNC: 6.8 G/DL (ref 6.1–8.1)
RBC # BLD AUTO: 4.34 MILLION/UL (ref 3.8–5.1)
SL AMB EGFR AFRICAN AMERICAN: 69 ML/MIN/1.73M2
SL AMB EGFR NON AFRICAN AMERICAN: 60 ML/MIN/1.73M2
SODIUM SERPL-SCNC: 140 MMOL/L (ref 135–146)
TRIGL SERPL-MCNC: 140 MG/DL
TSH SERPL-ACNC: 2.48 MIU/L (ref 0.4–4.5)
WBC # BLD AUTO: 5.3 THOUSAND/UL (ref 3.8–10.8)

## 2020-09-08 ENCOUNTER — OFFICE VISIT (OUTPATIENT)
Dept: FAMILY MEDICINE CLINIC | Facility: CLINIC | Age: 70
End: 2020-09-08
Payer: MEDICARE

## 2020-09-08 VITALS
HEIGHT: 65 IN | DIASTOLIC BLOOD PRESSURE: 82 MMHG | TEMPERATURE: 98.6 F | HEART RATE: 65 BPM | OXYGEN SATURATION: 98 % | WEIGHT: 218 LBS | SYSTOLIC BLOOD PRESSURE: 126 MMHG | BODY MASS INDEX: 36.32 KG/M2 | RESPIRATION RATE: 16 BRPM

## 2020-09-08 DIAGNOSIS — M85.89 OSTEOPENIA OF MULTIPLE SITES: ICD-10-CM

## 2020-09-08 DIAGNOSIS — Z98.890 S/P MVR (MITRAL VALVE REPAIR): ICD-10-CM

## 2020-09-08 DIAGNOSIS — Z23 FLU VACCINE NEED: ICD-10-CM

## 2020-09-08 DIAGNOSIS — Z12.31 ENCOUNTER FOR SCREENING MAMMOGRAM FOR BREAST CANCER: ICD-10-CM

## 2020-09-08 DIAGNOSIS — B35.3 TINEA PEDIS OF RIGHT FOOT: ICD-10-CM

## 2020-09-08 DIAGNOSIS — G47.33 SEVERE OBSTRUCTIVE SLEEP APNEA: ICD-10-CM

## 2020-09-08 DIAGNOSIS — M77.8 SHOULDER TENDONITIS, RIGHT: ICD-10-CM

## 2020-09-08 DIAGNOSIS — E78.2 MIXED HYPERLIPIDEMIA: ICD-10-CM

## 2020-09-08 DIAGNOSIS — E03.9 HYPOTHYROIDISM, UNSPECIFIED TYPE: ICD-10-CM

## 2020-09-08 DIAGNOSIS — I10 BENIGN ESSENTIAL HYPERTENSION: Primary | ICD-10-CM

## 2020-09-08 DIAGNOSIS — Z00.00 ENCOUNTER FOR MEDICARE ANNUAL WELLNESS EXAM: ICD-10-CM

## 2020-09-08 PROCEDURE — G0008 ADMIN INFLUENZA VIRUS VAC: HCPCS

## 2020-09-08 PROCEDURE — 90662 IIV NO PRSV INCREASED AG IM: CPT

## 2020-09-08 PROCEDURE — 99214 OFFICE O/P EST MOD 30 MIN: CPT | Performed by: FAMILY MEDICINE

## 2020-09-08 PROCEDURE — G0438 PPPS, INITIAL VISIT: HCPCS | Performed by: FAMILY MEDICINE

## 2020-09-08 RX ORDER — KETOCONAZOLE 20 MG/G
CREAM TOPICAL DAILY
Qty: 60 G | Refills: 1 | Status: SHIPPED | OUTPATIENT
Start: 2020-09-08 | End: 2022-04-25 | Stop reason: ALTCHOICE

## 2020-09-08 RX ORDER — METHYLPREDNISOLONE 4 MG/1
TABLET ORAL
Qty: 21 EACH | Refills: 0 | Status: SHIPPED | OUTPATIENT
Start: 2020-09-08 | End: 2020-11-12

## 2020-09-08 NOTE — PROGRESS NOTES
FAMILY PRACTICE OFFICE VISIT       NAME: Bright Guillen  AGE: 79 y o  SEX: female       : 1950        MRN: [id number]        Assessment and Plan     Problem List Items Addressed This Visit        Endocrine    Hypothyroidism     · Normal TSH  · Continue levothyroxine 150 mcg daily  · Follow labs in a few months         Relevant Medications    methylPREDNISolone 4 MG tablet therapy pack    Other Relevant Orders    TSH, 3rd generation       Respiratory    Severe obstructive sleep apnea     · Patient is compliant with CPAP , Dr Chang follows            Cardiovascular and Mediastinum    Benign essential hypertension - Primary     · Blood pressure is well controlled, continue amlodipine and HCTZ  · I advised patient to drink plenty of water  · Will repeat BMP in a few months, most recent blood work reveals mild elevation of creatinine 0 96         Relevant Orders    Basic metabolic panel       Musculoskeletal and Integument    Osteopenia of multiple sites     · Patient is due for repeat DEXA scan, I advised her to set up            Other    Hyperlipidemia     · Continue Lipitor 10 mg 3 days per week         S/P MVR (mitral valve repair)     · S/P Mitral valve repair with P2 triangular ressection and 30 mm Medtronic CG Future mitral annuloplasty ring, performed on 2019,Dr Gibson  · Most recent echocardiogram performed in 2019, followed by Dr Veronica Smith, no current cardiology follow-up  · Patient denies symptoms of chest pain, dyspnea, palpitations or dizziness           Encounter for Medicare annual wellness exam     · Last mammogram performed in 2019  · Colonoscopy, 2019, 3 polyps, next  study is due in 3 years,     · Patient is due for gyn appointment, last checkup , Dr Cristal Maxwell  · Patient is up-to-date with pneumococcal vaccinations, 2016 and 2017  · Flu vaccine was administered today  · Osteopenia:  I advised patient to proceed with DEXA scan           Other Visit Diagnoses Shoulder tendonitis, right        Relevant Medications    methylPREDNISolone 4 MG tablet therapy pack    Other Relevant Orders    XR shoulder 2+ vw right (Completed)    Ambulatory referral to Physical Therapy    Tinea pedis of right foot        Relevant Medications    ketoconazole (NIZORAL) 2 % cream    Flu vaccine need        Relevant Orders    influenza vaccine, high-dose, PF 0 7 mL (FLUZONE HIGH-DOSE) (Completed)    Encounter for screening mammogram for breast cancer        Relevant Orders    Mammo screening bilateral w 3d & cad       Patient presents for follow-up of chronic medical conditions  Assessment and plan as outlined above  She will continue daily medications for hypertension, hyperlipidemia and hypothyroidism  Right shoulder impingement syndrome, status post fall in February 2020:  Patient will start on Medrol Dosepak, proceed with x-ray and start physical therapy  I advised her to contact me in a few weeks if symptoms are not improving-patient will likely need follow-up with orthopedic team then  I reminded patient to schedule follow-up with [facility]  She will be due for mammogram and bone density scan, patient can schedule both studies together in December of 2020  Patient remains under ongoing care of [facility], Dr Tu gould  Flu vaccine was administered today  Follow-up in April-May of 2021  Patient will repeat BMP and TSH in a few months, we will reassess minimally elevated creatinine and follow on TSH  Patient Instructions       Medicare Preventive Visit Patient Instructions  Thank you for completing your Welcome to Medicare Visit or Medicare Annual Wellness Visit today  Your next wellness visit will be due in one year (9/8/2021)  The screening/preventive services that you may require over the next 5-10 years are detailed below  Some tests may not apply to you based off risk factors and/or age   Screening tests ordered at today's visit but not completed yet may show as past due  Also, please note that scanned in results may not display below  Preventive Screenings:  Service Recommendations Previous Testing/Comments   Colorectal Cancer Screening  * Colonoscopy    * Fecal Occult Blood Test (FOBT)/Fecal Immunochemical Test (FIT)  * Fecal DNA/Cologuard Test  * Flexible Sigmoidoscopy Age: 54-65 years old   Colonoscopy: every 10 years (may be performed more frequently if at higher risk)  OR  FOBT/FIT: every 1 year  OR  Cologuard: every 3 years  OR  Sigmoidoscopy: every 5 years  Screening may be recommended earlier than age 48 if at higher risk for colorectal cancer  Also, an individualized decision between you and your healthcare provider will decide whether screening between the ages of 74-80 would be appropriate  Colonoscopy: 09/26/2019  FOBT/FIT: Not on file  Cologuard: Not on file  Sigmoidoscopy: Not on file    Screening Current     Breast Cancer Screening Age: 36 years old  Frequency: every 1-2 years  Not required if history of left and right mastectomy Mammogram: 12/11/2019    Screening Current   Cervical Cancer Screening Between the ages of 21-29, pap smear recommended once every 3 years  Between the ages of 33-67, can perform pap smear with HPV co-testing every 5 years  Recommendations may differ for women with a history of total hysterectomy, cervical cancer, or abnormal pap smears in past  Pap Smear: Not on file    Screening Not Indicated   Hepatitis C Screening Once for adults born between 1945 and 1965  More frequently in patients at high risk for Hepatitis C Hep C Antibody: Not on file       Diabetes Screening 1-2 times per year if you're at risk for diabetes or have pre-diabetes Fasting glucose: 108 mg/dL   A1C: 5 5 %    Screening Current   Cholesterol Screening Once every 5 years if you don't have a lipid disorder  May order more often based on risk factors   Lipid panel: 09/01/2020    Screening Not Indicated  History Lipid Disorder     Other Preventive Screenings Covered by Medicare:  1  Abdominal Aortic Aneurysm (AAA) Screening: covered once if your at risk  You're considered to be at risk if you have a family history of AAA  2  Lung Cancer Screening: covers low dose CT scan once per year if you meet all of the following conditions: (1) Age 50-69; (2) No signs or symptoms of lung cancer; (3) Current smoker or have quit smoking within the last 15 years; (4) You have a tobacco smoking history of at least 30 pack years (packs per day multiplied by number of years you smoked); (5) You get a written order from a healthcare provider  3  Glaucoma Screening: covered annually if you're considered high risk: (1) You have diabetes OR (2) Family history of glaucoma OR (3)  aged 48 and older OR (3)  American aged 72 and older  3  Osteoporosis Screening: covered every 2 years if you meet one of the following conditions: (1) You're estrogen deficient and at risk for osteoporosis based off medical history and other findings; (2) Have a vertebral abnormality; (3) On glucocorticoid therapy for more than 3 months; (4) Have primary hyperparathyroidism; (5) On osteoporosis medications and need to assess response to drug therapy  · Last bone density test (DXA Scan): 08/05/2016   5  HIV Screening: covered annually if you're between the age of 15-65  Also covered annually if you are younger than 13 and older than 72 with risk factors for HIV infection  For pregnant patients, it is covered up to 3 times per pregnancy      Immunizations:  Immunization Recommendations   Influenza Vaccine Annual influenza vaccination during flu season is recommended for all persons aged >= 6 months who do not have contraindications   Pneumococcal Vaccine (Prevnar and Pneumovax)  * Prevnar = PCV13  * Pneumovax = PPSV23   Adults 25-60 years old: 1-3 doses may be recommended based on certain risk factors  Adults 72 years old: Prevnar (PCV13) vaccine recommended followed by Pneumovax (PPSV23) vaccine  If already received PPSV23 since turning 65, then PCV13 recommended at least one year after PPSV23 dose  Hepatitis B Vaccine 3 dose series if at intermediate or high risk (ex: diabetes, end stage renal disease, liver disease)   Tetanus (Td) Vaccine - COST NOT COVERED BY MEDICARE PART B Following completion of primary series, a booster dose should be given every 10 years to maintain immunity against tetanus  Td may also be given as tetanus wound prophylaxis  Tdap Vaccine - COST NOT COVERED BY MEDICARE PART B Recommended at least once for all adults  For pregnant patients, recommended with each pregnancy  Shingles Vaccine (Shingrix) - COST NOT COVERED BY MEDICARE PART B  2 shot series recommended in those aged 48 and above     Health Maintenance Due:      Topic Date Due    Hepatitis C Screening  1950    MAMMOGRAM  12/11/2021     Immunizations Due:      Topic Date Due    Influenza Vaccine  07/01/2020     Advance Directives   What are advance directives? Advance directives are legal documents that state your wishes and plans for medical care  These plans are made ahead of time in case you lose your ability to make decisions for yourself  Advance directives can apply to any medical decision, such as the treatments you want, and if you want to donate organs  What are the types of advance directives? There are many types of advance directives, and each state has rules about how to use them  You may choose a combination of any of the following:  · Living will: This is a written record of the treatment you want  You can also choose which treatments you do not want, which to limit, and which to stop at a certain time  This includes surgery, medicine, IV fluid, and tube feedings  · Durable power of  for healthcare [facility]): This is a written record that states who you want to make healthcare choices for you when you are unable to make them for yourself   This person, irma beranbe proxy, is usually a family member or a friend  You may choose more than 1 proxy  · Do not resuscitate (DNR) order:  A DNR order is used in case your heart stops beating or you stop breathing  It is a request not to have certain forms of treatment, such as CPR  A DNR order may be included in other types of advance directives  · Medical directive: This covers the care that you want if you are in a coma, near death, or unable to make decisions for yourself  You can list the treatments you want for each condition  Treatment may include pain medicine, surgery, blood transfusions, dialysis, IV or tube feedings, and a ventilator (breathing machine)  · Values history: This document has questions about your views, beliefs, and how you feel and think about life  This information can help others choose the care that you would choose  Why are advance directives important? An advance directive helps you control your care  Although spoken wishes may be used, it is better to have your wishes written down  Spoken wishes can be misunderstood, or not followed  Treatments may be given even if you do not want them  An advance directive may make it easier for your family to make difficult choices about your care  Fall Prevention    Fall prevention  includes ways to make your home and other areas safer  It also includes ways you can move more carefully to prevent a fall  Health conditions that cause changes in your blood pressure, vision, or muscle strength and coordination may increase your risk for falls  Medicines may also increase your risk for falls if they make you dizzy, weak, or sleepy  Fall prevention tips:   · Stand or sit up slowly  · Use assistive devices as directed  · Wear shoes that fit well and have soles that   · Wear a personal alarm  · Stay active  · Manage your medical conditions  Home Safety Tips:  · Add items to prevent falls in the bathroom  · Keep paths clear      · Install bright lights in your home  · Keep items you use often on shelves within reach  · Paint or place reflective tape on the edges of your stairs  Weight Management   Why it is important to manage your weight:  Being overweight increases your risk of health conditions such as heart disease, high blood pressure, type 2 diabetes, and certain types of cancer  It can also increase your risk for osteoarthritis, sleep apnea, and other respiratory problems  Aim for a slow, steady weight loss  Even a small amount of weight loss can lower your risk of health problems  How to lose weight safely:  A safe and healthy way to lose weight is to eat fewer calories and get regular exercise  You can lose up about 1 pound a week by decreasing the number of calories you eat by 500 calories each day  Healthy meal plan for weight management:  A healthy meal plan includes a variety of foods, contains fewer calories, and helps you stay healthy  A healthy meal plan includes the following:  · Eat whole-grain foods more often  A healthy meal plan should contain fiber  Fiber is the part of grains, fruits, and vegetables that is not broken down by your body  Whole-grain foods are healthy and provide extra fiber in your diet  Some examples of whole-grain foods are whole-wheat breads and pastas, oatmeal, brown rice, and bulgur  · Eat a variety of vegetables every day  Include dark, leafy greens such as spinach, kale, natan greens, and mustard greens  Eat yellow and orange vegetables such as carrots, sweet potatoes, and winter squash  · Eat a variety of fruits every day  Choose fresh or canned fruit (canned in its own juice or light syrup) instead of juice  Fruit juice has very little or no fiber  · Eat low-fat dairy foods  Drink fat-free (skim) milk or 1% milk  Eat fat-free yogurt and low-fat cottage cheese  Try low-fat cheeses such as mozzarella and other reduced-fat cheeses    · Choose meat and other protein foods that are low in fat  Choose beans or other legumes such as split peas or lentils  Choose fish, skinless poultry (chicken or turkey), or lean cuts of red meat (beef or pork)  Before you cook meat or poultry, cut off any visible fat  · Use less fat and oil  Try baking foods instead of frying them  Add less fat, such as margarine, sour cream, regular salad dressing and mayonnaise to foods  Eat fewer high-fat foods  Some examples of high-fat foods include french fries, doughnuts, ice cream, and cakes  · Eat fewer sweets  Limit foods and drinks that are high in sugar  This includes candy, cookies, regular soda, and sweetened drinks  Exercise:  Exercise at least 30 minutes per day on most days of the week  Some examples of exercise include walking, biking, dancing, and swimming  You can also fit in more physical activity by taking the stairs instead of the elevator or parking farther away from stores  Ask your healthcare provider about the best exercise plan for you  © Copyright [employer] 2018 Information is for End User's use only and may not be sold, redistributed or otherwise used for commercial purposes  All illustrations and images included in CareNotes® are the copyrighted property of A D A M , Inc  or [facility] [place] Thompson        Discussed with the patient and all questioned fully answered  She will call me if any problems arise  M*Modal software was used to dictate this note  It may contain errors with dictating incorrect words/spelling  Please contact provider directly with any questions  Chief Complaint     Chief Complaint   Patient presents with    Medicare Wellness Visit    Immunizations    Shoulder Pain     right shoulder pain    Foot Problem     right foot pain and cuts that wont go away       History of Present Illness     Patient presents for follow-up of chronic medical conditions  She is accompanied by her   Most recent blood work performed on September 3rd    Fasting blood glucose is higher than baseline, currently 107, minimal elevation of creatinine 0 96  Normal CBC, hepatic function panel and TSH  Patient remains on Lipitor 10 mg 3 days per week  LDL is under good control  Patient has been compliant with levothyroxine, current dose is 150 mcg daily  History of mitral valve replacement in 2018  Patient denies symptoms of chest pain, palpitations, shortness of breath or dizziness  Most recent echocardiogram performed by [facility] in March 2019, patient does not have pending follow-up with Cardiology and offers no complaints  Hypertension:  Patient uses regimen of HCTZ and  Norvasc 5 mg daily  Last mammogram performed in December 2019  Colonoscopy, 2019, 3 polyps, next  study is due in 3 years, 2022  Patient is due for gyn appointment, last checkup 2017, Dr Joshua Mcbride  Osteopenia:  Patient is due for repeat bone density scan  Patient fell in February of 2020  She is experiencing recurrent right shoulder pain and decreased range of motion  Pain is worse with laying on the right side, reaching and lifting right arm  Patient denies symptoms of neck pain or upper extremity radiculopathy  Pruritic rash dorsal surface right foot only  Patient is wearing sneakers most of the time  Shoulder Pain          Review of Systems   Review of Systems   Constitutional: Negative  HENT: Negative  Eyes: Negative  Respiratory: Negative  Cardiovascular: Negative  Gastrointestinal: Negative  Endocrine: Negative  Genitourinary: Negative  Musculoskeletal: Positive for arthralgias  Skin: Positive for rash  Allergic/Immunologic: Negative  Neurological: Negative  Hematological: Negative  Psychiatric/Behavioral: Negative          Active Problem List     Patient Active Problem List   Diagnosis    Benign essential hypertension    Disc degeneration, lumbar    Hyperlipidemia    Hypothyroidism    Liver enlargement    Osteopenia of multiple sites    Severe obstructive sleep apnea    Shoulder impingement    Tinea corporis    Lumbar radiculopathy    S/P MVR (mitral valve repair)    Encounter for Medicare annual wellness exam       Past Medical History:  Past Medical History:   Diagnosis Date    Cystic breast     Dyspareunia, female     last assessed 9/4/14    Hepatic cyst 9/21/2015    Hyperlipidemia     Hypothyroidism     Insomnia     [employer] on CPAP     Osteoporosis     Severe mitral regurgitation     Thrombocytopenia ([address] ) 1/8/2019    Varicose veins of lower extremity     last assessed 1/4/13       Past Surgical History:  Past Surgical History:   Procedure Laterality Date    COLONOSCOPY      complete 5/2016 - Dr Hogan Good due in 2019 - last assessed  3/17/17    HERNIA REPAIR      LIVER BIOPSY LAPAROSCOPIC N/A 5/7/2018    Procedure: Laparoscopic marsupialization of liver cyst;  Surgeon: Meredith Downs MD;  [facility];  Service: Surgical Oncology    NEUROMA EXCISION      SC ECHO [address] N/A 1/7/2019    Procedure: TRANSESOPHAGEAL ECHOCARDIOGRAM (DORI); Surgeon: Narciso Cortes MD;  [facility];  Service: Cardiac Surgery    SC ESOPHAGOGASTRODUODENOSCOPY TRANSORAL DIAGNOSTIC N/A 8/10/2016    Procedure: ESOPHAGOGASTRODUODENOSCOPY (EGD); Surgeon: Estelita Johnson MD;  [facility]; Service: Gastroenterology    SC ESOPHAGOSCOPY FLEXIBLE TRANSORAL WITH BIOPSY N/A 11/3/2016    Procedure: ESOPHAGOGASTRODUODENOSCOPY (EGD);   Surgeon: Brendon Bassett MD;  [facility];  Service: Thoracic    SC LAP, REPAIR PARAESOPHAGEAL HERNIA, INCL FUNDOPLASTY W/ MESH Left 11/3/2016    Procedure: LAPAROSCOPIC PARAESOPHAGEAL HERNIA REPAIR WITH MESH;NISSEN FUNDOPLICATION ;  Surgeon: Brendon Bassett MD;  [facility];  Service: Thoracic    SC LAP, VENTRAL HERNIA REPAIR,REDUCIBLE N/A 10/30/2017    Procedure: LAPAROSCOPIC INCISIONAL HERNIA REPAIR X 2 WITH ECHO MESH;  Surgeon: Glenys Taylor ;  [facility];  Service: General    MT REPAIR INCISIONAL HERNIA,REDUCIBLE N/A 1/13/2017    Procedure: INCISIONAL HERNIA REPAIR ;  Surgeon: Nanda Velarde DO;  [facility];  Service: General    MT REPLACEMENT OF MITRAL VALVE N/A 1/7/2019    Procedure: REPLACEMENT VALVE MITRAL (MVR), repair with 30mm CG Future ring;  Surgeon: Manuel Tracy MD;  [facility];  Service: Cardiac Surgery    TUBAL LIGATION         Family History:  Family History   Problem Relation Age of Onset    Heart disease Mother     Aneurysm Mother         cerebral    Alcohol abuse Father     Cancer Father     COPD Father     Heart failure Father     Hypertension Father     Cancer Family     Breast cancer Paternal Grandmother 80    No Known Problems Sister     No Known Problems Daughter     No Known Problems Maternal Grandmother     No Known Problems Maternal Grandfather     No Known Problems Paternal Grandfather        Social History:  Social History     Socioeconomic History    Marital status: /Civil Union     Spouse name: Not on file    Number of children: Not on file    Years of education: Not on file    Highest education level: Not on file   Occupational History    Not on file   Social Needs    Financial resource strain: Not on file    Food insecurity     Worry: Not on file     Inability: Not on file   [employer] needs     Medical: Not on file     Non-medical: Not on file   Tobacco Use    Smoking status: Never Smoker    Smokeless tobacco: Never Used   Substance and Sexual Activity    Alcohol use: Yes     Comment: social    Drug use: No    Sexual activity: Yes     Partners: Male     Birth control/protection: None   Lifestyle    Physical activity     Days per week: Not on file     Minutes per session: Not on file    Stress: Not on file   Relationships    Social connections     Talks on phone: Not on file     Gets together: Not on file     Attends Judaism service: Not on file     Active member of club or organization: Not on file     Attends meetings of clubs or organizations: Not on file     Relationship status: Not on file    Intimate partner violence     Fear of current or ex partner: Not on file     Emotionally abused: Not on file     Physically abused: Not on file     Forced sexual activity: Not on file   Other Topics Concern    Not on file   Social History Narrative    Coffee    Exercise - walking           Objective     Vitals:    09/08/20 1003   BP: 126/82   BP Location: Left arm   Patient Position: Sitting   Cuff Size: Adult   Pulse: 65   Resp: 16   Temp: 98 6 °F (37 °C)   TempSrc: Temporal   SpO2: 98%   Weight: 98 9 kg (218 lb)   Height: 5' 5" (1 651 m)     Wt Readings from Last 3 Encounters:   09/08/20 98 9 kg (218 lb)   05/27/20 97 1 kg (214 lb)   01/14/20 98 1 kg (216 lb 3 2 oz)       Physical Exam  Vitals signs and nursing note reviewed  Constitutional:       Appearance: Normal appearance  She is well-developed  She is obese  She is not ill-appearing  HENT:      Head: Normocephalic and atraumatic  Eyes:      Conjunctiva/sclera: Conjunctivae normal    Neck:      Musculoskeletal: Neck supple  Thyroid: No thyromegaly  Vascular: No carotid bruit  Cardiovascular:      Rate and Rhythm: Normal rate and regular rhythm  Heart sounds: Normal heart sounds  No murmur  Pulmonary:      Effort: Pulmonary effort is normal  No respiratory distress  Breath sounds: Normal breath sounds  No wheezing  Abdominal:      General: Bowel sounds are normal  There is no abdominal bruit  Palpations: Abdomen is soft  Musculoskeletal: Normal range of motion  Right lower leg: No edema  Left lower leg: No edema  Comments: Right shoulder:  Decreased range of motion with abduction and extension limited to 120-140 degrees, with hesitation  Skin:     Coloration: Skin is not jaundiced        Findings: Rash (Tenia pedis rash right dorsal foot) present  Neurological:      General: No focal deficit present  Mental Status: She is alert and oriented to person, place, and time  Cranial Nerves: No cranial nerve deficit  Coordination: Coordination normal    Psychiatric:         Mood and Affect: Mood normal          Behavior: Behavior normal          Thought Content:  Thought content normal          Pertinent Laboratory/Diagnostic Studies:  Lab Results   Component Value Date    GLUCOSE 146 (H) 01/07/2019    BUN 13 09/01/2020    CREATININE 0 96 (H) 09/01/2020    CALCIUM 9 6 09/01/2020     09/19/2017    K 3 8 09/01/2020    CO2 29 09/01/2020     09/01/2020     Lab Results   Component Value Date    ALT 14 09/01/2020    AST 16 09/01/2020    ALKPHOS 79 09/01/2020    BILITOT 0 6 09/19/2017       Lab Results   Component Value Date    WBC 5 3 09/01/2020    HGB 12 7 09/01/2020    HCT 39 4 09/01/2020    MCV 90 8 09/01/2020     09/01/2020       Lab Results   Component Value Date    TSH 2 48 09/01/2020       Lab Results   Component Value Date    CHOL 159 09/19/2017     Lab Results   Component Value Date    TRIG 140 09/01/2020     Lab Results   Component Value Date    HDL 57 09/01/2020     Lab Results   Component Value Date    LDLCALC 92 09/01/2020     Lab Results   Component Value Date    HGBA1C 5 5 12/31/2018       Results for orders placed or performed in visit on 09/01/20   Lipid Panel with Direct LDL reflex   Result Value Ref Range    Total Cholesterol 173 <200 mg/dL    HDL 57 > OR = 50 mg/dL    Triglycerides 140 <150 mg/dL    LDL Calculated 92 mg/dL (calc)    Chol HDLC Ratio 3 0 <5 0 (calc)    Non-HDL Cholesterol 116 <130 mg/dL (calc)   Comprehensive metabolic panel   Result Value Ref Range    Glucose, Random 107 (H) 65 - 99 mg/dL    BUN 13 7 - 25 mg/dL    Creatinine 0 96 (H) 0 60 - 0 93 mg/dL    eGFR Non  60 > OR = 60 mL/min/1 73m2    eGFR  69 > OR = 60 mL/min/1 73m2    SL AMB BUN/CREATININE RATIO 14 6 - 22 (calc)    Sodium 140 135 - 146 mmol/L    Potassium 3 8 3 5 - 5 3 mmol/L    Chloride 105 98 - 110 mmol/L    CO2 29 20 - 32 mmol/L    Calcium 9 6 8 6 - 10 4 mg/dL    Protein, Total 6 8 6 1 - 8 1 g/dL    Albumin 3 8 3 6 - 5 1 g/dL    Globulin 3 0 1 9 - 3 7 g/dL (calc)    Albumin/Globulin Ratio 1 3 1 0 - 2 5 (calc)    TOTAL BILIRUBIN 0 5 0 2 - 1 2 mg/dL    Alkaline Phosphatase 79 37 - 153 U/L    AST 16 10 - 35 U/L    ALT 14 6 - 29 U/L   CBC   Result Value Ref Range    White Blood Cell Count 5 3 3 8 - 10 8 Thousand/uL    Red Blood Cell Count 4 34 3 80 - 5 10 Million/uL    Hemoglobin 12 7 11 7 - 15 5 g/dL    HCT 39 4 35 0 - 45 0 %    MCV 90 8 80 0 - 100 0 fL    MCH 29 3 27 0 - 33 0 pg    MCHC 32 2 32 0 - 36 0 g/dL    RDW 12 5 11 0 - 15 0 %    Platelet Count 179 603 - 400 Thousand/uL    SL AMB MPV 10 2 7 5 - 12 5 fL   TSH, 3rd generation   Result Value Ref Range    TSH 2 48 0 40 - 4 50 mIU/L       Orders Placed This Encounter   Procedures    XR shoulder 2+ vw right    Mammo screening bilateral w 3d & cad    influenza vaccine, high-dose, PF 0 7 mL (FLUZONE HIGH-DOSE)    Basic metabolic panel    TSH, 3rd generation    Ambulatory referral to Physical Therapy       ALLERGIES:  Allergies   Allergen Reactions    Other Other (See Comments)     Skin breakdown from bandaid on for long time- reddness       Current Medications     Current Outpatient Medications   Medication Sig Dispense Refill    amLODIPine (NORVASC) 5 mg tablet Take 1 tablet (5 mg total) by mouth daily 90 tablet 3    aspirin 81 MG tablet Take 1 tablet (81 mg total) by mouth daily 30 tablet 11    atorvastatin (LIPITOR) 10 mg tablet TAKE 1 TABLET (10 MG TOTAL) BY MOUTH DAILY 30 tablet 4    Cholecalciferol (VITAMIN D3) 2000 UNITS TABS Take 1 tablet by mouth daily        estradiol (ESTRACE VAGINAL) 0 1 mg/g vaginal cream Insert into the vagina      hydrochlorothiazide (MICROZIDE) 12 5 mg capsule TAKE 1 TO 2 CAPSULES DAILY WITH AMLODIPINE 60 capsule 5    levothyroxine 150 mcg tablet TAKE 1 TABLET ONCE DAILY 30 tablet 3    Omega-3 Fatty Acids (FISH OIL) 1200 MG CAPS Take 1 capsule by mouth daily      ketoconazole (NIZORAL) 2 % cream Apply topically daily 60 g 1    methylPREDNISolone 4 MG tablet therapy pack Use as directed on package 21 each 0     No current facility-administered medications for this visit  There are no discontinued medications      Health Maintenance     Health Maintenance   Topic Date Due    Hepatitis C Screening  1950    SLP PLAN OF CARE  1950    Medicare Annual Wellness Visit (AWV)  04/04/2020    BMI: Followup Plan  04/07/2020    DTaP,Tdap,and Td Vaccines (2 - Td) 11/09/2020    Fall Risk  09/08/2021    Depression Screening PHQ  09/08/2021    BMI: Adult  09/08/2021    MAMMOGRAM  12/11/2021    Colonoscopy Surveillance  09/26/2022    Colorectal Cancer Screening  09/26/2029    Influenza Vaccine  Completed    Pneumococcal Vaccine: 65+ Years  Completed    HIB Vaccine  Aged Out    Hepatitis B Vaccine  Aged Out    IPV Vaccine  Aged Out    Hepatitis A Vaccine  Aged Out    Meningococcal ACWY Vaccine  Aged Out    HPV Vaccine  Aged Out       Immunization History   Administered Date(s) Administered    Influenza Quadrivalent Preservative Free 3 years and older IM 11/11/2016    Influenza Split High Dose Preservative Free IM 09/21/2015, 09/15/2017, 10/21/2019    Influenza TIV (IM) 11/01/2010, 01/24/2013, 09/22/2014    Influenza, high dose seasonal 0 7 mL 10/19/2018, 09/08/2020    Pneumococcal Conjugate 13-Valent 01/19/2016    Pneumococcal Polysaccharide PPV23 09/15/2017    Tdap 11/09/2010       Porsha Worrell MD

## 2020-09-08 NOTE — PROGRESS NOTES
Assessment and Plan:     Problem List Items Addressed This Visit        Endocrine    Hypothyroidism     · Normal TSH  · Continue levothyroxine 150 mcg daily  · Follow labs in a few months         Relevant Medications    methylPREDNISolone 4 MG tablet therapy pack    Other Relevant Orders    TSH, 3rd generation       Respiratory    Severe obstructive sleep apnea     · Patient is compliant with CPAP , Dr Chang follows            Cardiovascular and Mediastinum    Benign essential hypertension - Primary     · Blood pressure is well controlled, continue amlodipine and HCTZ  · I advised patient to drink plenty of water  · Will repeat BMP in a few months, most recent blood work reveals mild elevation of creatinine 0 96         Relevant Orders    Basic metabolic panel       Musculoskeletal and Integument    Osteopenia of multiple sites     · Patient is due for repeat DEXA scan, I advised her to set up            Other    Hyperlipidemia     · Continue Lipitor 10 mg 3 days per week         S/P MVR (mitral valve repair)     · S/P Mitral valve repair with P2 triangular ressection and 30 mm [employer] CG Future mitral annuloplasty ring, performed on 1/7/2019,Dr Gibson  · Most recent echocardiogram performed in March 2019, followed by Dr Griselda Renteria, no current cardiology follow-up  · Patient denies symptoms of chest pain, dyspnea, palpitations or dizziness           Encounter for Medicare annual wellness exam     · Last mammogram performed in December 2019  · Colonoscopy, 2019, 3 polyps, next  study is due in 3 years, 2022    · Patient is due for gyn appointment, last checkup 2017, Dr Nicole Cabrera  · Patient is up-to-date with pneumococcal vaccinations, 2016 and 2017  · Flu vaccine was administered today  · Osteopenia:  I advised patient to proceed with DEXA scan           Other Visit Diagnoses     Shoulder tendonitis, right        Relevant Medications    methylPREDNISolone 4 MG tablet therapy pack    Other Relevant Orders    XR shoulder 2+ vw right (Completed)    Ambulatory referral to Physical Therapy    Tinea pedis of right foot        Relevant Medications    ketoconazole (NIZORAL) 2 % cream    Flu vaccine need        Relevant Orders    influenza vaccine, high-dose, PF 0 7 mL (FLUZONE HIGH-DOSE) (Completed)    Encounter for screening mammogram for breast cancer        Relevant Orders    Mammo screening bilateral w 3d & cad        BMI Counseling: Body mass index is 36 28 kg/m²  The BMI is above normal  Nutrition recommendations include decreasing portion sizes, encouraging healthy choices of fruits and vegetables, consuming healthier snacks, moderation in carbohydrate intake and reducing intake of cholesterol  Exercise recommendations include exercising 3-5 times per week  Preventive health issues were discussed with patient, and age appropriate screening tests were ordered as noted in patient's After Visit Summary  Personalized health advice and appropriate referrals for health education or preventive services given if needed, as noted in patient's After Visit Summary       History of Present Illness:     Patient presents for Medicare Annual Wellness visit    Patient Care Team:  Deedee Ling MD as PCP - General  MD Yelena Hubbard DO Bradly Hammersmith, MD Merrilyn Muse, DO     Problem List:     Patient Active Problem List   Diagnosis    Benign essential hypertension    Disc degeneration, lumbar    Hyperlipidemia    Hypothyroidism    Liver enlargement    Osteopenia of multiple sites    Severe obstructive sleep apnea    Shoulder impingement    Tinea corporis    Lumbar radiculopathy    S/P MVR (mitral valve repair)    Encounter for Medicare annual wellness exam      Past Medical and Surgical History:     Past Medical History:   Diagnosis Date    Cystic breast     Dyspareunia, female     last assessed 9/4/14    Hepatic cyst 9/21/2015    Hyperlipidemia     Hypothyroidism     Insomnia     IRINA on CPAP     Osteoporosis     Severe mitral regurgitation     Thrombocytopenia ([facility] [address] ) 1/8/2019    Varicose veins of lower extremity     last assessed 1/4/13     Past Surgical History:   Procedure Laterality Date    COLONOSCOPY      complete 5/2016 - Dr Sarabia Ko due in 2019 - last assessed  3/17/17    HERNIA REPAIR      LIVER BIOPSY LAPAROSCOPIC N/A 5/7/2018    Procedure: Laparoscopic marsupialization of liver cyst;  Surgeon: Horacio Jauregui MD;  [facility];  Service: Surgical Oncology    NEUROMA EXCISION      WY ECHO [address] N/A 1/7/2019    Procedure: TRANSESOPHAGEAL ECHOCARDIOGRAM (DORI); Surgeon: Sarah Monaco MD;  [facility];  Service: Cardiac Surgery    WY ESOPHAGOGASTRODUODENOSCOPY TRANSORAL DIAGNOSTIC N/A 8/10/2016    Procedure: ESOPHAGOGASTRODUODENOSCOPY (EGD); Surgeon: Maikel Foley MD;  [facility]; Service: Gastroenterology    WY ESOPHAGOSCOPY FLEXIBLE TRANSORAL WITH BIOPSY N/A 11/3/2016    Procedure: ESOPHAGOGASTRODUODENOSCOPY (EGD);   Surgeon: David Morse MD;  [facility];  Service: Thoracic    WY LAP, REPAIR PARAESOPHAGEAL HERNIA, INCL FUNDOPLASTY W/ MESH Left 11/3/2016    Procedure: LAPAROSCOPIC PARAESOPHAGEAL HERNIA REPAIR WITH MESH;NISSEN FUNDOPLICATION ;  Surgeon: David Morse MD;  [facility];  Service: Thoracic    WY LAP, VENTRAL HERNIA REPAIR,REDUCIBLE N/A 10/30/2017    Procedure: LAPAROSCOPIC INCISIONAL HERNIA REPAIR X 2 WITH ECHO MESH;  Surgeon: Ligia Werner DO;  [facility];  Service: General    WY REPAIR INCISIONAL HERNIA,REDUCIBLE N/A 1/13/2017    Procedure: INCISIONAL HERNIA REPAIR ;  Surgeon: Ligia Werner DO;  [facility];  Service: General    WY REPLACEMENT OF MITRAL VALVE N/A 1/7/2019    Procedure: REPLACEMENT VALVE MITRAL (MVR), repair with 30mm CG Future ring;  Surgeon: Sarah Monaco MD;  [facility];  Service: Cardiac Surgery    TUBAL LIGATION Family History:     Family History   Problem Relation Age of Onset    Heart disease Mother     Aneurysm Mother         cerebral    Alcohol abuse Father     Cancer Father     COPD Father     Heart failure Father     Hypertension Father     Cancer Family     Breast cancer Paternal Grandmother 80    No Known Problems Sister     No Known Problems Daughter     No Known Problems Maternal Grandmother     No Known Problems Maternal Grandfather     No Known Problems Paternal Grandfather       Social History:        Social History     Socioeconomic History    Marital status: /Civil Union     Spouse name: None    Number of children: None    Years of education: None    Highest education level: None   Occupational History    None   Social Needs    Financial resource strain: None    Food insecurity     Worry: None     Inability: None    Transportation needs     Medical: None     Non-medical: None   Tobacco Use    Smoking status: Never Smoker    Smokeless tobacco: Never Used   Substance and Sexual Activity    Alcohol use: Yes     Comment: social    Drug use: No    Sexual activity: Yes     Partners: Male     Birth control/protection: None   Lifestyle    Physical activity     Days per week: None     Minutes per session: None    Stress: None   Relationships    Social connections     Talks on phone: None     Gets together: None     Attends Anabaptist service: None     Active member of club or organization: None     Attends meetings of clubs or organizations: None     Relationship status: None    Intimate partner violence     Fear of current or ex partner: None     Emotionally abused: None     Physically abused: None     Forced sexual activity: None   Other Topics Concern    None   Social History Narrative    Coffee    Exercise - walking      Medications and Allergies:     Current Outpatient Medications   Medication Sig Dispense Refill    amLODIPine (NORVASC) 5 mg tablet Take 1 tablet (5 mg total) by mouth daily 90 tablet 3    aspirin 81 MG tablet Take 1 tablet (81 mg total) by mouth daily 30 tablet 11    atorvastatin (LIPITOR) 10 mg tablet TAKE 1 TABLET (10 MG TOTAL) BY MOUTH DAILY 30 tablet 4    Cholecalciferol (VITAMIN D3) 2000 UNITS TABS Take 1 tablet by mouth daily   estradiol (ESTRACE VAGINAL) 0 1 mg/g vaginal cream Insert into the vagina      hydrochlorothiazide (MICROZIDE) 12 5 mg capsule TAKE 1 TO 2 CAPSULES DAILY WITH AMLODIPINE 60 capsule 5    levothyroxine 150 mcg tablet TAKE 1 TABLET ONCE DAILY 30 tablet 3    Omega-3 Fatty Acids (FISH OIL) 1200 MG CAPS Take 1 capsule by mouth daily      ketoconazole (NIZORAL) 2 % cream Apply topically daily 60 g 1    methylPREDNISolone 4 MG tablet therapy pack Use as directed on package 21 each 0     No current facility-administered medications for this visit  Allergies   Allergen Reactions    Other Other (See Comments)     Skin breakdown from bandaid on for long time- reddness      Immunizations:     Immunization History   Administered Date(s) Administered    Influenza Quadrivalent Preservative Free 3 years and older IM 11/11/2016    Influenza Split High Dose Preservative Free IM 09/21/2015, 09/15/2017, 10/21/2019    Influenza TIV (IM) 11/01/2010, 01/24/2013, 09/22/2014    Influenza, high dose seasonal 0 7 mL 10/19/2018, 09/08/2020    Pneumococcal Conjugate 13-Valent 01/19/2016    Pneumococcal Polysaccharide PPV23 09/15/2017    Tdap 11/09/2010      Health Maintenance:         Topic Date Due    Hepatitis C Screening  1950    MAMMOGRAM  12/11/2021     There are no preventive care reminders to display for this patient  Medicare Health Risk Assessment:     /82 (BP Location: Left arm, Patient Position: Sitting, Cuff Size: Adult)   Pulse 65   Temp 98 6 °F (37 °C) (Temporal)   Resp 16   Ht 5' 5" (1 651 m)   Wt 98 9 kg (218 lb)   SpO2 98%   BMI 36 28 kg/m²      Sandy is here for her Subsequent Wellness visit       Health Risk Assessment:   Patient rates overall health as fair  Patient feels that their physical health rating is same  Eyesight was rated as same  Hearing was rated as same  Patient feels that their emotional and mental health rating is same  Pain experienced in the last 7 days has been a lot  Patient's pain rating has been 7/10  Patient states that she has experienced no weight loss or gain in last 6 months  Right shoulder and right hip and right foot    Depression Screening:   PHQ-2 Score: 0      Fall Risk Screening: In the past year, patient has experienced: history of falling in past year    Number of falls: 1  Injured during fall?: Yes    Feels unsteady when standing or walking?: No    Worried about falling?: No      Urinary Incontinence Screening:   Patient has not leaked urine accidently in the last six months  Home Safety:  Patient does not have trouble with stairs inside or outside of their home  Patient has working smoke alarms and has working carbon monoxide detector  Home safety hazards include: none  Nutrition:   Current diet is Regular  Medications:   Patient is currently taking over-the-counter supplements  OTC medications include: see medication list  Patient is able to manage medications  Activities of Daily Living (ADLs)/Instrumental Activities of Daily Living (IADLs):   Walk and transfer into and out of bed and chair?: Yes  Dress and groom yourself?: Yes    Bathe or shower yourself?: Yes    Feed yourself? Yes  Do your laundry/housekeeping?: Yes  Manage your money, pay your bills and track your expenses?: Yes  Make your own meals?: Yes    Do your own shopping?: Yes    Previous Hospitalizations:   Any hospitalizations or ED visits within the last 12 months?: No      Advance Care Planning:   Living will: Yes    Durable POA for healthcare:  Yes    Advanced directive: Yes      Cognitive Screening:   Provider or family/friend/caregiver concerned regarding cognition?: No    PREVENTIVE SCREENINGS      Cardiovascular Screening:    General: Screening Not Indicated and History Lipid Disorder      Diabetes Screening:     General: Screening Current      Colorectal Cancer Screening:     General: Screening Current      Breast Cancer Screening:     General: Screening Current      Cervical Cancer Screening:    General: Screening Not Indicated      Osteoporosis Screening:      Due for: DXA Axial      Abdominal Aortic Aneurysm (AAA) Screening:        General: Screening Not Indicated      Lung Cancer Screening:     General: Screening Not Indicated      Hepatitis C Screening:    General: Screening Not Indicated    Other Counseling Topics:   Calcium and vitamin D intake and regular weightbearing exercise         Latasha Holguin MD

## 2020-09-08 NOTE — PATIENT INSTRUCTIONS
Medicare Preventive Visit Patient Instructions  Thank you for completing your Welcome to Medicare Visit or Medicare Annual Wellness Visit today  Your next wellness visit will be due in one year (9/8/2021)  The screening/preventive services that you may require over the next 5-10 years are detailed below  Some tests may not apply to you based off risk factors and/or age  Screening tests ordered at today's visit but not completed yet may show as past due  Also, please note that scanned in results may not display below  Preventive Screenings:  Service Recommendations Previous Testing/Comments   Colorectal Cancer Screening  * Colonoscopy    * Fecal Occult Blood Test (FOBT)/Fecal Immunochemical Test (FIT)  * Fecal DNA/Cologuard Test  * Flexible Sigmoidoscopy Age: 54-65 years old   Colonoscopy: every 10 years (may be performed more frequently if at higher risk)  OR  FOBT/FIT: every 1 year  OR  Cologuard: every 3 years  OR  Sigmoidoscopy: every 5 years  Screening may be recommended earlier than age 48 if at higher risk for colorectal cancer  Also, an individualized decision between you and your healthcare provider will decide whether screening between the ages of 74-80 would be appropriate  Colonoscopy: 09/26/2019  FOBT/FIT: Not on file  Cologuard: Not on file  Sigmoidoscopy: Not on file    Screening Current     Breast Cancer Screening Age: 36 years old  Frequency: every 1-2 years  Not required if history of left and right mastectomy Mammogram: 12/11/2019    Screening Current   Cervical Cancer Screening Between the ages of 21-29, pap smear recommended once every 3 years  Between the ages of 33-67, can perform pap smear with HPV co-testing every 5 years     Recommendations may differ for women with a history of total hysterectomy, cervical cancer, or abnormal pap smears in past  Pap Smear: Not on file    Screening Not Indicated   Hepatitis C Screening Once for adults born between 1945 and 1965  More frequently in patients at high risk for Hepatitis C Hep C Antibody: Not on file       Diabetes Screening 1-2 times per year if you're at risk for diabetes or have pre-diabetes Fasting glucose: 108 mg/dL   A1C: 5 5 %    Screening Current   Cholesterol Screening Once every 5 years if you don't have a lipid disorder  May order more often based on risk factors  Lipid panel: 09/01/2020    Screening Not Indicated  History Lipid Disorder     Other Preventive Screenings Covered by Medicare:  1  Abdominal Aortic Aneurysm (AAA) Screening: covered once if your at risk  You're considered to be at risk if you have a family history of AAA  2  Lung Cancer Screening: covers low dose CT scan once per year if you meet all of the following conditions: (1) Age 50-69; (2) No signs or symptoms of lung cancer; (3) Current smoker or have quit smoking within the last 15 years; (4) You have a tobacco smoking history of at least 30 pack years (packs per day multiplied by number of years you smoked); (5) You get a written order from a healthcare provider  3  Glaucoma Screening: covered annually if you're considered high risk: (1) You have diabetes OR (2) Family history of glaucoma OR (3)  aged 48 and older OR (3)  American aged 72 and older  3  Osteoporosis Screening: covered every 2 years if you meet one of the following conditions: (1) You're estrogen deficient and at risk for osteoporosis based off medical history and other findings; (2) Have a vertebral abnormality; (3) On glucocorticoid therapy for more than 3 months; (4) Have primary hyperparathyroidism; (5) On osteoporosis medications and need to assess response to drug therapy  · Last bone density test (DXA Scan): 08/05/2016   5  HIV Screening: covered annually if you're between the age of 15-65  Also covered annually if you are younger than 13 and older than 72 with risk factors for HIV infection   For pregnant patients, it is covered up to 3 times per pregnancy  Immunizations:  Immunization Recommendations   Influenza Vaccine Annual influenza vaccination during flu season is recommended for all persons aged >= 6 months who do not have contraindications   Pneumococcal Vaccine (Prevnar and Pneumovax)  * Prevnar = PCV13  * Pneumovax = PPSV23   Adults 25-60 years old: 1-3 doses may be recommended based on certain risk factors  Adults 72 years old: Prevnar (PCV13) vaccine recommended followed by Pneumovax (PPSV23) vaccine  If already received PPSV23 since turning 65, then PCV13 recommended at least one year after PPSV23 dose  Hepatitis B Vaccine 3 dose series if at intermediate or high risk (ex: diabetes, end stage renal disease, liver disease)   Tetanus (Td) Vaccine - COST NOT COVERED BY MEDICARE PART B Following completion of primary series, a booster dose should be given every 10 years to maintain immunity against tetanus  Td may also be given as tetanus wound prophylaxis  Tdap Vaccine - COST NOT COVERED BY MEDICARE PART B Recommended at least once for all adults  For pregnant patients, recommended with each pregnancy  Shingles Vaccine (Shingrix) - COST NOT COVERED BY MEDICARE PART B  2 shot series recommended in those aged 48 and above     Health Maintenance Due:      Topic Date Due    Hepatitis C Screening  1950    MAMMOGRAM  12/11/2021     Immunizations Due:      Topic Date Due    Influenza Vaccine  07/01/2020     Advance Directives   What are advance directives? Advance directives are legal documents that state your wishes and plans for medical care  These plans are made ahead of time in case you lose your ability to make decisions for yourself  Advance directives can apply to any medical decision, such as the treatments you want, and if you want to donate organs  What are the types of advance directives? There are many types of advance directives, and each state has rules about how to use them   You may choose a combination of any of the following:  · Living will: This is a written record of the treatment you want  You can also choose which treatments you do not want, which to limit, and which to stop at a certain time  This includes surgery, medicine, IV fluid, and tube feedings  · Durable power of  for healthcare [facility]): This is a written record that states who you want to make healthcare choices for you when you are unable to make them for yourself  This person, called a proxy, is usually a family member or a friend  You may choose more than 1 proxy  · Do not resuscitate (DNR) order:  A DNR order is used in case your heart stops beating or you stop breathing  It is a request not to have certain forms of treatment, such as CPR  A DNR order may be included in other types of advance directives  · Medical directive: This covers the care that you want if you are in a coma, near death, or unable to make decisions for yourself  You can list the treatments you want for each condition  Treatment may include pain medicine, surgery, blood transfusions, dialysis, IV or tube feedings, and a ventilator (breathing machine)  · Values history: This document has questions about your views, beliefs, and how you feel and think about life  This information can help others choose the care that you would choose  Why are advance directives important? An advance directive helps you control your care  Although spoken wishes may be used, it is better to have your wishes written down  Spoken wishes can be misunderstood, or not followed  Treatments may be given even if you do not want them  An advance directive may make it easier for your family to make difficult choices about your care  Fall Prevention    Fall prevention  includes ways to make your home and other areas safer  It also includes ways you can move more carefully to prevent a fall   Health conditions that cause changes in your blood pressure, vision, or muscle strength and coordination may increase your risk for falls  Medicines may also increase your risk for falls if they make you dizzy, weak, or sleepy  Fall prevention tips:   · Stand or sit up slowly  · Use assistive devices as directed  · Wear shoes that fit well and have soles that   · Wear a personal alarm  · Stay active  · Manage your medical conditions  Home Safety Tips:  · Add items to prevent falls in the bathroom  · Keep paths clear  · Install bright lights in your home  · Keep items you use often on shelves within reach  · Paint or place reflective tape on the edges of your stairs  Weight Management   Why it is important to manage your weight:  Being overweight increases your risk of health conditions such as heart disease, high blood pressure, type 2 diabetes, and certain types of cancer  It can also increase your risk for osteoarthritis, sleep apnea, and other respiratory problems  Aim for a slow, steady weight loss  Even a small amount of weight loss can lower your risk of health problems  How to lose weight safely:  A safe and healthy way to lose weight is to eat fewer calories and get regular exercise  You can lose up about 1 pound a week by decreasing the number of calories you eat by 500 calories each day  Healthy meal plan for weight management:  A healthy meal plan includes a variety of foods, contains fewer calories, and helps you stay healthy  A healthy meal plan includes the following:  · Eat whole-grain foods more often  A healthy meal plan should contain fiber  Fiber is the part of grains, fruits, and vegetables that is not broken down by your body  Whole-grain foods are healthy and provide extra fiber in your diet  Some examples of whole-grain foods are whole-wheat breads and pastas, oatmeal, brown rice, and bulgur  · Eat a variety of vegetables every day  Include dark, leafy greens such as spinach, kale, natan greens, and mustard greens   Eat yellow and orange vegetables such as carrots, sweet potatoes, and winter squash  · Eat a variety of fruits every day  Choose fresh or canned fruit (canned in its own juice or light syrup) instead of juice  Fruit juice has very little or no fiber  · Eat low-fat dairy foods  Drink fat-free (skim) milk or 1% milk  Eat fat-free yogurt and low-fat cottage cheese  Try low-fat cheeses such as mozzarella and other reduced-fat cheeses  · Choose meat and other protein foods that are low in fat  Choose beans or other legumes such as split peas or lentils  Choose fish, skinless poultry (chicken or turkey), or lean cuts of red meat (beef or pork)  Before you cook meat or poultry, cut off any visible fat  · Use less fat and oil  Try baking foods instead of frying them  Add less fat, such as margarine, sour cream, regular salad dressing and mayonnaise to foods  Eat fewer high-fat foods  Some examples of high-fat foods include french fries, doughnuts, ice cream, and cakes  · Eat fewer sweets  Limit foods and drinks that are high in sugar  This includes candy, cookies, regular soda, and sweetened drinks  Exercise:  Exercise at least 30 minutes per day on most days of the week  Some examples of exercise include walking, biking, dancing, and swimming  You can also fit in more physical activity by taking the stairs instead of the elevator or parking farther away from stores  Ask your healthcare provider about the best exercise plan for you  © Copyright [employer] 2018 Information is for End User's use only and may not be sold, redistributed or otherwise used for commercial purposes   All illustrations and images included in CareNotes® are the copyrighted property of A D A M , [employer]  or [address] [place] [address] [employer]

## 2020-09-09 ENCOUNTER — HOSPITAL ENCOUNTER (OUTPATIENT)
Dept: RADIOLOGY | Facility: HOSPITAL | Age: 70
Discharge: HOME | End: 2020-09-09
Payer: MEDICARE

## 2020-09-09 DIAGNOSIS — M77.8 SHOULDER TENDONITIS, RIGHT: ICD-10-CM

## 2020-09-09 PROCEDURE — 73030 X-RAY EXAM OF SHOULDER: CPT

## 2020-09-13 ENCOUNTER — TELEPHONE (OUTPATIENT)
Dept: FAMILY MEDICINE CLINIC | Facility: CLINIC | Age: 70
End: 2020-09-13

## 2020-09-13 PROBLEM — Z00.00 ENCOUNTER FOR MEDICARE ANNUAL WELLNESS EXAM: Status: ACTIVE | Noted: 2020-09-13

## 2020-09-13 NOTE — ASSESSMENT & PLAN NOTE
· Blood pressure is well controlled, continue amlodipine and HCTZ  · I advised patient to drink plenty of water    · Will repeat BMP in a few months, most recent blood work reveals mild elevation of creatinine 0 96

## 2020-09-13 NOTE — RESULT ENCOUNTER NOTE
Please contact patient  X-ray of right shoulder reveals mild arthritic changes  Please advised her to complete Medrol Dosepak as prescribed and start physical therapy    If her shoulder pain will not improve within next few weeks-she will need further evaluation by Orthopedic specialist   Thank you

## 2020-09-13 NOTE — ASSESSMENT & PLAN NOTE
· S/P Mitral valve repair with P2 triangular ressection and 30 mm Medtronic CG Future mitral annuloplasty ring, performed on 1/7/2019,Dr Gibson  · Most recent echocardiogram performed in March 2019, followed by Dr Belen Chacon, no current cardiology follow-up  · Patient denies symptoms of chest pain, dyspnea, palpitations or dizziness

## 2020-09-13 NOTE — ASSESSMENT & PLAN NOTE
· Last mammogram performed in December 2019  · Colonoscopy, 2019, 3 polyps, next  study is due in 3 years, 2022    · Patient is due for gyn appointment, last checkup 2017, Dr Nicole Cabrera  · Patient is up-to-date with pneumococcal vaccinations, 2016 and 2017  · Flu vaccine was administered today  · Osteopenia:  I advised patient to proceed with DEXA scan

## 2020-09-23 ENCOUNTER — EVALUATION (OUTPATIENT)
Dept: PHYSICAL THERAPY | Facility: REHABILITATION | Age: 70
End: 2020-09-23
Payer: MEDICARE

## 2020-09-23 DIAGNOSIS — M77.8 SHOULDER TENDONITIS, RIGHT: Primary | ICD-10-CM

## 2020-09-23 PROCEDURE — 97112 NEUROMUSCULAR REEDUCATION: CPT | Performed by: PHYSICAL THERAPIST

## 2020-09-23 PROCEDURE — 97140 MANUAL THERAPY 1/> REGIONS: CPT | Performed by: PHYSICAL THERAPIST

## 2020-09-23 PROCEDURE — 97161 PT EVAL LOW COMPLEX 20 MIN: CPT | Performed by: PHYSICAL THERAPIST

## 2020-09-23 NOTE — PROGRESS NOTES
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  Upon evaluation, [place] demonstrates impaired scapular mechanics, scaplar strength and GHJ mechanics with mobility deficits at SCJ and postural deficits  The listed impairments and functional limitation are effecting [place] ability to function at prior level   They can continue to benefit from physical therapy services at this times in order to address the above discussed impairments and functional limitation in order to allow for a return to premorbid status     Impairments: abnormal muscle firing, abnormal muscle tone, abnormal or restricted ROM, abnormal movement, activity intolerance, impaired physical strength and pain with function  Understanding of Dx/Px/POC: good   Prognosis: good    Plan  Patient would benefit from: skilled PT  Planned modality interventions: thermotherapy: hydrocollator packs  Planned therapy interventions: joint mobilization, manual therapy, ADL training, neuromuscular re-education, home exercise program, therapeutic exercise, therapeutic activities, strengthening, patient education and functional ROM exercises  Other planned therapy interventions: redcord  Frequency: 2x week  Duration in weeks: 8  Treatment plan discussed with: patient        Subjective Evaluation    History of Present Illness  Mechanism of injury: Terra Cruz is a 79 y o  female presenting to physical therapy on 20 with referral from MD for right shoulder pain that began in late February after a fall  She reports that she had fallen when she twisted her ankle, [address] injury with wrist injury as well  Fracture was negative for wrist fracture  Pain has been progressively getting worse in shoulder with ADLs and reaching such has reaching into horizontal ABD  Difficulty putting on bra  Pain is she lies on her right side at night but pain does not wake her  Denies numbness or tingling into her right shoulder and right UE/hand  She denies neck pain at this time  Pain  Current pain rating: 3  At worst pain ratin  Location: anterior shoulder  Quality: sharp    Hand dominance: right      Diagnostic Tests  X-ray: normal  Treatments  Previous treatment: medication  Patient Goals  Patient goals for therapy: decreased pain, independence with ADLs/IADLs and return to sport/leisure activities      STG:  Patient will be independent with HEP  Patient will reduced pain by 50% with activity  Patient will have pain free horizontal ABD  Patient will have 2/10 or less with AROM shoulder IR  Patient will improve MT/LT strength to 3+/5    LTG:  Patient will improve FOTO to > goal  Patient with report 1/10 or less with all shoulder AROM  Patient will resume ADLs and recreational activity without limitation    Objective     Posture: increased TS kyphosis, humerus >1/3 anterior R vs L    Dermatome: (pinprick- L/R):  Unremarkable B/L UE                    Scapular position:  Abduction Syndrome: R       Anterior glide noted with horizontal ABD and ER  Palpation: TTP anterior shoulder    Cervical  % of normal   Flex  WNL   Extn  WNL   SB Left WNL   SB Right WNL   ROT Left WNL   ROT Right WNL    Spurling negative      MMT         AROM          PROM    Shoulder       L       R        L           R      L     R   Flex  ERP   Abd  IR  4 4 T7 L2 pain  30   ER   4+ 4 T3 T3  85 ERP            Rhomboid         Mid Trap 3- 3-       Low Trap 3- 3-       Serratus         Infraspnatus         Teres Major         Sub Scap             Rotator Cuff Testing:  ER Lag: negative   Drop Arm: negative   Arc Sign: positive @  degrees    Belly Press: negative   Bear Hug: positive for pain      Other:  Scapular Assist test: NT      ACJ Testing:  Mobility: normal  ACJ Lift Off: pain  Horizontal ABD: pain  IR: limited    Hypomobile SCJ into posterior glide       Segmental mobility: GHJ: hypomobile posterior glide   STJ:  Decreased upward rotation   CTJ:  hypomobile  TS: hypomobile upper and mid           Precautions: HTN, mitral valve replacement      Manuals 9/23            SCJ inf glide G4            SCJ posterior glide G4/5                                      Neuro Re-Ed 9/23            Side lying ER 3x10            LT lifts YTB:4x5  5"            Wall slide touch downs                                                                 Ther Ex                                                                                                                     Ther Activity                                       Gait Training                                       Modalities

## 2020-09-30 ENCOUNTER — OFFICE VISIT (OUTPATIENT)
Dept: PHYSICAL THERAPY | Facility: REHABILITATION | Age: 70
End: 2020-09-30
Payer: MEDICARE

## 2020-09-30 DIAGNOSIS — M77.8 SHOULDER TENDONITIS, RIGHT: Primary | ICD-10-CM

## 2020-09-30 PROCEDURE — 97140 MANUAL THERAPY 1/> REGIONS: CPT | Performed by: PHYSICAL THERAPIST

## 2020-09-30 PROCEDURE — 97112 NEUROMUSCULAR REEDUCATION: CPT | Performed by: PHYSICAL THERAPIST

## 2020-09-30 NOTE — PROGRESS NOTES
Daily Note     Today's date: 2020  Patient name: Megan Pascual  : 1950  MRN: [id number]  Referring provider: Juan Carlos Mariee MD  Dx:   Encounter Diagnosis     ICD-10-CM    1  Shoulder tendonitis, right  M75 81        Start Time:   Stop Time:   Total time in clinic (min): 42 minutes    Subjective: Patient reports feeling sore in shoulder, not sure if from throwing corn hold bags this weekend or from exercises  Objective: See treatment diary below  Increased tone R sub scap    Assessment: Tolerated treatment well  Patient with TTP along sub scap that was improved with STM  Improved ER into end range and she was able to more easily reach into active IR post re-ed  Added 2# with SL ER  Plan: Continue per plan of care        Precautions: HTN, mitral valve replacement      Manuals             SCJ inf glide             SCJ posterior glide             STM R sub scap W/ and w/o ER  -10'            assessment 2'            Neuro Re-Ed             Side lying ER 2x10  2x8 w/ 2#            LT lifts NP            Wall slide touch downs 2x8            Prone IR AAROM 10x, 5"x5 2 sets AROM                                                   Ther Ex             pulley 5'                                                                                                       Ther Activity                                       Gait Training                                       Modalities

## 2020-10-02 ENCOUNTER — OFFICE VISIT (OUTPATIENT)
Dept: PHYSICAL THERAPY | Facility: REHABILITATION | Age: 70
End: 2020-10-02
Payer: MEDICARE

## 2020-10-02 DIAGNOSIS — M77.8 SHOULDER TENDONITIS, RIGHT: Primary | ICD-10-CM

## 2020-10-02 PROCEDURE — 97140 MANUAL THERAPY 1/> REGIONS: CPT | Performed by: PHYSICAL THERAPIST

## 2020-10-02 PROCEDURE — 97112 NEUROMUSCULAR REEDUCATION: CPT | Performed by: PHYSICAL THERAPIST

## 2020-10-06 ENCOUNTER — OFFICE VISIT (OUTPATIENT)
Dept: PHYSICAL THERAPY | Facility: REHABILITATION | Age: 70
End: 2020-10-06
Payer: MEDICARE

## 2020-10-06 DIAGNOSIS — M77.8 SHOULDER TENDONITIS, RIGHT: Primary | ICD-10-CM

## 2020-10-06 PROCEDURE — 97110 THERAPEUTIC EXERCISES: CPT | Performed by: PHYSICAL THERAPIST

## 2020-10-06 PROCEDURE — 97112 NEUROMUSCULAR REEDUCATION: CPT | Performed by: PHYSICAL THERAPIST

## 2020-10-06 PROCEDURE — 97140 MANUAL THERAPY 1/> REGIONS: CPT | Performed by: PHYSICAL THERAPIST

## 2020-10-08 ENCOUNTER — OFFICE VISIT (OUTPATIENT)
Dept: PHYSICAL THERAPY | Facility: REHABILITATION | Age: 70
End: 2020-10-08
Payer: MEDICARE

## 2020-10-08 DIAGNOSIS — M77.8 SHOULDER TENDONITIS, RIGHT: Primary | ICD-10-CM

## 2020-10-08 PROCEDURE — 97112 NEUROMUSCULAR REEDUCATION: CPT | Performed by: PHYSICAL THERAPIST

## 2020-10-08 PROCEDURE — 97140 MANUAL THERAPY 1/> REGIONS: CPT | Performed by: PHYSICAL THERAPIST

## 2020-10-12 ENCOUNTER — OFFICE VISIT (OUTPATIENT)
Dept: PHYSICAL THERAPY | Facility: REHABILITATION | Age: 70
End: 2020-10-12
Payer: MEDICARE

## 2020-10-12 DIAGNOSIS — M77.8 SHOULDER TENDONITIS, RIGHT: Primary | ICD-10-CM

## 2020-10-12 PROCEDURE — 97112 NEUROMUSCULAR REEDUCATION: CPT | Performed by: PHYSICAL THERAPIST

## 2020-10-12 PROCEDURE — 97140 MANUAL THERAPY 1/> REGIONS: CPT | Performed by: PHYSICAL THERAPIST

## 2020-10-14 ENCOUNTER — OFFICE VISIT (OUTPATIENT)
Dept: PHYSICAL THERAPY | Facility: REHABILITATION | Age: 70
End: 2020-10-14
Payer: MEDICARE

## 2020-10-14 DIAGNOSIS — M77.8 SHOULDER TENDONITIS, RIGHT: Primary | ICD-10-CM

## 2020-10-14 PROCEDURE — 97140 MANUAL THERAPY 1/> REGIONS: CPT | Performed by: PHYSICAL THERAPIST

## 2020-10-14 PROCEDURE — 97112 NEUROMUSCULAR REEDUCATION: CPT | Performed by: PHYSICAL THERAPIST

## 2020-10-19 ENCOUNTER — OFFICE VISIT (OUTPATIENT)
Dept: PHYSICAL THERAPY | Facility: REHABILITATION | Age: 70
End: 2020-10-19
Payer: MEDICARE

## 2020-10-19 DIAGNOSIS — M77.8 SHOULDER TENDONITIS, RIGHT: Primary | ICD-10-CM

## 2020-10-19 PROCEDURE — 97112 NEUROMUSCULAR REEDUCATION: CPT | Performed by: PHYSICAL THERAPIST

## 2020-10-19 PROCEDURE — 97140 MANUAL THERAPY 1/> REGIONS: CPT | Performed by: PHYSICAL THERAPIST

## 2020-10-21 ENCOUNTER — OFFICE VISIT (OUTPATIENT)
Dept: PHYSICAL THERAPY | Facility: REHABILITATION | Age: 70
End: 2020-10-21
Payer: MEDICARE

## 2020-10-21 DIAGNOSIS — M77.8 SHOULDER TENDONITIS, RIGHT: Primary | ICD-10-CM

## 2020-10-21 PROCEDURE — 97112 NEUROMUSCULAR REEDUCATION: CPT | Performed by: PHYSICAL THERAPIST

## 2020-10-21 PROCEDURE — 97140 MANUAL THERAPY 1/> REGIONS: CPT | Performed by: PHYSICAL THERAPIST

## 2020-10-26 ENCOUNTER — OFFICE VISIT (OUTPATIENT)
Dept: PHYSICAL THERAPY | Facility: REHABILITATION | Age: 70
End: 2020-10-26
Payer: MEDICARE

## 2020-10-26 DIAGNOSIS — M77.8 SHOULDER TENDONITIS, RIGHT: Primary | ICD-10-CM

## 2020-10-26 PROCEDURE — 97110 THERAPEUTIC EXERCISES: CPT | Performed by: PHYSICAL THERAPIST

## 2020-10-26 PROCEDURE — 97112 NEUROMUSCULAR REEDUCATION: CPT | Performed by: PHYSICAL THERAPIST

## 2020-10-26 PROCEDURE — 97140 MANUAL THERAPY 1/> REGIONS: CPT | Performed by: PHYSICAL THERAPIST

## 2020-10-28 ENCOUNTER — OFFICE VISIT (OUTPATIENT)
Dept: PHYSICAL THERAPY | Facility: REHABILITATION | Age: 70
End: 2020-10-28
Payer: MEDICARE

## 2020-10-28 DIAGNOSIS — M77.8 SHOULDER TENDONITIS, RIGHT: Primary | ICD-10-CM

## 2020-10-28 PROCEDURE — 97112 NEUROMUSCULAR REEDUCATION: CPT | Performed by: PHYSICAL THERAPIST

## 2020-10-28 PROCEDURE — 97140 MANUAL THERAPY 1/> REGIONS: CPT | Performed by: PHYSICAL THERAPIST

## 2020-11-02 ENCOUNTER — OFFICE VISIT (OUTPATIENT)
Dept: PHYSICAL THERAPY | Facility: REHABILITATION | Age: 70
End: 2020-11-02
Payer: MEDICARE

## 2020-11-02 DIAGNOSIS — M77.8 SHOULDER TENDONITIS, RIGHT: Primary | ICD-10-CM

## 2020-11-02 PROCEDURE — 97112 NEUROMUSCULAR REEDUCATION: CPT | Performed by: PHYSICAL THERAPIST

## 2020-11-02 PROCEDURE — 97140 MANUAL THERAPY 1/> REGIONS: CPT | Performed by: PHYSICAL THERAPIST

## 2020-11-04 ENCOUNTER — OFFICE VISIT (OUTPATIENT)
Dept: PHYSICAL THERAPY | Facility: REHABILITATION | Age: 70
End: 2020-11-04
Payer: MEDICARE

## 2020-11-04 DIAGNOSIS — M77.8 SHOULDER TENDONITIS, RIGHT: Primary | ICD-10-CM

## 2020-11-04 PROCEDURE — 97112 NEUROMUSCULAR REEDUCATION: CPT | Performed by: PHYSICAL THERAPIST

## 2020-11-04 PROCEDURE — 97140 MANUAL THERAPY 1/> REGIONS: CPT | Performed by: PHYSICAL THERAPIST

## 2020-11-04 PROCEDURE — 97110 THERAPEUTIC EXERCISES: CPT | Performed by: PHYSICAL THERAPIST

## 2020-11-09 ENCOUNTER — OFFICE VISIT (OUTPATIENT)
Dept: PHYSICAL THERAPY | Facility: REHABILITATION | Age: 70
End: 2020-11-09
Payer: MEDICARE

## 2020-11-09 DIAGNOSIS — M77.8 SHOULDER TENDONITIS, RIGHT: Primary | ICD-10-CM

## 2020-11-09 PROCEDURE — 97140 MANUAL THERAPY 1/> REGIONS: CPT

## 2020-11-09 PROCEDURE — 97112 NEUROMUSCULAR REEDUCATION: CPT

## 2020-11-11 ENCOUNTER — APPOINTMENT (OUTPATIENT)
Dept: PHYSICAL THERAPY | Facility: REHABILITATION | Age: 70
End: 2020-11-11
Payer: MEDICARE

## 2020-11-12 ENCOUNTER — OFFICE VISIT (OUTPATIENT)
Dept: OBGYN CLINIC | Facility: CLINIC | Age: 70
End: 2020-11-12
Payer: MEDICARE

## 2020-11-12 VITALS
HEIGHT: 67 IN | BODY MASS INDEX: 33.9 KG/M2 | SYSTOLIC BLOOD PRESSURE: 150 MMHG | DIASTOLIC BLOOD PRESSURE: 115 MMHG | TEMPERATURE: 99.5 F | WEIGHT: 216 LBS

## 2020-11-12 DIAGNOSIS — Z00.00 ENCOUNTER FOR MEDICARE ANNUAL WELLNESS EXAM: Primary | ICD-10-CM

## 2020-11-12 PROCEDURE — G0101 CA SCREEN;PELVIC/BREAST EXAM: HCPCS | Performed by: STUDENT IN AN ORGANIZED HEALTH CARE EDUCATION/TRAINING PROGRAM

## 2020-11-16 ENCOUNTER — OFFICE VISIT (OUTPATIENT)
Dept: PHYSICAL THERAPY | Facility: REHABILITATION | Age: 70
End: 2020-11-16
Payer: MEDICARE

## 2020-11-16 DIAGNOSIS — M77.8 SHOULDER TENDONITIS, RIGHT: Primary | ICD-10-CM

## 2020-11-16 PROCEDURE — 97110 THERAPEUTIC EXERCISES: CPT | Performed by: PHYSICAL THERAPIST

## 2020-11-16 PROCEDURE — 97112 NEUROMUSCULAR REEDUCATION: CPT | Performed by: PHYSICAL THERAPIST

## 2020-11-19 ENCOUNTER — OFFICE VISIT (OUTPATIENT)
Dept: PHYSICAL THERAPY | Facility: REHABILITATION | Age: 70
End: 2020-11-19
Payer: MEDICARE

## 2020-11-19 DIAGNOSIS — M77.8 SHOULDER TENDONITIS, RIGHT: Primary | ICD-10-CM

## 2020-11-19 PROCEDURE — 97112 NEUROMUSCULAR REEDUCATION: CPT | Performed by: PHYSICAL THERAPIST

## 2020-11-19 PROCEDURE — 97140 MANUAL THERAPY 1/> REGIONS: CPT | Performed by: PHYSICAL THERAPIST

## 2021-01-19 DIAGNOSIS — E03.9 HYPOTHYROIDISM, UNSPECIFIED TYPE: ICD-10-CM

## 2021-01-19 RX ORDER — LEVOTHYROXINE SODIUM 0.15 MG/1
TABLET ORAL
Qty: 30 TABLET | Refills: 3 | Status: SHIPPED | OUTPATIENT
Start: 2021-01-19 | End: 2021-06-01

## 2021-01-29 ENCOUNTER — IMMUNIZATIONS (OUTPATIENT)
Dept: FAMILY MEDICINE CLINIC | Facility: HOSPITAL | Age: 71
End: 2021-01-29

## 2021-01-29 DIAGNOSIS — Z23 ENCOUNTER FOR IMMUNIZATION: Primary | ICD-10-CM

## 2021-01-29 PROCEDURE — 0011A SARS-COV-2 / COVID-19 MRNA VACCINE (MODERNA) 100 MCG: CPT

## 2021-01-29 PROCEDURE — 91301 SARS-COV-2 / COVID-19 MRNA VACCINE (MODERNA) 100 MCG: CPT

## 2021-02-24 ENCOUNTER — IMMUNIZATIONS (OUTPATIENT)
Dept: FAMILY MEDICINE CLINIC | Facility: HOSPITAL | Age: 71
End: 2021-02-24

## 2021-02-24 DIAGNOSIS — Z23 ENCOUNTER FOR IMMUNIZATION: Primary | ICD-10-CM

## 2021-02-24 PROCEDURE — 91301 SARS-COV-2 / COVID-19 MRNA VACCINE (MODERNA) 100 MCG: CPT

## 2021-02-24 PROCEDURE — 0012A SARS-COV-2 / COVID-19 MRNA VACCINE (MODERNA) 100 MCG: CPT

## 2021-02-26 DIAGNOSIS — I10 BENIGN ESSENTIAL HYPERTENSION: ICD-10-CM

## 2021-02-26 RX ORDER — HYDROCHLOROTHIAZIDE 12.5 MG/1
CAPSULE, GELATIN COATED ORAL
Qty: 60 CAPSULE | Refills: 5 | Status: SHIPPED | OUTPATIENT
Start: 2021-02-26 | End: 2022-02-28

## 2021-03-02 DIAGNOSIS — E78.5 HYPERLIPIDEMIA, UNSPECIFIED HYPERLIPIDEMIA TYPE: ICD-10-CM

## 2021-03-02 RX ORDER — ATORVASTATIN CALCIUM 10 MG/1
10 TABLET, FILM COATED ORAL DAILY
Qty: 30 TABLET | Refills: 4 | Status: SHIPPED | OUTPATIENT
Start: 2021-03-02 | End: 2022-03-30

## 2021-03-12 NOTE — ASSESSMENT & PLAN NOTE
Continue Norvasc 5 mg once a day and  HCTZ 12 5 mg daily
Continue Synthroid 125 mcg daily
Patient uses CPAP  DR Chang follows
Patient uses Lipitor 10 mg every other day
Raine Blas

## 2021-03-24 DIAGNOSIS — E03.9 HYPOTHYROIDISM, UNSPECIFIED TYPE: ICD-10-CM

## 2021-03-24 DIAGNOSIS — E78.2 MIXED HYPERLIPIDEMIA: Primary | ICD-10-CM

## 2021-03-26 LAB
ALBUMIN SERPL-MCNC: 3.9 G/DL (ref 3.6–5.1)
ALBUMIN/GLOB SERPL: 1.2 (CALC) (ref 1–2.5)
ALP SERPL-CCNC: 84 U/L (ref 37–153)
ALT SERPL-CCNC: 14 U/L (ref 6–29)
AST SERPL-CCNC: 15 U/L (ref 10–35)
BILIRUB DIRECT SERPL-MCNC: 0.2 MG/DL
BILIRUB INDIRECT SERPL-MCNC: 0.5 MG/DL (CALC) (ref 0.2–1.2)
BILIRUB SERPL-MCNC: 0.7 MG/DL (ref 0.2–1.2)
BUN SERPL-MCNC: 13 MG/DL (ref 7–25)
BUN/CREAT SERPL: 13 (CALC) (ref 6–22)
CALCIUM SERPL-MCNC: 9.4 MG/DL (ref 8.6–10.4)
CHLORIDE SERPL-SCNC: 106 MMOL/L (ref 98–110)
CHOLEST SERPL-MCNC: 157 MG/DL
CHOLEST/HDLC SERPL: 2.6 (CALC)
CO2 SERPL-SCNC: 28 MMOL/L (ref 20–32)
CREAT SERPL-MCNC: 0.99 MG/DL (ref 0.6–0.93)
GLOBULIN SER CALC-MCNC: 3.2 G/DL (CALC) (ref 1.9–3.7)
GLUCOSE SERPL-MCNC: 108 MG/DL (ref 65–99)
HDLC SERPL-MCNC: 60 MG/DL
LDLC SERPL CALC-MCNC: 77 MG/DL (CALC)
NONHDLC SERPL-MCNC: 97 MG/DL (CALC)
POTASSIUM SERPL-SCNC: 3.7 MMOL/L (ref 3.5–5.3)
PROT SERPL-MCNC: 7.1 G/DL (ref 6.1–8.1)
REF LAB TEST NAME: NORMAL
REF LAB TEST: NORMAL
SL AMB CLIENT CONTACT: NORMAL
SL AMB EGFR AFRICAN AMERICAN: 67 ML/MIN/1.73M2
SL AMB EGFR NON AFRICAN AMERICAN: 58 ML/MIN/1.73M2
SODIUM SERPL-SCNC: 139 MMOL/L (ref 135–146)
TRIGL SERPL-MCNC: 110 MG/DL
TSH SERPL-ACNC: 0.45 MIU/L (ref 0.4–4.5)

## 2021-04-12 ENCOUNTER — OFFICE VISIT (OUTPATIENT)
Dept: FAMILY MEDICINE CLINIC | Facility: CLINIC | Age: 71
End: 2021-04-12
Payer: MEDICARE

## 2021-04-12 VITALS
DIASTOLIC BLOOD PRESSURE: 86 MMHG | SYSTOLIC BLOOD PRESSURE: 126 MMHG | BODY MASS INDEX: 33.71 KG/M2 | HEART RATE: 69 BPM | TEMPERATURE: 97.6 F | WEIGHT: 214.8 LBS | OXYGEN SATURATION: 97 % | HEIGHT: 67 IN | RESPIRATION RATE: 17 BRPM

## 2021-04-12 DIAGNOSIS — G47.33 SEVERE OBSTRUCTIVE SLEEP APNEA: ICD-10-CM

## 2021-04-12 DIAGNOSIS — Z98.890 S/P MVR (MITRAL VALVE REPAIR): ICD-10-CM

## 2021-04-12 DIAGNOSIS — I10 BENIGN ESSENTIAL HYPERTENSION: Primary | ICD-10-CM

## 2021-04-12 DIAGNOSIS — E03.9 HYPOTHYROIDISM, UNSPECIFIED TYPE: ICD-10-CM

## 2021-04-12 DIAGNOSIS — E78.2 MIXED HYPERLIPIDEMIA: ICD-10-CM

## 2021-04-12 DIAGNOSIS — H53.2 DOUBLE VISION WITH BOTH EYES OPEN: ICD-10-CM

## 2021-04-12 PROCEDURE — 99214 OFFICE O/P EST MOD 30 MIN: CPT | Performed by: FAMILY MEDICINE

## 2021-04-12 NOTE — PROGRESS NOTES
FAMILY PRACTICE OFFICE VISIT       NAME: Woo Marie  AGE: 79 y o  SEX: female       : 1950        MRN: [id number]        Assessment and Plan     Problem List Items Addressed This Visit        Endocrine    Hypothyroidism     ·   TSH is normal, continue levothyroxine, monitor labs         Relevant Orders    Lipid Panel with Direct LDL reflex    TSH, 3rd generation       Respiratory    Severe obstructive sleep apnea      Patient has been compliant with CPAP, she follows up with [facility],Dr Chang    CPAP 8            Cardiovascular and Mediastinum    Benign essential hypertension - Primary     ·  Continue amlodipine and HCTZ  · I again emphasized importance of good fluid intake  · Minimal elevation of creatinine of 0 99 noted on recent BMP, continue monitoring         Relevant Orders    CBC and differential    Comprehensive metabolic panel       Other    Hyperlipidemia     ·  Lipid panel is well controlled on low-dose of atorvastatin 10 mg 3 days per week         S/P MVR (mitral valve repair)     · 2019,Dr Gibson  ·  patient remains under care of [facility], Dr Nuris Gooden           Other Visit Diagnoses     Double vision with both eyes open        Relevant Orders    Ambulatory Referral to Ophthalmology        Patient presents for follow-up of chronic medical conditions  She has been feeling generally well and will continue daily medications for hypertension, hyperlipidemia and hypothyroidism  History of mitral valve replacement in 2019 with pending cardiology follow-up  Patient reports few episodes of double vision that have occurred within past 6-12 months  She denies symptoms of headaches, nausea, vomiting, chest pain, palpitations or dizziness  Her double visually usually resolves with monocular vision  Patient will discuss her symptoms at forthcoming follow-up with Cardiology    Previous workup for same symptoms 2 years ago included negative CTA brainand normal  carotid ultrasound and normal exam by Ophthalmology  I wonder if patient's symptoms may represent symptoms of ocular migraines? I advised patient to schedule re-evaluation with Ophthalmology  If no finding/recommendations as per Ophthalmology or cardiology -we will consider MRI brain as next diagnostic step  Patient will contact me with an update  Routine follow-up and blood work in 6 months  BMI Counseling: Body mass index is 34 15 kg/m²  The BMI is above normal  Nutrition recommendations include encouraging healthy choices of fruits and vegetables, consuming healthier snacks, moderation in carbohydrate intake and reducing intake of cholesterol  Exercise recommendations include exercising 3-5 times per week  There are no Patient Instructions on file for this visit  Discussed with the patient and all questioned fully answered  She will call me if any problems arise  M*Modal software was used to dictate this note  It may contain errors with dictating incorrect words/spelling  Please contact provider directly with any questions  Chief Complaint     Chief Complaint   Patient presents with    Follow-up       History of Present Illness     Patient presents for follow-up of chronic medical conditions  She is accompanied by her   Results of recent blood work reviewed  Normal CMP, lipid panel and TSH  Patient remains on daily medications for hyperlipidemia, hypothyroidism and hypertension  She denies symptoms of chest pain, palpitations, shortness of breath or dizziness  Patient remains under ongoing care of [facility]  due to history of mitral valve replacement  Patient is scheduled for mammogram and bone density scan on June 1st     She reports 3-4 episodes of double vision within past few months    Double vision usually disappears with monocular vision, patient state it does not matter which eye as she would close - double vision will disappear immediately  Symptoms are lasting 1 minute or so  No associated headache, dizziness, palpitations or diaphoresis  Patient reports few occurrences of double vision while she is driving  She managed to abort her episodes by closing one of her eyes  Patient had 1st episode of double vision shortly after her mitral valve replacement  2 years ago  At that time she was evaluated with CTA brain which was normal and had ophthalmology consultation which was unremarkable  Carotid ultrasound performed in December of 2018 was clear  Patient denies symptoms of visual trouble on a daily basis  She follows up with her optometrist on a regular basis  Review of Systems   Review of Systems   Constitutional: Negative  HENT: Negative  Eyes: Positive for visual disturbance  Respiratory: Negative  Cardiovascular: Negative  Gastrointestinal: Negative  Endocrine: Negative  Genitourinary: Negative  Musculoskeletal: Positive for arthralgias  Skin: Negative  Allergic/Immunologic: Negative  Neurological: Negative  Hematological: Negative  Psychiatric/Behavioral: Negative          Active Problem List     Patient Active Problem List   Diagnosis    Benign essential hypertension    Disc degeneration, lumbar    Hyperlipidemia    Hypothyroidism    Liver enlargement    Osteopenia of multiple sites    Severe obstructive sleep apnea    Shoulder impingement    Tinea corporis    Lumbar radiculopathy    S/P MVR (mitral valve repair)    Encounter for Medicare annual wellness exam       Past Medical History:  Past Medical History:   Diagnosis Date    Cystic breast     Disease of thyroid gland     Dyspareunia, female     last assessed 9/4/14    GERD (gastroesophageal reflux disease)     Hepatic cyst 9/21/2015    Hyperlipidemia     Hypertension     Hypothyroidism     Insomnia     Obesity     IRINA on CPAP     Osteoporosis     Severe mitral regurgitation     Thrombocytopenia (HCC) 1/8/2019    Urinary tract infection     Varicose veins of lower extremity     last assessed 1/4/13       Past Surgical History:  Past Surgical History:   Procedure Laterality Date    COLONOSCOPY      complete 5/2016 - Dr Savana Solano due in 2019 - last assessed  3/17/17    HERNIA REPAIR      LIVER BIOPSY LAPAROSCOPIC N/A 5/7/2018    Procedure: Laparoscopic marsupialization of liver cyst;  Surgeon: Nya Moore MD;  [facility];  Service: Surgical Oncology    NEUROMA EXCISION      VA ECHO Im [address] N/A 1/7/2019    Procedure: TRANSESOPHAGEAL ECHOCARDIOGRAM (DORI); Surgeon: Frances Mcgee MD;  [facility];  Service: Cardiac Surgery    VA ESOPHAGOGASTRODUODENOSCOPY TRANSORAL DIAGNOSTIC N/A 8/10/2016    Procedure: ESOPHAGOGASTRODUODENOSCOPY (EGD); Surgeon: Noah Pickens MD;  Location: [facility]; Service: Gastroenterology    VA ESOPHAGOSCOPY FLEXIBLE TRANSORAL WITH BIOPSY N/A 11/3/2016    Procedure: ESOPHAGOGASTRODUODENOSCOPY (EGD);   Surgeon: Dolores Newell MD;  [facility];  Service: Thoracic    VA LAP, REPAIR PARAESOPHAGEAL HERNIA, INCL FUNDOPLASTY W/ MESH Left 11/3/2016    Procedure: LAPAROSCOPIC PARAESOPHAGEAL HERNIA REPAIR WITH MESH;NISSEN FUNDOPLICATION ;  Surgeon: Dolores Newell MD;  [facility];  Service: Thoracic    VA LAP, VENTRAL HERNIA REPAIR,REDUCIBLE N/A 10/30/2017    Procedure: LAPAROSCOPIC INCISIONAL HERNIA REPAIR X 2 WITH ECHO MESH;  Surgeon: Jay Cifuentes DO;  [facility];  Service: General    VA REPAIR INCISIONAL HERNIA,REDUCIBLE N/A 1/13/2017    Procedure: INCISIONAL HERNIA REPAIR ;  Surgeon: Jay Cifuentes DO;  [facility];  Service: General    VA REPLACEMENT OF MITRAL VALVE N/A 1/7/2019    Procedure: REPLACEMENT VALVE MITRAL (MVR), repair with 30mm CG Future ring;  Surgeon: Frances Mcgee MD;  [facility];  Service: Cardiac Surgery    TUBAL LIGATION         Family History:  Family History Problem Relation Age of Onset    Heart disease Mother     Aneurysm Mother         cerebral    Alcohol abuse Father     Cancer Father     COPD Father     Heart failure Father     Hypertension Father     Tuberculosis Father     Cancer Family     Breast cancer Paternal Grandmother 80    No Known Problems Sister     No Known Problems Daughter     No Known Problems Maternal Grandmother     No Known Problems Maternal Grandfather     No Known Problems Paternal Grandfather        Social History:  Social History     Socioeconomic History    Marital status: /Civil Union     Spouse name: Not on file    Number of children: Not on file    Years of education: Not on file    Highest education level: Not on file   Occupational History    Not on file   Social Needs    Financial resource strain: Not on file    Food insecurity     Worry: Not on file     Inability: Not on file   [employer] needs     Medical: Not on file     Non-medical: Not on file   Tobacco Use    Smoking status: Never Smoker    Smokeless tobacco: Never Used   Substance and Sexual Activity    Alcohol use: Yes     Comment: social    Drug use: No    Sexual activity: Yes     Partners: Male     Birth control/protection: Post-menopausal   Lifestyle    Physical activity     Days per week: Not on file     Minutes per session: Not on file    Stress: Not on file   Relationships    Social connections     Talks on phone: Not on file     Gets together: Not on file     Attends Confucianist service: Not on file     Active member of club or organization: Not on file     Attends meetings of clubs or organizations: Not on file     Relationship status: Not on file    Intimate partner violence     Fear of current or ex partner: Not on file     Emotionally abused: Not on file     Physically abused: Not on file     Forced sexual activity: Not on file   Other Topics Concern    Not on file   Social History Narrative    Coffee    Exercise - walking Objective     Vitals:    04/12/21 1058   BP: 126/86   BP Location: Left arm   Patient Position: Sitting   Cuff Size: Large   Pulse: 69   Resp: 17   Temp: 97 6 °F (36 4 °C)   TempSrc: Temporal   SpO2: 97%   Weight: 97 4 kg (214 lb 12 8 oz)   Height: 5' 6 5" (1 689 m)     Wt Readings from Last 3 Encounters:   04/12/21 97 4 kg (214 lb 12 8 oz)   11/12/20 98 kg (216 lb)   09/08/20 98 9 kg (218 lb)       Physical Exam  Vitals signs and nursing note reviewed  Constitutional:       General: She is not in acute distress  Appearance: Normal appearance  She is well-developed  She is not ill-appearing  HENT:      Head: Normocephalic and atraumatic  Eyes:      General: No scleral icterus  Conjunctiva/sclera: Conjunctivae normal    Neck:      Musculoskeletal: Neck supple  No neck rigidity  Thyroid: No thyromegaly  Vascular: No carotid bruit  Cardiovascular:      Rate and Rhythm: Normal rate and regular rhythm  Heart sounds: Normal heart sounds  No murmur  Pulmonary:      Effort: Pulmonary effort is normal  No respiratory distress  Breath sounds: Normal breath sounds  No wheezing  Abdominal:      General: Bowel sounds are normal  There is no abdominal bruit  Palpations: Abdomen is soft  Musculoskeletal: Normal range of motion  Right lower leg: No edema  Left lower leg: No edema  Skin:     Findings: No rash  Neurological:      General: No focal deficit present  Mental Status: She is alert and oriented to person, place, and time  Cranial Nerves: No cranial nerve deficit  Coordination: Coordination normal    Psychiatric:         Mood and Affect: Mood normal          Behavior: Behavior normal          Thought Content:  Thought content normal          Pertinent Laboratory/Diagnostic Studies:  Lab Results   Component Value Date    GLUCOSE 146 (H) 01/07/2019    BUN 13 03/24/2021    CREATININE 0 99 (H) 03/24/2021    CALCIUM 9 4 03/24/2021     09/19/2017    K 3 7 03/24/2021    CO2 28 03/24/2021     03/24/2021     Lab Results   Component Value Date    ALT 14 03/24/2021    AST 15 03/24/2021    ALKPHOS 84 03/24/2021    BILITOT 0 6 09/19/2017       Lab Results   Component Value Date    WBC 5 3 09/01/2020    HGB 12 7 09/01/2020    HCT 39 4 09/01/2020    MCV 90 8 09/01/2020     09/01/2020       Lab Results   Component Value Date    TSH 0 45 03/24/2021       Lab Results   Component Value Date    CHOL 159 09/19/2017     Lab Results   Component Value Date    TRIG 110 03/24/2021     Lab Results   Component Value Date    HDL 60 03/24/2021     Lab Results   Component Value Date    LDLCALC 77 03/24/2021     Lab Results   Component Value Date    HGBA1C 5 5 12/31/2018       Results for orders placed or performed in visit on 67/10/52   Basic metabolic panel   Result Value Ref Range    Glucose, Random 108 (H) 65 - 99 mg/dL    BUN 13 7 - 25 mg/dL    Creatinine 0 99 (H) 0 60 - 0 93 mg/dL    eGFR Non  58 (L) > OR = 60 mL/min/1 73m2    eGFR  67 > OR = 60 mL/min/1 73m2    SL AMB BUN/CREATININE RATIO 13 6 - 22 (calc)    Sodium 139 135 - 146 mmol/L    Potassium 3 7 3 5 - 5 3 mmol/L    Chloride 106 98 - 110 mmol/L    CO2 28 20 - 32 mmol/L    Calcium 9 4 8 6 - 10 4 mg/dL   TSH, 3rd generation   Result Value Ref Range    TSH 0 45 0 40 - 4 50 mIU/L   Lipid panel   Result Value Ref Range    Total Cholesterol 157 <200 mg/dL    HDL 60 > OR = 50 mg/dL    Triglycerides 110 <150 mg/dL    LDL Calculated 77 mg/dL (calc)    Chol HDLC Ratio 2 6 <5 0 (calc)    Non-HDL Cholesterol 97 <130 mg/dL (calc)   Hepatic function panel   Result Value Ref Range    Protein, Total 7 1 6 1 - 8 1 g/dL    Albumin 3 9 3 6 - 5 1 g/dL    Globulin 3 2 1 9 - 3 7 g/dL (calc)    Albumin/Globulin Ratio 1 2 1 0 - 2 5 (calc)    TOTAL BILIRUBIN 0 7 0 2 - 1 2 mg/dL    Bilirubin, Direct 0 2 < OR = 0 2 mg/dL    Bilirubin, Indirect 0 5 0 2 - 1 2 mg/dL (calc)    Alkaline Phosphatase 84 37 - 153 U/L    AST 15 10 - 35 U/L    ALT 14 6 - 29 U/L   Test Authorization   Result Value Ref Range    Test Name  HEPATIC FUNCTION PANEL LIPID      Test Code  [id number]     Client Contact Alexa Cornejo     Report Always Message Signature      Always Message         Orders Placed This Encounter   Procedures    CBC and differential    Comprehensive metabolic panel    Lipid Panel with Direct LDL reflex    TSH, 3rd generation    Ambulatory Referral to Ophthalmology       ALLERGIES:  Allergies   Allergen Reactions    Other Other (See Comments)     Skin breakdown from bandaid on for long time- reddness       Current Medications     Current Outpatient Medications   Medication Sig Dispense Refill    amLODIPine (NORVASC) 5 mg tablet Take 1 tablet (5 mg total) by mouth daily 90 tablet 3    aspirin 81 MG tablet Take 1 tablet (81 mg total) by mouth daily 30 tablet 11    atorvastatin (LIPITOR) 10 mg tablet TAKE 1 TABLET (10 MG TOTAL) BY MOUTH DAILY 30 tablet 4    Cholecalciferol (VITAMIN D3) 2000 UNITS TABS Take 1 tablet by mouth daily   estradiol (ESTRACE VAGINAL) 0 1 mg/g vaginal cream Insert into the vagina      hydrochlorothiazide (MICROZIDE) 12 5 mg capsule TAKE 1 TO 2 CAPSULES DAILY WITH AMLODIPINE 60 capsule 5    ketoconazole (NIZORAL) 2 % cream Apply topically daily 60 g 1    levothyroxine 150 mcg tablet TAKE 1 TABLET ONCE DAILY 30 tablet 3    Omega-3 Fatty Acids (FISH OIL) 1200 MG CAPS Take 1 capsule by mouth daily       No current facility-administered medications for this visit  There are no discontinued medications      Health Maintenance     Health Maintenance   Topic Date Due    Hepatitis C Screening  Never done    SLP PLAN OF CARE  Never done    Falls: Plan of Care  Never done    DTaP,Tdap,and Td Vaccines (2 - Td) 11/09/2020    Medicare Annual Wellness Visit (AWV)  09/08/2021    Fall Risk  09/23/2021    MAMMOGRAM  12/11/2021    Depression Screening PHQ 04/12/2022    BMI: Followup Plan  04/12/2022    BMI: Adult  04/12/2022    Colonoscopy Surveillance  09/26/2022    Colorectal Cancer Screening  09/26/2029    Pneumococcal Vaccine: 65+ Years  Completed    Influenza Vaccine  Completed    COVID-19 Vaccine  Completed    HIB Vaccine  Aged Out    Hepatitis B Vaccine  Aged Out    IPV Vaccine  Aged Out    Hepatitis A Vaccine  Aged Out    Meningococcal ACWY Vaccine  Aged Out    HPV Vaccine  Aged Out       Immunization History   Administered Date(s) Administered    Influenza Quadrivalent Preservative Free 3 years and older IM 11/11/2016    Influenza Split High Dose Preservative Free IM 09/21/2015, 09/15/2017, 10/21/2019    Influenza, high dose seasonal 0 7 mL 10/19/2018, 09/08/2020    Influenza, seasonal, injectable 11/01/2010, 01/24/2013, 09/22/2014    Pneumococcal Conjugate 13-Valent 01/19/2016    Pneumococcal Polysaccharide PPV23 09/15/2017    SARS-CoV-2 / COVID-19 mRNA IM (DIRECTV) 01/29/2021, 02/24/2021    Tdap 11/09/2010       Pancho Bray MD

## 2021-04-18 ENCOUNTER — TELEPHONE (OUTPATIENT)
Dept: FAMILY MEDICINE CLINIC | Facility: CLINIC | Age: 71
End: 2021-04-18

## 2021-04-18 NOTE — TELEPHONE ENCOUNTER
Please fax most recent office visit note to [facility], attention Deyvi Herndon  ( mutual pt update)  Thanks

## 2021-04-18 NOTE — ASSESSMENT & PLAN NOTE
· 1/7/2019,Dr Gibson  ·  patient remains under care of [facility], Dr Page Rodríguez

## 2021-05-10 ENCOUNTER — OFFICE VISIT (OUTPATIENT)
Dept: CARDIOLOGY CLINIC | Facility: CLINIC | Age: 71
End: 2021-05-10
Payer: MEDICARE

## 2021-05-10 VITALS
SYSTOLIC BLOOD PRESSURE: 124 MMHG | BODY MASS INDEX: 33.56 KG/M2 | WEIGHT: 213.8 LBS | HEART RATE: 94 BPM | HEIGHT: 67 IN | DIASTOLIC BLOOD PRESSURE: 78 MMHG

## 2021-05-10 DIAGNOSIS — E78.2 MIXED HYPERLIPIDEMIA: Primary | ICD-10-CM

## 2021-05-10 DIAGNOSIS — G47.33 SEVERE OBSTRUCTIVE SLEEP APNEA: ICD-10-CM

## 2021-05-10 DIAGNOSIS — Z98.890 S/P MVR (MITRAL VALVE REPAIR): ICD-10-CM

## 2021-05-10 DIAGNOSIS — I10 BENIGN ESSENTIAL HYPERTENSION: ICD-10-CM

## 2021-05-10 PROCEDURE — 99214 OFFICE O/P EST MOD 30 MIN: CPT | Performed by: INTERNAL MEDICINE

## 2021-05-10 PROCEDURE — 93000 ELECTROCARDIOGRAM COMPLETE: CPT | Performed by: INTERNAL MEDICINE

## 2021-05-10 NOTE — PROGRESS NOTES
[facility]  Follow up note  Fran Hodgkins 79 y o  female MRN: [id number]        Problems    1  Mixed hyperlipidemia     2  Benign essential hypertension     3  S/P MVR (mitral valve repair)     4  Severe obstructive sleep apnea         Impression:    Moderate to severe mitral regurgitation/mitral valve prolapse  Status post mitral valve repair with annuloplasty ring 2018  Normal preoperative coronaries on cardiac catheterization   prophylaxis with 81 mg of aspirin   follow-up echo shows normal expected mitral gradient   Lifelong pre dental antibiotics advised    Mixed hyperlipidemia   well controlled for elevated ASCVD risk on atorvastatin intermittent dosing    Obstructive sleep apnea   stable and compliant with CPAP    Double vision  Significantly less frequent since immediately after her mitral valve repair   Her double vision is not side by side rather above and below double vision  Occurred twice in April this year, last for 30 seconds, may have something to do with I fatigue   CTA imaging in the past and ophthalmology evaluation has been unremarkable any significant pathology      Plan:     continue active lifestyle   Continue annual follow-up   No additional cardiac testing needed at this time    HPI:   Fran Hodgkins is a 79y o  year old female with severe mitral regurgitation due to P2 prolapse of the mitral valve who underwent class 2A indication mitral valve repair  In 2018  she had double vision again which is [place] rather than [address] double vision, ( vertical double vision evaluated in the past by CTA, ophthalmology, negative workup) she had this in April x2, often associated with looking at her cell phone, TB or a book for an extended amount of time, might have to do with I fatigue        She has no cardiac symptoms, she walks about 2 miles a couple times a week    Her blood pressure is excellent    Her cholesterol is excellent   Medications well tolerated    Her EKG in the office today is normal       Review of Systems   Constitutional: Negative for appetite change, diaphoresis, fatigue and fever  Eyes: Positive for visual disturbance  Respiratory: Negative for chest tightness, shortness of breath and wheezing  Cardiovascular: Negative for chest pain, palpitations and leg swelling  Gastrointestinal: Negative for abdominal pain and blood in stool  Musculoskeletal: Negative for arthralgias and joint swelling  Skin: Negative for rash  Neurological: Negative for dizziness, syncope and light-headedness  Past Medical History:   Diagnosis Date    Cystic breast     Disease of thyroid gland     Dyspareunia, female     last assessed 9/4/14    GERD (gastroesophageal reflux disease)     Hepatic cyst 9/21/2015    Hyperlipidemia     Hypertension     Hypothyroidism     Insomnia     Obesity     IRINA on CPAP     Osteoporosis     Severe mitral regurgitation     Thrombocytopenia ([address] ) 1/8/2019    Urinary tract infection     Varicose veins of lower extremity     last assessed 1/4/13     Social History     Substance and Sexual Activity   Alcohol Use Yes    Comment: social     Social History     Substance and Sexual Activity   Drug Use No     Social History     Tobacco Use   Smoking Status Never Smoker   Smokeless Tobacco Never Used       Allergies: Allergies   Allergen Reactions    Other Other (See Comments)     Skin breakdown from bandaid on for long time- reddness       Medications:     Current Outpatient Medications:     amLODIPine (NORVASC) 5 mg tablet, Take 1 tablet (5 mg total) by mouth daily, Disp: 90 tablet, Rfl: 3    aspirin 81 MG tablet, Take 1 tablet (81 mg total) by mouth daily, Disp: 30 tablet, Rfl: 11    atorvastatin (LIPITOR) 10 mg tablet, TAKE 1 TABLET (10 MG TOTAL) BY MOUTH DAILY, Disp: 30 tablet, Rfl: 4    Cholecalciferol (VITAMIN D3) 2000 UNITS TABS, Take 1 tablet by mouth daily  , Disp: , Rfl:     estradiol (ESTRACE VAGINAL) 0 1 mg/g vaginal cream, Insert into the vagina, Disp: , Rfl:     hydrochlorothiazide (MICROZIDE) 12 5 mg capsule, TAKE 1 TO 2 CAPSULES DAILY WITH AMLODIPINE, Disp: 60 capsule, Rfl: 5    ketoconazole (NIZORAL) 2 % cream, Apply topically daily, Disp: 60 g, Rfl: 1    levothyroxine 150 mcg tablet, TAKE 1 TABLET ONCE DAILY, Disp: 30 tablet, Rfl: 3    Omega-3 Fatty Acids (FISH OIL) 1200 MG CAPS, Take 1 capsule by mouth daily, Disp: , Rfl:       Vitals:    05/10/21 0839   BP: 124/78   Pulse: 94     Weight (last 2 days)     Date/Time   Weight    05/10/21 0839   97 (213 8)            Physical Exam  Constitutional:       General: She is not in acute distress  Appearance: She is not diaphoretic  HENT:      Head: Normocephalic and atraumatic  Eyes:      General: No scleral icterus  Conjunctiva/sclera: Conjunctivae normal    Neck:      Musculoskeletal: Normal range of motion  Vascular: No JVD  Cardiovascular:      Rate and Rhythm: Normal rate and regular rhythm  Heart sounds: Normal heart sounds  No murmur  Pulmonary:      Effort: Pulmonary effort is normal  No respiratory distress  Breath sounds: Normal breath sounds  No wheezing, rhonchi or rales  Musculoskeletal:         General: No tenderness  Right lower leg: Normal  No edema  Left lower leg: Normal  No edema  Skin:     General: Skin is warm and dry             Laboratory Studies:  Chem:   Lab Results   Component Value Date    HGBA1C 5 5 12/31/2018     09/19/2017     05/03/2017     12/12/2016    K 3 7 03/24/2021    K 3 8 09/01/2020    K 3 8 01/07/2020     03/24/2021     09/01/2020     01/07/2020    CO2 28 03/24/2021    CO2 29 09/01/2020    CO2 31 01/07/2020    GLUCOSE 146 (H) 01/07/2019    GLUCOSE 166 (H) 01/07/2019    GLUCOSE 195 (H) 01/07/2019    CREATININE 0 99 (H) 03/24/2021    CREATININE 0 96 (H) 09/01/2020    CREATININE 0 99 01/07/2020    CREATININE 0 66 01/10/2019    CREATININE 0 73 01/09/2019    CREATININE 0 73 2019    CREATININE 0 85 2017    CREATININE 0 84 2017    CREATININE 0 84 2016    BUN 13 2021    BUN 13 2020    BUN 16 2020    MG 2 7 (H) 2019    MG 2 3 2018    MG 2 3 2018     NT-proBNP:   Coags:  Lipid Profile:   Lab Results   Component Value Date    CHOL 159 2017    CHOL 163 2017    CHOL 146 2016    LDLCALC 77 2021    LDLCALC 92 2020    LDLCALC 76 2020    LDLDIRECT 110 2016    LDLDIRECT 105 2015    LDLDIRECT 98 2013    HDL 60 2021    HDL 57 2020    HDL 58 2020    TRIG 110 2021    TRIG 140 2020    TRIG 144 2020       Cardiac testing:   EKG reviewed personally:   Results for orders placed or performed in visit on 05/10/21   POCT ECG    Impression     Normal sinus rhythm, normal EKG         Limited echocardiogram 3/19  EF normal, status post mitral valve repair, mean gradient 7 mmHg    Results for orders placed during the hospital encounter of 18   Echo complete with contrast if indicated    Narrative [address]  [phone]    Transthoracic Echocardiogram  2D, M-mode, Doppler, and Color Doppler    Study date:  2018    Patient: Miller Vivar  MR number: [id number]  Account number: [de-identified]  : 1950  Age: 76 years  Gender: Female  Status: Outpatient  Location: Echo lab  Height: 65 in  Weight: 200 lb  BP: 140/ 90 mmHg    Indications: Murmur    Diagnoses: R01 1 - Cardiac murmur, unspecified    Sonographer:  ZEV Tripp  Primary Physician:  Agnes Granados MD  Referring Physician:  Agnes Granados MD  Group:  [facility]  Cardiology Fellow:  Anneliese Dunn MD  Interpreting Physician:  Edyta Armenta MD    SUMMARY    LEFT VENTRICLE:  Size was normal   Systolic function was normal  Ejection fraction was estimated to be 65 %    There were no regional wall motion abnormalities  RIGHT VENTRICLE:  The size was normal   Systolic function was normal     MITRAL VALVE:  There was holosystolic prolapse involving the posterior leaflet  There was severe regurgitation  The regurgitant jet was eccentric and directed anteriorly  The effective orifice of mitral regurgitation by proximal isovelocity surface area was 0 48 cm squared  The volume of mitral regurgitation by proximal isovelocity surface area was 93 ml  PULMONARY VEINS:  There was systolic flow reversal in the pulmonary vein(s), indicative of severe mitral regurgitation  RECOMMENDATIONS:  Discussed with the patient and the patient's primary care for out patient cardiology follow-up  The attending physician was notified of these results  HISTORY: PRIOR HISTORY: HTN, HLD, IRINA    PROCEDURE: The procedure was performed in the echo lab  This was a routine study  The transthoracic approach was used  The study included complete 2D imaging, M-mode, complete spectral Doppler, and color Doppler  The heart rate was 65 bpm,  at the start of the study  Images were obtained from the parasternal, apical, subcostal, and suprasternal notch acoustic windows  Image quality was adequate  LEFT VENTRICLE: Size was normal  Systolic function was normal  Ejection fraction was estimated to be 65 %  There were no regional wall motion abnormalities  Wall thickness was normal  DOPPLER: Left ventricular diastolic function parameters  were normal     RIGHT VENTRICLE: The size was normal  Systolic function was normal     LEFT ATRIUM: The atrium was mildly to moderately dilated  RIGHT ATRIUM: Size was normal     MITRAL VALVE: There was mild annular calcification  There was mild diffuse thickening of the valve  There was holosystolic prolapse involving the posterior leaflet  DOPPLER: There was no evidence for stenosis  There was severe  regurgitation   The regurgitant jet was eccentric and directed anteriorly  AORTIC VALVE: The valve was trileaflet  Leaflets exhibited normal thickness and normal cuspal separation  DOPPLER: There was no evidence for stenosis  There was no regurgitation  TRICUSPID VALVE: The valve structure was normal  There was normal leaflet separation  DOPPLER: There was no evidence for stenosis  There was mild regurgitation  Estimated peak PA pressure was 35 mmHg  PULMONIC VALVE: DOPPLER: The transpulmonic velocity was within the normal range  There was no evidence for stenosis  There was trace regurgitation  PERICARDIUM: There was no pericardial effusion  The pericardium was normal in appearance  AORTA: The root exhibited normal size  SYSTEMIC VEINS: IVC: The inferior vena cava was normal in size and course  Respirophasic changes were normal     PULMONARY VEINS: DOPPLER: There was systolic flow reversal in the pulmonary vein(s), indicative of severe mitral regurgitation  MEASUREMENT TABLES    DOPPLER MEASUREMENTS  Mitral valve   (Reference normals)  Regurg alias keven   38 cm/s   (--)  Regurg PISA radius   11 mm   (--)  Max MR keven   598 cm/s   (--)  Regurg VTI   194 cm   (--)  Max MR flow rate   288 9 ml/s   (--)  Area, ERO, PISA   0 48 cm squared   (--)  Regurg vol, PISA   93 ml   (--)    SYSTEM MEASUREMENT TABLES    2D  %FS: 40 38 %  Ao Diam: 2 74 cm  EDV(Teich): 118 21 ml  EF(Cube): 78 81 %  EF(Teich): 70 86 %  ESV(Cube): 26 48 ml  ESV(Teich): 34 45 ml  IVSd: 1 08 cm  LA Area: 27 71 cm2  LA Diam: 4 4 cm  LVEDV MOD A4C: 121 26 ml  LVEF MOD A4C: 62 99 %  LVESV MOD A4C: 44 88 ml  LVIDd: 5 cm  LVIDs: 2 98 cm  LVLd A4C: 8 01 cm  LVLs A4C: 6 37 cm  LVPWd: 1 1 cm  RA Area: 16 49 cm2  SV MOD A4C: 76 38 ml  SV(Cube): 98 48 ml  SV(Teich): 83 76 ml  rv diam: 3 86 cm    CF  MR Als  Keven: 0 38 m/s  MR Flow: 393 24 ml/s  MR Rad: 1 28 cm    CW  TR Vmax: 2 65 m/s  TR maxP 18 mmHg    MM  TAPSE: 2 23 cm    PW  E': 0 09 m/s  E/E': 13 56  MV A Keven: 0 81 m/s  MV Dec Gentry: 7 83 m/s2  MV DecT: 158 22 ms  MV E Keven: 1 24 m/s  MV E/A Ratio: 1 53    [facility]    Prepared and electronically signed by    Mykel Ahumada MD  Signed [id number] 11:47:14       Results for orders placed during the hospital encounter of 18   DORI    Narrative [address]  [facility], [address] [facility]  [phone]    Transesophageal Echocardiogram  2D, 3D, M-mode, Doppler, and Color Doppler    Study date:  03-Dec-2018    Patient: Karen Castillo  MR number: [id number]  Account number: [de-identified]  : 1950  Age: 76 years  Gender: Female  Status: Outpatient  Location: Echo lab  Height: 66 in  Weight: 200 lb  BP: 118/ 72 mmHg    Indications: MVP    Diagnoses: I34 1 - Nonrheumatic mitral (valve) prolapse    Sonographer:  ZEV Cope  Interpreting Physician:  Estephania Pina MD  Primary Physician:  Marco Coronel MD  Referring Physician:  Mere Wolfe MD  Group:  [facility]  Cardiology Fellow:  Reji Rivera MD  RN:  Biju Vazquez RN    SUMMARY    LEFT VENTRICLE:  Size was normal   The end systolic dimension was 30 mm  Systolic function was normal  Ejection fraction was estimated to be 60 %  There were no regional wall motion abnormalities  LEFT ATRIUM:  The atrium was mildly dilated  ATRIAL SEPTUM:  No defect or patent foramen ovale was identified by color Doppler  MITRAL VALVE:  There was a marked, holosystolic prolapse involving the medial scallop of the posterior leaflet  The maximum prolapse dimension was 8 mm  There was severe regurgitation  PISA could not be done due to the eccentric nature of the regurgitation jet  The regurgitant jet was eccentric and directed anteriorly  The mitral regurgitant volume (by LVOT continuity) was 145 ml  The mitral regurgitant volume-regurgitation fraction (by LVOT continuity) was 68 %      TRICUSPID VALVE:  There was mild to moderate regurgitation  Pulmonary artery systolic pressure was within the normal range  PULMONARY VEINS:  There was systolic blunting in the pulmonary vein(s), indicative of significant mitral regurgitation  There was no flow reversal seen and this could be due to low blood pressure of the patient during the study  HISTORY: PRIOR HISTORY: HTN, HLD, IRINA    PROCEDURE: The procedure was performed in the echo lab  This was a routine study  The risks and alternatives of the procedure were explained to the patient and informed consent was obtained  The transesophageal approach was used  The study  included complete 2D imaging, 3D imaging, M-mode, complete spectral Doppler, and color Doppler  The heart rate was 68 bpm, at the start of the study  An adult omniplane probe was inserted by the cardiology fellow under direct supervision  of the attending cardiologist  Intubated with ease  One intubation attempt(s)  There was no blood detected on the probe  Image quality was adequate  There were no complications during the procedure  MEDICATIONS: Anesthesia administered by  anesthesia team     LEFT VENTRICLE: Size was normal   The end systolic dimension was 30 mm  Systolic function was normal  Ejection fraction was estimated to be 60 %  There were no regional wall motion abnormalities  Wall thickness was normal     RIGHT VENTRICLE: The size was normal  Systolic function was normal     LEFT ATRIUM: The atrium was mildly dilated  No thrombus was identified  APPENDAGE: The size was normal  No thrombus was identified  DOPPLER: The function was normal (normal emptying velocity)  ATRIAL SEPTUM: No defect or patent foramen ovale was identified by color Doppler  RIGHT ATRIUM: Size was normal  No thrombus was identified  MITRAL VALVE: There was normal leaflet separation  There was a marked, holosystolic prolapse involving the medial scallop of the posterior leaflet  The maximum prolapse dimension was 8 mm   There was no echocardiographic evidence of  vegetation  DOPPLER: The transmitral velocity was within the normal range  There was no evidence for stenosis  There was severe regurgitation  PISA could not be done due to the eccentric nature of the regurgitation jet  The regurgitant jet was eccentric and directed anteriorly  AORTIC VALVE: The valve was trileaflet  Leaflets exhibited normal thickness and normal cuspal separation  DOPPLER: There was no regurgitation  TRICUSPID VALVE: The valve structure was normal  There was normal leaflet separation  There was no echocardiographic evidence of vegetation  DOPPLER: There was mild to moderate regurgitation  The regurgitant jet was toward the septum  Pulmonary artery systolic pressure was within the normal range  Estimated peak PA pressure was 26 mmHg  PULMONIC VALVE: Leaflets exhibited normal thickness, no calcification, and normal cuspal separation  DOPPLER: There was no significant regurgitation  PERICARDIUM: There was no pericardial effusion  The pericardium was normal in appearance  AORTA: The root exhibited normal size  There was no atheroma  There was no evidence for dissection  There was no evidence for aneurysm  PULMONARY VEINS: DOPPLER: There was systolic blunting in the pulmonary vein(s), indicative of significant mitral regurgitation  There was no flow reversal seen and this could be due to low blood pressure of the patient during the study      MEASUREMENT TABLES    2D MEASUREMENTS  LVOT   (Reference normals)  Diam   21 mm   (--)  Mitral valve   (Reference normals)  Mean annulus diam   33 mm   (--)    DOPPLER MEASUREMENTS  LVOT   (Reference normals)  VTI   20 cm   (--)  Stroke vol   69 27 ml   (--)  Stroke index   0 35 ml/m squared   (--)  Mitral valve   (Reference normals)  VTI at annulus   25 cm   (--)  Vol, (flow)   213 82 ml   (--)  Regurg vol, LVOT cont   145 ml   (--)  RF, LVOT cont   68 %   (--)  Area, ERO, LVOT cont   5 8 cm squared (--)    [facility]    Prepared and electronically signed by    Estephania Pina MD  Signed 03-Dec-2018 16:47:18       No results found for this or any previous visit  No results found for this or any previous visit  Estephania Pina MD    Portions of the record may have been created with voice recognition software   Occasional wrong word or "sound a like" substitutions may have occurred due to the inherent limitations of voice recognition software   Read the chart carefully and recognize, using context, where substitutions have occurred

## 2021-06-01 ENCOUNTER — HOSPITAL ENCOUNTER (OUTPATIENT)
Dept: RADIOLOGY | Age: 71
Discharge: HOME | End: 2021-06-01
Payer: MEDICARE

## 2021-06-01 VITALS — WEIGHT: 210 LBS | HEIGHT: 65 IN | BODY MASS INDEX: 34.99 KG/M2

## 2021-06-01 DIAGNOSIS — E03.9 HYPOTHYROIDISM, UNSPECIFIED TYPE: ICD-10-CM

## 2021-06-01 DIAGNOSIS — Z12.31 ENCOUNTER FOR SCREENING MAMMOGRAM FOR BREAST CANCER: ICD-10-CM

## 2021-06-01 DIAGNOSIS — M85.89 OSTEOPENIA OF MULTIPLE SITES: ICD-10-CM

## 2021-06-01 PROCEDURE — 77067 SCR MAMMO BI INCL CAD: CPT

## 2021-06-01 PROCEDURE — 77080 DXA BONE DENSITY AXIAL: CPT

## 2021-06-01 PROCEDURE — 77063 BREAST TOMOSYNTHESIS BI: CPT

## 2021-06-01 RX ORDER — LEVOTHYROXINE SODIUM 0.15 MG/1
TABLET ORAL
Qty: 30 TABLET | Refills: 5 | Status: SHIPPED | OUTPATIENT
Start: 2021-06-01 | End: 2021-10-13 | Stop reason: SDUPTHER

## 2021-06-02 ENCOUNTER — TELEPHONE (OUTPATIENT)
Dept: FAMILY MEDICINE CLINIC | Facility: CLINIC | Age: 71
End: 2021-06-02

## 2021-06-02 NOTE — TELEPHONE ENCOUNTER
Please contact patient  · Her mammogram was normal     · Bone density scan reveals early stages of osteopenia but no osteoporosis  It is essentially normal for her age  · No new medications needed      Thank you

## 2021-06-28 DIAGNOSIS — I10 BENIGN ESSENTIAL HYPERTENSION: ICD-10-CM

## 2021-06-28 RX ORDER — AMLODIPINE BESYLATE 5 MG/1
5 TABLET ORAL DAILY
Qty: 90 TABLET | Refills: 3 | Status: SHIPPED | OUTPATIENT
Start: 2021-06-28 | End: 2022-04-18

## 2021-09-11 ENCOUNTER — HOSPITAL ENCOUNTER (EMERGENCY)
Facility: HOSPITAL | Age: 71
Discharge: HOME | End: 2021-09-11
Attending: EMERGENCY MEDICINE
Payer: MEDICARE

## 2021-09-11 ENCOUNTER — APPOINTMENT (EMERGENCY)
Dept: PERIOP | Facility: HOSPITAL | Age: 71
End: 2021-09-11
Payer: MEDICARE

## 2021-09-11 ENCOUNTER — ANESTHESIA EVENT (EMERGENCY)
Dept: PERIOP | Facility: HOSPITAL | Age: 71
End: 2021-09-11
Payer: MEDICARE

## 2021-09-11 ENCOUNTER — ANESTHESIA (EMERGENCY)
Dept: PERIOP | Facility: HOSPITAL | Age: 71
End: 2021-09-11
Payer: MEDICARE

## 2021-09-11 VITALS
HEART RATE: 68 BPM | RESPIRATION RATE: 20 BRPM | SYSTOLIC BLOOD PRESSURE: 129 MMHG | BODY MASS INDEX: 33.79 KG/M2 | OXYGEN SATURATION: 96 % | TEMPERATURE: 98.1 F | DIASTOLIC BLOOD PRESSURE: 72 MMHG | HEIGHT: 65 IN | WEIGHT: 202.82 LBS

## 2021-09-11 DIAGNOSIS — K22.10 ULCERATIVE ESOPHAGITIS: ICD-10-CM

## 2021-09-11 DIAGNOSIS — K22.2 ESOPHAGEAL OBSTRUCTION DUE TO FOOD IMPACTION: Primary | ICD-10-CM

## 2021-09-11 DIAGNOSIS — T18.128A ESOPHAGEAL OBSTRUCTION DUE TO FOOD IMPACTION: Primary | ICD-10-CM

## 2021-09-11 PROCEDURE — 99284 EMERGENCY DEPT VISIT MOD MDM: CPT

## 2021-09-11 PROCEDURE — 43247 EGD REMOVE FOREIGN BODY: CPT | Performed by: INTERNAL MEDICINE

## 2021-09-11 PROCEDURE — 99284 EMERGENCY DEPT VISIT MOD MDM: CPT | Performed by: EMERGENCY MEDICINE

## 2021-09-11 PROCEDURE — 96372 THER/PROPH/DIAG INJ SC/IM: CPT

## 2021-09-11 PROCEDURE — 99285 EMERGENCY DEPT VISIT HI MDM: CPT | Performed by: INTERNAL MEDICINE

## 2021-09-11 RX ORDER — DEXAMETHASONE SODIUM PHOSPHATE 4 MG/ML
INJECTION, SOLUTION INTRA-ARTICULAR; INTRALESIONAL; INTRAMUSCULAR; INTRAVENOUS; SOFT TISSUE AS NEEDED
Status: DISCONTINUED | OUTPATIENT
Start: 2021-09-11 | End: 2021-09-11

## 2021-09-11 RX ORDER — SUCCINYLCHOLINE/SOD CL,ISO/PF 100 MG/5ML
SYRINGE (ML) INTRAVENOUS AS NEEDED
Status: DISCONTINUED | OUTPATIENT
Start: 2021-09-11 | End: 2021-09-11

## 2021-09-11 RX ORDER — OMEPRAZOLE 40 MG/1
40 CAPSULE, DELAYED RELEASE ORAL DAILY
Qty: 30 CAPSULE | Refills: 2 | Status: SHIPPED | OUTPATIENT
Start: 2021-09-11 | End: 2021-12-08

## 2021-09-11 RX ORDER — ONDANSETRON 2 MG/ML
INJECTION INTRAMUSCULAR; INTRAVENOUS AS NEEDED
Status: DISCONTINUED | OUTPATIENT
Start: 2021-09-11 | End: 2021-09-11

## 2021-09-11 RX ORDER — PROPOFOL 10 MG/ML
INJECTION, EMULSION INTRAVENOUS AS NEEDED
Status: DISCONTINUED | OUTPATIENT
Start: 2021-09-11 | End: 2021-09-11

## 2021-09-11 RX ORDER — SODIUM CHLORIDE, SODIUM LACTATE, POTASSIUM CHLORIDE, CALCIUM CHLORIDE 600; 310; 30; 20 MG/100ML; MG/100ML; MG/100ML; MG/100ML
50 INJECTION, SOLUTION INTRAVENOUS CONTINUOUS
Status: DISCONTINUED | OUTPATIENT
Start: 2021-09-11 | End: 2021-09-11 | Stop reason: HOSPADM

## 2021-09-11 RX ORDER — SODIUM CHLORIDE, SODIUM LACTATE, POTASSIUM CHLORIDE, CALCIUM CHLORIDE 600; 310; 30; 20 MG/100ML; MG/100ML; MG/100ML; MG/100ML
INJECTION, SOLUTION INTRAVENOUS CONTINUOUS PRN
Status: DISCONTINUED | OUTPATIENT
Start: 2021-09-11 | End: 2021-09-11

## 2021-09-11 RX ORDER — FENTANYL CITRATE/PF 50 MCG/ML
50 SYRINGE (ML) INJECTION
Status: DISCONTINUED | OUTPATIENT
Start: 2021-09-11 | End: 2021-09-11 | Stop reason: HOSPADM

## 2021-09-11 RX ORDER — ONDANSETRON 2 MG/ML
4 INJECTION INTRAMUSCULAR; INTRAVENOUS ONCE AS NEEDED
Status: DISCONTINUED | OUTPATIENT
Start: 2021-09-11 | End: 2021-09-11 | Stop reason: HOSPADM

## 2021-09-11 RX ADMIN — ONDANSETRON 4 MG: 2 INJECTION INTRAMUSCULAR; INTRAVENOUS at 13:48

## 2021-09-11 RX ADMIN — GLUCAGON HYDROCHLORIDE 1 MG: KIT at 11:19

## 2021-09-11 RX ADMIN — PROPOFOL 200 MG: 10 INJECTION, EMULSION INTRAVENOUS at 13:43

## 2021-09-11 RX ADMIN — SODIUM CHLORIDE, SODIUM LACTATE, POTASSIUM CHLORIDE, AND CALCIUM CHLORIDE: .6; .31; .03; .02 INJECTION, SOLUTION INTRAVENOUS at 13:40

## 2021-09-11 RX ADMIN — DEXAMETHASONE SODIUM PHOSPHATE 4 MG: 4 INJECTION INTRA-ARTICULAR; INTRALESIONAL; INTRAMUSCULAR; INTRAVENOUS; SOFT TISSUE at 13:48

## 2021-09-11 RX ADMIN — LIDOCAINE HYDROCHLORIDE 100 MG: 20 INJECTION, SOLUTION INTRAVENOUS at 13:43

## 2021-09-11 RX ADMIN — Medication 100 MG: at 13:43

## 2021-09-11 NOTE — INTERVAL H&P NOTE
H&P reviewed  After examining the patient I find no changes in the patients condition since the H&P had been written      Vitals:    09/11/21 0945   BP: 143/86   Pulse: 70   Resp: 20   Temp: 99 2 °F (37 3 °C)   SpO2: 99%

## 2021-09-11 NOTE — H&P (VIEW-ONLY)
Consultation - [facility]) [facility]  Ayleen Vieira 70 y o  female MRN: [id number]  Unit/Bed#: DIS03 Encounter: [id number]        [address]    Reason for Consult / Principal Problem:  Suspected esophageal food impaction    HPI: Ayleen Vieira is a 70y o  year old female with history of obstructive sleep apnea on CPAP who presents with complaint of dysphagia  The patient says that she had not been having any difficulty or discomfort with swallowing recently, until [facility], had small amount of short ribs and [employer] fries and immediately noticed sensation of pressure and feeling of food being stuck in the epigastric/lower substernal chest region  She said she tried to eat a small amount of food after this and regurgitated immediately, then tried to take liquids and regurgitated that immediately as well  Had further regurgitation episodes during the night which began to slow down, here in the emergency room she was given IV glucagon and recently was attempted sips of ginger ale, but the patient regurgitated with this as well  The patient says that she has had to have dilation of Schatzki's rings done in the past, most recently about 5 years ago which is when her last EGD was performed (2016 with Dr Vanessa Resendiz)  At that time she had also been noted with an 8 cm hiatal hernia, for which the patient reports that she had repair done in the interim  She denies taking any pharmacologic anticoagulation denies any supplemental oxygen requirements at home  REVIEW OF SYSTEMS:    CONSTITUTIONAL: Denies any fever, chills, or rigors  Good appetite, and no recent weight loss  HEENT: No earache or tinnitus  Denies hearing loss or visual disturbances  CARDIOVASCULAR: No chest pain or palpitations  RESPIRATORY: Denies any cough, hemoptysis, shortness of breath or dyspnea on exertion  GASTROINTESTINAL: As noted in the History of Present Illness  GENITOURINARY: No problems with urination  Denies any hematuria or dysuria  NEUROLOGIC: No dizziness or vertigo, denies headaches  MUSCULOSKELETAL: Denies any muscle or joint pain  SKIN: Denies skin rashes or itching  ENDOCRINE: Denies excessive thirst  Denies intolerance to heat or cold  PSYCHOSOCIAL: Denies depression or anxiety  Denies any recent memory loss  Historical Information   Past Medical History:   Diagnosis Date    Cystic breast     Disease of thyroid gland     Dyspareunia, female     last assessed 9/4/14    GERD (gastroesophageal reflux disease)     Hepatic cyst 9/21/2015    Hyperlipidemia     Hypertension     Hypothyroidism     Insomnia     Obesity     IRINA on CPAP     Osteoporosis     Severe mitral regurgitation     Thrombocytopenia ([address] ) 1/8/2019    Urinary tract infection     Varicose veins of lower extremity     last assessed 1/4/13     Past Surgical History:   Procedure Laterality Date    COLONOSCOPY      complete 5/2016 - Dr Ector Valladares due in 2019 - last assessed  3/17/17    HERNIA REPAIR      LIVER BIOPSY LAPAROSCOPIC N/A 5/7/2018    Procedure: Laparoscopic marsupialization of liver cyst;  Surgeon: Conor Paul MD;  [facility];  Service: Surgical Oncology    NEUROMA EXCISION      SD ECHO [address] N/A 1/7/2019    Procedure: TRANSESOPHAGEAL ECHOCARDIOGRAM (DORI); Surgeon: Gabriella Voss MD;  [facility];  Service: Cardiac Surgery    SD ESOPHAGOGASTRODUODENOSCOPY TRANSORAL DIAGNOSTIC N/A 8/10/2016    Procedure: ESOPHAGOGASTRODUODENOSCOPY (EGD); Surgeon: Nanette Johnson MD;  Location: [facility]; Service: Gastroenterology    SD ESOPHAGOSCOPY FLEXIBLE TRANSORAL WITH BIOPSY N/A 11/3/2016    Procedure: ESOPHAGOGASTRODUODENOSCOPY (EGD);   Surgeon: Feroz Ballard MD;  [facility];  Service: Thoracic    SD LAP, REPAIR PARAESOPHAGEAL HERNIA, INCL FUNDOPLASTY W/ MESH Left 11/3/2016    Procedure: LAPAROSCOPIC PARAESOPHAGEAL HERNIA REPAIR WITH MESH;NISSEN FUNDOPLICATION ;  Surgeon: America Jarrett MD;  [facility];  Service: Thoracic    NC LAP, VENTRAL HERNIA REPAIR,REDUCIBLE N/A 10/30/2017    Procedure: LAPAROSCOPIC INCISIONAL HERNIA REPAIR X 2 WITH ECHO MESH;  Surgeon: Pramod Ly DO;  [facility];  Service: General    NC REPAIR INCISIONAL HERNIA,REDUCIBLE N/A 1/13/2017    Procedure: INCISIONAL HERNIA REPAIR ;  Surgeon: Pramod Ly DO;  [facility];  Service: General    NC REPLACEMENT OF MITRAL VALVE N/A 1/7/2019    Procedure: REPLACEMENT VALVE MITRAL (MVR), repair with 30mm CG Future ring;  Surgeon: Bishop Hartmann MD;  [facility];  Service: Cardiac Surgery    TUBAL LIGATION       Social History   Social History     Substance and Sexual Activity   Alcohol Use Yes    Comment: social     Social History     Substance and Sexual Activity   Drug Use No     Social History     Tobacco Use   Smoking Status Never Smoker   Smokeless Tobacco Never Used     Family History   Problem Relation Age of Onset    Heart disease Mother     Aneurysm Mother         cerebral    Alcohol abuse Father     Cancer Father     COPD Father     Heart failure Father     Hypertension Father     Tuberculosis Father     Cancer Family     Breast cancer Paternal Grandmother 80    No Known Problems Sister     No Known Problems Daughter     No Known Problems Maternal Grandmother     No Known Problems Maternal Grandfather     No Known Problems Paternal Grandfather     No Known Problems Son        Meds/Allergies     (Not in a hospital admission)    No current facility-administered medications for this encounter  Allergies   Allergen Reactions    Other Other (See Comments)     Skin breakdown from bandaid on for long time- reddness           Objective     Blood pressure 143/86, pulse 70, temperature 99 2 °F (37 3 °C), temperature source Oral, resp   rate 20, height 5' 5" (1 651 m), weight 92 kg (202 lb 13 2 oz), SpO2 99 %, not currently breastfeeding  No intake or output data in the 24 hours ending 09/11/21 1318      PHYSICAL EXAM     General Appearance:   Alert, cooperative, no distress, appears stated age    HEENT:   Normocephalic, atraumatic, anicteric      Neck:  Supple, symmetrical, trachea midline, no adenopathy;    thyroid: no enlargement/tenderness/nodules; no carotid  bruit or JVD    Lungs:   Clear to auscultation bilaterally; no rales, rhonchi or wheezing; respirations unlabored    Heart[de-identified]   S1 and S2 normal; regular rate and rhythm; no murmur, rub, or gallop  Abdomen:   Soft, non-tender, non-distended; normal bowel sounds; no masses, no organomegaly    Genitalia:   Deferred    Rectal:   Deferred    Extremities:  No cyanosis, clubbing or edema    Pulses:  2+ and symmetric all extremities    Skin:  Skin color, texture, turgor normal, no rashes or lesions    Lymph nodes:  No palpable cervical, axillary or inguinal lymphadenopathy        Lab Results:   No visits with results within 1 Day(s) from this visit     Latest known visit with results is:   Orders Only on 03/24/2021   Component Date Value    Glucose, Random 03/24/2021 108*    BUN 03/24/2021 13     Creatinine 03/24/2021 0 99*    eGFR Non  03/24/2021 58*    eGFR  03/24/2021 67     SL AMB BUN/CREATININE RA* 03/24/2021 13     Sodium 03/24/2021 139     Potassium 03/24/2021 3 7     Chloride 03/24/2021 106     CO2 03/24/2021 28     Calcium 03/24/2021 9 4     TSH 03/24/2021 0 45     Total Cholesterol 03/24/2021 157     HDL 03/24/2021 60     Triglycerides 03/24/2021 110     LDL Calculated 03/24/2021 77     Chol HDLC Ratio 03/24/2021 2 6     Non-HDL Cholesterol 03/24/2021 97     Protein, Total 03/24/2021 7 1     Albumin 03/24/2021 3 9     Globulin 03/24/2021 3 2     Albumin/Globulin Ratio 03/24/2021 1 2     TOTAL BILIRUBIN 03/24/2021 0 7     Bilirubin, Direct 03/24/2021 0 2     Bilirubin, Indirect 03/24/2021 0 5     Alkaline Phosphatase 03/24/2021 84     AST 03/24/2021 15     ALT 03/24/2021 14     Test Name 03/24/2021  HEPATIC FUNCTION PANEL LIPID      Test Code 03/24/2021  [id number]     Client Contact 03/24/2021 Miya Alfonso     Report Always Message Si* 03/24/2021      Always Message 03/24/2021       Results for Modesta Buerger (MRN [id number]) as of 9/11/2021 13:18   Ref   Range 9/1/2020 08:27 9/9/2020 11:37 3/24/2021 09:10   Sodium Latest Ref Range: 135 - 146 mmol/L 140  139   Potassium Latest Ref Range: 3 5 - 5 3 mmol/L 3 8  3 7   Chloride Latest Ref Range: 98 - 110 mmol/L 105  106   CO2 Latest Ref Range: 20 - 32 mmol/L 29  28   BUN Latest Ref Range: 7 - 25 mg/dL 13  13   Creatinine Latest Ref Range: 0 60 - 0 93 mg/dL 0 96 (H)  0 99 (H)   SL AMB BUN/CREATININE RATIO Latest Ref Range: 6 - 22 (calc) 14  13   Glucose, Random Latest Ref Range: 65 - 99 mg/dL 107 (H)  108 (H)   Calcium Latest Ref Range: 8 6 - 10 4 mg/dL 9 6  9 4   AST Latest Ref Range: 10 - 35 U/L 16  15   ALT Latest Ref Range: 6 - 29 U/L 14  14   Alkaline Phosphatase Latest Ref Range: 37 - 153 U/L 79  84   Total Protein Latest Ref Range: 6 1 - 8 1 g/dL 6 8  7 1   Albumin Latest Ref Range: 3 6 - 5 1 g/dL 3 8  3 9   TOTAL BILIRUBIN Latest Ref Range: 0 2 - 1 2 mg/dL 0 5  0 7   eGFR  Latest Ref Range: > OR = 60 mL/min/1 73m2 69  67   eGFR Non  Latest Ref Range: > OR = 60 mL/min/1 73m2 60  58 (L)   Albumin/Globulin Ratio Latest Ref Range: 1 0 - 2 5 (calc) 1 3  1 2   Bilirubin, Indirect Latest Ref Range: 0 2 - 1 2 mg/dL (calc)   0 5   Cholesterol Latest Ref Range: <200 mg/dL 173  157   Triglycerides Latest Ref Range: <150 mg/dL 140  110   HDL Latest Ref Range: > OR = 50 mg/dL 57  60   Non-HDL Cholesterol Latest Ref Range: <130 mg/dL (calc) 116  97   LDL Calculated Latest Units: mg/dL (calc) 92  77   Chol HDLC Ratio Latest Ref Range: <5 0 (calc) 3 0  2 6   Globulin Latest Ref Range: 1 9 - 3 7 g/dL (calc) 3 0  3 2   TSH, POC Latest Ref Range: 0 40 - 4 50 mIU/L 2 48  0 45   White Blood Cell Count Latest Ref Range: 3 8 - 10 8 Thousand/uL 5 3     Red Blood Cell Count Latest Ref Range: 3 80 - 5 10 Million/uL 4 34     Hemoglobin Latest Ref Range: 11 7 - 15 5 g/dL 12 7     HCT Latest Ref Range: 35 0 - 45 0 % 39 4     MCV Latest Ref Range: 80 0 - 100 0 fL 90 8     MCH Latest Ref Range: 27 0 - 33 0 pg 29 3     MCHC  Latest Ref Range: 32 0 - 36 0 g/dL 32 2     RDW Latest Ref Range: 11 0 - 15 0 % 12 5     Platelet Count Latest Ref Range: 140 - 400 Thousand/uL 195     Always Message Unknown   See Comment   Bilirubin, Direct Latest Ref Range: < OR = 0 2 mg/dL   0 2   CONTACT: Unknown   HERON DAVIS   REPORT ALWAYS MESSAGE SIGNATURE Unknown   See Comment   SL AMB MPV Latest Ref Range: 7 5 - 12 5 fL 10 2     Test Code Unknown   [id number]   TEST NAME Unknown   HEPATIC FUNCTION PANEL LIPID   XR SHOULDER 2+ VW RIGHT Unknown  Rpt        Imaging Studies: I have personally reviewed pertinent reports  ASSESSMENT/PLAN:     1  Dysphagia with suspected esophageal food impaction, acute onset noted after patient reported eating short ribs and [employer] fries last night  Patient appears to be tolerating secretions but unable to tolerate administration of oral clear liquids despite administration of glucagon  Has prior history of esophageal food impaction but not for the last several years    Rule out peptic stricture, eosinophilic esophagitis, esophageal malignancy or other luminal pathology    - will plan for EGD  -patient was advised regarding risks and benefits of the procedure, risks including but not limited to infection, perforation bleeding  -also advised patient she will require airway protection and thus will require intubation for procedure  -keep patient NPO for the time being  - may require follow-up endoscopic evaluation as outpatient, pending findings       The patient was seen and examined by Dr Charito Silveira, all polanco medical decisions were made with Dr Glory Chirinos  Thank you for allowing us to participate in the care of this pleasant patient  We will follow up with you closely

## 2021-09-11 NOTE — ANESTHESIA POSTPROCEDURE EVALUATION
Post-Op Assessment Note    CV Status:  Stable    Pain management: adequate     Mental Status:  Awake   PONV Controlled:  None   Airway Patency:  Patent      Post Op Vitals Reviewed: Yes      Staff: Anesthesiologist, CRNA         No complications documented      BP (P) 117/69 (09/11/21 1405)    Temp (!) (P) 97 2 °F (36 2 °C) (09/11/21 1405)    Pulse (P) 63 (09/11/21 1405)   Resp (P) 19 (09/11/21 1405)    SpO2 (P) 100 % (09/11/21 1405)

## 2021-09-11 NOTE — CONSULTS
Consultation - [facility]  Corin Mcneal 70 y o  female MRN: [id number]  Unit/Bed#: DIS03 Encounter: [id number]        [address]    Reason for Consult / Principal Problem:  Suspected esophageal food impaction    HPI: Corin Mcneal is a 70y o  year old female with history of obstructive sleep apnea on CPAP who presents with complaint of dysphagia  The patient says that she had not been having any difficulty or discomfort with swallowing recently, until she went to [employer] last evening, had small amount of short ribs and [employer] fries and immediately noticed sensation of pressure and feeling of food being stuck in the epigastric/lower substernal chest region  She said she tried to eat a small amount of food after this and regurgitated immediately, then tried to take liquids and regurgitated that immediately as well  Had further regurgitation episodes during the night which began to slow down, here in the emergency room she was given IV glucagon and recently was attempted sips of ginger ale, but the patient regurgitated with this as well  The patient says that she has had to have dilation of Schatzki's rings done in the past, most recently about 5 years ago which is when her last EGD was performed (2016 with Dr Aye Wilkinson)  At that time she had also been noted with an 8 cm hiatal hernia, for which the patient reports that she had repair done in the interim  She denies taking any pharmacologic anticoagulation denies any supplemental oxygen requirements at home  REVIEW OF SYSTEMS:    CONSTITUTIONAL: Denies any fever, chills, or rigors  Good appetite, and no recent weight loss  HEENT: No earache or tinnitus  Denies hearing loss or visual disturbances  CARDIOVASCULAR: No chest pain or palpitations  RESPIRATORY: Denies any cough, hemoptysis, shortness of breath or dyspnea on exertion  GASTROINTESTINAL: As noted in the History of Present Illness  GENITOURINARY: No problems with urination  Denies any hematuria or dysuria  NEUROLOGIC: No dizziness or vertigo, denies headaches  MUSCULOSKELETAL: Denies any muscle or joint pain  SKIN: Denies skin rashes or itching  ENDOCRINE: Denies excessive thirst  Denies intolerance to heat or cold  PSYCHOSOCIAL: Denies depression or anxiety  Denies any recent memory loss  Historical Information   Past Medical History:   Diagnosis Date    Cystic breast     Disease of thyroid gland     Dyspareunia, female     last assessed 9/4/14    GERD (gastroesophageal reflux disease)     Hepatic cyst 9/21/2015    Hyperlipidemia     Hypertension     Hypothyroidism     Insomnia     Obesity     IRINA on CPAP     Osteoporosis     Severe mitral regurgitation     Thrombocytopenia ([address] ) 1/8/2019    Urinary tract infection     Varicose veins of lower extremity     last assessed 1/4/13     Past Surgical History:   Procedure Laterality Date    COLONOSCOPY      complete 5/2016 - Dr Ignacio Lax due in 2019 - last assessed  3/17/17    HERNIA REPAIR      LIVER BIOPSY LAPAROSCOPIC N/A 5/7/2018    Procedure: Laparoscopic marsupialization of liver cyst;  Surgeon: Renato Christopher MD;  [facility];  Service: Surgical Oncology    NEUROMA EXCISION      [place] ECHO [address] N/A 1/7/2019    Procedure: TRANSESOPHAGEAL ECHOCARDIOGRAM (DORI); Surgeon: Teresa Stuart MD;  [facility];  Service: Cardiac Surgery    [place] ESOPHAGOGASTRODUODENOSCOPY TRANSORAL DIAGNOSTIC N/A 8/10/2016    Procedure: ESOPHAGOGASTRODUODENOSCOPY (EGD); Surgeon: Shyla Lassiter MD;  Location: [facility]; Service: Gastroenterology    [place] ESOPHAGOSCOPY FLEXIBLE TRANSORAL WITH BIOPSY N/A 11/3/2016    Procedure: ESOPHAGOGASTRODUODENOSCOPY (EGD);   Surgeon: Thong Torres MD;  [facility];  Service: Thoracic    [place] LAP, REPAIR PARAESOPHAGEAL HERNIA, INCL FUNDOPLASTY W/ MESH Left 11/3/2016    Procedure: LAPAROSCOPIC PARAESOPHAGEAL HERNIA REPAIR WITH MESH;NISSEN FUNDOPLICATION ;  Surgeon: Renita Kuhn MD;  [facility];  Service: Thoracic    [place] LAP, VENTRAL HERNIA REPAIR,REDUCIBLE N/A 10/30/2017    Procedure: LAPAROSCOPIC INCISIONAL HERNIA REPAIR X 2 WITH ECHO MESH;  Surgeon: John Landers DO;  [facility];  Service: General    [place] REPAIR INCISIONAL HERNIA,REDUCIBLE N/A 1/13/2017    Procedure: INCISIONAL HERNIA REPAIR ;  Surgeon: John Landers DO;  [facility];  Service: General    [place] REPLACEMENT OF MITRAL VALVE N/A 1/7/2019    Procedure: REPLACEMENT VALVE MITRAL (MVR), repair with 30mm CG Future ring;  Surgeon: Isadora Marques MD;  [facility];  Service: Cardiac Surgery    TUBAL LIGATION       Social History   Social History     Substance and Sexual Activity   Alcohol Use Yes    Comment: social     Social History     Substance and Sexual Activity   Drug Use No     Social History     Tobacco Use   Smoking Status Never Smoker   Smokeless Tobacco Never Used     Family History   Problem Relation Age of Onset    Heart disease Mother     Aneurysm Mother         cerebral    Alcohol abuse Father     Cancer Father     COPD Father     Heart failure Father     Hypertension Father     Tuberculosis Father     Cancer Family     Breast cancer Paternal Grandmother 80    No Known Problems Sister     No Known Problems Daughter     No Known Problems Maternal Grandmother     No Known Problems Maternal Grandfather     No Known Problems Paternal Grandfather     No Known Problems Son        Meds/Allergies     (Not in a hospital admission)    No current facility-administered medications for this encounter  Allergies   Allergen Reactions    Other Other (See Comments)     Skin breakdown from bandaid on for long time- reddness           Objective     Blood pressure 143/86, pulse 70, temperature 99 2 °F (37 3 °C), temperature source Oral, resp   rate 20, height 5' 5" (1 651 m), weight 92 kg (202 lb 13 2 oz), SpO2 99 %, not currently breastfeeding  No intake or output data in the 24 hours ending 09/11/21 1318      PHYSICAL EXAM     General Appearance:   Alert, cooperative, no distress, appears stated age    HEENT:   Normocephalic, atraumatic, anicteric      Neck:  Supple, symmetrical, trachea midline, no adenopathy;    thyroid: no enlargement/tenderness/nodules; no carotid  bruit or JVD    Lungs:   Clear to auscultation bilaterally; no rales, rhonchi or wheezing; respirations unlabored    Heart[de-identified]   S1 and S2 normal; regular rate and rhythm; no murmur, rub, or gallop  Abdomen:   Soft, non-tender, non-distended; normal bowel sounds; no masses, no organomegaly    Genitalia:   Deferred    Rectal:   Deferred    Extremities:  No cyanosis, clubbing or edema    Pulses:  2+ and symmetric all extremities    Skin:  Skin color, texture, turgor normal, no rashes or lesions    Lymph nodes:  No palpable cervical, axillary or inguinal lymphadenopathy        Lab Results:   No visits with results within 1 Day(s) from this visit     Latest known visit with results is:   Orders Only on 03/24/2021   Component Date Value    Glucose, Random 03/24/2021 108*    BUN 03/24/2021 13     Creatinine 03/24/2021 0 99*    eGFR Non  03/24/2021 58*    eGFR  03/24/2021 67     SL AMB BUN/CREATININE RA* 03/24/2021 13     Sodium 03/24/2021 139     Potassium 03/24/2021 3 7     Chloride 03/24/2021 106     CO2 03/24/2021 28     Calcium 03/24/2021 9 4     TSH 03/24/2021 0 45     Total Cholesterol 03/24/2021 157     HDL 03/24/2021 60     Triglycerides 03/24/2021 110     LDL Calculated 03/24/2021 77     Chol HDLC Ratio 03/24/2021 2 6     Non-HDL Cholesterol 03/24/2021 97     Protein, Total 03/24/2021 7 1     Albumin 03/24/2021 3 9     Globulin 03/24/2021 3 2     Albumin/Globulin Ratio 03/24/2021 1 2     TOTAL BILIRUBIN 03/24/2021 0 7     Bilirubin, Direct 03/24/2021 0 2     Bilirubin, Indirect 03/24/2021 0 5     Alkaline Phosphatase 03/24/2021 84     AST 03/24/2021 15     ALT 03/24/2021 14     Test Name 03/24/2021  HEPATIC FUNCTION PANEL LIPID      Test Code 03/24/2021  [id number]     Client Contact 03/24/2021 Yadiel Victoria     Report Always Message Si* 03/24/2021      Always Message 03/24/2021       Results for Rosy Dunham (MRN [id number]) as of 9/11/2021 13:18   Ref   Range 9/1/2020 08:27 9/9/2020 11:37 3/24/2021 09:10   Sodium Latest Ref Range: 135 - 146 mmol/L 140  139   Potassium Latest Ref Range: 3 5 - 5 3 mmol/L 3 8  3 7   Chloride Latest Ref Range: 98 - 110 mmol/L 105  106   CO2 Latest Ref Range: 20 - 32 mmol/L 29  28   BUN Latest Ref Range: 7 - 25 mg/dL 13  13   Creatinine Latest Ref Range: 0 60 - 0 93 mg/dL 0 96 (H)  0 99 (H)   SL AMB BUN/CREATININE RATIO Latest Ref Range: 6 - 22 (calc) 14  13   Glucose, Random Latest Ref Range: 65 - 99 mg/dL 107 (H)  108 (H)   Calcium Latest Ref Range: 8 6 - 10 4 mg/dL 9 6  9 4   AST Latest Ref Range: 10 - 35 U/L 16  15   ALT Latest Ref Range: 6 - 29 U/L 14  14   Alkaline Phosphatase Latest Ref Range: 37 - 153 U/L 79  84   Total Protein Latest Ref Range: 6 1 - 8 1 g/dL 6 8  7 1   Albumin Latest Ref Range: 3 6 - 5 1 g/dL 3 8  3 9   TOTAL BILIRUBIN Latest Ref Range: 0 2 - 1 2 mg/dL 0 5  0 7   eGFR  Latest Ref Range: > OR = 60 mL/min/1 73m2 69  67   eGFR Non  Latest Ref Range: > OR = 60 mL/min/1 73m2 60  58 (L)   Albumin/Globulin Ratio Latest Ref Range: 1 0 - 2 5 (calc) 1 3  1 2   Bilirubin, Indirect Latest Ref Range: 0 2 - 1 2 mg/dL (calc)   0 5   Cholesterol Latest Ref Range: <200 mg/dL 173  157   Triglycerides Latest Ref Range: <150 mg/dL 140  110   HDL Latest Ref Range: > OR = 50 mg/dL 57  60   Non-HDL Cholesterol Latest Ref Range: <130 mg/dL (calc) 116  97   LDL Calculated Latest Units: mg/dL (calc) 92  77   Chol HDLC Ratio Latest Ref Range: <5 0 (calc) 3 0  2 6   Globulin Latest Ref Range: 1 9 - 3 7 g/dL (calc) 3 0  3 2   TSH, POC Latest Ref Range: 0 40 - 4 50 mIU/L 2 48  0 45   White Blood Cell Count Latest Ref Range: 3 8 - 10 8 Thousand/uL 5 3     Red Blood Cell Count Latest Ref Range: 3 80 - 5 10 Million/uL 4 34     Hemoglobin Latest Ref Range: 11 7 - 15 5 g/dL 12 7     HCT Latest Ref Range: 35 0 - 45 0 % 39 4     MCV Latest Ref Range: 80 0 - 100 0 fL 90 8     MCH Latest Ref Range: 27 0 - 33 0 pg 29 3     MCHC  Latest Ref Range: 32 0 - 36 0 g/dL 32 2     RDW Latest Ref Range: 11 0 - 15 0 % 12 5     Platelet Count Latest Ref Range: 140 - 400 Thousand/uL 195     Always Message Unknown   See Comment   Bilirubin, Direct Latest Ref Range: < OR = 0 2 mg/dL   0 2   CONTACT: Unknown   HERON DAVIS   REPORT ALWAYS MESSAGE SIGNATURE Unknown   See Comment   SL AMB MPV Latest Ref Range: 7 5 - 12 5 fL 10 2     Test Code Unknown   [id number]   TEST NAME Unknown   HEPATIC FUNCTION PANEL LIPID   XR SHOULDER 2+ VW RIGHT Unknown  Rpt        Imaging Studies: I have personally reviewed pertinent reports  ASSESSMENT/PLAN:     1  Dysphagia with suspected esophageal food impaction, acute onset noted after patient reported eating short ribs and [employer] fries last night  Patient appears to be tolerating secretions but unable to tolerate administration of oral clear liquids despite administration of glucagon  Has prior history of esophageal food impaction but not for the last several years    Rule out peptic stricture, eosinophilic esophagitis, esophageal malignancy or other luminal pathology    - will plan for EGD  -patient was advised regarding risks and benefits of the procedure, risks including but not limited to infection, perforation bleeding  -also advised patient she will require airway protection and thus will require intubation for procedure  -keep patient NPO for the time being  - may require follow-up endoscopic evaluation as outpatient, pending findings       The patient was seen and examined by Dr Delphine Crenshaw, all key medical decisions were made with Dr Bertram Villa  Thank you for allowing us to participate in the care of this pleasant patient  We will follow up with you closely

## 2021-09-11 NOTE — ED PROVIDER NOTES
History  Chief Complaint   Patient presents with    Difficulty Swallowing     c/o difficulty swallowing food/water since last night  Pt admitted to taking to bites of food and "then it got stuck right under my breastbone and then I threw up and increased saliva production"  (epigastric area) Pt denies throat/tongue swelling  Speech clear  +hx hiatal hernia repair 2 years ago     Artemio Chirinos is a 70 y o  female who presents with chief complaint of trouble swallowing since last night  She reports she ate some spare ribs at [facility] and had the sensation of a knot in her stomach  She reports she vomited in the bathroom and actually was spitting up her secretions for most of the evening  She has a history of a hiatal hernia and a Schatzki's ring that required dilation in the past with Dr Erasmo Patterson in 2016  Today she continues to have the sensation of a knot in her stomach and discomfort with swallowing but she is able to handle her secretions  No significant abdominal pain  No current nausea or vomiting  History provided by:  Patient   used: No    Medical Problem  Location:  Esophagus  Quality:  Difficulty swallowing  Severity:  Moderate  Onset quality:  Sudden  Duration:  2 days  Timing:  Constant  Progression:  Unchanged  Chronicity:  New  Context:  Eating short ribs  Relieved by:  Nothing  Worsened by:  Eating, drinking  Associated symptoms: nausea and vomiting    Associated symptoms: no chest pain, no diarrhea, no fever, no rash and no shortness of breath        Prior to Admission Medications   Prescriptions Last Dose Informant Patient Reported? Taking? Cholecalciferol (VITAMIN D3) 2000 UNITS TABS  Self Yes No   Sig: Take 1 tablet by mouth daily     Omega-3 Fatty Acids (FISH OIL) 1200 MG CAPS  Self Yes No   Sig: Take 1 capsule by mouth daily   amLODIPine (NORVASC) 5 mg tablet   No No   Sig: TAKE 1 TABLET (5 MG TOTAL) BY MOUTH DAILY   aspirin 81 MG tablet 9/10/2021 at Unknown time Self No Yes   Sig: Take 1 tablet (81 mg total) by mouth daily   atorvastatin (LIPITOR) 10 mg tablet  Self No No   Sig: TAKE 1 TABLET (10 MG TOTAL) BY MOUTH DAILY   estradiol (ESTRACE VAGINAL) 0 1 mg/g vaginal cream  Self Yes No   Sig: Insert into the vagina   hydrochlorothiazide (MICROZIDE) 12 5 mg capsule  Self No No   Sig: TAKE 1 TO 2 CAPSULES DAILY WITH AMLODIPINE   ketoconazole (NIZORAL) 2 % cream  Self No No   Sig: Apply topically daily   levothyroxine 150 mcg tablet   No No   Sig: TAKE 1 TABLET ONCE DAILY      Facility-Administered Medications: None       Past Medical History:   Diagnosis Date    Cystic breast     Disease of thyroid gland     Dyspareunia, female     last assessed 9/4/14    GERD (gastroesophageal reflux disease)     Hepatic cyst 9/21/2015    Hyperlipidemia     Hypertension     Hypothyroidism     Insomnia     Obesity     IRINA on CPAP     Osteoporosis     Severe mitral regurgitation     Thrombocytopenia ([facility]) 1/8/2019    Urinary tract infection     Varicose veins of lower extremity     last assessed 1/4/13       Past Surgical History:   Procedure Laterality Date    COLONOSCOPY      complete 5/2016 - Dr Franchesca sommer in 2019 - last assessed  3/17/17    HERNIA REPAIR      LIVER BIOPSY LAPAROSCOPIC N/A 5/7/2018    Procedure: Laparoscopic marsupialization of liver cyst;  Surgeon: Aleja De La Cruz MD;  [facility];  Service: Surgical Oncology    NEUROMA EXCISION      [place] ECHO TRANSESOPHAG [address] N/A 1/7/2019    Procedure: TRANSESOPHAGEAL ECHOCARDIOGRAM (DORI); Surgeon: Noé Cordoba MD;  [facility];  Service: Cardiac Surgery    [place] ESOPHAGOGASTRODUODENOSCOPY TRANSORAL DIAGNOSTIC N/A 8/10/2016    Procedure: ESOPHAGOGASTRODUODENOSCOPY (EGD); Surgeon: Kiel Young MD;  [facility]; Service: Gastroenterology    [place] ESOPHAGOSCOPY FLEXIBLE TRANSORAL WITH BIOPSY N/A 11/3/2016    Procedure: ESOPHAGOGASTRODUODENOSCOPY (EGD);   Surgeon: Ruby Penn MD Candace;  [facility];  Service: Thoracic    [place] LAP, REPAIR PARAESOPHAGEAL HERNIA, INCL FUNDOPLASTY W/ MESH Left 11/3/2016    Procedure: LAPAROSCOPIC PARAESOPHAGEAL HERNIA REPAIR WITH MESH;NISSEN FUNDOPLICATION ;  Surgeon: Tess Jerome MD;  [facility];  Service: Thoracic    [place] LAP, VENTRAL HERNIA REPAIR,REDUCIBLE N/A 10/30/2017    Procedure: LAPAROSCOPIC INCISIONAL HERNIA REPAIR X 2 WITH ECHO MESH;  Surgeon: Everardo Quinones DO;  [facility];  Service: General    [place] REPAIR INCISIONAL HERNIA,REDUCIBLE N/A 1/13/2017    Procedure: INCISIONAL HERNIA REPAIR ;  Surgeon: Everardo Quinones DO;  [facility];  Service: General    [place] REPLACEMENT OF MITRAL VALVE N/A 1/7/2019    Procedure: REPLACEMENT VALVE MITRAL (MVR), repair with 30mm CG Future ring;  Surgeon: Carlotta Herman MD;  [facility];  Service: Cardiac Surgery    TUBAL LIGATION         Family History   Problem Relation Age of Onset    Heart disease Mother     Aneurysm Mother         cerebral    Alcohol abuse Father     Cancer Father     COPD Father     Heart failure Father     Hypertension Father     Tuberculosis Father     Cancer Family     Breast cancer Paternal Grandmother 80    No Known Problems Sister     No Known Problems Daughter     No Known Problems Maternal Grandmother     No Known Problems Maternal Grandfather     No Known Problems Paternal Grandfather     No Known Problems Son      I have reviewed and agree with the history as documented      E-Cigarette/Vaping    E-Cigarette Use Never User      E-Cigarette/Vaping Substances    Nicotine No     THC No     CBD No     Flavoring No     Other No     Unknown No      Social History     Tobacco Use    Smoking status: Never Smoker    Smokeless tobacco: Never Used   Vaping Use    Vaping Use: Never used   Substance Use Topics    Alcohol use: Yes     Comment: social    Drug use: No       Review of Systems   Constitutional: Negative for chills, diaphoresis and fever  Respiratory: Negative for shortness of breath  Cardiovascular: Negative for chest pain and palpitations  Gastrointestinal: Positive for nausea and vomiting  Negative for diarrhea  Genitourinary: Negative for dysuria and frequency  Skin: Negative for rash  All other systems reviewed and are negative  Physical Exam  Physical Exam  Vitals and nursing note reviewed  Constitutional:       General: She is in acute distress (mild)  Appearance: She is well-developed  HENT:      Head: Normocephalic and atraumatic  Eyes:      Pupils: Pupils are equal, round, and reactive to light  Neck:      Vascular: No JVD  Cardiovascular:      Rate and Rhythm: Normal rate and regular rhythm  Heart sounds: Normal heart sounds  No murmur heard  No friction rub  No gallop  Pulmonary:      Effort: Pulmonary effort is normal  No respiratory distress  Breath sounds: Normal breath sounds  No wheezing or rales  Chest:      Chest wall: No tenderness  Musculoskeletal:         General: No tenderness  Normal range of motion  Cervical back: Normal range of motion  Skin:     General: Skin is warm and dry  Neurological:      General: No focal deficit present  Mental Status: She is alert and oriented to person, place, and time  Psychiatric:         Behavior: Behavior normal          Thought Content:  Thought content normal          Judgment: Judgment normal          Vital Signs  ED Triage Vitals [09/11/21 0945]   Temperature Pulse Respirations Blood Pressure SpO2   99 2 °F (37 3 °C) 70 20 143/86 99 %      Temp Source Heart Rate Source Patient Position - Orthostatic VS BP Location FiO2 (%)   Oral Monitor Sitting Left arm --      Pain Score       No Pain           Vitals:    09/11/21 0945   BP: 143/86   Pulse: 70   Patient Position - Orthostatic VS: Sitting         Visual Acuity      ED Medications  Medications   glucagon (GLUCAGEN) injection 1 mg (1 mg Intramuscular Given 9/11/21 1119)       Diagnostic Studies  Results Reviewed     None                 No orders to display              Procedures  Procedures         ED Course                             SBIRT 20yo+      Most Recent Value   SBIRT (25 yo +)   In order to provide better care to our patients, we are screening all of our patients for alcohol and drug use  Would it be okay to ask you these screening questions? Yes Filed at: 09/11/2021 1104   Initial Alcohol Screen: US AUDIT-C    1  How often do you have a drink containing alcohol?  0 Filed at: 09/11/2021 1104   2  How many drinks containing alcohol do you have on a typical day you are drinking? 0 Filed at: 09/11/2021 1104   3a  Male UNDER 65: How often do you have five or more drinks on one occasion? 0 Filed at: 09/11/2021 1104   3b  FEMALE Any Age, or MALE 65+: How often do you have 4 or more drinks on one occassion? 0 Filed at: 09/11/2021 1104   Audit-C Score  0 Filed at: 09/11/2021 1104   KATE: How many times in the past year have you    Used an illegal drug or used a prescription medication for non-medical reasons?   Never Filed at: 09/11/2021 1104                    MDM  Number of Diagnoses or Management Options  Esophageal obstruction due to food impaction: new and does not require workup  Diagnosis management comments: Background: 70 y o  female presents with difficulty swallowing    Differential DX includes but is not limited to: partial obstruction vs exacerbation of Schatzki's ring    Plan: glucagon im, po challenge, if unsuccessful GI consult          Amount and/or Complexity of Data Reviewed  Discuss the patient with other providers: yes (Tina Beebe ([facility]))    Risk of Complications, Morbidity, and/or Mortality  Management options: high    Patient Progress  Patient progress: stable      Disposition  Final diagnoses:   Esophageal obstruction due to food impaction     Time reflects when diagnosis was documented in both MDM as applicable and the Disposition within this note     Time User Action Codes Description Comment    9/11/2021  1:01 PM Piyush Dom [K22 2,  [id number]] Esophageal obstruction due to food impaction     9/11/2021  1:18 PM Elmer Query [K22 2,  R47 [id number]] Esophageal obstruction due to food impaction       ED Disposition     ED Disposition Condition Date/Time Comment    Send to OR  Sat Sep 11, 2021  1:19 PM With Dr Jose Sheets of GI  Follow-up Information    None         Patient's Medications   Discharge Prescriptions    No medications on file     No discharge procedures on file      PDMP Review     None          ED Provider  Electronically Signed by           Priyanka Grewal MD  09/11/21 9756

## 2021-09-11 NOTE — ANESTHESIA PREPROCEDURE EVALUATION
Procedure:  EGD    Relevant Problems   CARDIO   (+) Benign essential hypertension   (+) Hyperlipidemia   (+) S/P MVR (mitral valve repair)      ENDO   (+) Hypothyroidism      GI/HEPATIC   (+) Liver enlargement      MUSCULOSKELETAL   (+) Disc degeneration, lumbar      PULMONARY   (+) Severe obstructive sleep apnea      Other   (+) History of repair of hiatal hernia   (+) Lumbar radiculopathy      ECHO 3/6/2019: s/p MVR - mild mitral stenosis, EF 65%    Suspected food impaction for EGD    Unable to tolerate PO without regurgitating it back up almost immediately  Physical Exam    Airway    Mallampati score: I  TM Distance: >3 FB  Neck ROM: full     Dental   No notable dental hx     Cardiovascular  Cardiovascular exam normal    Pulmonary  Pulmonary exam normal     Other Findings        Anesthesia Plan  ASA Score- 2 Emergent    Anesthesia Type- general with ASA Monitors  Additional Monitors:   Airway Plan: ETT  Plan Factors-    Chart reviewed  Patient summary reviewed  Induction- intravenous and rapid sequence induction  Postoperative Plan- Plan for postoperative opioid use  Planned trial extubation    Informed Consent- Anesthetic plan and risks discussed with patient  I personally reviewed this patient with the CRNA  Discussed and agreed on the Anesthesia Plan with the CRNA  Priti Byrd

## 2021-09-13 ENCOUNTER — TELEPHONE (OUTPATIENT)
Dept: GASTROENTEROLOGY | Facility: CLINIC | Age: 71
End: 2021-09-13

## 2021-10-04 LAB
ALBUMIN SERPL-MCNC: 3.9 G/DL (ref 3.6–5.1)
ALBUMIN/GLOB SERPL: 1.5 (CALC) (ref 1–2.5)
ALP SERPL-CCNC: 109 U/L (ref 37–153)
ALT SERPL-CCNC: 22 U/L (ref 6–29)
AST SERPL-CCNC: 20 U/L (ref 10–35)
BASOPHILS # BLD AUTO: 73 CELLS/UL (ref 0–200)
BASOPHILS NFR BLD AUTO: 1.3 %
BILIRUB SERPL-MCNC: 0.6 MG/DL (ref 0.2–1.2)
BUN SERPL-MCNC: 13 MG/DL (ref 7–25)
BUN/CREAT SERPL: ABNORMAL (CALC) (ref 6–22)
CALCIUM SERPL-MCNC: 9.4 MG/DL (ref 8.6–10.4)
CHLORIDE SERPL-SCNC: 107 MMOL/L (ref 98–110)
CHOLEST SERPL-MCNC: 165 MG/DL
CHOLEST/HDLC SERPL: 3.2 (CALC)
CO2 SERPL-SCNC: 31 MMOL/L (ref 20–32)
CREAT SERPL-MCNC: 0.77 MG/DL (ref 0.6–0.93)
EOSINOPHIL # BLD AUTO: 476 CELLS/UL (ref 15–500)
EOSINOPHIL NFR BLD AUTO: 8.5 %
ERYTHROCYTE [DISTWIDTH] IN BLOOD BY AUTOMATED COUNT: 12.5 % (ref 11–15)
GLOBULIN SER CALC-MCNC: 2.6 G/DL (CALC) (ref 1.9–3.7)
GLUCOSE SERPL-MCNC: 106 MG/DL (ref 65–99)
HCT VFR BLD AUTO: 37.3 % (ref 35–45)
HDLC SERPL-MCNC: 51 MG/DL
HGB BLD-MCNC: 12.5 G/DL (ref 11.7–15.5)
LDLC SERPL CALC-MCNC: 91 MG/DL (CALC)
LYMPHOCYTES # BLD AUTO: 1641 CELLS/UL (ref 850–3900)
LYMPHOCYTES NFR BLD AUTO: 29.3 %
MCH RBC QN AUTO: 29.9 PG (ref 27–33)
MCHC RBC AUTO-ENTMCNC: 33.5 G/DL (ref 32–36)
MCV RBC AUTO: 89.2 FL (ref 80–100)
MONOCYTES # BLD AUTO: 605 CELLS/UL (ref 200–950)
MONOCYTES NFR BLD AUTO: 10.8 %
NEUTROPHILS # BLD AUTO: 2806 CELLS/UL (ref 1500–7800)
NEUTROPHILS NFR BLD AUTO: 50.1 %
NONHDLC SERPL-MCNC: 114 MG/DL (CALC)
PLATELET # BLD AUTO: 183 THOUSAND/UL (ref 140–400)
PMV BLD REES-ECKER: 10.4 FL (ref 7.5–12.5)
POTASSIUM SERPL-SCNC: 4 MMOL/L (ref 3.5–5.3)
PROT SERPL-MCNC: 6.5 G/DL (ref 6.1–8.1)
RBC # BLD AUTO: 4.18 MILLION/UL (ref 3.8–5.1)
SL AMB EGFR AFRICAN AMERICAN: 90 ML/MIN/1.73M2
SL AMB EGFR NON AFRICAN AMERICAN: 78 ML/MIN/1.73M2
SODIUM SERPL-SCNC: 141 MMOL/L (ref 135–146)
TRIGL SERPL-MCNC: 133 MG/DL
TSH SERPL-ACNC: 0.07 MIU/L (ref 0.4–4.5)
WBC # BLD AUTO: 5.6 THOUSAND/UL (ref 3.8–10.8)

## 2021-10-05 ENCOUNTER — TELEPHONE (OUTPATIENT)
Dept: FAMILY MEDICINE CLINIC | Facility: CLINIC | Age: 71
End: 2021-10-05

## 2021-10-13 ENCOUNTER — OFFICE VISIT (OUTPATIENT)
Dept: FAMILY MEDICINE CLINIC | Facility: CLINIC | Age: 71
End: 2021-10-13
Payer: MEDICARE

## 2021-10-13 VITALS — OXYGEN SATURATION: 97 % | HEART RATE: 76 BPM | SYSTOLIC BLOOD PRESSURE: 110 MMHG | DIASTOLIC BLOOD PRESSURE: 70 MMHG

## 2021-10-13 DIAGNOSIS — Z00.00 ENCOUNTER FOR MEDICARE ANNUAL WELLNESS EXAM: ICD-10-CM

## 2021-10-13 DIAGNOSIS — K21.9 CHRONIC GERD: ICD-10-CM

## 2021-10-13 DIAGNOSIS — I10 BENIGN ESSENTIAL HYPERTENSION: ICD-10-CM

## 2021-10-13 DIAGNOSIS — Z23 FLU VACCINE NEED: ICD-10-CM

## 2021-10-13 DIAGNOSIS — Z98.890 S/P MVR (MITRAL VALVE REPAIR): ICD-10-CM

## 2021-10-13 DIAGNOSIS — E03.9 HYPOTHYROIDISM, UNSPECIFIED TYPE: Primary | ICD-10-CM

## 2021-10-13 DIAGNOSIS — H53.2 DOUBLE VISION WITH BOTH EYES OPEN: ICD-10-CM

## 2021-10-13 DIAGNOSIS — E78.2 MIXED HYPERLIPIDEMIA: ICD-10-CM

## 2021-10-13 PROCEDURE — 1123F ACP DISCUSS/DSCN MKR DOCD: CPT

## 2021-10-13 PROCEDURE — 90662 IIV NO PRSV INCREASED AG IM: CPT

## 2021-10-13 PROCEDURE — G0008 ADMIN INFLUENZA VIRUS VAC: HCPCS

## 2021-10-13 PROCEDURE — G0439 PPPS, SUBSEQ VISIT: HCPCS | Performed by: FAMILY MEDICINE

## 2021-10-13 PROCEDURE — 99215 OFFICE O/P EST HI 40 MIN: CPT | Performed by: FAMILY MEDICINE

## 2021-10-13 RX ORDER — LEVOTHYROXINE SODIUM 0.12 MG/1
125 TABLET ORAL DAILY
Qty: 30 TABLET | Refills: 5 | Status: SHIPPED | OUTPATIENT
Start: 2021-10-13 | End: 2022-04-11

## 2021-10-17 PROBLEM — K21.9 CHRONIC GERD: Status: ACTIVE | Noted: 2021-10-17

## 2021-11-02 ENCOUNTER — TELEPHONE (OUTPATIENT)
Dept: GASTROENTEROLOGY | Facility: CLINIC | Age: 71
End: 2021-11-02

## 2021-11-03 ENCOUNTER — ANESTHESIA EVENT (OUTPATIENT)
Dept: GASTROENTEROLOGY | Facility: AMBULATORY SURGERY CENTER | Age: 71
End: 2021-11-03

## 2021-11-03 ENCOUNTER — ANESTHESIA (OUTPATIENT)
Dept: GASTROENTEROLOGY | Facility: AMBULATORY SURGERY CENTER | Age: 71
End: 2021-11-03

## 2021-11-03 ENCOUNTER — HOSPITAL ENCOUNTER (OUTPATIENT)
Dept: GASTROENTEROLOGY | Facility: AMBULATORY SURGERY CENTER | Age: 71
Discharge: HOME | End: 2021-11-03
Payer: MEDICARE

## 2021-11-03 VITALS
WEIGHT: 198 LBS | OXYGEN SATURATION: 95 % | HEART RATE: 66 BPM | DIASTOLIC BLOOD PRESSURE: 78 MMHG | BODY MASS INDEX: 31.82 KG/M2 | SYSTOLIC BLOOD PRESSURE: 110 MMHG | HEIGHT: 66 IN | RESPIRATION RATE: 18 BRPM | TEMPERATURE: 98.1 F

## 2021-11-03 DIAGNOSIS — K25.9 GASTRIC ULCER, UNSPECIFIED CHRONICITY, UNSPECIFIED WHETHER GASTRIC ULCER HEMORRHAGE OR PERFORATION PRESENT: ICD-10-CM

## 2021-11-03 PROBLEM — K22.2 ESOPHAGEAL OBSTRUCTION DUE TO FOOD IMPACTION: Status: ACTIVE | Noted: 2021-11-03
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
## 2021-11-03 PROBLEM — T18.128A ESOPHAGEAL OBSTRUCTION DUE TO FOOD IMPACTION: Status: ACTIVE | Noted: 2021-11-03

## 2021-11-03 PROCEDURE — 88305 TISSUE EXAM BY PATHOLOGIST: CPT | Performed by: PATHOLOGY

## 2021-11-03 PROCEDURE — 43239 EGD BIOPSY SINGLE/MULTIPLE: CPT | Performed by: INTERNAL MEDICINE

## 2021-11-03 PROCEDURE — 43249 ESOPH EGD DILATION <30 MM: CPT | Performed by: INTERNAL MEDICINE

## 2021-11-03 PROCEDURE — 99100 ANES PT EXTEME AGE<1 YR&>70: CPT | Performed by: NURSE ANESTHETIST, CERTIFIED REGISTERED

## 2021-11-03 PROCEDURE — 00731 ANES UPR GI NDSC PX NOS: CPT | Performed by: NURSE ANESTHETIST, CERTIFIED REGISTERED

## 2021-11-03 RX ORDER — PROPOFOL 10 MG/ML
INJECTION, EMULSION INTRAVENOUS AS NEEDED
Status: DISCONTINUED | OUTPATIENT
Start: 2021-11-03 | End: 2021-11-03

## 2021-11-03 RX ORDER — SODIUM CHLORIDE 9 MG/ML
20 INJECTION, SOLUTION INTRAVENOUS CONTINUOUS
Status: DISCONTINUED | OUTPATIENT
Start: 2021-11-03 | End: 2021-11-07 | Stop reason: HOSPADM

## 2021-11-03 RX ORDER — SODIUM CHLORIDE 9 MG/ML
INJECTION, SOLUTION INTRAVENOUS CONTINUOUS PRN
Status: DISCONTINUED | OUTPATIENT
Start: 2021-11-03 | End: 2021-11-03

## 2021-11-03 RX ORDER — LIDOCAINE HYDROCHLORIDE 10 MG/ML
INJECTION, SOLUTION EPIDURAL; INFILTRATION; INTRACAUDAL; PERINEURAL AS NEEDED
Status: DISCONTINUED | OUTPATIENT
Start: 2021-11-03 | End: 2021-11-03

## 2021-11-03 RX ORDER — SODIUM CHLORIDE 9 MG/ML
30 INJECTION, SOLUTION INTRAVENOUS CONTINUOUS
Status: DISCONTINUED | OUTPATIENT
Start: 2021-11-03 | End: 2021-11-07 | Stop reason: HOSPADM

## 2021-11-03 RX ADMIN — PROPOFOL 100 MG: 10 INJECTION, EMULSION INTRAVENOUS at 12:55

## 2021-11-03 RX ADMIN — SODIUM CHLORIDE: 9 INJECTION, SOLUTION INTRAVENOUS at 12:49

## 2021-11-03 RX ADMIN — PROPOFOL 50 MG: 10 INJECTION, EMULSION INTRAVENOUS at 12:59

## 2021-11-03 RX ADMIN — PROPOFOL 100 MG: 10 INJECTION, EMULSION INTRAVENOUS at 12:50

## 2021-11-03 RX ADMIN — LIDOCAINE HYDROCHLORIDE 50 MG: 10 INJECTION, SOLUTION EPIDURAL; INFILTRATION; INTRACAUDAL; PERINEURAL at 12:49

## 2021-12-03 ENCOUNTER — NEW PATIENT (OUTPATIENT)
Dept: URBAN - METROPOLITAN AREA CLINIC 6 | Facility: CLINIC | Age: 71
End: 2021-12-03

## 2021-12-03 DIAGNOSIS — H25.13: ICD-10-CM

## 2021-12-03 DIAGNOSIS — H53.2: ICD-10-CM

## 2021-12-03 PROCEDURE — 1036F TOBACCO NON-USER: CPT

## 2021-12-03 PROCEDURE — 92015 DETERMINE REFRACTIVE STATE: CPT

## 2021-12-03 PROCEDURE — 92004 COMPRE OPH EXAM NEW PT 1/>: CPT

## 2021-12-03 PROCEDURE — 92060 SENSORIMOTOR EXAMINATION: CPT

## 2021-12-03 PROCEDURE — G8428 CUR MEDS NOT DOCUMENT: HCPCS

## 2021-12-03 ASSESSMENT — TONOMETRY
OD_IOP_MMHG: 13
OS_IOP_MMHG: 13

## 2021-12-03 ASSESSMENT — VISUAL ACUITY
OD_PH: 20/30
OS_CC: J1+
OD_CC: 20/40+2
OD_CC: J1
OS_CC: 20/30

## 2021-12-07 DIAGNOSIS — T18.128A ESOPHAGEAL OBSTRUCTION DUE TO FOOD IMPACTION: ICD-10-CM

## 2021-12-07 DIAGNOSIS — K22.2 ESOPHAGEAL OBSTRUCTION DUE TO FOOD IMPACTION: ICD-10-CM

## 2021-12-07 DIAGNOSIS — K22.10 ULCERATIVE ESOPHAGITIS: ICD-10-CM

## 2021-12-08 RX ORDER — OMEPRAZOLE 40 MG/1
40 CAPSULE, DELAYED RELEASE ORAL DAILY
Qty: 30 CAPSULE | Refills: 2 | Status: SHIPPED | OUTPATIENT
Start: 2021-12-08 | End: 2022-04-11

## 2021-12-14 ENCOUNTER — TELEPHONE (OUTPATIENT)
Dept: FAMILY MEDICINE CLINIC | Facility: CLINIC | Age: 71
End: 2021-12-14

## 2021-12-14 DIAGNOSIS — H53.2 DOUBLE VISION WITH BOTH EYES OPEN: Primary | ICD-10-CM

## 2021-12-16 ENCOUNTER — TELEPHONE (OUTPATIENT)
Dept: FAMILY MEDICINE CLINIC | Facility: CLINIC | Age: 71
End: 2021-12-16

## 2021-12-17 ENCOUNTER — APPOINTMENT (OUTPATIENT)
Dept: LAB | Facility: CLINIC | Age: 71
End: 2021-12-17
Payer: MEDICARE

## 2021-12-17 DIAGNOSIS — Z01.812 PRE-PROCEDURE LAB EXAM: Primary | ICD-10-CM

## 2021-12-17 DIAGNOSIS — Z01.812 PRE-PROCEDURE LAB EXAM: ICD-10-CM

## 2021-12-17 LAB
BUN SERPL-MCNC: 18 MG/DL (ref 5–25)
CREAT SERPL-MCNC: 1.14 MG/DL (ref 0.6–1.3)
GFR SERPL CREATININE-BSD FRML MDRD: 48 ML/MIN/1.73SQ M

## 2021-12-17 PROCEDURE — 36415 COLL VENOUS BLD VENIPUNCTURE: CPT

## 2021-12-17 PROCEDURE — 84520 ASSAY OF UREA NITROGEN: CPT

## 2021-12-17 PROCEDURE — 82565 ASSAY OF CREATININE: CPT

## 2022-01-18 ENCOUNTER — HOSPITAL ENCOUNTER (OUTPATIENT)
Dept: RADIOLOGY | Facility: HOSPITAL | Age: 72
Discharge: HOME | End: 2022-01-18
Payer: MEDICARE

## 2022-01-18 DIAGNOSIS — H53.2 DOUBLE VISION WITH BOTH EYES OPEN: ICD-10-CM

## 2022-01-18 PROCEDURE — 70553 MRI BRAIN STEM W/O & W/DYE: CPT

## 2022-01-18 PROCEDURE — A9585 GADOBUTROL INJECTION: HCPCS | Performed by: FAMILY MEDICINE

## 2022-01-18 PROCEDURE — G1004 CDSM NDSC: HCPCS

## 2022-01-18 RX ADMIN — GADOBUTROL 9 ML: 604.72 INJECTION INTRAVENOUS at 08:01

## 2022-01-19 ENCOUNTER — TELEPHONE (OUTPATIENT)
Dept: FAMILY MEDICINE CLINIC | Facility: CLINIC | Age: 72
End: 2022-01-19

## 2022-01-19 DIAGNOSIS — H53.2 DOUBLE VISION WITH BOTH EYES OPEN: ICD-10-CM

## 2022-01-19 DIAGNOSIS — I63.81 LACUNAR INFARCTION (HCC): Primary | ICD-10-CM

## 2022-01-19 NOTE — TELEPHONE ENCOUNTER
Please fax copy of brain MRI to [facility], attention Dr Shyla Vargas  Please ask them to fax us most recent office visit note    Thank you

## 2022-01-19 NOTE — TELEPHONE ENCOUNTER
I spoke with patient and reviewed results of brain MRI  Finding of old lacunar strokes  No previous studies for comparison  Patient was evaluated by Ophthalmology (Ewelina Negro), no pertinent findings  I advised to continue on aspirin, medications for hypertension and hyperlipidemia  Patient has no new symptoms  Referral to [facility] Neurology for further evaluation  Patient understands instructions and agrees

## 2022-02-28 DIAGNOSIS — I10 BENIGN ESSENTIAL HYPERTENSION: ICD-10-CM

## 2022-02-28 RX ORDER — HYDROCHLOROTHIAZIDE 12.5 MG/1
CAPSULE, GELATIN COATED ORAL
Qty: 60 CAPSULE | Refills: 5 | Status: SHIPPED | OUTPATIENT
Start: 2022-02-28

## 2022-03-30 DIAGNOSIS — E78.5 HYPERLIPIDEMIA, UNSPECIFIED HYPERLIPIDEMIA TYPE: ICD-10-CM

## 2022-03-30 RX ORDER — ATORVASTATIN CALCIUM 10 MG/1
10 TABLET, FILM COATED ORAL DAILY
Qty: 30 TABLET | Refills: 4 | Status: SHIPPED | OUTPATIENT
Start: 2022-03-30 | End: 2022-04-12 | Stop reason: SDUPTHER

## 2022-04-06 ENCOUNTER — TELEPHONE (OUTPATIENT)
Dept: NEUROLOGY | Facility: CLINIC | Age: 72
End: 2022-04-06

## 2022-04-11 DIAGNOSIS — K22.2 ESOPHAGEAL OBSTRUCTION DUE TO FOOD IMPACTION: ICD-10-CM

## 2022-04-11 DIAGNOSIS — K22.10 ULCERATIVE ESOPHAGITIS: ICD-10-CM

## 2022-04-11 DIAGNOSIS — T18.128A ESOPHAGEAL OBSTRUCTION DUE TO FOOD IMPACTION: ICD-10-CM

## 2022-04-11 DIAGNOSIS — E03.9 HYPOTHYROIDISM, UNSPECIFIED TYPE: ICD-10-CM

## 2022-04-11 RX ORDER — LEVOTHYROXINE SODIUM 0.12 MG/1
125 TABLET ORAL DAILY
Qty: 30 TABLET | Refills: 5 | Status: SHIPPED | OUTPATIENT
Start: 2022-04-11

## 2022-04-11 RX ORDER — OMEPRAZOLE 40 MG/1
40 CAPSULE, DELAYED RELEASE ORAL DAILY
Qty: 30 CAPSULE | Refills: 2 | Status: SHIPPED | OUTPATIENT
Start: 2022-04-11 | End: 2022-06-06

## 2022-04-12 ENCOUNTER — OFFICE VISIT (OUTPATIENT)
Dept: NEUROLOGY | Facility: CLINIC | Age: 72
End: 2022-04-12
Payer: MEDICARE

## 2022-04-12 VITALS
SYSTOLIC BLOOD PRESSURE: 119 MMHG | WEIGHT: 198.5 LBS | TEMPERATURE: 97.7 F | DIASTOLIC BLOOD PRESSURE: 70 MMHG | HEIGHT: 66 IN | BODY MASS INDEX: 31.9 KG/M2 | HEART RATE: 80 BPM

## 2022-04-12 DIAGNOSIS — I10 BENIGN ESSENTIAL HYPERTENSION: ICD-10-CM

## 2022-04-12 DIAGNOSIS — Z86.73 HISTORY OF STROKE: Primary | ICD-10-CM

## 2022-04-12 DIAGNOSIS — E78.5 HYPERLIPIDEMIA, UNSPECIFIED HYPERLIPIDEMIA TYPE: ICD-10-CM

## 2022-04-12 DIAGNOSIS — G47.33 SEVERE OBSTRUCTIVE SLEEP APNEA: ICD-10-CM

## 2022-04-12 DIAGNOSIS — H53.2 DOUBLE VISION WITH BOTH EYES OPEN: ICD-10-CM

## 2022-04-12 DIAGNOSIS — R29.5 TRANSIENT PARALYSIS: ICD-10-CM

## 2022-04-12 DIAGNOSIS — E78.2 MIXED HYPERLIPIDEMIA: ICD-10-CM

## 2022-04-12 DIAGNOSIS — I63.81 LACUNAR INFARCTION (HCC): ICD-10-CM

## 2022-04-12 PROCEDURE — 99204 OFFICE O/P NEW MOD 45 MIN: CPT | Performed by: PSYCHIATRY & NEUROLOGY

## 2022-04-12 RX ORDER — ATORVASTATIN CALCIUM 20 MG/1
TABLET, FILM COATED ORAL
Qty: 60 TABLET | Refills: 5 | Status: SHIPPED | OUTPATIENT
Start: 2022-04-12

## 2022-04-12 RX ORDER — ATORVASTATIN CALCIUM 20 MG/1
TABLET, FILM COATED ORAL
Qty: 30 TABLET | Refills: 5 | Status: SHIPPED | OUTPATIENT
Start: 2022-04-12 | End: 2022-04-12

## 2022-04-12 NOTE — PATIENT INSTRUCTIONS
Stroke:  Sandy presents for an initial consultation with regard to an incidentally discovered stroke at the right caudate nucleus  There is also a tiny area in the right centrum semiovale  That I will review with Radiology to ensure that there is nothing more acute  Ultimately her stroke likely represents a scar/ lacunar stroke which could have happened at any time over the last couple of decades  At this point in time she has not had any associated symptoms and her episodes of double vision or absolutely not related to this stroke  She does have some typical vascular risk factors which thankfully are reasonably controlled at this point in time   -in as much as we do not know when the stroke occurred in relation to her prior surgeries in when she began aspirin will plan to continue aspirin at her current dose of 81 mg   - ideally we would like her LDL cholesterol to be less than 70 and her Lipitor dose to be 40 mg of possible  She will increase Lipitor to 20 mg at night for 2 weeks  If she notes no side effects she will then increase to 40 mg at night and let us know  Provided she tolerates that well with no challenges we will plan to then prescribe the 40 mg tablets   - I would like for her to continue to treat her obstructive sleep apnea and to occasionally check her blood pressure away from the doctor's office setting to ensure that the numbers are typically less than 130/80   - she should continue to follow a wise diet, and get exercise in as much she feels capable of doing so   - to further evaluate her vascular risk factors we will request a repeat cholesterol panel to be done along with a comprehensive metabolic panel and a carotid Doppler ultrasound  Intermittent diplopia:  She is having episodes of intermittent double vision which sound like dysfunction at either the superior or inferior rectus or occasionally at the 3rd cranial nerve    She has absolutely no findings on MRI that would explain the symptoms, they are intermittent and painless, and she has no baseline cranial nerve difficulties in the office today  - I would like for her to keep track using a calendar or application on her phone over the next several months to determine how often this is happening and if any patterns are forthcoming  I would also like for her to either look in a mirror or take some photos of her face during an episode  She should take 1 photo looking straight ahead and then 1 photo looking up, down, left, and right, this should be done without her glasses on  This will help us to determine if there are any other signs of dysfunction of the 3rd cranial nerve versus trouble in the muscles themselves  I will plan for her to return to the office in 6 months time to see me or one of the AP's but would be happy to see her sooner if the need should arise  If she has any symptoms concerning for TIA or stroke including sudden painless loss of vision or double vision, difficulty speaking or swallowing, vertigo/room spinning that does not quickly resolve, or weakness/numbness affecting 1 side of the face or body she should proceed by ambulance to the nearest emergency room immediately

## 2022-04-12 NOTE — PROGRESS NOTES
Patient ID: Lucía Julian is a 70 y o  female who presents to the [facility]  Assessment/Plan:   Patient Instructions   Stroke:  Sandy presents for an initial consultation with regard to an incidentally discovered stroke at the right caudate nucleus  There is also a tiny area in the right centrum semiovale  That I will review with Radiology to ensure that there is nothing more acute  Ultimately her stroke likely represents a scar/ lacunar stroke which could have happened at any time over the last couple of decades  At this point in time she has not had any associated symptoms and her episodes of double vision or absolutely not related to this stroke  She does have some typical vascular risk factors which thankfully are reasonably controlled at this point in time   -in as much as we do not know when the stroke occurred in relation to her prior surgeries in when she began aspirin will plan to continue aspirin at her current dose of 81 mg   - ideally we would like her LDL cholesterol to be less than 70 and her Lipitor dose to be 40 mg of possible  She will increase Lipitor to 20 mg at night for 2 weeks  If she notes no side effects she will then increase to 40 mg at night and let us know  Provided she tolerates that well with no challenges we will plan to then prescribe the 40 mg tablets   - I would like for her to continue to treat her obstructive sleep apnea and to occasionally check her blood pressure away from the doctor's office setting to ensure that the numbers are typically less than 130/80   - she should continue to follow a wise diet, and get exercise in as much she feels capable of doing so   - to further evaluate her vascular risk factors we will request a repeat cholesterol panel to be done along with a comprehensive metabolic panel and a carotid Doppler ultrasound      Intermittent diplopia:  She is having episodes of intermittent double vision which sound like dysfunction at either the superior or inferior rectus or occasionally at the 3rd cranial nerve  She has absolutely no findings on MRI that would explain the symptoms, they are intermittent and painless, and she has no baseline cranial nerve difficulties in the office today  - I would like for her to keep track using a calendar or application on her phone over the next several months to determine how often this is happening and if any patterns are forthcoming  I would also like for her to either look in a mirror or take some photos of her face during an episode  She should take 1 photo looking straight ahead and then 1 photo looking up, down, left, and right, this should be done without her glasses on  This will help us to determine if there are any other signs of dysfunction of the 3rd cranial nerve versus trouble in the muscles themselves  I will plan for her to return to the office in 6 months time to see me or one of the AP's but would be happy to see her sooner if the need should arise  If she has any symptoms concerning for TIA or stroke including sudden painless loss of vision or double vision, difficulty speaking or swallowing, vertigo/room spinning that does not quickly resolve, or weakness/numbness affecting 1 side of the face or body she should proceed by ambulance to the nearest emergency room immediately  Diagnoses and all orders for this visit:    History of stroke  -     VAS carotid complete study; Future  -     Lipid panel; Future  -     Comprehensive metabolic panel; Future    Double vision with both eyes open  -     Ambulatory referral to Neurology    Lacunar infarction [facility])  -     Ambulatory referral to Neurology    Severe obstructive sleep apnea    Benign essential hypertension    Mixed hyperlipidemia  -     Lipid panel; Future  -     Comprehensive metabolic panel;  Future    Hyperlipidemia, unspecified hyperlipidemia type  -     Discontinue: atorvastatin (LIPITOR) 20 mg tablet; 1 tab QPM X 2 weeks, if no side effects increase to 2 tabs QPM  -     atorvastatin (LIPITOR) 20 mg tablet; 1 tab QPM X 2 weeks, if no side effects increase to 2 tabs QPM    Transient paralysis   -     VAS carotid complete study; Future    ? Subjective:    HPI  I confirmed and agree with the history as documented by Dr Leticia Low with my additional edits as below  Pt is a 70year old female who presents for painless vertical diplopia that began approximately four years ago when patient had a mitral valve repair  Patient reports she started having double vision 2-3 months after her surgery  She denies any specific triggers or patterns  She states this has been ongoing since and occur at random intervals from occurring every other week to several months without an episode  She notes it last 30 seconds to a minute and previous episode was over a month ago  She describes that the double vision improves when one eye is covered  She denies any associated symptoms including nausea, vomiting, neck, dizziness, tingling, or numbness  Pt denies any history of known strokes  We reviewed her MRI together  There was no evidence of acute stroke and a tiny area of T2 shine through on diffusion weighted imaging, this corresponds to an area in the right centrum semiovale  Otherwise there is evidence of chronic appearing stroke as well as microvascular disease  We had an extensive discussion with regard to the physiology/pathophysiology of stroke and the most likely cause  In as much as there is no clear evidence to suggest that she experienced this stroke after she started taking aspirin, we will not plan for a Plavix transition at this point in time but will work on improving other stroke risk factors      During this office visit, discussed with patient about her MRI findings which revealed chronic microangiopathy and a chronic lacunar infarct  Additionally, reviewed risk factors of stroke and pt expressed understanding  Stroke risk factors were evaluated including:   Lab Results   Component Value Date/Time    CHOLESTEROL 165 10/04/2021 08:02 AM     Lab Results   Component Value Date/Time    TRIG 133 10/04/2021 08:02 AM     Lab Results   Component Value Date/Time    HDL 51 10/04/2021 08:02 AM     Lab Results   Component Value Date/Time    LDLCALC 91 10/04/2021 08:02 AM       Lab Results   Component Value Date/Time    HGBA1C 5 5 12/31/2018 09:37 AM     Lab Results   Component Value Date/Time     12/31/2018 09:37 AM           Past Medical History:   Diagnosis Date    Arthritis     in spine    Colon polyp     Constipation 11/02/2021    CPAP (continuous positive airway pressure) dependence     Cystic breast     Disease of thyroid gland     hypothyroid    Dyspareunia, female     last assessed 9/4/14    Esophageal ulcer     Food impaction of esophagus     GERD (gastroesophageal reflux disease)     Hepatic cyst 9/21/2015    Hyperlipidemia     Hypertension     Hypothyroidism     Insomnia     Liver cyst     drained    Obesity     [place] on CPAP     uses cpap    Osteoporosis     Severe mitral regurgitation     Thrombocytopenia (HCC) 1/8/2019    Urinary tract infection     Varicose veins of lower extremity     last assessed 1/4/13       Current Outpatient Medications:     amLODIPine (NORVASC) 5 mg tablet, TAKE 1 TABLET (5 MG TOTAL) BY MOUTH DAILY, Disp: 90 tablet, Rfl: 3    Ascorbic Acid (VITAMIN C ER PO), Take 1 capsule by mouth daily, Disp: , Rfl:     aspirin 81 MG tablet, Take 1 tablet (81 mg total) by mouth daily, Disp: 30 tablet, Rfl: 11    atorvastatin (LIPITOR) 10 mg tablet, TAKE 1 TABLET (10 MG TOTAL) BY MOUTH DAILY, Disp: 30 tablet, Rfl: 4    Cholecalciferol (VITAMIN D3) 2000 UNITS TABS, Take 1 tablet by mouth daily  , Disp: , Rfl:     estradiol (ESTRACE VAGINAL) 0 1 mg/g vaginal cream, Insert into the vagina, Disp: , Rfl:     hydrochlorothiazide (MICROZIDE) 12 5 mg capsule, TAKE 1 TO 2 CAPSULES DAILY WITH AMLODIPINE, Disp: 60 capsule, Rfl: 5    ketoconazole (NIZORAL) 2 % cream, Apply topically daily, Disp: 60 g, Rfl: 1    levothyroxine 125 mcg tablet, TAKE 1 TABLET (125 MCG TOTAL) BY MOUTH DAILY, Disp: 30 tablet, Rfl: 5    Omega-3 Fatty Acids (FISH OIL) 1200 MG CAPS, Take 1 capsule by mouth daily, Disp: , Rfl:     omeprazole (PriLOSEC) 40 MG capsule, TAKE 1 CAPSULE (40 MG TOTAL) BY MOUTH DAILY, Disp: 30 capsule, Rfl: 2     Objective:    /70 (BP Location: Left arm, Patient Position: Sitting, Cuff Size: Standard)   Pulse 80   Temp 97 7 °F (36 5 °C) (Tympanic)   Ht 5' 6" (1 676 m)   Wt 90 kg (198 lb 8 oz)   LMP  (LMP Unknown)   BMI 32 04 kg/m²     Neurological Exam    At the time of our examination she was awake alert, and in no distress  Cranial nerves 2-12 appeared intact although palate raise was not directly visualized  Motor testing reveals symmetric strength throughout the bilateral upper and lower extremities with no clear tremor or ataxia  She is able to rise easily without assistance and her gait is stable  ROS:    Review of Systems   Constitutional: Negative  Negative for appetite change and fever  HENT: Negative  Negative for hearing loss, tinnitus, trouble swallowing and voice change  Eyes: Negative  Negative for photophobia and pain  Respiratory: Negative  Negative for shortness of breath  Cardiovascular: Negative  Negative for palpitations  Gastrointestinal: Negative  Negative for nausea and vomiting  Endocrine: Negative  Negative for cold intolerance  Genitourinary: Negative  Negative for dysuria, frequency and urgency  Musculoskeletal: Negative  Negative for myalgias and neck pain  Skin: Negative  Negative for rash  Neurological: Negative  Negative for dizziness, tremors, seizures, syncope, facial asymmetry, speech difficulty, weakness, light-headedness, numbness and headaches  Hematological: Negative  Does not bruise/bleed easily  Psychiatric/Behavioral: Negative  Negative for confusion, hallucinations and sleep disturbance  Reviewed ROS as entered by medical assistant

## 2022-04-12 NOTE — PROGRESS NOTES
Pt is a 70year old female who presents for painless vertical diplopia that began approximately four years ago when patient had a mitral valve repair  Patient reports she started having double vision 2-3 months after her surgery  She denies any specific triggers or patterns  She states this has been ongoing since and occur at random intervals from occurring every other week to several months without an episode  She notes it last 30 seconds to a minute and previous episode was over a month ago  She describes that the double vision improves when one eye is covered  She denies any associated symptoms including nausea, vomiting, neck, dizziness, tingling, or numbness  Pt denies any history of known strokes  During this office visit, discussed with patient about her MRI findings which revealed chronic microangiopathy and a chronic lacunar infarct  Additionally, reviewed risk factors of stroke and pt expressed understanding

## 2022-04-18 DIAGNOSIS — I10 BENIGN ESSENTIAL HYPERTENSION: ICD-10-CM

## 2022-04-18 NOTE — TELEPHONE ENCOUNTER
Requested medication(s) are due for refill today: Yes  Patient has already received a courtesy refill: No  Other reason request has been forwarded to provider:   Cardiovascular:  Calcium Channel Blockers Failed 04/18/2022 10:03 AM   Protocol Details  Valid encounter within last 6 months

## 2022-04-19 LAB
ALBUMIN SERPL-MCNC: 3.9 G/DL (ref 3.6–5.1)
ALBUMIN/GLOB SERPL: 1.3 (CALC) (ref 1–2.5)
ALP SERPL-CCNC: 81 U/L (ref 37–153)
ALT SERPL-CCNC: 13 U/L (ref 6–29)
AST SERPL-CCNC: 16 U/L (ref 10–35)
BILIRUB SERPL-MCNC: 0.6 MG/DL (ref 0.2–1.2)
BUN SERPL-MCNC: 13 MG/DL (ref 7–25)
BUN/CREAT SERPL: ABNORMAL (CALC) (ref 6–22)
CALCIUM SERPL-MCNC: 9.4 MG/DL (ref 8.6–10.4)
CHLORIDE SERPL-SCNC: 106 MMOL/L (ref 98–110)
CHOLEST SERPL-MCNC: 145 MG/DL
CHOLEST/HDLC SERPL: 2.3 (CALC)
CO2 SERPL-SCNC: 30 MMOL/L (ref 20–32)
CREAT SERPL-MCNC: 0.8 MG/DL (ref 0.6–0.93)
GLOBULIN SER CALC-MCNC: 3 G/DL (CALC) (ref 1.9–3.7)
GLUCOSE SERPL-MCNC: 105 MG/DL (ref 65–99)
HDLC SERPL-MCNC: 62 MG/DL
LDLC SERPL CALC-MCNC: 65 MG/DL (CALC)
NONHDLC SERPL-MCNC: 83 MG/DL (CALC)
POTASSIUM SERPL-SCNC: 3.9 MMOL/L (ref 3.5–5.3)
PROT SERPL-MCNC: 6.9 G/DL (ref 6.1–8.1)
SL AMB EGFR AFRICAN AMERICAN: 86 ML/MIN/1.73M2
SL AMB EGFR NON AFRICAN AMERICAN: 74 ML/MIN/1.73M2
SODIUM SERPL-SCNC: 142 MMOL/L (ref 135–146)
TRIGL SERPL-MCNC: 98 MG/DL

## 2022-04-19 RX ORDER — AMLODIPINE BESYLATE 5 MG/1
5 TABLET ORAL DAILY
Qty: 90 TABLET | Refills: 0 | Status: SHIPPED | OUTPATIENT
Start: 2022-04-19

## 2022-04-21 ENCOUNTER — HOSPITAL ENCOUNTER (OUTPATIENT)
Dept: VASCULAR ULTRASOUND | Facility: HOSPITAL | Age: 72
Discharge: HOME | End: 2022-04-21
Attending: PSYCHIATRY & NEUROLOGY
Payer: MEDICARE

## 2022-04-21 DIAGNOSIS — R29.5 TRANSIENT PARALYSIS: ICD-10-CM

## 2022-04-21 DIAGNOSIS — Z86.73 HISTORY OF STROKE: ICD-10-CM

## 2022-04-21 PROCEDURE — 93880 EXTRACRANIAL BILAT STUDY: CPT

## 2022-04-21 PROCEDURE — 93880 EXTRACRANIAL BILAT STUDY: CPT | Performed by: SURGERY

## 2022-04-25 ENCOUNTER — OFFICE VISIT (OUTPATIENT)
Dept: FAMILY MEDICINE CLINIC | Facility: CLINIC | Age: 72
End: 2022-04-25
Payer: MEDICARE

## 2022-04-25 ENCOUNTER — APPOINTMENT (OUTPATIENT)
Dept: LAB | Facility: CLINIC | Age: 72
End: 2022-04-25
Payer: MEDICARE

## 2022-04-25 VITALS
BODY MASS INDEX: 31.98 KG/M2 | WEIGHT: 199 LBS | RESPIRATION RATE: 18 BRPM | HEART RATE: 67 BPM | SYSTOLIC BLOOD PRESSURE: 120 MMHG | DIASTOLIC BLOOD PRESSURE: 82 MMHG | HEIGHT: 66 IN | OXYGEN SATURATION: 98 % | TEMPERATURE: 97.8 F

## 2022-04-25 DIAGNOSIS — K21.9 CHRONIC GERD: ICD-10-CM

## 2022-04-25 DIAGNOSIS — L30.9 ECZEMA, UNSPECIFIED TYPE: ICD-10-CM

## 2022-04-25 DIAGNOSIS — E03.9 HYPOTHYROIDISM, UNSPECIFIED TYPE: ICD-10-CM

## 2022-04-25 DIAGNOSIS — Z86.73 HISTORY OF STROKE: ICD-10-CM

## 2022-04-25 DIAGNOSIS — I10 BENIGN ESSENTIAL HYPERTENSION: ICD-10-CM

## 2022-04-25 DIAGNOSIS — E78.2 MIXED HYPERLIPIDEMIA: Primary | ICD-10-CM

## 2022-04-25 DIAGNOSIS — G47.33 SEVERE OBSTRUCTIVE SLEEP APNEA: ICD-10-CM

## 2022-04-25 DIAGNOSIS — Z98.890 S/P MVR (MITRAL VALVE REPAIR): ICD-10-CM

## 2022-04-25 DIAGNOSIS — L81.9 PIGMENTED SKIN LESIONS: ICD-10-CM

## 2022-04-25 LAB — TSH SERPL DL<=0.05 MIU/L-ACNC: 0.58 UIU/ML (ref 0.45–4.5)

## 2022-04-25 PROCEDURE — 84443 ASSAY THYROID STIM HORMONE: CPT

## 2022-04-25 PROCEDURE — 99214 OFFICE O/P EST MOD 30 MIN: CPT | Performed by: FAMILY MEDICINE

## 2022-04-25 PROCEDURE — 36415 COLL VENOUS BLD VENIPUNCTURE: CPT

## 2022-04-25 NOTE — PROGRESS NOTES
FAMILY PRACTICE OFFICE VISIT       NAME: Starr Grier  AGE: 70 y o  SEX: female       : 1950        MRN: [id number]        Assessment and Plan     1  Mixed hyperlipidemia  Assessment & Plan:   Atorvastatin 20 mg daily as per neurology    Repeat LFts and Lipids  In 3 months    Orders:  -     Lipid Panel with Direct LDL reflex; Future; Expected date: 2022  -     Hepatic function panel; Future; Expected date: 2022  -     fluocinonide (LIDEX) 0 05 % cream; Apply topically 2 (two) times a day    2  S/P MVR (mitral valve repair)  Assessment & Plan:  S/P Mitral valve repair with P2 triangular ressection and 30 mm Medtronic CG Future mitral annuloplasty ring, performed on 2019,Dr Gibson  Patient denies symptoms of chest pain, dyspnea palpitations  3  Pigmented skin lesions  Comments:  Referral to dermatology  Orders:  -     Ambulatory referral to Dermatology; Future    4  Eczema, unspecified type  Comments:  Trial of fluocinonide cream    5  Severe obstructive sleep apnea  Assessment & Plan:  Continue CPAP      6  History of stroke  Assessment & Plan:  Evidence of few tiny lacunar strokes on recent MRI  Continue Lipitor, aspirin, blood pressure is well controlled  Patient is compliant with CPAP  for obstructive sleep apnea    MRI brain 2022: White matter changes suggestive of chronic microangiopathy  No acute intracranial pathology  Chronic lacunar infarcts are seen in the right caudate head and right lateral nucleus  Carotid ultrasound 2022: Normal        7  Chronic GERD  Assessment & Plan:  Continue omeprazole 40 mg daily, symptoms are well controlled      8  Hypothyroidism, unspecified type  Assessment & Plan:  Due for TSH now, patient will proceed today, continue levothyroxine 150 mcg daily      9  Benign essential hypertension  Assessment & Plan:  Blood pressure is well controlled, continue amlodipine and HCTZ          BMI Counseling:  Body mass index is 32 12 kg/m²  The BMI is above normal  Nutrition recommendations include encouraging healthy choices of fruits and vegetables, consuming healthier snacks, moderation in carbohydrate intake, reducing intake of saturated and trans fat and reducing intake of cholesterol  Exercise recommendations include exercising 3-5 times per week  No pharmacotherapy was ordered  Patient referred to PCP  Rationale for BMI follow-up plan is due to patient being overweight or obese  Depression Screening and Follow-up Plan: Patient was screened for depression during today's encounter  They screened negative with a PHQ-2 score of 0  There are no Patient Instructions on file for this visit  Return in about 6 months (around 10/25/2022) for follow up  Discussed with the patient and all questioned fully answered  She will call me if any problems arise  M*Modal software was used to dictate this note  It may contain errors with dictating incorrect words/spelling  Please contact provider directly with any questions  Chief Complaint     Chief Complaint   Patient presents with    Follow-up     6 month     Nevus     would like checked and scaley skin    Neurology Follow Up     CT scans  increased Atorvastatin 4mgs per day       History of Present Illness     Chronic medical conditions  Patient has been feeling generally well  She is here today accompanied by her   Results of recent blood work, brain MRI and recent neurology consult reviewed  MRI with incidental finding of few tiny lacunar strokes  Neurology has recommended to increase dose of atorvastatin  Patient is currently on 20 mg daily  She offers no complaints of headaches  No recent episodes of double vision  Patient was previously evaluated by Ophthalmology  Chronic GERD: She has been feeling well, no recurrences of BOLUS  Patient remains on Omeprazole 40 mg daily  She is on daily medications for hypertension, hyperlipidemia, hypothyroidism  Patient is up-to-date with health screenings  Patient started dieting via [employer], she is motivated to lose weight  Review of Systems   Review of Systems   Constitutional: Negative  HENT: Negative  Eyes: Negative  Respiratory: Negative  Cardiovascular: Negative  Gastrointestinal: Negative  Endocrine: Negative  Genitourinary: Negative  Musculoskeletal: Negative  Skin: Positive for rash  Allergic/Immunologic: Negative  Neurological: Negative  Hematological: Negative  Psychiatric/Behavioral: Negative          Active Problem List     Patient Active Problem List   Diagnosis    Benign essential hypertension    Disc degeneration, lumbar    Hyperlipidemia    Hypothyroidism    Liver enlargement    Osteopenia of multiple sites    Severe obstructive sleep apnea    Shoulder impingement    Tinea corporis    Lumbar radiculopathy    S/P MVR (mitral valve repair)    Double vision with both eyes open    Encounter for Medicare annual wellness exam    History of repair of hiatal hernia    Chronic GERD    History of stroke    Pigmented skin lesions       Past Medical History:  Past Medical History:   Diagnosis Date    Arthritis     in spine    Colon polyp     Constipation 11/02/2021    CPAP (continuous positive airway pressure) dependence     Cystic breast     Disease of thyroid gland     hypothyroid    Dyspareunia, female     last assessed 9/4/14    Esophageal obstruction due to food impaction 11/3/2021    September of 2021    Esophageal ulcer     Food impaction of esophagus     GERD (gastroesophageal reflux disease)     Hepatic cyst 9/21/2015    Hyperlipidemia     Hypertension     Hypothyroidism     Insomnia     Liver cyst     drained    Obesity     [place] on CPAP     uses cpap    Osteoporosis     Severe mitral regurgitation     Thrombocytopenia ([address] ) 1/8/2019    Urinary tract infection     Varicose veins of lower extremity     last assessed 1/4/13 Past Surgical History:  Past Surgical History:   Procedure Laterality Date    COLONOSCOPY      complete 5/2016 - Dr Marquez Fees due in 2019 - last assessed  3/17/17    HERNIA REPAIR      HIATAL HERNIA REPAIR      LIVER BIOPSY LAPAROSCOPIC N/A 5/7/2018    Procedure: Laparoscopic marsupialization of liver cyst;  Surgeon: Jobe Bloch, MD;  [facility];  Service: Surgical Oncology    NEUROMA EXCISION      IN ECHO [address] N/A 1/7/2019    Procedure: TRANSESOPHAGEAL ECHOCARDIOGRAM (DORI); Surgeon: Flores Peña MD;  [facility];  Service: Cardiac Surgery    IN ESOPHAGOGASTRODUODENOSCOPY TRANSORAL DIAGNOSTIC N/A 8/10/2016    Procedure: ESOPHAGOGASTRODUODENOSCOPY (EGD); Surgeon: Marco Lopez MD;  [facility]; Service: Gastroenterology    IN ESOPHAGOSCOPY FLEXIBLE TRANSORAL WITH BIOPSY N/A 11/3/2016    Procedure: ESOPHAGOGASTRODUODENOSCOPY (EGD);   Surgeon: Saba Coffman MD;  [facility];  Service: Thoracic    IN LAP, REPAIR PARAESOPHAGEAL HERNIA, INCL FUNDOPLASTY W/ MESH Left 11/3/2016    Procedure: LAPAROSCOPIC PARAESOPHAGEAL HERNIA REPAIR WITH MESH;NISSEN FUNDOPLICATION ;  Surgeon: Saba Coffman MD;  [facility];  Service: Thoracic    IN LAP, VENTRAL HERNIA REPAIR,REDUCIBLE N/A 10/30/2017    Procedure: LAPAROSCOPIC INCISIONAL HERNIA REPAIR X 2 WITH ECHO MESH;  Surgeon: Sharon Humphries DO;  [facility];  Service: General    IN REPAIR INCISIONAL HERNIA,REDUCIBLE N/A 1/13/2017    Procedure: INCISIONAL HERNIA REPAIR ;  Surgeon: Sharon Humphries DO;  [facility];  Service: General    IN REPLACEMENT OF MITRAL VALVE N/A 1/7/2019    Procedure: REPLACEMENT VALVE MITRAL (MVR), repair with 30mm CG Future ring;  Surgeon: Flores Peña MD;  [facility];  Service: Cardiac Surgery    TUBAL LIGATION         Family History:  Family History   Problem Relation Age of Onset    Heart disease Mother     Aneurysm Mother         cerebral    Alcohol abuse Father     Cancer Father     COPD Father     Heart failure Father     Hypertension Father     Tuberculosis Father     Cancer Family     Breast cancer Paternal Grandmother 80    No Known Problems Sister     No Known Problems Daughter     No Known Problems Maternal Grandmother     No Known Problems Maternal Grandfather     No Known Problems Paternal Grandfather     No Known Problems Son        Social History:  Social History     Socioeconomic History    Marital status: /Civil Union     Spouse name: Not on file    Number of children: Not on file    Years of education: Not on file    Highest education level: Not on file   Occupational History    Not on file   Tobacco Use    Smoking status: Never Smoker    Smokeless tobacco: Never Used   Vaping Use    Vaping Use: Never used   Substance and Sexual Activity    Alcohol use: Yes     Comment: social    Drug use: No    Sexual activity: Yes     Partners: Male     Birth control/protection: Post-menopausal   Other Topics Concern    Not on file   Social History Narrative    Coffee    Exercise - walking     Social Determinants of Health     Financial Resource Strain: Not on file   Food Insecurity: Not on file   Transportation Needs: Not on file   Physical Activity: Not on file   Stress: Not on file   Social Connections: Not on file   Intimate Partner Violence: Not on file   Housing Stability: Not on file         Objective     Vitals:    04/25/22 0939 04/25/22 1017   BP: 140/60 120/82   BP Location: Right arm    Patient Position: Sitting    Cuff Size: Standard    Pulse: 67    Resp: 18    Temp: 97 8 °F (36 6 °C)    TempSrc: Temporal    SpO2: 98%    Weight: 90 3 kg (199 lb)    Height: 5' 6" (1 676 m)        Wt Readings from Last 3 Encounters:   04/25/22 90 3 kg (199 lb)   04/12/22 90 kg (198 lb 8 oz)   11/03/21 89 8 kg (198 lb)       Physical Exam  Vitals and nursing note reviewed     Constitutional: Appearance: She is well-developed  HENT:      Head: Normocephalic and atraumatic  Eyes:      Conjunctiva/sclera: Conjunctivae normal    Neck:      Thyroid: No thyromegaly  Vascular: No carotid bruit  Cardiovascular:      Rate and Rhythm: Normal rate and regular rhythm  Heart sounds: Normal heart sounds  No murmur heard  Pulmonary:      Effort: Pulmonary effort is normal  No respiratory distress  Breath sounds: Normal breath sounds  No wheezing  Abdominal:      General: There is no abdominal bruit  Musculoskeletal:         General: Normal range of motion  Cervical back: Neck supple  Skin:     Comments: Irregular shaped flat mole left upper back  Few skin tags ACW and abdomen  Right  Forearm:  Oval-shaped raised scaly lesion  6x3 mm  Eczema  right lateral  distal  shin   Neurological:      Mental Status: She is alert and oriented to person, place, and time  Cranial Nerves: No cranial nerve deficit        Coordination: Coordination normal    Psychiatric:         Behavior: Behavior normal           Pertinent Laboratory/Diagnostic Studies:    Lab Results   Component Value Date    WBC 5 6 10/04/2021    HGB 12 5 10/04/2021    HCT 37 3 10/04/2021    MCV 89 2 10/04/2021     10/04/2021       Lab Results   Component Value Date    TSH 0 07 (L) 10/04/2021       Lab Results   Component Value Date    CHOL 159 09/19/2017     Lab Results   Component Value Date    TRIG 98 04/19/2022     Lab Results   Component Value Date    HDL 62 04/19/2022     Lab Results   Component Value Date    LDLCALC 65 04/19/2022     Lab Results   Component Value Date    HGBA1C 5 5 12/31/2018     Lab Results   Component Value Date    SODIUM 142 04/19/2022    K 3 9 04/19/2022     04/19/2022    CO2 30 04/19/2022    AGAP 5 01/10/2019    BUN 13 04/19/2022    CREATININE 0 80 04/19/2022    GLUC 105 (H) 04/19/2022    GLUF 108 (H) 12/21/2018    CALCIUM 9 4 04/19/2022    AST 16 04/19/2022    ALT 13 04/19/2022 ALKPHOS 81 04/19/2022    PROT 7 1 09/19/2017    TP 6 9 04/19/2022    BILITOT 0 6 09/19/2017    TBILI 0 6 04/19/2022    EGFR 48 12/17/2021       Orders Placed This Encounter   Procedures    Lipid Panel with Direct LDL reflex    Hepatic function panel    Ambulatory referral to Dermatology       ALLERGIES:  Allergies   Allergen Reactions    Other Other (See Comments)     Skin breakdown from bandaid on for long time- reddness       Current Medications     Current Outpatient Medications   Medication Sig Dispense Refill    amLODIPine (NORVASC) 5 mg tablet TAKE 1 TABLET (5 MG TOTAL) BY MOUTH DAILY 90 tablet 0    Ascorbic Acid (VITAMIN C ER PO) Take 1 capsule by mouth daily      aspirin 81 MG tablet Take 1 tablet (81 mg total) by mouth daily 30 tablet 11    Cholecalciferol (VITAMIN D3) 2000 UNITS TABS Take 1 tablet by mouth daily   estradiol (ESTRACE VAGINAL) 0 1 mg/g vaginal cream Insert into the vagina      hydrochlorothiazide (MICROZIDE) 12 5 mg capsule TAKE 1 TO 2 CAPSULES DAILY WITH AMLODIPINE 60 capsule 5    levothyroxine 125 mcg tablet TAKE 1 TABLET (125 MCG TOTAL) BY MOUTH DAILY 30 tablet 5    Omega-3 Fatty Acids (FISH OIL) 1200 MG CAPS Take 1 capsule by mouth daily      omeprazole (PriLOSEC) 40 MG capsule TAKE 1 CAPSULE (40 MG TOTAL) BY MOUTH DAILY 30 capsule 2    atorvastatin (LIPITOR) 20 mg tablet 1 tab QPM X 2 weeks, if no side effects increase to 2 tabs QPM 60 tablet 5    fluocinonide (LIDEX) 0 05 % cream Apply topically 2 (two) times a day 60 g 2     No current facility-administered medications for this visit         Medications Discontinued During This Encounter   Medication Reason    ketoconazole (NIZORAL) 2 % cream Therapy completed       Health Maintenance     Health Maintenance   Topic Date Due    Hepatitis C Screening  Never done    SLP PLAN OF CARE  Never done    DTaP,Tdap,and Td Vaccines (2 - Td or Tdap) 11/09/2020    Colorectal Cancer Screening  09/26/2022    Fall Risk 10/13/2022    Medicare Annual Wellness Visit (AWV)  10/13/2022    Depression Screening  04/25/2023    BMI: Adult  04/25/2023    BMI: Followup Plan  05/01/2023    Breast Cancer Screening: Mammogram  06/01/2023    Osteoporosis Screening  Completed    Pneumococcal Vaccine: 65+ Years  Completed    Influenza Vaccine  Completed    COVID-19 Vaccine  Completed    HIB Vaccine  Aged Out    Hepatitis B Vaccine  Aged Out    IPV Vaccine  Aged Out    Hepatitis A Vaccine  Aged Out    Meningococcal ACWY Vaccine  Aged Out    HPV Vaccine  Aged Out       Immunization History   Administered Date(s) Administered    COVID-19 MODERNA VACC 0 5 ML IM 01/29/2021, 02/24/2021, 11/22/2021    Influenza Quadrivalent Preservative Free 3 years and older IM 11/11/2016    Influenza Split High Dose Preservative Free IM 09/21/2015, 09/15/2017, 10/21/2019    Influenza, high dose seasonal 0 7 mL 10/19/2018, 09/08/2020, 10/13/2021    Influenza, seasonal, injectable 11/01/2010, 01/24/2013, 09/22/2014    Pneumococcal Conjugate 13-Valent 01/19/2016    Pneumococcal Polysaccharide PPV23 09/15/2017    Tdap 11/09/2010       Naomi Cruz MD

## 2022-05-01 PROBLEM — T18.128A ESOPHAGEAL OBSTRUCTION DUE TO FOOD IMPACTION: Status: RESOLVED | Noted: 2021-11-03 | Resolved: 2022-05-01

## 2022-05-01 PROBLEM — K22.2 ESOPHAGEAL OBSTRUCTION DUE TO FOOD IMPACTION: Status: RESOLVED | Noted: 2021-11-03 | Resolved: 2022-05-01

## 2022-05-01 PROBLEM — L81.9 PIGMENTED SKIN LESIONS: Status: ACTIVE | Noted: 2022-05-01
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
## 2022-05-01 NOTE — ASSESSMENT & PLAN NOTE
Evidence of few tiny lacunar strokes on recent MRI  Continue Lipitor, aspirin, blood pressure is well controlled  Patient is compliant with CPAP  for obstructive sleep apnea    MRI brain January 2022: White matter changes suggestive of chronic microangiopathy  No acute intracranial pathology  Chronic lacunar infarcts are seen in the right caudate head and right lateral nucleus      Carotid ultrasound April 2022: Normal

## 2022-05-01 NOTE — ASSESSMENT & PLAN NOTE
S/P Mitral valve repair with P2 triangular ressection and 30 mm Medtronic CG Future mitral annuloplasty ring, performed on 1/7/2019,Dr Gibson  Patient denies symptoms of chest pain, dyspnea palpitations

## 2022-05-18 ENCOUNTER — OFFICE VISIT (OUTPATIENT)
Dept: CARDIOLOGY CLINIC | Facility: CLINIC | Age: 72
End: 2022-05-18
Payer: MEDICARE

## 2022-05-18 VITALS
HEIGHT: 66 IN | BODY MASS INDEX: 31.29 KG/M2 | DIASTOLIC BLOOD PRESSURE: 76 MMHG | WEIGHT: 194.7 LBS | SYSTOLIC BLOOD PRESSURE: 108 MMHG | HEART RATE: 67 BPM

## 2022-05-18 DIAGNOSIS — Z98.890 S/P MVR (MITRAL VALVE REPAIR): Primary | ICD-10-CM

## 2022-05-18 PROCEDURE — 99214 OFFICE O/P EST MOD 30 MIN: CPT | Performed by: INTERNAL MEDICINE

## 2022-05-18 PROCEDURE — 93000 ELECTROCARDIOGRAM COMPLETE: CPT | Performed by: INTERNAL MEDICINE

## 2022-05-18 NOTE — PROGRESS NOTES
[facility]  Follow up note  Shahid Camejo 70 y o  female MRN: [id number]        Problems    1  S/P MVR (mitral valve repair)  POCT ECG       Impression:    Moderate to severe mitral regurgitation/mitral valve prolapse  Status post mitral valve repair with annuloplasty ring 2018  Normal preoperative coronaries on cardiac catheterization   continue prophylaxis with 81 mg aspirin   No murmur on exam, no cardiac symptoms, no imaging needed    Mixed hyperlipidemia   well controlled, LDL 65, atorvastatin 20 mg daily    Obstructive sleep apnea  Compliant with CPAP    Double vision  Not occurring significantly lately   Had been [place] double vision in the past   MRI per Neurology does show prior stroke, unclear if this is connected to her vision         Plan:      Continue annual follow-up  She does not require any new cardiovascular testing     HPI:   Shahid Camejo is a 70y o  year old female with severe mitral regurgitation due to P2 prolapse of the mitral valve who underwent class 2A indication mitral valve repair  In 2018  blood pressure is excellent   Lipids are well controlled, Neurology once at our higher dose of atorvastatin 40 mg, but she seems well controlled on lower doses with an LDL of 65  She is currently tolerating 20 mg a day   She underwent an MRI per Neurology and there was prior history of stroke-like changes  She has not had much double vision, in the past this was [place] vertical double vision  She does not experience significant cardiac symptoms, and her mitral valve repair appears to be functioning quite well from a clinical / symptom standpoint  Review of Systems   Constitutional: Negative for appetite change, diaphoresis, fatigue and fever  Respiratory: Negative for chest tightness, shortness of breath and wheezing  Cardiovascular: Negative for chest pain, palpitations and leg swelling  Gastrointestinal: Negative for abdominal pain and blood in stool  Musculoskeletal: Negative for arthralgias and joint swelling  Skin: Negative for rash  Neurological: Negative for dizziness, syncope and light-headedness  Past Medical History:   Diagnosis Date    Arthritis     in spine    Colon polyp     Constipation 11/02/2021    CPAP (continuous positive airway pressure) dependence     Cystic breast     Disease of thyroid gland     hypothyroid    Dyspareunia, female     last assessed 9/4/14    Esophageal obstruction due to food impaction 11/3/2021    September of 2021    Esophageal ulcer     Food impaction of esophagus     GERD (gastroesophageal reflux disease)     Hepatic cyst 9/21/2015    Hyperlipidemia     Hypertension     Hypothyroidism     Insomnia     Liver cyst     drained    Obesity     IRINA on CPAP     uses cpap    Osteoporosis     Severe mitral regurgitation     Thrombocytopenia ([address] ) 1/8/2019    Urinary tract infection     Varicose veins of lower extremity     last assessed 1/4/13     Social History     Substance and Sexual Activity   Alcohol Use Yes    Comment: social     Social History     Substance and Sexual Activity   Drug Use No     Social History     Tobacco Use   Smoking Status Never Smoker   Smokeless Tobacco Never Used       Allergies: Allergies   Allergen Reactions    Other Other (See Comments)     Skin breakdown from bandaid on for long time- reddness       Medications:     Current Outpatient Medications:     amLODIPine (NORVASC) 5 mg tablet, TAKE 1 TABLET (5 MG TOTAL) BY MOUTH DAILY, Disp: 90 tablet, Rfl: 0    Ascorbic Acid (VITAMIN C ER PO), Take 1 capsule by mouth daily, Disp: , Rfl:     aspirin 81 MG tablet, Take 1 tablet (81 mg total) by mouth daily, Disp: 30 tablet, Rfl: 11    atorvastatin (LIPITOR) 20 mg tablet, 1 tab QPM X 2 weeks, if no side effects increase to 2 tabs QPM (Patient taking differently: 20 mg  in the morning   1 tab QPM X 2 weeks, if no side effects increase to 2 tabs QPM ), Disp: 60 tablet, Rfl: 5    Cholecalciferol (VITAMIN D3) 2000 UNITS TABS, Take 1 tablet by mouth daily  , Disp: , Rfl:     estradiol (ESTRACE) 0 1 mg/g vaginal cream, Insert into the vagina, Disp: , Rfl:     fluocinonide (LIDEX) 0 05 % cream, Apply topically 2 (two) times a day, Disp: 60 g, Rfl: 2    hydrochlorothiazide (MICROZIDE) 12 5 mg capsule, TAKE 1 TO 2 CAPSULES DAILY WITH AMLODIPINE, Disp: 60 capsule, Rfl: 5    levothyroxine 125 mcg tablet, TAKE 1 TABLET (125 MCG TOTAL) BY MOUTH DAILY, Disp: 30 tablet, Rfl: 5    Omega-3 Fatty Acids (FISH OIL) 1200 MG CAPS, Take 1 capsule by mouth daily, Disp: , Rfl:     omeprazole (PriLOSEC) 40 MG capsule, TAKE 1 CAPSULE (40 MG TOTAL) BY MOUTH DAILY, Disp: 30 capsule, Rfl: 2      Vitals:    05/18/22 0857   BP: 108/76   Pulse: 67     Weight (last 2 days)     Date/Time Weight    05/18/22 0857 88 3 (194 7)        Physical Exam  Constitutional:       General: She is not in acute distress  Appearance: She is not diaphoretic  HENT:      Head: Normocephalic and atraumatic  Eyes:      General: No scleral icterus  Conjunctiva/sclera: Conjunctivae normal    Neck:      Vascular: No JVD  Cardiovascular:      Rate and Rhythm: Normal rate and regular rhythm  Heart sounds: Normal heart sounds  No murmur heard  Pulmonary:      Effort: Pulmonary effort is normal  No respiratory distress  Breath sounds: Normal breath sounds  No wheezing, rhonchi or rales  Musculoskeletal:         General: No tenderness  Cervical back: Normal range of motion  Right lower leg: Normal  No edema  Left lower leg: Normal  No edema  Skin:     General: Skin is warm and dry             Laboratory Studies:  Chem:   Lab Results   Component Value Date    HGBA1C 5 5 12/31/2018     09/19/2017     05/03/2017     12/12/2016    K 3 9 04/19/2022    K 4 0 10/04/2021    K 3 7 03/24/2021     04/19/2022     10/04/2021     03/24/2021    CO2 30 04/19/2022    CO2 31 10/04/2021    CO2 28 2021    GLUCOSE 146 (H) 2019    GLUCOSE 166 (H) 2019    GLUCOSE 195 (H) 2019    CREATININE 0 80 2022    CREATININE 1 14 2021    CREATININE 0 77 10/04/2021    CREATININE 0 99 (H) 2021    CREATININE 0 66 01/10/2019    CREATININE 0 73 2019    CREATININE 0 85 2017    CREATININE 0 84 2017    CREATININE 0 84 2016    BUN 13 2022    BUN 18 2021    BUN 13 10/04/2021    BUN 13 2021    MG 2 7 (H) 2019    MG 2 3 2018    MG 2 3 2018     NT-proBNP:   Coags:  Lipid Profile:   Lab Results   Component Value Date    CHOL 159 2017    CHOL 163 2017    CHOL 146 2016    LDLCALC 65 2022    LDLCALC 91 10/04/2021    LDLCALC 77 2021    LDLDIRECT 110 2016    LDLDIRECT 105 2015    LDLDIRECT 98 2013    HDL 62 2022    HDL 51 10/04/2021    HDL 60 2021    TRIG 98 2022    TRIG 133 10/04/2021    TRIG 110 2021       Cardiac testing:   EKG reviewed personally:   Results for orders placed or performed in visit on 22   POCT ECG    Impression    Normal sinus rhythm, nonspecific T-wave abnormality anteriorly           Limited echocardiogram 3/19  EF normal, status post mitral valve repair, mean gradient 7 mmHg    Results for orders placed during the hospital encounter of 18   Echo complete with contrast if indicated    [facility]ative [facility]  [facility] [address] [facility]  [phone]    Transthoracic Echocardiogram  2D, M-mode, Doppler, and Color Doppler    Study date:  2018    Patient: Cecilia Marie  MR number: [id number]  Account number: [de-identified]  : 1950  Age: 76 years  Gender: Female  Status: Outpatient  Location: Echo lab  Height: 65 in  Weight: 200 lb  BP: 140/ 90 mmHg    Indications: Murmur    Diagnoses: R01 1 - Cardiac murmur, unspecified    Sonographer:  Sanya Yun, ZEV  Primary Physician:  Jimmy Choi MD  Referring Physician:  Jimmy Choi MD  Group:  [facility]  Cardiology Fellow:  Theo Boyer MD  Interpreting Physician:  Tanisha Valadez MD    SUMMARY    LEFT VENTRICLE:  Size was normal   Systolic function was normal  Ejection fraction was estimated to be 65 %  There were no regional wall motion abnormalities  RIGHT VENTRICLE:  The size was normal   Systolic function was normal     MITRAL VALVE:  There was holosystolic prolapse involving the posterior leaflet  There was severe regurgitation  The regurgitant jet was eccentric and directed anteriorly  The effective orifice of mitral regurgitation by proximal isovelocity surface area was 0 48 cm squared  The volume of mitral regurgitation by proximal isovelocity surface area was 93 ml  PULMONARY VEINS:  There was systolic flow reversal in the pulmonary vein(s), indicative of severe mitral regurgitation  RECOMMENDATIONS:  Discussed with the patient and the patient's primary care for out patient cardiology follow-up  The attending physician was notified of these results  HISTORY: PRIOR HISTORY: HTN, HLD, IRINA    PROCEDURE: The procedure was performed in the echo lab  This was a routine study  The transthoracic approach was used  The study included complete 2D imaging, M-mode, complete spectral Doppler, and color Doppler  The heart rate was 65 bpm,  at the start of the study  Images were obtained from the parasternal, apical, subcostal, and suprasternal notch acoustic windows  Image quality was adequate  LEFT VENTRICLE: Size was normal  Systolic function was normal  Ejection fraction was estimated to be 65 %  There were no regional wall motion abnormalities   Wall thickness was normal  DOPPLER: Left ventricular diastolic function parameters  were normal     RIGHT VENTRICLE: The size was normal  Systolic function was normal     LEFT ATRIUM: The atrium was mildly to moderately dilated  RIGHT ATRIUM: Size was normal     MITRAL VALVE: There was mild annular calcification  There was mild diffuse thickening of the valve  There was holosystolic prolapse involving the posterior leaflet  DOPPLER: There was no evidence for stenosis  There was severe  regurgitation  The regurgitant jet was eccentric and directed anteriorly  AORTIC VALVE: The valve was trileaflet  Leaflets exhibited normal thickness and normal cuspal separation  DOPPLER: There was no evidence for stenosis  There was no regurgitation  TRICUSPID VALVE: The valve structure was normal  There was normal leaflet separation  DOPPLER: There was no evidence for stenosis  There was mild regurgitation  Estimated peak PA pressure was 35 mmHg  PULMONIC VALVE: DOPPLER: The transpulmonic velocity was within the normal range  There was no evidence for stenosis  There was trace regurgitation  PERICARDIUM: There was no pericardial effusion  The pericardium was normal in appearance  AORTA: The root exhibited normal size  SYSTEMIC VEINS: IVC: The inferior vena cava was normal in size and course  Respirophasic changes were normal     PULMONARY VEINS: DOPPLER: There was systolic flow reversal in the pulmonary vein(s), indicative of severe mitral regurgitation      MEASUREMENT TABLES    DOPPLER MEASUREMENTS  Mitral valve   (Reference normals)  Regurg alias darrick   38 cm/s   (--)  Regurg PISA radius   11 mm   (--)  Max MR darrick   598 cm/s   (--)  Regurg VTI   194 cm   (--)  Max MR flow rate   288 9 ml/s   (--)  Area, ERO, PISA   0 48 cm squared   (--)  Regurg vol, PISA   93 ml   (--)    SYSTEM MEASUREMENT TABLES    2D  %FS: 40 38 %  Ao Diam: 2 74 cm  EDV(Teich): 118 21 ml  EF(Cube): 78 81 %  EF(Teich): 70 86 %  ESV(Cube): 26 48 ml  ESV(Teich): 34 45 ml  IVSd: 1 08 cm  LA Area: 27 71 cm2  LA Diam: 4 4 cm  LVEDV MOD A4C: 121 26 ml  LVEF MOD A4C: 62 99 %  LVESV MOD A4C: 44 88 ml  LVIDd: 5 cm  LVIDs: 2 98 cm  LVLd A4C: 8 01 cm  LVLs A4C: 6 37 cm  LVPWd: 1 1 cm  RA Area: 16 49 cm2  SV MOD A4C: 76 38 ml  SV(Cube): 98 48 ml  SV(Teich): 83 76 ml  rv diam: 3 86 cm    CF  MR Als  Keven: 0 38 m/s  MR Flow: 393 24 ml/s  MR Rad: 1 28 cm    CW  TR Vmax: 2 65 m/s  TR maxP 18 mmHg    MM  TAPSE: 2 23 cm    PW  E': 0 09 m/s  E/E': 13 56  MV A Keven: 0 81 m/s  MV Dec [place]: 7 83 m/s2  MV DecT: 158 22 ms  MV E Keven: 1 24 m/s  MV E/A Ratio: 1 53    [facility]    Prepared and electronically signed by    Juan Garcia MD  Signed [id number] 11:47:14       Results for orders placed during the hospital encounter of 18   DORI    Narrative [address]  [facility] [place], [address] [facility]  [phone]    Transesophageal Echocardiogram  2D, 3D, M-mode, Doppler, and Color Doppler    Study date:  03-Dec-2018    Patient: John Hurt  MR number: [id number]  Account number: [de-identified]  : 1950  Age: 76 years  Gender: Female  Status: Outpatient  Location: Echo lab  Height: 66 in  Weight: 200 lb  BP: 118/ 72 mmHg    Indications: MVP    Diagnoses: I34 1 - Nonrheumatic mitral (valve) prolapse    Sonographer:  Ellyn Kocher, RCS  Interpreting Physician:  Mary Beth Roland MD  Primary Physician:  Sharon Juarez MD  Referring Physician:  Merline Sack, MD  Group:  [facility]  Cardiology Fellow:  Kallie Squires MD  RN:  Karmen Cavazos RN    SUMMARY    LEFT VENTRICLE:  Size was normal   The end systolic dimension was 30 mm  Systolic function was normal  Ejection fraction was estimated to be 60 %  There were no regional wall motion abnormalities  LEFT ATRIUM:  The atrium was mildly dilated  ATRIAL SEPTUM:  No defect or patent foramen ovale was identified by color Doppler  MITRAL VALVE:  There was a marked, holosystolic prolapse involving the medial scallop of the posterior leaflet  The maximum prolapse dimension was 8 mm    There was severe regurgitation  PISA could not be done due to the eccentric nature of the regurgitation jet  The regurgitant jet was eccentric and directed anteriorly  The mitral regurgitant volume (by LVOT continuity) was 145 ml  The mitral regurgitant volume-regurgitation fraction (by LVOT continuity) was 68 %  TRICUSPID VALVE:  There was mild to moderate regurgitation  Pulmonary artery systolic pressure was within the normal range  PULMONARY VEINS:  There was systolic blunting in the pulmonary vein(s), indicative of significant mitral regurgitation  There was no flow reversal seen and this could be due to low blood pressure of the patient during the study  HISTORY: PRIOR HISTORY: HTN, HLD, IRINA    PROCEDURE: The procedure was performed in the echo lab  This was a routine study  The risks and alternatives of the procedure were explained to the patient and informed consent was obtained  The transesophageal approach was used  The study  included complete 2D imaging, 3D imaging, M-mode, complete spectral Doppler, and color Doppler  The heart rate was 68 bpm, at the start of the study  An adult omniplane probe was inserted by the cardiology fellow under direct supervision  of the attending cardiologist  Intubated with ease  One intubation attempt(s)  There was no blood detected on the probe  Image quality was adequate  There were no complications during the procedure  MEDICATIONS: Anesthesia administered by  anesthesia team     LEFT VENTRICLE: Size was normal   The end systolic dimension was 30 mm  Systolic function was normal  Ejection fraction was estimated to be 60 %  There were no regional wall motion abnormalities  Wall thickness was normal     RIGHT VENTRICLE: The size was normal  Systolic function was normal     LEFT ATRIUM: The atrium was mildly dilated  No thrombus was identified  APPENDAGE: The size was normal  No thrombus was identified  DOPPLER: The function was normal (normal emptying velocity)      ATRIAL SEPTUM: No defect or patent foramen ovale was identified by color Doppler  RIGHT ATRIUM: Size was normal  No thrombus was identified  MITRAL VALVE: There was normal leaflet separation  There was a marked, holosystolic prolapse involving the medial scallop of the posterior leaflet  The maximum prolapse dimension was 8 mm  There was no echocardiographic evidence of  vegetation  DOPPLER: The transmitral velocity was within the normal range  There was no evidence for stenosis  There was severe regurgitation  PISA could not be done due to the eccentric nature of the regurgitation jet  The regurgitant jet was eccentric and directed anteriorly  AORTIC VALVE: The valve was trileaflet  Leaflets exhibited normal thickness and normal cuspal separation  DOPPLER: There was no regurgitation  TRICUSPID VALVE: The valve structure was normal  There was normal leaflet separation  There was no echocardiographic evidence of vegetation  DOPPLER: There was mild to moderate regurgitation  The regurgitant jet was toward the septum  Pulmonary artery systolic pressure was within the normal range  Estimated peak PA pressure was 26 mmHg  PULMONIC VALVE: Leaflets exhibited normal thickness, no calcification, and normal cuspal separation  DOPPLER: There was no significant regurgitation  PERICARDIUM: There was no pericardial effusion  The pericardium was normal in appearance  AORTA: The root exhibited normal size  There was no atheroma  There was no evidence for dissection  There was no evidence for aneurysm  PULMONARY VEINS: DOPPLER: There was systolic blunting in the pulmonary vein(s), indicative of significant mitral regurgitation  There was no flow reversal seen and this could be due to low blood pressure of the patient during the study      MEASUREMENT TABLES    2D MEASUREMENTS  LVOT   (Reference normals)  Diam   21 mm   (--)  Mitral valve   (Reference normals)  Mean annulus diam   33 mm   (--)    DOPPLER MEASUREMENTS  LVOT   (Reference normals)  VTI   20 cm   (--)  Stroke vol   69 27 ml   (--)  Stroke index   0 35 ml/m squared   (--)  Mitral valve   (Reference normals)  VTI at annulus   25 cm   (--)  Vol, (flow)   213 82 ml   (--)  Regurg vol, LVOT cont   145 ml   (--)  RF, LVOT cont   68 %   (--)  Area, ERO, LVOT cont   5 8 cm squared   (--)    [facility]    Prepared and electronically signed by    Ang Rodriguez MD  Signed 03-Dec-2018 16:47:18       No results found for this or any previous visit  No results found for this or any previous visit  Ang Rodriguez MD    Portions of the record may have been created with voice recognition software   Occasional wrong word or "sound a like" substitutions may have occurred due to the inherent limitations of voice recognition software   Read the chart carefully and recognize, using context, where substitutions have occurred

## 2022-06-06 DIAGNOSIS — K22.2 ESOPHAGEAL OBSTRUCTION DUE TO FOOD IMPACTION: ICD-10-CM

## 2022-06-06 DIAGNOSIS — K22.10 ULCERATIVE ESOPHAGITIS: ICD-10-CM

## 2022-06-06 DIAGNOSIS — T18.128A ESOPHAGEAL OBSTRUCTION DUE TO FOOD IMPACTION: ICD-10-CM

## 2022-06-06 RX ORDER — OMEPRAZOLE 40 MG/1
40 CAPSULE, DELAYED RELEASE ORAL DAILY
Qty: 30 CAPSULE | Refills: 2 | Status: SHIPPED | OUTPATIENT
Start: 2022-06-06

## 2022-06-07 ENCOUNTER — HOSPITAL ENCOUNTER (OUTPATIENT)
Dept: RADIOLOGY | Age: 72
Discharge: HOME | End: 2022-06-07
Payer: MEDICARE

## 2022-06-07 VITALS — HEIGHT: 66 IN | BODY MASS INDEX: 30.05 KG/M2 | WEIGHT: 187 LBS

## 2022-06-07 DIAGNOSIS — Z12.31 ENCOUNTER FOR SCREENING MAMMOGRAM FOR MALIGNANT NEOPLASM OF BREAST: ICD-10-CM

## 2022-06-07 PROCEDURE — 77067 SCR MAMMO BI INCL CAD: CPT

## 2022-06-07 PROCEDURE — 77063 BREAST TOMOSYNTHESIS BI: CPT

## 2022-07-07 ENCOUNTER — APPOINTMENT (OUTPATIENT)
Dept: LAB | Facility: CLINIC | Age: 72
End: 2022-07-07
Payer: MEDICARE

## 2022-07-07 DIAGNOSIS — E78.2 MIXED HYPERLIPIDEMIA: ICD-10-CM

## 2022-07-07 DIAGNOSIS — Z86.73 HISTORY OF STROKE: ICD-10-CM

## 2022-07-07 LAB
ALBUMIN SERPL BCP-MCNC: 3.3 G/DL (ref 3.5–5)
ALP SERPL-CCNC: 97 U/L (ref 46–116)
ALT SERPL W P-5'-P-CCNC: 21 U/L (ref 12–78)
ANION GAP SERPL CALCULATED.3IONS-SCNC: 6 MMOL/L (ref 4–13)
AST SERPL W P-5'-P-CCNC: 17 U/L (ref 5–45)
BILIRUB DIRECT SERPL-MCNC: 0.16 MG/DL (ref 0–0.2)
BILIRUB SERPL-MCNC: 0.67 MG/DL (ref 0.2–1)
BUN SERPL-MCNC: 13 MG/DL (ref 5–25)
CALCIUM ALBUM COR SERPL-MCNC: 10 MG/DL (ref 8.3–10.1)
CALCIUM SERPL-MCNC: 9.4 MG/DL (ref 8.3–10.1)
CHLORIDE SERPL-SCNC: 107 MMOL/L (ref 100–108)
CHOLEST SERPL-MCNC: 149 MG/DL
CO2 SERPL-SCNC: 27 MMOL/L (ref 21–32)
CREAT SERPL-MCNC: 0.86 MG/DL (ref 0.6–1.3)
GFR SERPL CREATININE-BSD FRML MDRD: 68 ML/MIN/1.73SQ M
GLUCOSE P FAST SERPL-MCNC: 101 MG/DL (ref 65–99)
HDLC SERPL-MCNC: 61 MG/DL
LDLC SERPL CALC-MCNC: 71 MG/DL (ref 0–100)
POTASSIUM SERPL-SCNC: 4.1 MMOL/L (ref 3.5–5.3)
PROT SERPL-MCNC: 7.6 G/DL (ref 6.4–8.2)
SODIUM SERPL-SCNC: 140 MMOL/L (ref 136–145)
TRIGL SERPL-MCNC: 84 MG/DL

## 2022-07-07 PROCEDURE — 80053 COMPREHEN METABOLIC PANEL: CPT

## 2022-07-07 PROCEDURE — 82248 BILIRUBIN DIRECT: CPT

## 2022-07-07 PROCEDURE — 80061 LIPID PANEL: CPT

## 2022-07-07 PROCEDURE — 36415 COLL VENOUS BLD VENIPUNCTURE: CPT

## 2022-07-16 NOTE — PATIENT INSTRUCTIONS
The valve has severe leakage  Transesophageal echo needs to be scheduled to look at it in more detail and make further recommendations      We will report back to you as soon as the echo is done 16-Jul-2022 15:07

## 2022-10-10 DIAGNOSIS — E03.9 HYPOTHYROIDISM, UNSPECIFIED TYPE: ICD-10-CM

## 2022-10-10 DIAGNOSIS — T18.128A ESOPHAGEAL OBSTRUCTION DUE TO FOOD IMPACTION: ICD-10-CM

## 2022-10-10 DIAGNOSIS — K22.10 ULCERATIVE ESOPHAGITIS: ICD-10-CM

## 2022-10-10 DIAGNOSIS — K22.2 ESOPHAGEAL OBSTRUCTION DUE TO FOOD IMPACTION: ICD-10-CM

## 2022-10-11 RX ORDER — OMEPRAZOLE 40 MG/1
40 CAPSULE, DELAYED RELEASE ORAL DAILY
Qty: 30 CAPSULE | Refills: 2 | Status: SHIPPED | OUTPATIENT
Start: 2022-10-11

## 2022-10-11 RX ORDER — LEVOTHYROXINE SODIUM 0.12 MG/1
125 TABLET ORAL DAILY
Qty: 30 TABLET | Refills: 5 | Status: SHIPPED | OUTPATIENT
Start: 2022-10-11

## 2022-10-18 ENCOUNTER — TELEPHONE (OUTPATIENT)
Dept: OTHER | Facility: OTHER | Age: 72
End: 2022-10-18

## 2022-10-18 DIAGNOSIS — E78.2 MIXED HYPERLIPIDEMIA: ICD-10-CM

## 2022-10-18 DIAGNOSIS — E03.9 HYPOTHYROIDISM, UNSPECIFIED TYPE: Primary | ICD-10-CM

## 2022-10-18 NOTE — TELEPHONE ENCOUNTER
Pt has an apt on 10/24 and needs to make sure that labs are not needed if so, please advise today so they can fast and complete labs on time

## 2022-10-18 NOTE — TELEPHONE ENCOUNTER
Spoke with pt, explained that MD was out of the office-BW prior to appt may not be a possibility at this time  Pt agrees with plan to fast prior to appt if he does not hear back from office  Will forward this to Dr Tessie Cross for review upon return

## 2022-10-21 ENCOUNTER — APPOINTMENT (OUTPATIENT)
Dept: LAB | Facility: CLINIC | Age: 72
End: 2022-10-21
Payer: MEDICARE

## 2022-10-21 DIAGNOSIS — D64.9 ANEMIA, UNSPECIFIED TYPE: Primary | ICD-10-CM

## 2022-10-21 DIAGNOSIS — D64.9 ANEMIA, UNSPECIFIED TYPE: ICD-10-CM

## 2022-10-21 DIAGNOSIS — E03.9 HYPOTHYROIDISM, UNSPECIFIED TYPE: ICD-10-CM

## 2022-10-21 DIAGNOSIS — E78.2 MIXED HYPERLIPIDEMIA: ICD-10-CM

## 2022-10-21 LAB
ALBUMIN SERPL BCP-MCNC: 3.2 G/DL (ref 3.5–5)
ALP SERPL-CCNC: 93 U/L (ref 46–116)
ALT SERPL W P-5'-P-CCNC: 20 U/L (ref 12–78)
ANION GAP SERPL CALCULATED.3IONS-SCNC: 6 MMOL/L (ref 4–13)
AST SERPL W P-5'-P-CCNC: 15 U/L (ref 5–45)
BASOPHILS # BLD AUTO: 0.05 THOUSANDS/ÂΜL (ref 0–0.1)
BASOPHILS NFR BLD AUTO: 1 % (ref 0–1)
BILIRUB SERPL-MCNC: 0.53 MG/DL (ref 0.2–1)
BUN SERPL-MCNC: 19 MG/DL (ref 5–25)
CALCIUM ALBUM COR SERPL-MCNC: 9.8 MG/DL (ref 8.3–10.1)
CALCIUM SERPL-MCNC: 9.2 MG/DL (ref 8.3–10.1)
CHLORIDE SERPL-SCNC: 109 MMOL/L (ref 96–108)
CHOLEST SERPL-MCNC: 148 MG/DL
CO2 SERPL-SCNC: 26 MMOL/L (ref 21–32)
CREAT SERPL-MCNC: 0.97 MG/DL (ref 0.6–1.3)
EOSINOPHIL # BLD AUTO: 0.17 THOUSAND/ÂΜL (ref 0–0.61)
EOSINOPHIL NFR BLD AUTO: 3 % (ref 0–6)
ERYTHROCYTE [DISTWIDTH] IN BLOOD BY AUTOMATED COUNT: 14 % (ref 11.6–15.1)
FERRITIN SERPL-MCNC: 7 NG/ML (ref 8–388)
GFR SERPL CREATININE-BSD FRML MDRD: 58 ML/MIN/1.73SQ M
GLUCOSE P FAST SERPL-MCNC: 100 MG/DL (ref 65–99)
HCT VFR BLD AUTO: 36.4 % (ref 34.8–46.1)
HDLC SERPL-MCNC: 67 MG/DL
HGB BLD-MCNC: 11 G/DL (ref 11.5–15.4)
IMM GRANULOCYTES # BLD AUTO: 0.02 THOUSAND/UL (ref 0–0.2)
IMM GRANULOCYTES NFR BLD AUTO: 0 % (ref 0–2)
IRON SATN MFR SERPL: 11 % (ref 15–50)
IRON SERPL-MCNC: 34 UG/DL (ref 50–170)
LDLC SERPL CALC-MCNC: 67 MG/DL (ref 0–100)
LYMPHOCYTES # BLD AUTO: 1.71 THOUSANDS/ÂΜL (ref 0.6–4.47)
LYMPHOCYTES NFR BLD AUTO: 30 % (ref 14–44)
MCH RBC QN AUTO: 28.2 PG (ref 26.8–34.3)
MCHC RBC AUTO-ENTMCNC: 30.2 G/DL (ref 31.4–37.4)
MCV RBC AUTO: 93 FL (ref 82–98)
MONOCYTES # BLD AUTO: 0.69 THOUSAND/ÂΜL (ref 0.17–1.22)
MONOCYTES NFR BLD AUTO: 12 % (ref 4–12)
NEUTROPHILS # BLD AUTO: 3.06 THOUSANDS/ÂΜL (ref 1.85–7.62)
NEUTS SEG NFR BLD AUTO: 54 % (ref 43–75)
NRBC BLD AUTO-RTO: 0 /100 WBCS
PLATELET # BLD AUTO: 191 THOUSANDS/UL (ref 149–390)
PMV BLD AUTO: 10.4 FL (ref 8.9–12.7)
POTASSIUM SERPL-SCNC: 3.8 MMOL/L (ref 3.5–5.3)
PROT SERPL-MCNC: 7.5 G/DL (ref 6.4–8.4)
RBC # BLD AUTO: 3.9 MILLION/UL (ref 3.81–5.12)
SODIUM SERPL-SCNC: 141 MMOL/L (ref 135–147)
TIBC SERPL-MCNC: 300 UG/DL (ref 250–450)
TRIGL SERPL-MCNC: 71 MG/DL
TSH SERPL DL<=0.05 MIU/L-ACNC: 2.85 UIU/ML (ref 0.45–4.5)
WBC # BLD AUTO: 5.7 THOUSAND/UL (ref 4.31–10.16)

## 2022-10-21 PROCEDURE — 83550 IRON BINDING TEST: CPT

## 2022-10-21 PROCEDURE — 80061 LIPID PANEL: CPT

## 2022-10-21 PROCEDURE — 85025 COMPLETE CBC W/AUTO DIFF WBC: CPT

## 2022-10-21 PROCEDURE — 83540 ASSAY OF IRON: CPT

## 2022-10-21 PROCEDURE — 82728 ASSAY OF FERRITIN: CPT

## 2022-10-21 PROCEDURE — 36415 COLL VENOUS BLD VENIPUNCTURE: CPT

## 2022-10-21 PROCEDURE — 80053 COMPREHEN METABOLIC PANEL: CPT

## 2022-10-21 PROCEDURE — 84443 ASSAY THYROID STIM HORMONE: CPT

## 2022-10-24 ENCOUNTER — OFFICE VISIT (OUTPATIENT)
Dept: FAMILY MEDICINE CLINIC | Facility: CLINIC | Age: 72
End: 2022-10-24
Payer: MEDICARE

## 2022-10-24 VITALS
OXYGEN SATURATION: 98 % | HEART RATE: 65 BPM | HEIGHT: 66 IN | RESPIRATION RATE: 16 BRPM | DIASTOLIC BLOOD PRESSURE: 80 MMHG | TEMPERATURE: 98 F | WEIGHT: 193 LBS | SYSTOLIC BLOOD PRESSURE: 130 MMHG | BODY MASS INDEX: 31.02 KG/M2

## 2022-10-24 DIAGNOSIS — Z00.00 MEDICARE ANNUAL WELLNESS VISIT, SUBSEQUENT: Primary | ICD-10-CM

## 2022-10-24 DIAGNOSIS — E03.9 HYPOTHYROIDISM, UNSPECIFIED TYPE: ICD-10-CM

## 2022-10-24 DIAGNOSIS — D50.9 IRON DEFICIENCY ANEMIA, UNSPECIFIED IRON DEFICIENCY ANEMIA TYPE: ICD-10-CM

## 2022-10-24 DIAGNOSIS — Z23 FLU VACCINE NEED: ICD-10-CM

## 2022-10-24 DIAGNOSIS — I10 BENIGN ESSENTIAL HYPERTENSION: ICD-10-CM

## 2022-10-24 DIAGNOSIS — L81.9 PIGMENTED SKIN LESIONS: ICD-10-CM

## 2022-10-24 DIAGNOSIS — K21.9 CHRONIC GERD: ICD-10-CM

## 2022-10-24 DIAGNOSIS — K63.5 POLYP OF COLON, UNSPECIFIED PART OF COLON, UNSPECIFIED TYPE: ICD-10-CM

## 2022-10-24 DIAGNOSIS — G47.33 SEVERE OBSTRUCTIVE SLEEP APNEA: ICD-10-CM

## 2022-10-24 PROCEDURE — 99214 OFFICE O/P EST MOD 30 MIN: CPT | Performed by: FAMILY MEDICINE

## 2022-10-24 PROCEDURE — 90662 IIV NO PRSV INCREASED AG IM: CPT

## 2022-10-24 PROCEDURE — G0008 ADMIN INFLUENZA VIRUS VAC: HCPCS

## 2022-10-24 PROCEDURE — G0439 PPPS, SUBSEQ VISIT: HCPCS | Performed by: FAMILY MEDICINE

## 2022-10-24 NOTE — PROGRESS NOTES
Assessment and Plan:     Problem List Items Addressed This Visit        Digestive    Chronic GERD     History of large hiatal hernia  History of Nissen fundoplication   History of esophageal obstruction/ food bolus back in September 2021 with emergency EGD revealing findings of ulcerative esophagitis  Patient had  EGD 9/2021 and 11/2021  Continue omeprazole 40 mg once a day  Patient would likely benefit from repeat GI evaluation due to new onset of iron deficiency anemia  Patient denies symptoms of dyspepsia, melena, abdominal pain or bright red blood per rectum  Relevant Orders    Ambulatory referral to Gastroenterology    Polyp of colon     Last colonoscopy was performed in 2019, patient is due for follow-up colonoscopy now  Referral to [facility]  She would likely benefit from both EGD and colonoscopy in the setting of recent onset of iron deficiency anemia  Patient reports chronic symptoms of constipation  I advised her to start daily Metamucil, she should also use MiraLax p r n     We discussed importance of good fluid intake and high-fiber diet         Relevant Orders    Ambulatory referral to Gastroenterology       Endocrine    Hypothyroidism     Normal TSH, continue levothyroxine 125 mcg daily            Respiratory    Severe obstructive sleep apnea     Patient remains on CPAP            Cardiovascular and Mediastinum    Benign essential hypertension     Blood pressure is well controlled, continue amlodipine and HCTZ            Musculoskeletal and Integument    Pigmented skin lesions     Patient was referred back to [facility] in spring of 2022  She is scheduled for evaluation of pigmented left upper abdomen skin lesion on November 30th  I will contact [facility] with request of possible earlier appointment            Other    Iron deficiency anemia     Mild anemia with hemoglobin of 11 and low level of iron and iron saturation      Start multivitamin with iron  Avoid iron tablets due to known constipation  Proceed with GI evaluation  Patient will repeat CBC and iron testing in 6 to 8 weeks  Relevant Orders    Ambulatory referral to Gastroenterology    CBC    Iron, TIBC and Ferritin Panel      Other Visit Diagnoses     Medicare annual wellness visit, subsequent    -  Primary    Flu vaccine need        Relevant Orders    influenza vaccine, high-dose, PF 0 7 mL (FLUZONE HIGH-DOSE) (Completed)           Depression Screening and Follow-up Plan: Patient was screened for depression during today's encounter  They screened negative with a PHQ-2 score of 0  Preventive health issues were discussed with patient, and age appropriate screening tests were ordered as noted in patient's After Visit Summary  Personalized health advice and appropriate referrals for health education or preventive services given if needed, as noted in patient's After Visit Summary  History of Present Illness:     Patient presents for a Medicare Wellness Visit    Annual Medicare well exam and follow up  Patient has been feeling generally well  She offers no complaints of chest pain, palpitations, shortness of breath or dizziness  No exertional symptoms  She is scheduled for follow-up with Neurology  She had few episodes of double vision but no headaches  Previous extensive workup was unremarkable  Started walking  4 miles 3 days per week  Patient reports chronic symptoms of constipation  Results of recent blood work reviewed with patient and her   New onset of anemia with hemoglobin of 11 0 and iron deficiency with iron level of 34 and iron saturation of 11%  Patient Care Team:  Keya Patterson MD as PCP - General  MD Eladio Miller DO Carol Deutscher, MD Marit Beady, DO     Review of Systems:     Review of Systems   Constitutional: Negative  HENT: Negative  Eyes: Negative      Respiratory: Negative  Cardiovascular: Negative  Gastrointestinal: Negative  Endocrine: Negative  Genitourinary: Negative  Musculoskeletal: Negative  Allergic/Immunologic: Negative  Neurological: Negative  Hematological: Negative  Psychiatric/Behavioral: Negative           Problem List:     Patient Active Problem List   Diagnosis   • Benign essential hypertension   • Disc degeneration, lumbar   • Hyperlipidemia   • Hypothyroidism   • Iron deficiency anemia   • Liver enlargement   • Osteopenia of multiple sites   • Severe obstructive sleep apnea   • Shoulder impingement   • Tinea corporis   • Lumbar radiculopathy   • S/P MVR (mitral valve repair)   • Double vision with both eyes open   • Encounter for Medicare annual wellness exam   • History of repair of hiatal hernia   • Chronic GERD   • History of stroke   • Pigmented skin lesions   • Polyp of colon      Past Medical and Surgical History:     Past Medical History:   Diagnosis Date   • Arthritis     in spine   • Colon polyp    • Constipation 11/02/2021   • CPAP (continuous positive airway pressure) dependence    • Cystic breast    • Disease of thyroid gland     hypothyroid   • Dyspareunia, female     last assessed 9/4/14   • Esophageal obstruction due to food impaction 11/3/2021    September of 2021   • Esophageal ulcer    • Food impaction of esophagus    • GERD (gastroesophageal reflux disease)    • Hepatic cyst 9/21/2015   • Hyperlipidemia    • Hypertension    • Hypothyroidism    • Insomnia    • Liver cyst     drained   • Obesity    • IRINA on CPAP     uses cpap   • Osteoporosis    • Severe mitral regurgitation    • Thrombocytopenia ([address] ) 1/8/2019   • Urinary tract infection    • Varicose veins of lower extremity     last assessed 1/4/13     Past Surgical History:   Procedure Laterality Date   • COLONOSCOPY      complete 5/2016 - Dr Jeane Martinez due in 2019 - last assessed  3/17/17   • HERNIA REPAIR     • HIATAL HERNIA REPAIR     • LIVER BIOPSY LAPAROSCOPIC N/A 5/7/2018    Procedure: Laparoscopic marsupialization of liver cyst;  Surgeon: Tali Mcclain MD;  [facility];  Service: Surgical Oncology   • NEUROMA EXCISION     • WI ECHO [address] N/A 1/7/2019    Procedure: TRANSESOPHAGEAL ECHOCARDIOGRAM (DORI); Surgeon: Fabiola Woodard MD;  [facility];  Service: Cardiac Surgery   • WI ESOPHAGOGASTRODUODENOSCOPY TRANSORAL DIAGNOSTIC N/A 8/10/2016    Procedure: ESOPHAGOGASTRODUODENOSCOPY (EGD); Surgeon: Edgar Rivas MD;  Location: [facility]; Service: Gastroenterology   • WI ESOPHAGOSCOPY FLEXIBLE TRANSORAL WITH BIOPSY N/A 11/3/2016    Procedure: ESOPHAGOGASTRODUODENOSCOPY (EGD);   Surgeon: Timothy George MD;  [facility];  Service: Thoracic   • WI LAP, REPAIR PARAESOPHAGEAL HERNIA, INCL FUNDOPLASTY W/ MESH Left 11/3/2016    Procedure: LAPAROSCOPIC PARAESOPHAGEAL HERNIA REPAIR WITH MESH;NISSEN FUNDOPLICATION ;  Surgeon: Timothy George MD;  [facility];  Service: Thoracic   • WI LAP, VENTRAL HERNIA REPAIR,REDUCIBLE N/A 10/30/2017    Procedure: LAPAROSCOPIC INCISIONAL HERNIA REPAIR X 2 WITH ECHO MESH;  Surgeon: Navjot Mckeon DO;  [facility];  Service: General   • WI REPAIR INCISIONAL HERNIA,REDUCIBLE N/A 1/13/2017    Procedure: INCISIONAL HERNIA REPAIR ;  Surgeon: Navjot Mckeon DO;  [facility];  Service: General   • WI REPLACEMENT OF MITRAL VALVE N/A 1/7/2019    Procedure: REPLACEMENT VALVE MITRAL (MVR), repair with 30mm CG Future ring;  Surgeon: Fabiola Woodard MD;  [facility];  Service: Cardiac Surgery   • TUBAL LIGATION        Family History:     Family History   Problem Relation Age of Onset   • Heart disease Mother    • Aneurysm Mother         cerebral   • Alcohol abuse Father    • Cancer Father    • COPD Father    • Heart failure Father    • Hypertension Father    • Tuberculosis Father    • Cancer Family    • Breast cancer Paternal Grandmother 80   • No Known Problems Sister    • No Known Problems Daughter    • No Known Problems Maternal Grandmother    • No Known Problems Maternal Grandfather    • No Known Problems Paternal Grandfather    • No Known Problems Son       Social History:     Social History     Socioeconomic History   • Marital status: /Civil Union     Spouse name: None   • Number of children: None   • Years of education: None   • Highest education level: None   Occupational History   • None   Tobacco Use   • Smoking status: Never Smoker   • Smokeless tobacco: Never Used   Vaping Use   • Vaping Use: Never used   Substance and Sexual Activity   • Alcohol use: Yes     Comment: social   • Drug use: No   • Sexual activity: Yes     Partners: Male     Birth control/protection: Post-menopausal   Other Topics Concern   • None   Social History Narrative    Coffee    Exercise - walking     Social Determinants of Health     Financial Resource Strain: Low Risk    • Difficulty of Paying Living Expenses: Not very hard   Food Insecurity: Not on file   Transportation Needs: No Transportation Needs   • Lack of Transportation (Medical): No   • Lack of Transportation (Non-Medical): No   Physical Activity: Not on file   Stress: Not on file   Social Connections: Not on file   Intimate Partner Violence: Not on file   Housing Stability: Not on file      Medications and Allergies:     Current Outpatient Medications   Medication Sig Dispense Refill   • amLODIPine (NORVASC) 5 mg tablet TAKE 1 TABLET (5 MG TOTAL) BY MOUTH DAILY 90 tablet 0   • Ascorbic Acid (VITAMIN C ER PO) Take 1 capsule by mouth daily     • aspirin 81 MG tablet Take 1 tablet (81 mg total) by mouth daily 30 tablet 11   • atorvastatin (LIPITOR) 20 mg tablet 1 tab QPM X 2 weeks, if no side effects increase to 2 tabs QPM (Patient taking differently: 20 mg daily 1 tab QPM X 2 weeks, if no side effects increase to 2 tabs QPM) 60 tablet 5   • Cholecalciferol (VITAMIN D3) 2000 UNITS TABS Take 1 tablet by mouth daily       • estradiol (ESTRACE) 0 1 mg/g vaginal cream Insert into the vagina     • fluocinonide (LIDEX) 0 05 % cream Apply topically 2 (two) times a day 60 g 2   • hydrochlorothiazide (MICROZIDE) 12 5 mg capsule TAKE 1 TO 2 CAPSULES DAILY WITH AMLODIPINE 60 capsule 5   • levothyroxine 125 mcg tablet TAKE 1 TABLET (125 MCG TOTAL) BY MOUTH DAILY 30 tablet 5   • Omega-3 Fatty Acids (FISH OIL) 1200 MG CAPS Take 1 capsule by mouth daily     • omeprazole (PriLOSEC) 40 MG capsule TAKE 1 CAPSULE (40 MG TOTAL) BY MOUTH DAILY 30 capsule 2     No current facility-administered medications for this visit  Allergies   Allergen Reactions   • Other Other (See Comments)     Skin breakdown from bandaid on for long time- reddness      Immunizations:     Immunization History   Administered Date(s) Administered   • COVID-19 MODERNA VACC 0 5 ML IM 01/29/2021, 02/24/2021, 11/22/2021   • Influenza Quadrivalent Preservative Free 3 years and older IM 11/11/2016   • Influenza Split High Dose Preservative Free IM 09/21/2015, 09/15/2017, 10/21/2019   • Influenza, high dose seasonal 0 7 mL 10/19/2018, 09/08/2020, 10/13/2021, 10/24/2022   • Influenza, seasonal, injectable 11/01/2010, 01/24/2013, 09/22/2014   • Pneumococcal Conjugate 13-Valent 01/19/2016   • Pneumococcal Polysaccharide PPV23 09/15/2017   • Tdap 11/09/2010      Health Maintenance:         Topic Date Due   • Hepatitis C Screening  Never done   • Colorectal Cancer Screening  09/26/2022   • Breast Cancer Screening: Mammogram  06/07/2024         Topic Date Due   • COVID-19 Vaccine (4 - Booster for Moderna series) 03/22/2022      Medicare Screening Tests and Risk Assessments:     Reuben Hayes is here for her Subsequent Wellness visit  Last Medicare Wellness visit information reviewed, patient interviewed and updates made to the record today  Health Risk Assessment:   Patient rates overall health as very good  Patient feels that their physical health rating is slightly better   Patient is very satisfied with their life  Eyesight was rated as same  Hearing was rated as same  Patient feels that their emotional and mental health rating is same  Patients states they are never, rarely angry  Patient states they are sometimes unusually tired/fatigued  Pain experienced in the last 7 days has been some  Patient's pain rating has been 3/10  Patient states that she has experienced no weight loss or gain in last 6 months  Depression Screening:   PHQ-2 Score: 0      Fall Risk Screening: In the past year, patient has experienced: no history of falling in past year      Urinary Incontinence Screening:   Patient has not leaked urine accidently in the last six months  Home Safety:  Patient does not have trouble with stairs inside or outside of their home  Patient has working smoke alarms and has working carbon monoxide detector  Home safety hazards include: none  Nutrition:   Current diet is Regular  Medications:   Patient is currently taking over-the-counter supplements  OTC medications include: Vitamins  d3, fish oil  Patient is able to manage medications  Activities of Daily Living (ADLs)/Instrumental Activities of Daily Living (IADLs):   Walk and transfer into and out of bed and chair?: Yes  Dress and groom yourself?: Yes    Bathe or shower yourself?: Yes    Feed yourself? Yes  Do your laundry/housekeeping?: Yes  Manage your money, pay your bills and track your expenses?: Yes  Make your own meals?: Yes    Do your own shopping?: Yes    Previous Hospitalizations:   Any hospitalizations or ED visits within the last 12 months?: No      Advance Care Planning:   Living will: Yes    Durable POA for healthcare:  Yes    Advanced directive: Yes      Cognitive Screening:   Provider or family/friend/caregiver concerned regarding cognition?: No    PREVENTIVE SCREENINGS      Cardiovascular Screening:    General: Screening Not Indicated and History Lipid Disorder      Diabetes Screening:     General: Screening Current      Colorectal Cancer Screening:     General: Screening Current      Breast Cancer Screening:     General: Screening Current      Cervical Cancer Screening:    General: Screening Not Indicated      Osteoporosis Screening:    General: Screening Current      Abdominal Aortic Aneurysm (AAA) Screening:        General: Screening Not Indicated      Lung Cancer Screening:     General: Screening Not Indicated      Hepatitis C Screening:    General: Screening Not Indicated    Screening, Brief Intervention, and Referral to Treatment (SBIRT)    Screening  Typical number of drinks in a day: 5  Typical number of drinks in a week: 3  Interpretation: Low risk drinking behavior  Single Item Drug Screening:  How often have you used an illegal drug (including marijuana) or a prescription medication for non-medical reasons in the past year? monthly    Single Item Drug Screen Score: 2  Interpretation: POSITIVE screen for possible drug use disorder    Drug Abuse Screening Test (DAST-10):  1) Have you used drugs other than those required for medical reasons? No  2) Do you abuse more than one drug at a time? No  3) Are you always able to stop using drugs when you want to? Yes  4) Have you had "blackouts" or "flashbacks" as a result of drug use? No  5) Do you ever feel bad or guilty about your drug use? No  6) Does your spouse (or parents) ever complain about your involvement with drugs? No  7) Have you neglected your family because of your use of drugs? No  8) Have you engaged in illegal activities in order to obtain drugs? No  9) Have you ever experienced withdrawal symptoms (felt sick) when you stopped taking drugs? No  10) Have you had medical problems as a result of your drug use (e g , memory loss, hepatitis, convulsions, bleeding, etc )? No    DAST-10 Score: 0  Interpretation: No problems reported    Brief Intervention  Alcohol & drug use screenings were reviewed  No concerns regarding substance use disorder identified  No exam data present     Physical Exam:     /80   Pulse 65   Temp 98 °F (36 7 °C) (Temporal)   Resp 16   Ht 5' 6" (1 676 m)   Wt 87 5 kg (193 lb)   LMP  (LMP Unknown)   SpO2 98%   BMI 31 15 kg/m²     Physical Exam  Vitals and nursing note reviewed  Constitutional:       General: She is not in acute distress  Appearance: Normal appearance  She is well-developed  She is not ill-appearing  HENT:      Head: Normocephalic and atraumatic  Eyes:      General: No scleral icterus  Conjunctiva/sclera: Conjunctivae normal    Neck:      Vascular: No carotid bruit  Cardiovascular:      Rate and Rhythm: Normal rate and regular rhythm  Heart sounds: Normal heart sounds  No murmur heard  Pulmonary:      Effort: Pulmonary effort is normal  No respiratory distress  Breath sounds: Normal breath sounds  Abdominal:      General: Bowel sounds are normal  There is no abdominal bruit  Palpations: Abdomen is soft  Tenderness: There is no abdominal tenderness  Musculoskeletal:      Cervical back: Neck supple  No rigidity  Right lower leg: No edema  Left lower leg: No edema  Skin:     General: Skin is warm  Comments: Left nodular raised pigmented lesion left upper abdomen, 1 x 0 5 cm  Neurological:      General: No focal deficit present  Mental Status: She is alert and oriented to person, place, and time  Psychiatric:         Mood and Affect: Mood normal          Behavior: Behavior normal          Thought Content:  Thought content normal           Katlyn Wnog MD

## 2022-10-24 NOTE — PATIENT INSTRUCTIONS
Medicare Preventive Visit Patient Instructions  Thank you for completing your Welcome to Medicare Visit or Medicare Annual Wellness Visit today  Your next wellness visit will be due in one year (10/25/2023)  The screening/preventive services that you may require over the next 5-10 years are detailed below  Some tests may not apply to you based off risk factors and/or age  Screening tests ordered at today's visit but not completed yet may show as past due  Also, please note that scanned in results may not display below  Preventive Screenings:  Service Recommendations Previous Testing/Comments   Colorectal Cancer Screening  * Colonoscopy    * Fecal Occult Blood Test (FOBT)/Fecal Immunochemical Test (FIT)  * Fecal DNA/Cologuard Test  * Flexible Sigmoidoscopy Age: 39-70 years old   Colonoscopy: every 10 years (may be performed more frequently if at higher risk)  OR  FOBT/FIT: every 1 year  OR  Cologuard: every 3 years  OR  Sigmoidoscopy: every 5 years  Screening may be recommended earlier than age 39 if at higher risk for colorectal cancer  Also, an individualized decision between you and your healthcare provider will decide whether screening between the ages of 74-80 would be appropriate  Colonoscopy: 09/26/2019  FOBT/FIT: Not on file  Cologuard: Not on file  Sigmoidoscopy: Not on file    Screening Current     Breast Cancer Screening Age: 36 years old  Frequency: every 1-2 years  Not required if history of left and right mastectomy Mammogram: 06/07/2022    Screening Current   Cervical Cancer Screening Between the ages of 21-29, pap smear recommended once every 3 years  Between the ages of 33-67, can perform pap smear with HPV co-testing every 5 years     Recommendations may differ for women with a history of total hysterectomy, cervical cancer, or abnormal pap smears in past  Pap Smear: 11/12/2020    Screening Not Indicated   Hepatitis C Screening Once for adults born between 1945 and 1965  More frequently in patients at high risk for Hepatitis C Hep C Antibody: Not on file        Diabetes Screening 1-2 times per year if you're at risk for diabetes or have pre-diabetes Fasting glucose: 100 mg/dL (10/21/2022)  A1C: 5 5 % (12/31/2018)  Screening Current   Cholesterol Screening Once every 5 years if you don't have a lipid disorder  May order more often based on risk factors  Lipid panel: 10/21/2022    Screening Not Indicated  History Lipid Disorder     Other Preventive Screenings Covered by Medicare:  1  Abdominal Aortic Aneurysm (AAA) Screening: covered once if your at risk  You're considered to be at risk if you have a family history of AAA  2  Lung Cancer Screening: covers low dose CT scan once per year if you meet all of the following conditions: (1) Age 50-69; (2) No signs or symptoms of lung cancer; (3) Current smoker or have quit smoking within the last 15 years; (4) You have a tobacco smoking history of at least 20 pack years (packs per day multiplied by number of years you smoked); (5) You get a written order from a healthcare provider  3  Glaucoma Screening: covered annually if you're considered high risk: (1) You have diabetes OR (2) Family history of glaucoma OR (3)  aged 48 and older OR (3)  American aged 72 and older  3  Osteoporosis Screening: covered every 2 years if you meet one of the following conditions: (1) You're estrogen deficient and at risk for osteoporosis based off medical history and other findings; (2) Have a vertebral abnormality; (3) On glucocorticoid therapy for more than 3 months; (4) Have primary hyperparathyroidism; (5) On osteoporosis medications and need to assess response to drug therapy  · Last bone density test (DXA Scan): 06/01/2021   5  HIV Screening: covered annually if you're between the age of 15-65  Also covered annually if you are younger than 13 and older than 72 with risk factors for HIV infection   For pregnant patients, it is covered up to 3 times per pregnancy  Immunizations:  Immunization Recommendations   Influenza Vaccine Annual influenza vaccination during flu season is recommended for all persons aged >= 6 months who do not have contraindications   Pneumococcal Vaccine   * Pneumococcal conjugate vaccine = PCV13 (Prevnar 13), PCV15 (Vaxneuvance), PCV20 (Prevnar 20)  * Pneumococcal polysaccharide vaccine = PPSV23 (Pneumovax) Adults 25-60 years old: 1-3 doses may be recommended based on certain risk factors  Adults 72 years old: 1-2 doses may be recommended based off what pneumonia vaccine you previously received   Hepatitis B Vaccine 3 dose series if at intermediate or high risk (ex: diabetes, end stage renal disease, liver disease)   Tetanus (Td) Vaccine - COST NOT COVERED BY MEDICARE PART B Following completion of primary series, a booster dose should be given every 10 years to maintain immunity against tetanus  Td may also be given as tetanus wound prophylaxis  Tdap Vaccine - COST NOT COVERED BY MEDICARE PART B Recommended at least once for all adults  For pregnant patients, recommended with each pregnancy  Shingles Vaccine (Shingrix) - COST NOT COVERED BY MEDICARE PART B  2 shot series recommended in those aged 48 and above     Health Maintenance Due:      Topic Date Due   • Hepatitis C Screening  Never done   • Colorectal Cancer Screening  09/26/2022   • Breast Cancer Screening: Mammogram  06/07/2024     Immunizations Due:      Topic Date Due   • COVID-19 Vaccine (4 - Booster for Moderna series) 03/22/2022   • Influenza Vaccine (1) 09/01/2022     Advance Directives   What are advance directives? Advance directives are legal documents that state your wishes and plans for medical care  These plans are made ahead of time in case you lose your ability to make decisions for yourself  Advance directives can apply to any medical decision, such as the treatments you want, and if you want to donate organs  What are the types of advance directives? There are many types of advance directives, and each state has rules about how to use them  You may choose a combination of any of the following:  · Living will: This is a written record of the treatment you want  You can also choose which treatments you do not want, which to limit, and which to stop at a certain time  This includes surgery, medicine, IV fluid, and tube feedings  · Durable power of  for healthcare [facility]): This is a written record that states who you want to make healthcare choices for you when you are unable to make them for yourself  This person, called a proxy, is usually a family member or a friend  You may choose more than 1 proxy  · Do not resuscitate (DNR) order:  A DNR order is used in case your heart stops beating or you stop breathing  It is a request not to have certain forms of treatment, such as CPR  A DNR order may be included in other types of advance directives  · Medical directive: This covers the care that you want if you are in a coma, near death, or unable to make decisions for yourself  You can list the treatments you want for each condition  Treatment may include pain medicine, surgery, blood transfusions, dialysis, IV or tube feedings, and a ventilator (breathing machine)  · Values history: This document has questions about your views, beliefs, and how you feel and think about life  This information can help others choose the care that you would choose  Why are advance directives important? An advance directive helps you control your care  Although spoken wishes may be used, it is better to have your wishes written down  Spoken wishes can be misunderstood, or not followed  Treatments may be given even if you do not want them  An advance directive may make it easier for your family to make difficult choices about your care     Weight Management   Why it is important to manage your weight:  Being overweight increases your risk of health conditions such as heart disease, high blood pressure, type 2 diabetes, and certain types of cancer  It can also increase your risk for osteoarthritis, sleep apnea, and other respiratory problems  Aim for a slow, steady weight loss  Even a small amount of weight loss can lower your risk of health problems  How to lose weight safely:  A safe and healthy way to lose weight is to eat fewer calories and get regular exercise  You can lose up about 1 pound a week by decreasing the number of calories you eat by 500 calories each day  Healthy meal plan for weight management:  A healthy meal plan includes a variety of foods, contains fewer calories, and helps you stay healthy  A healthy meal plan includes the following:  · Eat whole-grain foods more often  A healthy meal plan should contain fiber  Fiber is the part of grains, fruits, and vegetables that is not broken down by your body  Whole-grain foods are healthy and provide extra fiber in your diet  Some examples of whole-grain foods are whole-wheat breads and pastas, oatmeal, brown rice, and bulgur  · Eat a variety of vegetables every day  Include dark, leafy greens such as spinach, kale, natan greens, and mustard greens  Eat yellow and orange vegetables such as carrots, sweet potatoes, and winter squash  · Eat a variety of fruits every day  Choose fresh or canned fruit (canned in its own juice or light syrup) instead of juice  Fruit juice has very little or no fiber  · Eat low-fat dairy foods  Drink fat-free (skim) milk or 1% milk  Eat fat-free yogurt and low-fat cottage cheese  Try low-fat cheeses such as mozzarella and other reduced-fat cheeses  · Choose meat and other protein foods that are low in fat  Choose beans or other legumes such as split peas or lentils  Choose fish, skinless poultry (chicken or turkey), or lean cuts of red meat (beef or pork)  Before you cook meat or poultry, cut off any visible fat  · Use less fat and oil  Try baking foods instead of frying them  Add less fat, such as margarine, sour cream, regular salad dressing and mayonnaise to foods  Eat fewer high-fat foods  Some examples of high-fat foods include french fries, doughnuts, ice cream, and cakes  · Eat fewer sweets  Limit foods and drinks that are high in sugar  This includes candy, cookies, regular soda, and sweetened drinks  Exercise:  Exercise at least 30 minutes per day on most days of the week  Some examples of exercise include walking, biking, dancing, and swimming  You can also fit in more physical activity by taking the stairs instead of the elevator or parking farther away from stores  Ask your healthcare provider about the best exercise plan for you  Alcohol Use and Your Health    Drinking too much can harm your health  Excessive alcohol use leads to about 88,000 death in the [place] each year, and shortens the life of those who diet by almost 30 years  Further, excessive drinking cost the economy $249 billion in 2010  Most excessive drinkers are not alcohol dependent  Excessive alcohol use has immediate effects that increase the risk of many harmful health conditions  These are most often the result of binge drinking  Over time, excessive alcohol use can lead to the development of chronic diseases and other series health problems  What is considered a "drink"? Excessive alcohol use includes:  · Binge Drinking: For women, 4 or more drinks consumed on one occasion  For men, 5 or more drinks consumed on one occasion  · Heavy Drinking: For women, 8 or more drinks per week  For men, 15 or more drinks per week  · Any alcohol used by pregnant women  · Any alcohol used by those under the age of 21 years    If you choose to drink, do so in moderation:  · Do not drink at all if you are under the age of 24, or if you are or may be pregnant, or have health problems that could be made worse by drinking    · For women, up to 1 drink per day  · For men, up to 2 drinks a day    No one should begin drinking or drink more frequently based on potential health benefits    Short-Term Health Risks:  · Injuries: motor vehicle crashes, falls, drownings, burns  · Violence: homicide, suicide, sexual assault, intimate partner violence  · Alcohol poisoning  · Reproductive health: risky sexual behaviors, unintended prengnacy, sexually transmitted diseases, miscarriage, stillbirth, fetal alcohol syndrome    Long-Term Health Risks:  · Chronic diseases: high blood pressure, heart disease, stroke, liver disease, digestive problems  · Cancers: breast, mouth and throat, liver, colon  · Learning and memory problems: dementia, poor school performance  · Mental health: depression, anxiety, insomnia  · Social problems: lost productivity, family problems, unemployment  · Alcohol dependence    For support and more information:  · Substance Abuse and [facility] , [address] [place]  Web Address: https://[employer]/    · Alcoholics Anonymous        Web Address: http://[employer] info/    https://www cdc gov/alcohol/fact-sheets/alcohol-use htm     © Copyright [employer] 2018 Information is for End User's use only and may not be sold, redistributed or otherwise used for commercial purposes   All illustrations and images included in CareNotes® are the copyrighted property of [employer] M , [employer]  or [address] [place] [address] [employer]

## 2022-10-24 NOTE — ASSESSMENT & PLAN NOTE
History of large hiatal hernia  History of Nissen fundoplication   History of esophageal obstruction/ food bolus back in September 2021 with emergency EGD revealing findings of ulcerative esophagitis  Patient had  EGD 9/2021 and 11/2021  Continue omeprazole 40 mg once a day  Patient would likely benefit from repeat GI evaluation due to new onset of iron deficiency anemia  Patient denies symptoms of dyspepsia, melena, abdominal pain or bright red blood per rectum

## 2022-10-26 ENCOUNTER — TELEPHONE (OUTPATIENT)
Dept: FAMILY MEDICINE CLINIC | Facility: CLINIC | Age: 72
End: 2022-10-26

## 2022-10-26 PROBLEM — K63.5 POLYP OF COLON: Status: ACTIVE | Noted: 2022-10-26

## 2022-10-26 NOTE — ASSESSMENT & PLAN NOTE
Last colonoscopy was performed in 2019, patient is due for follow-up colonoscopy now  Referral to [facility]  She would likely benefit from both EGD and colonoscopy in the setting of recent onset of iron deficiency anemia  Patient reports chronic symptoms of constipation  I advised her to start daily Metamucil, she should also use MiraLax p r n     We discussed importance of good fluid intake and high-fiber diet

## 2022-10-26 NOTE — ASSESSMENT & PLAN NOTE
Mild anemia with hemoglobin of 11 and low level of iron and iron saturation  Start multivitamin with iron  Avoid iron tablets due to known constipation  Proceed with GI evaluation  Patient will repeat CBC and iron testing in 6 to 8 weeks

## 2022-10-26 NOTE — TELEPHONE ENCOUNTER
Please contact [facility]  Patient is currently scheduled for evaluation of November 30th  I referred her back on May 1st     She does have concerning pigmented lesion left upper back  If there is any chance they can see her sooner-I would greatly appreciate it        Thank you

## 2022-10-26 NOTE — ASSESSMENT & PLAN NOTE
Patient was referred back to [facility] in spring of 2022  She is scheduled for evaluation of pigmented left upper abdomen skin lesion on November 30th    I will contact [facility] with request of possible earlier appointment

## 2022-11-01 ENCOUNTER — CONSULT (OUTPATIENT)
Dept: GASTROENTEROLOGY | Facility: CLINIC | Age: 72
End: 2022-11-01

## 2022-11-01 ENCOUNTER — TELEPHONE (OUTPATIENT)
Dept: GASTROENTEROLOGY | Facility: CLINIC | Age: 72
End: 2022-11-01

## 2022-11-01 VITALS
WEIGHT: 194.4 LBS | HEIGHT: 66 IN | HEART RATE: 64 BPM | DIASTOLIC BLOOD PRESSURE: 84 MMHG | BODY MASS INDEX: 31.24 KG/M2 | SYSTOLIC BLOOD PRESSURE: 126 MMHG

## 2022-11-01 DIAGNOSIS — K21.9 CHRONIC GERD: ICD-10-CM

## 2022-11-01 DIAGNOSIS — R13.10 DYSPHAGIA, UNSPECIFIED TYPE: ICD-10-CM

## 2022-11-01 DIAGNOSIS — K62.5 BRBPR (BRIGHT RED BLOOD PER RECTUM): ICD-10-CM

## 2022-11-01 DIAGNOSIS — Z12.11 COLON CANCER SCREENING: ICD-10-CM

## 2022-11-01 DIAGNOSIS — K59.00 CONSTIPATION, UNSPECIFIED CONSTIPATION TYPE: ICD-10-CM

## 2022-11-01 DIAGNOSIS — Z86.010 HX OF COLONIC POLYPS: Primary | ICD-10-CM

## 2022-11-01 DIAGNOSIS — D50.9 IRON DEFICIENCY ANEMIA, UNSPECIFIED IRON DEFICIENCY ANEMIA TYPE: ICD-10-CM

## 2022-11-01 DIAGNOSIS — Z98.890 HISTORY OF ESOPHAGEAL DILATATION: ICD-10-CM

## 2022-11-01 PROBLEM — Z86.0100 HX OF COLONIC POLYPS: Status: ACTIVE | Noted: 2022-11-01

## 2022-11-01 NOTE — PATIENT INSTRUCTIONS
Continue Metamucil  High fiber diet  May take 1 capful of MiraLax in 8 oz of non carbonated beverage you can take it daily in you can adjusted for the bowel movement    MiraLax and Dulcolax bowel prep

## 2022-11-01 NOTE — PROGRESS NOTES
[facility] - Outpatient Consultation  Vianey Schafer 67 y o  female MRN: [id number]  Encounter: [id number]          ASSESSMENT AND PLAN:      1  Iron deficiency anemia, unspecified iron deficiency anemia type  Patient has history of iron deficient anemia  Review of medical records show patient has had iron deficient anemia since 2019  Last hemoglobin 11 0 on October 2022 and has been stable and improved over last several years  Patient denies any signs of overt GI bleed except for blood from rectal area when straining to move bowels  Last colonoscopy done 09/26/2019 which showed colon polyps  - Ambulatory referral to Gastroenterology  - Colonoscopy; schedule for colonoscopy  Prep and procedure explained patient in detail  Further recommendations pending results of colonoscopy  MiraLax and Dulcolax bowel prep    2  Hx of colonic polyps  Positive history of colon polyps  Last colonoscopy done 09/26/2019 which showed 3 colon polyps removed  Patient due for repeat colonoscopy  Patient does report when she strains move her bowels and will be blood on the toilet paper when she wipes  - Colonoscopy; schedule for colonoscopy  Prep and procedure explained patient in detail  Further recommendations pending results of colonoscopy  MiraLax and Dulcolax bowel prep    3  Chronic GERD  Patient has history of chronic GERD  Patient reports GERD is currently well controlled on current medication   -Continue omeprazole 40 milligrams daily in a m     4  Dysphagia, unspecified type  5  History of esophageal dilatation  Patient has history of dysphagia secondary to food impaction and esophageal dilation  EGD done 09/11/2021 for status post esophageal obstruction with food bolus  Ulcerative esophagitis and mild study stenosis distally with a lot of swelling    Repeat EGD done 11/03/2021 which showed history of Drew fundoplication, status post modest dilation due to history of dysphagia and food bolus obstruction  Biopsy showed mild chronic inactive gastritis negative for malignancy  Negative intestinal metaplasia, dysplasia malignancy,  -Chew food well, and eat slowly  -Continue omeprazole 40 mg daily in a m  6  Colon cancer screening  Colon cancer screening up-to-date  7  Constipation, unspecified constipation type  Patient reports history of constipation  Patient reports she recently started Metamucil which is helping with her constipation   -Continue Metamucil  -May take MiraLax 1 capful  daily in 8 ounces of non carbonated beverage her constipation and adjust for bowel movements   -High-fiber diet    8  BRBPR  Patient does report bright red blood from rectal area when she wipes when she strains to have bowel movements  Patient believes this is from hemorrhoids  Unfortunately previous colonoscopy has no mention of hemorrhoids  Further evaluation of bright red blood per rectal area will be evaluated at time of colonoscopy    Follow-up 3-4 weeks after procedure  ______________________________________________________________________    HPI:  Susi Napier is a 79-year-old female with past medical history of obstructive sleep apnea, GERD, hyperlipidemia, hypertension, hypothyroidism, mitral regurgitation, and osteoporosis who presents to office with iron deficient anemia, bright red blood per rectal, history of colon polyps, chronic GERD, history of dysphagia and esophageal dilation secondary to food impaction  Patient currently reports symptoms of GERD are well controlled on current medication  Patient denies nausea, vomiting, acid reflux, heartburn, dysphagia, epigastric or abdominal pain  Patient denies bright red blood in stool or black tarry stool  Patient does report history of constipation but stated it has improved since she started Metamucil  Abdomen exam benign no abdominal tenderness or guarding    Patient does report that when she strains to move her bowels she will have some bright red blood per rectal area but she believes it is secondary to hemorrhoids  Patient does not smoke  No family history of gastric or colon cancer  Last colonoscopy done 09/26/2019 which showed 3 colon polyps removed  Patient due for repeat colonoscopy  EGD done 09/11/2021 for status post esophageal obstruction with food bolus  Ulcerative esophagitis and mild study stenosis distally with a lot of swelling  Repeat EGD done 11/03/2021 which showed history of Drew fundoplication, status post modest dilation due to history of dysphagia and food bolus obstruction  Biopsy showed mild chronic inactive gastritis negative for malignancy  Negative intestinal metaplasia, dysplasia or malignancy  Patient is on no blood thinners or anti-platelet medication  REVIEW OF SYSTEMS:    CONSTITUTIONAL: Denies any fever, chills, rigors, and weight loss  HEENT: No earache or tinnitus  Denies hearing loss or visual disturbances  CARDIOVASCULAR: No chest pain or palpitations  RESPIRATORY: Denies any cough, hemoptysis, shortness of breath or dyspnea on exertion  GASTROINTESTINAL: As noted in the History of Present Illness  GENITOURINARY: No problems with urination  Denies any hematuria or dysuria  NEUROLOGIC: No dizziness or vertigo, denies headaches  MUSCULOSKELETAL: Denies any muscle or joint pain  SKIN: Denies skin rashes or itching  ENDOCRINE: Denies excessive thirst  Denies intolerance to heat or cold  PSYCHOSOCIAL: Denies depression or anxiety  Denies any recent memory loss         Historical Information   Past Medical History:   Diagnosis Date   • Arthritis     in spine   • Colon polyp    • Constipation 11/02/2021   • CPAP (continuous positive airway pressure) dependence    • Cystic breast    • Disease of thyroid gland     hypothyroid   • Dyspareunia, female     last assessed 9/4/14   • Esophageal obstruction due to food impaction 11/3/2021    September of 2021   • Esophageal ulcer    • Food impaction of esophagus    • GERD (gastroesophageal reflux disease)    • Hepatic cyst 9/21/2015   • Hyperlipidemia    • Hypertension    • Hypothyroidism    • Insomnia    • Liver cyst     drained   • Obesity    • IRINA on CPAP     uses cpap   • Osteoporosis    • Severe mitral regurgitation    • Thrombocytopenia ([address] ) 1/8/2019   • Urinary tract infection    • Varicose veins of lower extremity     last assessed 1/4/13     Past Surgical History:   Procedure Laterality Date   • COLONOSCOPY      complete 5/2016 - Dr Rowe Seen due in 2019 - last assessed  3/17/17   • HERNIA REPAIR     • HIATAL HERNIA REPAIR     • LIVER BIOPSY LAPAROSCOPIC N/A 5/7/2018    Procedure: Laparoscopic marsupialization of liver cyst;  Surgeon: Mae Mccarty MD;  [facility];  Service: Surgical Oncology   • NEUROMA EXCISION     • CA ECHO [address] N/A 1/7/2019    Procedure: TRANSESOPHAGEAL ECHOCARDIOGRAM (DORI); Surgeon: Satnam Boland MD;  [facility];  Service: Cardiac Surgery   • CA ESOPHAGOGASTRODUODENOSCOPY TRANSORAL DIAGNOSTIC N/A 8/10/2016    Procedure: ESOPHAGOGASTRODUODENOSCOPY (EGD); Surgeon: Roney Kiran MD;  Location: [facility]; Service: Gastroenterology   • CA ESOPHAGOSCOPY FLEXIBLE TRANSORAL WITH BIOPSY N/A 11/3/2016    Procedure: ESOPHAGOGASTRODUODENOSCOPY (EGD);   Surgeon: Cirilo Cates MD;  [facility];  Service: Thoracic   • CA LAP, REPAIR PARAESOPHAGEAL HERNIA, INCL FUNDOPLASTY W/ MESH Left 11/3/2016    Procedure: LAPAROSCOPIC PARAESOPHAGEAL HERNIA REPAIR WITH MESH;NISSEN FUNDOPLICATION ;  Surgeon: Cirilo Cates MD;  [facility];  Service: Thoracic   • CA LAP, VENTRAL HERNIA REPAIR,REDUCIBLE N/A 10/30/2017    Procedure: LAPAROSCOPIC INCISIONAL HERNIA REPAIR X 2 WITH ECHO MESH;  Surgeon: Fermin Oconnell DO;  [facility];  Service: General   • CA REPAIR [address] N/A 1/13/2017    Procedure: INCISIONAL HERNIA REPAIR ;  Surgeon: Franklyn Olson DO Cassie;  [facility];  Service: General   • WY REPLACEMENT OF MITRAL VALVE N/A 1/7/2019    Procedure: REPLACEMENT VALVE MITRAL (MVR), repair with 30mm CG Future ring;  Surgeon: Rojas Mandujano MD;  [facility];  Service: Cardiac Surgery   • TUBAL LIGATION       Social History   Social History     Substance and Sexual Activity   Alcohol Use Yes    Comment: social     Social History     Substance and Sexual Activity   Drug Use No     Social History     Tobacco Use   Smoking Status Never Smoker   Smokeless Tobacco Never Used     Family History   Problem Relation Age of Onset   • Heart disease Mother    • Aneurysm Mother         cerebral   • Alcohol abuse Father    • Cancer Father    • COPD Father    • Heart failure Father    • Hypertension Father    • Tuberculosis Father    • Cancer Family    • Breast cancer Paternal Grandmother 80   • No Known Problems Sister    • No Known Problems Daughter    • No Known Problems Maternal Grandmother    • No Known Problems Maternal Grandfather    • No Known Problems Paternal Grandfather    • No Known Problems Son        Meds/Allergies       Current Outpatient Medications:   •  amLODIPine (NORVASC) 5 mg tablet  •  Ascorbic Acid (VITAMIN C ER PO)  •  aspirin 81 MG tablet  •  atorvastatin (LIPITOR) 20 mg tablet  •  Cholecalciferol (VITAMIN D3) 2000 UNITS TABS  •  estradiol (ESTRACE) 0 1 mg/g vaginal cream  •  fluocinonide (LIDEX) 0 05 % cream  •  hydrochlorothiazide (MICROZIDE) 12 5 mg capsule  •  levothyroxine 125 mcg tablet  •  Omega-3 Fatty Acids (FISH OIL) 1200 MG CAPS  •  omeprazole (PriLOSEC) 40 MG capsule    Allergies   Allergen Reactions   • Other Other (See Comments)     Skin breakdown from bandaid on for long time- reddness           Objective     Blood pressure 126/84, pulse 64, height 5' 6" (1 676 m), weight 88 2 kg (194 lb 6 4 oz), not currently breastfeeding  Body mass index is 31 38 kg/m²          PHYSICAL EXAM:      General Appearance:   Alert, cooperative, no distress   HEENT:   Normocephalic, atraumatic, anicteric      Neck:  Supple, symmetrical, trachea midline   Lungs:   Clear to auscultation bilaterally; no rales, rhonchi or wheezing; respirations unlabored    Heart[de-identified]   Regular rate and rhythm; no murmur, rub, or gallop  Abdomen:   Soft, non-tender, non-distended; normal bowel sounds; no masses, no organomegaly    Genitalia:   Deferred    Rectal:   Deferred    Extremities:  No cyanosis, clubbing or edema    Pulses:  2+ and symmetric    Skin:  No jaundice, rashes, or lesions    Lymph nodes:  No palpable cervical lymphadenopathy        Lab Results:   No visits with results within 1 Day(s) from this visit     Latest known visit with results is:   Appointment on 10/21/2022   Component Date Value   • Sodium 10/21/2022 141    • Potassium 10/21/2022 3 8    • Chloride 10/21/2022 109 (A)   • CO2 10/21/2022 26    • ANION GAP 10/21/2022 6    • BUN 10/21/2022 19    • Creatinine 10/21/2022 0 97    • Glucose, Fasting 10/21/2022 100 (A)   • Calcium 10/21/2022 9 2    • Corrected Calcium 10/21/2022 9 8    • AST 10/21/2022 15    • ALT 10/21/2022 20    • Alkaline Phosphatase 10/21/2022 93    • Total Protein 10/21/2022 7 5    • Albumin 10/21/2022 3 2 (A)   • Total Bilirubin 10/21/2022 0 53    • eGFR 10/21/2022 58    • Cholesterol 10/21/2022 148    • Triglycerides 10/21/2022 71    • HDL, Direct 10/21/2022 67    • LDL Calculated 10/21/2022 67    • TSH 3RD GENERATON 10/21/2022 2 850    • WBC 10/21/2022 5 70    • RBC 10/21/2022 3 90    • Hemoglobin 10/21/2022 11 0 (A)   • Hematocrit 10/21/2022 36 4    • MCV 10/21/2022 93    • MCH 10/21/2022 28 2    • MCHC 10/21/2022 30 2 (A)   • RDW 10/21/2022 14 0    • MPV 10/21/2022 10 4    • Platelets 89/20/8345 191    • nRBC 10/21/2022 0    • Neutrophils Relative 10/21/2022 54    • Immat GRANS % 10/21/2022 0    • Lymphocytes Relative 10/21/2022 30    • Monocytes Relative 10/21/2022 12    • Eosinophils Relative 10/21/2022 3    • Basophils Relative 10/21/2022 1    • Neutrophils Absolute 10/21/2022 3 06    • Immature Grans Absolute 10/21/2022 0 02    • Lymphocytes Absolute 10/21/2022 1 71    • Monocytes Absolute 10/21/2022 0 69    • Eosinophils Absolute 10/21/2022 0 17    • Basophils Absolute 10/21/2022 0 05    • Iron Saturation 10/21/2022 11 (A)   • TIBC 10/21/2022 300    • Iron 10/21/2022 34 (A)   • Ferritin 10/21/2022 7 (A)         Radiology Results:   No results found

## 2022-11-02 ENCOUNTER — OFFICE VISIT (OUTPATIENT)
Dept: NEUROLOGY | Facility: CLINIC | Age: 72
End: 2022-11-02

## 2022-11-02 VITALS
DIASTOLIC BLOOD PRESSURE: 78 MMHG | TEMPERATURE: 98 F | WEIGHT: 192.9 LBS | SYSTOLIC BLOOD PRESSURE: 128 MMHG | BODY MASS INDEX: 31 KG/M2 | HEIGHT: 66 IN

## 2022-11-02 DIAGNOSIS — Z86.73 HISTORY OF STROKE: Primary | ICD-10-CM

## 2022-11-02 DIAGNOSIS — H53.2 DOUBLE VISION WITH BOTH EYES OPEN: ICD-10-CM

## 2022-11-02 DIAGNOSIS — I10 BENIGN ESSENTIAL HYPERTENSION: ICD-10-CM

## 2022-11-02 DIAGNOSIS — E78.2 MIXED HYPERLIPIDEMIA: ICD-10-CM

## 2022-11-02 NOTE — PATIENT INSTRUCTIONS
Stroke:  Sandy presents for a follow-up with regard to her prior stroke  She reports no new stroke-like symptoms and in fact had no symptoms at the time of her stroke in as much as it was incidentally discovered  Her risk factors are under very good control at this time   - for ongoing stroke prevention she should continue her combination of aspirin, Lipitor 20 mg, and appropriate blood pressure and glycemic control  -in as much as her LDL is at goal, less than 70, and she is doing well on Lipitor 20 mg we do not need to increase to 40 at this time   - I would like for her to remain physically active in as much she feels capable of doing so   -she is not in need of repeat cerebrovascular imaging   -I would like her to check her blood pressure occasionally away from the doctor's office to make sure that the numbers are typically less than 130/80 most of the time  If she finds that the numbers are frequently higher and at times much higher than it might be reasonable for her to check more often and follow-up with her primary care team    Double vision: She has had very few episodes of double vision since our last visit in the office thankfully  This is not likely to be a vascular problem, and may be related to strain in the eyes or some intermittent weakness in 1 of the extraocular muscles  - if she does have an episode it would be helpful for her to look in the mirror or to use her phone to video the episode itself and during that time she should specifically try and look around with her eyes including up and down  It would also be helpful if she covers each eye individually to try and determine which eye is not quite tracking properly, and if it is the same IIH time   -she should continue to look for any specific patterns in terms of when this happens     I will plan for her to return to the office in 8 months time to see me directly but would be happy to see her sooner if the need should arise    If she has any symptoms concerning for TIA or stroke including sudden painless loss of vision or double vision, difficulty speaking or swallowing, vertigo/room spinning that does not quickly resolve, or weakness/numbness/loss of coordination affecting 1 side of the face or body she should proceed by ambulance to the nearest emergency room immediately

## 2022-11-02 NOTE — PROGRESS NOTES
Patient ID: Valorie Jean-Baptiste is a 67 y o  female  Assessment/Plan:   Patient Instructions   Stroke:  Sandy presents for a follow-up with regard to her prior stroke  She reports no new stroke-like symptoms and in fact had no symptoms at the time of her stroke in as much as it was incidentally discovered  Her risk factors are under very good control at this time   - for ongoing stroke prevention she should continue her combination of aspirin, Lipitor 20 mg, and appropriate blood pressure and glycemic control  -in as much as her LDL is at goal, less than 70, and she is doing well on Lipitor 20 mg we do not need to increase to 40 at this time   - I would like for her to remain physically active in as much she feels capable of doing so   -she is not in need of repeat cerebrovascular imaging   -I would like her to check her blood pressure occasionally away from the doctor's office to make sure that the numbers are typically less than 130/80 most of the time  If she finds that the numbers are frequently higher and at times much higher than it might be reasonable for her to check more often and follow-up with her primary care team    Double vision: She has had very few episodes of double vision since our last visit in the office thankfully  This is not likely to be a vascular problem, and may be related to strain in the eyes or some intermittent weakness in 1 of the extraocular muscles  - if she does have an episode it would be helpful for her to look in the mirror or to use her phone to video the episode itself and during that time she should specifically try and look around with her eyes including up and down    It would also be helpful if she covers each eye individually to try and determine which eye is not quite tracking properly, and if it is the same IIH time   -she should continue to look for any specific patterns in terms of when this happens     I will plan for her to return to the office in 8 months time to see me directly but would be happy to see her sooner if the need should arise  If she has any symptoms concerning for TIA or stroke including sudden painless loss of vision or double vision, difficulty speaking or swallowing, vertigo/room spinning that does not quickly resolve, or weakness/numbness/loss of coordination affecting 1 side of the face or body she should proceed by ambulance to the nearest emergency room immediately  Diagnoses and all orders for this visit:    History of stroke    Benign essential hypertension    Double vision with both eyes open    Mixed hyperlipidemia         Subjective:    HPI   Have you had any new stroke like symptoms that were not previously present (Sudden loss of vision, difficulty speaking or swallowing,  vertigo/room spinning that did not quickly resolve, weakness or numbness affecting one side of the body)? no    Are you taking all of your medications as prescribed? Yes    Any side effects including bleeding/bruising? No    Sandy presents for a follow up in regard to her remote stroke  We reviewed her MRI together and vascular risk factors  Her diplopia is improved in that it is happening very infrequently, only once in the last few months  We again discussed plans to evaluate the symptoms if they do re-occur  They do tend to occur while seated          Past Medical History:   Diagnosis Date   • Arthritis     in spine   • Colon polyp    • Constipation 11/02/2021   • CPAP (continuous positive airway pressure) dependence    • Cystic breast    • Disease of thyroid gland     hypothyroid   • Dyspareunia, female     last assessed 9/4/14   • Esophageal obstruction due to food impaction 11/3/2021    September of 2021   • Esophageal ulcer    • Food impaction of esophagus    • GERD (gastroesophageal reflux disease)    • Hepatic cyst 9/21/2015   • Hyperlipidemia    • Hypertension    • Hypothyroidism    • Insomnia    • Liver cyst     drained   • Obesity    • IRINA on CPAP uses cpap   • Osteoporosis    • Severe mitral regurgitation    • Thrombocytopenia ([facility] [address] ) 1/8/2019   • Urinary tract infection    • Varicose veins of lower extremity     last assessed 1/4/13       Social History     Socioeconomic History   • Marital status: /Civil Union     Spouse name: None   • Number of children: None   • Years of education: None   • Highest education level: None   Occupational History   • None   Tobacco Use   • Smoking status: Never Smoker   • Smokeless tobacco: Never Used   Vaping Use   • Vaping Use: Never used   Substance and Sexual Activity   • Alcohol use: Yes     Comment: social   • Drug use: No   • Sexual activity: Yes     Partners: Male     Birth control/protection: Post-menopausal   Other Topics Concern   • None   Social History Narrative    Coffee    Exercise - walking     Social Determinants of Health     Financial Resource Strain: Low Risk    • Difficulty of Paying Living Expenses: Not very hard   Food Insecurity: Not on file   Transportation Needs: No Transportation Needs   • Lack of Transportation (Medical): No   • Lack of Transportation (Non-Medical): No   Physical Activity: Not on file   Stress: Not on file   Social Connections: Not on file   Intimate Partner Violence: Not on file   Housing Stability: Not on file         Current Outpatient Medications:   •  amLODIPine (NORVASC) 5 mg tablet, TAKE 1 TABLET (5 MG TOTAL) BY MOUTH DAILY, Disp: 90 tablet, Rfl: 0  •  aspirin 81 MG tablet, Take 1 tablet (81 mg total) by mouth daily, Disp: 30 tablet, Rfl: 11  •  atorvastatin (LIPITOR) 20 mg tablet, 1 tab QPM X 2 weeks, if no side effects increase to 2 tabs QPM (Patient taking differently: 20 mg daily 20mg tab once a day), Disp: 60 tablet, Rfl: 5  •  Cholecalciferol (VITAMIN D3) 2000 UNITS TABS, Take 1 tablet by mouth daily  , Disp: , Rfl:   •  estradiol (ESTRACE) 0 1 mg/g vaginal cream, Insert into the vagina, Disp: , Rfl:   •  fluocinonide (LIDEX) 0 05 % cream, Apply topically 2 (two) times a day, Disp: 60 g, Rfl: 2  •  hydrochlorothiazide (MICROZIDE) 12 5 mg capsule, TAKE 1 TO 2 CAPSULES DAILY WITH AMLODIPINE, Disp: 60 capsule, Rfl: 5  •  levothyroxine 125 mcg tablet, TAKE 1 TABLET (125 MCG TOTAL) BY MOUTH DAILY, Disp: 30 tablet, Rfl: 5  •  Omega-3 Fatty Acids (FISH OIL) 1200 MG CAPS, Take 1 capsule by mouth daily, Disp: , Rfl:   •  omeprazole (PriLOSEC) 40 MG capsule, TAKE 1 CAPSULE (40 MG TOTAL) BY MOUTH DAILY, Disp: 30 capsule, Rfl: 2  •  Ascorbic Acid (VITAMIN C ER PO), Take 1 capsule by mouth daily (Patient not taking: Reported on 11/2/2022), Disp: , Rfl:     Allergies   Allergen Reactions   • Other Other (See Comments)     Skin breakdown from bandaid on for long time- reddness                   Objective:    Blood pressure 128/78, temperature 98 °F (36 7 °C), temperature source Temporal, height 5' 6" (1 676 m), weight 87 5 kg (192 lb 14 4 oz), not currently breastfeeding  Physical Exam    Neurological Exam    At the time of my exam she was awake, alert, and in no distress  She has no clear cranial neuropathies or lateralizing signs  No dysarthria or aphasia  ROS:    Review of Systems   Constitutional: Negative  Negative for appetite change and fever  HENT: Negative  Negative for hearing loss, tinnitus, trouble swallowing and voice change  Eyes: Negative  Negative for photophobia, pain and visual disturbance  Respiratory: Negative  Negative for shortness of breath  Cardiovascular: Negative  Negative for palpitations  Gastrointestinal: Negative  Negative for nausea and vomiting  Endocrine: Negative  Negative for cold intolerance  Genitourinary: Negative  Negative for dysuria, frequency and urgency  Musculoskeletal: Negative  Negative for gait problem, myalgias and neck pain  Skin: Negative  Negative for rash  Allergic/Immunologic: Negative  Neurological: Negative    Negative for dizziness, tremors, seizures, syncope, facial asymmetry, speech difficulty, weakness, light-headedness, numbness and headaches  Hematological: Negative  Does not bruise/bleed easily  Psychiatric/Behavioral: Negative  Negative for confusion, hallucinations and sleep disturbance  Reviewed ROS as entered by medical assistant

## 2022-11-03 ENCOUNTER — CONSULT (OUTPATIENT)
Dept: DERMATOLOGY | Facility: CLINIC | Age: 72
End: 2022-11-03

## 2022-11-03 DIAGNOSIS — L82.1 SEBORRHEIC KERATOSES: ICD-10-CM

## 2022-11-03 DIAGNOSIS — D48.9 NEOPLASM OF UNCERTAIN BEHAVIOR: Primary | ICD-10-CM

## 2022-11-03 NOTE — PROGRESS NOTES
[facility] Note     Patient Name: Chadd Lake  Encounter Date: 11/3/22     Have you been cared for by a [place] [id number] Dermatologist in the last 3 years and, if so, which description applies to you? NO  I am considered a "new" patient and must complete all patient intake questions  I am FEMALE/of child-bearing potential     REVIEW OF SYSTEMS:  Have you recently had or currently have any of the following? · Recent fever or chills? No  · Any non-healing wound? No  · Are you pregnant or planning to become pregnant? No  · Are you currently or planning to be nursing or breast feeding? No   PAST MEDICAL HISTORY:  Have you personally ever had or currently have any of the following? If "YES," then please provide more detail  · Skin cancer (such as Melanoma, Basal Cell Carcinoma, Squamous Cell Carcinoma? No  · Tuberculosis, HIV/AIDS, Hepatitis B or C: No  · Systemic Immunosuppression such as Diabetes, Biologic or Immunotherapy, Chemotherapy, Organ Transplantation, Bone Marrow Transplantation No  · Radiation Treatment No   FAMILY HISTORY:  Any "first degree relatives" (parent, brother, sister, or child) with the following? • Skin Cancer, Pancreatic or Other Cancer? YES, father had lung caner   PATIENT EXPERIENCE:    • Do you want the Dermatologist to perform a COMPLETE skin exam today including a clinical examination under the "bra and underwear" areas? NO  • If necessary, do we have your permission to call and leave a detailed message on your Preferred Phone number that includes your specific medical information?   Yes      Allergies   Allergen Reactions   • Other Other (See Comments)     Skin breakdown from bandaid on for long time- reddness      Current Outpatient Medications:   •  amLODIPine (NORVASC) 5 mg tablet, TAKE 1 TABLET (5 MG TOTAL) BY MOUTH DAILY, Disp: 90 tablet, Rfl: 0  •  aspirin 81 MG tablet, Take 1 tablet (81 mg total) by mouth daily, Disp: 30 tablet, Rfl: 11  •  atorvastatin (LIPITOR) 20 mg tablet, 1 tab QPM X 2 weeks, if no side effects increase to 2 tabs QPM (Patient taking differently: 20 mg daily 20mg tab once a day), Disp: 60 tablet, Rfl: 5  •  Cholecalciferol (VITAMIN D3) 2000 UNITS TABS, Take 1 tablet by mouth daily  , Disp: , Rfl:   •  estradiol (ESTRACE) 0 1 mg/g vaginal cream, Insert into the vagina, Disp: , Rfl:   •  fluocinonide (LIDEX) 0 05 % cream, Apply topically 2 (two) times a day, Disp: 60 g, Rfl: 2  •  hydrochlorothiazide (MICROZIDE) 12 5 mg capsule, TAKE 1 TO 2 CAPSULES DAILY WITH AMLODIPINE, Disp: 60 capsule, Rfl: 5  •  levothyroxine 125 mcg tablet, TAKE 1 TABLET (125 MCG TOTAL) BY MOUTH DAILY, Disp: 30 tablet, Rfl: 5  •  Omega-3 Fatty Acids (FISH OIL) 1200 MG CAPS, Take 1 capsule by mouth daily, Disp: , Rfl:   •  omeprazole (PriLOSEC) 40 MG capsule, TAKE 1 CAPSULE (40 MG TOTAL) BY MOUTH DAILY, Disp: 30 capsule, Rfl: 2  •  Ascorbic Acid (VITAMIN C ER PO), Take 1 capsule by mouth daily (Patient not taking: Reported on 11/2/2022), Disp: , Rfl:           • Whom besides the patient is providing clinical information about today's encounter?   o NO ADDITIONAL HISTORIAN (patient alone provided history)    Physical Exam and Assessment/Plan by Diagnosis:    SEBORRHEIC KERATOSIS; NON-INFLAMED    Physical Exam:  • Anatomic Location Affected:  Upper abdomen and breast  • Morphological Description:   • Pertinent Positives:  • Pertinent Negatives: Additional History of Present Condition:  Patient reports on the skin  Denies itch, burn, pain, bleeding or ulceration  Present constantly; nothing seems to make it worse or better  No prior treatment        Assessment and Plan:  Based on a thorough discussion of this condition and the management approach to it (including a comprehensive discussion of the known risks, side effects and potential benefits of treatment), the patient (family) agrees to implement the following specific plan:    • Reassured benign    NEOPLASM OF UNCERTAIN BEHAVIOR OF SKIN    Physical Exam:  • (Anatomic Location); (Size and Morphological Description); (Differential Diagnosis):    Specimen A left abdomen 0 8 cm x 0 7 cm brown crusted papule; ddx: irritated and inflamed seborrheic keratosis   • Pertinent Positives:  • Pertinent Negatives: Additional History of Present Condition:      Assessment and Plan:  • I have discussed with the patient that a sample of skin via a "skin biopsy” would be potentially helpful to further make a specific diagnosis under the microscope  • Based on a thorough discussion of this condition and the management approach to it (including a comprehensive discussion of the known risks, side effects and potential benefits of treatment), the patient (family) agrees to implement the following specific plan:    o Procedure:  Skin Biopsy  After a thorough discussion of treatment options and risk/benefits/alternatives (including but not limited to local pain, scarring, dyspigmentation, blistering, possible superinfection, and inability to confirm a diagnosis via histopathology), verbal and written consent were obtained and portion of the rash was biopsied for tissue sample  See below for consent that was obtained from patient and subsequent Procedure Note  PROCEDURE TANGENTIAL (SHAVE) BIOPSY NOTE:    • Performing Physician: Dr Schaffer  • Anatomic Location; Clinical Description with size (cm); Pre-Op Diagnosis:   o Specimen A left abdomen 0 8 cm x 0 7 cm brown crusted papule; ddx: irritated and inflamed seborrheic keratosis   • Post-op diagnosis: Same     • Local anesthesia: 2% Lidocaine  HCL     • Topical anesthesia: None    • Hemostasis: Aluminum chloride       After obtaining informed consent  at which time there was a discussion about the purpose of biopsy  and low risks of infection and bleeding  The area was prepped and draped in the usual fashion  Anesthesia was obtained with 1% lidocaine with epinephrine   A shave biopsy to an appropriate sampling depth was obtained by Shave (Dermablade or 15 blade) The resulting wound was covered with surgical ointment and bandaged appropriately  The patient tolerated the procedure well without complications and was without signs of functional compromise  Specimen has been sent for review by Dermatopathology  Standard post-procedure care has been explained and has been included in written form within the patient's copy of Informed Consent  INFORMED CONSENT DISCUSSION AND POST-OPERATIVE INSTRUCTIONS FOR PATIENT    I   RATIONALE FOR PROCEDURE  I understand that a skin biopsy allows the Dermatologist to test a lesion or rash under the microscope to obtain a diagnosis  It usually involves numbing the area with numbing medication and removing a small piece of skin; sometimes the area will be closed with sutures  In this specific procedure, sutures are not usually needed  If any sutures are placed, then they are usually need to be removed in 2 weeks or less  I understand that my Dermatologist recommends that a skin "shave" biopsy be performed today  A local anesthetic, similar to the kind that a dentist uses when filling a cavity, will be injected with a very small needle into the skin area to be sampled  The injected skin and tissue underneath "will go to sleep” and become numb so no pain should be felt afterwards  An instrument shaped like a tiny "razor blade" (shave biopsy instrument) will be used to cut a small piece of tissue and skin from the area so that a sample of tissue can be taken and examined more closely under the microscope  A slight amount of bleeding will occur, but it will be stopped with direct pressure and a pressure bandage and any other appropriate methods  I understands that a scar will form where the wound was created  Surgical ointment will be applied to help protect the wound  Sutures are not usually needed      II   RISKS AND POTENTIAL COMPLICATIONS   I understand the risks and potential complications of a skin biopsy include but are not limited to the following:  • Bleeding  • Infection  • Pain  • Scar/keloid  • Skin discoloration  • Incomplete Removal  • Recurrence  • Nerve Damage/Numbness/Loss of Function  • Allergic Reaction to Anesthesia  • Biopsies are diagnostic procedures and based on findings additional treatment or evaluation may be required  • Loss or destruction of specimen resulting in no additional findings    My Dermatologist has explained to me the nature of the condition, the nature of the procedure, and the benefits to be reasonably expected compared with alternative approaches  My Dermatologist has discussed the likelihood of major risks or complications of this procedure including the specific risks listed above, such as bleeding, infection, and scarring/keloid  I understand that a scar is expected after this procedure  I understand that my physician cannot predict if the scar will form a "keloid," which extends beyond the borders of the wound that is created  A keloid is a thick, painful, and bumpy scar  A keloid can be difficult to treat, as it does not always respond well to therapy, which includes injecting cortisone directly into the keloid every few weeks  While this usually reduces the pain and size of the scar, it does not eliminate it  I understand that photographs may be taken before and after the procedure  These will be maintained as part of the medical providers confidential records and may not be made available to me  I further authorize the medical provider to use the photographs for teaching purposes or to illustrate scientific papers, books, or lectures if in his/her judgment, medical research, education, or science may benefit from its use  I have had an opportunity to fully inquire about the risks and benefits of this procedure and its alternatives     I have been given ample time and opportunity to ask questions and to seek a second opinion if I wished to do so  I acknowledge that there have specifically been no guarantees as to the cosmetic results from the procedure  I am aware that with any procedure there is always the possibility of an unexpected complication  III  POST-PROCEDURAL CARE (WHAT YOU WILL NEED TO DO "AFTER THE BIOPSY" TO OPTIMIZE HEALING)    • Keep the area clean and dry  Try NOT to remove the bandage or get it wet for the first 24 hours  • Gently clean the area and apply surgical ointment (such as Vaseline petrolatum ointment, which is available "over the counter" and not a prescription) to the biopsy site for up to 2 weeks straight  This acts to protect the wound from the outside world  • Sutures are not usually placed in this procedure  If any sutures were placed, return for suture removal as instructed (generally 1 week for the face, 2 weeks for the body)  • Take Acetaminophen (Tylenol) for discomfort, if no contraindications  Ibuprofen or aspirin could make bleeding worse  • Call our office immediately for signs of infection: fever, chills, increased redness, warmth, tenderness, discomfort/pain, or pus or foul smell coming from the wound  WHAT TO DO IF THERE IS ANY BLEEDING? If a small amount of bleeding is noticed, place a clean cloth over the area and apply firm pressure for ten minutes  Check the wound after 10 minutes of direct pressure  If bleeding persists, try one more time for an additional 10 minutes of direct pressure on the area  If the bleeding becomes heavier or does not stop after the second attempt, or if you have any other questions about this procedure, then please call your [facility]ologist by calling [phone] (SKIN)  I hereby acknowledge that I have reviewed and verified the site with my Dermatologist and have requested and authorized my Dermatologist to proceed with the procedure        Scribe Attestation    I,:  Gwen Flowers MA am acting as a scribe while in the presence of the attending physician :       I,:  Aditi Jarrett MD personally performed the services described in this documentation    as scribed in my presence :         Yvonne Muro MD

## 2022-11-03 NOTE — PATIENT INSTRUCTIONS
SEBORRHEIC KERATOSIS; NON-INFLAMED      Assessment and Plan:  Based on a thorough discussion of this condition and the management approach to it (including a comprehensive discussion of the known risks, side effects and potential benefits of treatment), the patient (family) agrees to implement the following specific plan:    Reassured benign    Seborrheic Keratosis  A seborrheic keratosis is a harmless warty spot that appears during adult life as a common sign of skin aging  Seborrheic keratoses can arise on any area of skin, covered or uncovered, with the usual exception of the palms and soles  They do not arise from mucous membranes  Seborrheic keratoses can have highly variable appearance  Seborrheic keratoses are extremely common  It has been estimated that over 90% of adults over the age of 61 years have one or more of them  They occur in males and females of all races, typically beginning to erupt in the 35s or 45s  They are uncommon under the age of 21 years  The precise cause of seborrhoeic keratoses is not known  Seborrhoeic keratoses are considered degenerative in nature  As time goes by, seborrheic keratoses tend to become more numerous  Some people inherit a tendency to develop a very large number of them; some people may have hundreds of them  The name "seborrheic keratosis" is misleading, because these lesions are not limited to a seborrhoeic distribution (scalp, mid-face, chest, upper back), nor are they formed from sebaceous glands, nor are they associated with sebum -- which is greasy    Seborrheic keratosis may also be called "SK," "Seb K," "basal cell papilloma," "senile wart," or "barnacle "      Researchers have noted:  Eruptive seborrhoeic keratoses can follow sunburn or dermatitis  Skin friction may be the reason they appear in body folds  Viral cause (e g , human papillomavirus) seems unlikely  Stable and clonal mutations or activation of FRFR3, PIK3CA, RADHA, AKT1 and EGFR genes are found in seborrhoeic keratoses  Seborrhoeic keratosis can arise from solar lentigo  FRFR3 mutations also arise in solar lentigines  These mutations are associated with increased age and location on the head and neck, suggesting a role of ultraviolet radiation in these lesions  Seborrheic keratoses do not harbour tumour suppressor gene mutations  Epidermal growth factor receptor inhibitors, which are used to treat some cancers, often result in an increase in verrucal (warty) keratoses  There is no easy way to remove multiple lesions on a single occasion  Unless a specific lesion is "inflamed" and is causing pain or stinging/burning or is bleeding, most insurance companies do not authorize treatment  NEOPLASM OF UNCERTAIN BEHAVIOR OF SKIN          Assessment and Plan:  I have discussed with the patient that a sample of skin via a "skin biopsy” would be potentially helpful to further make a specific diagnosis under the microscope  Based on a thorough discussion of this condition and the management approach to it (including a comprehensive discussion of the known risks, side effects and potential benefits of treatment), the patient (family) agrees to implement the following specific plan:    Procedure:  Skin Biopsy  After a thorough discussion of treatment options and risk/benefits/alternatives (including but not limited to local pain, scarring, dyspigmentation, blistering, possible superinfection, and inability to confirm a diagnosis via histopathology), verbal and written consent were obtained and portion of the rash was biopsied for tissue sample  See below for consent that was obtained from patient and subsequent Procedure Note  PROCEDURE TANGENTIAL (SHAVE) BIOPSY NOTE:    Performing Physician: David England    After obtaining informed consent  at which time there was a discussion about the purpose of biopsy  and low risks of infection and bleeding  The area was prepped and draped in the usual fashion  Anesthesia was obtained with 1% lidocaine with epinephrine  A shave biopsy to an appropriate sampling depth was obtained by Shave (Dermablade or 15 blade) The resulting wound was covered with surgical ointment and bandaged appropriately  The patient tolerated the procedure well without complications and was without signs of functional compromise  Specimen has been sent for review by Dermatopathology  Standard post-procedure care has been explained and has been included in written form within the patient's copy of Informed Consent  INFORMED CONSENT DISCUSSION AND POST-OPERATIVE INSTRUCTIONS FOR PATIENT    I   RATIONALE FOR PROCEDURE  I understand that a skin biopsy allows the Dermatologist to test a lesion or rash under the microscope to obtain a diagnosis  It usually involves numbing the area with numbing medication and removing a small piece of skin; sometimes the area will be closed with sutures  In this specific procedure, sutures are not usually needed  If any sutures are placed, then they are usually need to be removed in 2 weeks or less  I understand that my Dermatologist recommends that a skin "shave" biopsy be performed today  A local anesthetic, similar to the kind that a dentist uses when filling a cavity, will be injected with a very small needle into the skin area to be sampled  The injected skin and tissue underneath "will go to sleep” and become numb so no pain should be felt afterwards  An instrument shaped like a tiny "razor blade" (shave biopsy instrument) will be used to cut a small piece of tissue and skin from the area so that a sample of tissue can be taken and examined more closely under the microscope  A slight amount of bleeding will occur, but it will be stopped with direct pressure and a pressure bandage and any other appropriate methods  I understands that a scar will form where the wound was created  Surgical ointment will be applied to help protect the wound  Sutures are not usually needed  II   RISKS AND POTENTIAL COMPLICATIONS   I understand the risks and potential complications of a skin biopsy include but are not limited to the following:  Bleeding  Infection  Pain  Scar/keloid  Skin discoloration  Incomplete Removal  Recurrence  Nerve Damage/Numbness/Loss of Function  Allergic Reaction to Anesthesia  Biopsies are diagnostic procedures and based on findings additional treatment or evaluation may be required  Loss or destruction of specimen resulting in no additional findings    My Dermatologist has explained to me the nature of the condition, the nature of the procedure, and the benefits to be reasonably expected compared with alternative approaches  My Dermatologist has discussed the likelihood of major risks or complications of this procedure including the specific risks listed above, such as bleeding, infection, and scarring/keloid  I understand that a scar is expected after this procedure  I understand that my physician cannot predict if the scar will form a "keloid," which extends beyond the borders of the wound that is created  A keloid is a thick, painful, and bumpy scar  A keloid can be difficult to treat, as it does not always respond well to therapy, which includes injecting cortisone directly into the keloid every few weeks  While this usually reduces the pain and size of the scar, it does not eliminate it  I understand that photographs may be taken before and after the procedure  These will be maintained as part of the medical providers confidential records and may not be made available to me  I further authorize the medical provider to use the photographs for teaching purposes or to illustrate scientific papers, books, or lectures if in his/her judgment, medical research, education, or science may benefit from its use  I have had an opportunity to fully inquire about the risks and benefits of this procedure and its alternatives     I have been given ample time and opportunity to ask questions and to seek a second opinion if I wished to do so  I acknowledge that there have specifically been no guarantees as to the cosmetic results from the procedure  I am aware that with any procedure there is always the possibility of an unexpected complication  III  POST-PROCEDURAL CARE (WHAT YOU WILL NEED TO DO "AFTER THE BIOPSY" TO OPTIMIZE HEALING)    Keep the area clean and dry  Try NOT to remove the bandage or get it wet for the first 24 hours  Gently clean the area and apply surgical ointment (such as Vaseline petrolatum ointment, which is available "over the counter" and not a prescription) to the biopsy site for up to 2 weeks straight  This acts to protect the wound from the outside world  Sutures are not usually placed in this procedure  If any sutures were placed, return for suture removal as instructed (generally 1 week for the face, 2 weeks for the body)  Take Acetaminophen (Tylenol) for discomfort, if no contraindications  Ibuprofen or aspirin could make bleeding worse  Call our office immediately for signs of infection: fever, chills, increased redness, warmth, tenderness, discomfort/pain, or pus or foul smell coming from the wound  WHAT TO DO IF THERE IS ANY BLEEDING? If a small amount of bleeding is noticed, place a clean cloth over the area and apply firm pressure for ten minutes  Check the wound after 10 minutes of direct pressure  If bleeding persists, try one more time for an additional 10 minutes of direct pressure on the area  If the bleeding becomes heavier or does not stop after the second attempt, or if you have any other questions about this procedure, then please call your [facility] Dermatologist by calling [phone] (SKIN)  I hereby acknowledge that I have reviewed and verified the site with my Dermatologist and have requested and authorized my Dermatologist to proceed with the procedure

## 2022-11-04 ENCOUNTER — TELEPHONE (OUTPATIENT)
Dept: GASTROENTEROLOGY | Facility: CLINIC | Age: 72
End: 2022-11-04

## 2022-11-04 NOTE — TELEPHONE ENCOUNTER
EGD added to Colon on 12/16/22  I called pt to inform and advised to do colon prep and she would be prepared for the egd as well

## 2022-11-04 NOTE — TELEPHONE ENCOUNTER
----- Message from Allyson Ugarte MD sent at 11/4/2022  3:54 PM EDT -----  Regarding: FW: [place] patient  Can you please had EGD to the anoscopy, patient  ----- Message -----  From: Jesús Parmar MD  Sent: 10/26/2022   6:59 PM EDT  To: Allyson Ugarte MD  Subject: [place] patient                                   Good evening,    You took care of this most pleasant patient over a year ago when she presented to ED  with food bolus and was diagnosed with esophageal ulceration  She is currently asymptomatic and denies symptoms of GERD but  has  new onset of  iron deficiency anemia  She also complains of chronic constipation  She is due for colonoscopy (history of polyps)  I would appreciate if you could evaluate her and likely proceed with both colonoscopy and EGD  I will follow up with CBC and iron testing  I have placed a referral and advised patient to contact your office      Thank you    Brenda Casillas

## 2022-11-15 DIAGNOSIS — I10 BENIGN ESSENTIAL HYPERTENSION: ICD-10-CM

## 2022-11-15 RX ORDER — AMLODIPINE BESYLATE 5 MG/1
5 TABLET ORAL DAILY
Qty: 90 TABLET | Refills: 3 | Status: SHIPPED | OUTPATIENT
Start: 2022-11-15

## 2022-11-29 ENCOUNTER — TELEPHONE (OUTPATIENT)
Dept: OTHER | Facility: OTHER | Age: 72
End: 2022-11-29

## 2022-12-13 ENCOUNTER — OFFICE VISIT (OUTPATIENT)
Dept: DERMATOLOGY | Facility: CLINIC | Age: 72
End: 2022-12-13

## 2022-12-13 ENCOUNTER — TELEPHONE (OUTPATIENT)
Dept: GASTROENTEROLOGY | Facility: CLINIC | Age: 72
End: 2022-12-13

## 2022-12-13 VITALS — WEIGHT: 192 LBS | TEMPERATURE: 97.8 F | BODY MASS INDEX: 30.86 KG/M2 | HEIGHT: 66 IN

## 2022-12-13 DIAGNOSIS — D22.70 MULTIPLE BENIGN MELANOCYTIC NEVI OF UPPER AND LOWER EXTREMITIES AND TRUNK: ICD-10-CM

## 2022-12-13 DIAGNOSIS — D22.60 MULTIPLE BENIGN MELANOCYTIC NEVI OF UPPER AND LOWER EXTREMITIES AND TRUNK: ICD-10-CM

## 2022-12-13 DIAGNOSIS — L82.1 SEBORRHEIC KERATOSES: Primary | ICD-10-CM

## 2022-12-13 DIAGNOSIS — L30.8 OTHER ECZEMA: ICD-10-CM

## 2022-12-13 DIAGNOSIS — L82.0 SEBORRHEIC KERATOSIS, INFLAMED: ICD-10-CM

## 2022-12-13 DIAGNOSIS — D22.5 MULTIPLE BENIGN MELANOCYTIC NEVI OF UPPER AND LOWER EXTREMITIES AND TRUNK: ICD-10-CM

## 2022-12-13 DIAGNOSIS — D18.01 CHERRY ANGIOMA: ICD-10-CM

## 2022-12-13 DIAGNOSIS — L81.4 SOLAR LENTIGO: ICD-10-CM

## 2022-12-13 NOTE — PATIENT INSTRUCTIONS
ECZEMA     Assessment and Plan:  Based on a thorough discussion of this condition and the management approach to it (including a comprehensive discussion of the known risks, side effects and potential benefits of treatment), the patient (family) agrees to implement the following specific plan:  Continue Fluocinonide ( Lidex)  0 05% cream twice a day as needed to left dorsal foot  Area  Patient declined for refill  She states that she has enough at home  SEBORRHEIC KERATOSIS; INFLAMED      Assessment and Plan:  Based on a thorough discussion of this condition and the management approach to it (including a comprehensive discussion of the known risks, side effects and potential benefits of treatment), the patient (family) agrees to implement the following specific plan:  Recommended to treat lesion with Liquid nitrogen today  Liquid Nitrogen performed in office  Consent obtained  Seborrheic Keratosis  A seborrheic keratosis is a harmless warty spot that appears during adult life as a common sign of skin aging  Seborrheic keratoses can arise on any area of skin, covered or uncovered, with the usual exception of the palms and soles  They do not arise from mucous membranes  Seborrheic keratoses can have highly variable appearance  Seborrheic keratoses are extremely common  It has been estimated that over 90% of adults over the age of 61 years have one or more of them  They occur in males and females of all races, typically beginning to erupt in the 35s or 45s  They are uncommon under the age of 21 years  The precise cause of seborrhoeic keratoses is not known  Seborrhoeic keratoses are considered degenerative in nature  As time goes by, seborrheic keratoses tend to become more numerous  Some people inherit a tendency to develop a very large number of them; some people may have hundreds of them      The name "seborrheic keratosis" is misleading, because these lesions are not limited to a seborrhoeic distribution (scalp, mid-face, chest, upper back), nor are they formed from sebaceous glands, nor are they associated with sebum -- which is greasy  Seborrheic keratosis may also be called "SK," "Seb K," "basal cell papilloma," "senile wart," or "barnacle "      Researchers have noted:  Eruptive seborrhoeic keratoses can follow sunburn or dermatitis  Skin friction may be the reason they appear in body folds  Viral cause (e g , human papillomavirus) seems unlikely  Stable and clonal mutations or activation of FRFR3, PIK3CA, RADHA, AKT1 and EGFR genes are found in seborrhoeic keratoses  Seborrhoeic keratosis can arise from solar lentigo  FRFR3 mutations also arise in solar lentigines  These mutations are associated with increased age and location on the head and neck, suggesting a role of ultraviolet radiation in these lesions  Seborrheic keratoses do not harbour tumour suppressor gene mutations  Epidermal growth factor receptor inhibitors, which are used to treat some cancers, often result in an increase in verrucal (warty) keratoses  There is no easy way to remove multiple lesions on a single occasion  Unless a specific lesion is "inflamed" and is causing pain or stinging/burning or is bleeding, most insurance companies do not authorize treatment  PROCEDURE:  DESTRUCTION OF BENIGN LESIONS WITH CRYOTHERAPY  After a thorough discussion of treatment options and risk/benefits/alternatives (including but not limited to local pain, scarring, dyspigmentation, blistering, and possible superinfection), verbal and written consent were obtained and the aforementioned lesions were treated on with cryotherapy using liquid nitrogen x 1 cycle for 5-10 seconds  The patient tolerated the procedure well, and after-care instructions were provided

## 2022-12-13 NOTE — TELEPHONE ENCOUNTER
Spoke to pt confirming pt's colonoscopy scheduled on 12/16/22 at [facility] with Dr Raj Yan  Informed [facility] would be calling 1-2 days prior with the arrival time  Pt has instructions and did not have any questions

## 2022-12-13 NOTE — PROGRESS NOTES
[facility] Note     Patient Name: David Aiken  Encounter Date: 12/13/2022    Have you been cared for by a [place] [id number] Dermatologist in the last 3 years and, if so, which description applies to you? Yes  I have been here within the last 3 years, and my medical history has NOT changed since that time  I am FEMALE/of child-bearing potential     REVIEW OF SYSTEMS:  Have you recently had or currently have any of the following? · No changes in my recent health  PAST MEDICAL HISTORY:  Have you personally ever had or currently have any of the following? If "YES," then please provide more detail  · No changes in my medical history  FAMILY HISTORY:  Any "first degree relatives" (parent, brother, sister, or child) with the following? • No changes in my family's known health  PATIENT EXPERIENCE:    • Do you want the Dermatologist to perform a COMPLETE skin exam today including a clinical examination under the "bra and underwear" areas? Yes  • If necessary, do we have your permission to call and leave a detailed message on your Preferred Phone number that includes your specific medical information? NO      Allergies   Allergen Reactions   • Other Other (See Comments)     Skin breakdown from bandaid on for long time- reddness      Current Outpatient Medications:   •  amLODIPine (NORVASC) 5 mg tablet, TAKE 1 TABLET (5 MG TOTAL) BY MOUTH DAILY, Disp: 90 tablet, Rfl: 3  •  aspirin 81 MG tablet, Take 1 tablet (81 mg total) by mouth daily, Disp: 30 tablet, Rfl: 11  •  Cholecalciferol (VITAMIN D3) 2000 UNITS TABS, Take 1 tablet by mouth daily  , Disp: , Rfl:   •  estradiol (ESTRACE) 0 1 mg/g vaginal cream, Insert into the vagina, Disp: , Rfl:   •  fluocinonide (LIDEX) 0 05 % cream, Apply topically 2 (two) times a day, Disp: 60 g, Rfl: 2  •  hydrochlorothiazide (MICROZIDE) 12 5 mg capsule, TAKE 1 TO 2 CAPSULES DAILY WITH AMLODIPINE, Disp: 60 capsule, Rfl: 5  •  levothyroxine 125 mcg tablet, TAKE 1 TABLET (125 MCG TOTAL) BY MOUTH DAILY, Disp: 30 tablet, Rfl: 5  •  Omega-3 Fatty Acids (FISH OIL) 1200 MG CAPS, Take 1 capsule by mouth daily, Disp: , Rfl:   •  omeprazole (PriLOSEC) 40 MG capsule, TAKE 1 CAPSULE (40 MG TOTAL) BY MOUTH DAILY, Disp: 30 capsule, Rfl: 2  •  Ascorbic Acid (VITAMIN C ER PO), Take 1 capsule by mouth daily (Patient not taking: Reported on 11/2/2022), Disp: , Rfl:   •  atorvastatin (LIPITOR) 20 mg tablet, 1 tab QPM X 2 weeks, if no side effects increase to 2 tabs QPM (Patient taking differently: 20 mg daily 20mg tab once a day), Disp: 60 tablet, Rfl: 5          • Whom besides the patient is providing clinical information about today's encounter?   o NO ADDITIONAL HISTORIAN (patient alone provided history)    Physical Exam and Assessment/Plan by Diagnosis:      MELANOCYTIC NEVI ("Moles")    Physical Exam:  • Anatomic Location Affected:   Mostly on sun-exposed areas of the trunk and extremities  • Morphological Description:  Scattered, 1-4mm round to ovoid, symmetric and evenly bordered, regularly pigmented macules/papules without outliers other than if noted elsewhere in today's note  • Pertinent Positives:  • Pertinent Negatives: Additional History of Present Condition:      Assessment and Plan:  Based on a thorough discussion of this condition and the management approach to it (including a comprehensive discussion of the known risks, side effects and potential benefits of treatment), the patient (family) agrees to implement the following specific plan:  • The patient was encouraged to use an SPF30+ broad spectrum sunscreen daily and re-apply every 2-3 outdoors while outside  The importance of sun protection, self-skin exams, and sun avoidance was emphasized  An annual full body skin exam is recommended, and the patient was encouraged to return to the office sooner for any new or changing lesions of concerns    • Benign, reassured  • Annual skin check     Melanocytic Nevi  Melanocytic nevi ("moles") are tan or brown, raised or flat areas of the skin which have an increased number of melanocytes  Melanocytes are the cells in our body which make pigment and account for skin color  Some moles are present at birth (I e , "congenital nevi"), while others come up later in life (i e , "acquired nevi")  The sun can stimulate the body to make more moles  Sunburns are not the only thing that triggers more moles  Chronic sun exposure can do it too  Clinically distinguishing a healthy mole from melanoma may be difficult, even for experienced dermatologists  The "ABCDE's" of moles have been suggested as a means of helping to alert a person to a suspicious mole and the possible increased risk of melanoma  The suggestions for raising alert are as follows:    Asymmetry: Healthy moles tend to be symmetric, while melanomas are often asymmetric  Asymmetry means if you draw a line through the mole, the two halves do not match in color, size, shape, or surface texture  Asymmetry can be a result of rapid enlargement of a mole, the development of a raised area on a previously flat lesion, scaling, ulceration, bleeding or scabbing within the mole  Any mole that starts to demonstrate "asymmetry" should be examined promptly by a board certified dermatologist      Border: Healthy moles tend to have discrete, even borders  The border of a melanoma often blends into the normal skin and does not sharply delineate the mole from normal skin  Any mole that starts to demonstrate "uneven borders" should be examined promptly by a board certified dermatologist      Color: Healthy moles tend to be one color throughout  Melanomas tend to be made up of different colors ranging from dark black, blue, white, or red    Any mole that demonstrates a color change should be examined promptly by a board certified dermatologist      Diameter: Healthy moles tend to be smaller than 0 6 cm in size; an exception are "congenital nevi" that can be larger  Melanomas tend to grow and can often be greater than 0 6 cm (1/4 of an inch, or the size of a pencil eraser)  This is only a guideline, and many normal moles may be larger than 0 6 cm without being unhealthy  Any mole that starts to change in size (small to bigger or bigger to smaller) should be examined promptly by a board certified dermatologist      Evolving: Healthy moles tend to "stay the same "  Melanomas may often show signs of change or evolution such as a change in size, shape, color, or elevation  Any mole that starts to itch, bleed, crust, burn, hurt, or ulcerate or demonstrate a change or evolution should be examined promptly by a board certified dermatologist         LENTIGO    Physical Exam:  • Anatomic Location Affected:  Trunk and back  • Morphological Description:  Light brown well demarcated macules on sun exposed skin with reassuring dermoscopy  • Pertinent Positives:  • Pertinent Negatives: Additional History of Present Condition:      Assessment and Plan:  Based on a thorough discussion of this condition and the management approach to it (including a comprehensive discussion of the known risks, side effects and potential benefits of treatment), the patient (family) agrees to implement the following specific plan:  • When outside we recommend using a wide brim hat, sunglasses, long sleeve and pants, sunscreen with SPF 68+ with reapplication every 2 hours, or SPF specific clothing       What is a lentigo? A lentigo is a pigmented flat or slightly raised lesion with a clearly defined edge  Unlike an ephelis (freckle), it does not fade in the winter months  There are several kinds of lentigo  The name lentigo originally referred to its appearance resembling a small lentil  The plural of lentigo is lentigines, although “lentigos” is also in common use  Who gets lentigines? Lentigines can affect males and females of all ages and races   Solar lentigines are especially prevalent in fair skinned adults  Lentigines associated with syndromes are present at birth or arise during childhood  What causes lentigines? Common forms of lentigo are due to exposure to ultraviolet radiation:  • Sun damage including sunburn   • Indoor tanning   • Phototherapy, especially photochemotherapy (PUVA)    Ionizing radiation, eg radiation therapy, can also cause lentigines  Several familial syndromes associated with widespread lentigines originate from mutations in Valdemar-MAP kinase, mTOR signaling and PTEN pathways  What is the treatment for lentigines? Most lentigines are left alone  Attempts to lighten them may not be successful  The following approaches are used:  • SPF 50+ broad-spectrum sunscreen   • Hydroquinone bleaching cream   • Alpha hydroxy acids   • Vitamin C   • Retinoids   • Azelaic acid   • Chemical peels  Individual lesions can be permanently removed using:  • Cryotherapy   • Intense pulsed light   • Pigment lasers    How can lentigines be prevented? Lentigines associated with exposure ultraviolet radiation can be prevented by very careful sun protection  Clothing is more successful at preventing new lentigines than are sunscreens  What is the outlook for lentigines? Lentigines usually persist  They may increase in number with age and sun exposure  Some in sun-protected sites may fade and disappear  STEEN ANGIOMAS    Physical Exam:  • Anatomic Location Affected:  Trunk  • Morphological Description:  Scattered cherry red, variably sized papules  • Pertinent Positives:  • Pertinent Negatives:     Additional History of Present Condition:      Assessment and Plan:  Based on a thorough discussion of this condition and the management approach to it (including a comprehensive discussion of the known risks, side effects and potential benefits of treatment), the patient (family) agrees to implement the following specific plan:  • Monitor for changes  • Benign, reassured  •     Assessment and Plan:    Cherry angioma, also known as [employer] spots, are benign vascular skin lesions  A "cherry angioma" is a firm red, blue or purple papule, 0 1-1 cm in diameter  When thrombosed, they can appear black in colour until evaluated with a dermatoscope when the red or purple colour is more easily seen  Cherry angioma may develop on any part of the body but most often appear on the scalp, face, lips and trunk  An angioma is due to proliferating endothelial cells; these are the cells that line the inside of a blood vessel  Angiomas can arise in early life or later in life; the most common type of angioma is a cherry angioma  Cherry angiomas are very common in males and females of any age or race  They are more noticeable in white skin than in skin of colour  They markedly increase in number from about the age of 36  There may be a family history of similar lesions  Eruptive cherry angiomas have been rarely reported to be associated with internal malignancy  The cause of angiomas is unknown  Genetic analysis of cherry angiomas has shown that they frequently carry specific somatic missense mutations in the GNAQ and GNA11 (Q209H) genes, which are involved in other vascular and melanocytic proliferations  SEBORRHEIC KERATOSIS; NON-INFLAMED    Physical Exam:  • Anatomic Location Affected:  Trunk, back and extremities   • Morphological Description:  Brown waxy variably sized "stuck-on" appearing papules with reassuring dermoscopy  • Pertinent Positives:  • Pertinent Negatives:     Additional History of Present Condition:      Assessment and Plan:  Based on a thorough discussion of this condition and the management approach to it (including a comprehensive discussion of the known risks, side effects and potential benefits of treatment), the patient (family) agrees to implement the following specific plan:  • Monitor for changes  • Benign, reassured  •     Seborrheic Keratosis  A seborrheic keratosis is a harmless warty spot that appears during adult life as a common sign of skin aging  Seborrheic keratoses can arise on any area of skin, covered or uncovered, with the usual exception of the palms and soles  They do not arise from mucous membranes  Seborrheic keratoses can have highly variable appearance  Seborrheic keratoses are extremely common  It has been estimated that over 90% of adults over the age of 61 years have one or more of them  They occur in males and females of all races, typically beginning to erupt in the 35s or 45s  They are uncommon under the age of 21 years  The precise cause of seborrhoeic keratoses is not known  Seborrhoeic keratoses are considered degenerative in nature  As time goes by, seborrheic keratoses tend to become more numerous  Some people inherit a tendency to develop a very large number of them; some people may have hundreds of them  There is no easy way to remove multiple lesions on a single occasion  Unless a specific lesion is "inflamed" and is causing pain or stinging/burning or is bleeding, most insurance companies do not authorize treatment  ECZEMA   Physical Exam:  • Anatomic Location Affected:  Left dorsal foot  • Morphological Description:  Pink scaly patch  • Pertinent Positives:  • Pertinent Negatives: Additional History of Present Condition:  Patient noticed it 6 months ago  The spot gets red and itchy on left foot  Assessment and Plan:  Based on a thorough discussion of this condition and the management approach to it (including a comprehensive discussion of the known risks, side effects and potential benefits of treatment), the patient (family) agrees to implement the following specific plan:  • Continue Fluocinonide ( Lidex)  0 05% cream twice a day as needed to left dorsal foot  Area  Patient declined for refill  She states that she has enough at home       SEBORRHEIC KERATOSIS; INFLAMED    Physical Exam:  • Anatomic Location Affected:  Right Chest   • Morphological Description:  4 mm irritated and erythematous waxy "stuck-on" appearing papule  • Pertinent Positives:  • Pertinent Negatives: Additional History of Present Condition:  Patient reports a lesion gets caught and painful at times  Assessment and Plan:  Based on a thorough discussion of this condition and the management approach to it (including a comprehensive discussion of the known risks, side effects and potential benefits of treatment), the patient (family) agrees to implement the following specific plan:  • Recommended to treat lesion with Liquid nitrogen today  • Liquid Nitrogen performed in office  Consent obtained  Seborrheic Keratosis  A seborrheic keratosis is a harmless warty spot that appears during adult life as a common sign of skin aging  Seborrheic keratoses can arise on any area of skin, covered or uncovered, with the usual exception of the palms and soles  They do not arise from mucous membranes  Seborrheic keratoses can have highly variable appearance  Seborrheic keratoses are extremely common  It has been estimated that over 90% of adults over the age of 61 years have one or more of them  They occur in males and females of all races, typically beginning to erupt in the 35s or 45s  They are uncommon under the age of 21 years  The precise cause of seborrhoeic keratoses is not known  Seborrhoeic keratoses are considered degenerative in nature  As time goes by, seborrheic keratoses tend to become more numerous  Some people inherit a tendency to develop a very large number of them; some people may have hundreds of them  The name "seborrheic keratosis" is misleading, because these lesions are not limited to a seborrhoeic distribution (scalp, mid-face, chest, upper back), nor are they formed from sebaceous glands, nor are they associated with sebum -- which is greasy    Seborrheic keratosis may also be called "SK," "Seb K," "basal cell papilloma," "senile wart," or "barnacle "      Researchers have noted:  • Eruptive seborrhoeic keratoses can follow sunburn or dermatitis  • Skin friction may be the reason they appear in body folds  • Viral cause (e g , human papillomavirus) seems unlikely  • Stable and clonal mutations or activation of FRFR3, PIK3CA, RADHA, AKT1 and EGFR genes are found in seborrhoeic keratoses  • Seborrhoeic keratosis can arise from solar lentigo  • FRFR3 mutations also arise in solar lentigines  These mutations are associated with increased age and location on the head and neck, suggesting a role of ultraviolet radiation in these lesions  • Seborrheic keratoses do not harbour tumour suppressor gene mutations  • Epidermal growth factor receptor inhibitors, which are used to treat some cancers, often result in an increase in verrucal (warty) keratoses  There is no easy way to remove multiple lesions on a single occasion  Unless a specific lesion is "inflamed" and is causing pain or stinging/burning or is bleeding, most insurance companies do not authorize treatment  PROCEDURE:  DESTRUCTION OF BENIGN LESIONS WITH CRYOTHERAPY  After a thorough discussion of treatment options and risk/benefits/alternatives (including but not limited to local pain, scarring, dyspigmentation, blistering, and possible superinfection), verbal and written consent were obtained and the aforementioned lesions were treated on with cryotherapy using liquid nitrogen x 1 cycle for 5-10 seconds  TOTAL NUMBER of 1 lesions were treated today on the ANATOMIC LOCATION: Right chest      The patient tolerated the procedure well, and after-care instructions were provided        Scribe Attestation    I,:  Dione Ansari am acting as a scribe while in the presence of the attending physician :       I,:  Indira Saunders MD personally performed the services described in this documentation    as scribed in my presence :

## 2022-12-14 ENCOUNTER — APPOINTMENT (OUTPATIENT)
Dept: LAB | Facility: CLINIC | Age: 72
End: 2022-12-14

## 2022-12-14 DIAGNOSIS — D50.9 IRON DEFICIENCY ANEMIA, UNSPECIFIED IRON DEFICIENCY ANEMIA TYPE: ICD-10-CM

## 2022-12-14 LAB
ERYTHROCYTE [DISTWIDTH] IN BLOOD BY AUTOMATED COUNT: 14.8 % (ref 11.6–15.1)
FERRITIN SERPL-MCNC: 7 NG/ML (ref 8–388)
HCT VFR BLD AUTO: 33.9 % (ref 34.8–46.1)
HGB BLD-MCNC: 10.5 G/DL (ref 11.5–15.4)
IRON SATN MFR SERPL: 20 % (ref 15–50)
IRON SERPL-MCNC: 57 UG/DL (ref 50–170)
MCH RBC QN AUTO: 28.2 PG (ref 26.8–34.3)
MCHC RBC AUTO-ENTMCNC: 31 G/DL (ref 31.4–37.4)
MCV RBC AUTO: 91 FL (ref 82–98)
PLATELET # BLD AUTO: 184 THOUSANDS/UL (ref 149–390)
PMV BLD AUTO: 10.1 FL (ref 8.9–12.7)
RBC # BLD AUTO: 3.72 MILLION/UL (ref 3.81–5.12)
TIBC SERPL-MCNC: 292 UG/DL (ref 250–450)
WBC # BLD AUTO: 5.49 THOUSAND/UL (ref 4.31–10.16)

## 2022-12-16 ENCOUNTER — ANESTHESIA EVENT (OUTPATIENT)
Dept: GASTROENTEROLOGY | Facility: AMBULATORY SURGERY CENTER | Age: 72
End: 2022-12-16

## 2022-12-16 ENCOUNTER — ANESTHESIA (OUTPATIENT)
Dept: GASTROENTEROLOGY | Facility: AMBULATORY SURGERY CENTER | Age: 72
End: 2022-12-16

## 2022-12-16 ENCOUNTER — HOSPITAL ENCOUNTER (OUTPATIENT)
Dept: GASTROENTEROLOGY | Facility: AMBULATORY SURGERY CENTER | Age: 72
Discharge: HOME | End: 2022-12-16

## 2022-12-16 VITALS
RESPIRATION RATE: 18 BRPM | HEIGHT: 66 IN | TEMPERATURE: 97.7 F | WEIGHT: 190 LBS | HEART RATE: 61 BPM | OXYGEN SATURATION: 97 % | BODY MASS INDEX: 30.53 KG/M2 | DIASTOLIC BLOOD PRESSURE: 69 MMHG | SYSTOLIC BLOOD PRESSURE: 113 MMHG

## 2022-12-16 DIAGNOSIS — K62.5 BRBPR (BRIGHT RED BLOOD PER RECTUM): ICD-10-CM

## 2022-12-16 DIAGNOSIS — Z86.010 HX OF COLONIC POLYPS: ICD-10-CM

## 2022-12-16 DIAGNOSIS — K56.699 FOOD BOLUS OBSTRUCTION OF INTESTINE (HCC): ICD-10-CM

## 2022-12-16 DIAGNOSIS — D50.9 IRON DEFICIENCY ANEMIA, UNSPECIFIED IRON DEFICIENCY ANEMIA TYPE: ICD-10-CM

## 2022-12-16 RX ORDER — SODIUM CHLORIDE 9 MG/ML
20 INJECTION, SOLUTION INTRAVENOUS CONTINUOUS
Status: DISCONTINUED | OUTPATIENT
Start: 2022-12-16 | End: 2022-12-20 | Stop reason: HOSPADM

## 2022-12-16 RX ORDER — SODIUM CHLORIDE 9 MG/ML
INJECTION, SOLUTION INTRAVENOUS CONTINUOUS PRN
Status: DISCONTINUED | OUTPATIENT
Start: 2022-12-16 | End: 2022-12-16

## 2022-12-16 RX ORDER — RIBOFLAVIN (VITAMIN B2) 100 MG
100 TABLET ORAL DAILY
COMMUNITY

## 2022-12-16 RX ORDER — PROPOFOL 10 MG/ML
INJECTION, EMULSION INTRAVENOUS AS NEEDED
Status: DISCONTINUED | OUTPATIENT
Start: 2022-12-16 | End: 2022-12-16

## 2022-12-16 RX ORDER — EPHEDRINE SULFATE 50 MG/ML
INJECTION INTRAVENOUS AS NEEDED
Status: DISCONTINUED | OUTPATIENT
Start: 2022-12-16 | End: 2022-12-16

## 2022-12-16 RX ORDER — LIDOCAINE HYDROCHLORIDE 20 MG/ML
INJECTION, SOLUTION EPIDURAL; INFILTRATION; INTRACAUDAL; PERINEURAL AS NEEDED
Status: DISCONTINUED | OUTPATIENT
Start: 2022-12-16 | End: 2022-12-16

## 2022-12-16 RX ADMIN — PROPOFOL 25 MG: 10 INJECTION, EMULSION INTRAVENOUS at 10:22

## 2022-12-16 RX ADMIN — PROPOFOL 25 MG: 10 INJECTION, EMULSION INTRAVENOUS at 10:14

## 2022-12-16 RX ADMIN — PROPOFOL 50 MG: 10 INJECTION, EMULSION INTRAVENOUS at 10:11

## 2022-12-16 RX ADMIN — PROPOFOL 25 MG: 10 INJECTION, EMULSION INTRAVENOUS at 10:20

## 2022-12-16 RX ADMIN — PROPOFOL 25 MG: 10 INJECTION, EMULSION INTRAVENOUS at 10:32

## 2022-12-16 RX ADMIN — PROPOFOL 25 MG: 10 INJECTION, EMULSION INTRAVENOUS at 10:41

## 2022-12-16 RX ADMIN — PROPOFOL 50 MG: 10 INJECTION, EMULSION INTRAVENOUS at 10:12

## 2022-12-16 RX ADMIN — PROPOFOL 25 MG: 10 INJECTION, EMULSION INTRAVENOUS at 10:29

## 2022-12-16 RX ADMIN — PROPOFOL 25 MG: 10 INJECTION, EMULSION INTRAVENOUS at 10:13

## 2022-12-16 RX ADMIN — PROPOFOL 25 MG: 10 INJECTION, EMULSION INTRAVENOUS at 10:16

## 2022-12-16 RX ADMIN — PROPOFOL 25 MG: 10 INJECTION, EMULSION INTRAVENOUS at 10:35

## 2022-12-16 RX ADMIN — PROPOFOL 25 MG: 10 INJECTION, EMULSION INTRAVENOUS at 10:44

## 2022-12-16 RX ADMIN — PROPOFOL 25 MG: 10 INJECTION, EMULSION INTRAVENOUS at 10:24

## 2022-12-16 RX ADMIN — PROPOFOL 25 MG: 10 INJECTION, EMULSION INTRAVENOUS at 10:38

## 2022-12-16 RX ADMIN — LIDOCAINE HYDROCHLORIDE 100 MG: 20 INJECTION, SOLUTION EPIDURAL; INFILTRATION; INTRACAUDAL; PERINEURAL at 10:11

## 2022-12-16 RX ADMIN — EPHEDRINE SULFATE 10 MG: 50 INJECTION INTRAVENOUS at 10:35

## 2022-12-16 RX ADMIN — PROPOFOL 25 MG: 10 INJECTION, EMULSION INTRAVENOUS at 10:18

## 2022-12-16 RX ADMIN — PROPOFOL 25 MG: 10 INJECTION, EMULSION INTRAVENOUS at 10:26

## 2022-12-16 RX ADMIN — SODIUM CHLORIDE: 9 INJECTION, SOLUTION INTRAVENOUS at 09:40

## 2022-12-16 RX ADMIN — PROPOFOL 25 MG: 10 INJECTION, EMULSION INTRAVENOUS at 10:15

## 2022-12-16 NOTE — ANESTHESIA PREPROCEDURE EVALUATION
Procedure:  COLONOSCOPY  EGD    Relevant Problems   CARDIO   (+) Benign essential hypertension   (+) Hyperlipidemia   (+) S/P MVR (mitral valve repair)      ENDO   (+) Hypothyroidism      GI/HEPATIC   (+) Chronic GERD   (+) Dysphagia      HEMATOLOGY   (+) Iron deficiency anemia      NEURO/PSYCH   (+) History of stroke      PULMONARY   (+) Severe obstructive sleep apnea      Nervous and Auditory   (+) Lumbar radiculopathy      States that she has no symptoms from TIAs    MVR (2019)  +CPAP    Physical Exam    Airway    Mallampati score: I  TM Distance: <3 FB  Neck ROM: full     Dental   Comment: None loose, No notable dental hx     Cardiovascular      Pulmonary      Other Findings        ECHO (3/2019)  LEFT VENTRICLE:  Systolic function was normal  Ejection fraction was estimated to be 65 %  There were no regional wall motion abnormalities      MITRAL VALVE:  Prior repair procedures: surgical repair with mitral annuloplasty ring  There was mild stenosis  Vmax 2 07, mean gradient 7 3 mm Hg, maximum gradient 17 mm Hg at a HR of 78 BPM   There was no significant regurgitation  Anesthesia Plan  ASA Score- 3     Anesthesia Type- IV sedation with anesthesia with ASA Monitors  Additional Monitors:   Airway Plan:     Comment: 05:30 - last of PO bowel prep  Plan Factors-Exercise tolerance (METS): >4 METS  Chart reviewed  Patient summary reviewed  Patient is not a current smoker  Obstructive sleep apnea risk education given perioperatively  Induction- intravenous  Postoperative Plan-     Informed Consent- Anesthetic plan and risks discussed with patient  I personally reviewed this patient with the CRNA  Discussed and agreed on the Anesthesia Plan with the CRNA  Vaishali Segura

## 2022-12-16 NOTE — ANESTHESIA POSTPROCEDURE EVALUATION
Post-Op Assessment Note    CV Status:  Stable    Pain management: adequate     Mental Status:  Alert and awake   Hydration Status:  Euvolemic   PONV Controlled:  Controlled   Airway Patency:  Patent      Post Op Vitals Reviewed: Yes      Staff: CRNA         No notable events documented      /73 (12/16/22 1052)    Temp      Pulse 69 (12/16/22 1052)   Resp 16 (12/16/22 1052)    SpO2 99 % (12/16/22 1052)

## 2022-12-16 NOTE — H&P
History and Physical - [facility]  Aramis Fabian 67 y o  female MRN: [id number]                  HPI: Aramis Fabian is a 67y o  year old female who presents for EGD and colonoscopy due to history of iron deficiency anemia history of colon polyps bright red blood per rectum and food bolus obstruction  Last colonoscopy 3 years ago      REVIEW OF SYSTEMS: Per the HPI, and otherwise unremarkable  Historical Information   Past Medical History:   Diagnosis Date   • Arthritis     in spine   • Colon polyp    • Constipation 11/02/2021   • CPAP (continuous positive airway pressure) dependence    • Cystic breast    • Disease of thyroid gland     hypothyroid   • Dyspareunia, female     last assessed 9/4/14   • Esophageal obstruction due to food impaction 11/03/2021 September of 2021   • Esophageal ulcer    • Food impaction of esophagus    • GERD (gastroesophageal reflux disease)    • Hepatic cyst 09/21/2015   • Hyperlipidemia    • Hypertension    • Hypothyroidism    • Insomnia    • Iron deficiency anemia    • Liver cyst     drained   • Obesity    • IRINA on CPAP     uses cpap   • Osteoporosis    • Severe mitral regurgitation    • Stroke [facility])    • Thrombocytopenia ([facility] [address] ) 01/08/2019   • Urinary tract infection    • Varicose veins of lower extremity     last assessed 1/4/13     Past Surgical History:   Procedure Laterality Date   • COLONOSCOPY      complete 5/2016 - Dr Rebecca Schwartz due in 2019 - last assessed  3/17/17   • HERNIA REPAIR     • HIATAL HERNIA REPAIR     • LIVER BIOPSY LAPAROSCOPIC N/A 5/7/2018    Procedure: Laparoscopic marsupialization of liver cyst;  Surgeon: David Smith MD;  [facility];  Service: Surgical Oncology   • NEUROMA EXCISION     • NH ECHO [address] N/A 1/7/2019    Procedure: TRANSESOPHAGEAL ECHOCARDIOGRAM (DORI);   Surgeon: Randell Blonut MD;  [facility];  Service: Cardiac Surgery   • NH ESOPHAGOGASTRODUODENOSCOPY TRANSORAL DIAGNOSTIC N/A 8/10/2016    Procedure: ESOPHAGOGASTRODUODENOSCOPY (EGD); Surgeon: Chandan Syed MD;  Location: [facility]; Service: Gastroenterology   • IA ESOPHAGOSCOPY FLEXIBLE TRANSORAL WITH BIOPSY N/A 11/3/2016    Procedure: ESOPHAGOGASTRODUODENOSCOPY (EGD);   Surgeon: Quang Castro MD;  [facility];  Service: Thoracic   • IA LAP, REPAIR PARAESOPHAGEAL HERNIA, INCL FUNDOPLASTY W/ MESH Left 11/3/2016    Procedure: LAPAROSCOPIC PARAESOPHAGEAL HERNIA REPAIR WITH MESH;NISSEN FUNDOPLICATION ;  Surgeon: Quang Castro MD;  [facility];  Service: Thoracic   • IA LAP, VENTRAL HERNIA REPAIR,REDUCIBLE N/A 10/30/2017    Procedure: LAPAROSCOPIC INCISIONAL HERNIA REPAIR X 2 WITH ECHO MESH;  Surgeon: Thien Duff DO;  [facility];  Service: General   • IA REPAIR INCISIONAL HERNIA,REDUCIBLE N/A 1/13/2017    Procedure: INCISIONAL HERNIA REPAIR ;  Surgeon: Thien Duff DO;  [facility];  Service: General   • IA REPLACEMENT OF MITRAL VALVE N/A 1/7/2019    Procedure: REPLACEMENT VALVE MITRAL (MVR), repair with 30mm CG Future ring;  Surgeon: Floyd Lesches, MD;  [facility];  Service: Cardiac Surgery   • TUBAL LIGATION       Social History   Social History     Substance and Sexual Activity   Alcohol Use Yes    Comment: social     Social History     Substance and Sexual Activity   Drug Use No     Social History     Tobacco Use   Smoking Status Never   Smokeless Tobacco Never     Family History   Problem Relation Age of Onset   • Heart disease Mother    • Aneurysm Mother         cerebral   • Alcohol abuse Father    • Cancer Father    • COPD Father    • Heart failure Father    • Hypertension Father    • Tuberculosis Father    • Cancer Family    • Breast cancer Paternal Grandmother 80   • No Known Problems Sister    • No Known Problems Daughter    • No Known Problems Maternal Grandmother    • No Known Problems Maternal Grandfather    • No Known Problems Paternal Grandfather    • No Known Problems Son        Meds/Allergies       Current Outpatient Medications:   •  amLODIPine (NORVASC) 5 mg tablet  •  Ascorbic Acid (VITAMIN C ER PO)  •  Ascorbic Acid (vitamin C) 100 MG tablet  •  aspirin 81 MG tablet  •  atorvastatin (LIPITOR) 20 mg tablet  •  Cholecalciferol (VITAMIN D3) 2000 UNITS TABS  •  estradiol (ESTRACE) 0 1 mg/g vaginal cream  •  fluocinonide (LIDEX) 0 05 % cream  •  hydrochlorothiazide (MICROZIDE) 12 5 mg capsule  •  levothyroxine 125 mcg tablet  •  Multiple Vitamins-Minerals (CENTRUM PO)  •  Omega-3 Fatty Acids (FISH OIL) 1200 MG CAPS  •  omeprazole (PriLOSEC) 40 MG capsule    Allergies   Allergen Reactions   • Other Other (See Comments)     Skin breakdown from bandaid on for long time- reddness       Objective     /75   Pulse 70   Temp 97 7 °F (36 5 °C) (Temporal)   Resp 18   Ht 5' 6" (1 676 m)   Wt 86 2 kg (190 lb)   LMP  (LMP Unknown)   SpO2 99%   BMI 30 67 kg/m²       PHYSICAL EXAM    Gen: NAD  Head: NCAT  CV: RRR  CHEST: Clear  ABD: soft, NT/ND  EXT: no edema      ASSESSMENT/PLAN:  This is a 67y o  year old female here for EGD and colonoscopy, and she is stable and optimized for her procedure

## 2022-12-19 ENCOUNTER — TELEPHONE (OUTPATIENT)
Dept: FAMILY MEDICINE CLINIC | Facility: CLINIC | Age: 72
End: 2022-12-19

## 2022-12-19 DIAGNOSIS — D50.9 IRON DEFICIENCY ANEMIA, UNSPECIFIED IRON DEFICIENCY ANEMIA TYPE: Primary | ICD-10-CM

## 2022-12-19 NOTE — TELEPHONE ENCOUNTER
Is contact patient  Reviewed results of recent blood work  Her hemoglobin is still decreased, 10 5 (previous hemoglobin was 11 0)  Level of iron, ferritin is low  I reviewed results of recent EGD and colonoscopy, overall no concerning findings  I am not quite sure why patient's anemia is still persisting  I recommend to schedule evaluation with [facility]  She may benefit from IV iron infusions as patient cannot take oral iron due to constipation  Referral placed      Thank you

## 2023-01-03 ENCOUNTER — OFFICE VISIT (OUTPATIENT)
Dept: GASTROENTEROLOGY | Facility: CLINIC | Age: 73
End: 2023-01-03

## 2023-01-03 VITALS
SYSTOLIC BLOOD PRESSURE: 123 MMHG | BODY MASS INDEX: 30.53 KG/M2 | WEIGHT: 190 LBS | HEIGHT: 66 IN | HEART RATE: 68 BPM | DIASTOLIC BLOOD PRESSURE: 86 MMHG

## 2023-01-03 DIAGNOSIS — K21.9 CHRONIC GERD: Primary | ICD-10-CM

## 2023-01-03 DIAGNOSIS — Z98.890 HISTORY OF ESOPHAGEAL DILATATION: ICD-10-CM

## 2023-01-03 DIAGNOSIS — K64.8 OTHER HEMORRHOIDS: ICD-10-CM

## 2023-01-03 DIAGNOSIS — R13.10 DYSPHAGIA, UNSPECIFIED TYPE: ICD-10-CM

## 2023-01-03 DIAGNOSIS — Z12.31 ENCOUNTER FOR SCREENING MAMMOGRAM FOR MALIGNANT NEOPLASM OF BREAST: ICD-10-CM

## 2023-01-03 DIAGNOSIS — D50.9 IRON DEFICIENCY ANEMIA, UNSPECIFIED IRON DEFICIENCY ANEMIA TYPE: ICD-10-CM

## 2023-01-03 DIAGNOSIS — Z86.010 HX OF COLONIC POLYPS: ICD-10-CM

## 2023-01-03 DIAGNOSIS — Z12.11 COLON CANCER SCREENING: ICD-10-CM

## 2023-01-03 NOTE — PROGRESS NOTES
[facility] - Outpatient Follow-up Note  David Aiken 67 y o  female MRN: [id number]  Encounter: [id number]          ASSESSMENT AND PLAN:      1  Chronic GERD  Patient has history of chronic GERD  Patient reports that GERD symptoms are well controlled on current medication     -Continue antireflux diet  -Continue omeprazole 40 Mg daily in a m  2  Dysphagia, unspecified type  3  History of esophageal dilatation  Patient has history of dysphagia secondary to food impaction and esophageal dilation  Patient denies any dysphagia or difficulty swallowing  Patient denies blood in stool, blood from rectal area, or black tarry stool  Bowel patterns are regular   -Continue antireflux diet and precautions  -Continue omeprazole 40 Mg daily in a m   -Eat slow and chew food well    4  Hx of colonic polyps  5  Other hemorrhoids  6  Colon cancer screening  Patient has history of colon polyps  Colonoscopy done 12/16/2022 showed history of polyps  2 polyps removed today, sigmoid diverticulosis, and internal hemorrhoids  Patient denies any blood in stool, blood from rectal area, or black tarry stool  Denies abdominal pain  -High-fiber diet  -Repeat colonoscopy in 5 years  7  Encounter for screening mammogram for malignant neoplasm of breast  Up-to-date  8  Iron deficient anemia  EGD done 12/16/2022 showed evidence of prior Drew fundoplication, mild antritis, history of food impaction and dilation over a year ago  Colonoscopy done 12/16/2022 showed  history of polyps  2 polyps removed today, sigmoid diverticulosis, and internal hemorrhoids  Biopsy showed 1 hyperplastic polyp and 1 tubular adenoma  Biopsy showed mild chronic inflammation at GE junction patient denies any blood in stool, blood from rectal area, or black tarry stool  Iron studies done 12/14 showed iron saturation 11, TIBC 300, iron 34, and ferritin 7    Hemoglobin 10 5 on 12/14     -Consult with Hematologist Dr Michele Kessler scheduled for 1/17/2022  -Iron supplements per Hematology  Follow-up in 6 months     ______________________________________________________________________    SUBJECTIVE: Vini Tyler is a 44-year-old female who presents to the office for follow-up for iron deficient anemia status post EGD and colonoscopy  Results of the EGD and colonoscopy and biopsy reviewed with patient  Patient denies any GI symptoms  Patient denies nausea, vomiting, acid reflux, heartburn, dysphagia, epigastric or abdominal pain  Patient denies blood in stool, blood from rectal area, or black tarry stool  Bowel pattern is regular  Abdomen exam benign  EGD done 12/16/2022 showed evidence of prior Drew fundoplication, mild antritis, history of food impaction and dilation over a year ago  Colonoscopy done 12/16/2022 showed  history of polyps  2 polyps removed today, sigmoid diverticulosis, and internal hemorrhoids  Biopsy showed 1 hyperplastic polyp and 1 tubular adenoma  Biopsy showed mild chronic inflammation at GE junction patient denies any blood in stool, blood from rectal area, or black tarry stool  Iron studies done 12/14 showed iron saturation 11, TIBC 300, iron 34, and ferritin 7  Hemoglobin 10 5 on 12/14  Patient has history of dysphagia secondary to food impaction and esophageal dilation  Patient denies any dysphagia or difficulty swallowing  Patient has history of chronic GERD  Patient reports that GERD symptoms are well controlled on current medication of omeprazole  REVIEW OF SYSTEMS IS OTHERWISE NEGATIVE        Historical Information   Past Medical History:   Diagnosis Date   • Arthritis     in spine   • Colon polyp    • Constipation 11/02/2021   • CPAP (continuous positive airway pressure) dependence    • Cystic breast    • Disease of thyroid gland     hypothyroid   • Dyspareunia, female     last assessed 9/4/14   • Esophageal obstruction due to food impaction 11/03/2021 September of 2021   • Esophageal ulcer    • Food impaction of esophagus    • GERD (gastroesophageal reflux disease)    • Hepatic cyst 09/21/2015   • Hyperlipidemia    • Hypertension    • Hypothyroidism    • Insomnia    • Iron deficiency anemia    • Liver cyst     drained   • Obesity    • IRINA on CPAP     uses cpap   • Osteoporosis    • Severe mitral regurgitation    • Stroke [facility])    • Thrombocytopenia ([facility] [address] ) 01/08/2019   • Urinary tract infection    • Varicose veins of lower extremity     last assessed 1/4/13     Past Surgical History:   Procedure Laterality Date   • COLONOSCOPY      complete 5/2016 - Dr Harsh Tubbs due in 2019 - last assessed  3/17/17   • HERNIA REPAIR     • HIATAL HERNIA REPAIR     • LIVER BIOPSY LAPAROSCOPIC N/A 5/7/2018    Procedure: Laparoscopic marsupialization of liver cyst;  Surgeon: Dempsey Fabry, MD;  [facility];  Service: Surgical Oncology   • NEUROMA EXCISION     • OK ECHO [address] N/A 1/7/2019    Procedure: TRANSESOPHAGEAL ECHOCARDIOGRAM (DORI); Surgeon: Nathen Zuniga MD;  [facility];  Service: Cardiac Surgery   • OK ESOPHAGOGASTRODUODENOSCOPY TRANSORAL DIAGNOSTIC N/A 8/10/2016    Procedure: ESOPHAGOGASTRODUODENOSCOPY (EGD); Surgeon: Blaire Perla MD;  Location: [facility]; Service: Gastroenterology   • OK ESOPHAGOSCOPY FLEXIBLE TRANSORAL WITH BIOPSY N/A 11/3/2016    Procedure: ESOPHAGOGASTRODUODENOSCOPY (EGD);   Surgeon: Sathish Hebert MD;  [facility];  Service: Thoracic   • OK LAPS REPAIR HERNIA EXCEPT INCAL/INGUN REDUCIBLE N/A 10/30/2017    Procedure: LAPAROSCOPIC INCISIONAL HERNIA REPAIR X 2 WITH ECHO MESH;  Surgeon: Angelo Braga DO;  [facility];  Service: General   • OK LAPS RPR PARAESPHGL HRNA INCL FUNDPLSTY W/MESH Left 11/3/2016    Procedure: LAPAROSCOPIC PARAESOPHAGEAL HERNIA REPAIR WITH MESH;NISSEN FUNDOPLICATION ;  Surgeon: Sathish Hebert MD;  [facility];  Service: Thoracic   • OK REPAIR FIRST ABDOMINAL WALL HERNIA N/A 1/13/2017    Procedure: INCISIONAL HERNIA REPAIR ;  Surgeon: Yanely Carrington DO;  [facility];  Service: General   • WY REPLACEMENT MITRAL VALVE W/CARDIOPULMONARY BYP N/A 1/7/2019    Procedure: REPLACEMENT VALVE MITRAL (MVR), repair with 30mm CG Future ring;  Surgeon: Nikole Platt MD;  [facility];  Service: Cardiac Surgery   • TUBAL LIGATION       Social History   Social History     Substance and Sexual Activity   Alcohol Use Yes    Comment: social     Social History     Substance and Sexual Activity   Drug Use No     Social History     Tobacco Use   Smoking Status Never   Smokeless Tobacco Never     Family History   Problem Relation Age of Onset   • Heart disease Mother    • Aneurysm Mother         cerebral   • Alcohol abuse Father    • Cancer Father    • COPD Father    • Heart failure Father    • Hypertension Father    • Tuberculosis Father    • Cancer Family    • Breast cancer Paternal Grandmother 80   • No Known Problems Sister    • No Known Problems Daughter    • No Known Problems Maternal Grandmother    • No Known Problems Maternal Grandfather    • No Known Problems Paternal Grandfather    • No Known Problems Son        Meds/Allergies       Current Outpatient Medications:   •  amLODIPine (NORVASC) 5 mg tablet  •  Ascorbic Acid (VITAMIN C ER PO)  •  Ascorbic Acid (vitamin C) 100 MG tablet  •  aspirin 81 MG tablet  •  atorvastatin (LIPITOR) 20 mg tablet  •  Cholecalciferol (VITAMIN D3) 2000 UNITS TABS  •  estradiol (ESTRACE) 0 1 mg/g vaginal cream  •  fluocinonide (LIDEX) 0 05 % cream  •  hydrochlorothiazide (MICROZIDE) 12 5 mg capsule  •  levothyroxine 125 mcg tablet  •  Multiple Vitamins-Minerals (CENTRUM PO)  •  Omega-3 Fatty Acids (FISH OIL) 1200 MG CAPS  •  omeprazole (PriLOSEC) 40 MG capsule    Allergies   Allergen Reactions   • Other Other (See Comments)     Skin breakdown from bandaid on for long time- reddness           Objective     Blood pressure 123/86, pulse 68, height 5' 6" (1 676 m), weight 86 2 kg (190 lb), not currently breastfeeding  Body mass index is 30 67 kg/m²  PHYSICAL EXAM:      General Appearance:   Alert, cooperative, no distress   HEENT:   Normocephalic, atraumatic, anicteric      Neck:  Supple, symmetrical, trachea midline   Lungs:   Clear to auscultation bilaterally; no rales, rhonchi or wheezing; respirations unlabored    Heart[de-identified]   Regular rate and rhythm; no murmur, rub, or gallop  Abdomen:   Soft, non-tender, non-distended; normal bowel sounds; no masses, no organomegaly    Genitalia:   Deferred    Rectal:   Deferred    Extremities:  No cyanosis, clubbing or edema    Pulses:  2+ and symmetric    Skin:  No jaundice, rashes, or lesions    Lymph nodes:  No palpable cervical lymphadenopathy        Lab Results:   No visits with results within 1 Day(s) from this visit     Latest known visit with results is:   Hospital Outpatient Visit on 12/16/2022   Component Date Value   • Case Report 12/16/2022                      Value:Surgical Pathology Report                         Case: [id number]                                   Authorizing Provider:  Angely Wolf MD    Collected:           12/16/2022 1021              Ordering Location:     [address] [place] [address] Received:            12/16/2022 [address] [place] [address]                                                                  Pathologist:           Zach Cantu MD                                                         Specimens:   A) - Duodenum, cold bx Duodenum check for celiac disease                                            B) - Stomach, cold bx Antrum check for hpy                                                          C) - Esophagus, cold bx Eg junction reflux                                                          D) - Rectum, cold snare Recto-Sigmoid polyp                                                         E) - Large Intestine, Sigmoid Colon, cold bx Sigmoid polyp                                • Addendum 12/16/2022                      Value: This result contains rich text formatting which cannot be displayed here  • Final Diagnosis 12/16/2022                      Value: This result contains rich text formatting which cannot be displayed here  • Additional Information 12/16/2022                      Value: This result contains rich text formatting which cannot be displayed here  • Synoptic Checklist 12/16/2022                      Value:                            COLON/RECTUM POLYP FORM - GI - D, E                                                                                     :    Adenoma(s)                                                         : Other     • Gross Description 12/16/2022                      Value: This result contains rich text formatting which cannot be displayed here  Radiology Results:   EGD    Result Date: 12/16/2022  Narrative: Table formatting from the original result was not included  [address] [place] [id number] [facility] OF SERVICE: 12/16/22 PHYSICIAN(S): Attending: Brett Scherer MD Fellow: No Staff Documented INDICATION: Food bolus obstruction of intestine ([address] ) POST-OP DIAGNOSIS: See the impression below  PREPROCEDURE: Informed consent was obtained for the procedure, including sedation  Risks of perforation, hemorrhage, adverse drug reaction and aspiration were discussed  The patient was placed in the left lateral decubitus position  Patient was explained about the risks and benefits of the procedure  Risks including but not limited to bleeding, infection, and perforation were explained in detail  Also explained about less than 100% sensitivity with the exam and other alternatives  PROCEDURE: EGD DETAILS OF PROCEDURE: Patient was taken to the procedure room where a time out was performed to confirm correct patient and correct procedure  The patient underwent monitored anesthesia care, which was administered by an anesthesia professional  The patient's blood pressure, heart rate, level of consciousness, respirations and oxygen were monitored throughout the procedure  The scope was advanced to the second part of the duodenum  Retroflexion was performed in the fundus  The patient experienced no blood loss  The procedure was not difficult  The patient tolerated the procedure well  There were no apparent complications  ANESTHESIA INFORMATION: ASA: III Anesthesia Type: IV Sedation with Anesthesia MEDICATIONS: No administrations occurring from 0959 to 1023 on 12/16/22 FINDINGS: Performed forceps biopsies in the duodenal bulb, 1st part of the duodenum and 2nd part of the duodenum Erythematous mucosa in the antrum; performed cold forceps biopsy  Retroflexed view showed evidence of prior Nissen fundoplication Irregular Z-line; performed cold forceps biopsy   extensions less than a centimeter The remainder of a detailed exam otherwise unremarkable SPECIMENS: ID Type Source Tests Collected by Time Destination 1 : cold bx Duodenum check for celiac disease Tissue Duodenum TISSUE EXAM Esperanza Turner MD 12/16/2022 10:21 AM  2 : cold bx Antrum check for hpy Tissue Stomach TISSUE EXAM Esperanza Turner MD 12/16/2022 10:22 AM  3 : cold bx Eg junction reflux Tissue Esophagus TISSUE EXAM Esperanza Turner MD 12/16/2022 10:22 AM      Impression: Evidence of prior Nissen fundoplication, mild antritis, history of reflux and a history of food impaction dilated over a year ago doing well RECOMMENDATION: Acid suppression, reflux precautions, chew food well   Esperanza Turner MD     Colonoscopy    Addendum Date: 12/16/2022 Addendum:   [address] [place] [id number] [place] OF SERVICE: 12/16/22 PHYSICIAN(S): Attending: Esperanza Turner MD Fellow: No Staff Documented INDICATION: Hx of colonic polyps, BRBPR (bright red blood per rectum), Iron deficiency anemia, unspecified iron deficiency anemia type POST-OP DIAGNOSIS: See the impression below  HISTORY: Prior colonoscopy: 3 years ago  BOWEL PREPARATION: Miralax/Dulcolax PREPROCEDURE: Informed consent was obtained for the procedure, including sedation  Risks including but not limited to bleeding, infection, perforation, adverse drug reaction and aspiration were explained in detail  Also explained about less than 100% sensitivity with the exam and other alternatives  The patient was placed in the left lateral decubitus position  Procedure: Colonoscopy DETAILS OF PROCEDURE: Patient was taken to the procedure room where a time out was performed to confirm correct patient and correct procedure  The patient underwent monitored anesthesia care, which was administered by an anesthesia professional  The patient's blood pressure, heart rate, level of consciousness, oxygen and respirations were monitored throughout the procedure  A digital rectal exam was performed  The scope was introduced through the anus and advanced to the terminal ileum  Retroflexion was performed in the rectum  The quality of bowel preparation was evaluated using the [facility] Bowel Preparation Scale with scores of: right colon = 2, transverse colon = 2, left colon = 2  The total BBPS score was 6  Bowel prep was adequate  The patient experienced no blood loss  The procedure was not difficult  The patient tolerated the procedure well  There were no apparent complications   ANESTHESIA INFORMATION: ASA: III Anesthesia Type: IV Sedation with Anesthesia MEDICATIONS: No administrations occurring from 0959 to 1049 on 12/16/22 FINDINGS: Rectal exam no masses Small, internal hemorrhoids Diverticula in the sigmoid colon 3 mm polyp in the sigmoid colon; performed complete piecemeal removal by cold forceps biopsy 5 mm polyp in the rectosigmoid; completely removed en bloc by cold snare and retrieved specimen The remainder of a detailed exam was within normal limits including the appendix opening and distal terminal ileum EVENTS: Procedure Events Event Event Time ENDO SCOPE OUT TIME 12/16/2022 10:19 AM ENDO CECUM REACHED 12/16/2022 10:37 AM ENDO SCOPE OUT TIME 12/16/2022 10:49 AM SPECIMENS: ID Type Source Tests Collected by Time Destination 1 : cold bx Duodenum check for celiac disease Tissue Duodenum TISSUE EXAM Yomaira Miner MD 12/16/2022 10:21 AM  2 : cold bx Antrum check for hpy Tissue Stomach TISSUE EXAM Yomaira Miner MD 12/16/2022 10:22 AM  3 : cold bx Eg junction reflux Tissue Esophagus TISSUE EXAM Yomaira Miner MD 12/16/2022 10:22 AM  4 : cold snare Recto-Sigmoid polyp Tissue Rectum TISSUE EXAM Yomaira Miner MD 12/16/2022 10:28 AM  5 : cold bx Sigmoid polyp Tissue Large Intestine, Sigmoid Colon TISSUE EXAM Yomaira Miner MD 12/16/2022 10:49 AM  EQUIPMENT: Loopcam [id number] IMPRESSION: History of polyps, 2 polyps removed today, sigmoid diverticulosis and internal hemorrhoids RECOMMENDATION:  Repeat colonoscopy in 5 years  Personal history of colon polyps   High-fiber diet recommended, consider capsule endoscopy pending course, schedule follow-up office visit   Yomaira Miner MD     Result Date: 12/16/2022  Narrative: Table formatting from the original result was not included  [facility] [place] [id number] [phone] DATE OF SERVICE: 12/16/22 PHYSICIAN(S): Attending: Yomaira Miner MD Fellow: No Staff Documented INDICATION: Hx of colonic polyps, BRBPR (bright red blood per rectum), Iron deficiency anemia, unspecified iron deficiency anemia type POST-OP DIAGNOSIS: See the impression below  HISTORY: Prior colonoscopy: 3 years ago  BOWEL PREPARATION: Miralax/Dulcolax PREPROCEDURE: Informed consent was obtained for the procedure, including sedation   Risks including but not limited to bleeding, infection, perforation, adverse drug reaction and aspiration were explained in detail  Also explained about less than 100% sensitivity with the exam and other alternatives  The patient was placed in the left lateral decubitus position  Procedure: Colonoscopy DETAILS OF PROCEDURE: Patient was taken to the procedure room where a time out was performed to confirm correct patient and correct procedure  The patient underwent monitored anesthesia care, which was administered by an anesthesia professional  The patient's blood pressure, heart rate, level of consciousness, oxygen and respirations were monitored throughout the procedure  A digital rectal exam was performed  The scope was introduced through the anus and advanced to the terminal ileum  Retroflexion was performed in the rectum  The quality of bowel preparation was evaluated using the [facility] Bowel Preparation Scale with scores of: right colon = 2, transverse colon = 2, left colon = 2  The total BBPS score was 6  Bowel prep was adequate  The patient experienced no blood loss  The procedure was not difficult  The patient tolerated the procedure well  There were no apparent complications   ANESTHESIA INFORMATION: ASA: III Anesthesia Type: IV Sedation with Anesthesia MEDICATIONS: No administrations occurring from 0959 to 1049 on 12/16/22 FINDINGS: Rectal exam no masses Small, internal hemorrhoids Diverticula in the sigmoid colon 3 mm polyp in the sigmoid colon; performed complete piecemeal removal by cold forceps biopsy 5 mm polyp in the rectosigmoid; completely removed en bloc by cold snare and retrieved specimen The remainder of a detailed exam was within normal limits including the appendix opening and distal terminal ileum EVENTS: Procedure Events Event Event Time ENDO SCOPE OUT TIME 12/16/2022 10:19 AM ENDO CECUM REACHED 12/16/2022 10:37 AM ENDO SCOPE OUT TIME 12/16/2022 10:49 AM SPECIMENS: ID Type Source Tests Collected by Time Destination 1 : cold bx Duodenum check for celiac disease Tissue Duodenum TISSUE EXAM Benedicto Villalobos MD 12/16/2022 10:21 AM  2 : cold bx Antrum check for hpy Tissue Stomach TISSUE EXAM Benedicto Villalobos MD 12/16/2022 10:22 AM  3 : cold bx Eg junction reflux Tissue Esophagus TISSUE EXAM Benedicto Villalobos MD 12/16/2022 10:22 AM  4 : cold snare Recto-Sigmoid polyp Tissue Rectum TISSUE EXAM Benedicto Villalobos MD 12/16/2022 10:28 AM  5 : cold bx Sigmoid polyp Tissue Large Intestine, Sigmoid Colon TISSUE EXAM Benedicto Villalobos MD 12/16/2022 10:49 AM  EQUIPMENT: [employer] - [id number]     Impression: History of polyps, 2 polyps removed today, sigmoid diverticulosis and internal hemorrhoids RECOMMENDATION:  Repeat colonoscopy in 5 years  Personal history of colon polyps   High-fiber diet recommended   Benedicto Villalobos MD

## 2023-01-09 DIAGNOSIS — K22.10 ULCERATIVE ESOPHAGITIS: ICD-10-CM

## 2023-01-09 DIAGNOSIS — T18.128A ESOPHAGEAL OBSTRUCTION DUE TO FOOD IMPACTION: ICD-10-CM

## 2023-01-09 DIAGNOSIS — K22.2 ESOPHAGEAL OBSTRUCTION DUE TO FOOD IMPACTION: ICD-10-CM

## 2023-01-09 DIAGNOSIS — I10 BENIGN ESSENTIAL HYPERTENSION: ICD-10-CM

## 2023-01-09 RX ORDER — OMEPRAZOLE 40 MG/1
40 CAPSULE, DELAYED RELEASE ORAL DAILY
Qty: 30 CAPSULE | Refills: 2 | Status: SHIPPED | OUTPATIENT
Start: 2023-01-09

## 2023-01-10 RX ORDER — HYDROCHLOROTHIAZIDE 12.5 MG/1
CAPSULE, GELATIN COATED ORAL
Qty: 60 CAPSULE | Refills: 5 | Status: SHIPPED | OUTPATIENT
Start: 2023-01-10

## 2023-01-17 ENCOUNTER — TELEPHONE (OUTPATIENT)
Dept: HEMATOLOGY ONCOLOGY | Facility: CLINIC | Age: 73
End: 2023-01-17

## 2023-01-17 NOTE — TELEPHONE ENCOUNTER
Susan Choi amAppointment Cancellation Or Reschedule     Person calling in Patient    If other than patient calling, are they listed on the communication consent form? Provider Chanel Smith   Office Visit Date and Time 01/17 at 9:00am    Office Visit Location [facility]   Did patient want to reschedule their office appointment? If so, when was it scheduled to? Yes, 02/02 at 10:00am   Did you have STAR scheduled for this appointment? no   Do you need STAR set up for your new appointment? If yes, please send to "PATIENT RIDESHARE" pool for STAR rescheduling no   If you are cancelling appointment, can we notify STAR to cancel ride? If yes, please send to "PATIENT RIDESHARE" pool for STAR to cancel service no   Is this patient calling to reschedule an infusion appointment? no   When is their next infusion appointment? n/a   Is this patient a Chemo patient? no   Reason for Cancellation or Reschedule Patient is sick      If the patient is a treatment patient, please route this to the office nurse  If the patient is not on treatment, please route to the Clerical pool based on location  If the patient is a surgical oncology patient, please route to surg/onc clinical pool  Route message as high priority if same day cancellation

## 2023-02-01 ENCOUNTER — ESTABLISHED COMPREHENSIVE EXAM (OUTPATIENT)
Dept: URBAN - METROPOLITAN AREA CLINIC 6 | Facility: CLINIC | Age: 73
End: 2023-02-01

## 2023-02-01 DIAGNOSIS — H25.13: ICD-10-CM

## 2023-02-01 DIAGNOSIS — H53.2: ICD-10-CM

## 2023-02-01 PROCEDURE — 92014 COMPRE OPH EXAM EST PT 1/>: CPT

## 2023-02-01 PROCEDURE — 92060 SENSORIMOTOR EXAMINATION: CPT

## 2023-02-01 ASSESSMENT — TONOMETRY
OD_IOP_MMHG: 13
OS_IOP_MMHG: 14

## 2023-02-01 ASSESSMENT — VISUAL ACUITY
OD_CC: 20/20
OS_CC: J1-2
OD_CC: J1
OS_CC: 20/30+2

## 2023-02-02 ENCOUNTER — APPOINTMENT (OUTPATIENT)
Dept: LAB | Facility: CLINIC | Age: 73
End: 2023-02-02

## 2023-02-02 ENCOUNTER — CONSULT (OUTPATIENT)
Dept: HEMATOLOGY ONCOLOGY | Facility: CLINIC | Age: 73
End: 2023-02-02

## 2023-02-02 VITALS
HEIGHT: 66 IN | RESPIRATION RATE: 18 BRPM | OXYGEN SATURATION: 98 % | WEIGHT: 198 LBS | HEART RATE: 78 BPM | BODY MASS INDEX: 31.82 KG/M2 | DIASTOLIC BLOOD PRESSURE: 75 MMHG | SYSTOLIC BLOOD PRESSURE: 125 MMHG

## 2023-02-02 DIAGNOSIS — D50.0 IRON DEFICIENCY ANEMIA DUE TO CHRONIC BLOOD LOSS: ICD-10-CM

## 2023-02-02 DIAGNOSIS — D64.9 NORMOCYTIC ANEMIA: ICD-10-CM

## 2023-02-02 DIAGNOSIS — D64.9 NORMOCYTIC ANEMIA: Primary | ICD-10-CM

## 2023-02-02 DIAGNOSIS — E46 PROTEIN-CALORIE MALNUTRITION, UNSPECIFIED SEVERITY (HCC): ICD-10-CM

## 2023-02-02 LAB
ALBUMIN SERPL BCP-MCNC: 3.7 G/DL (ref 3.5–5)
ALP SERPL-CCNC: 75 U/L (ref 34–104)
ALT SERPL W P-5'-P-CCNC: 18 U/L (ref 7–52)
ANION GAP SERPL CALCULATED.3IONS-SCNC: 4 MMOL/L (ref 4–13)
AST SERPL W P-5'-P-CCNC: 18 U/L (ref 13–39)
BASOPHILS # BLD AUTO: 0.08 THOUSANDS/ÂΜL (ref 0–0.1)
BASOPHILS NFR BLD AUTO: 1 % (ref 0–1)
BILIRUB SERPL-MCNC: 0.55 MG/DL (ref 0.2–1)
BUN SERPL-MCNC: 16 MG/DL (ref 5–25)
CALCIUM SERPL-MCNC: 9.2 MG/DL (ref 8.4–10.2)
CHLORIDE SERPL-SCNC: 105 MMOL/L (ref 96–108)
CO2 SERPL-SCNC: 29 MMOL/L (ref 21–32)
CREAT SERPL-MCNC: 0.89 MG/DL (ref 0.6–1.3)
EOSINOPHIL # BLD AUTO: 0.19 THOUSAND/ÂΜL (ref 0–0.61)
EOSINOPHIL NFR BLD AUTO: 3 % (ref 0–6)
ERYTHROCYTE [DISTWIDTH] IN BLOOD BY AUTOMATED COUNT: 14.9 % (ref 11.6–15.1)
FERRITIN SERPL-MCNC: 20 NG/ML (ref 8–388)
FOLATE SERPL-MCNC: >20 NG/ML (ref 3.1–17.5)
GFR SERPL CREATININE-BSD FRML MDRD: 64 ML/MIN/1.73SQ M
GLUCOSE SERPL-MCNC: 113 MG/DL (ref 65–140)
HCT VFR BLD AUTO: 39.1 % (ref 34.8–46.1)
HGB BLD-MCNC: 12.1 G/DL (ref 11.5–15.4)
IMM GRANULOCYTES # BLD AUTO: 0.03 THOUSAND/UL (ref 0–0.2)
IMM GRANULOCYTES NFR BLD AUTO: 1 % (ref 0–2)
IRON SATN MFR SERPL: 34 % (ref 15–50)
IRON SERPL-MCNC: 83 UG/DL (ref 50–170)
LDH SERPL-CCNC: 144 U/L (ref 140–271)
LYMPHOCYTES # BLD AUTO: 1.77 THOUSANDS/ÂΜL (ref 0.6–4.47)
LYMPHOCYTES NFR BLD AUTO: 29 % (ref 14–44)
MCH RBC QN AUTO: 29.2 PG (ref 26.8–34.3)
MCHC RBC AUTO-ENTMCNC: 30.9 G/DL (ref 31.4–37.4)
MCV RBC AUTO: 94 FL (ref 82–98)
MONOCYTES # BLD AUTO: 0.76 THOUSAND/ÂΜL (ref 0.17–1.22)
MONOCYTES NFR BLD AUTO: 12 % (ref 4–12)
NEUTROPHILS # BLD AUTO: 3.29 THOUSANDS/ÂΜL (ref 1.85–7.62)
NEUTS SEG NFR BLD AUTO: 54 % (ref 43–75)
NRBC BLD AUTO-RTO: 0 /100 WBCS
PLATELET # BLD AUTO: 172 THOUSANDS/UL (ref 149–390)
PMV BLD AUTO: 10 FL (ref 8.9–12.7)
POTASSIUM SERPL-SCNC: 3.8 MMOL/L (ref 3.5–5.3)
PROT SERPL-MCNC: 7 G/DL (ref 6.4–8.4)
RBC # BLD AUTO: 4.15 MILLION/UL (ref 3.81–5.12)
RETICS # AUTO: NORMAL 10*3/UL (ref 14097–95744)
RETICS # CALC: 1.44 % (ref 0.37–1.87)
SODIUM SERPL-SCNC: 138 MMOL/L (ref 135–147)
TIBC SERPL-MCNC: 245 UG/DL (ref 250–450)
VIT B12 SERPL-MCNC: 394 PG/ML (ref 100–900)
WBC # BLD AUTO: 6.12 THOUSAND/UL (ref 4.31–10.16)

## 2023-02-02 NOTE — PROGRESS NOTES
[address] [facility] [id number]  Hematology Ambulatory Consult  Shana Knight, 1950, [id number]  2/2/2023    Assessment/Plan:  1  Normocytic anemia  2  Iron deficiency anemia due to chronic blood loss  Ms Jayant Godinez is a 17-year-old female seen in consultation for iron deficiency anemia  She has no evidence of chronic blood loss but does donate blood regularly  She also take a PPI long term which could be contributing to some malabsorption component  We discussed that further work-up of her anemia would include SPEP, reticulocyte count, LD, and looking for other deficiencies including B12 and folate  I will call her with the results and my recommendations moving forward  She would likely benefit from oral iron due to frequent blood donations  We agreed that if her ferritin is less than 30 she will take Slow Fe daily and if it is greater than 30 she will start taking it every other day  She will continue to have labs drawn every 2 months prior to follow-up with me in 4 months  She knows to call the office with any new symptoms and we can repeat blood work sooner     - Folate; Future  - Vitamin B12; Future  - Methylmalonic acid, serum; Future  - Protein electrophoresis, serum; Future  - Iron Panel (Includes Ferritin, Iron Sat%, Iron, and TIBC); Future  - CBC and differential; Future  - Comprehensive metabolic panel; Future  - Reticulocytes; Future  - LD,Blood; Future      The patient is scheduled for follow-up in approximately 4 months  Patient voiced agreement and understanding to the above  Patient knows to call the Hematology/Oncology office with any questions and concerns regarding the above  Barrier(s) to care: None    The patient is able to self care     -------------------------------------------------------------------------------------------------------    Chief Complaint   Patient presents with   • Consult Referring provider:  No referring provider defined for this encounter  History of present illness:  Nora Nicole is a 70-year-old female with past medical history of GERD, hypothyroidism, obstructive sleep apnea, hypertension, osteopenia, hyperlipidemia, status post mitral valve repair, and history of smoking  She is seen in consultation for iron deficiency anemia  She donates blood regularly for the last 3 years and most  recently she was noted to have a hemoglobin 10 5 with a ferritin of 7  Been taking a woman's One-A-Day vitamin and most recently when she went to donate blood her serum iron was greater than 40  She states that about every third time she is denied donation due to not meeting the iron parameters  She denies ever being told that she or a family member is diagnosed with thalassemia  She denies history of gastric bypass or colon resction  Her last colonoscopy and EGD was 12/2022 which found small internal hemorrhoids, and diverticula in the sigmoid colon  No overt sources of blood loss  She denies pagophagia, pica, restless leg syndrome, brittle nails, thinning hair, or pallor  She denies coffee ground stools, tarry stools, bright red blood per rectum, hematuria, or vaginal bleeding  She denies fatigue, lightheadedness, dizziness, shortness of breath, BEARD, palpitations, or chest pain  She denies fevers, chills, unintentional weight loss, abdominal pain/distension, nausea, vomiting, constipation, diarrhea, petechiae/purpura, unexplained ecchymosis, or LE swelling  She currently has no complaints or concerns  She take metamucil for chronic constipation       7/11/2016: Ferritin 12, serum iron 44  12/31/2018: Hemoglobin 12 5, MCV 94, platelets 238, WBC 8 94   Ferritin 37, iron saturation 34%, TIBC 252, serum iron 86  12/14/2022: Hemoglobin 10 5, MCV 91, platelets 526, WBC 3 10   Ferritin 7, iron saturation 20%, TIBC 292, serum iron 57      Review of Systems   Constitutional: Negative for activity change, appetite change, diaphoresis, fatigue and fever  HENT: Negative for trouble swallowing and voice change  Eyes: Negative for photophobia and visual disturbance  Respiratory: Negative for cough, chest tightness and shortness of breath  Cardiovascular: Negative for chest pain, palpitations and leg swelling  Gastrointestinal: Negative for abdominal distention, abdominal pain, blood in stool, constipation, diarrhea, nausea and vomiting  Endocrine: Negative for cold intolerance and heat intolerance  Genitourinary: Negative for dysuria, hematuria and urgency  Musculoskeletal: Negative for arthralgias, back pain, gait problem, joint swelling and myalgias  Skin: Negative for pallor and rash  Neurological: Negative for dizziness, weakness, light-headedness, numbness and headaches  Hematological: Negative for adenopathy  Does not bruise/bleed easily  Psychiatric/Behavioral: Negative for confusion and sleep disturbance         Patient Active Problem List   Diagnosis   • Benign essential hypertension   • Disc degeneration, lumbar   • Dysphagia   • Hyperlipidemia   • Hypothyroidism   • Iron deficiency anemia   • Liver enlargement   • Osteopenia of multiple sites   • Severe obstructive sleep apnea   • Shoulder impingement   • Tinea corporis   • Lumbar radiculopathy   • S/P MVR (mitral valve repair)   • Double vision with both eyes open   • Encounter for screening mammogram for malignant neoplasm of breast   • History of esophageal dilatation   • Chronic GERD   • History of stroke   • Pigmented skin lesions   • Polyp of colon   • Hx of colonic polyps   • Other hemorrhoids       Past Medical History:   Diagnosis Date   • Arthritis     in spine   • Colon polyp    • Constipation 11/02/2021   • CPAP (continuous positive airway pressure) dependence    • Cystic breast    • Disease of thyroid gland     hypothyroid   • Dyspareunia, female     last assessed 9/4/14   • Esophageal obstruction due to food impaction 11/03/2021 September of 2021   • Esophageal ulcer    • Food impaction of esophagus    • GERD (gastroesophageal reflux disease)    • Hepatic cyst 09/21/2015   • Hyperlipidemia    • Hypertension    • Hypothyroidism    • Insomnia    • Iron deficiency anemia    • Liver cyst     drained   • Obesity    • IRINA on CPAP     uses cpap   • Osteoporosis    • Severe mitral regurgitation    • Stroke [facility])    • Thrombocytopenia ([address] ) 01/08/2019   • Urinary tract infection    • Varicose veins of lower extremity     last assessed 1/4/13       Past Surgical History:   Procedure Laterality Date   • COLONOSCOPY      complete 5/2016 - Dr Hope Serum due in 2019 - last assessed  3/17/17   • HERNIA REPAIR     • HIATAL HERNIA REPAIR     • LIVER BIOPSY LAPAROSCOPIC N/A 5/7/2018    Procedure: Laparoscopic marsupialization of liver cyst;  Surgeon: Lilly Espinal MD;  [facility];  Service: Surgical Oncology   • NEUROMA EXCISION     • AZ ECHO [address] N/A 1/7/2019    Procedure: TRANSESOPHAGEAL ECHOCARDIOGRAM (DORI); Surgeon: Floyd Lesches, MD;  Location[facility];  Service: Cardiac Surgery   • AZ ESOPHAGOGASTRODUODENOSCOPY TRANSORAL DIAGNOSTIC N/A 8/10/2016    Procedure: ESOPHAGOGASTRODUODENOSCOPY (EGD); Surgeon: Chandan Syed MD;  Location: [facility]; Service: Gastroenterology   • AZ ESOPHAGOSCOPY FLEXIBLE TRANSORAL WITH BIOPSY N/A 11/3/2016    Procedure: ESOPHAGOGASTRODUODENOSCOPY (EGD);   Surgeon: Quang Castro MD;  [facility];  Service: Thoracic   • AZ LAPS REPAIR HERNIA EXCEPT INCAL/INGUN REDUCIBLE N/A 10/30/2017    Procedure: LAPAROSCOPIC INCISIONAL HERNIA REPAIR X 2 WITH ECHO MESH;  Surgeon: Thien Duff DO;  [facility];  Service: General   • AZ LAPS RPR PARAESPHGL HRNA INCL FUNDPLSTY W/MESH Left 11/3/2016    Procedure: LAPAROSCOPIC PARAESOPHAGEAL HERNIA REPAIR WITH MESH;NISSEN FUNDOPLICATION ;  Surgeon: Quang Castro MD;  [facility];  Service: Thoracic   • AZ REPAIR FIRST ABDOMINAL WALL HERNIA N/A 1/13/2017    Procedure: INCISIONAL HERNIA REPAIR ;  Surgeon: Samira Noel DO;  [facility];  Service: General   • AZ REPLACEMENT MITRAL VALVE W/CARDIOPULMONARY BYP N/A 1/7/2019    Procedure: REPLACEMENT VALVE MITRAL (MVR), repair with 30mm CG Future ring;  Surgeon: Filomena De Leon MD;  [facility];  Service: Cardiac Surgery   • TUBAL LIGATION         Family History   Problem Relation Age of Onset   • Heart disease Mother    • Aneurysm Mother         cerebral   • Alcohol abuse Father    • Cancer Father    • COPD Father    • Heart failure Father    • Hypertension Father    • Tuberculosis Father    • Cancer Family    • Breast cancer Paternal Grandmother 80   • No Known Problems Sister    • No Known Problems Daughter    • No Known Problems Maternal Grandmother    • No Known Problems Maternal Grandfather    • No Known Problems Paternal Grandfather    • No Known Problems Son        Social History     Socioeconomic History   • Marital status: /Civil Union     Spouse name: None   • Number of children: None   • Years of education: None   • Highest education level: None   Occupational History   • None   Tobacco Use   • Smoking status: Never   • Smokeless tobacco: Never   Vaping Use   • Vaping Use: Never used   Substance and Sexual Activity   • Alcohol use: Yes     Comment: social   • Drug use: No   • Sexual activity: Yes     Partners: Male     Birth control/protection: Post-menopausal   Other Topics Concern   • None   Social History Narrative    Coffee    Exercise - walking     Social Determinants of Health     Financial Resource Strain: Low Risk    • Difficulty of Paying Living Expenses: Not very hard   Food Insecurity: Not on file   Transportation Needs: No Transportation Needs   • Lack of Transportation (Medical): No   • Lack of Transportation (Non-Medical):  No   Physical Activity: Not on file   Stress: Not on file Social Connections: Not on file   Intimate Partner Violence: Not on file   Housing Stability: Not on file         Current Outpatient Medications:   •  amLODIPine (NORVASC) 5 mg tablet, TAKE 1 TABLET (5 MG TOTAL) BY MOUTH DAILY, Disp: 90 tablet, Rfl: 3  •  Ascorbic Acid (VITAMIN C ER PO), Take 1 capsule by mouth daily, Disp: , Rfl:   •  Ascorbic Acid (vitamin C) 100 MG tablet, Take 100 mg by mouth daily, Disp: , Rfl:   •  aspirin 81 MG tablet, Take 1 tablet (81 mg total) by mouth daily, Disp: 30 tablet, Rfl: 11  •  atorvastatin (LIPITOR) 20 mg tablet, 1 tab QPM X 2 weeks, if no side effects increase to 2 tabs QPM (Patient taking differently: 20 mg daily 20mg tab once a day), Disp: 60 tablet, Rfl: 5  •  Cholecalciferol (VITAMIN D3) 2000 UNITS TABS, Take 1 tablet by mouth daily  , Disp: , Rfl:   •  estradiol (ESTRACE) 0 1 mg/g vaginal cream, Insert into the vagina, Disp: , Rfl:   •  fluocinonide (LIDEX) 0 05 % cream, Apply topically 2 (two) times a day, Disp: 60 g, Rfl: 2  •  hydrochlorothiazide (MICROZIDE) 12 5 mg capsule, TAKE 1 TO 2 CAPSULES DAILY WITH AMLODIPINE, Disp: 60 capsule, Rfl: 5  •  levothyroxine 125 mcg tablet, TAKE 1 TABLET (125 MCG TOTAL) BY MOUTH DAILY, Disp: 30 tablet, Rfl: 5  •  Multiple Vitamins-Minerals (CENTRUM PO), Take by mouth, Disp: , Rfl:   •  Omega-3 Fatty Acids (FISH OIL) 1200 MG CAPS, Take 1 capsule by mouth daily, Disp: , Rfl:   •  omeprazole (PriLOSEC) 40 MG capsule, TAKE 1 CAPSULE (40 MG TOTAL) BY MOUTH DAILY, Disp: 30 capsule, Rfl: 2    Allergies   Allergen Reactions   • Other Other (See Comments)     Skin breakdown from bandaid on for long time- reddness       Objective:  /75 (BP Location: Left arm, Patient Position: Sitting, Cuff Size: Adult)   Pulse 78   Resp 18   Ht 5' 6" (1 676 m)   Wt 89 8 kg (198 lb)   LMP  (LMP Unknown)   SpO2 98%   BMI 31 96 kg/m²   Physical Exam  Constitutional:       General: She is not in acute distress  Appearance: Normal appearance   She is not ill-appearing  HENT:      Head: Normocephalic and atraumatic  Eyes:      Extraocular Movements: Extraocular movements intact  Conjunctiva/sclera: Conjunctivae normal       Pupils: Pupils are equal, round, and reactive to light  Cardiovascular:      Rate and Rhythm: Normal rate and regular rhythm  Pulses: Normal pulses  Heart sounds: Normal heart sounds  No murmur heard  Pulmonary:      Effort: Pulmonary effort is normal  No respiratory distress  Breath sounds: Normal breath sounds  Abdominal:      General: Abdomen is flat  Bowel sounds are normal  There is no distension  Palpations: Abdomen is soft  Tenderness: There is no abdominal tenderness  Musculoskeletal:         General: Normal range of motion  Cervical back: Normal range of motion  No tenderness  Right lower leg: No edema  Left lower leg: No edema  Lymphadenopathy:      Cervical: No cervical adenopathy  Skin:     General: Skin is warm and dry  Capillary Refill: Capillary refill takes less than 2 seconds  Coloration: Skin is not jaundiced or pale  Neurological:      General: No focal deficit present  Mental Status: She is alert and oriented to person, place, and time  Mental status is at baseline  Motor: No weakness  Gait: Gait normal    Psychiatric:         Mood and Affect: Mood normal          Behavior: Behavior normal          Thought Content: Thought content normal          Judgment: Judgment normal          Result Review  Labs:   No visits with results within 1 Month(s) from this visit     Latest known visit with results is:   Hospital Outpatient Visit on 12/16/2022   Component Date Value Ref Range Status   • Case Report 12/16/2022    Final                    Value:Surgical Pathology Report                         Case: [id number]                                   Authorizing Provider:  Peterson Hernandez MD    Collected:           12/16/2022 1021 Ordering Location:     [address] [place] [address] Received:            2022 3186                                     [place]                                                                  Pathologist:           Annika Vargas MD                                                         Specimens:   A) - Duodenum, cold bx Duodenum check for celiac disease                                            B) - Stomach, cold bx Antrum check for hpy                                                          C) - Esophagus, cold bx Eg junction reflux                                                          D) - Rectum, cold snare Recto-Sigmoid polyp                                                         E) - Large Intestine, Sigmoid Colon, cold bx Sigmoid polyp                                • Addendum 2022    Final                    Value: This result contains rich text formatting which cannot be displayed here  • Final Diagnosis 2022    Final                    Value: This result contains rich text formatting which cannot be displayed here  • Additional Information 2022    Final                    Value: This result contains rich text formatting which cannot be displayed here  • Synoptic Checklist 2022    Final                    Value:                            COLON/RECTUM POLYP FORM - GI - D, E                                                                                     :    Adenoma(s)                                                         : Other     • Gross Description 2022    Final                    Value: This result contains rich text formatting which cannot be displayed here             Imagin22:  Colonoscopy   •  Rectal exam no masses  • Small, internal hemorrhoids  • Diverticula in the sigmoid colon  • 3 mm polyp in the sigmoid colon; performed complete piecemeal removal by cold forceps biopsy  • 5 mm polyp in the rectosigmoid; completely removed en bloc by cold snare and retrieved specimen  The remainder of a detailed exam was within normal limits including the appendix opening and distal terminal ileum    EGD:  • Performed forceps biopsies in the duodenal bulb, 1st part of the duodenum and 2nd part of the duodenum  • Erythematous mucosa in the antrum; performed cold forceps biopsy  Retroflexed view showed evidence of prior Nissen fundoplication  • Irregular Z-line; performed cold forceps biopsy  extensions less than a centimeter  • The remainder of a detailed exam otherwise unremarkable      Please note: This report has been generated by a voice recognition software system  Therefore there may be syntax, spelling, and/or grammatical errors  Please call if you have any questions

## 2023-02-04 LAB
ALBUMIN SERPL ELPH-MCNC: 3.65 G/DL (ref 3.5–5)
ALBUMIN SERPL ELPH-MCNC: 52.2 % (ref 52–65)
ALPHA1 GLOB SERPL ELPH-MCNC: 0.34 G/DL (ref 0.1–0.4)
ALPHA1 GLOB SERPL ELPH-MCNC: 4.8 % (ref 2.5–5)
ALPHA2 GLOB SERPL ELPH-MCNC: 0.63 G/DL (ref 0.4–1.2)
ALPHA2 GLOB SERPL ELPH-MCNC: 9 % (ref 7–13)
BETA GLOB ABNORMAL SERPL ELPH-MCNC: 0.44 G/DL (ref 0.4–0.8)
BETA1 GLOB SERPL ELPH-MCNC: 6.3 % (ref 5–13)
BETA2 GLOB SERPL ELPH-MCNC: 6.9 % (ref 2–8)
BETA2+GAMMA GLOB SERPL ELPH-MCNC: 0.48 G/DL (ref 0.2–0.5)
GAMMA GLOB ABNORMAL SERPL ELPH-MCNC: 1.46 G/DL (ref 0.5–1.6)
GAMMA GLOB SERPL ELPH-MCNC: 20.8 % (ref 12–22)
IGG/ALB SER: 1.09 {RATIO} (ref 1.1–1.8)
PROT PATTERN SERPL ELPH-IMP: ABNORMAL
PROT SERPL-MCNC: 7 G/DL (ref 6.4–8.2)

## 2023-02-05 LAB — METHYLMALONATE SERPL-SCNC: 166 NMOL/L (ref 0–378)

## 2023-02-09 PROBLEM — D64.9 NORMOCYTIC ANEMIA: Status: ACTIVE | Noted: 2023-02-09

## 2023-02-11 ENCOUNTER — OFFICE VISIT (OUTPATIENT)
Dept: URGENT CARE | Age: 73
End: 2023-02-11

## 2023-02-11 VITALS
SYSTOLIC BLOOD PRESSURE: 150 MMHG | DIASTOLIC BLOOD PRESSURE: 83 MMHG | RESPIRATION RATE: 16 BRPM | HEIGHT: 66 IN | TEMPERATURE: 98.4 F | WEIGHT: 195 LBS | HEART RATE: 65 BPM | BODY MASS INDEX: 31.34 KG/M2

## 2023-02-11 DIAGNOSIS — B02.9 HERPES ZOSTER WITHOUT COMPLICATION: Primary | ICD-10-CM

## 2023-02-11 RX ORDER — VALACYCLOVIR HYDROCHLORIDE 1 G/1
1000 TABLET, FILM COATED ORAL 3 TIMES DAILY
Qty: 21 TABLET | Refills: 0 | Status: SHIPPED | OUTPATIENT
Start: 2023-02-11 | End: 2023-02-18

## 2023-02-11 NOTE — PROGRESS NOTES
[facility]        NAME: Vicente Neves is a 67 y o  female  : 1950    MRN: [id number]  DATE: 2023  TIME: 12:59 PM    Assessment and Plan   Herpes zoster without complication [N19 7]  1  Herpes zoster without complication  valACYclovir (VALTREX) 1,000 mg tablet      Physical presentation consistent with shingles, please begin Valtrex 3 times daily x7 days  May use over-the-counter topicals such as lidocaine patches for discomfort  Continue to alternate Tylenol 650 mg every 4-6 hours with ibuprofen 600 mg every 6-8 hours as needed for pain  Follow-up with primary care provider within 1 to 2 weeks  Patient Instructions   Shingles   WHAT YOU NEED TO KNOW:   Shingles is a painful rash  Shingles is caused by the same virus that causes chickenpox (varicella-zoster)  After you get chickenpox, the virus stays in your body for several years without causing any symptoms  Shingles occurs when the virus becomes active again   The active virus travels along a nerve to your skin and causes a rash         DISCHARGE INSTRUCTIONS:   Call your local emergency number (911 in the [address] [place] [address],[address]) if:   • You have trouble moving your arms, legs, or face      • You become confused, or have difficulty speaking      • You have a seizure      Return to the emergency department if:   • You have weakness in an arm or leg      • You have dizziness, a severe headache, or hearing or vision loss      • You have painful, red, warm skin around the blisters, or the blisters drain pus      • Your neck is stiff or you have trouble moving it      Call your doctor if:   • You feel weak or have a headache      • You have a cough, chills, or a fever      • You have abdominal pain or nausea, or you are vomiting      • Your rash becomes more itchy or painful      • Your rash spreads to other parts of your body      • Your pain worsens and does not go away even after you take medicine      • You have questions or concerns about your condition or care      Medicines: You may need any of the following:  • Antiviral medicine  helps decrease symptoms and healing time  They may also decrease your risk of developing nerve pain  You will need to start taking them within 3 days of the start of symptoms to prevent nerve pain      • Prescription pain medicine  may be given  Ask your healthcare provider how to take this medicine safely  Some prescription pain medicines contain acetaminophen  Do not take other medicines that contain acetaminophen without talking to your healthcare provider  Too much acetaminophen may cause liver damage  Prescription pain medicine may cause constipation  Ask your healthcare provider how to prevent or treat constipation       • Acetaminophen  decreases pain and fever  It is available without a doctor's order  Ask how much to take and how often to take it  Follow directions  Read the labels of all other medicines you are using to see if they also contain acetaminophen, or ask your doctor or pharmacist  Acetaminophen can cause liver damage if not taken correctly  Do not use more than 4 grams (4,000 milligrams) total of acetaminophen in one day       • NSAIDs , such as ibuprofen, help decrease swelling, pain, and fever  This medicine is available with or without a doctor's order  NSAIDs can cause stomach bleeding or kidney problems in certain people  If you take blood thinner medicine, always ask if NSAIDs are safe for you  Always read the medicine label and follow directions  Do not give these medicines to children under 10months of age without direction from your child's healthcare provider       • Topical anesthetics  are used to numb the skin and decrease pain  They can be a cream, gel, spray, or patch      • Anticonvulsants  decrease nerve pain and may help you sleep at night      • Antidepressants  may be used to decrease nerve pain      • Take your medicine as directed    Contact your healthcare provider if you think your medicine is not helping or if you have side effects  Tell him of her if you are allergic to any medicine  Keep a list of the medicines, vitamins, and herbs you take  Include the amounts, and when and why you take them  Bring the list or the pill bottles to follow-up visits  Carry your medicine list with you in case of an emergency      Self-care:  Keep your rash clean and dry  Cover your rash with a bandage or clothing  Do not use bandages that stick to your skin  The sticky part may irritate your skin and make your rash last longer  Prevent the spread of germs:       • Wash your hands often  Wash your hands several times each day  Wash after you use the bathroom, change a child's diaper, and before you prepare or eat food  Use soap and water every time  Rub your soapy hands together, lacing your fingers  Wash the front and back of your hands, and in between your fingers  Use the fingers of one hand to scrub under the fingernails of the other hand  Wash for at least 20 seconds  Rinse with warm, running water for several seconds  Then dry your hands with a clean towel or paper towel  Use hand  that contains alcohol if soap and water are not available  Do not touch your eyes, nose, or mouth without washing your hands first           • Cover a sneeze or cough  Use a tissue that covers your mouth and nose  Throw the tissue away in a trash can right away  Use the bend of your arm if a tissue is not available  Wash your hands well with soap and water or use a hand       • Stay away from others while you are sick  Avoid crowds as much as possible      • Ask about vaccines you may need  Talk to your healthcare provider about your vaccine history  He or she will tell you which vaccines you need, and when to get them      Prevent shingles or another shingles outbreak:  A vaccine may be given to help prevent shingles  You can get the vaccine even if you already had shingles   The vaccine can help prevent a future outbreak  If you do get shingles again, the vaccine can keep it from becoming severe  The vaccine comes in 2 forms  Your healthcare provider will tell you which form is right for you  The decision is based on your age and any medical conditions you have  A 2-dose vaccine is usually given to adults 48 years or older  A 1-dose vaccine may be given to adults 61 years or older  Follow up with your doctor as directed:  Write down your questions so you remember to ask them during your visits  For more information:   • Centers for Disease Control and Prevention  [address] , [address]  Phone: 3- 550 - 5598393  Phone: 5- 328 - 0206704  Web Address: [employer]     © [address] [place] [address] 2022 Information is for End User's use only and may not be sold, redistributed or otherwise used for commercial purposes  All illustrations and images included in CareNotes® are the copyrighted property of A D A M , Inc  or [facility] [place][note 2] Elgin katina   The above information is an  only  It is not intended as medical advice for individual conditions or treatments  Talk to your doctor, nurse or pharmacist before following any medical regimen to see if it is safe and effective for you  Follow up with PCP in 3-5 days  Proceed to  ER if symptoms worsen  Chief Complaint     Chief Complaint   Patient presents with   • Possible Shingles     Patient states Saturday she started with a burning feeling in her arm and appears to have blisters  Only on her RT arm from shoulder to wrist  Used Tylenol  History of Present Illness       Patient is a 70-year-old female with past medical history significant for hypertension, stroke, IRINA on CPAP, who presents for evaluation of painful rash across right torso and right arm  She reports symptoms began 1 week ago with a burning/tingling sensation along her right arm and chest   She initially thought nothing of it and took Tylenol    However, last evening while she was out to dinner with family she noticed her rash started forming  Her daughter sent a picture of the rash to her friend who is a physician assistant and her friend suggested it was likely shingles  Patient denies fever, drainage, swelling  She does endorse fatigue and body aches  She confirms that she did have chickenpox as a child, and has yet to receive the shingles vaccination  Review of Systems   Review of Systems   Constitutional: Negative for fatigue and fever  HENT: Negative for congestion, ear discharge, ear pain, postnasal drip, rhinorrhea, sinus pressure, sinus pain, sneezing and sore throat  Eyes: Negative  Negative for pain, discharge, redness and itching  Respiratory: Negative  Negative for apnea, cough, choking, chest tightness, shortness of breath and stridor  Cardiovascular: Negative  Negative for chest pain and palpitations  Gastrointestinal: Negative  Negative for diarrhea, nausea and vomiting  Endocrine: Negative  Negative for polydipsia, polyphagia and polyuria  Genitourinary: Negative  Negative for decreased urine volume and flank pain  Musculoskeletal: Negative  Negative for arthralgias, back pain, myalgias, neck pain and neck stiffness  Skin: Positive for rash  Negative for color change  Allergic/Immunologic: Negative  Negative for environmental allergies  Neurological: Negative  Negative for dizziness, facial asymmetry, light-headedness, numbness and headaches  Hematological: Negative  Negative for adenopathy  Psychiatric/Behavioral: Negative            Current Medications       Current Outpatient Medications:   •  amLODIPine (NORVASC) 5 mg tablet, TAKE 1 TABLET (5 MG TOTAL) BY MOUTH DAILY, Disp: 90 tablet, Rfl: 3  •  Ascorbic Acid (VITAMIN C ER PO), Take 1 capsule by mouth daily, Disp: , Rfl:   •  Ascorbic Acid (vitamin C) 100 MG tablet, Take 100 mg by mouth daily, Disp: , Rfl:   •  aspirin 81 MG tablet, Take 1 tablet (81 mg total) by mouth daily, Disp: 30 tablet, Rfl: 11  •  atorvastatin (LIPITOR) 20 mg tablet, 1 tab QPM X 2 weeks, if no side effects increase to 2 tabs QPM (Patient taking differently: 20 mg daily 20mg tab once a day), Disp: 60 tablet, Rfl: 5  •  Cholecalciferol (VITAMIN D3) 2000 UNITS TABS, Take 1 tablet by mouth daily  , Disp: , Rfl:   •  estradiol (ESTRACE) 0 1 mg/g vaginal cream, Insert into the vagina, Disp: , Rfl:   •  fluocinonide (LIDEX) 0 05 % cream, Apply topically 2 (two) times a day, Disp: 60 g, Rfl: 2  •  hydrochlorothiazide (MICROZIDE) 12 5 mg capsule, TAKE 1 TO 2 CAPSULES DAILY WITH AMLODIPINE, Disp: 60 capsule, Rfl: 5  •  levothyroxine 125 mcg tablet, TAKE 1 TABLET (125 MCG TOTAL) BY MOUTH DAILY, Disp: 30 tablet, Rfl: 5  •  Multiple Vitamins-Minerals (CENTRUM PO), Take by mouth, Disp: , Rfl:   •  Omega-3 Fatty Acids (FISH OIL) 1200 MG CAPS, Take 1 capsule by mouth daily, Disp: , Rfl:   •  omeprazole (PriLOSEC) 40 MG capsule, TAKE 1 CAPSULE (40 MG TOTAL) BY MOUTH DAILY, Disp: 30 capsule, Rfl: 2  •  valACYclovir (VALTREX) 1,000 mg tablet, Take 1 tablet (1,000 mg total) by mouth 3 (three) times a day for 7 days, Disp: 21 tablet, Rfl: 0    Current Allergies     Allergies as of 02/11/2023 - Reviewed 02/11/2023   Allergen Reaction Noted   • Other Other (See Comments) 11/03/2016            The following portions of the patient's history were reviewed and updated as appropriate: allergies, current medications, past family history, past medical history, past social history, past surgical history and problem list      Past Medical History:   Diagnosis Date   • Arthritis     in spine   • Colon polyp    • Constipation 11/02/2021   • CPAP (continuous positive airway pressure) dependence    • Cystic breast    • Disease of thyroid gland     hypothyroid   • Dyspareunia, female     last assessed 9/4/14   • Esophageal obstruction due to food impaction 11/03/2021 September of 2021   • Esophageal ulcer    • Food impaction of esophagus    • GERD (gastroesophageal reflux disease)    • Hepatic cyst 09/21/2015   • Hyperlipidemia    • Hypertension    • Hypothyroidism    • Insomnia    • Iron deficiency anemia    • Liver cyst     drained   • Obesity    • IRINA on CPAP     uses cpap   • Osteoporosis    • Severe mitral regurgitation    • Stroke [facility])    • Thrombocytopenia ([facility] [address] ) 01/08/2019   • Urinary tract infection    • Varicose veins of lower extremity     last assessed 1/4/13       Past Surgical History:   Procedure Laterality Date   • COLONOSCOPY      complete 5/2016 - Dr Sravanthi Edmond due in 2019 - last assessed  3/17/17   • HERNIA REPAIR     • HIATAL HERNIA REPAIR     • LIVER BIOPSY LAPAROSCOPIC N/A 5/7/2018    Procedure: Laparoscopic marsupialization of liver cyst;  Surgeon: Hany Jose MD;  [facility];  Service: Surgical Oncology   • NEUROMA EXCISION     • NE ECHO [address] N/A 1/7/2019    Procedure: TRANSESOPHAGEAL ECHOCARDIOGRAM (DORI); Surgeon: Guille Napoles MD;  [facility];  Service: Cardiac Surgery   • NE ESOPHAGOGASTRODUODENOSCOPY TRANSORAL DIAGNOSTIC N/A 8/10/2016    Procedure: ESOPHAGOGASTRODUODENOSCOPY (EGD); Surgeon: Bruna Matta MD;  Location: [facility]; Service: Gastroenterology   • NE ESOPHAGOSCOPY FLEXIBLE TRANSORAL WITH BIOPSY N/A 11/3/2016    Procedure: ESOPHAGOGASTRODUODENOSCOPY (EGD);   Surgeon: Zeyad Lanier MD;  [facility];  Service: Thoracic   • NE LAPS REPAIR HERNIA EXCEPT INCAL/INGUN REDUCIBLE N/A 10/30/2017    Procedure: LAPAROSCOPIC INCISIONAL HERNIA REPAIR X 2 WITH ECHO MESH;  Surgeon: Christopher Michael DO;  [facility];  Service: General   • NE LAPS RPR PARAESPHGL HRNA INCL FUNDPLSTY W/MESH Left 11/3/2016    Procedure: LAPAROSCOPIC PARAESOPHAGEAL HERNIA REPAIR WITH MESH;NISSEN FUNDOPLICATION ;  Surgeon: Zeyad Lanier MD;  [facility];  Service: Thoracic   • NE REPAIR FIRST ABDOMINAL WALL HERNIA N/A 1/13/2017    Procedure: INCISIONAL HERNIA REPAIR ;  Surgeon: Jayden Marie DO;  [facility];  Service: General   • AZ REPLACEMENT MITRAL VALVE W/CARDIOPULMONARY BYP N/A 1/7/2019    Procedure: REPLACEMENT VALVE MITRAL (MVR), repair with 30mm CG Future ring;  Surgeon: Ozzie Sanchez MD;  [facility];  Service: Cardiac Surgery   • TUBAL LIGATION         Family History   Problem Relation Age of Onset   • Heart disease Mother    • Aneurysm Mother         cerebral   • Alcohol abuse Father    • Cancer Father    • COPD Father    • Heart failure Father    • Hypertension Father    • Tuberculosis Father    • Cancer Family    • Breast cancer Paternal Grandmother 80   • No Known Problems Sister    • No Known Problems Daughter    • No Known Problems Maternal Grandmother    • No Known Problems Maternal Grandfather    • No Known Problems Paternal Grandfather    • No Known Problems Son          Medications have been verified  Objective   /83   Pulse 65   Temp 98 4 °F (36 9 °C)   Resp 16   Ht 5' 6" (1 676 m)   Wt 88 5 kg (195 lb)   LMP  (LMP Unknown)   BMI 31 47 kg/m²        Physical Exam     Physical Exam  Vitals and nursing note reviewed  Constitutional:       General: She is not in acute distress  Appearance: Normal appearance  She is not ill-appearing, toxic-appearing or diaphoretic  HENT:      Head: Normocephalic and atraumatic  Right Ear: External ear normal       Left Ear: External ear normal       Nose: Nose normal  No congestion or rhinorrhea  Mouth/Throat:      Mouth: Mucous membranes are moist    Eyes:      Extraocular Movements: Extraocular movements intact  Conjunctiva/sclera: Conjunctivae normal       Pupils: Pupils are equal, round, and reactive to light  Cardiovascular:      Rate and Rhythm: Normal rate and regular rhythm  Pulses: Normal pulses  Heart sounds: Normal heart sounds  No murmur heard  No friction rub  No gallop     Pulmonary:      Effort: Pulmonary effort is normal  No respiratory distress  Breath sounds: Normal breath sounds  No stridor  No wheezing, rhonchi or rales  Abdominal:      General: Bowel sounds are normal       Palpations: Abdomen is soft  Tenderness: There is no abdominal tenderness  There is no guarding or rebound  Musculoskeletal:         General: Normal range of motion  Cervical back: Normal range of motion and neck supple  No tenderness  Skin:     General: Skin is warm and dry  Capillary Refill: Capillary refill takes less than 2 seconds  Findings: Rash present  Rash is vesicular  Comments: Vesicular rash noted along right anterior chest, anterior right upper arm and forearm  No crossing of midline  (-) drainage, swelling, crusting   Neurological:      General: No focal deficit present  Mental Status: She is alert and oriented to person, place, and time  Cranial Nerves: No cranial nerve deficit     Psychiatric:         Mood and Affect: Mood normal          Behavior: Behavior normal

## 2023-02-11 NOTE — PATIENT INSTRUCTIONS
Physical presentation consistent with shingles, please begin Valtrex 3 times daily x7 days  May use over-the-counter topicals such as lidocaine patches for discomfort  Continue to alternate Tylenol 650 mg every 4-6 hours with ibuprofen 600 mg every 6-8 hours as needed for pain  Follow-up with primary care provider within 1 to 2 weeks

## 2023-02-17 ENCOUNTER — OFFICE VISIT (OUTPATIENT)
Dept: FAMILY MEDICINE CLINIC | Facility: CLINIC | Age: 73
End: 2023-02-17

## 2023-02-17 VITALS
RESPIRATION RATE: 16 BRPM | HEART RATE: 80 BPM | TEMPERATURE: 98.2 F | WEIGHT: 200.4 LBS | OXYGEN SATURATION: 99 % | BODY MASS INDEX: 32.21 KG/M2 | DIASTOLIC BLOOD PRESSURE: 82 MMHG | SYSTOLIC BLOOD PRESSURE: 122 MMHG | HEIGHT: 66 IN

## 2023-02-17 DIAGNOSIS — B02.29 POST HERPETIC NEURALGIA: Primary | ICD-10-CM

## 2023-02-17 DIAGNOSIS — E78.5 HYPERLIPIDEMIA, UNSPECIFIED HYPERLIPIDEMIA TYPE: ICD-10-CM

## 2023-02-17 RX ORDER — GABAPENTIN 100 MG/1
CAPSULE ORAL
Qty: 90 CAPSULE | Refills: 1 | Status: SHIPPED | OUTPATIENT
Start: 2023-02-17

## 2023-02-17 RX ORDER — ATORVASTATIN CALCIUM 20 MG/1
20 TABLET, FILM COATED ORAL DAILY
Qty: 30 TABLET | Refills: 5 | Status: SHIPPED | OUTPATIENT
Start: 2023-02-17

## 2023-02-17 NOTE — PROGRESS NOTES
Name: Edie Leal      :       MRN: [id number]  Encounter Provider: Pancho Bray MD  Encounter Date: 2023   Encounter department: [address] [place] [address]      1  Post herpetic neuralgia  Assessment & Plan:  Patient is completing course of Valtrex  No new lesions, rash is drying out  Start gabapentin at night for symptoms of postherpetic neuralgia, patient will start with 100 mg nightly and will titrate up to 300 mg nightly,, she will contact me if symptoms are not improving    Orders:  -     gabapentin (NEURONTIN) 100 mg capsule; Take 1 capsule at bedtime for 3 days then increase dose to 2 capsules at bedtime for 3 days, then increase dose up to 3 capsules at bedtime if needed    2  Hyperlipidemia, unspecified hyperlipidemia type  -     atorvastatin (LIPITOR) 20 mg tablet; Take 1 tablet (20 mg total) by mouth daily           Subjective     F/up -recent dx of shingles  The patient has developed burning  Of Right UE as of 10 days ago  Rash appeared  7 days ago  Seen at [facility]- started Valtrex   Rash is drying now  Pain comes and goes, burning , usually worse at night    Review of Systems   Constitutional: Negative  Eyes: Negative  Respiratory: Negative  Cardiovascular: Negative  Skin: Positive for rash  Allergic/Immunologic: Negative  Neurological: Negative           As per HPI       Past Medical History:   Diagnosis Date   • Arthritis     in spine   • Colon polyp    • Constipation 2021   • CPAP (continuous positive airway pressure) dependence    • Cystic breast    • Disease of thyroid gland     hypothyroid   • Dyspareunia, female     last assessed 14   • Esophageal obstruction due to food impaction 2021   • Esophageal ulcer    • Food impaction of esophagus    • GERD (gastroesophageal reflux disease)    • Hepatic cyst 2015   • Hyperlipidemia    • Hypertension    • Hypothyroidism    • Insomnia    • Iron deficiency anemia    • Liver cyst     drained   • Obesity    • IRINA on CPAP     uses cpap   • Osteoporosis    • Severe mitral regurgitation    • Stroke [facility])    • Thrombocytopenia ([address] ) 01/08/2019   • Urinary tract infection    • Varicose veins of lower extremity     last assessed 1/4/13     Past Surgical History:   Procedure Laterality Date   • COLONOSCOPY      complete 5/2016 - Dr Yancy De La Cruz due in 2019 - last assessed  3/17/17   • HERNIA REPAIR     • HIATAL HERNIA REPAIR     • LIVER BIOPSY LAPAROSCOPIC N/A 5/7/2018    Procedure: Laparoscopic marsupialization of liver cyst;  Surgeon: Savana Kirby MD;  [facility];  Service: Surgical Oncology   • NEUROMA EXCISION     • [place] ECHO [address] N/A 1/7/2019    Procedure: TRANSESOPHAGEAL ECHOCARDIOGRAM (DORI); Surgeon: Eliseo Lopes MD;  Location: [facility];  Service: Cardiac Surgery   • WI ESOPHAGOGASTRODUODENOSCOPY TRANSORAL DIAGNOSTIC N/A 8/10/2016    Procedure: ESOPHAGOGASTRODUODENOSCOPY (EGD); Surgeon: Mai Mandujano MD;  Location: [facility]; Service: Gastroenterology   • WI ESOPHAGOSCOPY FLEXIBLE TRANSORAL WITH BIOPSY N/A 11/3/2016    Procedure: ESOPHAGOGASTRODUODENOSCOPY (EGD);   Surgeon: Glori Fothergill, MD;  Location: [facility];  Service: Thoracic   • WI LAPS REPAIR HERNIA EXCEPT INCAL/INGUN REDUCIBLE N/A 10/30/2017    Procedure: LAPAROSCOPIC INCISIONAL HERNIA REPAIR X 2 WITH ECHO MESH;  Surgeon: Myke Carter DO;  Location: [facility];  Service: General   • WI LAPS RPR PARAESPHGL HRNA INCL FUNDPLSTY W/MESH Left 11/3/2016    Procedure: LAPAROSCOPIC PARAESOPHAGEAL HERNIA REPAIR WITH MESH;NISSEN FUNDOPLICATION ;  Surgeon: Glori Fothergill, MD;  Location: [facility];  Service: Thoracic   • WI REPAIR FIRST ABDOMINAL WALL HERNIA N/A 1/13/2017    Procedure: INCISIONAL HERNIA REPAIR ;  Surgeon: Myke Carter DO;  Location[facility];  Service: General   • WI REPLACEMENT MITRAL VALVE W/CARDIOPULMONARY BYP N/A 1/7/2019 Procedure: REPLACEMENT VALVE MITRAL (MVR), repair with 30mm CG Future ring;  Surgeon: Terri Slade MD;  Location: [facility];  Service: Cardiac Surgery   • TUBAL LIGATION       Family History   Problem Relation Age of Onset   • Heart disease Mother    • Aneurysm Mother         cerebral   • Alcohol abuse Father    • Cancer Father    • COPD Father    • Heart failure Father    • Hypertension Father    • Tuberculosis Father    • Cancer Family    • Breast cancer Paternal [de-identified]          of natural causes at 80years old   • No Known Problems Sister    • No Known Problems Daughter    • No Known Problems Maternal Grandmother    • No Known Problems Maternal Grandfather    • No Known Problems Paternal Grandfather    • No Known Problems Son      Social History     Socioeconomic History   • Marital status: /Civil Union     Spouse name: None   • Number of children: None   • Years of education: None   • Highest education level: None   Occupational History   • None   Tobacco Use   • Smoking status: Never   • Smokeless tobacco: Never   Vaping Use   • Vaping Use: Never used   Substance and Sexual Activity   • Alcohol use: Yes     Alcohol/week: 3 0 standard drinks     Types: 3 Glasses of wine per week     Comment: social   • Drug use: No   • Sexual activity: Yes     Partners: Male     Birth control/protection: Post-menopausal   Other Topics Concern   • None   Social History Narrative    Coffee    Exercise - walking     Social Determinants of Health     Financial Resource Strain: Low Risk    • Difficulty of Paying Living Expenses: Not very hard   Food Insecurity: Not on file   Transportation Needs: No Transportation Needs   • Lack of Transportation (Medical): No   • Lack of Transportation (Non-Medical):  No   Physical Activity: Not on file   Stress: Not on file   Social Connections: Not on file   Intimate Partner Violence: Not on file   Housing Stability: Not on file     Current Outpatient Medications on File Prior to Visit   Medication Sig   • amLODIPine (NORVASC) 5 mg tablet TAKE 1 TABLET (5 MG TOTAL) BY MOUTH DAILY   • Ascorbic Acid (VITAMIN C ER PO) Take 1 capsule by mouth daily   • Ascorbic Acid (vitamin C) 100 MG tablet Take 100 mg by mouth daily   • aspirin 81 MG tablet Take 1 tablet (81 mg total) by mouth daily   • Cholecalciferol (VITAMIN D3) 2000 UNITS TABS Take 1 tablet by mouth daily     • estradiol (ESTRACE) 0 1 mg/g vaginal cream Insert into the vagina   • fluocinonide (LIDEX) 0 05 % cream Apply topically 2 (two) times a day   • hydrochlorothiazide (MICROZIDE) 12 5 mg capsule TAKE 1 TO 2 CAPSULES DAILY WITH AMLODIPINE   • levothyroxine 125 mcg tablet TAKE 1 TABLET (125 MCG TOTAL) BY MOUTH DAILY   • Multiple Vitamins-Minerals (CENTRUM PO) Take by mouth   • Omega-3 Fatty Acids (FISH OIL) 1200 MG CAPS Take 1 capsule by mouth daily   • omeprazole (PriLOSEC) 40 MG capsule TAKE 1 CAPSULE (40 MG TOTAL) BY MOUTH DAILY   • valACYclovir (VALTREX) 1,000 mg tablet Take 1 tablet (1,000 mg total) by mouth 3 (three) times a day for 7 days     Allergies   Allergen Reactions   • Other Other (See Comments)     Skin breakdown from bandaid on for long time- reddness     Immunization History   Administered Date(s) Administered   • COVID-19 MODERNA VACC 0 5 ML IM 01/29/2021, 02/24/2021, 11/22/2021   • Influenza Quadrivalent Preservative Free 3 years and older IM 11/11/2016   • Influenza Split High Dose Preservative Free IM 09/21/2015, 09/15/2017, 10/21/2019   • Influenza, high dose seasonal 0 7 mL 10/19/2018, 09/08/2020, 10/13/2021, 10/24/2022   • Influenza, seasonal, injectable 11/01/2010, 01/24/2013, 09/22/2014   • Pneumococcal Conjugate 13-Valent 01/19/2016   • Pneumococcal Polysaccharide PPV23 09/15/2017   • Tdap 11/09/2010       Objective     /82 (BP Location: Left arm, Patient Position: Sitting, Cuff Size: Large)   Pulse 80   Temp 98 2 °F (36 8 °C) (Temporal)   Resp 16   Ht 5' 6" (1 676 m)   Wt 90 9 kg (200 lb 6 4 oz) LMP  (LMP Unknown)   SpO2 99%   BMI 32 35 kg/m²     Physical Exam  Vitals and nursing note reviewed  Constitutional:       General: She is not in acute distress  Appearance: Normal appearance  She is not ill-appearing  Skin:            Comments: Drying rash of H zoster as pictured   Neurological:      General: No focal deficit present  Mental Status: She is alert and oriented to person, place, and time  Psychiatric:         Mood and Affect: Mood normal          Behavior: Behavior normal          Thought Content:  Thought content normal        Machelle Onofre MD

## 2023-02-19 PROBLEM — B02.29 POST HERPETIC NEURALGIA: Status: ACTIVE | Noted: 2023-02-19

## 2023-02-20 NOTE — ASSESSMENT & PLAN NOTE
· Patient is completing course of Valtrex  · No new lesions, rash is drying out    · Start gabapentin at night for symptoms of postherpetic neuralgia, patient will start with 100 mg nightly and will titrate up to 300 mg nightly,, she will contact me if symptoms are not improving

## 2023-02-21 DIAGNOSIS — D50.0 IRON DEFICIENCY ANEMIA DUE TO CHRONIC BLOOD LOSS: Primary | ICD-10-CM

## 2023-02-21 DIAGNOSIS — D64.9 NORMOCYTIC ANEMIA: ICD-10-CM

## 2023-03-14 DIAGNOSIS — K22.10 ULCERATIVE ESOPHAGITIS: ICD-10-CM

## 2023-03-14 DIAGNOSIS — K22.2 ESOPHAGEAL OBSTRUCTION DUE TO FOOD IMPACTION: ICD-10-CM

## 2023-03-14 DIAGNOSIS — T18.128A ESOPHAGEAL OBSTRUCTION DUE TO FOOD IMPACTION: ICD-10-CM

## 2023-03-14 DIAGNOSIS — E03.9 HYPOTHYROIDISM, UNSPECIFIED TYPE: ICD-10-CM

## 2023-03-14 RX ORDER — OMEPRAZOLE 40 MG/1
40 CAPSULE, DELAYED RELEASE ORAL DAILY
Qty: 30 CAPSULE | Refills: 2 | Status: SHIPPED | OUTPATIENT
Start: 2023-03-14

## 2023-03-14 RX ORDER — LEVOTHYROXINE SODIUM 0.12 MG/1
125 TABLET ORAL DAILY
Qty: 30 TABLET | Refills: 5 | Status: SHIPPED | OUTPATIENT
Start: 2023-03-14

## 2023-04-20 ENCOUNTER — APPOINTMENT (OUTPATIENT)
Dept: LAB | Facility: CLINIC | Age: 73
End: 2023-04-20

## 2023-04-20 DIAGNOSIS — E78.2 MIXED HYPERLIPIDEMIA: ICD-10-CM

## 2023-04-20 DIAGNOSIS — E03.9 HYPOTHYROIDISM, UNSPECIFIED TYPE: ICD-10-CM

## 2023-04-20 LAB
ALBUMIN SERPL BCP-MCNC: 3.5 G/DL (ref 3.5–5)
ALP SERPL-CCNC: 75 U/L (ref 46–116)
ALT SERPL W P-5'-P-CCNC: 23 U/L (ref 12–78)
ANION GAP SERPL CALCULATED.3IONS-SCNC: 3 MMOL/L (ref 4–13)
AST SERPL W P-5'-P-CCNC: 16 U/L (ref 5–45)
BASOPHILS # BLD AUTO: 0.07 THOUSANDS/ΜL (ref 0–0.1)
BASOPHILS NFR BLD AUTO: 1 % (ref 0–1)
BILIRUB SERPL-MCNC: 0.68 MG/DL (ref 0.2–1)
BUN SERPL-MCNC: 23 MG/DL (ref 5–25)
CALCIUM SERPL-MCNC: 9.7 MG/DL (ref 8.3–10.1)
CHLORIDE SERPL-SCNC: 106 MMOL/L (ref 96–108)
CHOLEST SERPL-MCNC: 159 MG/DL
CO2 SERPL-SCNC: 29 MMOL/L (ref 21–32)
CREAT SERPL-MCNC: 1.06 MG/DL (ref 0.6–1.3)
EOSINOPHIL # BLD AUTO: 0.17 THOUSAND/ΜL (ref 0–0.61)
EOSINOPHIL NFR BLD AUTO: 3 % (ref 0–6)
ERYTHROCYTE [DISTWIDTH] IN BLOOD BY AUTOMATED COUNT: 13.6 % (ref 11.6–15.1)
GFR SERPL CREATININE-BSD FRML MDRD: 52 ML/MIN/1.73SQ M
GLUCOSE P FAST SERPL-MCNC: 101 MG/DL (ref 65–99)
HCT VFR BLD AUTO: 40.8 % (ref 34.8–46.1)
HDLC SERPL-MCNC: 68 MG/DL
HGB BLD-MCNC: 13.1 G/DL (ref 11.5–15.4)
IMM GRANULOCYTES # BLD AUTO: 0.02 THOUSAND/UL (ref 0–0.2)
IMM GRANULOCYTES NFR BLD AUTO: 0 % (ref 0–2)
LDLC SERPL CALC-MCNC: 73 MG/DL (ref 0–100)
LYMPHOCYTES # BLD AUTO: 1.63 THOUSANDS/ΜL (ref 0.6–4.47)
LYMPHOCYTES NFR BLD AUTO: 30 % (ref 14–44)
MCH RBC QN AUTO: 30.4 PG (ref 26.8–34.3)
MCHC RBC AUTO-ENTMCNC: 32.1 G/DL (ref 31.4–37.4)
MCV RBC AUTO: 95 FL (ref 82–98)
MONOCYTES # BLD AUTO: 0.61 THOUSAND/ΜL (ref 0.17–1.22)
MONOCYTES NFR BLD AUTO: 11 % (ref 4–12)
NEUTROPHILS # BLD AUTO: 2.89 THOUSANDS/ΜL (ref 1.85–7.62)
NEUTS SEG NFR BLD AUTO: 55 % (ref 43–75)
NRBC BLD AUTO-RTO: 0 /100 WBCS
PLATELET # BLD AUTO: 176 THOUSANDS/UL (ref 149–390)
PMV BLD AUTO: 10.4 FL (ref 8.9–12.7)
POTASSIUM SERPL-SCNC: 3.7 MMOL/L (ref 3.5–5.3)
PROT SERPL-MCNC: 7.5 G/DL (ref 6.4–8.4)
RBC # BLD AUTO: 4.31 MILLION/UL (ref 3.81–5.12)
SODIUM SERPL-SCNC: 138 MMOL/L (ref 135–147)
TRIGL SERPL-MCNC: 89 MG/DL
TSH SERPL DL<=0.05 MIU/L-ACNC: 1.53 UIU/ML (ref 0.45–4.5)
WBC # BLD AUTO: 5.39 THOUSAND/UL (ref 4.31–10.16)

## 2023-04-25 ENCOUNTER — OFFICE VISIT (OUTPATIENT)
Dept: FAMILY MEDICINE CLINIC | Facility: CLINIC | Age: 73
End: 2023-04-25

## 2023-04-25 VITALS
DIASTOLIC BLOOD PRESSURE: 84 MMHG | WEIGHT: 203.4 LBS | TEMPERATURE: 98.3 F | SYSTOLIC BLOOD PRESSURE: 132 MMHG | RESPIRATION RATE: 18 BRPM | BODY MASS INDEX: 32.83 KG/M2

## 2023-04-25 DIAGNOSIS — I83.812 VARICOSE VEINS OF LEFT LOWER EXTREMITY WITH PAIN: ICD-10-CM

## 2023-04-25 DIAGNOSIS — E03.9 HYPOTHYROIDISM, UNSPECIFIED TYPE: ICD-10-CM

## 2023-04-25 DIAGNOSIS — I10 BENIGN ESSENTIAL HYPERTENSION: Primary | ICD-10-CM

## 2023-04-25 DIAGNOSIS — R42 VERTIGO: ICD-10-CM

## 2023-04-25 DIAGNOSIS — E78.2 MIXED HYPERLIPIDEMIA: ICD-10-CM

## 2023-04-25 DIAGNOSIS — D50.9 IRON DEFICIENCY ANEMIA, UNSPECIFIED IRON DEFICIENCY ANEMIA TYPE: ICD-10-CM

## 2023-04-25 DIAGNOSIS — G47.33 SEVERE OBSTRUCTIVE SLEEP APNEA: ICD-10-CM

## 2023-04-25 PROBLEM — B02.29 POST HERPETIC NEURALGIA: Status: RESOLVED | Noted: 2023-02-19 | Resolved: 2023-04-25

## 2023-04-25 RX ORDER — FLUTICASONE PROPIONATE 50 MCG
2 SPRAY, SUSPENSION (ML) NASAL DAILY
Qty: 16 G | Refills: 3 | Status: SHIPPED | OUTPATIENT
Start: 2023-04-25

## 2023-04-25 NOTE — PROGRESS NOTES
Name: Thania Santo      : 1950      MRN: [id number]  Encounter Provider: Sabas Ha MD  Encounter Date: 2023   Encounter department: [address] [place] [address]      1  Benign essential hypertension  Assessment & Plan:  Blood pressure is well controlled  Continue current medication regimen        2  Severe obstructive sleep apnea  Assessment & Plan:  Due for f/up with sleep medicine    Orders:  -     Ambulatory Referral to Sleep Medicine; Future    3  Mixed hyperlipidemia  -     Comprehensive metabolic panel; Future  -     Lipid Panel with Direct LDL reflex; Future  -     CBC and differential; Future    4  Hypothyroidism, unspecified type  -     TSH, 3rd generation; Future    5  Iron deficiency anemia, unspecified iron deficiency anemia type  Assessment & Plan:  Hemoglobin is excellent at 13 2  Patient is on multivitamin with iron  EGD/colonoscopy 2022-negative  Pending follow-up with hematology      6  Vertigo  Comments:  Likely component of BPV  Start Claritin 10 mg daily and Flonase  Orders:  -     fluticasone (FLONASE) 50 mcg/act nasal spray; 2 sprays into each nostril daily    7  Varicose veins of left lower extremity with pain  -     VAS lower limb venous duplex study, unilateral/limited; Future; Expected date: 2023  -     Ambulatory Referral to Vascular Surgery; Future      Return in about 6 months (around 10/25/2023) for follow up, Medicare wellness  Subjective     The patient presents for follow-up of chronic medical conditions  Results of recent blood work and diagnostic testing reviewed  Medications updated  Patient complains of painful varicose vein left lower extremity  Most recent hemoglobin is 13 1 patient is no longer taking oral iron, just multivitamin with iron  She had normal EGD and colonoscopy in 2022  Patient is under [facility] of [facility] hematology    She remains on daily medications for hypertension, hyperlipidemia and hypothyroidism  Intermittent symptoms of mild positional vertigo within past 1 to 2 weeks, no URI symptoms, no sore throat  Patient offers no complaints of chest pain, palpitations, shortness of breath or dizziness  Review of Systems   Constitutional: Negative  HENT: Negative for congestion  Eyes: Negative  Respiratory: Negative  Cardiovascular: Negative  Gastrointestinal: Negative  Endocrine: Negative  Genitourinary: Negative  Musculoskeletal: Negative  Skin: Negative  Allergic/Immunologic: Negative  Neurological: Positive for light-headedness  Hematological: Negative  Psychiatric/Behavioral: Negative          Past Medical History:   Diagnosis Date   • Arthritis     in spine   • Colon polyp    • Constipation 11/02/2021   • CPAP (continuous positive airway pressure) dependence    • Cystic breast    • Disease of thyroid gland     hypothyroid   • Dyspareunia, female     last assessed 9/4/14   • Esophageal obstruction due to food impaction 11/03/2021 September of 2021   • Esophageal ulcer    • Food impaction of esophagus    • GERD (gastroesophageal reflux disease)    • Hepatic cyst 09/21/2015   • Hyperlipidemia    • Hypertension    • Hypothyroidism    • Insomnia    • Iron deficiency anemia    • Liver cyst     drained   • Obesity    • IRINA on CPAP     uses cpap   • Osteoporosis    • Severe mitral regurgitation    • Stroke [facility])    • Thrombocytopenia ([facility] [address] ) 01/08/2019   • Urinary tract infection    • Varicose veins of lower extremity     last assessed 1/4/13     Past Surgical History:   Procedure Laterality Date   • COLONOSCOPY      complete 5/2016 - Dr Nelida Delaney due in 2019 - last assessed  3/17/17   • HERNIA REPAIR     • HIATAL HERNIA REPAIR     • LIVER BIOPSY LAPAROSCOPIC N/A 5/7/2018    Procedure: Laparoscopic marsupialization of liver cyst;  Surgeon: Marquita Tan MD;  Location[facility];  Service: Surgical Oncology   • NEUROMA EXCISION     • [place] ECHO [address] N/A 2019    Procedure: TRANSESOPHAGEAL ECHOCARDIOGRAM (DORI); Surgeon: Onur Slona MD;  [facility];  Service: Cardiac Surgery   • GA ESOPHAGOGASTRODUODENOSCOPY TRANSORAL DIAGNOSTIC N/A 8/10/2016    Procedure: ESOPHAGOGASTRODUODENOSCOPY (EGD); Surgeon: Hailey Head MD;  Location: [facility]; Service: Gastroenterology   • GA ESOPHAGOSCOPY FLEXIBLE TRANSORAL WITH BIOPSY N/A 11/3/2016    Procedure: ESOPHAGOGASTRODUODENOSCOPY (EGD);   Surgeon: Clyde Kevin MD;  Location: [facility];  Service: Thoracic   • GA LAPS REPAIR HERNIA EXCEPT INCAL/INGUN REDUCIBLE N/A 10/30/2017    Procedure: LAPAROSCOPIC INCISIONAL HERNIA REPAIR X 2 WITH ECHO MESH;  Surgeon: Roxana Ayala DO;  [facility];  Service: General   • GA LAPS RPR PARAESPHGL HRNA INCL FUNDPLSTY [address] Left 11/3/2016    Procedure: LAPAROSCOPIC PARAESOPHAGEAL HERNIA REPAIR WITH MESH;NISSEN FUNDOPLICATION ;  Surgeon: Clyde Kevin MD;  Location: [facility];  Service: Thoracic   • GA REPAIR FIRST ABDOMINAL WALL HERNIA N/A 2017    Procedure: INCISIONAL HERNIA REPAIR ;  Surgeon: Roxana Ayala DO;  Location[facility];  Service: General   • GA REPLACEMENT MITRAL VALVE W/CARDIOPULMONARY BYP N/A 2019    Procedure: REPLACEMENT VALVE MITRAL (MVR), repair with 30mm CG Future ring;  Surgeon: Onur Sloan MD;  Location: [facility];  Service: Cardiac Surgery   • TUBAL LIGATION       Family History   Problem Relation Age of Onset   • Heart disease Mother    • Aneurysm Mother         cerebral   • Alcohol abuse Father    • Cancer Father    • COPD Father    • Heart failure Father    • Hypertension Father    • Tuberculosis Father    • Cancer Family    • Breast cancer Paternal [de-identified]          of natural causes at 80years old   • No Known Problems Sister    • No Known Problems Daughter    • No Known Problems Maternal Grandmother    • No Known Problems Maternal Grandfather    • No Known Problems Paternal Grandfather    • No Known Problems Son      Social History     Socioeconomic History   • Marital status: /Civil Union     Spouse name: None   • Number of children: None   • Years of education: None   • Highest education level: None   Occupational History   • None   Tobacco Use   • Smoking status: Never   • Smokeless tobacco: Never   Vaping Use   • Vaping Use: Never used   Substance and Sexual Activity   • Alcohol use: Yes     Alcohol/week: 3 0 standard drinks     Types: 3 Glasses of wine per week     Comment: social   • Drug use: No   • Sexual activity: Yes     Partners: Male     Birth control/protection: Post-menopausal   Other Topics Concern   • None   Social History Narrative    Coffee    Exercise - walking     Social Determinants of Health     Financial Resource Strain: Low Risk    • Difficulty of Paying Living Expenses: Not very hard   Food Insecurity: Not on file   Transportation Needs: No Transportation Needs   • Lack of Transportation (Medical): No   • Lack of Transportation (Non-Medical): No   Physical Activity: Not on file   Stress: Not on file   Social Connections: Not on file   Intimate Partner Violence: Not on file   Housing Stability: Not on file     Current Outpatient Medications on File Prior to Visit   Medication Sig   • amLODIPine (NORVASC) 5 mg tablet TAKE 1 TABLET (5 MG TOTAL) BY MOUTH DAILY   • Ascorbic Acid (VITAMIN C ER PO) Take 1 capsule by mouth daily   • Ascorbic Acid (vitamin C) 100 MG tablet Take 100 mg by mouth daily   • aspirin 81 MG tablet Take 1 tablet (81 mg total) by mouth daily   • atorvastatin (LIPITOR) 20 mg tablet Take 1 tablet (20 mg total) by mouth daily   • Cholecalciferol (VITAMIN D3) 2000 UNITS TABS Take 1 tablet by mouth daily     • estradiol (ESTRACE) 0 1 mg/g vaginal cream Insert into the vagina   • fluocinonide (LIDEX) 0 05 % cream Apply topically 2 (two) times a day   • hydrochlorothiazide (MICROZIDE) 12 5 mg capsule TAKE 1 TO 2 CAPSULES DAILY WITH AMLODIPINE   • levothyroxine 125 mcg tablet TAKE 1 TABLET (125 MCG TOTAL) BY MOUTH DAILY   • Multiple Vitamins-Minerals (CENTRUM PO) Take by mouth   • Omega-3 Fatty Acids (FISH OIL) 1200 MG CAPS Take 1 capsule by mouth daily   • omeprazole (PriLOSEC) 40 MG capsule TAKE 1 CAPSULE (40 MG TOTAL) BY MOUTH DAILY   • valACYclovir (VALTREX) 1,000 mg tablet Take 1 tablet (1,000 mg total) by mouth 3 (three) times a day for 7 days     Allergies   Allergen Reactions   • Other Other (See Comments)     Skin breakdown from bandaid on for long time- reddness     Immunization History   Administered Date(s) Administered   • COVID-19 MODERNA VACC 0 5 ML IM 01/29/2021, 02/24/2021, 11/22/2021   • Influenza Quadrivalent Preservative Free 3 years and older IM 11/11/2016   • Influenza Split High Dose Preservative Free IM 09/21/2015, 09/15/2017, 10/21/2019   • Influenza, high dose seasonal 0 7 mL 10/19/2018, 09/08/2020, 10/13/2021, 10/24/2022   • Influenza, seasonal, injectable 11/01/2010, 01/24/2013, 09/22/2014   • Pneumococcal Conjugate 13-Valent 01/19/2016   • Pneumococcal Polysaccharide PPV23 09/15/2017   • Tdap 11/09/2010       Objective     /84   Temp 98 3 °F (36 8 °C) (Temporal)   Resp 18   Wt 92 3 kg (203 lb 6 4 oz)   LMP  (LMP Unknown)   BMI 32 83 kg/m²     Physical Exam  Vitals and nursing note reviewed  Constitutional:       General: She is not in acute distress  Appearance: Normal appearance  She is well-developed  She is not ill-appearing  HENT:      Head: Normocephalic and atraumatic  Right Ear: Tympanic membrane normal       Left Ear: Tympanic membrane normal       Nose: Congestion present  Eyes:      Conjunctiva/sclera: Conjunctivae normal    Neck:      Thyroid: No thyromegaly  Vascular: No carotid bruit  Cardiovascular:      Rate and Rhythm: Normal rate and regular rhythm  Heart sounds: Normal heart sounds  No murmur heard    Pulmonary:      Effort: Pulmonary effort is normal  No respiratory distress  Breath sounds: Normal breath sounds  No wheezing or rhonchi  Abdominal:      General: Bowel sounds are normal  There is no abdominal bruit  Palpations: Abdomen is soft  Musculoskeletal:         General: Normal range of motion  Cervical back: Neck supple  No rigidity  Right lower leg: No edema  Left lower leg: No edema  Comments: Prominent varicose veins left calf  No warmth, no discoloration  Numerous spider veins bilateral lower extremities     Skin:     General: Skin is warm  Neurological:      General: No focal deficit present  Mental Status: She is alert and oriented to person, place, and time  Cranial Nerves: No cranial nerve deficit  Coordination: Coordination normal    Psychiatric:         Mood and Affect: Mood normal          Behavior: Behavior normal          Thought Content:  Thought content normal        Levon Cool MD

## 2023-04-28 NOTE — ASSESSMENT & PLAN NOTE
Hemoglobin is excellent at 13 2  Patient is on multivitamin with iron  EGD/colonoscopy December 2022-negative    Pending follow-up with hematology

## 2023-05-01 ENCOUNTER — HOSPITAL ENCOUNTER (OUTPATIENT)
Dept: VASCULAR ULTRASOUND | Facility: HOSPITAL | Age: 73
Discharge: HOME | End: 2023-05-01

## 2023-05-01 DIAGNOSIS — I83.812 VARICOSE VEINS OF LEFT LOWER EXTREMITY WITH PAIN: ICD-10-CM

## 2023-05-12 NOTE — PROGRESS NOTES
Assessment/Plan:    Venous insufficiency  Varicose veins of both lower extremities with pain  LE edema  -     Compression Stocking  -     Ambulatory Referral to Vascular Surgery    -Mildly, symptomatic B L>R varicose veins (occasional left calf vein pain and discomfort;  -Hx leg swelling with evidence of venous hypertension    -Bulging varicose veins in the L calf up to 10 mm wide with chronic stasis changes and spiders at the B ankles  -Diffuse smaller spider/retic varicosities  -Skin is intact    -LEVDR 5/1/23:  LLE with GSV, SSV and  incompetence  Inguinal GSV 7 mm with 4 19 sec reflux, thigh 5 7 to 6 1 mm with reflux 2 to 4 seconds, knee GSV 9 9 mm with reflux 3 63 seconds and there is also reflux in the calf and ankle    Plan: We had a detailed discussion regarding varicose veins and the treatment of venous disease  She did have a venous reflux study performed which shows significant venous incompetence throughout the left lower extremity  Her symptoms are overall mild with occasional pain and aching, though she does have edema with evidence of chronic stasis changes and venous hypertension  She tells me the swelling is controlled with diuretic  She otherwise has mild leg symptoms  She is not yet wearing compression regularly  We discussed strategies to slow progression and treat venous disease emphasizing the importance of compression, elevation, exercise and weight loss  Since she has not yet regularly wearing compression stockings she is advised to do so to help control her symptoms and slow progression of venous disease  We also did briefly discussed surgical options   She does not feel the surgery is needed at this time, should she continues to have worsening varicosities or symptoms despite compression then she should return for reevaluation     - Order for prescription graded compression stockings of 20-30 mm Hg  - Compression, periodic elevation, low-sodium diet, regular exercise for weight loss  - Patient education for venous insufficiency  - Follow up as needed       Subjective:      Patient ID: Corwin Sarabia is a 67 y o  female  Patient is new to our practice referred by José Manuel Nichols MD   Patient c/o BLE varicose veins L>R  Patient states she does get some discomfort but once in a while not all the time  Patient have no recent testing  Patient is currently taking ASA and Atorvastatin  HPI     Corwin Sarabia 71yo F severe IRINA, GERD, hypertension, hyperlipidemia, mitral regurg s/p MV repair, Hx stroke referred for varicose veins in the lower extremities  Patient has longstanding history of progressive, bulky varicosities particularly in the left calf  Occasionally she has pain and discomfort in the calf but generally she is pain-free and completes all activities of daily living comfortably  She continues to walk several times weekly with her  and friend and has no limitations due to leg heaviness or claudication  She has no history of DVT or SVT  No painful or bleeding veins  She mostly wants evaluation to be sure there is not anything dangerous or to be concerned about  In the past, she has had leg swelling but she tells me this is controlled with diuretic  On exam, there is mild ankle swelling and bulging varicosities, there is also moderate chronic stasis changes and evidence of venous hypertension  She does have a history of mitral annuloplasty with ring for treatment of mitral regurgitation  The following portions of the patient's history were reviewed and updated as appropriate: allergies, current medications, past family history, past medical history, past social history, past surgical history and problem list     Review of Systems   Constitutional: Negative  HENT: Negative  Eyes: Negative  Respiratory: Negative  Cardiovascular: Negative  Gastrointestinal: Negative  Endocrine: Negative  Genitourinary: Negative  "  Musculoskeletal: Negative  Skin: Negative  Allergic/Immunologic: Negative  Neurological: Negative  Hematological: Negative  Psychiatric/Behavioral: Negative  Objective:      /74 (BP Location: Right arm, Patient Position: Sitting, Cuff Size: Adult)   Pulse 75   Ht 5' 6\" (1 676 m)   Wt 93 1 kg (205 lb 4 8 oz)   LMP  (LMP Unknown)   BMI 33 14 kg/m²        Physical Exam  Constitutional:       Appearance: Normal appearance  She is obese  HENT:      Head: Normocephalic and atraumatic  Eyes:      Extraocular Movements: Extraocular movements intact  Pupils: Pupils are equal, round, and reactive to light  Cardiovascular:      Rate and Rhythm: Regular rhythm  Pulses:           Dorsalis pedis pulses are 2+ on the right side and 2+ on the left side  Heart sounds: Normal heart sounds, S1 normal and S2 normal    Musculoskeletal:      Right lower leg: Edema present  Left lower leg: Edema present  [facility] REPORT  CLINICAL:  Indications: Patient presents with history of Left lower extremity varicose  veins  Patient reports left leg heaviness and pain  Patient has been wearing  therapeutic compression stockings for 3 months  Operative History:  Mitral valve replacement  Varicose vein surgery  Risk Factors  The patient has history of Hyperlipidemia       FINDINGS:     Right           AP (mm)  Reflux  Valve Closure Time    GSV Mid Thigh       5 4  Reflux                2 67       Left            AP (mm)  Reflux  Valve Closure Time    GSV Inguinal        7 0  Reflux                4 19    GSV Prox Thigh      5 7  Reflux                1 98    GSV Mid Thigh       5 8  Reflux                2 66    GSV Dist Thigh      6 1  Reflux                4 05    GSV Knee            9 9  Reflux                3 63    GSV Prox Calf       5 1  Reflux                2 10    GSV Mid Calf        1 8  Reflux                1 24    GSV Dist " "Calf       1 9                                GSV Ankle           2 1                                SSV Knee            3 2                                SSV Mid Calf        3 7  Reflux                1 74    SSV Ankle           3 2  Reflux                1 76             CONCLUSION:     Impression:  RIGHT LIMB: Limited  No evidence of deep vein thrombosis in the common femoral vein  The great saphenous vein appears incompetent in the thigh  LEFT LIMB:  No evidence of deep venous incompetence  The great saphenous vein is incompetent  The great saphenous vein remains within the saphenous compartment in the thigh  The small saphenous vein is incompetent and does not communicate with the  popliteal vein  There is an incompetent  in the mid posterior calf with connection to  the gastrocnemius veins and the small saphenous vein  There is no evidence of deep vein thrombosis in the CFV, the proximal PFV, the  femoral vein and the popliteal vein  Study performed with patient standing  / in steep Reverse Trendelenburg  I have reviewed and made appropriate changes to the review of systems input by the medical assistant      Vitals:    05/15/23 1306   BP: 110/74   BP Location: Right arm   Patient Position: Sitting   Cuff Size: Adult   Pulse: 75   Weight: 93 1 kg (205 lb 4 8 oz)   Height: 5' 6\" (1 676 m)       Patient Active Problem List   Diagnosis   • Benign essential hypertension   • Disc degeneration, lumbar   • Dysphagia   • Hyperlipidemia   • Hypothyroidism   • Iron deficiency anemia   • Liver enlargement   • Osteopenia of multiple sites   • Severe obstructive sleep apnea   • Shoulder impingement   • Tinea corporis   • Lumbar radiculopathy   • S/P MVR (mitral valve repair)   • Double vision with both eyes open   • Encounter for screening mammogram for malignant neoplasm of breast   • History of esophageal dilatation   • Chronic GERD   • History of stroke   • Pigmented skin lesions   • Polyp " of colon   • Hx of colonic polyps   • Other hemorrhoids   • Normocytic anemia       Past Surgical History:   Procedure Laterality Date   • COLONOSCOPY      complete 5/2016 - Dr Lori Plunkett due in 2019 - last assessed  3/17/17   • HERNIA REPAIR     • HIATAL HERNIA REPAIR     • LIVER BIOPSY LAPAROSCOPIC N/A 5/7/2018    Procedure: Laparoscopic marsupialization of liver cyst;  Surgeon: Terri Sargent MD;  [facility];  Service: Surgical Oncology   • NEUROMA EXCISION     • IN ECHO [address] N/A 1/7/2019    Procedure: TRANSESOPHAGEAL ECHOCARDIOGRAM (DORI); Surgeon: Nathanael Moore MD;  [facility];  Service: Cardiac Surgery   • IN ESOPHAGOGASTRODUODENOSCOPY TRANSORAL DIAGNOSTIC N/A 8/10/2016    Procedure: ESOPHAGOGASTRODUODENOSCOPY (EGD); Surgeon: Maria Elena Fisher MD;  Location: [facility]; Service: Gastroenterology   • IN ESOPHAGOSCOPY FLEXIBLE TRANSORAL WITH BIOPSY N/A 11/3/2016    Procedure: ESOPHAGOGASTRODUODENOSCOPY (EGD);   Surgeon: Rodney Moore MD;  [facility];  Service: Thoracic   • IN LAPS REPAIR HERNIA EXCEPT INCAL/INGUN REDUCIBLE N/A 10/30/2017    Procedure: LAPAROSCOPIC INCISIONAL HERNIA REPAIR X 2 WITH ECHO MESH;  Surgeon: aRngel Dent DO;  [facility];  Service: General   • IN LAPS RPR PARAESPHGL HRNA INCL FUNDPLSTY [address] Left 11/3/2016    Procedure: LAPAROSCOPIC PARAESOPHAGEAL HERNIA REPAIR WITH MESH;NISSEN FUNDOPLICATION ;  Surgeon: Rodney Moore MD;  [facility];  Service: Thoracic   • IN REPAIR FIRST ABDOMINAL WALL HERNIA N/A 1/13/2017    Procedure: INCISIONAL HERNIA REPAIR ;  Surgeon: Rangel Dent DO;  [facility];  Service: General   • IN REPLACEMENT MITRAL VALVE W/CARDIOPULMONARY BYP N/A 1/7/2019    Procedure: REPLACEMENT VALVE MITRAL (MVR), repair with 30mm CG Future ring;  Surgeon: Nathanael Moore MD;  [facility];  Service: Cardiac Surgery   • TUBAL LIGATION         Family History   Problem Relation Age of Onset   • Heart disease Mother    • Aneurysm Mother         cerebral   • Alcohol abuse Father    • Cancer Father    • COPD Father    • Heart failure Father    • Hypertension Father    • Tuberculosis Father    • Cancer Family    • Breast cancer Paternal [de-identified]          of natural causes at 80years old   • No Known Problems Sister    • No Known Problems Daughter    • No Known Problems Maternal Grandmother    • No Known Problems Maternal Grandfather    • No Known Problems Paternal Grandfather    • No Known Problems Son        Social History     Socioeconomic History   • Marital status: /Civil Union     Spouse name: Not on file   • Number of children: Not on file   • Years of education: Not on file   • Highest education level: Not on file   Occupational History   • Not on file   Tobacco Use   • Smoking status: Never   • Smokeless tobacco: Never   Vaping Use   • Vaping Use: Never used   Substance and Sexual Activity   • Alcohol use: Yes     Alcohol/week: 3 0 standard drinks     Types: 3 Glasses of wine per week     Comment: social   • Drug use: No   • Sexual activity: Yes     Partners: Male     Birth control/protection: Post-menopausal   Other Topics Concern   • Not on file   Social History Narrative    Coffee    Exercise - walking     Social Determinants of Health     Financial Resource Strain: Low Risk    • Difficulty of Paying Living Expenses: Not very hard   Food Insecurity: Not on file   Transportation Needs: No Transportation Needs   • Lack of Transportation (Medical): No   • Lack of Transportation (Non-Medical):  No   Physical Activity: Not on file   Stress: Not on file   Social Connections: Not on file   Intimate Partner Violence: Not on file   Housing Stability: Not on file       Allergies   Allergen Reactions   • Other Other (See Comments)     Skin breakdown from bandaid on for long time- reddness         Current Outpatient Medications:   •  amLODIPine (NORVASC) 5 mg tablet, TAKE 1 TABLET (5 MG TOTAL) BY MOUTH DAILY, Disp: 90 tablet, Rfl: 3  •  Ascorbic Acid (VITAMIN C ER PO), Take 1 capsule by mouth daily, Disp: , Rfl:   •  Ascorbic Acid (vitamin C) 100 MG tablet, Take 100 mg by mouth daily, Disp: , Rfl:   •  aspirin 81 MG tablet, Take 1 tablet (81 mg total) by mouth daily, Disp: 30 tablet, Rfl: 11  •  atorvastatin (LIPITOR) 20 mg tablet, Take 1 tablet (20 mg total) by mouth daily, Disp: 30 tablet, Rfl: 5  •  Cholecalciferol (VITAMIN D3) 2000 UNITS TABS, Take 1 tablet by mouth daily  , Disp: , Rfl:   •  estradiol (ESTRACE) 0 1 mg/g vaginal cream, Insert into the vagina, Disp: , Rfl:   •  fluocinonide (LIDEX) 0 05 % cream, Apply topically 2 (two) times a day, Disp: 60 g, Rfl: 2  •  fluticasone (FLONASE) 50 mcg/act nasal spray, 2 sprays into each nostril daily, Disp: 16 g, Rfl: 3  •  hydrochlorothiazide (MICROZIDE) 12 5 mg capsule, TAKE 1 TO 2 CAPSULES DAILY WITH AMLODIPINE, Disp: 60 capsule, Rfl: 5  •  levothyroxine 125 mcg tablet, TAKE 1 TABLET (125 MCG TOTAL) BY MOUTH DAILY, Disp: 30 tablet, Rfl: 5  •  Multiple Vitamins-Minerals (CENTRUM PO), Take by mouth, Disp: , Rfl:   •  Omega-3 Fatty Acids (FISH OIL) 1200 MG CAPS, Take 1 capsule by mouth daily, Disp: , Rfl:   •  omeprazole (PriLOSEC) 40 MG capsule, TAKE 1 CAPSULE (40 MG TOTAL) BY MOUTH DAILY, Disp: 30 capsule, Rfl: 2  •  valACYclovir (VALTREX) 1,000 mg tablet, Take 1 tablet (1,000 mg total) by mouth 3 (three) times a day for 7 days, Disp: 21 tablet, Rfl: 0

## 2023-05-15 ENCOUNTER — CONSULT (OUTPATIENT)
Dept: VASCULAR SURGERY | Facility: CLINIC | Age: 73
End: 2023-05-15

## 2023-05-15 VITALS
HEIGHT: 66 IN | WEIGHT: 205.3 LBS | BODY MASS INDEX: 32.99 KG/M2 | HEART RATE: 75 BPM | SYSTOLIC BLOOD PRESSURE: 110 MMHG | DIASTOLIC BLOOD PRESSURE: 74 MMHG

## 2023-05-15 DIAGNOSIS — I83.813 VARICOSE VEINS OF BOTH LOWER EXTREMITIES WITH PAIN: ICD-10-CM

## 2023-05-26 ENCOUNTER — TELEPHONE (OUTPATIENT)
Dept: GASTROENTEROLOGY | Facility: CLINIC | Age: 73
End: 2023-05-26

## 2023-06-05 ENCOUNTER — TELEPHONE (OUTPATIENT)
Dept: HEMATOLOGY ONCOLOGY | Facility: CLINIC | Age: 73
End: 2023-06-05

## 2023-06-05 NOTE — TELEPHONE ENCOUNTER
Called and left voice message to remind patient to have labs collected prior to her appointment tomorrow 6/6 with NP-Linette Daly  Mentioned that the orders are already in the system and that as long as patient goes to [facility] to have labs drawn there is no paper work required

## 2023-06-06 ENCOUNTER — APPOINTMENT (OUTPATIENT)
Dept: LAB | Facility: CLINIC | Age: 73
End: 2023-06-06
Payer: MEDICARE

## 2023-06-06 ENCOUNTER — OFFICE VISIT (OUTPATIENT)
Dept: HEMATOLOGY ONCOLOGY | Facility: CLINIC | Age: 73
End: 2023-06-06
Payer: MEDICARE

## 2023-06-06 VITALS
HEIGHT: 66 IN | OXYGEN SATURATION: 97 % | RESPIRATION RATE: 18 BRPM | TEMPERATURE: 98 F | WEIGHT: 207 LBS | HEART RATE: 70 BPM | BODY MASS INDEX: 33.27 KG/M2 | DIASTOLIC BLOOD PRESSURE: 70 MMHG | SYSTOLIC BLOOD PRESSURE: 120 MMHG

## 2023-06-06 DIAGNOSIS — D50.0 IRON DEFICIENCY ANEMIA DUE TO CHRONIC BLOOD LOSS: Primary | ICD-10-CM

## 2023-06-06 DIAGNOSIS — D64.9 NORMOCYTIC ANEMIA: ICD-10-CM

## 2023-06-06 DIAGNOSIS — D50.0 IRON DEFICIENCY ANEMIA DUE TO CHRONIC BLOOD LOSS: ICD-10-CM

## 2023-06-06 LAB
ALBUMIN SERPL BCP-MCNC: 3.3 G/DL (ref 3.5–5)
ALP SERPL-CCNC: 88 U/L (ref 46–116)
ALT SERPL W P-5'-P-CCNC: 24 U/L (ref 12–78)
ANION GAP SERPL CALCULATED.3IONS-SCNC: 2 MMOL/L (ref 4–13)
AST SERPL W P-5'-P-CCNC: 17 U/L (ref 5–45)
BASOPHILS # BLD AUTO: 0.06 THOUSANDS/ÂΜL (ref 0–0.1)
BASOPHILS NFR BLD AUTO: 1 % (ref 0–1)
BILIRUB SERPL-MCNC: 0.55 MG/DL (ref 0.2–1)
BUN SERPL-MCNC: 18 MG/DL (ref 5–25)
CALCIUM ALBUM COR SERPL-MCNC: 10.1 MG/DL (ref 8.3–10.1)
CALCIUM SERPL-MCNC: 9.5 MG/DL (ref 8.3–10.1)
CHLORIDE SERPL-SCNC: 110 MMOL/L (ref 96–108)
CO2 SERPL-SCNC: 29 MMOL/L (ref 21–32)
CREAT SERPL-MCNC: 1.13 MG/DL (ref 0.6–1.3)
EOSINOPHIL # BLD AUTO: 0.18 THOUSAND/ÂΜL (ref 0–0.61)
EOSINOPHIL NFR BLD AUTO: 3 % (ref 0–6)
ERYTHROCYTE [DISTWIDTH] IN BLOOD BY AUTOMATED COUNT: 13.2 % (ref 11.6–15.1)
FERRITIN SERPL-MCNC: 24 NG/ML (ref 11–307)
GFR SERPL CREATININE-BSD FRML MDRD: 48 ML/MIN/1.73SQ M
GLUCOSE SERPL-MCNC: 136 MG/DL (ref 65–140)
HCT VFR BLD AUTO: 40.1 % (ref 34.8–46.1)
HGB BLD-MCNC: 12.3 G/DL (ref 11.5–15.4)
IMM GRANULOCYTES # BLD AUTO: 0.02 THOUSAND/UL (ref 0–0.2)
IMM GRANULOCYTES NFR BLD AUTO: 0 % (ref 0–2)
IRON SATN MFR SERPL: 18 % (ref 15–50)
IRON SERPL-MCNC: 46 UG/DL (ref 50–170)
LYMPHOCYTES # BLD AUTO: 1.56 THOUSANDS/ÂΜL (ref 0.6–4.47)
LYMPHOCYTES NFR BLD AUTO: 29 % (ref 14–44)
MCH RBC QN AUTO: 30.1 PG (ref 26.8–34.3)
MCHC RBC AUTO-ENTMCNC: 30.7 G/DL (ref 31.4–37.4)
MCV RBC AUTO: 98 FL (ref 82–98)
MONOCYTES # BLD AUTO: 0.65 THOUSAND/ÂΜL (ref 0.17–1.22)
MONOCYTES NFR BLD AUTO: 12 % (ref 4–12)
NEUTROPHILS # BLD AUTO: 2.94 THOUSANDS/ÂΜL (ref 1.85–7.62)
NEUTS SEG NFR BLD AUTO: 55 % (ref 43–75)
NRBC BLD AUTO-RTO: 0 /100 WBCS
PLATELET # BLD AUTO: 160 THOUSANDS/UL (ref 149–390)
PMV BLD AUTO: 10.1 FL (ref 8.9–12.7)
POTASSIUM SERPL-SCNC: 4 MMOL/L (ref 3.5–5.3)
PROT SERPL-MCNC: 7.5 G/DL (ref 6.4–8.4)
RBC # BLD AUTO: 4.09 MILLION/UL (ref 3.81–5.12)
SODIUM SERPL-SCNC: 141 MMOL/L (ref 135–147)
TIBC SERPL-MCNC: 250 UG/DL (ref 250–450)
WBC # BLD AUTO: 5.41 THOUSAND/UL (ref 4.31–10.16)

## 2023-06-06 PROCEDURE — 83550 IRON BINDING TEST: CPT

## 2023-06-06 PROCEDURE — 80053 COMPREHEN METABOLIC PANEL: CPT

## 2023-06-06 PROCEDURE — 36415 COLL VENOUS BLD VENIPUNCTURE: CPT

## 2023-06-06 PROCEDURE — 85025 COMPLETE CBC W/AUTO DIFF WBC: CPT

## 2023-06-06 PROCEDURE — 82728 ASSAY OF FERRITIN: CPT

## 2023-06-06 PROCEDURE — 83540 ASSAY OF IRON: CPT

## 2023-06-06 PROCEDURE — 99213 OFFICE O/P EST LOW 20 MIN: CPT

## 2023-06-06 NOTE — PROGRESS NOTES
[address] [facility] [id number]  Hematology Ambulatory Follow-Up  Samir Norris, 1950, [id number]  6/6/2023    Assessment/Plan:    1  Iron deficiency anemia due to chronic blood loss  2  Normocytic anemia  Ms Charles Hurt is a 77-year-old female seen in follow-up for normocytic anemia  She is found to have a mild iron deficiency with a ferritin of 20 and hemoglobin of 12 1 on initial work-up back in February 2023  Overall she has no complaints or concerns and is feeling well  Her iron deficiency anemia is likely secondary to chronic blood loss from regular blood donation  She has not donated in quite some time  Most recent hemoglobin as of April was normal at 13 1  Unfortunately labs today are still in process and there is no updated iron panel to be reviewed  She continues to take a multivitamin with iron once daily  We discussed that if she continues with mild iron deficiency we can add additional oral iron supplementation and be monitored by her primary care and there is no ongoing need for hematology follow-up as her initial work-up was negative  I will contact her if her labs require intervention otherwise she will continue to follow with her primary care doctor regularly and call my office if needed in the future  Patient voiced agreement and understanding to the above  Patient knows to call the Hematology/Oncology office with any questions and concerns regarding the above  Barrier(s) to care: None  The patient is able to self care   ------------------------------------------------------------------------------------------------------    Chief Complaint   Patient presents with   • Follow-up       History of present illness:   Allen Griffith is a 77-year-old female seen in follow-up for iron deficiency anemia    She has past medical history of GERD, hypothyroidism, IRINA, hypertension, osteopenia, hyperlipidemia, status post mitral valve repair, and history of smoking  She previously donated blood regularly for the last 3 years and was declined due to a hemoglobin of 10 5 with a ferritin of 7  She has been taking a women's multivitamin with oral iron  She recently was denied more frequently due to not meeting iron parameters  She denies ever being told that she or a family member is diagnosed with thalassemia  She denies history of gastric bypass or colon resction  Her last colonoscopy and EGD was 12/2022 which found small internal hemorrhoids, and diverticula in the sigmoid colon  No overt sources of blood loss  Initial work-up was negative for hemolysis, SPEP, reticulocyte, B12, and folate deficiency  She was found to have mild iron deficiency with a ferritin of 20  Also on initial work-up her hemoglobin had returned to baseline at 12 1     7/11/2016: Ferritin 12, serum iron 44  12/31/2018: Hemoglobin 12 5, MCV 94, platelets 184, WBC 5 14              Ferritin 37, iron saturation 34%, TIBC 252, serum iron 86  12/14/2022: Hemoglobin 10 5, MCV 91, platelets 905, WBC 3 26              Ferritin 7, iron saturation 20%, TIBC 292, serum iron 57  2/2/2023: Hemoglobin 12 1, MCV 94, platelets 987, WBC 6 60   Ferritin 20, iron saturation 20%, TIBC 245, serum iron 83  6/6/2023: PENDING    Interval history: He continues to take a multivitamin daily with iron  She does have chronic constipation but nothing out of the normal   She has not donated blood in quite some time  She overall is feeling well with no complaints or concerns today  Review of Systems   Constitutional: Negative for activity change, appetite change, diaphoresis, fatigue and fever  HENT: Negative for trouble swallowing and voice change  Eyes: Negative for photophobia and visual disturbance  Respiratory: Negative for cough, chest tightness and shortness of breath  Cardiovascular: Negative for chest pain, palpitations and leg swelling  Gastrointestinal: Negative for abdominal distention, abdominal pain, blood in stool, constipation, diarrhea, nausea and vomiting  Endocrine: Negative for cold intolerance and heat intolerance  Genitourinary: Negative for dysuria, hematuria and urgency  Musculoskeletal: Negative for arthralgias, back pain, gait problem, joint swelling and myalgias  Skin: Negative for pallor and rash  Neurological: Negative for dizziness, weakness, light-headedness, numbness and headaches  Hematological: Negative for adenopathy  Does not bruise/bleed easily  Psychiatric/Behavioral: Negative for confusion and sleep disturbance         Patient Active Problem List   Diagnosis   • Benign essential hypertension   • Disc degeneration, lumbar   • Dysphagia   • Hyperlipidemia   • Hypothyroidism   • Iron deficiency anemia   • Liver enlargement   • Osteopenia of multiple sites   • Severe obstructive sleep apnea   • Shoulder impingement   • Tinea corporis   • Lumbar radiculopathy   • S/P MVR (mitral valve repair)   • Double vision with both eyes open   • Encounter for screening mammogram for malignant neoplasm of breast   • History of esophageal dilatation   • Chronic GERD   • History of stroke   • Pigmented skin lesions   • Polyp of colon   • Hx of colonic polyps   • Other hemorrhoids   • Normocytic anemia   • Varicose veins of both lower extremities with pain       Past Medical History:   Diagnosis Date   • Arthritis     in spine   • Colon polyp    • Constipation 11/02/2021   • CPAP (continuous positive airway pressure) dependence    • Cystic breast    • Disease of thyroid gland     hypothyroid   • Dyspareunia, female     last assessed 9/4/14   • Esophageal obstruction due to food impaction 11/03/2021 September of 2021   • Esophageal ulcer    • Food impaction of esophagus    • GERD (gastroesophageal reflux disease)    • Hepatic cyst 09/21/2015   • Hyperlipidemia    • Hypertension    • Hypothyroidism    • Insomnia    • Iron deficiency anemia    • Liver cyst     drained   • Obesity    • IRINA on CPAP     uses cpap   • Osteoporosis    • Severe mitral regurgitation    • Stroke [facility])    • Thrombocytopenia ([address] ) 01/08/2019   • Urinary tract infection    • Varicose veins of lower extremity     last assessed 1/4/13       Past Surgical History:   Procedure Laterality Date   • COLONOSCOPY      complete 5/2016 - Dr Alexa Pizarro due in 2019 - last assessed  3/17/17   • HERNIA REPAIR     • HIATAL HERNIA REPAIR     • LIVER BIOPSY LAPAROSCOPIC N/A 5/7/2018    Procedure: Laparoscopic marsupialization of liver cyst;  Surgeon: Syed Lieberman MD;  [facility];  Service: Surgical Oncology   • NEUROMA EXCISION     • WI ECHO [address] N/A 1/7/2019    Procedure: TRANSESOPHAGEAL ECHOCARDIOGRAM (DORI); Surgeon: Kala Teague MD;  Location[facility];  Service: Cardiac Surgery   • WI ESOPHAGOGASTRODUODENOSCOPY TRANSORAL DIAGNOSTIC N/A 8/10/2016    Procedure: ESOPHAGOGASTRODUODENOSCOPY (EGD); Surgeon: Kirill Castro MD;  Location: [facility]; Service: Gastroenterology   • WI ESOPHAGOSCOPY FLEXIBLE TRANSORAL WITH BIOPSY N/A 11/3/2016    Procedure: ESOPHAGOGASTRODUODENOSCOPY (EGD);   Surgeon: Sandra Eckert MD;  [facility];  Service: Thoracic   • WI LAPS REPAIR HERNIA EXCEPT INCAL/INGUN REDUCIBLE N/A 10/30/2017    Procedure: LAPAROSCOPIC INCISIONAL HERNIA REPAIR X 2 WITH ECHO MESH;  Surgeon: Colton Duenas DO;  [facility];  Service: General   • WI LAPS RPR PARAESPHGL HRNA INCL FUNDPLSTY W/MESH Left 11/3/2016    Procedure: LAPAROSCOPIC PARAESOPHAGEAL HERNIA REPAIR WITH MESH;NISSEN FUNDOPLICATION ;  Surgeon: Sandra Eckert MD;  [facility];  Service: Thoracic   • WI REPAIR FIRST ABDOMINAL WALL HERNIA N/A 1/13/2017    Procedure: INCISIONAL HERNIA REPAIR ;  Surgeon: Colton Duenas DO;  [facility];  Service: General   • WI REPLACEMENT MITRAL VALVE W/CARDIOPULMONARY BYP N/A 1/7/2019 Procedure: REPLACEMENT VALVE MITRAL (MVR), repair with 30mm CG Future ring;  Surgeon: Ed Herbert MD;  [facility];  Service: Cardiac Surgery   • TUBAL LIGATION         Family History   Problem Relation Age of Onset   • Heart disease Mother    • Aneurysm Mother         cerebral   • Alcohol abuse Father    • Cancer Father    • COPD Father    • Heart failure Father    • Hypertension Father    • Tuberculosis Father    • Cancer Family    • Breast cancer Paternal [de-identified]          of natural causes at 80years old   • No Known Problems Sister    • No Known Problems Daughter    • No Known Problems Maternal Grandmother    • No Known Problems Maternal Grandfather    • No Known Problems Paternal Grandfather    • No Known Problems Son        Social History     Socioeconomic History   • Marital status: /Civil Union     Spouse name: Not on file   • Number of children: Not on file   • Years of education: Not on file   • Highest education level: Not on file   Occupational History   • Not on file   Tobacco Use   • Smoking status: Never   • Smokeless tobacco: Never   Vaping Use   • Vaping Use: Never used   Substance and Sexual Activity   • Alcohol use: Yes     Alcohol/week: 3 0 standard drinks of alcohol     Types: 3 Glasses of wine per week     Comment: social   • Drug use: No   • Sexual activity: Yes     Partners: Male     Birth control/protection: Post-menopausal   Other Topics Concern   • Not on file   Social History Narrative    Coffee    Exercise - walking     Social Determinants of Health     Financial Resource Strain: Low Risk  (10/17/2022)    Overall Financial Resource Strain (CARDIA)    • Difficulty of Paying Living Expenses: Not very hard   Food Insecurity: Not on file   Transportation Needs: No Transportation Needs (10/17/2022)    PRAPARE - Transportation    • Lack of Transportation (Medical): No    • Lack of Transportation (Non-Medical):  No   Physical Activity: Not on file   Stress: Not on "file   Social Connections: Not on file   Intimate Partner Violence: Not on file   Housing Stability: Not on file         Current Outpatient Medications:   •  amLODIPine (NORVASC) 5 mg tablet, TAKE 1 TABLET (5 MG TOTAL) BY MOUTH DAILY, Disp: 90 tablet, Rfl: 3  •  Ascorbic Acid (VITAMIN C ER PO), Take 1 capsule by mouth daily, Disp: , Rfl:   •  Ascorbic Acid (vitamin C) 100 MG tablet, Take 100 mg by mouth daily, Disp: , Rfl:   •  aspirin 81 MG tablet, Take 1 tablet (81 mg total) by mouth daily, Disp: 30 tablet, Rfl: 11  •  atorvastatin (LIPITOR) 20 mg tablet, Take 1 tablet (20 mg total) by mouth daily, Disp: 30 tablet, Rfl: 5  •  Cholecalciferol (VITAMIN D3) 2000 UNITS TABS, Take 1 tablet by mouth daily  , Disp: , Rfl:   •  estradiol (ESTRACE) 0 1 mg/g vaginal cream, Insert into the vagina, Disp: , Rfl:   •  fluocinonide (LIDEX) 0 05 % cream, Apply topically 2 (two) times a day, Disp: 60 g, Rfl: 2  •  fluticasone (FLONASE) 50 mcg/act nasal spray, 2 sprays into each nostril daily, Disp: 16 g, Rfl: 3  •  hydrochlorothiazide (MICROZIDE) 12 5 mg capsule, TAKE 1 TO 2 CAPSULES DAILY WITH AMLODIPINE, Disp: 60 capsule, Rfl: 5  •  levothyroxine 125 mcg tablet, TAKE 1 TABLET (125 MCG TOTAL) BY MOUTH DAILY, Disp: 30 tablet, Rfl: 5  •  Multiple Vitamins-Minerals (CENTRUM PO), Take by mouth, Disp: , Rfl:   •  Omega-3 Fatty Acids (FISH OIL) 1200 MG CAPS, Take 1 capsule by mouth daily, Disp: , Rfl:   •  omeprazole (PriLOSEC) 40 MG capsule, TAKE 1 CAPSULE (40 MG TOTAL) BY MOUTH DAILY, Disp: 30 capsule, Rfl: 2  •  valACYclovir (VALTREX) 1,000 mg tablet, Take 1 tablet (1,000 mg total) by mouth 3 (three) times a day for 7 days, Disp: 21 tablet, Rfl: 0    Allergies   Allergen Reactions   • Other Other (See Comments)     Skin breakdown from bandaid on for long time- reddness       Objective:  /70 (BP Location: Left arm, Patient Position: Sitting, Cuff Size: Adult)   Pulse 70   Temp 98 °F (36 7 °C)   Resp 18   Ht 5' 6\" (1 676 m)  " Wt 93 9 kg (207 lb)   LMP  (LMP Unknown)   SpO2 97%   BMI 33 41 kg/m²    Physical Exam  Constitutional:       General: She is not in acute distress  Appearance: Normal appearance  She is not ill-appearing  HENT:      Head: Normocephalic and atraumatic  Eyes:      Extraocular Movements: Extraocular movements intact  Conjunctiva/sclera: Conjunctivae normal       Pupils: Pupils are equal, round, and reactive to light  Cardiovascular:      Rate and Rhythm: Normal rate and regular rhythm  Pulses: Normal pulses  Heart sounds: Normal heart sounds  No murmur heard  Pulmonary:      Effort: Pulmonary effort is normal  No respiratory distress  Breath sounds: Normal breath sounds  Abdominal:      General: Abdomen is flat  Bowel sounds are normal  There is no distension  Palpations: Abdomen is soft  Tenderness: There is no abdominal tenderness  Musculoskeletal:         General: Normal range of motion  Cervical back: Normal range of motion  No tenderness  Right lower leg: No edema  Left lower leg: No edema  Lymphadenopathy:      Cervical: No cervical adenopathy  Skin:     General: Skin is warm and dry  Capillary Refill: Capillary refill takes less than 2 seconds  Coloration: Skin is not jaundiced or pale  Neurological:      General: No focal deficit present  Mental Status: She is alert and oriented to person, place, and time  Mental status is at baseline  Motor: No weakness  Gait: Gait normal    Psychiatric:         Mood and Affect: Mood normal          Behavior: Behavior normal          Thought Content:  Thought content normal          Judgment: Judgment normal          Result Review  Labs:  Appointment on 06/06/2023   Component Date Value Ref Range Status   • WBC 06/06/2023 5 41  4 31 - 10 16 Thousand/uL Final   • RBC 06/06/2023 4 09  3 81 - 5 12 Million/uL Final   • Hemoglobin 06/06/2023 12 3  11 5 - 15 4 g/dL Final   • Hematocrit 06/06/2023 40 1  34 8 - 46 1 % Final   • MCV 06/06/2023 98  82 - 98 fL Final   • MCH 06/06/2023 30 1  26 8 - 34 3 pg Final   • MCHC 06/06/2023 30 7 (L)  31 4 - 37 4 g/dL Final   • RDW 06/06/2023 13 2  11 6 - 15 1 % Final   • MPV 06/06/2023 10 1  8 9 - 12 7 fL Final   • Platelets 66/96/1005 160  149 - 390 Thousands/uL Final   • nRBC 06/06/2023 0  /100 WBCs Final   • Neutrophils Relative 06/06/2023 55  43 - 75 % Final   • Immat GRANS % 06/06/2023 0  0 - 2 % Final   • Lymphocytes Relative 06/06/2023 29  14 - 44 % Final   • Monocytes Relative 06/06/2023 12  4 - 12 % Final   • Eosinophils Relative 06/06/2023 3  0 - 6 % Final   • Basophils Relative 06/06/2023 1  0 - 1 % Final   • Neutrophils Absolute 06/06/2023 2 94  1 85 - 7 62 Thousands/µL Final   • Immature Grans Absolute 06/06/2023 0 02  0 00 - 0 20 Thousand/uL Final   • Lymphocytes Absolute 06/06/2023 1 56  0 60 - 4 47 Thousands/µL Final   • Monocytes Absolute 06/06/2023 0 65  0 17 - 1 22 Thousand/µL Final   • Eosinophils Absolute 06/06/2023 0 18  0 00 - 0 61 Thousand/µL Final   • Basophils Absolute 06/06/2023 0 06  0 00 - 0 10 Thousands/µL Final       Imaging:   No relevant imaging to review     Please note: This report has been generated by a voice recognition software system  Therefore there may be syntax, spelling, and/or grammatical errors  Please call if you have any questions

## 2023-06-09 DIAGNOSIS — T18.128A ESOPHAGEAL OBSTRUCTION DUE TO FOOD IMPACTION: ICD-10-CM

## 2023-06-09 DIAGNOSIS — K22.2 ESOPHAGEAL OBSTRUCTION DUE TO FOOD IMPACTION: ICD-10-CM

## 2023-06-09 DIAGNOSIS — K22.10 ULCERATIVE ESOPHAGITIS: ICD-10-CM

## 2023-06-09 RX ORDER — OMEPRAZOLE 40 MG/1
40 CAPSULE, DELAYED RELEASE ORAL DAILY
Qty: 30 CAPSULE | Refills: 2 | Status: SHIPPED | OUTPATIENT
Start: 2023-06-09

## 2023-06-13 ENCOUNTER — TELEPHONE (OUTPATIENT)
Dept: HEMATOLOGY ONCOLOGY | Facility: CLINIC | Age: 73
End: 2023-06-13

## 2023-06-13 NOTE — TELEPHONE ENCOUNTER
----- Message from [facility] sent at 6/13/2023  8:13 AM EDT -----  Patient did not read MyChart message  Please call them and let them know my recommendations  Thanks      ----- Message -----  From: PLACIDO Daniels  Sent: 6/6/2023   2:37 PM EDT  To: PLACIDO Daniels    Sandy,   Your iron is still low  Please start the oral iron we discussed at your appointment today  It is called Slow Fe and I know you can buy it at [facility] and [facility]  Continue to follow with your PCP regarding your iron deficiency  [address] [place] [address]    Phoned patient to inform of Jose CUMMINS recommendations as above  Patient aware and agreeable    No additional questions at this time

## 2023-08-01 ENCOUNTER — HOSPITAL ENCOUNTER (OUTPATIENT)
Dept: RADIOLOGY | Age: 73
Discharge: HOME | End: 2023-08-01
Payer: MEDICARE

## 2023-08-01 VITALS — BODY MASS INDEX: 33.27 KG/M2 | WEIGHT: 207.01 LBS | HEIGHT: 66 IN

## 2023-08-01 DIAGNOSIS — Z12.31 ENCOUNTER FOR SCREENING MAMMOGRAM FOR MALIGNANT NEOPLASM OF BREAST: ICD-10-CM

## 2023-08-01 PROCEDURE — 77063 BREAST TOMOSYNTHESIS BI: CPT

## 2023-08-01 PROCEDURE — 77067 SCR MAMMO BI INCL CAD: CPT

## 2023-08-28 DIAGNOSIS — I10 BENIGN ESSENTIAL HYPERTENSION: ICD-10-CM

## 2023-08-28 RX ORDER — AMLODIPINE BESYLATE 5 MG/1
5 TABLET ORAL DAILY
Qty: 90 TABLET | Refills: 3 | Status: SHIPPED | OUTPATIENT
Start: 2023-08-28

## 2023-08-29 ENCOUNTER — OFFICE VISIT (OUTPATIENT)
Dept: GASTROENTEROLOGY | Facility: CLINIC | Age: 73
End: 2023-08-29
Payer: MEDICARE

## 2023-08-29 VITALS
WEIGHT: 212 LBS | HEIGHT: 66 IN | BODY MASS INDEX: 34.07 KG/M2 | DIASTOLIC BLOOD PRESSURE: 83 MMHG | SYSTOLIC BLOOD PRESSURE: 113 MMHG | HEART RATE: 69 BPM

## 2023-08-29 DIAGNOSIS — D50.9 IRON DEFICIENCY ANEMIA, UNSPECIFIED IRON DEFICIENCY ANEMIA TYPE: ICD-10-CM

## 2023-08-29 DIAGNOSIS — K21.9 CHRONIC GERD: Primary | ICD-10-CM

## 2023-08-29 DIAGNOSIS — R13.10 DYSPHAGIA, UNSPECIFIED TYPE: ICD-10-CM

## 2023-08-29 DIAGNOSIS — K22.2 ESOPHAGEAL OBSTRUCTION DUE TO FOOD IMPACTION: ICD-10-CM

## 2023-08-29 DIAGNOSIS — T18.128A ESOPHAGEAL OBSTRUCTION DUE TO FOOD IMPACTION: ICD-10-CM

## 2023-08-29 DIAGNOSIS — Z86.010 HX OF COLONIC POLYPS: ICD-10-CM

## 2023-08-29 DIAGNOSIS — Z12.11 COLON CANCER SCREENING: ICD-10-CM

## 2023-08-29 PROCEDURE — 99214 OFFICE O/P EST MOD 30 MIN: CPT | Performed by: NURSE PRACTITIONER

## 2023-08-29 RX ORDER — OMEPRAZOLE 40 MG/1
40 CAPSULE, DELAYED RELEASE ORAL DAILY
Qty: 30 CAPSULE | Refills: 5 | Status: SHIPPED | OUTPATIENT
Start: 2023-08-29 | End: 2023-09-28

## 2023-08-29 NOTE — PROGRESS NOTES
[facility] Outpatient Follow-up Note  Lorena Grover 68 y.o. female MRN: [id number]  Encounter: [id number]          ASSESSMENT AND PLAN:      1. History esophageal obstruction due to food impaction  2. Chronic GERD  3. Dysphagia, unspecified type  Patient has history of chronic GERD, dysphagia, and esophageal dilation secondary to food impaction. Patient denies any current upper GI symptoms. Patient denies nausea, vomiting, acid reflux, heartburn, or dysphagia.  -Eat slow, chew food well, alternate liquids with solids.  -Continue to follow antireflux diet and measures  - omeprazole (PriLOSEC) 40 MG capsule; Take 1 capsule (40 mg total) by mouth daily  Dispense: 30 capsule; Refill: 5    4. Hx of colonic polyps  5. Colon cancer screening  Patient has history of colon polyps. Colonoscopy done 12/16/2022 showed history of colon polyps. 2 polyps removed at that time, sigmoid diverticulosis and internal hemorrhoids. Patient denies any blood in stool, blood from rectal area, or black tarry stool. Patient denies lower abdominal pain.  -Surveillance colonoscopy due 12/2027    6. Iron deficiency anemia, unspecified iron deficiency anemia type  EGD done 12/16/2022 showed evidence of prior Drew fundoplication, mild antritis, history of food impaction and dilation over a year ago. Colonoscopy done 12/16/2022 showed  history of polyps. 2 polyps removed today, sigmoid diverticulosis, and internal hemorrhoids. Biopsy showed 1 hyperplastic polyp and 1 tubular adenoma. Biopsy showed mild chronic inflammation at GE junction patient denies any blood in stool, blood from rectal area, or black tarry stool. Iron studies done 02/02/2023 showed iron saturation 34, TIBC 245, iron 83, and ferritin 20 and increased from previous labs done 12/2022. Hemoglobin 12.3 on 06/06/2023.    -Follows with Hematologist Dr. Farheen Gallagher. -Iron supplements per Hematology.     Follow up in 6 months  ______________________________________________________________________    SUBJECTIVE: This is a 58-year-old female who presents to office for follow-up. Patient denies any current GI issues. Patient denies nausea, vomiting, acid reflux, heartburn, dysphagia, epigastric or abdominal pain. Patient denies blood in stool, blood from rectal area, or black tarry stool. Bowel patterns are regular. Abdominal exam benign. EGD done 12/16/2022 showed evidence of prior Drew fundoplication, mild antritis, history of food impaction and dilation over a year ago. Colonoscopy done 12/16/2022 showed  history of polyps. 2 polyps removed today, sigmoid diverticulosis, and internal hemorrhoids. Biopsy showed 1 hyperplastic polyp and 1 tubular adenoma. Biopsy showed mild chronic inflammation at GE junction. Colonoscopy done 12/16/2022 showed  history of polyps. 2 polyps removed today, sigmoid diverticulosis, and internal hemorrhoids. Biopsy showed 1 hyperplastic polyp and 1 tubular adenoma. Biopsy showed mild chronic inflammation at GE junction patient denies any blood in stool, blood from rectal area, or black tarry stool. Iron studies done 02/02/2023 showed iron saturation 34, TIBC 245, iron 83, and ferritin 20 and increased from previous labs done 12/2022. Hemoglobin 12.3 on 06/06/2023. Patient does not smoke. Patient drinks alcohol socially. Denies illicit drug use. No family history of gastric or colon cancer. REVIEW OF SYSTEMS IS OTHERWISE NEGATIVE.       Historical Information   Past Medical History:   Diagnosis Date   • Arthritis     in spine   • Colon polyp    • Constipation 11/02/2021   • CPAP (continuous positive airway pressure) dependence    • Cystic breast    • Disease of thyroid gland     hypothyroid   • Dyspareunia, female     last assessed 9/4/14   • Esophageal obstruction due to food impaction 11/03/2021 September of 2021   • Esophageal ulcer    • Food impaction of esophagus    • GERD (gastroesophageal reflux disease)    • Hepatic cyst 09/21/2015   • Hyperlipidemia    • Hypertension    • Hypothyroidism    • Insomnia    • Iron deficiency anemia    • Liver cyst     drained   • Obesity    • IRINA on CPAP     uses cpap   • Osteoporosis    • Severe mitral regurgitation    • Stroke [facility])    • Thrombocytopenia ([address]) 01/08/2019   • Urinary tract infection    • Varicose veins of lower extremity     last assessed 1/4/13     Past Surgical History:   Procedure Laterality Date   • COLONOSCOPY      complete 5/2016 - Dr Mamie Macdonald due in 2019 - last assessed  3/17/17   • HERNIA REPAIR     • HIATAL HERNIA REPAIR     • LIVER BIOPSY LAPAROSCOPIC N/A 5/7/2018    Procedure: Laparoscopic marsupialization of liver cyst;  Surgeon: Benita Sherwood MD;  Location: [facility];  Service: Surgical Oncology   • NEUROMA EXCISION     • OH ECHO [address] [place] [address] N/A 1/7/2019    Procedure: TRANSESOPHAGEAL ECHOCARDIOGRAM (DORI); Surgeon: Yuliana Kamara MD;  Location: [facility];  Service: Cardiac Surgery   • OH ESOPHAGOGASTRODUODENOSCOPY TRANSORAL DIAGNOSTIC N/A 8/10/2016    Procedure: ESOPHAGOGASTRODUODENOSCOPY (EGD); Surgeon: Noe Lalwer MD;  Location: [facility]; Service: Gastroenterology   • OH ESOPHAGOSCOPY FLEXIBLE TRANSORAL WITH BIOPSY N/A 11/3/2016    Procedure: ESOPHAGOGASTRODUODENOSCOPY (EGD);   Surgeon: Joel Rausch MD;  Location[facility];  Service: Thoracic   • OH LAPS REPAIR HERNIA EXCEPT INCAL/INGUN REDUCIBLE N/A 10/30/2017    Procedure: LAPAROSCOPIC INCISIONAL HERNIA REPAIR X 2 WITH ECHO MESH;  Surgeon: Enedina Odom DO;  Location[facility];  Service: General   • OH LAPS RPR PARAESPHGL HRNA INCL FUNDPLSTY W/MESH Left 11/3/2016    Procedure: LAPAROSCOPIC PARAESOPHAGEAL HERNIA REPAIR WITH MESH;NISSEN FUNDOPLICATION ;  Surgeon: Joel Rausch MD;  Location: [facility];  Service: Thoracic   • OH REPAIR FIRST ABDOMINAL WALL HERNIA N/A 1/13/2017    Procedure: INCISIONAL HERNIA REPAIR ; Surgeon: Karlo Jeff DO;  Location: [facility];  Service: General   • NJ REPLACEMENT MITRAL VALVE W/CARDIOPULMONARY BYP N/A 2019    Procedure: REPLACEMENT VALVE MITRAL (MVR), repair with 30mm CG Future ring;  Surgeon: Teagan Simpson MD;  Location: [facility];  Service: Cardiac Surgery   • TUBAL LIGATION       Social History   Social History     Substance and Sexual Activity   Alcohol Use Yes   • Alcohol/week: 3.0 standard drinks of alcohol   • Types: 3 Glasses of wine per week    Comment: social     Social History     Substance and Sexual Activity   Drug Use No     Social History     Tobacco Use   Smoking Status Never   Smokeless Tobacco Never     Family History   Problem Relation Age of Onset   • Heart disease Mother    • Aneurysm Mother         cerebral   • Alcohol abuse Father    • Cancer Father    • COPD Father    • Heart failure Father    • Hypertension Father    • Tuberculosis Father    • Cancer Family    • Breast cancer Paternal Grandmother          of natural causes at 80years old   • No Known Problems Sister    • No Known Problems Daughter    • No Known Problems Maternal Grandmother    • No Known Problems Maternal Grandfather    • No Known Problems Paternal Grandfather    • No Known Problems Son        Meds/Allergies       Current Outpatient Medications:   •  amLODIPine (NORVASC) 5 mg tablet  •  Ascorbic Acid (VITAMIN C ER PO)  •  Ascorbic Acid (vitamin C) 100 MG tablet  •  aspirin 81 MG tablet  •  atorvastatin (LIPITOR) 20 mg tablet  •  Cholecalciferol (VITAMIN D3) 2000 UNITS TABS  •  estradiol (ESTRACE) 0.1 mg/g vaginal cream  •  Ferrous Gluconate-C-Folic Acid (IRON-C PO)  •  fluocinonide (LIDEX) 0.05 % cream  •  fluticasone (FLONASE) 50 mcg/act nasal spray  •  hydrochlorothiazide (MICROZIDE) 12.5 mg capsule  •  levothyroxine 125 mcg tablet  •  Multiple Vitamins-Minerals (CENTRUM PO)  •  Omega-3 Fatty Acids (FISH OIL) 1200 MG CAPS  •  omeprazole (PriLOSEC) 40 MG capsule    Allergies Allergen Reactions   • Other Other (See Comments)     Skin breakdown from bandaid on for long time- reddness           Objective     Blood pressure 113/83, pulse 69, height 5' 6" (1.676 m), weight 96.2 kg (212 lb), not currently breastfeeding. Body mass index is 34.22 kg/m². PHYSICAL EXAM:      General Appearance:   Alert, cooperative, no distress   HEENT:   Normocephalic, atraumatic, anicteric.     Neck:  Supple, symmetrical, trachea midline   Lungs:   Clear to auscultation bilaterally; no rales, rhonchi or wheezing; respirations unlabored    Heart[de-identified]   Regular rate and rhythm; no murmur, rub, or gallop. Abdomen:   Soft, non-tender, non-distended; normal bowel sounds; no masses, no organomegaly    Genitalia:   Deferred    Rectal:   Deferred    Extremities:  No cyanosis, clubbing or edema    Pulses:  2+ and symmetric    Skin:  No jaundice, rashes, or lesions    Lymph nodes:  No palpable cervical lymphadenopathy        Lab Results:   No visits with results within 1 Day(s) from this visit.    Latest known visit with results is:   Appointment on 06/06/2023   Component Date Value   • WBC 06/06/2023 5.41    • RBC 06/06/2023 4.09    • Hemoglobin 06/06/2023 12.3    • Hematocrit 06/06/2023 40.1    • MCV 06/06/2023 98    • MCH 06/06/2023 30.1    • MCHC 06/06/2023 30.7 (L)    • RDW 06/06/2023 13.2    • MPV 06/06/2023 10.1    • Platelets 74/44/6378 160    • nRBC 06/06/2023 0    • Neutrophils Relative 06/06/2023 55    • Immat GRANS % 06/06/2023 0    • Lymphocytes Relative 06/06/2023 29    • Monocytes Relative 06/06/2023 12    • Eosinophils Relative 06/06/2023 3    • Basophils Relative 06/06/2023 1    • Neutrophils Absolute 06/06/2023 2.94    • Immature Grans Absolute 06/06/2023 0.02    • Lymphocytes Absolute 06/06/2023 1.56    • Monocytes Absolute 06/06/2023 0.65    • Eosinophils Absolute 06/06/2023 0.18    • Basophils Absolute 06/06/2023 0.06    • Sodium 06/06/2023 141    • Potassium 06/06/2023 4.0    • Chloride 06/06/2023 110 (H)    • CO2 06/06/2023 29    • ANION GAP 06/06/2023 2 (L)    • BUN 06/06/2023 18    • Creatinine 06/06/2023 1.13    • Glucose 06/06/2023 136    • Calcium 06/06/2023 9.5    • Corrected Calcium 06/06/2023 10.1    • AST 06/06/2023 17    • ALT 06/06/2023 24    • Alkaline Phosphatase 06/06/2023 88    • Total Protein 06/06/2023 7.5    • Albumin 06/06/2023 3.3 (L)    • Total Bilirubin 06/06/2023 0.55    • eGFR 06/06/2023 48    • Iron Saturation 06/06/2023 18    • TIBC 06/06/2023 250    • Iron 06/06/2023 46 (L)    • Ferritin 06/06/2023 24          Radiology Results:   Mammo screening bilateral w 3d & cad    Result Date: 8/2/2023  Narrative: DIAGNOSIS: Encounter for screening mammogram for malignant neoplasm of breast TECHNIQUE: Digital screening mammography was performed. Computer Aided Detection (CAD) analyzed all applicable images. COMPARISONS: Prior breast imaging dated: 06/07/2022, 06/01/2021, 12/11/2019, 11/20/2018, 10/18/2017, 08/05/2016, 07/31/2015, and 07/25/2014 RELEVANT HISTORY: Family Breast Cancer History: History of breast cancer in Paternal Grandmother. Family Medical History: Family medical history includes breast cancer in paternal grandmother. Personal History: Hormone history includes estrogen replacement therapy and birth control. No known relevant surgical history. Medical history includes fibrocystic breast. The patient is scheduled in a reminder system for screening mammography. 8-10% of cancers will be missed on mammography. Management of a palpable abnormality must be based on clinical grounds. Patients will be notified of their results via letter from our facility. Accredited by [employer] of [employer] and FDA. RISK ASSESSMENT: 5 Year Tyrer-Cuzick: 3.11 % 10 Year Tyrer-Cuzick: 6.54 % Lifetime Tyrer-Cuzick: 8.72 % TISSUE DENSITY: There are scattered areas of fibroglandular density. INDICATION: Lula Cavazos is a 67 y.o. female presenting for screening mammography.  FINDINGS: There are no suspicious masses, grouped microcalcifications or areas of architectural distortion. The skin and nipple areolar complex are unremarkable. Impression: No mammographic evidence of malignancy. No significant change ASSESSMENT/BI-RADS CATEGORY: Left: 1 - Negative Right: 1 - Negative Overall: 1 - Negative RECOMMENDATION:      - Routine screening mammogram in 1 year for both breasts.  Workstation ID: [id number]

## 2023-08-29 NOTE — PATIENT INSTRUCTIONS
Continue antireflux  and measures-limit coffee 1-2 8 ounce cups a day, avoid chocolate, caffeine, carbonated beverages, tomato-based products and fried spicy food. Do not lay down for 1 hour after eating and try not to eat 3 hours before you go to bed and sleep with your head of bed elevated are on multiple pillows. Eat slow, chew thoroughly and alternate solids with liquids.

## 2023-09-04 NOTE — PROGRESS NOTES
Assessment/Plan:    {There are no diagnoses linked to this encounter. (Refresh or delete this SmartLink)}      Subjective:      Patient ID: Nazario Stephens is a 68 y.o. female.     HPI        [place] Sleepiness Scale                                 Review of Systems      Objective:      LMP  (LMP Unknown)          Physical Exam

## 2023-09-05 ENCOUNTER — OFFICE VISIT (OUTPATIENT)
Dept: SLEEP CENTER | Facility: CLINIC | Age: 73
End: 2023-09-05
Payer: MEDICARE

## 2023-09-05 ENCOUNTER — APPOINTMENT (OUTPATIENT)
Dept: LAB | Facility: CLINIC | Age: 73
End: 2023-09-05
Payer: MEDICARE

## 2023-09-05 VITALS
BODY MASS INDEX: 34.07 KG/M2 | OXYGEN SATURATION: 96 % | WEIGHT: 212 LBS | HEIGHT: 66 IN | SYSTOLIC BLOOD PRESSURE: 120 MMHG | HEART RATE: 60 BPM | DIASTOLIC BLOOD PRESSURE: 74 MMHG

## 2023-09-05 DIAGNOSIS — G47.33 SEVERE OBSTRUCTIVE SLEEP APNEA: Primary | ICD-10-CM

## 2023-09-05 DIAGNOSIS — E61.1 IRON DEFICIENCY: ICD-10-CM

## 2023-09-05 LAB
FERRITIN SERPL-MCNC: 27 NG/ML (ref 11–307)
IRON SATN MFR SERPL: 28 % (ref 15–50)
IRON SERPL-MCNC: 68 UG/DL (ref 50–212)
TIBC SERPL-MCNC: 241 UG/DL (ref 250–450)
UIBC SERPL-MCNC: 173 UG/DL (ref 155–355)

## 2023-09-05 PROCEDURE — 82728 ASSAY OF FERRITIN: CPT

## 2023-09-05 PROCEDURE — 83550 IRON BINDING TEST: CPT

## 2023-09-05 PROCEDURE — 83540 ASSAY OF IRON: CPT

## 2023-09-05 PROCEDURE — 99203 OFFICE O/P NEW LOW 30 MIN: CPT | Performed by: PSYCHIATRY & NEUROLOGY

## 2023-09-05 PROCEDURE — 36415 COLL VENOUS BLD VENIPUNCTURE: CPT

## 2023-09-05 NOTE — PATIENT INSTRUCTIONS
You are doing great with use of your machine. .  On your prior diagnostic sleep study you had 72 times per hour of sleep apnea events. With your machine, you have 2 times per hour of sleep apnea episodes. The goal is to use CPAP all night long, more usage= more health benefit and more symptomatic improvement. If any barriers or problems arise that lead to difficulty using your machine, please let me know either through a [employer] message or alternatively, by calling my office. It is very important to avoid driving while drowsy, this can be very dangerous or even cause serious injury or death. If sleepy, it is not safe to get behind the wheel. If you are driving and feels sleepy, it is very important to pull over right away. Even losing control of the car for a split second can be deadly. If you feel you cannot control when sleepiness occurs and cannot prevented, it is important to not drive at all until this improves. Please let me know if you experience this as it is very important. Nursing Support:  When: Monday through Friday 7A-5PM except holidays  Where: Our direct line is [phone]. If you are having a true emergency please call 911. In the event that the line is busy or it is after hours please leave a voice message and we will return your call. Please speak clearly, leaving your full name, birth date, best number to reach you and the reason for your call. Medication refills: We will need the name of the medication, the dosage, the ordering provider, whether you get a 30 or 90 day refill, and the pharmacy name and address. Medications will be ordered by the provider only. Nurses cannot call in prescriptions. Please allow 7 days for medication refills. Physician requested updates:  If your provider requested that you call with an update after starting medication, please be ready to provide us the medication and dosage, what time you take your medication, the time you attempt to fall asleep, time you fall asleep, when you wake up, and what time you get out of bed. Sleep Study Results: We will contact you with sleep study results and/or next steps after the physician has reviewed your testing.

## 2023-09-05 NOTE — PROGRESS NOTES
Assessment/Plan:    1. Severe obstructive sleep apnea  -     Ambulatory Referral to Sleep Medicine  -     PAP DME Resupply/Reorder    2. Iron deficiency  -     Iron Panel (Includes Ferritin, Iron Sat%, Iron, and TIBC); Future    Ms. Sandeep Maxwell is doing great with CPAP use, I reviewed her data today and her AHI is normal treatment. She has had a fixed CPAP pressure and does well with this, there is no need to change settings. Long-term treatment with CPAP is recommended, she is in agreement. I have reordered supplies and will follow up with her in 1 year. In reviewing her history, she has been taking iron supplement for iron deficiency, she mentions some leg soreness. This does not exactly fit restless leg syndrome although it should be noted that restless leg syndrome can be seen in the setting of iron deficiency. As she has been on an iron supplement for about 3 months, I have ordered a repeat panel to assess this today. Ultimately though, she should follow-up with other providers for management of her iron supplementation. Subjective:      Patient ID: Dre Leal is a 68 y.o. female. HPI    This is a 77-year-old female with a history of obstructive sleep apnea who presents as a new patient assessment, she previously been seen by Dr. Soy Galicia in 2019. In brief, she is described to have a history of severe obstructive sleep apnea. She had a home sleep test in 2015, weight 219 pounds at that time, that test showed respiratory event index of 72. Baseline oxygen saturation was 88% on that test.  A subsequent CPAP study was completed in 2015, that test showed normalization of the AHI as well as oxygen saturations with use of CPAP. The CPAP setting of 8 cm H2O was ultimately recommended on that test.  She had initially presented with "chronic hypersomnia", disrupted sleep, snoring, and witnessed breathing pauses. At her last assessment in 2019, she was reported to be doing well.   In chart review, the patient has a history of iron deficiency anemia, follows with hematology, felt to be secondary to blood loss from regular blood donation. She also has chronic GERD, follows with hematology, treated with omeprazole. Also has hypothyroidism . Saw Dr Fran Hector last year for a hx of a stroke. She is treated with a Fredy DreamStation 2 CPAP machine set at 8 cm H2O, data reviewed today  AHI 1.7  Usage 30 out of 30 days, average session 7 hours 8 minutes      HPI:  She does not have many concerns regarding her sleep. She is retired, she uses CPAP each night. Lives with her     She goes to bed 12 AM, asleep in 30 minutes. Wakes 0-1 times, generally returns to sleep without difficulty. Awake 7 AM on her own. She is refreshed when she wakes. Feels tired but not sleepy in the day. Naps at 2 PM as she likes to, can function without napping also. Does not doze. No drowsy driving    Uses a nasal mask     Denies possible CPAP side effects such as dry mouth, nasal congestion, nose bleeds, aerophagia, or nasal dryness. No RLS but has some achiness in her legs. Has varicose veins     Caffeine- 1 mug in the AM. Alcohol on occasion      [place] Sleepiness Scale  Sitting and reading: Would never doze  Watching TV: Slight chance of dozing  Sitting, inactive in a public place (e.g. a theatre or a meeting):  Would never doze  As a passenger in a car for an hour without a break: Would never doze  Lying down to rest in the afternoon when circumstances permit: Slight chance of dozing  Sitting and talking to someone: Would never doze  Sitting quietly after a lunch without alcohol: Would never doze  In a car, while stopped for a few minutes in traffic: Would never doze  Total score: 2      Review of Systems      Objective:      /74 (BP Location: Left arm, Cuff Size: Adult)   Pulse 60   Ht 5' 6" (1.676 m)   Wt 96.2 kg (212 lb)   LMP  (LMP Unknown)   SpO2 96%   BMI 34.22 kg/m²          Physical Exam  Constitutional:       Appearance: Normal appearance. HENT:      Head: Normocephalic and atraumatic. Mouth/Throat:      Mouth: Mucous membranes are moist.   Eyes:      Extraocular Movements: Extraocular movements intact. Pupils: Pupils are equal, round, and reactive to light. Cardiovascular:      Rate and Rhythm: Normal rate. Pulses: Normal pulses. Heart sounds: Normal heart sounds. Pulmonary:      Effort: Pulmonary effort is normal.      Breath sounds: Normal breath sounds. Musculoskeletal:      Right lower leg: No edema. Left lower leg: No edema. Neurological:      Mental Status: She is alert. Psychiatric:         Mood and Affect: Mood normal.         Behavior: Behavior normal.         Thought Content:  Thought content normal.         Judgment: Judgment normal.         Mallampati 4 airway, elongated and redundant soft palate, 1-2+ tonsils, no significant overbite

## 2023-09-06 ENCOUNTER — TELEPHONE (OUTPATIENT)
Dept: SLEEP CENTER | Facility: CLINIC | Age: 73
End: 2023-09-06

## 2023-09-07 DIAGNOSIS — E78.5 HYPERLIPIDEMIA, UNSPECIFIED HYPERLIPIDEMIA TYPE: ICD-10-CM

## 2023-09-07 DIAGNOSIS — E03.9 HYPOTHYROIDISM, UNSPECIFIED TYPE: ICD-10-CM

## 2023-09-07 LAB

## 2023-09-07 RX ORDER — ATORVASTATIN CALCIUM 20 MG/1
20 TABLET, FILM COATED ORAL DAILY
Qty: 30 TABLET | Refills: 5 | Status: SHIPPED | OUTPATIENT
Start: 2023-09-07

## 2023-09-07 RX ORDER — LEVOTHYROXINE SODIUM 0.12 MG/1
125 TABLET ORAL DAILY
Qty: 30 TABLET | Refills: 5 | Status: SHIPPED | OUTPATIENT
Start: 2023-09-07

## 2023-09-21 ENCOUNTER — OFFICE VISIT (OUTPATIENT)
Dept: FAMILY MEDICINE CLINIC | Facility: CLINIC | Age: 73
End: 2023-09-21
Payer: MEDICARE

## 2023-09-21 VITALS
HEIGHT: 66 IN | WEIGHT: 206 LBS | OXYGEN SATURATION: 95 % | DIASTOLIC BLOOD PRESSURE: 80 MMHG | HEART RATE: 75 BPM | SYSTOLIC BLOOD PRESSURE: 110 MMHG | TEMPERATURE: 97.6 F | RESPIRATION RATE: 16 BRPM | BODY MASS INDEX: 33.11 KG/M2

## 2023-09-21 DIAGNOSIS — R19.7 DIARRHEA, UNSPECIFIED TYPE: Primary | ICD-10-CM

## 2023-09-21 PROCEDURE — 99213 OFFICE O/P EST LOW 20 MIN: CPT | Performed by: FAMILY MEDICINE

## 2023-09-21 NOTE — ASSESSMENT & PLAN NOTE
Waxing and waning for 10 days now. Improved today after taking Imodium last night. No abdominal pain or associated symptoms of N/V. Recommend probiotic, Imodium as needed, and binding diet. Follow up if not improved or feeling unwell otherwise.

## 2023-09-21 NOTE — PROGRESS NOTES
Name: Lesly Farfan      : 1950      MRN: [id number]  Encounter Provider: Joann Damon DO  Encounter Date: 2023   Encounter department: [address] [place] [address]     1. Diarrhea, unspecified type  Assessment & Plan:  Waxing and waning for 10 days now. Improved today after taking Imodium last night. No abdominal pain or associated symptoms of N/V. Recommend probiotic, Imodium as needed, and binding diet. Follow up if not improved or feeling unwell otherwise. Subjective      HPI   Was having diarrhea for the last 10 days, started bad for a few days, then got better for a couple of days, then went camping and developed diarrhea again. Unsure why, went to a party with pot luck style, had 2 beers. Had been going maybe once during the day but at night was up every 10 minutes. Very watery, no blood or melena reported. Review of Systems   Gastrointestinal: Positive for diarrhea. Negative for abdominal pain, blood in stool, constipation, nausea, rectal pain and vomiting. Neurological: Negative for headaches. Current Outpatient Medications on File Prior to Visit   Medication Sig   • amLODIPine (NORVASC) 5 mg tablet TAKE 1 TABLET (5 MG TOTAL) BY MOUTH DAILY   • Ascorbic Acid (VITAMIN C ER PO) Take 1 capsule by mouth daily   • Ascorbic Acid (vitamin C) 100 MG tablet Take 100 mg by mouth daily   • aspirin 81 MG tablet Take 1 tablet (81 mg total) by mouth daily   • atorvastatin (LIPITOR) 20 mg tablet TAKE 1 TABLET (20 MG TOTAL) BY MOUTH DAILY   • Cholecalciferol (VITAMIN D3) 2000 UNITS TABS Take 1 tablet by mouth daily.    • estradiol (ESTRACE) 0.1 mg/g vaginal cream Insert into the vagina   • Ferrous Gluconate-C-Folic Acid (IRON-C PO) Take by mouth   • fluocinonide (LIDEX) 0.05 % cream Apply topically 2 (two) times a day   • fluticasone (FLONASE) 50 mcg/act nasal spray 2 sprays into each nostril daily   • hydrochlorothiazide (MICROZIDE) 12.5 mg capsule TAKE 1 TO 2 CAPSULES DAILY WITH AMLODIPINE   • levothyroxine 125 mcg tablet TAKE 1 TABLET (125 MCG TOTAL) BY MOUTH DAILY   • Omega-3 Fatty Acids (FISH OIL) 1200 MG CAPS Take 1 capsule by mouth daily   • omeprazole (PriLOSEC) 40 MG capsule Take 1 capsule (40 mg total) by mouth daily   • Multiple Vitamins-Minerals (CENTRUM PO) Take by mouth (Patient not taking: Reported on 9/5/2023)       Objective     /80   Pulse 75   Temp 97.6 °F (36.4 °C) (Temporal)   Resp 16   Ht 5' 6" (1.676 m)   Wt 93.4 kg (206 lb)   LMP  (LMP Unknown)   SpO2 95%   BMI 33.25 kg/m²     Physical Exam  Abdominal:      General: Abdomen is flat. Bowel sounds are normal. There is no distension. Palpations: Abdomen is soft. Tenderness: There is no abdominal tenderness. Skin:     General: Skin is warm and dry. Capillary Refill: Capillary refill takes less than 2 seconds.    Psychiatric:         Mood and Affect: Mood normal.         Behavior: Behavior normal.       Jeb Pizano, DO

## 2023-10-05 ENCOUNTER — OFFICE VISIT (OUTPATIENT)
Dept: CARDIOLOGY CLINIC | Facility: CLINIC | Age: 73
End: 2023-10-05
Payer: MEDICARE

## 2023-10-05 VITALS
HEART RATE: 71 BPM | HEIGHT: 66 IN | SYSTOLIC BLOOD PRESSURE: 114 MMHG | DIASTOLIC BLOOD PRESSURE: 70 MMHG | WEIGHT: 211.2 LBS | BODY MASS INDEX: 33.94 KG/M2

## 2023-10-05 DIAGNOSIS — E78.2 MIXED HYPERLIPIDEMIA: ICD-10-CM

## 2023-10-05 DIAGNOSIS — Z98.890 S/P MVR (MITRAL VALVE REPAIR): ICD-10-CM

## 2023-10-05 DIAGNOSIS — I10 BENIGN ESSENTIAL HYPERTENSION: Primary | ICD-10-CM

## 2023-10-05 PROCEDURE — 99213 OFFICE O/P EST LOW 20 MIN: CPT | Performed by: INTERNAL MEDICINE

## 2023-10-05 PROCEDURE — 93000 ELECTROCARDIOGRAM COMPLETE: CPT | Performed by: INTERNAL MEDICINE

## 2023-10-05 NOTE — PROGRESS NOTES
[facility] Cardiology  Follow up note  Lexii Pena 68 y.o. female MRN: [id number]        Problems    1. Benign essential hypertension  POCT ECG      2. Mixed hyperlipidemia        3. S/P MVR (mitral valve repair)            Impression:    Moderate to severe mitral regurgitation/mitral valve prolapse  Status post mitral valve repair with annuloplasty ring 2018  No CAD on preop cardiac catheterization  Continues on prophylaxis with 81 mg of aspirin  Educated on the need for lifelong predental antibiotics  No murmur on exam    Mixed hyperlipidemia  Well-controlled    Obstructive sleep apnea  Compliant with CPAP    Double vision  Not complaining of this much lately has seen neurology in the past with a prior history of stroke        Plan:    1 year follow-up  No additional cardiac testing needed from my perspective    HPI:   Lexii Pena is a 68y.o. year old female with severe mitral regurgitation due to P2 prolapse of the mitral valve who underwent class 2A indication mitral valve repair  In 2018. Blood pressure is excellent  Lipids are well controlled on atorvastatin  Not complaining of double vision today  Status post mitral valve repair with excellent result, still without any cardiac complaints. On HCTZ with stable labs  On amlodipine      Review of Systems   Constitutional: Negative for appetite change, diaphoresis, fatigue and fever. Respiratory: Negative for chest tightness, shortness of breath and wheezing. Cardiovascular: Negative for chest pain, palpitations and leg swelling. Gastrointestinal: Negative for abdominal pain and blood in stool. Musculoskeletal: Negative for arthralgias and joint swelling. Skin: Negative for rash. Neurological: Negative for dizziness, syncope and light-headedness.          Past Medical History:   Diagnosis Date   • Arthritis     in spine   • Colon polyp    • Constipation 11/02/2021   • CPAP (continuous positive airway pressure) dependence    • Cystic breast    • Disease of thyroid gland     hypothyroid   • Dyspareunia, female     last assessed 9/4/14   • Esophageal obstruction due to food impaction 11/03/2021 September of 2021   • Esophageal ulcer    • Food impaction of esophagus    • GERD (gastroesophageal reflux disease)    • Hepatic cyst 09/21/2015   • Hyperlipidemia    • Hypertension    • Hypothyroidism    • Insomnia    • Iron deficiency anemia    • Liver cyst     drained   • Obesity    • IRINA on CPAP     uses cpap   • Osteoporosis    • Severe mitral regurgitation    • Stroke [facility])    • Thrombocytopenia ([address]) 01/08/2019   • Urinary tract infection    • Varicose veins of lower extremity     last assessed 1/4/13     Social History     Substance and Sexual Activity   Alcohol Use Yes   • Alcohol/week: 3.0 standard drinks of alcohol   • Types: 3 Glasses of wine per week    Comment: social     Social History     Substance and Sexual Activity   Drug Use No     Social History     Tobacco Use   Smoking Status Never   Smokeless Tobacco Never       Allergies: Allergies   Allergen Reactions   • Other Other (See Comments)     Skin breakdown from bandaid on for long time- reddness       Medications:     Current Outpatient Medications:   •  amLODIPine (NORVASC) 5 mg tablet, TAKE 1 TABLET (5 MG TOTAL) BY MOUTH DAILY, Disp: 90 tablet, Rfl: 3  •  Ascorbic Acid (VITAMIN C ER PO), Take 1 capsule by mouth daily, Disp: , Rfl:   •  aspirin 81 MG tablet, Take 1 tablet (81 mg total) by mouth daily, Disp: 30 tablet, Rfl: 11  •  atorvastatin (LIPITOR) 20 mg tablet, TAKE 1 TABLET (20 MG TOTAL) BY MOUTH DAILY, Disp: 30 tablet, Rfl: 5  •  Cholecalciferol (VITAMIN D3) 2000 UNITS TABS, Take 1 tablet by mouth daily. , Disp: , Rfl:   •  estradiol (ESTRACE) 0.1 mg/g vaginal cream, Insert into the vagina, Disp: , Rfl:   •  Ferrous Gluconate-C-Folic Acid (IRON-C PO), Take by mouth, Disp: , Rfl:   •  fluocinonide (LIDEX) 0.05 % cream, Apply topically 2 (two) times a day, Disp: 60 g, Rfl: 2  • hydrochlorothiazide (MICROZIDE) 12.5 mg capsule, TAKE 1 TO 2 CAPSULES DAILY WITH AMLODIPINE, Disp: 60 capsule, Rfl: 5  •  levothyroxine 125 mcg tablet, TAKE 1 TABLET (125 MCG TOTAL) BY MOUTH DAILY, Disp: 30 tablet, Rfl: 5  •  Omega-3 Fatty Acids (FISH OIL) 1200 MG CAPS, Take 1 capsule by mouth daily, Disp: , Rfl:   •  omeprazole (PriLOSEC) 40 MG capsule, Take 1 capsule (40 mg total) by mouth daily, Disp: 30 capsule, Rfl: 5  •  Ascorbic Acid (vitamin C) 100 MG tablet, Take 100 mg by mouth daily (Patient not taking: Reported on 10/5/2023), Disp: , Rfl:   •  fluticasone (FLONASE) 50 mcg/act nasal spray, 2 sprays into each nostril daily (Patient not taking: Reported on 10/5/2023), Disp: 16 g, Rfl: 3  •  Multiple Vitamins-Minerals (CENTRUM PO), Take by mouth (Patient not taking: Reported on 9/5/2023), Disp: , Rfl:       Vitals:    10/05/23 0816   BP: 114/70   Pulse: 71     Weight (last 2 days)     Date/Time Weight    10/05/23 0816 95.8 (211.2)        Physical Exam  Constitutional:       General: She is not in acute distress. Appearance: Normal appearance. She is not diaphoretic. HENT:      Head: Normocephalic and atraumatic. Eyes:      General: No scleral icterus. Conjunctiva/sclera: Conjunctivae normal.   Neck:      Vascular: No JVD. Cardiovascular:      Rate and Rhythm: Normal rate and regular rhythm. Heart sounds: Normal heart sounds. No murmur heard. Pulmonary:      Effort: Pulmonary effort is normal. No respiratory distress. Breath sounds: Normal breath sounds. No wheezing, rhonchi or rales. Musculoskeletal:         General: No tenderness. Right lower leg: Normal. No edema. Left lower leg: Normal. No edema. Skin:     General: Skin is warm and dry. Neurological:      Mental Status: She is alert. Mental status is at baseline.            Laboratory Studies:  Chem:   Lab Results   Component Value Date    HGBA1C 5.5 12/31/2018     09/19/2017     05/03/2017     12/12/2016    K 4.0 06/06/2023    K 3.7 04/20/2023    K 3.8 02/02/2023    K 3.9 04/19/2022    K 4.0 10/04/2021    K 3.7 03/24/2021     (H) 06/06/2023     04/20/2023     02/02/2023     04/19/2022     10/04/2021     03/24/2021    CO2 29 06/06/2023    CO2 29 04/20/2023    CO2 29 02/02/2023    CO2 30 04/19/2022    CO2 31 10/04/2021    CO2 28 03/24/2021    GLUCOSE 146 (H) 01/07/2019    GLUCOSE 166 (H) 01/07/2019    GLUCOSE 195 (H) 01/07/2019    CREATININE 1.13 06/06/2023    CREATININE 1.06 04/20/2023    CREATININE 0.89 02/02/2023    CREATININE 0.85 09/19/2017    CREATININE 0.84 05/03/2017    CREATININE 0.84 12/12/2016    BUN 18 06/06/2023    BUN 23 04/20/2023    BUN 16 02/02/2023    BUN 13 04/19/2022    BUN 13 10/04/2021    BUN 13 03/24/2021    MG 2.7 (H) 01/08/2019    MG 2.3 12/21/2018    MG 2.3 05/08/2018     NT-proBNP:   Coags:  Lipid Profile:   Lab Results   Component Value Date    CHOL 159 09/19/2017    CHOL 163 05/03/2017    CHOL 146 12/12/2016    LDLCALC 73 04/20/2023    LDLCALC 67 10/21/2022    LDLCALC 71 07/07/2022    LDLCALC 65 04/19/2022    LDLCALC 91 10/04/2021    LDLCALC 77 03/24/2021    LDLDIRECT 110 04/05/2016    LDLDIRECT 105 09/16/2015    LDLDIRECT 98 12/09/2013    HDL 68 04/20/2023    HDL 67 10/21/2022    HDL 61 07/07/2022    HDL 62 04/19/2022    HDL 51 10/04/2021    HDL 60 03/24/2021    TRIG 89 04/20/2023    TRIG 71 10/21/2022    TRIG 84 07/07/2022    TRIG 98 04/19/2022    TRIG 133 10/04/2021    TRIG 110 03/24/2021       Cardiac testing:   EKG reviewed personally:   Results for orders placed or performed in visit on 10/05/23   POCT ECG    Impression    Normal sinus rhythm, normal EKG         Echocardiogram  3/19- EF normal, status post mitral valve repair, mean gradient 7 mmHg        Chela Castillo MD    Portions of the record may have been created with voice recognition software.  Occasional wrong word or "sound a like" substitutions may have occurred due to the inherent limitations of voice recognition software.  Read the chart carefully and recognize, using context, where substitutions have occurred.

## 2023-10-12 DIAGNOSIS — I10 BENIGN ESSENTIAL HYPERTENSION: ICD-10-CM

## 2023-10-12 RX ORDER — HYDROCHLOROTHIAZIDE 12.5 MG/1
CAPSULE, GELATIN COATED ORAL
Qty: 60 CAPSULE | Refills: 5 | Status: SHIPPED | OUTPATIENT
Start: 2023-10-12

## 2023-10-19 ENCOUNTER — TELEPHONE (OUTPATIENT)
Age: 73
End: 2023-10-19

## 2023-10-19 NOTE — TELEPHONE ENCOUNTER
Patient called to schedule a yearly skin check w/ dr Renny Norris. Currently no avail appts.   I explained wait list and placed her on list.  I advised she call back in nov for upcoming schedule

## 2023-10-23 ENCOUNTER — RA CDI HCC (OUTPATIENT)
Dept: OTHER | Facility: HOSPITAL | Age: 73
End: 2023-10-23

## 2023-10-26 ENCOUNTER — APPOINTMENT (OUTPATIENT)
Dept: LAB | Facility: CLINIC | Age: 73
End: 2023-10-26
Payer: MEDICARE

## 2023-10-26 DIAGNOSIS — K21.9 CHRONIC GERD: Primary | ICD-10-CM

## 2023-10-26 DIAGNOSIS — E03.9 HYPOTHYROIDISM, UNSPECIFIED TYPE: ICD-10-CM

## 2023-10-26 DIAGNOSIS — E78.2 MIXED HYPERLIPIDEMIA: ICD-10-CM

## 2023-10-26 LAB
ALBUMIN SERPL BCP-MCNC: 4.1 G/DL (ref 3.5–5)
ALP SERPL-CCNC: 78 U/L (ref 34–104)
ALT SERPL W P-5'-P-CCNC: 14 U/L (ref 7–52)
ANION GAP SERPL CALCULATED.3IONS-SCNC: 9 MMOL/L
AST SERPL W P-5'-P-CCNC: 16 U/L (ref 13–39)
BASOPHILS # BLD AUTO: 0.05 THOUSANDS/ÂΜL (ref 0–0.1)
BASOPHILS NFR BLD AUTO: 1 % (ref 0–1)
BILIRUB SERPL-MCNC: 0.96 MG/DL (ref 0.2–1)
BUN SERPL-MCNC: 15 MG/DL (ref 5–25)
CALCIUM SERPL-MCNC: 9.5 MG/DL (ref 8.4–10.2)
CHLORIDE SERPL-SCNC: 102 MMOL/L (ref 96–108)
CHOLEST SERPL-MCNC: 160 MG/DL
CO2 SERPL-SCNC: 28 MMOL/L (ref 21–32)
CREAT SERPL-MCNC: 0.9 MG/DL (ref 0.6–1.3)
EOSINOPHIL # BLD AUTO: 0.14 THOUSAND/ÂΜL (ref 0–0.61)
EOSINOPHIL NFR BLD AUTO: 2 % (ref 0–6)
ERYTHROCYTE [DISTWIDTH] IN BLOOD BY AUTOMATED COUNT: 13.2 % (ref 11.6–15.1)
GFR SERPL CREATININE-BSD FRML MDRD: 63 ML/MIN/1.73SQ M
GLUCOSE P FAST SERPL-MCNC: 113 MG/DL (ref 65–99)
HCT VFR BLD AUTO: 42.8 % (ref 34.8–46.1)
HDLC SERPL-MCNC: 64 MG/DL
HGB BLD-MCNC: 13.6 G/DL (ref 11.5–15.4)
IMM GRANULOCYTES # BLD AUTO: 0.01 THOUSAND/UL (ref 0–0.2)
IMM GRANULOCYTES NFR BLD AUTO: 0 % (ref 0–2)
LDLC SERPL CALC-MCNC: 72 MG/DL (ref 0–100)
LYMPHOCYTES # BLD AUTO: 0.92 THOUSANDS/ÂΜL (ref 0.6–4.47)
LYMPHOCYTES NFR BLD AUTO: 15 % (ref 14–44)
MCH RBC QN AUTO: 30.4 PG (ref 26.8–34.3)
MCHC RBC AUTO-ENTMCNC: 31.8 G/DL (ref 31.4–37.4)
MCV RBC AUTO: 96 FL (ref 82–98)
MONOCYTES # BLD AUTO: 0.69 THOUSAND/ÂΜL (ref 0.17–1.22)
MONOCYTES NFR BLD AUTO: 11 % (ref 4–12)
NEUTROPHILS # BLD AUTO: 4.28 THOUSANDS/ÂΜL (ref 1.85–7.62)
NEUTS SEG NFR BLD AUTO: 71 % (ref 43–75)
NRBC BLD AUTO-RTO: 0 /100 WBCS
PLATELET # BLD AUTO: 166 THOUSANDS/UL (ref 149–390)
PMV BLD AUTO: 10.1 FL (ref 8.9–12.7)
POTASSIUM SERPL-SCNC: 4 MMOL/L (ref 3.5–5.3)
PROT SERPL-MCNC: 7.9 G/DL (ref 6.4–8.4)
RBC # BLD AUTO: 4.48 MILLION/UL (ref 3.81–5.12)
SODIUM SERPL-SCNC: 139 MMOL/L (ref 135–147)
TRIGL SERPL-MCNC: 119 MG/DL
TSH SERPL DL<=0.05 MIU/L-ACNC: 2.77 UIU/ML (ref 0.45–4.5)
WBC # BLD AUTO: 6.09 THOUSAND/UL (ref 4.31–10.16)

## 2023-10-26 PROCEDURE — 80053 COMPREHEN METABOLIC PANEL: CPT

## 2023-10-26 PROCEDURE — 80061 LIPID PANEL: CPT

## 2023-10-26 PROCEDURE — 84443 ASSAY THYROID STIM HORMONE: CPT

## 2023-10-26 PROCEDURE — 85025 COMPLETE CBC W/AUTO DIFF WBC: CPT

## 2023-10-26 PROCEDURE — 36415 COLL VENOUS BLD VENIPUNCTURE: CPT

## 2023-10-26 NOTE — TELEPHONE ENCOUNTER
Please check with patient if she wants omeprazole sent to mail order pharmacy. Original RX sent to [facility].  Thank you

## 2023-10-26 NOTE — TELEPHONE ENCOUNTER
I called patient and left message per consent to c/b and let us know what pharmacy she would like the refill to go to. I also advised her that she could send a message in my chart if that is easier for her.

## 2023-10-28 PROBLEM — Z12.11 COLON CANCER SCREENING: Status: RESOLVED | Noted: 2020-09-13 | Resolved: 2023-10-28

## 2023-10-30 ENCOUNTER — OFFICE VISIT (OUTPATIENT)
Dept: FAMILY MEDICINE CLINIC | Facility: CLINIC | Age: 73
End: 2023-10-30
Payer: MEDICARE

## 2023-10-30 VITALS
SYSTOLIC BLOOD PRESSURE: 126 MMHG | HEART RATE: 86 BPM | RESPIRATION RATE: 18 BRPM | OXYGEN SATURATION: 97 % | TEMPERATURE: 97.8 F | DIASTOLIC BLOOD PRESSURE: 80 MMHG | WEIGHT: 210.6 LBS | BODY MASS INDEX: 33.99 KG/M2

## 2023-10-30 DIAGNOSIS — Z00.00 MEDICARE ANNUAL WELLNESS VISIT, SUBSEQUENT: Primary | ICD-10-CM

## 2023-10-30 DIAGNOSIS — N95.2 VAGINITIS, ATROPHIC: ICD-10-CM

## 2023-10-30 DIAGNOSIS — D22.9 NUMEROUS SKIN MOLES: ICD-10-CM

## 2023-10-30 DIAGNOSIS — U07.1 COVID: ICD-10-CM

## 2023-10-30 DIAGNOSIS — E03.9 HYPOTHYROIDISM, UNSPECIFIED TYPE: ICD-10-CM

## 2023-10-30 DIAGNOSIS — E78.2 MIXED HYPERLIPIDEMIA: ICD-10-CM

## 2023-10-30 DIAGNOSIS — R73.02 IGT (IMPAIRED GLUCOSE TOLERANCE): ICD-10-CM

## 2023-10-30 DIAGNOSIS — G47.33 SEVERE OBSTRUCTIVE SLEEP APNEA: ICD-10-CM

## 2023-10-30 DIAGNOSIS — I10 BENIGN ESSENTIAL HYPERTENSION: ICD-10-CM

## 2023-10-30 DIAGNOSIS — D50.9 IRON DEFICIENCY ANEMIA, UNSPECIFIED IRON DEFICIENCY ANEMIA TYPE: ICD-10-CM

## 2023-10-30 PROBLEM — R19.7 DIARRHEA: Status: RESOLVED | Noted: 2023-09-21 | Resolved: 2023-10-30
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
## 2023-10-30 PROBLEM — T18.128A ESOPHAGEAL OBSTRUCTION DUE TO FOOD IMPACTION: Status: RESOLVED | Noted: 2021-11-03 | Resolved: 2023-10-30

## 2023-10-30 PROBLEM — D64.9 NORMOCYTIC ANEMIA: Status: RESOLVED | Noted: 2023-02-09 | Resolved: 2023-10-30

## 2023-10-30 LAB
SARS-COV-2 AG UPPER RESP QL IA: POSITIVE
VALID CONTROL: ABNORMAL

## 2023-10-30 PROCEDURE — 87811 SARS-COV-2 COVID19 W/OPTIC: CPT | Performed by: FAMILY MEDICINE

## 2023-10-30 PROCEDURE — 99214 OFFICE O/P EST MOD 30 MIN: CPT | Performed by: FAMILY MEDICINE

## 2023-10-30 PROCEDURE — G0439 PPPS, SUBSEQ VISIT: HCPCS | Performed by: FAMILY MEDICINE

## 2023-10-30 NOTE — PATIENT INSTRUCTIONS
Medicare Preventive Visit Patient Instructions  Thank you for completing your Welcome to Medicare Visit or Medicare Annual Wellness Visit today. Your next wellness visit will be due in one year (10/30/2024). The screening/preventive services that you may require over the next 5-10 years are detailed below. Some tests may not apply to you based off risk factors and/or age. Screening tests ordered at today's visit but not completed yet may show as past due. Also, please note that scanned in results may not display below. Preventive Screenings:  Service Recommendations Previous Testing/Comments   Colorectal Cancer Screening  * Colonoscopy    * Fecal Occult Blood Test (FOBT)/Fecal Immunochemical Test (FIT)  * Fecal DNA/Cologuard Test  * Flexible Sigmoidoscopy Age: 43-73 years old   Colonoscopy: every 10 years (may be performed more frequently if at higher risk)  OR  FOBT/FIT: every 1 year  OR  Cologuard: every 3 years  OR  Sigmoidoscopy: every 5 years  Screening may be recommended earlier than age 39 if at higher risk for colorectal cancer. Also, an individualized decision between you and your healthcare provider will decide whether screening between the ages of 77-80 would be appropriate. Colonoscopy: 12/16/2022  FOBT/FIT: Not on file  Cologuard: Not on file  Sigmoidoscopy: Not on file    Screening Current     Breast Cancer Screening Age: 36 years old  Frequency: every 1-2 years  Not required if history of left and right mastectomy Mammogram: 08/01/2023    Screening Current   Cervical Cancer Screening Between the ages of 21-29, pap smear recommended once every 3 years. Between the ages of 32-69, can perform pap smear with HPV co-testing every 5 years.    Recommendations may differ for women with a history of total hysterectomy, cervical cancer, or abnormal pap smears in past. Pap Smear: 11/12/2020    Screening Not Indicated   Hepatitis C Screening Once for adults born between 1945 and 1965  More frequently in patients at high risk for Hepatitis C Hep C Antibody: Not on file        Diabetes Screening 1-2 times per year if you're at risk for diabetes or have pre-diabetes Fasting glucose: 113 mg/dL (10/26/2023)  A1C: 5.5 % (12/31/2018)  Screening Current   Cholesterol Screening Once every 5 years if you don't have a lipid disorder. May order more often based on risk factors. Lipid panel: 10/26/2023    Screening Not Indicated  History Lipid Disorder     Other Preventive Screenings Covered by Medicare:  Abdominal Aortic Aneurysm (AAA) Screening: covered once if your at risk. You're considered to be at risk if you have a family history of AAA. Lung Cancer Screening: covers low dose CT scan once per year if you meet all of the following conditions: (1) Age 48-67; (2) No signs or symptoms of lung cancer; (3) Current smoker or have quit smoking within the last 15 years; (4) You have a tobacco smoking history of at least 20 pack years (packs per day multiplied by number of years you smoked); (5) You get a written order from a healthcare provider. Glaucoma Screening: covered annually if you're considered high risk: (1) You have diabetes OR (2) Family history of glaucoma OR (3)  aged 48 and older OR (3)  American aged 72 and older  Osteoporosis Screening: covered every 2 years if you meet one of the following conditions: (1) You're estrogen deficient and at risk for osteoporosis based off medical history and other findings; (2) Have a vertebral abnormality; (3) On glucocorticoid therapy for more than 3 months; (4) Have primary hyperparathyroidism; (5) On osteoporosis medications and need to assess response to drug therapy. Last bone density test (DXA Scan): 06/01/2021. HIV Screening: covered annually if you're between the age of 14-79. Also covered annually if you are younger than 13 and older than 72 with risk factors for HIV infection.  For pregnant patients, it is covered up to 3 times per pregnancy. Immunizations:  Immunization Recommendations   Influenza Vaccine Annual influenza vaccination during flu season is recommended for all persons aged >= 6 months who do not have contraindications   Pneumococcal Vaccine   * Pneumococcal conjugate vaccine = PCV13 (Prevnar 13), PCV15 (Vaxneuvance), PCV20 (Prevnar 20)  * Pneumococcal polysaccharide vaccine = PPSV23 (Pneumovax) Adults 68-32 yo with certain risk factors or if 69+ yo  If never received any pneumonia vaccine: recommend Prevnar 20 (PCV20)  Give PCV20 if previously received 1 dose of PCV13 or PPSV23   Hepatitis B Vaccine 3 dose series if at intermediate or high risk (ex: diabetes, end stage renal disease, liver disease)   Respiratory syncytial virus (RSV) Vaccine - COVERED BY MEDICARE PART D  * RSVPreF3 (Arexvy) CDC recommends that adults 61years of age and older may receive a single dose of RSV vaccine using shared clinical decision-making (SCDM)   Tetanus (Td) Vaccine - COST NOT COVERED BY MEDICARE PART B Following completion of primary series, a booster dose should be given every 10 years to maintain immunity against tetanus. Td may also be given as tetanus wound prophylaxis. Tdap Vaccine - COST NOT COVERED BY MEDICARE PART B Recommended at least once for all adults. For pregnant patients, recommended with each pregnancy. Shingles Vaccine (Shingrix) - COST NOT COVERED BY MEDICARE PART B  2 shot series recommended in those 19 years and older who have or will have weakened immune systems or those 50 years and older     Health Maintenance Due:      Topic Date Due   • Hepatitis C Screening  Never done   • Breast Cancer Screening: Mammogram  08/01/2025   • Colorectal Cancer Screening  12/15/2027     Immunizations Due:      Topic Date Due   • COVID-19 Vaccine (4 - Moderna series) 01/17/2022   • Influenza Vaccine (1) 09/01/2023     Advance Directives   What are advance directives?   Advance directives are legal documents that state your wishes and plans for medical care. These plans are made ahead of time in case you lose your ability to make decisions for yourself. Advance directives can apply to any medical decision, such as the treatments you want, and if you want to donate organs. What are the types of advance directives? There are many types of advance directives, and each state has rules about how to use them. You may choose a combination of any of the following:  Living will: This is a written record of the treatment you want. You can also choose which treatments you do not want, which to limit, and which to stop at a certain time. This includes surgery, medicine, IV fluid, and tube feedings. Durable power of  for healthcare [facility]): This is a written record that states who you want to make healthcare choices for you when you are unable to make them for yourself. This person, called a proxy, is usually a family member or a friend. You may choose more than 1 proxy. Do not resuscitate (DNR) order:  A DNR order is used in case your heart stops beating or you stop breathing. It is a request not to have certain forms of treatment, such as CPR. A DNR order may be included in other types of advance directives. Medical directive: This covers the care that you want if you are in a coma, near death, or unable to make decisions for yourself. You can list the treatments you want for each condition. Treatment may include pain medicine, surgery, blood transfusions, dialysis, IV or tube feedings, and a ventilator (breathing machine). Values history: This document has questions about your views, beliefs, and how you feel and think about life. This information can help others choose the care that you would choose. Why are advance directives important? An advance directive helps you control your care. Although spoken wishes may be used, it is better to have your wishes written down. Spoken wishes can be misunderstood, or not followed.  Treatments may be given even if you do not want them. An advance directive may make it easier for your family to make difficult choices about your care. Weight Management   Why it is important to manage your weight:  Being overweight increases your risk of health conditions such as heart disease, high blood pressure, type 2 diabetes, and certain types of cancer. It can also increase your risk for osteoarthritis, sleep apnea, and other respiratory problems. Aim for a slow, steady weight loss. Even a small amount of weight loss can lower your risk of health problems. How to lose weight safely:  A safe and healthy way to lose weight is to eat fewer calories and get regular exercise. You can lose up about 1 pound a week by decreasing the number of calories you eat by 500 calories each day. Healthy meal plan for weight management:  A healthy meal plan includes a variety of foods, contains fewer calories, and helps you stay healthy. A healthy meal plan includes the following:  Eat whole-grain foods more often. A healthy meal plan should contain fiber. Fiber is the part of grains, fruits, and vegetables that is not broken down by your body. Whole-grain foods are healthy and provide extra fiber in your diet. Some examples of whole-grain foods are whole-wheat breads and pastas, oatmeal, brown rice, and bulgur. Eat a variety of vegetables every day. Include dark, leafy greens such as spinach, kale, natan greens, and mustard greens. Eat yellow and orange vegetables such as carrots, sweet potatoes, and winter squash. Eat a variety of fruits every day. Choose fresh or canned fruit (canned in its own juice or light syrup) instead of juice. Fruit juice has very little or no fiber. Eat low-fat dairy foods. Drink fat-free (skim) milk or 1% milk. Eat fat-free yogurt and low-fat cottage cheese. Try low-fat cheeses such as mozzarella and other reduced-fat cheeses. Choose meat and other protein foods that are low in fat.   Choose beans or other legumes such as split peas or lentils. Choose fish, skinless poultry (chicken or turkey), or lean cuts of red meat (beef or pork). Before you cook meat or poultry, cut off any visible fat. Use less fat and oil. Try baking foods instead of frying them. Add less fat, such as margarine, sour cream, regular salad dressing and mayonnaise to foods. Eat fewer high-fat foods. Some examples of high-fat foods include french fries, doughnuts, ice cream, and cakes. Eat fewer sweets. Limit foods and drinks that are high in sugar. This includes candy, cookies, regular soda, and sweetened drinks. Exercise:  Exercise at least 30 minutes per day on most days of the week. Some examples of exercise include walking, biking, dancing, and swimming. You can also fit in more physical activity by taking the stairs instead of the elevator or parking farther away from stores. Ask your healthcare provider about the best exercise plan for you. © Copyright [employer] 2018 Information is for End User's use only and may not be sold, redistributed or otherwise used for commercial purposes.  All illustrations and images included in CareNotes® are the copyrighted property of [employer] or [address] [place], [place] [address]ols

## 2023-10-30 NOTE — PROGRESS NOTES
Assessment and Plan:     Problem List Items Addressed This Visit        Endocrine    Hypothyroidism     Continue Levothyroxine, TSH is normal         Relevant Orders    TSH, 3rd generation       Respiratory    Severe obstructive sleep apnea (Chronic)     On CPAP, compliant            Cardiovascular and Mediastinum    Benign essential hypertension     Blood pressure is well controlled, continue amlodipine and HCTZ            Other    Hyperlipidemia     Good control, continue atorvastatin         Relevant Orders    CBC    Comprehensive metabolic panel    Lipid Panel with Direct LDL reflex    Iron deficiency anemia     Continue OTC slow iron (ferrous gluconate-vitamin C-folic acid)         Medicare annual wellness visit, subsequent - Primary     Patient is up-to-date with health screenings. Other Visit Diagnoses     COVID        We discussed current isolation guidelines. Symptoms are mild, patient is afebrile, today's day #6. Continue OTC Rx PRN. Relevant Orders    POCT Rapid Covid Ag (Completed)    Vaginitis, atrophic        Relevant Orders    Ambulatory referral to Obstetrics / Gynecology    IGT (impaired glucose tolerance)        Relevant Orders    Hemoglobin A1C    Numerous skin moles        Relevant Orders    Ambulatory referral to Dermatology        Return in about 6 months (around 4/30/2024) for follow up. Patient will schedule flu vaccine within next 1 to 2 weeks. Preventive health issues were discussed with patient, and age appropriate screening tests were ordered as noted in patient's After Visit Summary. Personalized health advice and appropriate referrals for health education or preventive services given if needed, as noted in patient's After Visit Summary.      History of Present Illness:     Patient presents for a Medicare Wellness Visit    Cold symptoms  since 10/25/23   COVID test in the office is positive   Symptoms started as rhinorrhea, followed by cough  Had  one day of 100. 6 Fahrenheit fever earlier last week, resolved  Patient has been feeling generally well, just residual URI symptoms that she has been managing with DayQuil and NyQuil. No trouble breathing or chest tightness  All labs are normal  aside from   UTD with colonoscopy and mammo  UTD with  pneumonia vaccine and shingles vaccination. Patient uses estradiol cream for chronic vaginal dryness. No recent follow-up with GYN. Patient Care Team:  Janki Newberry MD as PCP - General  MD Jennifer Lombardi,   Ramonia Daily, MD Meme Mcgarry, DO     Review of Systems:     Review of Systems   Constitutional: Negative. HENT:  Positive for congestion and postnasal drip. Eyes: Negative. Respiratory:  Positive for cough. Negative for chest tightness and shortness of breath. Cardiovascular: Negative. Gastrointestinal: Negative. Endocrine: Negative. Genitourinary: Negative. Musculoskeletal: Negative. Allergic/Immunologic: Positive for environmental allergies. Neurological: Negative. Hematological: Negative. Psychiatric/Behavioral: Negative.           Problem List:     Patient Active Problem List   Diagnosis   • Benign essential hypertension   • Disc degeneration, lumbar   • Dysphagia   • Hyperlipidemia   • Hypothyroidism   • Iron deficiency anemia   • Liver enlargement   • Osteopenia of multiple sites   • Severe obstructive sleep apnea   • Shoulder impingement   • Tinea corporis   • Lumbar radiculopathy   • S/P MVR (mitral valve repair)   • Double vision with both eyes open   • History of esophageal dilatation   • Chronic GERD   • History of stroke   • Pigmented skin lesions   • Polyp of colon   • Hx of colonic polyps   • Other hemorrhoids   • Varicose veins of both lower extremities with pain   • Medicare annual wellness visit, subsequent      Past Medical and Surgical History:     Past Medical History:   Diagnosis Date   • Arthritis     in spine   • Colon polyp    • Constipation 11/02/2021   • CPAP (continuous positive airway pressure) dependence    • Cystic breast    • Disease of thyroid gland     hypothyroid   • Dyspareunia, female     last assessed 9/4/14   • Esophageal obstruction due to food impaction 11/03/2021 September of 2021   • Esophageal ulcer    • Food impaction of esophagus    • GERD (gastroesophageal reflux disease)    • Hepatic cyst 09/21/2015   • Hyperlipidemia    • Hypertension    • Hypothyroidism    • Insomnia    • Iron deficiency anemia    • Liver cyst     drained   • Obesity    • IRINA on CPAP     uses cpap   • Osteoporosis    • Severe mitral regurgitation    • Stroke [facility])    • Thrombocytopenia ([address]) 01/08/2019   • Urinary tract infection    • Varicose veins of lower extremity     last assessed 1/4/13     Past Surgical History:   Procedure Laterality Date   • COLONOSCOPY      complete 5/2016 - Dr Jalen Dubois due in 2019 - last assessed  3/17/17   • HERNIA REPAIR     • HIATAL HERNIA REPAIR     • LIVER BIOPSY LAPAROSCOPIC N/A 5/7/2018    Procedure: Laparoscopic marsupialization of liver cyst;  Surgeon: Filipe Johnson MD;  [facility];  Service: Surgical Oncology   • NEUROMA EXCISION     • OH ECHO [address] [place] [address] N/A 1/7/2019    Procedure: TRANSESOPHAGEAL ECHOCARDIOGRAM (DORI); Surgeon: Lynn Feliciano MD;  Location[facility];  Service: Cardiac Surgery   • OH ESOPHAGOGASTRODUODENOSCOPY TRANSORAL DIAGNOSTIC N/A 8/10/2016    Procedure: ESOPHAGOGASTRODUODENOSCOPY (EGD); Surgeon: Gildardo Peck MD;  Location: [facility]; Service: Gastroenterology   • OH ESOPHAGOSCOPY FLEXIBLE TRANSORAL WITH BIOPSY N/A 11/3/2016    Procedure: ESOPHAGOGASTRODUODENOSCOPY (EGD);   Surgeon: Marie Conrad MD;  [facility];  Service: Thoracic   • OH LAPS REPAIR HERNIA EXCEPT INCAL/INGUN REDUCIBLE N/A 10/30/2017    Procedure: LAPAROSCOPIC INCISIONAL HERNIA REPAIR X 2 WITH ECHO MESH;  Surgeon: Hayder Crenshaw DO;  [facility];  Service: General   • NJ LAPS RPR PARAESPHGL HRNA INCL FUNDPLSTY W/MESH Left 11/3/2016    Procedure: LAPAROSCOPIC PARAESOPHAGEAL HERNIA REPAIR WITH MESH;NISSEN FUNDOPLICATION ;  Surgeon: Cassidy Philip MD;  [facility];  Service: Thoracic   • NJ REPAIR FIRST ABDOMINAL WALL HERNIA N/A 2017    Procedure: INCISIONAL HERNIA REPAIR ;  Surgeon: An Cohen DO;  [facility];  Service: General   • NJ REPLACEMENT MITRAL VALVE W/CARDIOPULMONARY BYP N/A 2019    Procedure: REPLACEMENT VALVE MITRAL (MVR), repair with 30mm CG Future ring;  Surgeon: José Miguel Watters MD;  [facility];  Service: Cardiac Surgery   • TUBAL LIGATION        Family History:     Family History   Problem Relation Age of Onset   • Heart disease Mother    • Aneurysm Mother         cerebral   • Alcohol abuse Father    • Cancer Father    • COPD Father    • Heart failure Father    • Hypertension Father    • Tuberculosis Father    • Cancer Family    • Breast cancer Paternal Grandmother          of natural causes at 80years old   • No Known Problems Sister    • No Known Problems Daughter    • No Known Problems Maternal Grandmother    • No Known Problems Maternal Grandfather    • No Known Problems Paternal Grandfather    • No Known Problems Son       Social History:     Social History     Socioeconomic History   • Marital status: /Civil Union     Spouse name: None   • Number of children: None   • Years of education: None   • Highest education level: None   Occupational History   • None   Tobacco Use   • Smoking status: Never   • Smokeless tobacco: Never   Vaping Use   • Vaping Use: Never used   Substance and Sexual Activity   • Alcohol use:  Yes     Alcohol/week: 3.0 standard drinks of alcohol     Types: 3 Glasses of wine per week     Comment: social   • Drug use: No   • Sexual activity: Yes     Partners: Male     Birth control/protection: Post-menopausal   Other Topics Concern   • None   Social History Narrative Coffee    Exercise - walking     Social Determinants of Health     Financial Resource Strain: Low Risk  (10/28/2023)    Overall Financial Resource Strain (CARDIA)    • Difficulty of Paying Living Expenses: Not hard at all   Food Insecurity: Not on file   Transportation Needs: No Transportation Needs (10/28/2023)    PRAPARE - Transportation    • Lack of Transportation (Medical): No    • Lack of Transportation (Non-Medical): No   Physical Activity: Not on file   Stress: Not on file   Social Connections: Not on file   Intimate Partner Violence: Not on file   Housing Stability: Not on file      Medications and Allergies:     Current Outpatient Medications   Medication Sig Dispense Refill   • amLODIPine (NORVASC) 5 mg tablet TAKE 1 TABLET (5 MG TOTAL) BY MOUTH DAILY 90 tablet 3   • Ascorbic Acid (VITAMIN C ER PO) Take 1 capsule by mouth daily     • aspirin 81 MG tablet Take 1 tablet (81 mg total) by mouth daily 30 tablet 11   • atorvastatin (LIPITOR) 20 mg tablet TAKE 1 TABLET (20 MG TOTAL) BY MOUTH DAILY 30 tablet 5   • Cholecalciferol (VITAMIN D3) 2000 UNITS TABS Take 1 tablet by mouth daily.      • estradiol (ESTRACE) 0.1 mg/g vaginal cream Insert into the vagina     • Ferrous Gluconate-C-Folic Acid (IRON-C PO) Take by mouth     • fluocinonide (LIDEX) 0.05 % cream Apply topically 2 (two) times a day 60 g 2   • fluticasone (FLONASE) 50 mcg/act nasal spray 2 sprays into each nostril daily 16 g 3   • hydrochlorothiazide (MICROZIDE) 12.5 mg capsule TAKE 1 TO 2 CAPSULES DAILY WITH AMLODIPINE 60 capsule 5   • levothyroxine 125 mcg tablet TAKE 1 TABLET (125 MCG TOTAL) BY MOUTH DAILY 30 tablet 5   • Multiple Vitamins-Minerals (CENTRUM PO) Take by mouth     • Omega-3 Fatty Acids (FISH OIL) 1200 MG CAPS Take 1 capsule by mouth daily     • omeprazole (PriLOSEC) 40 MG capsule Take 1 capsule (40 mg total) by mouth daily 30 capsule 5   • Ascorbic Acid (vitamin C) 100 MG tablet Take 100 mg by mouth daily (Patient not taking: Reported on 10/5/2023)       No current facility-administered medications for this visit. Allergies   Allergen Reactions   • Other Other (See Comments)     Skin breakdown from bandaid on for long time- reddness      Immunizations:     Immunization History   Administered Date(s) Administered   • COVID-19 MODERNA VACC 0.5 ML IM 01/29/2021, 02/24/2021, 11/22/2021   • Influenza Quadrivalent Preservative Free 3 years and older IM 11/11/2016   • Influenza Split High Dose Preservative Free IM 09/21/2015, 09/15/2017, 10/21/2019   • Influenza, high dose seasonal 0.7 mL 10/19/2018, 09/08/2020, 10/13/2021, 10/24/2022   • Influenza, seasonal, injectable 11/01/2010, 01/24/2013, 09/22/2014   • Pneumococcal Conjugate 13-Valent 01/19/2016   • Pneumococcal Polysaccharide PPV23 09/15/2017   • Tdap 11/09/2010      Health Maintenance:         Topic Date Due   • Hepatitis C Screening  Never done   • Breast Cancer Screening: Mammogram  08/01/2025   • Colorectal Cancer Screening  12/15/2027         Topic Date Due   • COVID-19 Vaccine (4 - Moderna series) 01/17/2022   • Influenza Vaccine (1) 09/01/2023      Medicare Screening Tests and Risk Assessments:     Nick Connor is here for her Subsequent Wellness visit. Last Medicare Wellness visit information reviewed, patient interviewed and updates made to the record today. Health Risk Assessment:   Patient rates overall health as good. Patient feels that their physical health rating is same. Patient is very satisfied with their life. Eyesight was rated as same. Hearing was rated as same. Patient feels that their emotional and mental health rating is same. Patients states they are never, rarely angry. Patient states they are sometimes unusually tired/fatigued. Pain experienced in the last 7 days has been none. Patient states that she has experienced no weight loss or gain in last 6 months. Fall Risk Screening:    In the past year, patient has experienced: no history of falling in past year Urinary Incontinence Screening:   Patient has not leaked urine accidently in the last six months. Home Safety:  Patient does not have trouble with stairs inside or outside of their home. Patient has working smoke alarms and has working carbon monoxide detector. Home safety hazards include: none. Nutrition:   Current diet is Regular. Medications:   Patient is currently taking over-the-counter supplements. OTC medications include: see medication list. Patient is able to manage medications. Activities of Daily Living (ADLs)/Instrumental Activities of Daily Living (IADLs):   Walk and transfer into and out of bed and chair?: Yes  Dress and groom yourself?: Yes    Bathe or shower yourself?: Yes    Feed yourself? Yes  Do your laundry/housekeeping?: Yes  Manage your money, pay your bills and track your expenses?: Yes  Make your own meals?: Yes    Do your own shopping?: Yes    Durable Medical Equipment Suppliers  [employer]    Previous Hospitalizations:   Any hospitalizations or ED visits within the last 12 months?: No      Advance Care Planning:   Living will: Yes    Durable POA for healthcare:  Yes    Advanced directive: Yes      Cognitive Screening:   Provider or family/friend/caregiver concerned regarding cognition?: No    PREVENTIVE SCREENINGS      Cardiovascular Screening:    General: Screening Not Indicated and History Lipid Disorder      Diabetes Screening:     General: Screening Current      Colorectal Cancer Screening:     General: Screening Current      Breast Cancer Screening:     General: Screening Current      Cervical Cancer Screening:    General: Screening Not Indicated      Osteoporosis Screening:    General: Screening Current      Abdominal Aortic Aneurysm (AAA) Screening:        General: Screening Not Indicated      Lung Cancer Screening:     General: Screening Not Indicated      Hepatitis C Screening:    General: Screening Not Indicated    Screening, Brief Intervention, and Referral to Treatment (SBIRT)    Screening  Typical number of drinks in a day: 1  Typical number of drinks in a week: 2  Interpretation: Low risk drinking behavior. Single Item Drug Screening:  How often have you used an illegal drug (including marijuana) or a prescription medication for non-medical reasons in the past year? less than monthly    Single Item Drug Screen Score: 1  Interpretation: POSITIVE screen for possible drug use disorder    Drug Abuse Screening Test (DAST-10):  1) Have you used drugs other than those required for medical reasons? No  2) Do you abuse more than one drug at a time? No  3) Are you always able to stop using drugs when you want to? No  4) Have you had "blackouts" or "flashbacks" as a result of drug use? No  5) Do you ever feel bad or guilty about your drug use? No  6) Does your spouse (or parents) ever complain about your involvement with drugs? No  7) Have you neglected your family because of your use of drugs? No  8) Have you engaged in illegal activities in order to obtain drugs? No  9) Have you ever experienced withdrawal symptoms (felt sick) when you stopped taking drugs? No  10) Have you had medical problems as a result of your drug use (e.g., memory loss, hepatitis, convulsions, bleeding, etc.)? No    DAST-10 Score: 1  Interpretation: Low level problems related to drug abuse    Brief Intervention  Alcohol & drug use screenings were reviewed. No concerns regarding substance use disorder identified. Other Counseling Topics:   Regular weightbearing exercise. No results found. Physical Exam:     /80   Pulse 86   Temp 97.8 °F (36.6 °C) (Temporal)   Resp 18   Wt 95.5 kg (210 lb 9.6 oz)   LMP  (LMP Unknown)   SpO2 97%   BMI 33.99 kg/m²     Physical Exam  Vitals and nursing note reviewed. Constitutional:       General: She is not in acute distress. Appearance: Normal appearance. She is well-developed. She is not ill-appearing.    HENT:      Head: Normocephalic and atraumatic. Eyes:      General: No scleral icterus. Conjunctiva/sclera: Conjunctivae normal.   Neck:      Vascular: No carotid bruit. Cardiovascular:      Rate and Rhythm: Normal rate and regular rhythm. Heart sounds: Normal heart sounds. No murmur heard. Pulmonary:      Effort: Pulmonary effort is normal. No respiratory distress. Breath sounds: Normal breath sounds. Abdominal:      General: Bowel sounds are normal. There is no abdominal bruit. Palpations: Abdomen is soft. Tenderness: There is no abdominal tenderness. Musculoskeletal:      Cervical back: Neck supple. No rigidity. Right lower leg: No edema. Left lower leg: No edema. Skin:     General: Skin is warm. Neurological:      General: No focal deficit present. Mental Status: She is alert and oriented to person, place, and time. Psychiatric:         Mood and Affect: Mood normal.         Behavior: Behavior normal.         Thought Content:  Thought content normal.          Cassius Vang MD

## 2023-11-08 ENCOUNTER — OFFICE VISIT (OUTPATIENT)
Age: 73
End: 2023-11-08

## 2023-11-08 VITALS — BODY MASS INDEX: 34.31 KG/M2 | DIASTOLIC BLOOD PRESSURE: 92 MMHG | SYSTOLIC BLOOD PRESSURE: 146 MMHG | WEIGHT: 212.6 LBS

## 2023-11-08 DIAGNOSIS — L29.3 PERINEAL ITCHING, FEMALE: Primary | ICD-10-CM

## 2023-11-08 DIAGNOSIS — N95.2 VAGINITIS, ATROPHIC: ICD-10-CM

## 2023-11-08 RX ORDER — NYSTATIN AND TRIAMCINOLONE ACETONIDE 100000; 1 [USP'U]/G; MG/G
OINTMENT TOPICAL 2 TIMES DAILY
Qty: 60 G | Refills: 1 | Status: SHIPPED | OUTPATIENT
Start: 2023-11-08 | End: 2023-11-17

## 2023-11-08 NOTE — PROGRESS NOTES
Assessment/Plan:    Will use mycolog x 2 weeks, then RTO for re-check and consideration of topical steroid use for lichen like changes. Can continue to use estrace to introitus. Perineal itching, female    Vaginitis, atrophic  -     Ambulatory referral to Obstetrics / Gynecology  -     nystatin-triamcinolone (MYCOLOG-II) ointment; Apply topically 2 (two) times a day      Subjective:      Patient ID: Monika Younger is a 68 y.o. female. Sandy presents for problem visit. Has been struggling with vulvar/perineal irritation - reports it can be very itchy at night, and she can scratch to the point of bleeding. Also notes a rash in the right groin crease. Using estrace for atrophy. The following portions of the patient's history were reviewed and updated as appropriate: allergies, current medications, and problem list.    Review of Systems      Objective:      /92 (BP Location: Left arm, Patient Position: Sitting, Cuff Size: Large)   Wt 96.4 kg (212 lb 9.6 oz)   LMP  (LMP Unknown)   BMI 34.31 kg/m²          Physical Exam  Vitals reviewed. Constitutional:       Appearance: Normal appearance. Genitourinary:      Neurological:      Mental Status: She is alert.

## 2023-11-10 ENCOUNTER — IMMUNIZATIONS (OUTPATIENT)
Dept: FAMILY MEDICINE CLINIC | Facility: CLINIC | Age: 73
End: 2023-11-10
Payer: MEDICARE

## 2023-11-10 DIAGNOSIS — Z23 ENCOUNTER FOR IMMUNIZATION: Primary | ICD-10-CM

## 2023-11-10 PROCEDURE — 90662 IIV NO PRSV INCREASED AG IM: CPT

## 2023-11-10 PROCEDURE — G0008 ADMIN INFLUENZA VIRUS VAC: HCPCS

## 2023-11-17 DIAGNOSIS — N95.2 ATROPHIC VAGINITIS: Primary | ICD-10-CM

## 2023-11-20 RX ORDER — CLOTRIMAZOLE AND BETAMETHASONE DIPROPIONATE 10; .64 MG/G; MG/G
CREAM TOPICAL
Qty: 45 G | Refills: 1 | Status: SHIPPED | OUTPATIENT
Start: 2023-11-20

## 2023-11-21 ENCOUNTER — OFFICE VISIT (OUTPATIENT)
Age: 73
End: 2023-11-21
Payer: MEDICARE

## 2023-11-21 ENCOUNTER — TELEPHONE (OUTPATIENT)
Age: 73
End: 2023-11-21

## 2023-11-21 VITALS — SYSTOLIC BLOOD PRESSURE: 136 MMHG | WEIGHT: 211.6 LBS | DIASTOLIC BLOOD PRESSURE: 90 MMHG | BODY MASS INDEX: 34.15 KG/M2

## 2023-11-21 DIAGNOSIS — L29.3 PERINEAL ITCHING, FEMALE: Primary | ICD-10-CM

## 2023-11-21 PROCEDURE — 99212 OFFICE O/P EST SF 10 MIN: CPT | Performed by: OBSTETRICS & GYNECOLOGY

## 2023-11-21 NOTE — TELEPHONE ENCOUNTER
Called patient to follow up with voicemail left regarding script. Per patient, matter has been sorted and no need for further follow up.

## 2023-11-27 NOTE — PROGRESS NOTES
Assessment/Plan:   Continue routine care  Use cream PRN  Follow up PRN    Subjective:     Patient ID: Bradley Ramos is a 68 y.o. female. Sandy presents for follow up visit. Was seen last time for vulvar irritation. Given rx for mycolog. Used this as directed and feels that things have dramatically improved. Patient Active Problem List   Diagnosis    Benign essential hypertension    Disc degeneration, lumbar    Dysphagia    Hyperlipidemia    Hypothyroidism    Iron deficiency anemia    Liver enlargement    Osteopenia of multiple sites    Severe obstructive sleep apnea    Shoulder impingement    Tinea corporis    Lumbar radiculopathy    S/P MVR (mitral valve repair)    Double vision with both eyes open    History of esophageal dilatation    Chronic GERD    History of stroke    Pigmented skin lesions    Polyp of colon    Hx of colonic polyps    Other hemorrhoids    Varicose veins of both lower extremities with pain    Medicare annual wellness visit, subsequent       Current Outpatient Medications:     amLODIPine (NORVASC) 5 mg tablet, TAKE 1 TABLET (5 MG TOTAL) BY MOUTH DAILY, Disp: 90 tablet, Rfl: 3    Ascorbic Acid (VITAMIN C ER PO), Take 1 capsule by mouth daily, Disp: , Rfl:     aspirin 81 MG tablet, Take 1 tablet (81 mg total) by mouth daily, Disp: 30 tablet, Rfl: 11    atorvastatin (LIPITOR) 20 mg tablet, TAKE 1 TABLET (20 MG TOTAL) BY MOUTH DAILY, Disp: 30 tablet, Rfl: 5    Cholecalciferol (VITAMIN D3) 2000 UNITS TABS, Take 1 tablet by mouth daily. , Disp: , Rfl:     estradiol (ESTRACE) 0.1 mg/g vaginal cream, Insert into the vagina, Disp: , Rfl:     Ferrous Gluconate-C-Folic Acid (IRON-C PO), Take by mouth, Disp: , Rfl:     fluocinonide (LIDEX) 0.05 % cream, Apply topically 2 (two) times a day, Disp: 60 g, Rfl: 2    fluticasone (FLONASE) 50 mcg/act nasal spray, 2 sprays into each nostril daily, Disp: 16 g, Rfl: 3    hydrochlorothiazide (MICROZIDE) 12.5 mg capsule, TAKE 1 TO 2 CAPSULES DAILY WITH AMLODIPINE, Disp: 60 capsule, Rfl: 5    levothyroxine 125 mcg tablet, TAKE 1 TABLET (125 MCG TOTAL) BY MOUTH DAILY, Disp: 30 tablet, Rfl: 5    nystatin-triamcinolone (MYCOLOG-II) cream, Apply topically 4 (four) times a day, Disp: , Rfl:     Omega-3 Fatty Acids (FISH OIL) 1200 MG CAPS, Take 1 capsule by mouth daily, Disp: , Rfl:     omeprazole (PriLOSEC) 40 MG capsule, Take 1 capsule (40 mg total) by mouth daily, Disp: 30 capsule, Rfl: 5    Ascorbic Acid (vitamin C) 100 MG tablet, Take 100 mg by mouth daily (Patient not taking: Reported on 10/5/2023), Disp: , Rfl:     clotrimazole-betamethasone (LOTRISONE) 1-0.05 % cream, Apply to affected area twice daily. (Patient not taking: Reported on 11/21/2023), Disp: 45 g, Rfl: 1    Multiple Vitamins-Minerals (CENTRUM PO), Take by mouth, Disp: , Rfl:       Review of Systems      Objective:    /90 (BP Location: Left arm, Patient Position: Sitting, Cuff Size: Large)   Wt 96 kg (211 lb 9.6 oz)   LMP  (LMP Unknown)   BMI 34.15 kg/m²        Physical Exam  Vitals and nursing note reviewed. Constitutional:       Appearance: Normal appearance. Genitourinary:     General: Normal vulva. Comments: Exam significantly improved from prior  No erythema or lesions   Minimal concern for lichen   Neurological:      Mental Status: She is alert.

## 2023-12-29 PROBLEM — Z00.00 MEDICARE ANNUAL WELLNESS VISIT, SUBSEQUENT: Status: RESOLVED | Noted: 2023-10-30 | Resolved: 2023-12-29

## 2024-01-10 ENCOUNTER — VBI (OUTPATIENT)
Dept: ADMINISTRATIVE | Facility: OTHER | Age: 74
End: 2024-01-10

## 2024-02-05 ENCOUNTER — ESTABLISHED COMPREHENSIVE EXAM (OUTPATIENT)
Dept: URBAN - METROPOLITAN AREA CLINIC 6 | Facility: CLINIC | Age: 74
End: 2024-02-05

## 2024-02-05 DIAGNOSIS — H01.024: ICD-10-CM

## 2024-02-05 DIAGNOSIS — H02.403: ICD-10-CM

## 2024-02-05 DIAGNOSIS — H25.13: ICD-10-CM

## 2024-02-05 DIAGNOSIS — H53.2: ICD-10-CM

## 2024-02-05 DIAGNOSIS — H52.4: ICD-10-CM

## 2024-02-05 PROCEDURE — 92015 DETERMINE REFRACTIVE STATE: CPT

## 2024-02-05 PROCEDURE — 92014 COMPRE OPH EXAM EST PT 1/>: CPT

## 2024-02-05 ASSESSMENT — TONOMETRY
OS_IOP_MMHG: 12
OD_IOP_MMHG: 13

## 2024-02-05 ASSESSMENT — VISUAL ACUITY
OU_CC: J1
OS_CC: 20/25
OD_CC: 20/20-2

## 2024-02-08 ENCOUNTER — OFFICE VISIT (OUTPATIENT)
Dept: FAMILY MEDICINE CLINIC | Facility: CLINIC | Age: 74
End: 2024-02-08
Payer: MEDICARE

## 2024-02-08 VITALS
SYSTOLIC BLOOD PRESSURE: 126 MMHG | BODY MASS INDEX: 34.81 KG/M2 | HEART RATE: 74 BPM | RESPIRATION RATE: 16 BRPM | TEMPERATURE: 98 F | OXYGEN SATURATION: 98 % | DIASTOLIC BLOOD PRESSURE: 86 MMHG | HEIGHT: 66 IN | WEIGHT: 216.6 LBS

## 2024-02-08 DIAGNOSIS — L30.9 ECZEMA, UNSPECIFIED TYPE: Primary | ICD-10-CM

## 2024-02-08 DIAGNOSIS — B35.4 TINEA CORPORIS: ICD-10-CM

## 2024-02-08 PROCEDURE — 99213 OFFICE O/P EST LOW 20 MIN: CPT | Performed by: FAMILY MEDICINE

## 2024-02-08 RX ORDER — CLOTRIMAZOLE AND BETAMETHASONE DIPROPIONATE 10; .64 MG/G; MG/G
CREAM TOPICAL
Qty: 45 G | Refills: 1 | Status: SHIPPED | OUTPATIENT
Start: 2024-02-08

## 2024-02-08 NOTE — PROGRESS NOTES
Name: Sandy Shipley      : 1950      MRN: [id number]  Encounter Provider: Melody Mancia MD  Encounter Date: 2024   Encounter department: [facility]    Assessment & Plan     1. Eczema, unspecified type    2. Tinea corporis  -     clotrimazole-betamethasone (LOTRISONE) 1-0.05 % cream; Apply to abdomen, thigh and calf twice a day as needed       Patient presents for evaluation of eczema and tenia corporis.  She will start regimen of Lotrisone cream twice a day for the next 7 to 10 days.  She will contact me if symptoms are not improving.  We discussed importance of dry skin precautions.    Subjective     Patient presents for evaluation of rash right mid calf, left upper thigh and mid abdomen.  She has noticed rash couple months ago.  It is itching at times.  Patient usually wears jeans, admits to being tight.  No new laundry detergents or personal care items.  No new medications.  Generally she has been feeling well.    Rash      Review of Systems   Constitutional: Negative.    Respiratory: Negative.     Cardiovascular: Negative.    Skin:  Positive for rash.   Hematological: Negative.    Psychiatric/Behavioral: Negative.         Past Medical History:   Diagnosis Date   • Arthritis     in spine   • Colon polyp    • Constipation 2021   • CPAP (continuous positive airway pressure) dependence    • Cystic breast    • Disease of thyroid gland     hypothyroid   • Dyspareunia, female     last assessed 14   • Esophageal obstruction due to food impaction 2021   • Esophageal ulcer    • Food impaction of esophagus    • GERD (gastroesophageal reflux disease)    • Hepatic cyst 2015   • Hyperlipidemia    • Hypertension    • Hypothyroidism    • Insomnia    • Iron deficiency anemia    • Liver cyst     drained   • Obesity    • IRINA on CPAP     uses cpap   • Osteoporosis    • Severe mitral regurgitation    • Stroke (HCC)    • Thrombocytopenia (HCC)  01/08/2019   • Urinary tract infection    • Varicose veins of lower extremity     last assessed 1/4/13     Past Surgical History:   Procedure Laterality Date   • COLONOSCOPY      complete 5/2016 - Dr Wharton due in 2019 - last assessed  3/17/17   • HERNIA REPAIR     • HIATAL HERNIA REPAIR     • LIVER BIOPSY LAPAROSCOPIC N/A 5/7/2018    Procedure: Laparoscopic marsupialization of liver cyst;  Surgeon: Uriel Gonzalez MD;  [facility];  Service: Surgical Oncology   • NEUROMA EXCISION     • NE ECHO TRANSESOPHAG MONTR CARDIAC PUMP FUNCTJ N/A 1/7/2019    Procedure: TRANSESOPHAGEAL ECHOCARDIOGRAM (DORI);  Surgeon: Eladio Hernandez MD;  [facility];  Service: Cardiac Surgery   • NE ESOPHAGOGASTRODUODENOSCOPY TRANSORAL DIAGNOSTIC N/A 8/10/2016    Procedure: ESOPHAGOGASTRODUODENOSCOPY (EGD);  Surgeon: Sushant Wharton MD;  Location: [facility];  Service: Gastroenterology   • NE ESOPHAGOSCOPY FLEXIBLE TRANSORAL WITH BIOPSY N/A 11/3/2016    Procedure: ESOPHAGOGASTRODUODENOSCOPY (EGD);  Surgeon: Wali Maria MD;  [facility];  Service: Thoracic   • NE LAPS REPAIR HERNIA EXCEPT INCAL/INGUN REDUCIBLE N/A 10/30/2017    Procedure: LAPAROSCOPIC INCISIONAL HERNIA REPAIR X 2 WITH ECHO MESH;  Surgeon: Tanmay Matthews DO;  [facility];  Service: General   • NE LAPS RPR PARAESPHGL HRNA INCL FUNDPLSTY W/MESH Left 11/3/2016    Procedure: LAPAROSCOPIC PARAESOPHAGEAL HERNIA REPAIR WITH MESH;NISSEN FUNDOPLICATION ;  Surgeon: Wali Maria MD;  [facility];  Service: Thoracic   • NE REPAIR FIRST ABDOMINAL WALL HERNIA N/A 1/13/2017    Procedure: INCISIONAL HERNIA REPAIR ;  Surgeon: Tanmay Matthews DO;  [facility];  Service: General   • NE REPLACEMENT MITRAL VALVE W/CARDIOPULMONARY BYP N/A 1/7/2019    Procedure: REPLACEMENT VALVE MITRAL (MVR), repair with 30mm CG Future ring;  Surgeon: Eladio Hernandez MD;  [facility];  Service: Cardiac Surgery   • TUBAL LIGATION       Family  History   Problem Relation Age of Onset   • Heart disease Mother    • Aneurysm Mother         cerebral   • Alcohol abuse Father    • Cancer Father    • COPD Father    • Heart failure Father    • Hypertension Father    • Tuberculosis Father    • Cancer Family    • Breast cancer Paternal Grandmother 98         of natural causes at 99 years old   • No Known Problems Sister    • No Known Problems Daughter    • No Known Problems Maternal Grandmother    • No Known Problems Maternal Grandfather    • No Known Problems Paternal Grandfather    • No Known Problems Son      Social History     Socioeconomic History   • Marital status: /Civil Union     Spouse name: None   • Number of children: None   • Years of education: None   • Highest education level: None   Occupational History   • None   Tobacco Use   • Smoking status: Never   • Smokeless tobacco: Never   Vaping Use   • Vaping status: Never Used   Substance and Sexual Activity   • Alcohol use: Yes     Alcohol/week: 3.0 standard drinks of alcohol     Types: 3 Glasses of wine per week     Comment: social   • Drug use: No   • Sexual activity: Yes     Partners: Male     Birth control/protection: Post-menopausal   Other Topics Concern   • None   Social History Narrative    Coffee    Exercise - walking     Social Determinants of Health     Financial Resource Strain: Low Risk  (10/28/2023)    Overall Financial Resource Strain (CARDIA)    • Difficulty of Paying Living Expenses: Not hard at all   Food Insecurity: Not on file   Transportation Needs: No Transportation Needs (10/28/2023)    PRAPARE - Transportation    • Lack of Transportation (Medical): No    • Lack of Transportation (Non-Medical): No   Physical Activity: Not on file   Stress: Not on file   Social Connections: Not on file   Intimate Partner Violence: Not on file   Housing Stability: Not on file     Current Outpatient Medications on File Prior to Visit   Medication Sig   • amLODIPine (NORVASC) 5 mg tablet  TAKE 1 TABLET (5 MG TOTAL) BY MOUTH DAILY   • Ascorbic Acid (VITAMIN C ER PO) Take 1 capsule by mouth daily   • aspirin 81 MG tablet Take 1 tablet (81 mg total) by mouth daily   • atorvastatin (LIPITOR) 20 mg tablet TAKE 1 TABLET (20 MG TOTAL) BY MOUTH DAILY   • Cholecalciferol (VITAMIN D3) 2000 UNITS TABS Take 1 tablet by mouth daily.   • estradiol (ESTRACE) 0.1 mg/g vaginal cream Insert into the vagina   • Ferrous Gluconate-C-Folic Acid (IRON-C PO) Take by mouth   • fluocinonide (LIDEX) 0.05 % cream Apply topically 2 (two) times a day   • fluticasone (FLONASE) 50 mcg/act nasal spray 2 sprays into each nostril daily   • hydrochlorothiazide (MICROZIDE) 12.5 mg capsule TAKE 1 TO 2 CAPSULES DAILY WITH AMLODIPINE   • levothyroxine 125 mcg tablet TAKE 1 TABLET (125 MCG TOTAL) BY MOUTH DAILY   • nystatin-triamcinolone (MYCOLOG-II) cream Apply topically 4 (four) times a day   • Omega-3 Fatty Acids (FISH OIL) 1200 MG CAPS Take 1 capsule by mouth daily   • omeprazole (PriLOSEC) 40 MG capsule Take 1 capsule (40 mg total) by mouth daily   • Ascorbic Acid (vitamin C) 100 MG tablet Take 100 mg by mouth daily (Patient not taking: Reported on 10/5/2023)     Allergies   Allergen Reactions   • Other Other (See Comments)     Skin breakdown from bandaid on for long time- reddness     Immunization History   Administered Date(s) Administered   • COVID-19 MODERNA VACC 0.5 ML IM 01/29/2021, 02/24/2021, 11/22/2021   • COVID-19 Pfizer Vac BIVALENT Henry-sucrose 12 Yr+ IM 11/10/2022   • Influenza Quadrivalent Preservative Free 3 years and older IM 11/11/2016   • Influenza Split High Dose Preservative Free IM 09/21/2015, 09/15/2017, 10/21/2019   • Influenza, high dose seasonal 0.7 mL 10/19/2018, 09/08/2020, 10/13/2021, 10/24/2022, 11/10/2023   • Influenza, seasonal, injectable 11/01/2010, 01/24/2013, 09/22/2014   • Pneumococcal Conjugate 13-Valent 01/19/2016   • Pneumococcal Polysaccharide PPV23 09/15/2017   • Tdap 11/09/2010       Objective  "    /86 (BP Location: Left arm, Patient Position: Sitting, Cuff Size: Large)   Pulse 74   Temp 98 °F (36.7 °C) (Temporal)   Resp 16   Ht 5' 6\" (1.676 m)   Wt 98.2 kg (216 lb 9.6 oz)   LMP  (LMP Unknown)   SpO2 98%   BMI 34.96 kg/m²     Physical Exam  Constitutional:       Appearance: Normal appearance.   HENT:      Head: Normocephalic and atraumatic.   Skin:            Comments: Patch of eczema left thigh and right calf.    Erythematous rash along waistline, mild scaly appearance mid abdomen.     Neurological:      General: No focal deficit present.      Mental Status: She is alert and oriented to person, place, and time.   Psychiatric:         Mood and Affect: Mood normal.         Behavior: Behavior normal.       Melody Mancia MD    "

## 2024-03-04 ENCOUNTER — CONSULT EP (OUTPATIENT)
Dept: URBAN - METROPOLITAN AREA CLINIC 6 | Facility: CLINIC | Age: 74
End: 2024-03-04

## 2024-03-04 DIAGNOSIS — H02.413: ICD-10-CM

## 2024-03-04 PROCEDURE — 99214 OFFICE O/P EST MOD 30 MIN: CPT

## 2024-03-04 ASSESSMENT — VISUAL ACUITY
OD_CC: 20/30+2
OS_CC: 20/30-2

## 2024-03-05 DIAGNOSIS — K21.9 CHRONIC GERD: ICD-10-CM

## 2024-03-05 RX ORDER — OMEPRAZOLE 40 MG/1
40 CAPSULE, DELAYED RELEASE ORAL DAILY
Qty: 30 CAPSULE | Refills: 5 | Status: SHIPPED | OUTPATIENT
Start: 2024-03-05 | End: 2024-09-01

## 2024-04-04 DIAGNOSIS — E78.5 HYPERLIPIDEMIA, UNSPECIFIED HYPERLIPIDEMIA TYPE: ICD-10-CM

## 2024-04-04 DIAGNOSIS — E03.9 HYPOTHYROIDISM, UNSPECIFIED TYPE: ICD-10-CM

## 2024-04-04 RX ORDER — ATORVASTATIN CALCIUM 20 MG/1
20 TABLET, FILM COATED ORAL DAILY
Qty: 30 TABLET | Refills: 5 | Status: SHIPPED | OUTPATIENT
Start: 2024-04-04

## 2024-04-04 RX ORDER — LEVOTHYROXINE SODIUM 0.12 MG/1
125 TABLET ORAL DAILY
Qty: 30 TABLET | Refills: 5 | Status: SHIPPED | OUTPATIENT
Start: 2024-04-04

## 2024-04-29 ENCOUNTER — APPOINTMENT (OUTPATIENT)
Dept: LAB | Facility: CLINIC | Age: 74
End: 2024-04-29
Payer: MEDICARE

## 2024-04-29 ENCOUNTER — RA CDI HCC (OUTPATIENT)
Dept: OTHER | Facility: HOSPITAL | Age: 74
End: 2024-04-29

## 2024-04-29 DIAGNOSIS — E78.2 MIXED HYPERLIPIDEMIA: ICD-10-CM

## 2024-04-29 DIAGNOSIS — R73.02 IGT (IMPAIRED GLUCOSE TOLERANCE): ICD-10-CM

## 2024-04-29 DIAGNOSIS — E03.9 HYPOTHYROIDISM, UNSPECIFIED TYPE: ICD-10-CM

## 2024-04-29 LAB
ALBUMIN SERPL BCP-MCNC: 4 G/DL (ref 3.5–5)
ALP SERPL-CCNC: 71 U/L (ref 34–104)
ALT SERPL W P-5'-P-CCNC: 14 U/L (ref 7–52)
ANION GAP SERPL CALCULATED.3IONS-SCNC: 7 MMOL/L (ref 4–13)
AST SERPL W P-5'-P-CCNC: 15 U/L (ref 13–39)
BILIRUB SERPL-MCNC: 0.9 MG/DL (ref 0.2–1)
BUN SERPL-MCNC: 17 MG/DL (ref 5–25)
CALCIUM SERPL-MCNC: 9.4 MG/DL (ref 8.4–10.2)
CHLORIDE SERPL-SCNC: 104 MMOL/L (ref 96–108)
CHOLEST SERPL-MCNC: 148 MG/DL
CO2 SERPL-SCNC: 31 MMOL/L (ref 21–32)
CREAT SERPL-MCNC: 0.95 MG/DL (ref 0.6–1.3)
ERYTHROCYTE [DISTWIDTH] IN BLOOD BY AUTOMATED COUNT: 13.1 % (ref 11.6–15.1)
EST. AVERAGE GLUCOSE BLD GHB EST-MCNC: 123 MG/DL
GFR SERPL CREATININE-BSD FRML MDRD: 59 ML/MIN/1.73SQ M
GLUCOSE P FAST SERPL-MCNC: 109 MG/DL (ref 65–99)
HBA1C MFR BLD: 5.9 %
HCT VFR BLD AUTO: 41.9 % (ref 34.8–46.1)
HDLC SERPL-MCNC: 62 MG/DL
HGB BLD-MCNC: 13.2 G/DL (ref 11.5–15.4)
LDLC SERPL CALC-MCNC: 66 MG/DL (ref 0–100)
MCH RBC QN AUTO: 30.6 PG (ref 26.8–34.3)
MCHC RBC AUTO-ENTMCNC: 31.5 G/DL (ref 31.4–37.4)
MCV RBC AUTO: 97 FL (ref 82–98)
PLATELET # BLD AUTO: 162 THOUSANDS/UL (ref 149–390)
PMV BLD AUTO: 10.2 FL (ref 8.9–12.7)
POTASSIUM SERPL-SCNC: 4 MMOL/L (ref 3.5–5.3)
PROT SERPL-MCNC: 7.4 G/DL (ref 6.4–8.4)
RBC # BLD AUTO: 4.31 MILLION/UL (ref 3.81–5.12)
SODIUM SERPL-SCNC: 142 MMOL/L (ref 135–147)
TRIGL SERPL-MCNC: 100 MG/DL
TSH SERPL DL<=0.05 MIU/L-ACNC: 1.82 UIU/ML (ref 0.45–4.5)
WBC # BLD AUTO: 5.17 THOUSAND/UL (ref 4.31–10.16)

## 2024-04-29 PROCEDURE — 80053 COMPREHEN METABOLIC PANEL: CPT

## 2024-04-29 PROCEDURE — 83036 HEMOGLOBIN GLYCOSYLATED A1C: CPT

## 2024-04-29 PROCEDURE — 80061 LIPID PANEL: CPT

## 2024-04-29 PROCEDURE — 36415 COLL VENOUS BLD VENIPUNCTURE: CPT

## 2024-04-29 PROCEDURE — 85027 COMPLETE CBC AUTOMATED: CPT

## 2024-04-29 PROCEDURE — 84443 ASSAY THYROID STIM HORMONE: CPT

## 2024-05-06 ENCOUNTER — OFFICE VISIT (OUTPATIENT)
Dept: FAMILY MEDICINE CLINIC | Facility: CLINIC | Age: 74
End: 2024-05-06
Payer: MEDICARE

## 2024-05-06 VITALS
WEIGHT: 218.6 LBS | DIASTOLIC BLOOD PRESSURE: 84 MMHG | OXYGEN SATURATION: 99 % | BODY MASS INDEX: 35.13 KG/M2 | SYSTOLIC BLOOD PRESSURE: 118 MMHG | RESPIRATION RATE: 16 BRPM | HEART RATE: 66 BPM | HEIGHT: 66 IN | TEMPERATURE: 98 F

## 2024-05-06 DIAGNOSIS — R73.02 IGT (IMPAIRED GLUCOSE TOLERANCE): ICD-10-CM

## 2024-05-06 DIAGNOSIS — G47.33 SEVERE OBSTRUCTIVE SLEEP APNEA: Chronic | ICD-10-CM

## 2024-05-06 DIAGNOSIS — I10 BENIGN ESSENTIAL HYPERTENSION: Primary | ICD-10-CM

## 2024-05-06 DIAGNOSIS — E03.9 HYPOTHYROIDISM, UNSPECIFIED TYPE: ICD-10-CM

## 2024-05-06 DIAGNOSIS — Z12.31 ENCOUNTER FOR SCREENING MAMMOGRAM FOR BREAST CANCER: ICD-10-CM

## 2024-05-06 DIAGNOSIS — K21.9 CHRONIC GERD: ICD-10-CM

## 2024-05-06 DIAGNOSIS — E78.2 MIXED HYPERLIPIDEMIA: ICD-10-CM

## 2024-05-06 PROCEDURE — G2211 COMPLEX E/M VISIT ADD ON: HCPCS | Performed by: FAMILY MEDICINE

## 2024-05-06 PROCEDURE — 99214 OFFICE O/P EST MOD 30 MIN: CPT | Performed by: FAMILY MEDICINE

## 2024-05-06 NOTE — PROGRESS NOTES
Name: Sandy Shipley      : 1950      MRN: [id number]  Encounter Provider: Melody Mancia MD  Encounter Date: 2024   Encounter department: [facility]    Assessment & Plan     1. Benign essential hypertension  Assessment & Plan:  Blood pressure is well-controlled.  Continue amlodipine and HCTZ    Orders:  -     CBC; Future    2. Severe obstructive sleep apnea  Assessment & Plan:  Continue CPAP      3. Hypothyroidism, unspecified type  Assessment & Plan:  TSH is normal, continue levothyroxine 125 mcg daily    Orders:  -     Comprehensive metabolic panel; Future    4. Mixed hyperlipidemia  Assessment & Plan:  Continue atorvastatin 20 mg daily.  Total cholesterol is 148 with LDL of 66    Orders:  -     Lipid Panel with Direct LDL reflex; Future  -     TSH, 3rd generation; Future    5. Encounter for screening mammogram for breast cancer  -     Mammo screening bilateral w 3d & cad; Future; Expected date: 2024    6. Chronic GERD  Assessment & Plan:  Omeprazole 40 mg daily      7. IGT (impaired glucose tolerance)  -     Hemoglobin A1C; Future      Patient Instructions   Mammogram is due in August  Low carbohydrate diet -please research high and low glycemic index  Stay on omeprazole, if no concerns- you may follow up with GI on an as ended basis  Labs and follow up 6 months         Subjective     The patient presents for follow-up.  Results of recent blood work reviewed.  Fasting blood glucose has improved and went down from 113 to 1 09.  Hemoglobin A1c slightly elevated 5.9%.  TSH is normal.  Lipid panel is excellent.  The patient remains on daily medications as directed.    Last mammogram 2023    History of food bolus.  History of hiatal hernia repair.   EGD 2022  IMPRESSION:Evidence of prior Nissen fundoplication, mild antritis, history of reflux and a history of food impaction dilated over a year ago doing well  RECOMMENDATION:Acid suppression, reflux precautions,  chew food well    The patient remains on omeprazole, no symptoms.    No complaints of chest pain, palpitations, shortness of breath or dizziness.        Review of Systems   Constitutional: Negative.    HENT: Negative.     Eyes: Negative.    Respiratory: Negative.     Cardiovascular: Negative.    Gastrointestinal: Negative.    Endocrine: Negative.    Genitourinary: Negative.    Musculoskeletal: Negative.    Allergic/Immunologic: Negative.    Neurological: Negative.    Hematological: Negative.    Psychiatric/Behavioral: Negative.         Past Medical History:   Diagnosis Date   • Arthritis     in spine   • Colon polyp    • Constipation 11/02/2021   • CPAP (continuous positive airway pressure) dependence    • Cystic breast    • Disease of thyroid gland     hypothyroid   • Dyspareunia, female     last assessed 9/4/14   • Esophageal obstruction due to food impaction 11/03/2021 September of 2021   • Esophageal ulcer    • Food impaction of esophagus    • GERD (gastroesophageal reflux disease)    • Hepatic cyst 09/21/2015   • Hyperlipidemia    • Hypertension    • Hypothyroidism    • Insomnia    • Iron deficiency anemia    • Liver cyst     drained   • Obesity    • IRINA on CPAP     uses cpap   • Osteoporosis    • Severe mitral regurgitation    • Stroke (HCC)    • Thrombocytopenia (HCC) 01/08/2019   • Urinary tract infection    • Varicose veins of lower extremity     last assessed 1/4/13     Past Surgical History:   Procedure Laterality Date   • COLONOSCOPY      complete 5/2016 - Dr Wharton due in 2019 - last assessed  3/17/17   • HERNIA REPAIR     • HIATAL HERNIA REPAIR     • LIVER BIOPSY LAPAROSCOPIC N/A 5/7/2018    Procedure: Laparoscopic marsupialization of liver cyst;  Surgeon: Uriel Gonzalez MD;  [facility];  Service: Surgical Oncology   • NEUROMA EXCISION     • TX ECHO TRANSESOPHAG MONTR CARDIAC PUMP FUNCTJ N/A 1/7/2019    Procedure: TRANSESOPHAGEAL ECHOCARDIOGRAM (DORI);  Surgeon: Eladio Hernandez MD;   [facility];  Service: Cardiac Surgery   • GA ESOPHAGOGASTRODUODENOSCOPY TRANSORAL DIAGNOSTIC N/A 8/10/2016    Procedure: ESOPHAGOGASTRODUODENOSCOPY (EGD);  Surgeon: Sushant Wharton MD;  [facility];  Service: Gastroenterology   • GA ESOPHAGOSCOPY FLEXIBLE TRANSORAL WITH BIOPSY N/A 11/3/2016    Procedure: ESOPHAGOGASTRODUODENOSCOPY (EGD);  Surgeon: Wali Maria MD;  [facility];  Service: Thoracic   • GA LAPS REPAIR HERNIA EXCEPT INCAL/INGUN REDUCIBLE N/A 10/30/2017    Procedure: LAPAROSCOPIC INCISIONAL HERNIA REPAIR X 2 WITH ECHO MESH;  Surgeon: Tanmay Matthews DO;  [facility];  Service: General   • GA LAPS RPR PARAESPHGL HRNA INCL FUNDPLSTY W/MESH Left 11/3/2016    Procedure: LAPAROSCOPIC PARAESOPHAGEAL HERNIA REPAIR WITH MESH;NISSEN FUNDOPLICATION ;  Surgeon: Wali Maria MD;  [facility];  Service: Thoracic   • GA REPAIR FIRST ABDOMINAL WALL HERNIA N/A 2017    Procedure: INCISIONAL HERNIA REPAIR ;  Surgeon: Tanmay Matthews DO;  [facility];  Service: General   • GA REPLACEMENT MITRAL VALVE W/CARDIOPULMONARY BYP N/A 2019    Procedure: REPLACEMENT VALVE MITRAL (MVR), repair with 30mm CG Future ring;  Surgeon: Eladio Hernandez MD;  [facility];  Service: Cardiac Surgery   • TUBAL LIGATION       Family History   Problem Relation Age of Onset   • Heart disease Mother    • Aneurysm Mother         cerebral   • Alcohol abuse Father    • Cancer Father    • COPD Father    • Heart failure Father    • Hypertension Father    • Tuberculosis Father    • Cancer Family    • Breast cancer Paternal Grandmother 98         of natural causes at 99 years old   • No Known Problems Sister    • No Known Problems Daughter    • No Known Problems Maternal Grandmother    • No Known Problems Maternal Grandfather    • No Known Problems Paternal Grandfather    • No Known Problems Son      Social History     Socioeconomic History   • Marital status: /Civil  Union     Spouse name: None   • Number of children: None   • Years of education: None   • Highest education level: None   Occupational History   • None   Tobacco Use   • Smoking status: Never   • Smokeless tobacco: Never   Vaping Use   • Vaping status: Never Used   Substance and Sexual Activity   • Alcohol use: Yes     Alcohol/week: 3.0 standard drinks of alcohol     Types: 3 Glasses of wine per week     Comment: social   • Drug use: No   • Sexual activity: Yes     Partners: Male     Birth control/protection: Post-menopausal   Other Topics Concern   • None   Social History Narrative    Coffee    Exercise - walking     Social Determinants of Health     Financial Resource Strain: Low Risk  (10/28/2023)    Overall Financial Resource Strain (CARDIA)    • Difficulty of Paying Living Expenses: Not hard at all   Food Insecurity: Not on file   Transportation Needs: No Transportation Needs (10/28/2023)    PRAPARE - Transportation    • Lack of Transportation (Medical): No    • Lack of Transportation (Non-Medical): No   Physical Activity: Not on file   Stress: Not on file   Social Connections: Not on file   Intimate Partner Violence: Not on file   Housing Stability: Not on file     Current Outpatient Medications on File Prior to Visit   Medication Sig   • amLODIPine (NORVASC) 5 mg tablet TAKE 1 TABLET (5 MG TOTAL) BY MOUTH DAILY   • Ascorbic Acid (VITAMIN C ER PO) Take 1 capsule by mouth daily   • aspirin 81 MG tablet Take 1 tablet (81 mg total) by mouth daily   • atorvastatin (LIPITOR) 20 mg tablet TAKE 1 TABLET (20 MG TOTAL) BY MOUTH DAILY   • Cholecalciferol (VITAMIN D3) 2000 UNITS TABS Take 1 tablet by mouth daily.   • clotrimazole-betamethasone (LOTRISONE) 1-0.05 % cream Apply to abdomen, thigh and calf twice a day as needed   • estradiol (ESTRACE) 0.1 mg/g vaginal cream Insert into the vagina   • Ferrous Gluconate-C-Folic Acid (IRON-C PO) Take by mouth   • fluocinonide (LIDEX) 0.05 % cream Apply topically 2 (two) times a  "day   • fluticasone (FLONASE) 50 mcg/act nasal spray 2 sprays into each nostril daily   • hydrochlorothiazide (MICROZIDE) 12.5 mg capsule TAKE 1 TO 2 CAPSULES DAILY WITH AMLODIPINE   • levothyroxine 125 mcg tablet TAKE 1 TABLET (125 MCG TOTAL) BY MOUTH DAILY   • nystatin-triamcinolone (MYCOLOG-II) cream Apply topically 4 (four) times a day   • Omega-3 Fatty Acids (FISH OIL) 1200 MG CAPS Take 1 capsule by mouth daily   • omeprazole (PriLOSEC) 40 MG capsule TAKE 1 CAPSULE (40 MG TOTAL) BY MOUTH DAILY   • Ascorbic Acid (vitamin C) 100 MG tablet Take 100 mg by mouth daily (Patient not taking: Reported on 10/5/2023)     Allergies   Allergen Reactions   • Other Other (See Comments)     Skin breakdown from bandaid on for long time- reddness     Immunization History   Administered Date(s) Administered   • COVID-19 MODERNA VACC 0.5 ML IM 01/29/2021, 02/24/2021, 11/22/2021   • COVID-19 Pfizer Vac BIVALENT Henry-sucrose 12 Yr+ IM 11/10/2022   • Influenza Quadrivalent Preservative Free 3 years and older IM 11/11/2016   • Influenza Split High Dose Preservative Free IM 09/21/2015, 09/15/2017, 10/21/2019   • Influenza, high dose seasonal 0.7 mL 10/19/2018, 09/08/2020, 10/13/2021, 10/24/2022, 11/10/2023   • Influenza, seasonal, injectable 11/01/2010, 01/24/2013, 09/22/2014   • Pneumococcal Conjugate 13-Valent 01/19/2016   • Pneumococcal Polysaccharide PPV23 09/15/2017   • Tdap 11/09/2010       Objective     /84 (BP Location: Left arm, Patient Position: Sitting, Cuff Size: Large)   Pulse 66   Temp 98 °F (36.7 °C) (Temporal)   Resp 16   Ht 5' 6\" (1.676 m)   Wt 99.2 kg (218 lb 9.6 oz)   LMP  (LMP Unknown)   SpO2 99%   BMI 35.28 kg/m²     Physical Exam  Vitals and nursing note reviewed.   Constitutional:       General: She is not in acute distress.     Appearance: Normal appearance. She is well-developed. She is not ill-appearing.   HENT:      Head: Normocephalic and atraumatic.   Eyes:      Conjunctiva/sclera: " Conjunctivae normal.   Neck:      Thyroid: No thyromegaly.      Vascular: No carotid bruit.   Cardiovascular:      Rate and Rhythm: Normal rate and regular rhythm.      Heart sounds: Normal heart sounds. No murmur heard.  Pulmonary:      Effort: Pulmonary effort is normal. No respiratory distress.      Breath sounds: Normal breath sounds. No wheezing.   Abdominal:      General: Bowel sounds are normal. There is no abdominal bruit.   Musculoskeletal:         General: Normal range of motion.      Cervical back: Neck supple.      Right lower leg: No edema.      Left lower leg: No edema.   Neurological:      General: No focal deficit present.      Mental Status: She is alert and oriented to person, place, and time.      Cranial Nerves: No cranial nerve deficit.      Coordination: Coordination normal.   Psychiatric:         Mood and Affect: Mood normal.         Behavior: Behavior normal.       Melody Mancia MD

## 2024-05-06 NOTE — PATIENT INSTRUCTIONS
Mammogram is due in August  Low carbohydrate diet -please research high and low glycemic index  Stay on omeprazole, if no concerns- you may follow up with GI on an as ended basis  Labs and follow up 6 months

## 2024-06-24 ENCOUNTER — TELEPHONE (OUTPATIENT)
Age: 74
End: 2024-06-24

## 2024-06-24 NOTE — TELEPHONE ENCOUNTER
Pt called, reports having right hip pain for a couple of weeks, progressively getting worse. Pt reports sharp pain occurs with changing positions from sitting to standing, improves after walking around. Pt denies recent injury or fall. Appointment made for tomorrow. Pt agreeable.

## 2024-06-25 ENCOUNTER — OFFICE VISIT (OUTPATIENT)
Dept: FAMILY MEDICINE CLINIC | Facility: CLINIC | Age: 74
End: 2024-06-25
Payer: MEDICARE

## 2024-06-25 ENCOUNTER — HOSPITAL ENCOUNTER (OUTPATIENT)
Dept: RADIOLOGY | Facility: HOSPITAL | Age: 74
Discharge: HOME | End: 2024-06-25
Payer: MEDICARE

## 2024-06-25 VITALS
HEIGHT: 66 IN | RESPIRATION RATE: 16 BRPM | OXYGEN SATURATION: 97 % | TEMPERATURE: 98.2 F | BODY MASS INDEX: 35.03 KG/M2 | SYSTOLIC BLOOD PRESSURE: 120 MMHG | WEIGHT: 218 LBS | DIASTOLIC BLOOD PRESSURE: 90 MMHG | HEART RATE: 66 BPM

## 2024-06-25 DIAGNOSIS — E66.01 OBESITY, MORBID (HCC): ICD-10-CM

## 2024-06-25 DIAGNOSIS — M25.551 RIGHT HIP PAIN: Primary | ICD-10-CM

## 2024-06-25 DIAGNOSIS — M25.551 RIGHT HIP PAIN: ICD-10-CM

## 2024-06-25 PROCEDURE — 99213 OFFICE O/P EST LOW 20 MIN: CPT | Performed by: FAMILY MEDICINE

## 2024-06-25 PROCEDURE — 73502 X-RAY EXAM HIP UNI 2-3 VIEWS: CPT

## 2024-06-25 PROCEDURE — G2211 COMPLEX E/M VISIT ADD ON: HCPCS | Performed by: FAMILY MEDICINE

## 2024-06-25 NOTE — ASSESSMENT & PLAN NOTE
Suspect some tendinopathy due to quick resolution but will get XR given previously noted degnerative changes. PT referral provided. Follow up with results when available.

## 2024-06-25 NOTE — PROGRESS NOTES
"Ambulatory Visit  Name: Sandy Shipley      : 1950      MRN: [id number]  Encounter Provider: Torrey Christine DO  Encounter Date: 2024   Encounter department: [facility]    Assessment & Plan   1. Right hip pain  Assessment & Plan:  Suspect some tendinopathy due to quick resolution but will get XR given previously noted degnerative changes. PT referral provided. Follow up with results when available.  Orders:  -     XR hip/pelv 2-3 vws right if performed; Future; Expected date: 2024  -     Ambulatory Referral to Physical Therapy; Future  2. Obesity, morbid (HCC)  Assessment & Plan:  Healthy diet. Continue active lifestyle.     History of Present Illness     HPI    Right hip pain with standing, sitting, laying. Pain seems to be more anterior. Pain improves with walking. No clicking sensation. Feels sharp and has difficulty bearing weight when it happens but only for a few steps.    Enjoys camping with [employer].    Review of Systems    Objective     /90   Pulse 66   Temp 98.2 °F (36.8 °C) (Temporal)   Resp 16   Ht 5' 6\" (1.676 m)   Wt 98.9 kg (218 lb)   LMP  (LMP Unknown)   SpO2 97%   BMI 35.19 kg/m²     Physical Exam  Vitals reviewed.   Constitutional:       Appearance: Normal appearance.   Cardiovascular:      Rate and Rhythm: Normal rate.   Pulmonary:      Effort: Pulmonary effort is normal.   Musculoskeletal:         General: Tenderness (greater trochanter right side) present. No swelling or deformity. Normal range of motion.   Neurological:      Mental Status: She is alert.       Administrative Statements           "

## 2024-06-26 ENCOUNTER — TELEPHONE (OUTPATIENT)
Dept: FAMILY MEDICINE CLINIC | Facility: CLINIC | Age: 74
End: 2024-06-26

## 2024-06-26 NOTE — TELEPHONE ENCOUNTER
----- Message from Torrey Christine DO sent at 6/26/2024  2:16 PM EDT -----  Xray shows moderate arthritis of the hip - please schedule with PT as discussed and if not improved she can see orthopedics for a possible injection.

## 2024-06-27 NOTE — PROGRESS NOTES
PT Evaluation     Today's date: 2024  Patient name: Sandy Shipley  : 1950  MRN: [id number]  Referring provider: Torrey Christine DO  Dx:   Encounter Diagnosis     ICD-10-CM    1. Right hip pain  M25.551 Ambulatory Referral to Physical Therapy          Start Time: 1015  Stop Time: 1055  Total time in clinic (min): 40 minutes    Assessment  Impairments: abnormal gait, abnormal muscle tone, abnormal or restricted ROM, activity intolerance, impaired balance, impaired physical strength, lacks appropriate home exercise program, pain with function and weight-bearing intolerance  Symptom irritability: moderate    Assessment details:   Sandy Shipley is a pleasant 73 y.o. female who presents with acute/sub acute right hip pain. Patient demonstrates an extension directional preference. No focal weakness. No ROM deficits assoicated with OA. Provided and reviewed HEP. No further referral appears necessary at this time based upon examination results.  I expect she will improve in 4-6 weeks.   Understanding of Dx/Px/POC: good     Prognosis: good    Goals  Short Term Goals (Week 4):  1. Decreased pain by 50%  2. Demonstrate full lumbar AROM  3. Demonstrate full pain free PROM      Long Term Goals (8 weeks):  1. GROC >75%  2. Patient will exceed FOTO predicted outcome score  3. Patient will be fully independent with HEP by discharge  4. Patient will be able to manage symptoms independently.       Plan  Patient would benefit from: skilled physical therapy    Planned therapy interventions: graded exercise, functional ROM exercises, flexibility, gait training, graded activity, home exercise program, therapeutic training, therapeutic exercise, therapeutic activities, stretching, strengthening, patient education, neuromuscular re-education, nerve gliding, behavior modification, balance, activity modification, manual therapy, IASTM and joint mobilization    Frequency: 2x week  Duration in weeks: 6  Treatment plan discussed with:  patient        Subjective  Patient presents to PT with acute/subacute right hip pain that started around 6-7 weeks ago. Patient cannot recall any cause of the symptoms. Patient reports the symptoms are localized to her anterior. Patient describes the pain as sharp. Patient reports pain can get up to a an 8/10 but denies any pain at rest. Patient reports her symptoms are aggravated when she goes to stand after sitting/lying for a while. Patient reports her symptoms immediate resolve once she is up and moving. Patient had x-rays which showed moderate OA. Patient reports no improvements with Tylenol arthritis. Patient denies any resting pain. Patient denies any weakness. Patient has no prior injuries/traumas/surgeries. Patient goals are to be ready for her trip in July.       Objective  Myotomes  Strength WNL bilaterally.    Dermatomes  Sensation intact bilaterally    Neurodynamic Testing  Slump Test: -  SLR: -   Femoral Nerve Traction Test: -    Lumbar Active Range of Motion  Movement Loss Symptoms Abner Mod Min Nil Symptoms   Flexion   x     Extension  x      R SG   x  Right Hip Discomfort   L SG   x     Other          Hip Passive Range of Motion  WNL in all directions, pain with passive hip abduction    Ignacio Assessment  Rep MIGUEL: no effect, nb/nw  Rep EIL: no effect, better      Hip Special Tests:  FADIRS= (-), FABERS= (-), Scours= (-), Distraction= (-)      SIJ Special Tests:  Compression= (-), Gapping= (-), FABERS= (-), Gaenslan's= (+), Sacral Thrust/PA Pressure= (+), Post Thigh Thrust= (-)    Palpation  No tenderness       Precautions: HTN      Manuals 6/28                                                                Neuro Re-Ed                                                                                                        Ther Ex             Pt Edu, HEP, POC PRR            REIL HEP            Modifed Jovani Stretch EOB HEP                                                                                           Ther Activity                                       Gait Training                                       Modalities

## 2024-06-28 ENCOUNTER — EVALUATION (OUTPATIENT)
Dept: PHYSICAL THERAPY | Facility: REHABILITATION | Age: 74
End: 2024-06-28
Payer: MEDICARE

## 2024-06-28 DIAGNOSIS — M25.551 RIGHT HIP PAIN: Primary | ICD-10-CM

## 2024-06-28 PROCEDURE — 97162 PT EVAL MOD COMPLEX 30 MIN: CPT

## 2024-06-28 PROCEDURE — 97110 THERAPEUTIC EXERCISES: CPT

## 2024-07-03 ENCOUNTER — OFFICE VISIT (OUTPATIENT)
Dept: PHYSICAL THERAPY | Facility: REHABILITATION | Age: 74
End: 2024-07-03
Payer: MEDICARE

## 2024-07-03 DIAGNOSIS — M25.551 RIGHT HIP PAIN: Primary | ICD-10-CM

## 2024-07-03 PROCEDURE — 97140 MANUAL THERAPY 1/> REGIONS: CPT

## 2024-07-03 PROCEDURE — 97110 THERAPEUTIC EXERCISES: CPT

## 2024-07-03 NOTE — PROGRESS NOTES
"Daily Note     Today's date: 7/3/2024  Patient name: Sandy Shipley  : 1950  MRN: [id number]  Referring provider: Torrey Christine DO  Dx:   Encounter Diagnosis     ICD-10-CM    1. Right hip pain  M25.551           Start Time: 1600  Stop Time: 1630  Total time in clinic (min): 30 minutes    Subjective: patient reports symptoms remain similar to last session. Patient reports the hip flexor stretch was painful      Objective: See treatment diary below      Assessment: Tolerated treatment well today. Pain reproduced with hip IR and ABD PROM, and LAD. Symptoms almost fully abolished with REIL. Reviewed HEP to avoid aggravating symptoms. Will re-assess next visit. Patient would benefit from continued PT      Plan: Continue per plan of care.      Precautions: HTN      Manuals 6/28 7/3                                                  Re-Assessment  PRR           Neuro Re-Ed                                                                                                        Ther Ex             Pt Edu, HEP, POC PRR PRR           REIL HEP 3x5           Modifed Jovani Stretch EOB HEP            Supine Clamshell  Yhb 1x10 3\" hold           Bridge w/ Abd Iso  Yhb 1x10                                                               Ther Activity                                       Gait Training                                       Modalities                                            "

## 2024-07-10 ENCOUNTER — OFFICE VISIT (OUTPATIENT)
Dept: PHYSICAL THERAPY | Facility: REHABILITATION | Age: 74
End: 2024-07-10
Payer: MEDICARE

## 2024-07-10 DIAGNOSIS — M25.551 RIGHT HIP PAIN: Primary | ICD-10-CM

## 2024-07-10 PROCEDURE — 97140 MANUAL THERAPY 1/> REGIONS: CPT

## 2024-07-10 PROCEDURE — 97110 THERAPEUTIC EXERCISES: CPT

## 2024-07-10 NOTE — PROGRESS NOTES
"Daily Note     Today's date: 7/10/2024  Patient name: Sandy Shipley  : 1950  MRN: [id number]  Referring provider: Torrey Christine DO  Dx:   Encounter Diagnosis     ICD-10-CM    1. Right hip pain  M25.551           Start Time: 1530  Stop Time: 1615  Total time in clinic (min): 45 minutes    Subjective: Patient reports compliance with HEP. States symptoms only occur in the am and towards end of day after not moving for a while.      Objective: See treatment diary below      Assessment: Tolerated treatment well today. Symptoms lasted <2 seconds upon rising from lying and sitting following activities today. Updated HEP see below. Patient would benefit from continued PT      Plan: Continue per plan of care.      Precautions: HTN      Manuals 6/28 7/3 7/10                                                 Re-Assessment  PRR PRR          Neuro Re-Ed                                                                                                        Ther Ex             Pt Edu, HEP, POC PRR PRR HEP          REIL HEP 3x5 reviewed          Modifed Jovani Stretch EOB HEP            Supine Clamshell  Yhb 1x10 3\" hold Yhb 2x10 3\" hold          Bridge w/ Abd Iso  Yhb 1x10 Yhb 2x10           Prone Knee Flexion   5x 10\" hold          Resisted Side Stepping   Yb 5 laps          VG   L7 7' DL                       Ther Activity                                       Gait Training                                       Modalities                                              "

## 2024-07-12 ENCOUNTER — OFFICE VISIT (OUTPATIENT)
Dept: PHYSICAL THERAPY | Facility: REHABILITATION | Age: 74
End: 2024-07-12
Payer: MEDICARE

## 2024-07-12 DIAGNOSIS — M25.551 RIGHT HIP PAIN: Primary | ICD-10-CM

## 2024-07-12 PROCEDURE — 97140 MANUAL THERAPY 1/> REGIONS: CPT

## 2024-07-12 PROCEDURE — 97110 THERAPEUTIC EXERCISES: CPT

## 2024-07-12 NOTE — PROGRESS NOTES
"Daily Note     Today's date: 2024  Patient name: Sandy Shipley  : 1950  MRN: [id number]  Referring provider: Torrey Christine DO  Dx:   Encounter Diagnosis     ICD-10-CM    1. Right hip pain  M25.551           Start Time: 930  Stop Time: 1000  Total time in clinic (min): 30 minutes  Patient 1 on 1 with PT from 930-1000.    Subjective: Patient reports slight improvements in symptoms since last visit and updated HEP. States getting up and the morning and getting up from sitting are when she experiences the symptoms. Symptoms are brief and last <30 seconds.      Objective: See treatment diary below      Assessment: Tolerated treatment well today. TTP in deep gluteal musculature. Resolution of symptoms end of session. Updated HEP see below. Will see patient back in 1 week to determine effectiveness of updated HEP. . Patient would benefit from continued PT      Plan: Continue per plan of care.      Precautions: HTN      Manuals  7/3 7/10 7/12                      STM deep gluteals    PRR 2 rds                      Re-Assessment  PRR PRR PRR         Neuro Re-Ed                                                                                                        Ther Ex             Pt Edu, HEP, POC PRR PRR HEP HEP         REIL HEP 3x5 reviewed RLE only 1x5         Modifed Jovani Stretch EOB HEP            Supine Clamshell  Yhb 1x10 3\" hold Yhb 2x10 3\" hold Rhb 2x10 3\" hold         Bridge w/ Abd Iso  Yhb 1x10 Yhb 2x10  Rhb 2x10 3\" hold         Supine Long Sit Windshield Wipers    2x10 ptb         Prone Knee Flexion   5x 10\" hold          Resisted Side Stepping   Yb 5 laps          VG   L7 7' DL                       Ther Activity                                       Gait Training                                       Modalities                                                "

## 2024-07-24 ENCOUNTER — OFFICE VISIT (OUTPATIENT)
Dept: PHYSICAL THERAPY | Facility: REHABILITATION | Age: 74
End: 2024-07-24
Payer: MEDICARE

## 2024-07-24 DIAGNOSIS — M25.551 RIGHT HIP PAIN: Primary | ICD-10-CM

## 2024-07-24 PROCEDURE — 97110 THERAPEUTIC EXERCISES: CPT

## 2024-07-24 PROCEDURE — 97140 MANUAL THERAPY 1/> REGIONS: CPT

## 2024-07-24 NOTE — PROGRESS NOTES
"Daily Note     Today's date: 2024  Patient name: Sandy Shipley  : 1950  MRN: [id number]  Referring provider: Torrey Christine DO  Dx:   Encounter Diagnosis     ICD-10-CM    1. Right hip pain  M25.551           Start Time: 1045  Stop Time: 1130  Total time in clinic (min): 45 minutes    Subjective: Patient reports her symptoms remain about the same over the last month. Patient reports she only feels her symptoms when standing up after sitting for extended periods and getting up first thing in the morning. Patient reports her symptoms more or less resolve once she gets up in moving.       Objective: See treatment diary below      Assessment:   Patient has attended 5 sessions of PT over the last month with no significant changes in her symptoms. Encouraged patient to f/u with ortho due to lack of progress. Patient would benefit from continued PT      Plan: Continue per plan of care.      Precautions: HTN      Manuals  7/3 7/10 7/12 7/24                     STM deep gluteals    PRR 2 rds PRR                     Re-Assessment  PRR PRR PRR PRR        Neuro Re-Ed                                                                                                        Ther Ex             Pt Edu, HEP, POC PRR PRR HEP HEP         REIL HEP 3x5 reviewed RLE only 1x5         Modifed Jovani Stretch EOB HEP            Supine Clamshell  Yhb 1x10 3\" hold Yhb 2x10 3\" hold Rhb 2x10 3\" hold Rhb 2x15 3\" hold        Bridge w/ Abd Iso  Yhb 1x10 Yhb 2x10  Rhb 2x10 3\" hold Rhb 2x15 3\" hold        Supine Long Sit Windshield Wipers    2x10 ptb 2x15 ptb        Prone Knee Flexion   5x 10\" hold          Resisted Side Stepping   Yb 5 laps          VG   L7 7' DL                       Ther Activity                                       Gait Training                                       Modalities                                                  "

## 2024-08-07 ENCOUNTER — OFFICE VISIT (OUTPATIENT)
Dept: OBGYN CLINIC | Facility: HOSPITAL | Age: 74
End: 2024-08-07
Payer: MEDICARE

## 2024-08-07 VITALS
SYSTOLIC BLOOD PRESSURE: 124 MMHG | WEIGHT: 217 LBS | HEIGHT: 66 IN | DIASTOLIC BLOOD PRESSURE: 83 MMHG | BODY MASS INDEX: 34.87 KG/M2 | HEART RATE: 75 BPM

## 2024-08-07 DIAGNOSIS — M25.551 RIGHT HIP PAIN: ICD-10-CM

## 2024-08-07 DIAGNOSIS — M16.11 PRIMARY OSTEOARTHRITIS OF RIGHT HIP: Primary | ICD-10-CM

## 2024-08-07 PROCEDURE — 99214 OFFICE O/P EST MOD 30 MIN: CPT | Performed by: ORTHOPAEDIC SURGERY

## 2024-08-07 RX ORDER — CELECOXIB 200 MG/1
200 CAPSULE ORAL 2 TIMES DAILY PRN
Qty: 56 CAPSULE | Refills: 0 | Status: SHIPPED | OUTPATIENT
Start: 2024-08-07 | End: 2024-09-04

## 2024-08-07 NOTE — PROGRESS NOTES
Orthopaedics Office Visit - New Patient Visit    ASSESSMENT/PLAN:    Assessment:   Right hip osteoarthritis - chronic - exacerbated in last few months  Leg length discrepancy, left longer than right     Plan:   X-rays reviewed in the office today  Continue physical therapy/ home exercises  Continue OTC Tylenol arthritis as needed/directed  Prescription med management: Celebrex 200 mg was prescribed to the patient's pharmacy on file. She was advised to take medication with food  She would like to hold off on intraarticular injection at this time.  Follow up 6 weeks for reevaluation    To Do Next Visit:  reevaluation    _____________________________________________________  CHIEF COMPLAINT:  Chief Complaint   Patient presents with    Right Hip - Pain           SUBJECTIVE:  Sandy Shipley is a 73 y.o. female who presents for initial evaluation of right hip pain. The patient reports pain for a few months, but she cannot recall when it started. She feels a sharp, pinching pain in her anterior hip. She completed approximately one month of physical therapy which helps her symptoms. She has pain when standing from a seated position that resolves once she starts walking.  She denies low back pain and radiating symptoms. The patient enjoys hiking and walking. Pain is managed with Tylenol arthritis as needed.    PAST MEDICAL HISTORY:  Past Medical History:   Diagnosis Date    Arthritis     in spine    Colon polyp     Constipation 11/02/2021    CPAP (continuous positive airway pressure) dependence     Cystic breast     Disease of thyroid gland     hypothyroid    Dyspareunia, female     last assessed 9/4/14    Esophageal obstruction due to food impaction 11/03/2021 September of 2021    Esophageal ulcer     Food impaction of esophagus     GERD (gastroesophageal reflux disease)     Hepatic cyst 09/21/2015    Hyperlipidemia     Hypertension     Hypothyroidism     Insomnia     Iron deficiency anemia     Liver cyst     drained     Obesity     [place] on CPAP     uses cpap    Osteoporosis     Severe mitral regurgitation     Stroke (HCC)     Thrombocytopenia (HCC) 01/08/2019    Urinary tract infection     Varicose veins of lower extremity     last assessed 1/4/13       PAST SURGICAL HISTORY:  Past Surgical History:   Procedure Laterality Date    COLONOSCOPY      complete 5/2016 - Dr Wharton due in 2019 - last assessed  3/17/17    HERNIA REPAIR      HIATAL HERNIA REPAIR      LIVER BIOPSY LAPAROSCOPIC N/A 5/7/2018    Procedure: Laparoscopic marsupialization of liver cyst;  Surgeon: Uriel Gonzalez MD;  Location: [facility];  Service: Surgical Oncology    NEUROMA EXCISION      OR ECHO TRANSESOPHAG MONTR CARDIAC PUMP FUNCTJ N/A 1/7/2019    Procedure: TRANSESOPHAGEAL ECHOCARDIOGRAM (DORI);  Surgeon: Eladio Hernandez MD;  Location: [facility];  Service: Cardiac Surgery    OR ESOPHAGOGASTRODUODENOSCOPY TRANSORAL DIAGNOSTIC N/A 8/10/2016    Procedure: ESOPHAGOGASTRODUODENOSCOPY (EGD);  Surgeon: Sushant Wharton MD;  Location: [facility];  Service: Gastroenterology    OR ESOPHAGOSCOPY FLEXIBLE TRANSORAL WITH BIOPSY N/A 11/3/2016    Procedure: ESOPHAGOGASTRODUODENOSCOPY (EGD);  Surgeon: Wali Maria MD;  Location: [facility];  Service: Thoracic    OR LAPS REPAIR HERNIA EXCEPT INCAL/INGUN REDUCIBLE N/A 10/30/2017    Procedure: LAPAROSCOPIC INCISIONAL HERNIA REPAIR X 2 WITH ECHO MESH;  Surgeon: Tanmay Matthews DO;  [facility];  Service: General    OR LAPS RPR PARAESPHGL HRNA INCL FUNDPLSTY W/MESH Left 11/3/2016    Procedure: LAPAROSCOPIC PARAESOPHAGEAL HERNIA REPAIR WITH MESH;NISSEN FUNDOPLICATION ;  Surgeon: Wali Maria MD;  Location: [facility];  Service: Thoracic    OR REPAIR FIRST ABDOMINAL WALL HERNIA N/A 1/13/2017    Procedure: INCISIONAL HERNIA REPAIR ;  Surgeon: Tanmay Matthews DO;  [facility];  Service: General    OR REPLACEMENT MITRAL VALVE W/CARDIOPULMONARY BYP N/A 1/7/2019    Procedure: REPLACEMENT VALVE MITRAL (MVR), repair  with 30mm CG Future ring;  Surgeon: Eladio Hernandez MD;  Location: [facility];  Service: Cardiac Surgery    TUBAL LIGATION         FAMILY HISTORY:  Family History   Problem Relation Age of Onset    Heart disease Mother     Aneurysm Mother         cerebral    Alcohol abuse Father     Cancer Father     COPD Father     Heart failure Father     Hypertension Father     Tuberculosis Father     Cancer Family     Breast cancer Paternal Grandmother 98         of natural causes at 99 years old    No Known Problems Sister     No Known Problems Daughter     No Known Problems Maternal Grandmother     No Known Problems Maternal Grandfather     No Known Problems Paternal Grandfather     No Known Problems Son        SOCIAL HISTORY:  Social History     Tobacco Use    Smoking status: Never    Smokeless tobacco: Never   Vaping Use    Vaping status: Never Used   Substance Use Topics    Alcohol use: Yes     Alcohol/week: 3.0 standard drinks of alcohol     Types: 3 Glasses of wine per week     Comment: social    Drug use: No       MEDICATIONS:    Current Outpatient Medications:     amLODIPine (NORVASC) 5 mg tablet, TAKE 1 TABLET (5 MG TOTAL) BY MOUTH DAILY, Disp: 90 tablet, Rfl: 3    Ascorbic Acid (VITAMIN C ER PO), Take 1 capsule by mouth daily, Disp: , Rfl:     Ascorbic Acid (vitamin C) 100 MG tablet, Take 100 mg by mouth daily, Disp: , Rfl:     aspirin 81 MG tablet, Take 1 tablet (81 mg total) by mouth daily, Disp: 30 tablet, Rfl: 11    atorvastatin (LIPITOR) 20 mg tablet, TAKE 1 TABLET (20 MG TOTAL) BY MOUTH DAILY, Disp: 30 tablet, Rfl: 5    Cholecalciferol (VITAMIN D3) 2000 UNITS TABS, Take 1 tablet by mouth daily., Disp: , Rfl:     clotrimazole-betamethasone (LOTRISONE) 1-0.05 % cream, Apply to abdomen, thigh and calf twice a day as needed, Disp: 45 g, Rfl: 1    estradiol (ESTRACE) 0.1 mg/g vaginal cream, Insert into the vagina, Disp: , Rfl:     Ferrous Gluconate-C-Folic Acid (IRON-C PO), Take by mouth, Disp: , Rfl:      "fluocinonide (LIDEX) 0.05 % cream, APPLY TOPICALLY 2 (TWO) TIMES A DAY, Disp: 30 g, Rfl: 1    fluticasone (FLONASE) 50 mcg/act nasal spray, 2 sprays into each nostril daily, Disp: 16 g, Rfl: 3    hydrochlorothiazide (MICROZIDE) 12.5 mg capsule, TAKE 1 TO 2 CAPSULES DAILY WITH AMLODIPINE, Disp: 60 capsule, Rfl: 5    levothyroxine 125 mcg tablet, TAKE 1 TABLET (125 MCG TOTAL) BY MOUTH DAILY, Disp: 30 tablet, Rfl: 5    nystatin-triamcinolone (MYCOLOG-II) cream, Apply topically 4 (four) times a day, Disp: , Rfl:     Omega-3 Fatty Acids (FISH OIL) 1200 MG CAPS, Take 1 capsule by mouth daily, Disp: , Rfl:     omeprazole (PriLOSEC) 40 MG capsule, TAKE 1 CAPSULE (40 MG TOTAL) BY MOUTH DAILY, Disp: 30 capsule, Rfl: 5    ALLERGIES:  Allergies   Allergen Reactions    Other Other (See Comments)     Skin breakdown from bandaid on for long time- reddness       REVIEW OF SYSTEMS:  MSK: as noted in HPI  Neuro: WNL  Pertinent items are otherwise noted in HPI.  A comprehensive review of systems was otherwise negative.    LABS:  HgA1c:   Lab Results   Component Value Date    HGBA1C 5.9 (H) 04/29/2024     BMP:   Lab Results   Component Value Date    GLUCOSE 146 (H) 01/07/2019    CALCIUM 9.4 04/29/2024     09/19/2017    K 4.0 04/29/2024    CO2 31 04/29/2024     04/29/2024    BUN 17 04/29/2024    CREATININE 0.95 04/29/2024     CBC: No components found for: \"CBC\"    _____________________________________________________  PHYSICAL EXAMINATION:  Vital signs: /83   Pulse 75   Ht 5' 6\" (1.676 m)   Wt 98.4 kg (217 lb)   LMP  (LMP Unknown)   BMI 35.02 kg/m²   General: No acute distress, awake and alert  Psychiatric: Mood and affect appear appropriate  HEENT: Trachea Midline, No torticollis, no apparent facial trauma  Cardiovascular: No audible murmurs; Extremities appear perfused  Pulmonary: No audible wheezing or stridor  Skin: No open lesions; see further details (if any) below    MUSCULOSKELETAL " EXAMINATION:  Extremities:    Right Hip:  Range of motion 120 degrees hip flexion  20 degrees external rotation  10 degrees internal rotation  No tenderness to palpation  able to perform straight leg raise  negative log roll  negative JOSE JUAN, FADIR  Sensation intact to L2-S1 dermatomes   Extremity warm and well perfused         _____________________________________________________  STUDIES REVIEWED:  I personally reviewed the images and my independent interpretation is as follows:  X-rays of the right hip obtained 6/25/2024 demonstrate moderate degenerative changes. No acute fracture or dislocation.    PROCEDURES PERFORMED:  Procedures  None.      Scribe Attestation      I,:  Rose Silva am acting as a scribe while in the presence of the attending physician.:       I,:  Lex Arrington MD personally performed the services described in this documentation    as scribed in my presence.:

## 2024-08-14 NOTE — PROGRESS NOTES
FAMILY PRACTICE OFFICE VISIT       NAME: Gema Ramos  AGE: 79 y o  SEX: female       : 1950        MRN: [id number]    DATE: 2018  TIME: 9:35 PM    Assessment and Plan     Problem List Items Addressed This Visit     Benign essential hypertension - Primary    Hepatic cyst    Hypothyroidism      Other Visit Diagnoses     Anemia, unspecified type        Relevant Orders    Iron Panel    Vitamin B12    Screening for colon cancer           Patient presents for follow-up of chronic medical conditions  Pending pre-admission testing prior to surgery due to enlarging hepatic cyst   I requested to add iron panel and vitamin B12 to forthcoming blood work  Will check Hemoccult x 3  Patient is up-to-date with colonoscopy screening  Hypertension-continue Norvasc 5 mg daily  Hyperlipidemia-continue Lipitor 10 mg once a day  Hypothyroidism-continue levothyroxine 150 mcg daily  Chronic low back pain  Symptoms are stable  Follow up pending pre-admission testing results, updates  Will schedule routine follow-up in 6 months and will follow up with patient pending forthcoming surgery and as needed  There are no Patient Instructions on file for this visit  1  Stool for blood  2  Probiotic ( Align or Florastor Or Culturelle)  3  benifiber or metamucil - fiber   4  Additional blood work when you are at pre-admission testing      Chief Complaint     Chief Complaint   Patient presents with   [facility]ness Visit     subsequent    Follow-up       History of Present Illness     Follow-up chronic medical conditions  Interim enlargement of known large hepatic cyst that was followed by GI for a while  Patient is proceeding with surgery in a few weeks  She was evaluated by Gastroenterology, Surgical Oncology and underwent to 2 recent MRIs  Results of recent blood work and diagnostic testing discussed    Mild normocytic anemia with hemoglobin of 11 1, CMP is normal, LDL is 79, TSH is within normal limits  Patient denies chest pain, palpitations, shortness of breath or dizziness  She remains on medications for hypertension, hyperlipidemia and hypothyroidism  Patient denies abdominal discomfort, dyspepsia, melena or bright red blood per rectum  She follows healthy diet  She is not vegetarian  Patient admits to intermittent symptoms of constipation  Review of Systems   Review of Systems   Constitutional: Negative  HENT: Negative  Eyes: Negative  Respiratory: Negative  Cardiovascular: Negative  Gastrointestinal: Positive for constipation  Negative for abdominal pain, blood in stool and nausea  Endocrine: Negative  Genitourinary: Negative  Musculoskeletal: Positive for arthralgias and back pain  Allergic/Immunologic: Negative  Neurological: Negative  Hematological: Negative  Psychiatric/Behavioral: Negative  Active Problem List     Patient Active Problem List   Diagnosis    Paraesophageal hernia    Benign essential hypertension    Disc degeneration, lumbar    Dysphagia    Hepatic cyst    Hyperlipidemia    Hypothyroidism    Liver enlargement    Osteoporosis    Severe obstructive sleep apnea    Shoulder impingement    Tinea corporis       Past Medical History:  Past Medical History:   Diagnosis Date    Cystic breast     Disease of thyroid gland     Dyspareunia, female     last assessed 9/4/14    Dysphagia     Esophageal reflux     Hiatal hernia     Hyperlipidemia     Hypertension     Insomnia     Osteoporosis     Sleep apnea     Thyroid disease     Varicose veins of lower extremity     last assessed 1/4/13       Past Surgical History:  Past Surgical History:   Procedure Laterality Date    COLONOSCOPY      complete 5/2016 - Dr Sugar Morgan due in 2019 - last assessed  3/17/17    NEUROMA EXCISION      NH ESOPHAGOGASTRODUODENOSCOPY TRANSORAL DIAGNOSTIC N/A 8/10/2016    Procedure: ESOPHAGOGASTRODUODENOSCOPY (EGD);   Surgeon: Veronica Purcell MD;  [facility];  Service: Gastroenterology    WA ESOPHAGOSCOPY FLEXIBLE TRANSORAL WITH BIOPSY N/A 11/3/2016    Procedure: ESOPHAGOGASTRODUODENOSCOPY (EGD); Surgeon: Annika Ryan MD;  [facility];  Service: Thoracic    WA LAP, REPAIR PARAESOPHAGEAL HERNIA, INCL FUNDOPLASTY W/ MESH Left 11/3/2016    Procedure: LAPAROSCOPIC PARAESOPHAGEAL HERNIA REPAIR WITH MESH;NISSEN FUNDOPLICATION ;  Surgeon: Annika Ryan MD;  [facility];  Service: Thoracic    WA LAP, VENTRAL HERNIA REPAIR,REDUCIBLE N/A 10/30/2017    Procedure: LAPAROSCOPIC INCISIONAL HERNIA REPAIR X 2 WITH ECHO MESH;  Surgeon: Rafia Strong DO;  [facility];  Service: [address] N/A 1/13/2017    Procedure: INCISIONAL HERNIA REPAIR ;  Surgeon: Rafia Strong DO;  [facility];  Service: General    TUBAL LIGATION         Family History:  Family History   Problem Relation Age of Onset    Heart disease Mother     Aneurysm Mother      cerebral    Alcohol abuse Father     Cancer Father     COPD Father     Heart failure Father     Hypertension Father     Cancer Family        Social History:  Social History     Social History    Marital status: /Civil Union     Spouse name: N/A    Number of children: N/A    Years of education: N/A     Occupational History    Not on file       Social History Main Topics    Smoking status: Never Smoker    Smokeless tobacco: Never Used    Alcohol use Yes      Comment: social    Drug use: No    Sexual activity: Yes     Partners: Male     Birth control/ protection: None     Other Topics Concern    Not on file     Social History Narrative    Coffee    Exercise - walking       Objective     Vitals:    04/18/18 0739   BP: 130/80   Pulse: 80   Resp: 16   Temp: 97 9 °F (36 6 °C)     Wt Readings from Last 3 Encounters:   04/18/18 93 4 kg (206 lb)   04/17/18 93 4 kg (206 lb)   04/09/18 94 3 kg (208 lb)       Physical Exam   Constitutional: She is oriented to person, place, and time  She appears well-developed and well-nourished  HENT:   Head: Normocephalic and atraumatic  Eyes: Conjunctivae are normal    Neck: Neck supple  Carotid bruit is not present  Cardiovascular: Normal rate, regular rhythm and normal heart sounds  No murmur heard  128/82 Re checked blood pressure   Pulmonary/Chest: Effort normal and breath sounds normal  No respiratory distress  She has no wheezes  She has no rales  Abdominal: Normal aorta and bowel sounds are normal  She exhibits no distension and no abdominal bruit  There is no tenderness  Musculoskeletal: Normal range of motion  Neurological: She is alert and oriented to person, place, and time  No cranial nerve deficit  Psychiatric: She has a normal mood and affect  Her behavior is normal    Nursing note and vitals reviewed        Pertinent Laboratory/Diagnostic Studies:  Lab Results   Component Value Date    GLUCOSE 100 10/30/2017    BUN 15 04/04/2018    CREATININE 0 99 04/04/2018    CALCIUM 9 2 04/04/2018     10/30/2017    K 3 7 10/30/2017    CO2 25 10/30/2017     10/30/2017     Lab Results   Component Value Date    ALT 17 09/19/2017    AST 19 09/19/2017    ALKPHOS 99 09/19/2017    BILITOT 0 6 09/19/2017       Lab Results   Component Value Date    WBC 4 68 10/30/2017    HGB 11 1 (L) 04/04/2018    HCT 34 2 (L) 04/04/2018    MCV 87 7 04/04/2018     04/04/2018       Lab Results   Component Value Date    TSH 1 06 04/04/2018       Lab Results   Component Value Date    CHOL 159 09/19/2017     Lab Results   Component Value Date    TRIG 96 04/04/2018     Lab Results   Component Value Date    HDL 63 09/19/2017     No results found for: LDLCALC  No results found for: HGBA1C    Results for orders placed or performed in visit on 04/04/18   Lipid Panel with Direct LDL reflex   Result Value Ref Range    Total Cholesterol 158 <200 mg/dL    SL AMB HDL CHOLESTEROL 61 >50 mg/dL    Triglycerides 96 <150 mg/dL SL AMB LDL-CHOLESTEROL 79 mg/dL (calc)    SL AMB CHOL/HDLC RATIO 2 6 <5 0 (calc)    SL AMB NON HDL CHOLESTEROL 97 <130 mg/dL (calc)   Comprehensive metabolic panel   Result Value Ref Range    SL AMB GLUCOSE 103 (H) 65 - 99 mg/dL    BUN 15 7 - 25 mg/dL    Creatinine, Serum 0 99 0 50 - 0 99 mg/dL    eGFR Non  59 (L) > OR = 60 mL/min/1 73m2    SL AMB EGFR  68 > OR = 60 mL/min/1 73m2    SL AMB BUN/CREATININE RATIO NOT APPLICABLE 6 - 22 (calc)    SL AMB SODIUM 140 135 - 146 mmol/L    SL AMB POTASSIUM 3 8 3 5 - 5 3 mmol/L    SL AMB CHLORIDE 105 98 - 110 mmol/L    SL AMB CARBON DIOXIDE 29 20 - 31 mmol/L    SL AMB CALCIUM 9 2 8 6 - 10 4 mg/dL    SL AMB PROTEIN, TOTAL 7 0 6 1 - 8 1 g/dL    Serum Albumin 3 8 3 6 - 5 1 g/dL    SL AMB GLOBULIN 3 2 1 9 - 3 7 g/dL (calc)    SL AMB ALBUMIN/GLOBULIN RATIO 1 2 1 0 - 2 5 (calc)    SL AMB BILIRUBIN, TOTAL 0 8 0 2 - 1 2 mg/dL    SL AMB ALKALINE PHOSPHATASE 130 33 - 130 U/L    SL AMB AST 18 10 - 35 U/L    SL AMB ALT 15 6 - 29 U/L   CBC   Result Value Ref Range    SL AMB LAB WHITE BLOOD CELL COUNT 5 1 3 8 - 10 8 Thousand/uL    SL AMB LAB RED BLOOD CELLS 3 90 3 80 - 5 10 Million/uL    Hemoglobin 11 1 (L) 11 7 - 15 5 g/dL    Hematocrit 34 2 (L) 35 0 - 45 0 %    MCV 87 7 80 0 - 100 0 fL    MCH 28 5 27 0 - 33 0 pg    MCHC 32 5 32 0 - 36 0 g/dL    RDW 13 2 11 0 - 15 0 %    Platelet Count 188 461 - 400 Thousand/uL    SL AMB MPV 10 2 7 5 - 12 5 fL   TSH, 3rd generation   Result Value Ref Range    TSH 1 06 0 40 - 4 50 mIU/L       Orders Placed This Encounter   Procedures    Vitamin B12       ALLERGIES:  Allergies   Allergen Reactions    Latex      Added based on information entered during case entry, please review and add reactions, type, and severity as needed    Other Other (See Comments)     Skin breakdown from bandaid on for long time- reddness       Current Medications     Current Outpatient Prescriptions   Medication Sig Dispense Refill    amLODIPine (NORVASC) 5 mg tablet Take 5 mg by mouth daily   atorvastatin (LIPITOR) 10 mg tablet Take 10 mg by mouth daily   B Complex-C (SUPER B COMPLEX PO) Take 1 capsule by mouth   Calcium Carb-Cholecalciferol 600-800 MG-UNIT TABS Take by mouth      Cholecalciferol (VITAMIN D3) 2000 UNITS TABS Take 1 tablet by mouth daily   co-enzyme Q-10 30 MG capsule Take 30 mg by mouth 3 (three) times a day   estradiol (ESTRACE VAGINAL) 0 1 mg/g vaginal cream Insert into the vagina      hydrochlorothiazide (HYDRODIURIL) 12 5 mg tablet Take 12 5 mg by mouth daily   levothyroxine 150 mcg tablet Take 150 mcg by mouth daily   Multiple Vitamins-Minerals (CENTRUM SILVER PO) Take 1 tablet by mouth daily  No current facility-administered medications for this visit            Health Maintenance     Health Maintenance   Topic Date Due    Hepatitis C Screening  1950    PAP SMEAR  1950    SLP PLAN OF CARE  1950    COLONOSCOPY  1950    Depression Screening PHQ-9  08/25/1962    Fall Risk  08/25/2015    Urinary Incontinence Screening  08/25/2015    GLAUCOMA SCREENING 67+ YR  08/25/2017    MAMMOGRAM  10/18/2018    DTaP,Tdap,and Td Vaccines (2 - Td) 11/09/2020    INFLUENZA VACCINE  Completed    PNEUMOCOCCAL POLYSACCHARIDE VACCINE AGE 72 AND OVER  Completed     Immunization History   Administered Date(s) Administered    Influenza Quadrivalent Preservative Free 3 years and older IM 11/11/2016    Influenza Split High Dose Preservative Free IM 09/21/2015, 09/15/2017    Influenza TIV (IM) 11/01/2010, 01/24/2013, 09/22/2014    Pneumococcal Conjugate 13-Valent 01/19/2016    Pneumococcal Polysaccharide PPV23 09/15/2017    Tdap 11/09/2010       Hudson Sacks, MD 14-Aug-2024 04:14

## 2024-08-28 NOTE — PROGRESS NOTES
Cardiology Follow Up    Sandy Shipley  1950  [id number]  [facility]  [address]  [place] [id number] [phone]    1. Shortness of breath  Echo complete w/ contrast if indicated      2. Benign essential hypertension  POCT ECG      3. Mixed hyperlipidemia        4. S/P MVR (mitral valve repair)  POCT ECG          Interval History:   Ms Sandy Shipley presents to our office for a follow up visit.  She admits to  shortness of breath with exertion such as walking 2 miles.  Sandy is able to go for short walks with her dog and does not experience SOB.  She denies chest pain palpitations lightheadedness or dizziness    Medical History   Primary Cardiologist Dr Rahman  Hypertension  Hyperlipidemia 4/29/24   HDL 62 LDL 66  Hx of Mitral valve repair  No CAD pre op valve   Patient Active Problem List   Diagnosis    Benign essential hypertension    Disc degeneration, lumbar    Dysphagia    Hyperlipidemia    Hypothyroidism    Iron deficiency anemia    Liver enlargement    Osteopenia of multiple sites    Severe obstructive sleep apnea    Shoulder impingement    Tinea corporis    Lumbar radiculopathy    S/P MVR (mitral valve repair)    Double vision with both eyes open    History of esophageal dilatation    Chronic GERD    History of stroke    Pigmented skin lesions    Polyp of colon    Hx of colonic polyps    Other hemorrhoids    Varicose veins of both lower extremities with pain    Obesity, morbid (HCC)    Right hip pain    Primary osteoarthritis of right hip     Past Medical History:   Diagnosis Date    Arthritis     in spine    Colon polyp     Constipation 11/02/2021    CPAP (continuous positive airway pressure) dependence     Cystic breast     Disease of thyroid gland     hypothyroid    Dyspareunia, female     last assessed 9/4/14    Esophageal obstruction due to food impaction 11/03/2021 September of 2021     Esophageal ulcer     Food impaction of esophagus     GERD (gastroesophageal reflux disease)     Hepatic cyst 09/21/2015    Hyperlipidemia     Hypertension     Hypothyroidism     Insomnia     Iron deficiency anemia     Liver cyst     drained    Obesity     [place] on CPAP     uses cpap    Osteoporosis     Severe mitral regurgitation     Stroke (HCC)     Thrombocytopenia (HCC) 01/08/2019    Urinary tract infection     Varicose veins of lower extremity     last assessed 1/4/13     Social History     Socioeconomic History    Marital status: /Civil Union     Spouse name: Not on file    Number of children: Not on file    Years of education: Not on file    Highest education level: Not on file   Occupational History    Not on file   Tobacco Use    Smoking status: Never    Smokeless tobacco: Never   Vaping Use    Vaping status: Never Used   Substance and Sexual Activity    Alcohol use: Yes     Alcohol/week: 3.0 standard drinks of alcohol     Types: 3 Glasses of wine per week     Comment: social    Drug use: No    Sexual activity: Yes     Partners: Male     Birth control/protection: Post-menopausal   Other Topics Concern    Not on file   Social History Narrative    Coffee    Exercise - walking     Social Determinants of Health     Financial Resource Strain: Low Risk  (10/28/2023)    Overall Financial Resource Strain (CARDIA)     Difficulty of Paying Living Expenses: Not hard at all   Food Insecurity: Not on file   Transportation Needs: No Transportation Needs (10/28/2023)    PRAPARE - Transportation     Lack of Transportation (Medical): No     Lack of Transportation (Non-Medical): No   Physical Activity: Not on file   Stress: Not on file   Social Connections: Not on file   Intimate Partner Violence: Not on file   Housing Stability: Not on file      Family History   Problem Relation Age of Onset    Heart disease Mother     Aneurysm Mother         cerebral    Alcohol abuse Father     Cancer Father     COPD Father     Heart  failure Father     Hypertension Father     Tuberculosis Father     Cancer Family     Breast cancer Paternal Grandmother 98         of natural causes at 99 years old    No Known Problems Sister     No Known Problems Daughter     No Known Problems Maternal Grandmother     No Known Problems Maternal Grandfather     No Known Problems Paternal Grandfather     No Known Problems Son      Past Surgical History:   Procedure Laterality Date    COLONOSCOPY      complete 2016 - Dr Wharton due in 2019 - last assessed  3/17/17    HERNIA REPAIR      HIATAL HERNIA REPAIR      LIVER BIOPSY LAPAROSCOPIC N/A 2018    Procedure: Laparoscopic marsupialization of liver cyst;  Surgeon: Uriel Gonzalez MD;  [facility];  Service: Surgical Oncology    NEUROMA EXCISION      WA ECHO TRANSESOPHAG MONTR CARDIAC PUMP FUNCTJ N/A 2019    Procedure: TRANSESOPHAGEAL ECHOCARDIOGRAM (DORI);  Surgeon: Eladio Hernandez MD;  [facility];  Service: Cardiac Surgery    WA ESOPHAGOGASTRODUODENOSCOPY TRANSORAL DIAGNOSTIC N/A 8/10/2016    Procedure: ESOPHAGOGASTRODUODENOSCOPY (EGD);  Surgeon: Sushant Wharton MD;  [facility];  Service: Gastroenterology    WA ESOPHAGOSCOPY FLEXIBLE TRANSORAL WITH BIOPSY N/A 11/3/2016    Procedure: ESOPHAGOGASTRODUODENOSCOPY (EGD);  Surgeon: Wali Maria MD;  [facility];  Service: Thoracic    WA LAPS REPAIR HERNIA EXCEPT INCAL/INGUN REDUCIBLE N/A 10/30/2017    Procedure: LAPAROSCOPIC INCISIONAL HERNIA REPAIR X 2 WITH ECHO MESH;  Surgeon: Tanmay Matthews DO;  [facility];  Service: General    WA LAPS RPR PARAESPHGL HRNA INCL FUNDPLSTY W/MESH Left 11/3/2016    Procedure: LAPAROSCOPIC PARAESOPHAGEAL HERNIA REPAIR WITH MESH;NISSEN FUNDOPLICATION ;  Surgeon: Wali Maria MD;  [facility];  Service: Thoracic    WA REPAIR FIRST ABDOMINAL WALL HERNIA N/A 2017    Procedure: INCISIONAL HERNIA REPAIR ;  Surgeon: Tanmay Matthews DO;  [facility];  Service:  General    NM REPLACEMENT MITRAL VALVE W/CARDIOPULMONARY BYP N/A 1/7/2019    Procedure: REPLACEMENT VALVE MITRAL (MVR), repair with 30mm CG Future ring;  Surgeon: Eladio Hernandez MD;  [facility];  Service: Cardiac Surgery    TUBAL LIGATION         Current Outpatient Medications:     amLODIPine (NORVASC) 5 mg tablet, TAKE 1 TABLET (5 MG TOTAL) BY MOUTH DAILY, Disp: 90 tablet, Rfl: 3    Ascorbic Acid (VITAMIN C ER PO), Take 1 capsule by mouth daily, Disp: , Rfl:     Ascorbic Acid (vitamin C) 100 MG tablet, Take 100 mg by mouth daily, Disp: , Rfl:     aspirin 81 MG tablet, Take 1 tablet (81 mg total) by mouth daily, Disp: 30 tablet, Rfl: 11    atorvastatin (LIPITOR) 20 mg tablet, TAKE 1 TABLET (20 MG TOTAL) BY MOUTH DAILY, Disp: 30 tablet, Rfl: 5    celecoxib (CeleBREX) 200 mg capsule, Take 1 capsule (200 mg total) by mouth 2 (two) times a day as needed for mild pain for up to 28 days, Disp: 56 capsule, Rfl: 0    Cholecalciferol (VITAMIN D3) 2000 UNITS TABS, Take 1 tablet by mouth daily., Disp: , Rfl:     clotrimazole-betamethasone (LOTRISONE) 1-0.05 % cream, Apply to abdomen, thigh and calf twice a day as needed, Disp: 45 g, Rfl: 1    estradiol (ESTRACE) 0.1 mg/g vaginal cream, Insert into the vagina, Disp: , Rfl:     Ferrous Gluconate-C-Folic Acid (IRON-C PO), Take by mouth, Disp: , Rfl:     fluocinonide (LIDEX) 0.05 % cream, APPLY TOPICALLY 2 (TWO) TIMES A DAY, Disp: 30 g, Rfl: 1    fluticasone (FLONASE) 50 mcg/act nasal spray, 2 sprays into each nostril daily, Disp: 16 g, Rfl: 3    hydrochlorothiazide (MICROZIDE) 12.5 mg capsule, TAKE 1 TO 2 CAPSULES DAILY WITH AMLODIPINE, Disp: 60 capsule, Rfl: 5    levothyroxine 125 mcg tablet, TAKE 1 TABLET (125 MCG TOTAL) BY MOUTH DAILY, Disp: 30 tablet, Rfl: 5    nystatin-triamcinolone (MYCOLOG-II) cream, Apply topically 4 (four) times a day, Disp: , Rfl:     Omega-3 Fatty Acids (FISH OIL) 1200 MG CAPS, Take 1 capsule by mouth daily, Disp: , Rfl:     omeprazole  (PriLOSEC) 40 MG capsule, TAKE 1 CAPSULE (40 MG TOTAL) BY MOUTH DAILY, Disp: 30 capsule, Rfl: 5  Allergies   Allergen Reactions    Other Other (See Comments)     Skin breakdown from bandaid on for long time- reddness       Labs:  No visits with results within 2 Month(s) from this visit.   Latest known visit with results is:   Appointment on 04/29/2024   Component Date Value    WBC 04/29/2024 5.17     RBC 04/29/2024 4.31     Hemoglobin 04/29/2024 13.2     Hematocrit 04/29/2024 41.9     MCV 04/29/2024 97     MCH 04/29/2024 30.6     MCHC 04/29/2024 31.5     RDW 04/29/2024 13.1     Platelets 04/29/2024 162     MPV 04/29/2024 10.2     Sodium 04/29/2024 142     Potassium 04/29/2024 4.0     Chloride 04/29/2024 104     CO2 04/29/2024 31     ANION GAP 04/29/2024 7     BUN 04/29/2024 17     Creatinine 04/29/2024 0.95     Glucose, Fasting 04/29/2024 109 (H)     Calcium 04/29/2024 9.4     AST 04/29/2024 15     ALT 04/29/2024 14     Alkaline Phosphatase 04/29/2024 71     Total Protein 04/29/2024 7.4     Albumin 04/29/2024 4.0     Total Bilirubin 04/29/2024 0.90     eGFR 04/29/2024 59     Cholesterol 04/29/2024 148     Triglycerides 04/29/2024 100     HDL, Direct 04/29/2024 62     LDL Calculated 04/29/2024 66     TSH 3RD GENERATON 04/29/2024 1.816     Hemoglobin A1C 04/29/2024 5.9 (H)     EAG 04/29/2024 123      Imaging: No results found.    Review of Systems:  Review of Systems   Respiratory:  Positive for shortness of breath.    Musculoskeletal:  Positive for arthralgias and myalgias.   All other systems reviewed and are negative.      Physical Exam:  Physical Exam  Vitals reviewed.   Constitutional:       Appearance: She is obese.   Cardiovascular:      Rate and Rhythm: Normal rate and regular rhythm.   Pulmonary:      Effort: Pulmonary effort is normal.      Breath sounds: Normal breath sounds.   Musculoskeletal:         General: Normal range of motion.      Cervical back: Normal range of motion and neck supple.      Right  lower leg: No edema.      Left lower leg: No edema.   Skin:     General: Skin is warm and dry.      Capillary Refill: Capillary refill takes less than 2 seconds.   Neurological:      General: No focal deficit present.      Mental Status: She is alert and oriented to person, place, and time.   Psychiatric:         Mood and Affect: Mood normal.         Behavior: Behavior normal.         Discussion/Summary:  # Shortness of breath with moderate exertion, no recent change in severity of SOB.  TTE evaluate Wall function and valvular function.   # Hypertension continue on amlodipine 5 mg daily, hydrochlorothiazide 12.5 mg daily, DASH diet  # Hyperlipidemia 4/29/24   HDL 62 LDL 66, no hx of CAD LDL below 79, continue Omega 3 fatty acids 1200mg daily, Lipitor 20mg daily, heart healthy diet  # Hx of Mitral valve repair continue on aspirin 81 mg daily, 20 mg daily, lifelong use antibiotics 1 hour prior to dental procedures.

## 2024-08-29 ENCOUNTER — OFFICE VISIT (OUTPATIENT)
Dept: CARDIOLOGY CLINIC | Facility: CLINIC | Age: 74
End: 2024-08-29
Payer: MEDICARE

## 2024-08-29 VITALS
OXYGEN SATURATION: 99 % | HEIGHT: 66 IN | BODY MASS INDEX: 34.92 KG/M2 | DIASTOLIC BLOOD PRESSURE: 78 MMHG | WEIGHT: 217.3 LBS | SYSTOLIC BLOOD PRESSURE: 116 MMHG | HEART RATE: 67 BPM

## 2024-08-29 DIAGNOSIS — I10 BENIGN ESSENTIAL HYPERTENSION: ICD-10-CM

## 2024-08-29 DIAGNOSIS — Z98.890 S/P MVR (MITRAL VALVE REPAIR): ICD-10-CM

## 2024-08-29 DIAGNOSIS — R06.02 SHORTNESS OF BREATH: Primary | ICD-10-CM

## 2024-08-29 DIAGNOSIS — E78.2 MIXED HYPERLIPIDEMIA: ICD-10-CM

## 2024-08-29 PROCEDURE — 99215 OFFICE O/P EST HI 40 MIN: CPT | Performed by: NURSE PRACTITIONER

## 2024-08-29 PROCEDURE — 93000 ELECTROCARDIOGRAM COMPLETE: CPT | Performed by: NURSE PRACTITIONER

## 2024-08-29 RX ORDER — CLOBETASOL PROPIONATE 0.5 MG/G
CREAM TOPICAL
COMMUNITY
Start: 2024-07-09

## 2024-09-03 DIAGNOSIS — K21.9 CHRONIC GERD: ICD-10-CM

## 2024-09-04 RX ORDER — OMEPRAZOLE 40 MG/1
40 CAPSULE, DELAYED RELEASE ORAL DAILY
Qty: 30 CAPSULE | Refills: 0 | Status: SHIPPED | OUTPATIENT
Start: 2024-09-04 | End: 2025-03-03

## 2024-09-06 ENCOUNTER — HOSPITAL ENCOUNTER (OUTPATIENT)
Dept: MAMMOGRAPHY | Facility: HOSPITAL | Age: 74
Discharge: HOME | End: 2024-09-06
Payer: MEDICARE

## 2024-09-06 VITALS — BODY MASS INDEX: 34.87 KG/M2 | WEIGHT: 217 LBS | HEIGHT: 66 IN

## 2024-09-06 DIAGNOSIS — Z12.31 ENCOUNTER FOR SCREENING MAMMOGRAM FOR BREAST CANCER: ICD-10-CM

## 2024-09-06 PROCEDURE — 77067 SCR MAMMO BI INCL CAD: CPT

## 2024-09-06 PROCEDURE — 77063 BREAST TOMOSYNTHESIS BI: CPT

## 2024-09-16 ENCOUNTER — OFFICE VISIT (OUTPATIENT)
Dept: SLEEP CENTER | Facility: CLINIC | Age: 74
End: 2024-09-16
Payer: MEDICARE

## 2024-09-16 ENCOUNTER — TELEPHONE (OUTPATIENT)
Dept: SLEEP CENTER | Facility: CLINIC | Age: 74
End: 2024-09-16

## 2024-09-16 VITALS
WEIGHT: 220 LBS | BODY MASS INDEX: 35.36 KG/M2 | HEIGHT: 66 IN | DIASTOLIC BLOOD PRESSURE: 73 MMHG | SYSTOLIC BLOOD PRESSURE: 120 MMHG | HEART RATE: 69 BPM

## 2024-09-16 DIAGNOSIS — G47.33 SEVERE OBSTRUCTIVE SLEEP APNEA: Primary | ICD-10-CM

## 2024-09-16 PROCEDURE — 99213 OFFICE O/P EST LOW 20 MIN: CPT | Performed by: PSYCHIATRY & NEUROLOGY

## 2024-09-16 PROCEDURE — G2211 COMPLEX E/M VISIT ADD ON: HCPCS | Performed by: PSYCHIATRY & NEUROLOGY

## 2024-09-16 NOTE — ASSESSMENT & PLAN NOTE
-Doing great, she is consistently using her CPAP machine, treatment is beneficial and effective.  AHI is normal, no change is needed in current settings  We discussed that she is eligible for new machine as her previous machine through Medicare was 5 years ago.  I therefore have ordered her a replace machine today.  We discussed the FDA safety warning regarding the DreamStation 2 machine that she currently has  -She has occasional dry mouth, will include a chinstrap on the order if she wants to try that

## 2024-09-16 NOTE — TELEPHONE ENCOUNTER
Rx for CPAP, clinicals, and resupply sent to [facility] via [facility].

## 2024-09-16 NOTE — PROGRESS NOTES
"Assessment/Plan:    1. Severe obstructive sleep apnea  Assessment & Plan:  -Doing great, she is consistently using her CPAP machine, treatment is beneficial and effective.  AHI is normal, no change is needed in current settings  We discussed that she is eligible for new machine as her previous machine through Medicare was 5 years ago.  I therefore have ordered her a replace machine today.  We discussed the FDA safety warning regarding the DreamStation 2 machine that she currently has  -She has occasional dry mouth, will include a chinstrap on the order if she wants to try that    Orders:  -     PAP DME Resupply/Reorder  -     CPAP Auto New DME        Subjective:      Patient ID: Sandy Shipley is a 74 y.o. female.    HPI  Historian- patient and her    This is a 74-year-old female who returns in a follow-up visit she has a history of severe obstructive sleep apnea treated with CPAP. , she previously been seen by Dr. Chang in 2019.  In brief, she is described to have a history of severe obstructive sleep apnea.  She had a home sleep test in 2015, weight 219 pounds at that time, that test showed respiratory event index of 72.  Baseline oxygen saturation was 88% on that test.  A subsequent CPAP study was completed in 2015, that test showed normalization of the AHI as well as oxygen saturations with use of CPAP.  The CPAP setting of 8 cm H2O was ultimately recommended on that test.  She had initially presented with \"chronic hypersomnia\", disrupted sleep, snoring, and witnessed breathing pauses.  At her last assessment in 2019, she was reported to be doing well.  In chart review, the patient has a history of iron deficiency anemia, follows with hematology, felt to be secondary to blood loss from regular blood donation.  She also has chronic GERD, follows with hematology, treated with omeprazole.  Also has hypothyroidism     CPAP data reviewed today  DreamStation 2 auto CPAP set at 8 cm H2O, fixed pressure  AHI " "2.4  Average session 6 hours 54 minutes  Usage 30 out of 30 days  Uses a nasal mask     She goes to bed at 12 am and is up 7-730 am   Often sleeps through the night  Sometimes sleepy in the day.  Takes a nap in the afternoon for an hour or will just rest    Has dry mouth with PAP use, no other side effects     No RLS, has a sharp pain in thigh on R side      Caffeine- a cup of coffee in the day     [place] Sleepiness Scale  Sitting and reading: Slight chance of dozing  Watching TV: Slight chance of dozing  Sitting, inactive in a public place (e.g. a theatre or a meeting): Would never doze  As a passenger in a car for an hour without a break: Would never doze  Lying down to rest in the afternoon when circumstances permit: Moderate chance of dozing  Sitting and talking to someone: Would never doze  Sitting quietly after a lunch without alcohol: Slight chance of dozing  In a car, while stopped for a few minutes in traffic: Would never doze  Total score: 5      Review of Systems  (As above unless noted)    Objective:      /73 (BP Location: Left arm, Patient Position: Sitting, Cuff Size: Large)   Pulse 69   Ht 5' 6\" (1.676 m)   Wt 99.8 kg (220 lb)   LMP  (LMP Unknown)   BMI 35.51 kg/m²          Physical Exam    RRR  Lungs CTA b/l  In NAD    "

## 2024-09-17 LAB
DME PARACHUTE DELIVERY DATE REQUESTED: NORMAL
DME PARACHUTE ITEM DESCRIPTION: NORMAL
DME PARACHUTE ORDER STATUS: NORMAL
DME PARACHUTE SUPPLIER NAME: NORMAL
DME PARACHUTE SUPPLIER PHONE: NORMAL

## 2024-09-23 LAB

## 2024-09-25 ENCOUNTER — TELEPHONE (OUTPATIENT)
Dept: SLEEP CENTER | Facility: CLINIC | Age: 74
End: 2024-09-25

## 2024-09-26 ENCOUNTER — HOSPITAL ENCOUNTER (OUTPATIENT)
Dept: NON INVASIVE DIAGNOSTICS | Facility: CLINIC | Age: 74
Discharge: HOME | End: 2024-09-26
Payer: MEDICARE

## 2024-09-26 VITALS
DIASTOLIC BLOOD PRESSURE: 73 MMHG | HEART RATE: 69 BPM | HEIGHT: 66 IN | BODY MASS INDEX: 35.36 KG/M2 | WEIGHT: 220 LBS | SYSTOLIC BLOOD PRESSURE: 120 MMHG

## 2024-09-26 DIAGNOSIS — R06.02 SHORTNESS OF BREATH: ICD-10-CM

## 2024-09-26 LAB
AORTIC ROOT: 3.1 CM
APICAL FOUR CHAMBER EJECTION FRACTION: 55 %
ASCENDING AORTA: 3.6 CM
AV LVOT MEAN GRADIENT: 2 MMHG
AV LVOT PEAK GRADIENT: 4 MMHG
BSA FOR ECHO PROCEDURE: 2.08 M2
DOP CALC LVOT PEAK VEL VTI: 26.06 CM
DOP CALC LVOT PEAK VEL: 1.03 M/S
DOP CALC MV VTI: 66.41 CM
E WAVE DECELERATION TIME: 489 MS
E/A RATIO: 0.92
FRACTIONAL SHORTENING: 30 (ref 28–44)
INTERVENTRICULAR SEPTUM IN DIASTOLE (PARASTERNAL SHORT AXIS VIEW): 1.2 CM
INTERVENTRICULAR SEPTUM: 1.2 CM (ref 0.6–1.1)
LAAS-AP2: 21.4 CM2
LAAS-AP4: 22.2 CM2
LEFT ATRIUM AREA SYSTOLE SINGLE PLANE A4C: 23.3 CM2
LEFT ATRIUM SIZE: 4.6 CM
LEFT ATRIUM VOLUME (MOD BIPLANE): 72 ML
LEFT ATRIUM VOLUME INDEX (MOD BIPLANE): 34.6 ML/M2
LEFT INTERNAL DIMENSION IN SYSTOLE: 3 CM (ref 2.1–4)
LEFT VENTRICULAR INTERNAL DIMENSION IN DIASTOLE: 4.3 CM (ref 3.5–6)
LEFT VENTRICULAR POSTERIOR WALL IN END DIASTOLE: 1.1 CM
LEFT VENTRICULAR STROKE VOLUME: 47 ML
LVSV (TEICH): 47 ML
MV E'TISSUE VEL-SEP: 5 CM/S
MV MEAN GRADIENT: 7 MMHG
MV PEAK A VEL: 1.64 M/S
MV PEAK E VEL: 151 CM/S
MV PEAK GRADIENT: 15 MMHG
MV STENOSIS PRESSURE HALF TIME: 142 MS
MV VALVE AREA P 1/2 METHOD: 1.55
RIGHT ATRIAL 2D VOLUME: 46 ML
RIGHT ATRIUM AREA SYSTOLE A4C: 17.3 CM2
RIGHT VENTRICLE ID DIMENSION: 4.3 CM
SL CV LEFT ATRIUM LENGTH A2C: 5.1 CM
SL CV LV EF: 60
SL CV PED ECHO LEFT VENTRICLE DIASTOLIC VOLUME (MOD BIPLANE) 2D: 83 ML
SL CV PED ECHO LEFT VENTRICLE SYSTOLIC VOLUME (MOD BIPLANE) 2D: 36 ML
TR MAX PG: 31 MMHG
TR PEAK VELOCITY: 2.8 M/S
TRICUSPID ANNULAR PLANE SYSTOLIC EXCURSION: 1.8 CM
TRICUSPID VALVE PEAK REGURGITATION VELOCITY: 2.77 M/S

## 2024-09-26 PROCEDURE — 93306 TTE W/DOPPLER COMPLETE: CPT

## 2024-09-26 PROCEDURE — 93306 TTE W/DOPPLER COMPLETE: CPT | Performed by: INTERNAL MEDICINE

## 2024-10-03 DIAGNOSIS — E78.5 HYPERLIPIDEMIA, UNSPECIFIED HYPERLIPIDEMIA TYPE: ICD-10-CM

## 2024-10-03 DIAGNOSIS — K21.9 CHRONIC GERD: ICD-10-CM

## 2024-10-03 DIAGNOSIS — E03.9 HYPOTHYROIDISM, UNSPECIFIED TYPE: ICD-10-CM

## 2024-10-03 LAB

## 2024-10-03 RX ORDER — OMEPRAZOLE 40 MG/1
40 CAPSULE, DELAYED RELEASE ORAL DAILY
Qty: 30 CAPSULE | Refills: 0 | Status: SHIPPED | OUTPATIENT
Start: 2024-10-03 | End: 2025-04-01

## 2024-10-03 RX ORDER — ATORVASTATIN CALCIUM 20 MG/1
20 TABLET, FILM COATED ORAL DAILY
Qty: 30 TABLET | Refills: 5 | Status: SHIPPED | OUTPATIENT
Start: 2024-10-03

## 2024-10-03 RX ORDER — LEVOTHYROXINE SODIUM 125 UG/1
125 TABLET ORAL DAILY
Qty: 30 TABLET | Refills: 5 | Status: SHIPPED | OUTPATIENT
Start: 2024-10-03

## 2024-10-04 NOTE — TELEPHONE ENCOUNTER
Pt called to schedule an appt but the first available is 02/26/24. Can we please send a refill to last until her appt.

## 2024-10-04 NOTE — TELEPHONE ENCOUNTER
I called the patient to schedule a follow up office visit for a medication check (Prilosec), left a message on patients voicemail to return the call.

## 2024-10-28 DIAGNOSIS — I10 BENIGN ESSENTIAL HYPERTENSION: ICD-10-CM

## 2024-10-29 RX ORDER — AMLODIPINE BESYLATE 5 MG/1
5 TABLET ORAL DAILY
Qty: 90 TABLET | Refills: 1 | Status: SHIPPED | OUTPATIENT
Start: 2024-10-29

## 2024-11-06 DIAGNOSIS — I10 BENIGN ESSENTIAL HYPERTENSION: ICD-10-CM

## 2024-11-06 DIAGNOSIS — K21.9 CHRONIC GERD: ICD-10-CM

## 2024-11-06 RX ORDER — OMEPRAZOLE 40 MG/1
40 CAPSULE, DELAYED RELEASE ORAL DAILY
Qty: 30 CAPSULE | Refills: 3 | Status: SHIPPED | OUTPATIENT
Start: 2024-11-06 | End: 2025-05-05

## 2024-11-07 RX ORDER — HYDROCHLOROTHIAZIDE 12.5 MG/1
CAPSULE ORAL
Qty: 60 CAPSULE | Refills: 5 | Status: SHIPPED | OUTPATIENT
Start: 2024-11-07

## 2024-11-07 NOTE — TELEPHONE ENCOUNTER
Patient called requesting refill for hydroCHLOROthiazide 12.5 mg capsule. Patient made aware medication was refilled on 11- for 30 with 5 refills to [facility]  pharmacy. Patient instructed to contact the pharmacy to obtain refills of medication. Patient verbalized understanding.

## 2024-11-23 ENCOUNTER — APPOINTMENT (OUTPATIENT)
Dept: LAB | Facility: CLINIC | Age: 74
End: 2024-11-23
Payer: MEDICARE

## 2024-11-23 DIAGNOSIS — I10 BENIGN ESSENTIAL HYPERTENSION: ICD-10-CM

## 2024-11-23 DIAGNOSIS — E03.9 HYPOTHYROIDISM, UNSPECIFIED TYPE: ICD-10-CM

## 2024-11-23 DIAGNOSIS — R73.02 IGT (IMPAIRED GLUCOSE TOLERANCE): ICD-10-CM

## 2024-11-23 DIAGNOSIS — E78.2 MIXED HYPERLIPIDEMIA: ICD-10-CM

## 2024-11-23 LAB
ALBUMIN SERPL BCG-MCNC: 4 G/DL (ref 3.5–5)
ALP SERPL-CCNC: 70 U/L (ref 34–104)
ALT SERPL W P-5'-P-CCNC: 13 U/L (ref 7–52)
ANION GAP SERPL CALCULATED.3IONS-SCNC: 7 MMOL/L (ref 4–13)
AST SERPL W P-5'-P-CCNC: 14 U/L (ref 13–39)
BILIRUB SERPL-MCNC: 0.87 MG/DL (ref 0.2–1)
BUN SERPL-MCNC: 20 MG/DL (ref 5–25)
CALCIUM SERPL-MCNC: 9.1 MG/DL (ref 8.4–10.2)
CHLORIDE SERPL-SCNC: 103 MMOL/L (ref 96–108)
CHOLEST SERPL-MCNC: 147 MG/DL (ref ?–200)
CO2 SERPL-SCNC: 29 MMOL/L (ref 21–32)
CREAT SERPL-MCNC: 0.99 MG/DL (ref 0.6–1.3)
ERYTHROCYTE [DISTWIDTH] IN BLOOD BY AUTOMATED COUNT: 13.4 % (ref 11.6–15.1)
EST. AVERAGE GLUCOSE BLD GHB EST-MCNC: 117 MG/DL
GFR SERPL CREATININE-BSD FRML MDRD: 56 ML/MIN/1.73SQ M
GLUCOSE P FAST SERPL-MCNC: 114 MG/DL (ref 65–99)
HBA1C MFR BLD: 5.7 %
HCT VFR BLD AUTO: 39.7 % (ref 34.8–46.1)
HDLC SERPL-MCNC: 54 MG/DL
HGB BLD-MCNC: 12.5 G/DL (ref 11.5–15.4)
LDLC SERPL CALC-MCNC: 62 MG/DL (ref 0–100)
MCH RBC QN AUTO: 30.4 PG (ref 26.8–34.3)
MCHC RBC AUTO-ENTMCNC: 31.5 G/DL (ref 31.4–37.4)
MCV RBC AUTO: 97 FL (ref 82–98)
PLATELET # BLD AUTO: 177 THOUSANDS/UL (ref 149–390)
PMV BLD AUTO: 10.1 FL (ref 8.9–12.7)
POTASSIUM SERPL-SCNC: 3.7 MMOL/L (ref 3.5–5.3)
PROT SERPL-MCNC: 7.3 G/DL (ref 6.4–8.4)
RBC # BLD AUTO: 4.11 MILLION/UL (ref 3.81–5.12)
SODIUM SERPL-SCNC: 139 MMOL/L (ref 135–147)
TRIGL SERPL-MCNC: 153 MG/DL (ref ?–150)
TSH SERPL DL<=0.05 MIU/L-ACNC: 3.61 UIU/ML (ref 0.45–4.5)
WBC # BLD AUTO: 5.55 THOUSAND/UL (ref 4.31–10.16)

## 2024-11-23 PROCEDURE — 36415 COLL VENOUS BLD VENIPUNCTURE: CPT

## 2024-11-23 PROCEDURE — 84443 ASSAY THYROID STIM HORMONE: CPT

## 2024-11-23 PROCEDURE — 80053 COMPREHEN METABOLIC PANEL: CPT

## 2024-11-23 PROCEDURE — 80061 LIPID PANEL: CPT

## 2024-11-23 PROCEDURE — 85027 COMPLETE CBC AUTOMATED: CPT

## 2024-11-23 PROCEDURE — 83036 HEMOGLOBIN GLYCOSYLATED A1C: CPT

## 2024-11-29 ENCOUNTER — OFFICE VISIT (OUTPATIENT)
Dept: FAMILY MEDICINE CLINIC | Facility: CLINIC | Age: 74
End: 2024-11-29
Payer: MEDICARE

## 2024-11-29 VITALS
OXYGEN SATURATION: 98 % | RESPIRATION RATE: 16 BRPM | HEIGHT: 66 IN | BODY MASS INDEX: 35.07 KG/M2 | HEART RATE: 73 BPM | DIASTOLIC BLOOD PRESSURE: 80 MMHG | WEIGHT: 218.2 LBS | SYSTOLIC BLOOD PRESSURE: 124 MMHG | TEMPERATURE: 97.6 F

## 2024-11-29 DIAGNOSIS — I10 BENIGN ESSENTIAL HYPERTENSION: ICD-10-CM

## 2024-11-29 DIAGNOSIS — E03.9 HYPOTHYROIDISM, UNSPECIFIED TYPE: ICD-10-CM

## 2024-11-29 DIAGNOSIS — Z00.00 MEDICARE ANNUAL WELLNESS VISIT, SUBSEQUENT: Primary | ICD-10-CM

## 2024-11-29 DIAGNOSIS — Z98.890 S/P MVR (MITRAL VALVE REPAIR): ICD-10-CM

## 2024-11-29 DIAGNOSIS — G57.11 MERALGIA PARAESTHETICA, RIGHT: ICD-10-CM

## 2024-11-29 DIAGNOSIS — G47.33 SEVERE OBSTRUCTIVE SLEEP APNEA: Chronic | ICD-10-CM

## 2024-11-29 DIAGNOSIS — E78.2 MIXED HYPERLIPIDEMIA: ICD-10-CM

## 2024-11-29 PROCEDURE — 99214 OFFICE O/P EST MOD 30 MIN: CPT | Performed by: FAMILY MEDICINE

## 2024-11-29 PROCEDURE — G0439 PPPS, SUBSEQ VISIT: HCPCS | Performed by: FAMILY MEDICINE

## 2024-11-29 RX ORDER — FUROSEMIDE 20 MG/1
20 TABLET ORAL DAILY
Qty: 30 TABLET | Refills: 5 | Status: SHIPPED | OUTPATIENT
Start: 2024-11-29

## 2024-11-29 RX ORDER — GABAPENTIN 100 MG/1
CAPSULE ORAL
Qty: 90 CAPSULE | Refills: 2 | Status: SHIPPED | OUTPATIENT
Start: 2024-11-29

## 2024-11-29 NOTE — ASSESSMENT & PLAN NOTE
Blood pressure is well-controlled.  Continue amlodipine  Orders:    Basic metabolic panel; Future

## 2024-11-29 NOTE — PATIENT INSTRUCTIONS
"Patient Education     Meralgia paresthetica   The Basics   Written by the doctors and editors at [facility]   What is meralgia paresthetica? -- This is a condition that causes pain, tingling, and numbness in the outer thigh. It happens when a nerve in that area gets squeezed or compressed.  Different things can cause meralgia paresthetica. They include pregnancy, wearing tight belts or waistbands, leaning the thigh on something for a long time, and injury to the area. Sometimes, it can happen after surgery in the area.  Meralgia paresthetica is more common in people who have diabetes or obesity, and in older people. It is not a serious condition, and it usually goes away on its own.  What are the symptoms of meralgia paresthetica? -- The main symptoms involve the upper, outer thigh. They can include:   Pain - This can be burning or stinging.   Tingling - This can feel like \"pins and needles\" in the area.   Numbness   Feeling extra sensitive to touch - Even light touch, like the feeling of clothing on the skin, might be unpleasant.   Itching  Symptoms usually affect only 1 of the thighs.  Will I need tests? -- Your doctor should be able to tell if you have meralgia paresthetica by learning about your symptoms and doing a \"neurologic exam.\" In this exam, the doctor checks how your brain, nerves, and muscles are working.  Sometimes, doctors do other tests to make sure that something else is not causing your symptoms. This is more likely if you have any symptoms that are different from the ones listed above. Other tests might include:   MRI of the spine - This is a type of imaging test. It creates pictures of the inside of your body.   Nerve conduction studies or electromyography - These check how well your nerves and muscles are working.  How is meralgia paresthetica treated? -- It usually goes away on its own within a few weeks or months. If your symptoms bother you, it might help to:   Avoid wearing tight belts or " clothing with a tight waistband. These can put pressure on the nerve that runs from your lower spine to your thigh.   Try to keep a healthy body weight. Your doctor or nurse can talk to you about ways to lose weight if needed.   Take pain-relieving medicines such as acetaminophen (brand name: Tylenol) or ibuprofen (sample brand names: Advil, Motrin).  If your symptoms last for longer than 1 or 2 months, tell your doctor or nurse. They might suggest trying other treatments, such as pain medicines or a shot of medicine to numb the area. Sometimes, surgery is recommended for people with severe symptoms, but this is rare.  All topics are updated as new evidence becomes available and our peer review process is complete.  This topic retrieved from [employer] on: Mar 19, 2024.  Topic 530315 Version 3.0  Release: 32.2.4 - C32.75  © 2024 UpToDate, Inc. and/or its affiliates. All rights reserved.  Consumer Information Use and Disclaimer   Disclaimer: This generalized information is a limited summary of diagnosis, treatment, and/or medication information. It is not meant to be comprehensive and should be used as a tool to help the user understand and/or assess potential diagnostic and treatment options. It does NOT include all information about conditions, treatments, medications, side effects, or risks that may apply to a specific patient. It is not intended to be medical advice or a substitute for the medical advice, diagnosis, or treatment of a health care provider based on the health care provider's examination and assessment of a patient's specific and unique circumstances. Patients must speak with a health care provider for complete information about their health, medical questions, and treatment options, including any risks or benefits regarding use of medications. This information does not endorse any treatments or medications as safe, effective, or approved for treating a specific patient. UpToDate, Inc. and its  affiliates disclaim any warranty or liability relating to this information or the use thereof.The use of this information is governed by the Terms of Use, available at https://www.wolterskluwer.com/en/know/clinical-effectiveness-terms. 2024© UpToDate, Inc. and its affiliates and/or licensors. All rights reserved.  Copyright   © 2024 UpToDate, Inc. and/or its affiliates. All rights reserved.    Stretching exercises.  Please let me know if right thigh burning is not improving.  Loose clothes  Stop HCTZ.  We will replace with Lasix/furosemide 1 tablet daily.  Please let me know if breathing symptoms are not improving or getting worse at any time

## 2024-11-29 NOTE — PROGRESS NOTES
Name: Sandy Shipley      : 1950      MRN: [id number]  Encounter Provider: Melody Mancia MD  Encounter Date: 2024   Encounter department: [facility]    Assessment & Plan  Medicare annual wellness visit, subsequent  The patient is up-to-date with pneumococcal, shingles influenza and COVID vaccinations.       Meralgia paraesthetica, right  Related painful paresthesias affecting anterior right thigh and patient with BMI of 35  We discussed likely diagnosis of meralgia paresthetica.  Denies symptoms of low back pain or hip pain.  We will hold off EMG for the time being and trial her on gabapentin at night  Information provided.  Advised patient to avoid tight clothing.  She will benefit from increased physical activity and weight loss.  Orders:    gabapentin (NEURONTIN) 100 mg capsule; Take 1 capsule at bedtime for 5 days then increase dose to 2 capsules at bedtime for 5 days, then increase dose up to 3 capsules at bedtime if needed    S/P MVR (mitral valve repair)  Recent evaluation by cardiology due to dyspnea on exertion that only occurs with stairs and hills.  Echo reveals status post MVR with mild to moderate mitral stenosis and moderate tricuspid regurgitation.  LVEF 60%.  Patient denies symptoms of chest pain, palpitations or dizziness.  I advised to discontinue HCTZ and switch to furosemide 20 mg daily  Repeat BMP in 4 to 5 weeks  Orders:    furosemide (LASIX) 20 mg tablet; Take 1 tablet (20 mg total) by mouth daily    Benign essential hypertension  Blood pressure is well-controlled.  Continue amlodipine  Orders:    Basic metabolic panel; Future    Severe obstructive sleep apnea  Patient is under care of [facility] sleep medicine, compliant with CPAP         Hypothyroidism, unspecified type  TSH is normal.  Continue levothyroxine 125 mcg qd         Mixed hyperlipidemia  Atorvastatin 20 mg daily  Total cholesterol 147 with LDL of 62          Preventive health issues were  "discussed with patient, and age appropriate screening tests were ordered as noted in patient's After Visit Summary. Personalized health advice and appropriate referrals for health education or preventive services given if needed, as noted in patient's After Visit Summary.    Return in about 6 months (around 5/29/2025) for follow up.    Patient Instructions   Patient Education     Meralgia paresthetica   The Basics   Written by the doctors and editors at [facility]   What is meralgia paresthetica? -- This is a condition that causes pain, tingling, and numbness in the outer thigh. It happens when a nerve in that area gets squeezed or compressed.  Different things can cause meralgia paresthetica. They include pregnancy, wearing tight belts or waistbands, leaning the thigh on something for a long time, and injury to the area. Sometimes, it can happen after surgery in the area.  Meralgia paresthetica is more common in people who have diabetes or obesity, and in older people. It is not a serious condition, and it usually goes away on its own.  What are the symptoms of meralgia paresthetica? -- The main symptoms involve the upper, outer thigh. They can include:   Pain - This can be burning or stinging.   Tingling - This can feel like \"pins and needles\" in the area.   Numbness   Feeling extra sensitive to touch - Even light touch, like the feeling of clothing on the skin, might be unpleasant.   Itching  Symptoms usually affect only 1 of the thighs.  Will I need tests? -- Your doctor should be able to tell if you have meralgia paresthetica by learning about your symptoms and doing a \"neurologic exam.\" In this exam, the doctor checks how your brain, nerves, and muscles are working.  Sometimes, doctors do other tests to make sure that something else is not causing your symptoms. This is more likely if you have any symptoms that are different from the ones listed above. Other tests might include:   MRI of the spine - This is a " type of imaging test. It creates pictures of the inside of your body.   Nerve conduction studies or electromyography - These check how well your nerves and muscles are working.  How is meralgia paresthetica treated? -- It usually goes away on its own within a few weeks or months. If your symptoms bother you, it might help to:   Avoid wearing tight belts or clothing with a tight waistband. These can put pressure on the nerve that runs from your lower spine to your thigh.   Try to keep a healthy body weight. Your doctor or nurse can talk to you about ways to lose weight if needed.   Take pain-relieving medicines such as acetaminophen (brand name: Tylenol) or ibuprofen (sample brand names: Advil, Motrin).  If your symptoms last for longer than 1 or 2 months, tell your doctor or nurse. They might suggest trying other treatments, such as pain medicines or a shot of medicine to numb the area. Sometimes, surgery is recommended for people with severe symptoms, but this is rare.  All topics are updated as new evidence becomes available and our peer review process is complete.  This topic retrieved from [employer] on: Mar 19, 2024.  Topic 208863 Version 3.0  Release: 32.2.4 - C32.75  © 2024 UpToDate, Inc. and/or its affiliates. All rights reserved.  Consumer Information Use and Disclaimer   Disclaimer: This generalized information is a limited summary of diagnosis, treatment, and/or medication information. It is not meant to be comprehensive and should be used as a tool to help the user understand and/or assess potential diagnostic and treatment options. It does NOT include all information about conditions, treatments, medications, side effects, or risks that may apply to a specific patient. It is not intended to be medical advice or a substitute for the medical advice, diagnosis, or treatment of a health care provider based on the health care provider's examination and assessment of a patient's specific and unique  circumstances. Patients must speak with a health care provider for complete information about their health, medical questions, and treatment options, including any risks or benefits regarding use of medications. This information does not endorse any treatments or medications as safe, effective, or approved for treating a specific patient. UpToDate, Inc. and its affiliates disclaim any warranty or liability relating to this information or the use thereof.The use of this information is governed by the Terms of Use, available at https://www.Cloud.comer.com/en/know/clinical-effectiveness-terms. 2024© UpToDate, Inc. and its affiliates and/or licensors. All rights reserved.  Copyright   © 2024 UpToDate, Inc. and/or its affiliates. All rights reserved.    Stretching exercises.  Please let me know if right thigh burning is not improving.  Loose clothes  Stop HCTZ.  We will replace with Lasix/furosemide 1 tablet daily.  Please let me know if breathing symptoms are not improving or getting worse at any time    History of Present Illness     Patient presents for follow-up and Medicare wellness    She is here today accompanied by her .  She has been feeling stably overall.  She is complaining of intermittent painful burning affecting right anterior thigh.  She denies symptoms of back pain/sciatica or hip pain.  She denies numbness or tingling elsewhere.  Symptoms started a few months ago.    Patient had recent evaluation by [facility]'s cardiology.  She reports dyspnea on exertion that primarily occurs with hills and steps.  She is not experiencing any shortness of breath with regular physical activity such as walking on a flat surface.  No complaints of chest pain, palpitations or leg edema.  Echo performed in September: EF 60%, mild to moderate mitral stenosis, status post mitral valve repair, moderate tricuspid regurgitation, pulmonary pressure high side of normal.    The patient remains on daily medications for  hypertension, hyperlipidemia, hypothyroidism.Results of recent blood work reviewed.         Patient Care Team:  Melody Mancia MD as PCP - General  MD Sushant Carrillo DO William Richard Burfeind, MD Pamela Meyer, DO    Review of Systems   Constitutional: Negative.    HENT: Negative.     Eyes: Negative.    Respiratory:  Positive for shortness of breath (as per HPI).    Cardiovascular: Negative.    Endocrine: Negative.    Genitourinary: Negative.    Musculoskeletal: Negative.    Allergic/Immunologic: Negative.    Neurological:  Positive for numbness.   Hematological: Negative.    Psychiatric/Behavioral: Negative.       Medical History Reviewed by provider this encounter:  Tobacco  Allergies  Meds  Problems  Med Hx  Surg Hx  Fam Hx       Annual Wellness Visit Questionnaire   Sandy is here for her Subsequent Wellness visit. Last Medicare Wellness visit information reviewed, patient interviewed and updates made to the record today.      Health Risk Assessment:   Patient rates overall health as good. Patient feels that their physical health rating is same. Patient is very satisfied with their life. Eyesight was rated as same. Hearing was rated as same. Patient feels that their emotional and mental health rating is same. Patients states they are never, rarely angry. Patient states they are sometimes unusually tired/fatigued. Pain experienced in the last 7 days has been none. Patient states that she has experienced no weight loss or gain in last 6 months.     Fall Risk Screening:   In the past year, patient has experienced: no history of falling in past year      Urinary Incontinence Screening:   Patient has not leaked urine accidently in the last six months.     Home Safety:  Patient does not have trouble with stairs inside or outside of their home. Patient has working smoke alarms and has working carbon monoxide detector. Home safety hazards include: none.     Nutrition:   Current diet is  Regular.     Medications:   Patient is currently taking over-the-counter supplements. OTC medications include: see medication list. Patient is able to manage medications.     Activities of Daily Living (ADLs)/Instrumental Activities of Daily Living (IADLs):   Walk and transfer into and out of bed and chair?: Yes  Dress and groom yourself?: Yes    Bathe or shower yourself?: Yes    Feed yourself? Yes  Do your laundry/housekeeping?: Yes  Manage your money, pay your bills and track your expenses?: Yes  Make your own meals?: Yes    Do your own shopping?: Yes    Durable Medical Equipment Suppliers  [employer]    Previous Hospitalizations:   Any hospitalizations or ED visits within the last 12 months?: No      Advance Care Planning:   Living will: Yes    Durable POA for healthcare: Yes    Advanced directive: Yes      Cognitive Screening:   Provider or family/friend/caregiver concerned regarding cognition?: No    PREVENTIVE SCREENINGS      Cardiovascular Screening:    General: Screening Not Indicated and History Lipid Disorder      Diabetes Screening:     General: Screening Current      Colorectal Cancer Screening:     General: Screening Current      Breast Cancer Screening:     General: Screening Current      Cervical Cancer Screening:    General: Screening Not Indicated      Osteoporosis Screening:    General: Screening Current      Abdominal Aortic Aneurysm (AAA) Screening:        General: Screening Not Indicated      Lung Cancer Screening:     General: Screening Not Indicated      Hepatitis C Screening:    General: Screening Not Indicated    Screening, Brief Intervention, and Referral to Treatment (SBIRT)    Screening  Typical number of drinks in a day: 1  Typical number of drinks in a week: 2  Interpretation: Low risk drinking behavior.    Single Item Drug Screening:  How often have you used an illegal drug (including marijuana) or a prescription medication for non-medical reasons in the past year? less than  "monthly    Single Item Drug Screen Score: 1  Interpretation: POSITIVE screen for possible drug use disorder    Drug Abuse Screening Test (DAST-10):  1) Have you used drugs other than those required for medical reasons? No  2) Do you abuse more than one drug at a time? No  3) Are you always able to stop using drugs when you want to? No  4) Have you had \"blackouts\" or \"flashbacks\" as a result of drug use? No  5) Do you ever feel bad or guilty about your drug use? No  6) Does your spouse (or parents) ever complain about your involvement with drugs? No  7) Have you neglected your family because of your use of drugs? No  8) Have you engaged in illegal activities in order to obtain drugs? No  9) Have you ever experienced withdrawal symptoms (felt sick) when you stopped taking drugs? No  10) Have you had medical problems as a result of your drug use (e.g., memory loss, hepatitis, convulsions, bleeding, etc.)? No    DAST-10 Score: 1  Interpretation: Low level problems related to drug abuse    Brief Intervention  Alcohol & drug use screenings were reviewed. No concerns regarding substance use disorder identified.     Other Counseling Topics:   Regular weightbearing exercise.     Social Drivers of Health     Financial Resource Strain: Low Risk  (10/28/2023)    Overall Financial Resource Strain (CARDIA)     Difficulty of Paying Living Expenses: Not hard at all   Food Insecurity: No Food Insecurity (11/29/2024)    Hunger Vital Sign     Worried About Running Out of Food in the Last Year: Never true     Ran Out of Food in the Last Year: Never true   Transportation Needs: No Transportation Needs (11/29/2024)    PRAPARE - Transportation     Lack of Transportation (Medical): No     Lack of Transportation (Non-Medical): No   Housing Stability: Low Risk  (11/29/2024)    Housing Stability Vital Sign     Unable to Pay for Housing in the Last Year: No     Number of Times Moved in the Last Year: 0     Homeless in the Last Year: No " "  Utilities: Not At Risk (11/29/2024)    [facility] Utilities     Threatened with loss of utilities: No     No results found.    Objective   /80 (BP Location: Left arm, Patient Position: Sitting, Cuff Size: Large)   Pulse 73   Temp 97.6 °F (36.4 °C) (Temporal)   Resp 16   Ht 5' 6\" (1.676 m)   Wt 99 kg (218 lb 3.2 oz)   LMP  (LMP Unknown)   SpO2 98%   BMI 35.22 kg/m²     Physical Exam  Vitals and nursing note reviewed.   Constitutional:       General: She is not in acute distress.     Appearance: Normal appearance. She is well-developed. She is not ill-appearing.   HENT:      Head: Normocephalic and atraumatic.   Eyes:      Conjunctiva/sclera: Conjunctivae normal.   Neck:      Thyroid: No thyromegaly.      Vascular: No carotid bruit.   Cardiovascular:      Rate and Rhythm: Normal rate and regular rhythm.      Heart sounds: Normal heart sounds. No murmur heard.  Pulmonary:      Effort: Pulmonary effort is normal. No respiratory distress.      Breath sounds: Normal breath sounds. No wheezing.   Abdominal:      General: There is no abdominal bruit.   Musculoskeletal:         General: Normal range of motion.      Cervical back: Neck supple.   Neurological:      General: No focal deficit present.      Mental Status: She is alert and oriented to person, place, and time.      Cranial Nerves: No cranial nerve deficit.      Coordination: Coordination normal.   Psychiatric:         Mood and Affect: Mood normal.         Behavior: Behavior normal.         "

## 2024-11-29 NOTE — ASSESSMENT & PLAN NOTE
Recent evaluation by cardiology due to dyspnea on exertion that only occurs with stairs and hills.  Echo reveals status post MVR with mild to moderate mitral stenosis and moderate tricuspid regurgitation.  LVEF 60%.  Patient denies symptoms of chest pain, palpitations or dizziness.  I advised to discontinue HCTZ and switch to furosemide 20 mg daily  Repeat BMP in 4 to 5 weeks  Orders:    furosemide (LASIX) 20 mg tablet; Take 1 tablet (20 mg total) by mouth daily

## 2024-12-01 PROBLEM — G57.11 MERALGIA PARAESTHETICA, RIGHT: Status: ACTIVE | Noted: 2024-12-01

## 2024-12-02 NOTE — ASSESSMENT & PLAN NOTE
Related painful paresthesias affecting anterior right thigh and patient with BMI of 35  We discussed likely diagnosis of meralgia paresthetica.  Denies symptoms of low back pain or hip pain.  We will hold off EMG for the time being and trial her on gabapentin at night  Information provided.  Advised patient to avoid tight clothing.  She will benefit from increased physical activity and weight loss.  Orders:    gabapentin (NEURONTIN) 100 mg capsule; Take 1 capsule at bedtime for 5 days then increase dose to 2 capsules at bedtime for 5 days, then increase dose up to 3 capsules at bedtime if needed

## 2025-01-27 ENCOUNTER — OFFICE VISIT (OUTPATIENT)
Dept: SLEEP CENTER | Facility: CLINIC | Age: 75
End: 2025-01-27
Payer: MEDICARE

## 2025-01-27 VITALS
WEIGHT: 213.4 LBS | DIASTOLIC BLOOD PRESSURE: 70 MMHG | SYSTOLIC BLOOD PRESSURE: 122 MMHG | BODY MASS INDEX: 34.3 KG/M2 | HEIGHT: 66 IN

## 2025-01-27 DIAGNOSIS — E66.01 OBESITY, MORBID (HCC): ICD-10-CM

## 2025-01-27 DIAGNOSIS — G47.33 SEVERE OBSTRUCTIVE SLEEP APNEA: Primary | Chronic | ICD-10-CM

## 2025-01-27 PROCEDURE — 99214 OFFICE O/P EST MOD 30 MIN: CPT | Performed by: INTERNAL MEDICINE

## 2025-01-27 PROCEDURE — G2211 COMPLEX E/M VISIT ADD ON: HCPCS | Performed by: INTERNAL MEDICINE

## 2025-01-27 NOTE — PROGRESS NOTES
Name: Sandy Shipley      : 1950      MRN: [id number]  Encounter Provider: Rylan Ayala MD  Encounter Date: 2025   Encounter department: [facility] SLEEP MEDICINE [place]  :  Assessment & Plan  Severe obstructive sleep apnea  Severe IRINA meets the criteria for a new CPAP machine got a replacement after her last visit with Dr. Markham in 2024 currently on ResMed AirSense 11 auto titrating CPAP machine but it set at fixed pressure of 8 cmH2O    Excellent compliance which what we reviewed today together with the patient 100% using it averaging 7 hours 9 minutes, residual AHI of 0.7 and minimal mask leak  Orders:    PAP DME Resupply/Reorder    Obesity, morbid (HCC)  BMI of 34.44           History of Present Illness     74-year-old here for a follow-up compliance visit last seen by Dr. Markham in  currently with CPAP machine for history of remotely diagnosed severe sleep apnea in  with AHI of 72 events per hour has been using CPAP for many years obtain a new CPAP machine as she met the 5 years abhilash criteria  Denied excessive daytime sleepiness or waking up unrefreshed.  She does not snore while using the CPAP however she would occasionally take a daytime nap out of habit    Sleep routine:  What time do you typically go to bed on weekdays or workdays? 12:30 AM   What time do you typically go to bed weekends or days off? 12:30 AM   How long does it take to fall asleep? 16-30 minutes   How often does it take more than 30 minutes to fall asleep? Few times a month   How often do you sleep through the night without waking up? 3 or more times per week   If you do wake during the night, how many times do you typically wake up during your sleep time? 1-2 times per night   How often is it hard for you to return to sleep after awakening in the middle of the night? Rare   What time do you typically wake up on weekdays or workdays? 7:30 AM   What time do you typically wake up on weekends or days off? 7:30  AM   How many hours do you sleep on average? 7   Do you typically feel refreshed when you first awaken? Sometimes   Are you sleepy during the day? Sometimes         [place] scale  Sitting and reading: (Patient-Rptd) (P) Slight chance of dozing  Watching TV: (Patient-Rptd) (P) Slight chance of dozing  Sitting, inactive in a public place (e.g. a theatre or a meeting): (Patient-Rptd) (P) Would never doze  As a passenger in a car for an hour without a break: (Patient-Rptd) (P) Would never doze  Lying down to rest in the afternoon when circumstances permit: (Patient-Rptd) (P) Moderate chance of dozing  Sitting and talking to someone: (Patient-Rptd) (P) Would never doze  Sitting quietly after a lunch without alcohol: (Patient-Rptd) (P) Slight chance of dozing  In a car, while stopped for a few minutes in traffic: (Patient-Rptd) (P) Would never doze  Total score: (Patient-Rptd) (P) 5     Review of Systems   All other systems reviewed and are negative.    Pertinent positives/negatives included in HPI and also as noted:     Medical History Reviewed by provider this encounter:     .  Past Medical History   Past Medical History:   Diagnosis Date    Arthritis     in spine    Colon polyp     Constipation 11/02/2021    CPAP (continuous positive airway pressure) dependence     Cystic breast     Disease of thyroid gland     hypothyroid    Dyspareunia, female     last assessed 9/4/14    Esophageal obstruction due to food impaction 11/03/2021 September of 2021    Esophageal ulcer     Food impaction of esophagus     GERD (gastroesophageal reflux disease)     Hepatic cyst 09/21/2015    Hyperlipidemia     Hypertension     Hypothyroidism     Insomnia     Iron deficiency anemia     Liver cyst     drained    Obesity     IRINA on CPAP     uses cpap    Osteoporosis     Severe mitral regurgitation     Stroke (HCC)     Thrombocytopenia (HCC) 01/08/2019    Urinary tract infection     Varicose veins of lower extremity     last assessed 1/4/13      Past Surgical History:   Procedure Laterality Date    COLONOSCOPY      complete 5/2016 - Dr Wharton due in 2019 - last assessed  3/17/17    HERNIA REPAIR      HIATAL HERNIA REPAIR      LIVER BIOPSY LAPAROSCOPIC N/A 5/7/2018    Procedure: Laparoscopic marsupialization of liver cyst;  Surgeon: Uriel Gonzalez MD;  [facility]: [facility];  Service: Surgical Oncology    NEUROMA EXCISION      WV ECHO TRANSESOPHAG MONTR CARDIAC PUMP FUNCTJ N/A 1/7/2019    Procedure: TRANSESOPHAGEAL ECHOCARDIOGRAM (DORI);  Surgeon: Eladio Hernandez MD;  [facility]: [facility];  Service: Cardiac Surgery    WV ESOPHAGOGASTRODUODENOSCOPY TRANSORAL DIAGNOSTIC N/A 8/10/2016    Procedure: ESOPHAGOGASTRODUODENOSCOPY (EGD);  Surgeon: Sushant Wharton MD;  Location: [facility];  Service: Gastroenterology    WV ESOPHAGOSCOPY FLEXIBLE TRANSORAL WITH BIOPSY N/A 11/3/2016    Procedure: ESOPHAGOGASTRODUODENOSCOPY (EGD);  Surgeon: Wali Maria MD;  [facility];  Service: Thoracic    WV LAPS REPAIR HERNIA EXCEPT INCAL/INGUN REDUCIBLE N/A 10/30/2017    Procedure: LAPAROSCOPIC INCISIONAL HERNIA REPAIR X 2 WITH ECHO MESH;  Surgeon: Tanmay Matthews DO;  [facility];  Service: General    WV LAPS RPR PARAESPHGL HRNA INCL FUNDPLSTY W/MESH Left 11/3/2016    Procedure: LAPAROSCOPIC PARAESOPHAGEAL HERNIA REPAIR WITH MESH;NISSEN FUNDOPLICATION ;  Surgeon: Wali Maria MD;  [facility];  Service: Thoracic    WV REPAIR FIRST ABDOMINAL WALL HERNIA N/A 1/13/2017    Procedure: INCISIONAL HERNIA REPAIR ;  Surgeon: Tanmay Matthews DO;  [facility];  Service: General    WV REPLACEMENT MITRAL VALVE W/CARDIOPULMONARY BYP N/A 1/7/2019    Procedure: REPLACEMENT VALVE MITRAL (MVR), repair with 30mm CG Future ring;  Surgeon: Eladio Hernandez MD;  [facility];  Service: Cardiac Surgery    TUBAL LIGATION       Family History   Problem Relation Age of Onset    Heart disease Mother     Aneurysm Mother         cerebral    Alcohol abuse  Father     Cancer Father     COPD Father     Heart failure Father     Hypertension Father     Tuberculosis Father     No Known Problems Sister     No Known Problems Daughter     No Known Problems Maternal Grandmother     No Known Problems Maternal Grandfather     Breast cancer Paternal Grandmother 98         of natural causes at 99 years old    No Known Problems Paternal Grandfather     No Known Problems Son       reports that she has never smoked. She has never used smokeless tobacco. She reports current alcohol use of about 3.0 standard drinks of alcohol per week. She reports that she does not use drugs.  Current Outpatient Medications on File Prior to Visit   Medication Sig Dispense Refill    amLODIPine (NORVASC) 5 mg tablet TAKE 1 TABLET (5 MG TOTAL) BY MOUTH DAILY 90 tablet 1    Ascorbic Acid (VITAMIN C ER PO) Take 1 capsule by mouth daily      Ascorbic Acid (vitamin C) 100 MG tablet Take 100 mg by mouth daily      aspirin 81 MG tablet Take 1 tablet (81 mg total) by mouth daily 30 tablet 11    atorvastatin (LIPITOR) 20 mg tablet TAKE 1 TABLET (20 MG TOTAL) BY MOUTH DAILY 30 tablet 5    Cholecalciferol (VITAMIN D3) 2000 UNITS TABS Take 1 tablet by mouth daily.      clobetasol (TEMOVATE) 0.05 % cream APPLY SPARINGLY TWICE A DAY FOR TWO WEEK INTERVALS, REPEAT AS NEEDED.      clotrimazole-betamethasone (LOTRISONE) 1-0.05 % cream Apply to abdomen, thigh and calf twice a day as needed 45 g 1    estradiol (ESTRACE) 0.1 mg/g vaginal cream Insert into the vagina      Ferrous Gluconate-C-Folic Acid (IRON-C PO) Take by mouth      fluocinonide (LIDEX) 0.05 % cream APPLY TOPICALLY 2 (TWO) TIMES A DAY 30 g 1    fluticasone (FLONASE) 50 mcg/act nasal spray 2 sprays into each nostril daily 16 g 3    furosemide (LASIX) 20 mg tablet Take 1 tablet (20 mg total) by mouth daily 30 tablet 5    gabapentin (NEURONTIN) 100 mg capsule Take 1 capsule at bedtime for 5 days then increase dose to 2 capsules at bedtime for 5 days, then  increase dose up to 3 capsules at bedtime if needed 90 capsule 2    levothyroxine 125 mcg tablet TAKE 1 TABLET (125 MCG TOTAL) BY MOUTH DAILY 30 tablet 5    nystatin-triamcinolone (MYCOLOG-II) cream Apply topically 4 (four) times a day      Omega-3 Fatty Acids (FISH OIL) 1200 MG CAPS Take 1 capsule by mouth daily      omeprazole (PriLOSEC) 40 MG capsule TAKE 1 CAPSULE (40 MG TOTAL) BY MOUTH DAILY 30 capsule 3     No current facility-administered medications on file prior to visit.     Allergies   Allergen Reactions    Other Other (See Comments)     Skin breakdown from bandaid on for long time- reddness      Current Outpatient Medications on File Prior to Visit   Medication Sig Dispense Refill    amLODIPine (NORVASC) 5 mg tablet TAKE 1 TABLET (5 MG TOTAL) BY MOUTH DAILY 90 tablet 1    Ascorbic Acid (VITAMIN C ER PO) Take 1 capsule by mouth daily      Ascorbic Acid (vitamin C) 100 MG tablet Take 100 mg by mouth daily      aspirin 81 MG tablet Take 1 tablet (81 mg total) by mouth daily 30 tablet 11    atorvastatin (LIPITOR) 20 mg tablet TAKE 1 TABLET (20 MG TOTAL) BY MOUTH DAILY 30 tablet 5    Cholecalciferol (VITAMIN D3) 2000 UNITS TABS Take 1 tablet by mouth daily.      clobetasol (TEMOVATE) 0.05 % cream APPLY SPARINGLY TWICE A DAY FOR TWO WEEK INTERVALS, REPEAT AS NEEDED.      clotrimazole-betamethasone (LOTRISONE) 1-0.05 % cream Apply to abdomen, thigh and calf twice a day as needed 45 g 1    estradiol (ESTRACE) 0.1 mg/g vaginal cream Insert into the vagina      Ferrous Gluconate-C-Folic Acid (IRON-C PO) Take by mouth      fluocinonide (LIDEX) 0.05 % cream APPLY TOPICALLY 2 (TWO) TIMES A DAY 30 g 1    fluticasone (FLONASE) 50 mcg/act nasal spray 2 sprays into each nostril daily 16 g 3    furosemide (LASIX) 20 mg tablet Take 1 tablet (20 mg total) by mouth daily 30 tablet 5    gabapentin (NEURONTIN) 100 mg capsule Take 1 capsule at bedtime for 5 days then increase dose to 2 capsules at bedtime for 5 days, then  "increase dose up to 3 capsules at bedtime if needed 90 capsule 2    levothyroxine 125 mcg tablet TAKE 1 TABLET (125 MCG TOTAL) BY MOUTH DAILY 30 tablet 5    nystatin-triamcinolone (MYCOLOG-II) cream Apply topically 4 (four) times a day      Omega-3 Fatty Acids (FISH OIL) 1200 MG CAPS Take 1 capsule by mouth daily      omeprazole (PriLOSEC) 40 MG capsule TAKE 1 CAPSULE (40 MG TOTAL) BY MOUTH DAILY 30 capsule 3     No current facility-administered medications on file prior to visit.      Social History     Tobacco Use    Smoking status: Never    Smokeless tobacco: Never   Vaping Use    Vaping status: Never Used   Substance and Sexual Activity    Alcohol use: Yes     Alcohol/week: 3.0 standard drinks of alcohol     Types: 3 Glasses of wine per week     Comment: social    Drug use: No    Sexual activity: Yes     Partners: Male     Birth control/protection: Post-menopausal     Objective   /70   Ht 5' 6\" (1.676 m)   Wt 96.8 kg (213 lb 6.4 oz)   LMP  (LMP Unknown)   BMI 34.44 kg/m²        Physical Exam  Constitutional:       Appearance: Normal appearance.   HENT:      Head: Normocephalic and atraumatic.      Nose: No congestion or rhinorrhea.      Mouth/Throat:      Mouth: Mucous membranes are moist.      Pharynx: Oropharynx is clear. No oropharyngeal exudate.   Eyes:      General: No scleral icterus.        Right eye: No discharge.         Left eye: No discharge.      Conjunctiva/sclera: Conjunctivae normal.   Cardiovascular:      Rate and Rhythm: Normal rate and regular rhythm.      Pulses: Normal pulses.      Heart sounds: Normal heart sounds.      No gallop.   Pulmonary:      Effort: Pulmonary effort is normal.      Breath sounds: Normal breath sounds. No stridor. No wheezing or rhonchi.   Musculoskeletal:         General: No swelling or tenderness.      Cervical back: Normal range of motion and neck supple.   Skin:     General: Skin is warm.      Coloration: Skin is not pale.      Findings: No erythema. "   Neurological:      General: No focal deficit present.      Mental Status: She is alert and oriented to person, place, and time. Mental status is at baseline.   Psychiatric:         Mood and Affect: Mood normal.         Behavior: Behavior normal.         Data  Lab Results   Component Value Date    HGB 12.5 11/23/2024    HCT 39.7 11/23/2024    MCV 97 11/23/2024      Lab Results   Component Value Date    GLUCOSE 146 (H) 01/07/2019    CALCIUM 9.1 11/23/2024     09/19/2017    K 3.7 11/23/2024    CO2 29 11/23/2024     11/23/2024    BUN 20 11/23/2024    CREATININE 0.99 11/23/2024     Lab Results   Component Value Date    IRON 68 09/05/2023    TIBC 241 (L) 09/05/2023    FERRITIN 27 09/05/2023     Lab Results   Component Value Date    AST 14 11/23/2024    ALT 13 11/23/2024       Administrative Statements   I have spent a total time of 30 minutes in caring for this patient on the day of the visit/encounter including Reviewing / ordering tests, medicine, procedures  .

## 2025-01-27 NOTE — ASSESSMENT & PLAN NOTE
Severe IRINA meets the criteria for a new CPAP machine got a replacement after her last visit with Dr. Markham in September 2024 currently on ResMed AirSense 11 auto titrating CPAP machine but it set at fixed pressure of 8 cmH2O    Excellent compliance which what we reviewed today together with the patient 100% using it averaging 7 hours 9 minutes, residual AHI of 0.7 and minimal mask leak  Orders:    PAP DME Resupply/Reorder

## 2025-01-27 NOTE — PATIENT INSTRUCTIONS
Patient Education     Continuous Positive Airway Pressure   Why is this procedure done?   Continuous positive airway pressure is often called CPAP. It is a treatment used to help you breathe better. It keeps the airways open by using air pressure. It is the first choice of treatment for obstructive sleep apnea. This happens when your breathing stops or gets shallow while you are asleep. You are not able to get enough air from your mouth and nose into your lungs. Breathing pauses from a few seconds to minutes in sleep apnea. CPAP can also be used to treat other health problems like breathing problems in babies.  CPAP is given by a machine that helps breathing. It gives constant air pressure to keep the airways open. The machine most often has:  A mask that covers your mouth and nose or just your nose  A tube that hooks the mask to the machine  A pump or motor that gives off the air pressure that is attached to the tubing  What will the results be?   Treat sleep apnea  Lessen your daytime sleepiness  Lessen chance for high blood pressure  Improve heart function  What happens before the procedure?   Your doctor will take your history. Talk to the doctor about:   All the drugs you are taking. Be sure to include all prescription and over-the-counter (OTC) drugs, and herbal supplements. Tell the doctor about any drug allergy. Bring a list of drugs you take with you.  Your doctor will do an exam and may:  Check your lungs, ears, nose, and throat.   Order a sleep study. This will help your doctor know the right amount of airway pressure.  Send you to see a lung or ear, nose, and throat specialist.  What happens during the procedure?   Your doctor will fit a mask tightly on your face. This will make sure that air does not leak out.  The mask will cover your mouth and nose or just your nose. The pump will force air through your airway to keep it open. You will feel pressure from the mask.  You will wear the face mask when you  sleep during the study.  If you have CPAP treatments for obstructive sleep apnea, you will use the machine for the rest of your life.  What happens after the procedure?   After your sleep study, you will be able to go home.  If you have obstructive sleep apnea, you will need to have a CPAP machine at home. CPAP machines are costly. You can rent a machine before you buy it. Your insurance may also help to pay for it.  What care is needed at home?   Be sure to use the CPAP machine every night. If you stop using it, it is likely that your signs will return.  Ask your doctor or machine provider how to care for and clean the machine and face mask.  What follow-up care is needed?   Your doctor may ask you to make visits to the office to check on your progress. Be sure to keep these visits.  What lifestyle changes are needed?   Eat a healthy diet.  Lose weight  Exercise regularly.  Keep your home smoke free. Stay away from secondhand and thirdhand smoke. Anyone in your home who smokes should quit smoking.  Do not drink beer, wine, and mixed drinks (alcohol).  What problems could happen?   Skin allergy or skin irritation from the mask  Dry mouth and nose  Air may leak from mask  Problems from air pressure  Nightmares and dreaming a lot during early use  Last Reviewed Date   2020-11-02  Consumer Information Use and Disclaimer   This generalized information is a limited summary of diagnosis, treatment, and/or medication information. It is not meant to be comprehensive and should be used as a tool to help the user understand and/or assess potential diagnostic and treatment options. It does NOT include all information about conditions, treatments, medications, side effects, or risks that may apply to a specific patient. It is not intended to be medical advice or a substitute for the medical advice, diagnosis, or treatment of a health care provider based on the health care provider's examination and assessment of a patient’s  specific and unique circumstances. Patients must speak with a health care provider for complete information about their health, medical questions, and treatment options, including any risks or benefits regarding use of medications. This information does not endorse any treatments or medications as safe, effective, or approved for treating a specific patient. [employer] and its affiliates disclaim any warranty or liability relating to this information or the use thereof. The use of this information is governed by the Terms of Use, available at https://www.woltersMyandbuwer.com/en/know/clinical-effectiveness-terms   Copyright   Copyright © 2024 [employer] and its affiliates and/or licensors. All rights reserved.

## 2025-01-28 ENCOUNTER — TELEPHONE (OUTPATIENT)
Dept: SLEEP CENTER | Facility: CLINIC | Age: 75
End: 2025-01-28

## 2025-02-26 ENCOUNTER — OFFICE VISIT (OUTPATIENT)
Dept: GASTROENTEROLOGY | Facility: CLINIC | Age: 75
End: 2025-02-26
Payer: MEDICARE

## 2025-02-26 VITALS
WEIGHT: 214 LBS | HEART RATE: 77 BPM | HEIGHT: 66 IN | SYSTOLIC BLOOD PRESSURE: 130 MMHG | BODY MASS INDEX: 34.39 KG/M2 | DIASTOLIC BLOOD PRESSURE: 85 MMHG

## 2025-02-26 DIAGNOSIS — R13.10 DYSPHAGIA, UNSPECIFIED TYPE: ICD-10-CM

## 2025-02-26 DIAGNOSIS — Z86.0100 HISTORY OF COLON POLYPS: ICD-10-CM

## 2025-02-26 DIAGNOSIS — R19.8 IRREGULAR BOWEL HABITS: ICD-10-CM

## 2025-02-26 DIAGNOSIS — K21.9 GASTROESOPHAGEAL REFLUX DISEASE, UNSPECIFIED WHETHER ESOPHAGITIS PRESENT: Primary | ICD-10-CM

## 2025-02-26 PROCEDURE — 99214 OFFICE O/P EST MOD 30 MIN: CPT | Performed by: NURSE PRACTITIONER

## 2025-02-26 PROCEDURE — G2211 COMPLEX E/M VISIT ADD ON: HCPCS | Performed by: NURSE PRACTITIONER

## 2025-02-26 RX ORDER — OMEPRAZOLE 20 MG/1
20 CAPSULE, DELAYED RELEASE ORAL DAILY
Qty: 90 CAPSULE | Refills: 3 | Status: SHIPPED | OUTPATIENT
Start: 2025-02-26 | End: 2025-05-27

## 2025-02-26 NOTE — PATIENT INSTRUCTIONS
"Patient Education     High-fiber diet   The Basics   Written by the doctors and editors at [facility]   What is fiber? -- Fiber is a substance found in some fruits, vegetables, and grains. Most fiber passes through your body without being digested. But it can affect how you digest other foods, and it can also improve your bowel movements.  There are 2 kinds of fiber. One kind is called \"soluble fiber\" and is found in fruits, oats, barley, beans, and peas. The other kind is called \"insoluble fiber,\" and is found in wheat, rye, and other grains.  Both kinds of fiber that you eat are called \"dietary fiber.\"  Why is fiber important to my health? -- Fiber can help make your bowel movements softer and more regular. Adding fiber to your diet can help with problems including constipation, hemorrhoids, and diarrhea. Plus, it can help prevent \"accidents\" if you have trouble controlling your bowel movements.  Getting enough fiber can also help lower your risk of heart disease, stroke, and type 2 diabetes. That's because fiber can help lower cholesterol and help control blood sugar.  How much fiber do I need? -- The recommended amount of fiber is 20 to 35 grams a day. The nutrition label on packaged foods can show you how much fiber you are getting in each serving (figure 1).  How can I make sure I'm getting enough fiber? -- To make sure that you're getting enough fiber, eat plenty of the fruits, vegetables, and grains that contain fiber (table 1 and figure 2). Many breakfast cereals also have a lot of fiber.  If you can't get enough fiber from food, you can add wheat bran to the foods you do eat. Or you can take fiber supplements. These come in the form of powders, wafers, or pills. They include psyllium seed (sample brand names: Metamucil, Konsyl), methylcellulose (sample brand name: Citrucel), polycarbophil (sample brand name: FiberCon), and wheat dextrin (sample brand name: Benefiber). If you take a fiber supplement, be sure " "to read the label so you know how much to take. If you're not sure, ask your doctor or nurse.  What are the side effects of fiber? -- When you start eating more fiber, your belly might feel bloated, or you might have gas or cramps. You can avoid these side effects by adding fiber to your diet slowly.  Some people feel worse when they eat more fiber or take fiber supplements. If you feel worse after adding more fiber to your diet, you can try decreasing the amount of fiber to see if that helps.  All topics are updated as new evidence becomes available and our peer review process is complete.  This topic retrieved from [email or web address] on: Feb 28, 2024.  Topic 69495 Version 10.0  Release: 32.2.4 - C32.58  © 2024 UpToDate, Inc. and/or its affiliates. All rights reserved.  figure 1: Nutrition label - Fiber     This is an example of a nutrition label. To figure out how much fiber is in a food, look for the line that says \"Dietary Fiber.\" It's also important to look at the serving size. This food has 7 grams of fiber in each serving, and each serving is 1 cup.  Graphic 54850 Version 8.0  table 1: Amount of fiber in different foods  Food  Serving  Grams of fiber    Fruits    Apple (with skin) 1 medium apple 4.4   Banana 1 medium banana 3.1   Oranges 1 orange 3.1   Prunes 1 cup, pitted 12.4   Juices    Apple, unsweetened, with added ascorbic acid 1 cup 0.5   Grapefruit, white, canned, sweetened 1 cup 0.2   Grape, unsweetened, with added ascorbic acid 1 cup 0.5   Orange 1 cup 0.7   Vegetables    Cooked   Green beans 1 cup 4.0   Carrots 1/2 cup sliced 2.3   Peas 1 cup 8.8   Potato (baked, with skin) 1 medium potato 3.8   Raw   Cucumber (with peel) 1 cucumber 1.5   Lettuce 1 cup shredded 0.5   Tomato 1 medium tomato 1.5   Spinach 1 cup 0.7   Legumes   Baked beans, canned, no salt added 1 cup 13.9   Kidney beans, canned 1 cup 13.6   Lima beans, canned 1 cup 11.6   Lentils, boiled 1 cup 15.6   Breads, pastas, flours    Bran muffins 1 " medium muffin 5.2   Oatmeal, cooked 1 cup 4.0   White bread 1 slice 0.6   Whole-wheat bread 1 slice 1.9   Pasta and rice, cooked   Macaroni 1 cup 2.5   Rice, brown 1 cup 3.5   Rice, white 1 cup 0.6   Spaghetti (regular) 1 cup 2.5   Nuts    Almonds 1/2 cup 8.7   Peanuts 1/2 cup 7.9   To learn how much fiber and other nutrients are in different foods, visit the [place] [employer] ([employer]) Food[employer] website.  Graphic 64897 Version 6.0  figure 2: Foods with fiber     Foods with a lot of fiber include prunes, apples, oranges, bananas, peas, green beans, kidney beans, cooked oatmeal, almonds, peanuts, and whole-wheat bread.  Graphic 73055 Version 1.0  Consumer Information Use and Disclaimer   Disclaimer: This generalized information is a limited summary of diagnosis, treatment, and/or medication information. It is not meant to be comprehensive and should be used as a tool to help the user understand and/or assess potential diagnostic and treatment options. It does NOT include all information about conditions, treatments, medications, side effects, or risks that may apply to a specific patient. It is not intended to be medical advice or a substitute for the medical advice, diagnosis, or treatment of a health care provider based on the health care provider's examination and assessment of a patient's specific and unique circumstances. Patients must speak with a health care provider for complete information about their health, medical questions, and treatment options, including any risks or benefits regarding use of medications. This information does not endorse any treatments or medications as safe, effective, or approved for treating a specific patient. UpToDate, Inc. and its affiliates disclaim any warranty or liability relating to this information or the use thereof.The use of this information is governed by the Terms of Use, available at  "https://www.woltersPersonalinguwer.com/en/know/clinical-effectiveness-terms. 2024© UpToDate, Inc. and its affiliates and/or licensors. All rights reserved.  Copyright   © 2024 UpToDate, Inc. and/or its affiliates. All rights reserved.      Patient Education     Acid reflux and GERD in adults   The Basics   Written by the doctors and editors at [facility]   What is acid reflux? -- Acid reflux is when the acid that is normally in the stomach backs up into the esophagus (figure 1). The esophagus is the tube that carries food from the mouth to the stomach.  When acid reflux causes bothersome symptoms or damage, doctors call it \"gastroesophageal reflux disease\" (\"GERD\").  What are the symptoms of acid reflux? -- The most common symptoms are:   Heartburn, which is a burning feeling in the chest   Regurgitation, which is when acid and undigested food flow back into your throat or mouth  Other symptoms might include:   Stomach or chest pain   Trouble swallowing   Raspy voice or sore throat   Unexplained cough   Nausea or vomiting  Is there anything I can do on my own to feel better? -- Yes. You might feel better if you:   Lose weight (if you are overweight).   Raise the head of your bed by 6 to 8 inches - You can do this by putting blocks of wood or rubber under 2 legs of the bed or a foam wedge under the mattress. It is not enough to sleep with your head raised on pillows.   Avoid foods that make your symptoms worse - For some people, these include coffee, chocolate, alcohol, peppermint, and fatty foods. It might help to write down what you ate before having reflux. This can help you figure out if a food is causing your problem.   Stop smoking, if you smoke. Your doctor or nurse can help you try to quit.   Avoid late meals - Lying down with a full stomach can make reflux worse. Try to plan meals for at least 2 to 3 hours before bedtime.   Avoid tight clothing - Some people feel better if they wear comfortable clothing that does not " squeeze the stomach area.  How is acid reflux treated? -- There are a few main types of medicines that can help with the symptoms of acid reflux. The most common are antacids, histamine blockers, and proton pump inhibitors (table 1). All of these medicines work by reducing or blocking stomach acid. But they each do that in a different way.   For mild symptoms, antacids can help, but they work only for a short time. Histamine blockers are stronger and last longer than antacids. You can buy antacids and most histamine blockers without a prescription.   For frequent and more severe symptoms, proton pump inhibitors are the most effective medicines. Some of these medicines are sold without a prescription. But there are other versions that your doctor can prescribe.  Sometimes, medicines cost less if you get them with a doctor's prescription. Other times, non-prescription medicines cost less. If you are worried about cost, ask your pharmacist about ways to pay less for your medicines.  When should I call the doctor? -- While pain or burning in the chest can be a symptom of acid reflux, it can also be a symptom of something more serious like a heart problem. Call for emergency help right away (in the [place] and [place], call 9-1-1) if you think that you might be having a heart attack.  Symptoms of a heart attack might include:   Severe chest pain, pressure, or discomfort with:   Breathing trouble, sweating, upset stomach, or cold and clammy skin   Pain in your arms, back, or jaw   Worse pain with activity like walking up stairs   Fast or irregular heartbeat   Feeling dizzy, faint, or weak  Some people can manage their acid reflux on their own by changing their habits or taking non-prescription medicines. Call your doctor for advice if:   Your symptoms are severe or last a long time.   You cannot seem to control your symptoms.   You have had symptoms for many years.  You should also see a doctor or nurse right away if you:    "Have trouble swallowing, or feel as though food gets \"stuck\" on the way down   Lose weight when you are not trying to   Have chest pain   Choke when you eat   Vomit blood, or have bowel movements that are red, black, or look like tar  What if my child or teen has acid reflux? -- If your child or teen has acid reflux, take them to see a doctor or nurse. Do not give your child medicines to treat acid reflux without talking to a doctor or nurse.  In children, acid reflux can be caused by a number of problems. Have a doctor or nurse check for these problems before trying any treatments.  All topics are updated as new evidence becomes available and our peer review process is complete.  This topic retrieved from [employer] on: Mar 29, 2024.  Topic 77323 Version 15.0  Release: 32.2.4 - C32.87  © 2024 UpToDate, Inc. and/or its affiliates. All rights reserved.  figure 1: Acid reflux     Acid reflux is when the acid that is normally in the stomach backs up into the esophagus. This can happen if a muscle called the \"lower esophageal sphincter\" relaxes too much.  Graphic 544985 Version 1.0  table 1: Medicines that reduce or block stomach acid  Medicine type  Medicine name examples    Antacids* Calcium carbonate (sample brand names: Maalox, Tums)    Aluminum hydroxide, magnesium hydroxide, and simethicone (sample brand name: Mylanta)   Surface agents Sucralfate (brand name: Carafate)   Histamine blockers¶  Famotidine (brand name: Pepcid)    Cimetidine (brand name: Tagamet)   Proton pump inhibitors Omeprazole (brand name: Prilosec)    Esomeprazole (brand name: Nexium)    Pantoprazole (brand name: Protonix)    Lansoprazole (brand name: Prevacid)    Dexlansoprazole (brand name: Dexilant)    Rabeprazole (brand name: AcipHex)   * Some antacids contain aspirin, which can increase the risk of internal bleeding. Examples of antacids with aspirin include Kristal-Dickerson, Medi-Dickerson, and Neutralin. But there are others, too, so it's " important to check labels. Talk to your doctor or nurse before taking any medicines that contain aspirin.  ¶ Another histamine blocker, ranitidine (brand name: Zantac), stopped being sold in 2020. That's because it was found to sometimes contain a substance that could increase a person's risk of cancer if they took a lot of it over time. If you have any of this medicine in your home, stop taking it and throw away any that is left over. Ask your doctor or nurse about what other medicine you should use instead.  Graphic 95761 Version 15.0  Consumer Information Use and Disclaimer   Disclaimer: This generalized information is a limited summary of diagnosis, treatment, and/or medication information. It is not meant to be comprehensive and should be used as a tool to help the user understand and/or assess potential diagnostic and treatment options. It does NOT include all information about conditions, treatments, medications, side effects, or risks that may apply to a specific patient. It is not intended to be medical advice or a substitute for the medical advice, diagnosis, or treatment of a health care provider based on the health care provider's examination and assessment of a patient's specific and unique circumstances. Patients must speak with a health care provider for complete information about their health, medical questions, and treatment options, including any risks or benefits regarding use of medications. This information does not endorse any treatments or medications as safe, effective, or approved for treating a specific patient. UpToDate, Inc. and its affiliates disclaim any warranty or liability relating to this information or the use thereof.The use of this information is governed by the Terms of Use, available at https://www.wolterskluwer.com/en/know/clinical-effectiveness-terms. 2024© UpToDate, Inc. and its affiliates and/or licensors. All rights reserved.  Copyright   © 2024 UpToDate, Inc. and/or its  affiliates. All rights reserved.

## 2025-02-26 NOTE — ASSESSMENT & PLAN NOTE
History of chronic GERD, dysphagia, and esophageal dilation secondary to food impaction.   - Omeprazole 20 mg daily as above  - Chew food thoroughly, eat slowly, alternate solids and liquids

## 2025-02-26 NOTE — PROGRESS NOTES
Name: Sandy Shipley      : 1950      MRN: [id number]  Encounter Provider: PLACIDO Biggs  Encounter Date: 2025   Encounter department: [facility]STRO[facility]OLOG[facility] [place] [address]  :  Assessment & Plan  Gastroesophageal reflux disease, unspecified whether esophagitis present  Taking omeprazole 40 mg daily. Patient asked if this needs to be continued. Discussed prior findings of ulcerative esophagitis on prior EGD () requiring esophageal dilatation due to food bolus impaction at the time. Symptoms appear to be well-controlled on omeprazole as ordered but patient is interested in decreasing dosage.   - Will trial omeprazole 20 mg daily  - Anti-reflux diet  - Monitor symptoms, may need to consider restarting omeprazole 40 mg if symptoms become uncontrolled on lower dose of omeprazole       Dysphagia, unspecified type  History of chronic GERD, dysphagia, and esophageal dilation secondary to food impaction.   - Omeprazole 20 mg daily as above  - Chew food thoroughly, eat slowly, alternate solids and liquids       Irregular bowel habits  Patient reports intermittent constipation and diarrhea at times. Takes Metamucil inconsistently.   - Reviewed importance of adequate dietary fiber intake  - Recommend Metamucil on a daily basis       History of colon polyps  Last colonoscopy 2022 as below.   - Due for next screening colonoscopy in            History of Present Illness   HPI  Sandy Shipley is a 74 y.o. female who presents for follow up visit for medication check. She has a history of chronic GERD, dysphagia with prior food impaction requiring dilatation in , Nissen fundoplication in 2016 per chart review. She has been taking omeprazole 40 mg daily but was wondering if she could discontinue the medication. Symptoms appear to be well-controlled as patient denies heartburn, reflux, dysphagia, nausea, vomiting, abdominal pain. No complaints, but does report intermittent  constipation and diarrhea at times. Takes Metamucil inconsistently.     EGD 12/2022 - Evidence of prior Nissen fundoplication, mild antritis, history of reflux and a history of food impaction dilated over a year ago doing well. Biopsies benign, no H. Pylori.     Colonoscopy 12/2022 - History of polyps, 2 polyps removed, sigmoid diverticulosis and internal hemorrhoids. Sigmoid hyperplastic polyp negative for dysplasia, rectosigmoid colon tubular adenoma no high grade dysplasia per pathology report. Repeat in 5 years due to personal history of polyps.     Review of Systems   HENT:  Negative for trouble swallowing.    Gastrointestinal:  Negative for abdominal pain, nausea and vomiting.       Objective   LMP  (LMP Unknown)      Physical Exam  Vitals reviewed.   Constitutional:       General: She is not in acute distress.     Appearance: Normal appearance.   HENT:      Head: Normocephalic and atraumatic.   Eyes:      Conjunctiva/sclera: Conjunctivae normal.   Cardiovascular:      Rate and Rhythm: Normal rate and regular rhythm.   Pulmonary:      Effort: Pulmonary effort is normal.      Breath sounds: Normal breath sounds.   Abdominal:      General: Bowel sounds are normal.      Palpations: Abdomen is soft.      Tenderness: There is no abdominal tenderness.   Musculoskeletal:      Cervical back: Neck supple.   Skin:     General: Skin is warm and dry.      Coloration: Skin is not jaundiced.   Neurological:      Mental Status: She is alert.   Psychiatric:         Mood and Affect: Mood normal.         Behavior: Behavior normal.         Thought Content: Thought content normal.

## 2025-03-07 DIAGNOSIS — E03.9 HYPOTHYROIDISM, UNSPECIFIED TYPE: ICD-10-CM

## 2025-03-07 RX ORDER — LEVOTHYROXINE SODIUM 125 UG/1
125 TABLET ORAL DAILY
Qty: 30 TABLET | Refills: 5 | Status: SHIPPED | OUTPATIENT
Start: 2025-03-07

## 2025-03-18 DIAGNOSIS — E78.5 HYPERLIPIDEMIA, UNSPECIFIED HYPERLIPIDEMIA TYPE: ICD-10-CM

## 2025-03-19 RX ORDER — ATORVASTATIN CALCIUM 20 MG/1
20 TABLET, FILM COATED ORAL DAILY
Qty: 30 TABLET | Refills: 5 | Status: SHIPPED | OUTPATIENT
Start: 2025-03-19

## 2025-04-07 DIAGNOSIS — E03.9 HYPOTHYROIDISM, UNSPECIFIED TYPE: ICD-10-CM

## 2025-04-08 RX ORDER — LEVOTHYROXINE SODIUM 125 UG/1
125 TABLET ORAL DAILY
Qty: 30 TABLET | Refills: 5 | Status: SHIPPED | OUTPATIENT
Start: 2025-04-08

## 2025-05-19 DIAGNOSIS — I10 BENIGN ESSENTIAL HYPERTENSION: ICD-10-CM

## 2025-05-19 RX ORDER — AMLODIPINE BESYLATE 5 MG/1
5 TABLET ORAL DAILY
Qty: 90 TABLET | Refills: 1 | Status: SHIPPED | OUTPATIENT
Start: 2025-05-19

## 2025-05-20 ENCOUNTER — APPOINTMENT (OUTPATIENT)
Dept: LAB | Facility: CLINIC | Age: 75
End: 2025-05-20
Payer: MEDICARE

## 2025-05-20 ENCOUNTER — RA CDI HCC (OUTPATIENT)
Dept: OTHER | Facility: HOSPITAL | Age: 75
End: 2025-05-20

## 2025-05-20 ENCOUNTER — TELEPHONE (OUTPATIENT)
Dept: FAMILY MEDICINE CLINIC | Facility: CLINIC | Age: 75
End: 2025-05-20

## 2025-05-20 DIAGNOSIS — E78.2 MIXED HYPERLIPIDEMIA: Primary | ICD-10-CM

## 2025-05-20 DIAGNOSIS — I10 BENIGN ESSENTIAL HYPERTENSION: ICD-10-CM

## 2025-05-20 DIAGNOSIS — R73.02 IGT (IMPAIRED GLUCOSE TOLERANCE): ICD-10-CM

## 2025-05-20 DIAGNOSIS — E03.9 HYPOTHYROIDISM, UNSPECIFIED TYPE: ICD-10-CM

## 2025-05-20 LAB
ANION GAP SERPL CALCULATED.3IONS-SCNC: 10 MMOL/L (ref 4–13)
BUN SERPL-MCNC: 14 MG/DL (ref 5–25)
CALCIUM SERPL-MCNC: 9.6 MG/DL (ref 8.4–10.2)
CHLORIDE SERPL-SCNC: 104 MMOL/L (ref 96–108)
CO2 SERPL-SCNC: 28 MMOL/L (ref 21–32)
CREAT SERPL-MCNC: 0.8 MG/DL (ref 0.6–1.3)
GFR SERPL CREATININE-BSD FRML MDRD: 72 ML/MIN/1.73SQ M
GLUCOSE P FAST SERPL-MCNC: 109 MG/DL (ref 65–99)
POTASSIUM SERPL-SCNC: 3.9 MMOL/L (ref 3.5–5.3)
SODIUM SERPL-SCNC: 142 MMOL/L (ref 135–147)

## 2025-05-20 PROCEDURE — 36415 COLL VENOUS BLD VENIPUNCTURE: CPT

## 2025-05-20 PROCEDURE — 80048 BASIC METABOLIC PNL TOTAL CA: CPT

## 2025-05-20 NOTE — TELEPHONE ENCOUNTER
Complete blood work orders placed.  Please let the patient know.  Patient had BMP drawn today.  This test was supposed to be done shortly after our last appointment to recheck kidney function.  Patient should proceed with redraw. thank you

## 2025-05-22 ENCOUNTER — APPOINTMENT (OUTPATIENT)
Dept: LAB | Facility: CLINIC | Age: 75
End: 2025-05-22
Payer: MEDICARE

## 2025-05-22 DIAGNOSIS — E03.9 HYPOTHYROIDISM, UNSPECIFIED TYPE: ICD-10-CM

## 2025-05-22 DIAGNOSIS — R73.02 IGT (IMPAIRED GLUCOSE TOLERANCE): ICD-10-CM

## 2025-05-22 DIAGNOSIS — E78.2 MIXED HYPERLIPIDEMIA: ICD-10-CM

## 2025-05-22 DIAGNOSIS — I10 BENIGN ESSENTIAL HYPERTENSION: ICD-10-CM

## 2025-05-22 LAB
ALBUMIN SERPL BCG-MCNC: 4.1 G/DL (ref 3.5–5)
ALP SERPL-CCNC: 73 U/L (ref 34–104)
ALT SERPL W P-5'-P-CCNC: 16 U/L (ref 7–52)
ANION GAP SERPL CALCULATED.3IONS-SCNC: 11 MMOL/L (ref 4–13)
AST SERPL W P-5'-P-CCNC: 20 U/L (ref 13–39)
BASOPHILS # BLD AUTO: 0.05 THOUSANDS/ÂΜL (ref 0–0.1)
BASOPHILS NFR BLD AUTO: 1 % (ref 0–1)
BILIRUB SERPL-MCNC: 0.87 MG/DL (ref 0.2–1)
BUN SERPL-MCNC: 15 MG/DL (ref 5–25)
CALCIUM SERPL-MCNC: 9.6 MG/DL (ref 8.4–10.2)
CHLORIDE SERPL-SCNC: 103 MMOL/L (ref 96–108)
CHOLEST SERPL-MCNC: 142 MG/DL (ref ?–200)
CO2 SERPL-SCNC: 26 MMOL/L (ref 21–32)
CREAT SERPL-MCNC: 0.87 MG/DL (ref 0.6–1.3)
EOSINOPHIL # BLD AUTO: 0.17 THOUSAND/ÂΜL (ref 0–0.61)
EOSINOPHIL NFR BLD AUTO: 3 % (ref 0–6)
ERYTHROCYTE [DISTWIDTH] IN BLOOD BY AUTOMATED COUNT: 13.6 % (ref 11.6–15.1)
EST. AVERAGE GLUCOSE BLD GHB EST-MCNC: 123 MG/DL
GFR SERPL CREATININE-BSD FRML MDRD: 65 ML/MIN/1.73SQ M
GLUCOSE P FAST SERPL-MCNC: 107 MG/DL (ref 65–99)
HBA1C MFR BLD: 5.9 %
HCT VFR BLD AUTO: 41.2 % (ref 34.8–46.1)
HDLC SERPL-MCNC: 58 MG/DL
HGB BLD-MCNC: 13.1 G/DL (ref 11.5–15.4)
IMM GRANULOCYTES # BLD AUTO: 0.01 THOUSAND/UL (ref 0–0.2)
IMM GRANULOCYTES NFR BLD AUTO: 0 % (ref 0–2)
LDLC SERPL CALC-MCNC: 62 MG/DL (ref 0–100)
LYMPHOCYTES # BLD AUTO: 1.38 THOUSANDS/ÂΜL (ref 0.6–4.47)
LYMPHOCYTES NFR BLD AUTO: 27 % (ref 14–44)
MCH RBC QN AUTO: 30.5 PG (ref 26.8–34.3)
MCHC RBC AUTO-ENTMCNC: 31.8 G/DL (ref 31.4–37.4)
MCV RBC AUTO: 96 FL (ref 82–98)
MONOCYTES # BLD AUTO: 0.65 THOUSAND/ÂΜL (ref 0.17–1.22)
MONOCYTES NFR BLD AUTO: 13 % (ref 4–12)
NEUTROPHILS # BLD AUTO: 2.94 THOUSANDS/ÂΜL (ref 1.85–7.62)
NEUTS SEG NFR BLD AUTO: 56 % (ref 43–75)
NRBC BLD AUTO-RTO: 0 /100 WBCS
PLATELET # BLD AUTO: 178 THOUSANDS/UL (ref 149–390)
PMV BLD AUTO: 10.3 FL (ref 8.9–12.7)
POTASSIUM SERPL-SCNC: 3.8 MMOL/L (ref 3.5–5.3)
PROT SERPL-MCNC: 7.6 G/DL (ref 6.4–8.4)
RBC # BLD AUTO: 4.29 MILLION/UL (ref 3.81–5.12)
SODIUM SERPL-SCNC: 140 MMOL/L (ref 135–147)
TRIGL SERPL-MCNC: 112 MG/DL (ref ?–150)
TSH SERPL DL<=0.05 MIU/L-ACNC: 2.95 UIU/ML (ref 0.45–4.5)
WBC # BLD AUTO: 5.2 THOUSAND/UL (ref 4.31–10.16)

## 2025-05-22 PROCEDURE — 80061 LIPID PANEL: CPT

## 2025-05-22 PROCEDURE — 84443 ASSAY THYROID STIM HORMONE: CPT

## 2025-05-22 PROCEDURE — 36415 COLL VENOUS BLD VENIPUNCTURE: CPT

## 2025-05-22 PROCEDURE — 80053 COMPREHEN METABOLIC PANEL: CPT

## 2025-05-22 PROCEDURE — 85025 COMPLETE CBC W/AUTO DIFF WBC: CPT

## 2025-05-22 PROCEDURE — 83036 HEMOGLOBIN GLYCOSYLATED A1C: CPT

## 2025-05-29 ENCOUNTER — OFFICE VISIT (OUTPATIENT)
Dept: FAMILY MEDICINE CLINIC | Facility: CLINIC | Age: 75
End: 2025-05-29
Payer: MEDICARE

## 2025-05-29 VITALS
RESPIRATION RATE: 16 BRPM | BODY MASS INDEX: 33.97 KG/M2 | SYSTOLIC BLOOD PRESSURE: 116 MMHG | OXYGEN SATURATION: 97 % | WEIGHT: 211.4 LBS | HEIGHT: 66 IN | DIASTOLIC BLOOD PRESSURE: 80 MMHG | HEART RATE: 77 BPM | TEMPERATURE: 98 F

## 2025-05-29 DIAGNOSIS — E03.9 HYPOTHYROIDISM, UNSPECIFIED TYPE: ICD-10-CM

## 2025-05-29 DIAGNOSIS — E78.2 MIXED HYPERLIPIDEMIA: ICD-10-CM

## 2025-05-29 DIAGNOSIS — I10 BENIGN ESSENTIAL HYPERTENSION: Primary | ICD-10-CM

## 2025-05-29 DIAGNOSIS — M25.819 SHOULDER IMPINGEMENT: ICD-10-CM

## 2025-05-29 DIAGNOSIS — Z12.31 ENCOUNTER FOR SCREENING MAMMOGRAM FOR BREAST CANCER: ICD-10-CM

## 2025-05-29 DIAGNOSIS — R73.02 IGT (IMPAIRED GLUCOSE TOLERANCE): ICD-10-CM

## 2025-05-29 PROCEDURE — G2211 COMPLEX E/M VISIT ADD ON: HCPCS | Performed by: FAMILY MEDICINE

## 2025-05-29 PROCEDURE — 99214 OFFICE O/P EST MOD 30 MIN: CPT | Performed by: FAMILY MEDICINE

## 2025-05-29 NOTE — PATIENT INSTRUCTIONS
Mammogram is due in September - please set up   Labs  before next  visit  Right shoulder pain continues to be bothersome I would recommend to schedule evaluation with sports medicine, Dr. Dash at [phone]

## 2025-06-01 NOTE — ASSESSMENT & PLAN NOTE
Continue atorvastatin.  Monitor blood work  Continue low-fat diet and regular active lifestyle  Orders:  •  CBC; Future  •  Comprehensive metabolic panel; Future  •  Lipid Panel with Direct LDL reflex; Future

## 2025-06-01 NOTE — PROGRESS NOTES
Name: Sandy Shipley      : 1950      MRN: [id number]  Encounter Provider: Melody Mancia MD  Encounter Date: 2025   Encounter department: [facility]    Assessment & Plan  Benign essential hypertension  BP is well controlled  Continue Amlodipine, furosemide       Hypothyroidism, unspecified type  TSH is normal  Continue Levothyroxine 125 mcg daily  Orders:  •  TSH, 3rd generation; Future    Mixed hyperlipidemia  Continue atorvastatin.  Monitor blood work  Continue low-fat diet and regular active lifestyle  Orders:  •  CBC; Future  •  Comprehensive metabolic panel; Future  •  Lipid Panel with Direct LDL reflex; Future    IGT (impaired glucose tolerance)  Lab Results   Component Value Date    HGBA1C 5.9 (H) 2025   Low carbohydrate diet.  Monitor blood    Orders:  •  Hemoglobin A1C; Future    Encounter for screening mammogram for breast cancer    Orders:  •  Mammo screening bilateral w 3d and cad; Future    Shoulder impingement  Right shoulder impingement.  Somewhat restricted range of motion.  Patient would like to monitor her symptoms for the next few weeks, she is leaving on vacation for the next few days.  If right shoulder continues to be bothersome-will proceed with sports medicine consult          Patient Instructions   Mammogram is due in September - please set up   Labs  before next  visit  Right shoulder pain continues to be bothersome I would recommend to schedule evaluation with sports medicine, Dr. Dash at [phone]    Return in about 6 months (around 2025) for Medicare wellness.    History of Present Illness     Follow-up chronic medical conditions.  Patient has been feeling stable.  Offers no complaints.  She is here today accompanied by her .  Blood work results discussed.  Medications updated.  Leg swelling has improved after start of furosemide.    Intermittent right shoulder discomfort for the past few months.  No injury or fall.   "Unrestricted lateral range of motion, pain with extension and overhead.  Occasional discomfort when she rolls on the right side at night.      Review of Systems   Constitutional: Negative.    HENT: Negative.     Eyes: Negative.    Respiratory: Negative.     Cardiovascular: Negative.    Gastrointestinal: Negative.    Endocrine: Negative.    Genitourinary: Negative.    Musculoskeletal:  Positive for arthralgias.   Skin: Negative.    Allergic/Immunologic: Negative.    Neurological: Negative.    Hematological: Negative.    Psychiatric/Behavioral: Negative.       Past Medical History[1]  Past Surgical History[2]  Family History[3]  Social History[4]  Medications[5]  Allergies   Allergen Reactions   • Other Other (See Comments)     Skin breakdown from bandaid on for long time- reddness     Immunization History   Administered Date(s) Administered   • COVID-19 MODERNA VACC 0.5 ML IM 01/29/2021, 02/24/2021, 11/22/2021   • COVID-19 Moderna mRNA Vaccine 12 Yr+ 50 mcg/0.5 mL (Spikevax) 10/07/2024   • COVID-19 Pfizer Vac BIVALENT Henry-sucrose 12 Yr+ IM 11/10/2022   • Influenza Quadrivalent Preservative Free 3 years and older IM 11/11/2016   • Influenza Split High Dose Preservative Free IM 09/21/2015, 09/15/2017, 10/21/2019   • Influenza, high dose seasonal 0.7 mL 10/19/2018, 09/08/2020, 10/13/2021, 10/24/2022, 11/10/2023   • Influenza, seasonal, injectable 11/01/2010, 01/24/2013, 09/22/2014   • Pneumococcal Conjugate 13-Valent 01/19/2016   • Pneumococcal Polysaccharide PPV23 09/15/2017   • Tdap 11/09/2010   • Zoster Vaccine Recombinant 06/26/2023, 10/19/2023   • influenza, trivalent, adjuvanted 10/07/2024     Objective   /80 (BP Location: Left arm, Patient Position: Sitting, Cuff Size: Large)   Pulse 77   Temp 98 °F (36.7 °C) (Temporal)   Resp 16   Ht 5' 6\" (1.676 m)   Wt 95.9 kg (211 lb 6.4 oz)   LMP  (LMP Unknown)   SpO2 97%   BMI 34.12 kg/m²     Physical Exam  Vitals and nursing note reviewed.   Constitutional:  "      General: She is not in acute distress.     Appearance: Normal appearance. She is well-developed. She is not ill-appearing.   HENT:      Head: Normocephalic and atraumatic.     Eyes:      Conjunctiva/sclera: Conjunctivae normal.     Neck:      Thyroid: No thyromegaly.      Vascular: No carotid bruit.     Cardiovascular:      Rate and Rhythm: Normal rate and regular rhythm.      Heart sounds: Normal heart sounds. No murmur heard.  Pulmonary:      Effort: Pulmonary effort is normal. No respiratory distress.      Breath sounds: Normal breath sounds. No wheezing.   Abdominal:      General: There is no abdominal bruit.     Musculoskeletal:      Cervical back: Neck supple.      Right lower leg: No edema.      Left lower leg: No edema.      Comments: Painful abduction of right shoulder limited  to120 degrees     Neurological:      General: No focal deficit present.      Mental Status: She is alert and oriented to person, place, and time.      Cranial Nerves: No cranial nerve deficit.      Coordination: Coordination normal.     Psychiatric:         Mood and Affect: Mood normal.         Behavior: Behavior normal.                [1]  Past Medical History:  Diagnosis Date   • Arthritis     in spine   • Colon polyp    • Constipation 11/02/2021   • CPAP (continuous positive airway pressure) dependence    • Cystic breast    • Disease of thyroid gland     hypothyroid   • Dyspareunia, female     last assessed 9/4/14   • Esophageal obstruction due to food impaction 11/03/2021 September of 2021   • Esophageal ulcer    • Food impaction of esophagus    • GERD (gastroesophageal reflux disease)    • Hepatic cyst 09/21/2015   • Hyperlipidemia    • Hypertension    • Hypothyroidism    • Insomnia    • Iron deficiency anemia    • Liver cyst     drained   • Obesity    • IRINA on CPAP     uses cpap   • Osteoporosis    • Severe mitral regurgitation    • Stroke (HCC)    • Thrombocytopenia (HCC) 01/08/2019   • Urinary tract infection    •  Varicose veins of lower extremity     last assessed 1/4/13   [2]  Past Surgical History:  Procedure Laterality Date   • COLONOSCOPY      complete 5/2016 - Dr Wharton due in 2019 - last assessed  3/17/17   • HERNIA REPAIR     • HIATAL HERNIA REPAIR     • LIVER BIOPSY LAPAROSCOPIC N/A 5/7/2018    Procedure: Laparoscopic marsupialization of liver cyst;  Surgeon: Uriel Gonzalez MD;  Location: [facility];  Service: Surgical Oncology   • NEUROMA EXCISION     • CA ECHO TRANSESOPHAG MONTR CARDIAC PUMP FUNCTJ N/A 1/7/2019    Procedure: TRANSESOPHAGEAL ECHOCARDIOGRAM (DORI);  Surgeon: Eladio Hernandez MD;  Location: [facility];  Service: Cardiac Surgery   • CA ESOPHAGOGASTRODUODENOSCOPY TRANSORAL DIAGNOSTIC N/A 8/10/2016    Procedure: ESOPHAGOGASTRODUODENOSCOPY (EGD);  Surgeon: Sushant Wharton MD;  Location: [facility];  Service: Gastroenterology   • CA ESOPHAGOSCOPY FLEXIBLE TRANSORAL WITH BIOPSY N/A 11/3/2016    Procedure: ESOPHAGOGASTRODUODENOSCOPY (EGD);  Surgeon: Wali Maria MD;  [facility]: [facility];  Service: Thoracic   • CA LAPS REPAIR HERNIA EXCEPT INCAL/INGUN REDUCIBLE N/A 10/30/2017    Procedure: LAPAROSCOPIC INCISIONAL HERNIA REPAIR X 2 WITH ECHO MESH;  Surgeon: Tanmay Matthews DO;  [facility];  Service: General   • CA LAPS RPR PARAESPHGL HRNA INCL FUNDPLSTY W/MESH Left 11/3/2016    Procedure: LAPAROSCOPIC PARAESOPHAGEAL HERNIA REPAIR WITH MESH;NISSEN FUNDOPLICATION ;  Surgeon: Wali Maria MD;  [facility];  Service: Thoracic   • CA REPAIR FIRST ABDOMINAL WALL HERNIA N/A 1/13/2017    Procedure: INCISIONAL HERNIA REPAIR ;  Surgeon: Tanmay Matthews DO;  [facility];  Service: General   • CA REPLACEMENT MITRAL VALVE W/CARDIOPULMONARY BYP N/A 1/7/2019    Procedure: REPLACEMENT VALVE MITRAL (MVR), repair with 30mm CG Future ring;  Surgeon: Eladio Hernandez MD;  [facility];  Service: Cardiac Surgery   • TUBAL LIGATION     [3]  Family History  Problem Relation Name Age of Onset    • Heart disease Mother     • Aneurysm Mother          cerebral   • Alcohol abuse Father Abdoul    • Cancer Father Abdoul    • COPD Father Abdoul    • Heart failure Father Abdoul    • Hypertension Father Abdoul    • Tuberculosis Father Abdoul    • No Known Problems Sister     • No Known Problems Daughter     • No Known Problems Maternal Grandmother     • No Known Problems Maternal Grandfather     • Breast cancer Paternal Grandmother Jasmin Gold 98         of natural causes at 99 years old   • No Known Problems Paternal Grandfather     • No Known Problems Son     [4]  Social History  Tobacco Use   • Smoking status: Never   • Smokeless tobacco: Never   Vaping Use   • Vaping status: Never Used   Substance and Sexual Activity   • Alcohol use: Yes     Alcohol/week: 3.0 standard drinks of alcohol     Types: 3 Glasses of wine per week     Comment: social   • Drug use: No   • Sexual activity: Yes     Partners: Male     Birth control/protection: Post-menopausal   [5]  Current Outpatient Medications on File Prior to Visit   Medication Sig   • amLODIPine (NORVASC) 5 mg tablet TAKE 1 TABLET (5 MG TOTAL) BY MOUTH DAILY   • Ascorbic Acid (VITAMIN C ER PO) Take 1 capsule by mouth in the morning.   • aspirin 81 MG tablet Take 1 tablet (81 mg total) by mouth daily   • atorvastatin (LIPITOR) 20 mg tablet TAKE 1 TABLET (20 MG TOTAL) BY MOUTH DAILY   • Cholecalciferol (VITAMIN D3) 2000 UNITS TABS Take 1 tablet by mouth in the morning.   • clobetasol (TEMOVATE) 0.05 % cream    • clotrimazole-betamethasone (LOTRISONE) 1-0.05 % cream Apply to abdomen, thigh and calf twice a day as needed   • estradiol (ESTRACE) 0.1 mg/g vaginal cream Insert into the vagina   • Ferrous Gluconate-C-Folic Acid (IRON-C PO) Take by mouth   • fluocinonide (LIDEX) 0.05 % cream APPLY TOPICALLY 2 (TWO) TIMES A DAY   • furosemide (LASIX) 20 mg tablet Take 1 tablet (20 mg total) by mouth daily   • levothyroxine 125 mcg tablet TAKE 1 TABLET (125 MCG TOTAL) BY MOUTH  DAILY   • nystatin-triamcinolone (MYCOLOG-II) cream Apply topically in the morning and at noon and in the evening and before bedtime.   • Omega-3 Fatty Acids (FISH OIL) 1200 MG CAPS Take 1 capsule by mouth in the morning.   • omeprazole (PriLOSEC) 20 mg delayed release capsule Take 1 capsule (20 mg total) by mouth daily   • [DISCONTINUED] Ascorbic Acid (vitamin C) 100 MG tablet Take 100 mg by mouth in the morning.   • fluticasone (FLONASE) 50 mcg/act nasal spray 2 sprays into each nostril daily (Patient not taking: Reported on 2/26/2025)   • gabapentin (NEURONTIN) 100 mg capsule Take 1 capsule at bedtime for 5 days then increase dose to 2 capsules at bedtime for 5 days, then increase dose up to 3 capsules at bedtime if needed (Patient not taking: Reported on 2/26/2025)

## 2025-06-01 NOTE — ASSESSMENT & PLAN NOTE
Right shoulder impingement.  Somewhat restricted range of motion.  Patient would like to monitor her symptoms for the next few weeks, she is leaving on vacation for the next few days.  If right shoulder continues to be bothersome-will proceed with sports medicine consult

## 2025-06-16 DIAGNOSIS — Z98.890 S/P MVR (MITRAL VALVE REPAIR): ICD-10-CM

## 2025-06-16 RX ORDER — FUROSEMIDE 20 MG/1
20 TABLET ORAL DAILY
Qty: 30 TABLET | Refills: 5 | Status: SHIPPED | OUTPATIENT
Start: 2025-06-16

## 2025-06-20 NOTE — PROGRESS NOTES
Yobany Yu is a 64 year old female who presents for Follow-up    HPI  The patient presents for a follow-up visit. She has a history of diabetes, chronic kidney disease stage III, hypertension, left total knee replacement surgery, polyneuropathy, and spinal stenosis near the lumbar spine. She has come in today for a regular follow-up visit and refills on her medication.    She reports no symptoms of polyuria, polydipsia, or polyphagia associated with diabetes. No alterations in urine output have been observed related to chronic kidney disease.    She expresses a desire to have her blood pressure checked. No headaches, chest pain, pressure, or shortness of breath are reported.    Regarding her left knee, she is not noticing any significant pain currently. However, her right knee has been hurting a lot when walking, and she will consider surgery at a later date.    PAST SURGICAL HISTORY:  - Left total knee replacement surgery  Relevant past med history  Past Medical History:   Diagnosis Date    Essential (primary) hypertension     Morbid obesity  (CMD)        Past Surgical History:   Procedure Laterality Date    Cervical fusion       section, classic      Hysterectomy      Knee arthroscopy w/ meniscal repair       Social History     Socioeconomic History    Marital status: Single     Spouse name: Not on file    Number of children: Not on file    Years of education: Not on file    Highest education level: Not on file   Occupational History    Not on file   Tobacco Use    Smoking status: Every Day     Current packs/day: 0.50     Average packs/day: 0.5 packs/day for 49.5 years (24.7 ttl pk-yrs)     Types: Cigarettes     Start date:     Smokeless tobacco: Never   Vaping Use    Vaping status: never used   Substance and Sexual Activity    Alcohol use: No    Drug use: No    Sexual activity: Not Currently   Other Topics Concern     Service No    Blood Transfusions No    Caffeine Concern No     Assessment    1  Benign essential hypertension (401 1) (I10)   2  Hyperlipidemia (272 4) (E78 5)   3  Hypothyroidism (244 9) (E03 9)    Plan  Health Maintenance, Hyperlipidemia, Hypertension, Hypothyroidism    · (1) CBC/ PLT (NO DIFF); Status:Active; Requested ULM:[id number];    · (1) COMPREHENSIVE METABOLIC PANEL; Status:Active; Requested for:01Mar2018;    · (1) LIPID PANEL FASTING W DIRECT LDL REFLEX; Status:Active; Requestedfor:01Mar2018;    · (1) TSH; Status:Active; Requested KUY:[id number]; Discussion/Summary    Patient presents evaluation of possible cardiac murmur  Possible functional faint murmur ? Patient is asymptomatic and denies chest pain, palpitations, palpitations or dizziness  prefers to hold of it workup at present time unless it is necessary  to overall benign findings on exam lack of symptoms-will continue routine follow-ups in observation  will continue same daily medications for chronic medical conditions as outlined above  on blood work in [place]  The patient was counseled regarding instructions for management,-- impressions  Possible side effects of new medications were reviewed with the patient/guardian today  The treatment plan was reviewed with the patient/guardian  The patient/guardian understands and agrees with the treatment plan      Chief Complaint  Pt is here for a f/u appt  Pt states that while at an appt with Calvert Lefort she was advised that she has a heart murmur  Pt states that a Hep C test was also done by Calvert Lefort and she is awaiting the results  Pt states that she has been feeling well  All meds/allergies reviewed with pt  History of Present Illness  heart murmur was heard by CRNP at GI office  Patient is asymptomaticsurgery for hernia 4-6 weeks agoremains on medications for hypertension, hyperlipidemia and hypothyroidism  She denies chest pain, palpitations, shortness of breath, dizziness or leg edema  The patient presents for follow-up of essential hypertension   The Occupational Exposure No    Hobby Hazards No    Sleep Concern No    Stress Concern No    Weight Concern No    Special Diet No    Back Care No    Exercise Yes    Bike Helmet No    Seat Belt Yes    Self-Exams No   Social History Narrative    Not on file     Social Drivers of Health     Financial Resource Strain: Low Risk  (3/10/2025)    Financial Resource Strain     Unable to Get: None   Food Insecurity: Low Risk  (3/10/2025)    Food Insecurity     Worried about Food: Never true     Food is Gone: Never true   Transportation Needs: No Transportation Needs (3/28/2025)    OASIS : Transportation     Lack of Transportation (Medical): No     Lack of Transportation (Non-Medical): No     Patient Unable or Declines to Respond: No   Physical Activity: Not on file   Stress: Not on file   Social Connections: Feeling Socially Integrated (3/28/2025)    OASIS : Social Isolation     Frequency of experiencing loneliness or isolation: Never   Feeling safe in your relationship: Low Risk  (3/10/2025)    Interpersonal Safety     How often physically hurt: Never     How often insulted or talked down to: Never     How often threatened with harm: Never     How often scream or curse at: Never       ALLERGIES:   Allergen Reactions    Aspirin HIVES     Family History   Problem Relation Age of Onset    Hypertension Sister     Cancer Sister         throat    Diabetes Maternal Grandfather      Review of Systems   Constitutional: Negative.  Negative for activity change, appetite change, chills, diaphoresis, fatigue, fever and unexpected weight change.   HENT: Negative.  Negative for congestion, postnasal drip, rhinorrhea, sinus pain and sore throat.    Eyes: Negative.  Negative for photophobia, pain, discharge, redness and visual disturbance.   Respiratory: Negative.  Negative for cough, shortness of breath and wheezing.    Cardiovascular: Negative.  Negative for chest pain, palpitations and leg swelling.   Gastrointestinal: Negative.   patient states she has been doing well with her blood pressure control since the last visit  She has no comorbid illnesses  She has no significant interval events  Symptoms: denies impaired vision,-- denies dyspnea,-- denies chest pain,-- denies intermittent leg claudication-- and-- denies lower extremity edema  Associated symptoms include no headache  Home monitoring: The patient is not checking blood pressure at home  Medications: Medication(s): a diuretic-- and-- a calcium channel blocker  Review of Systems   Constitutional: No fever, no chills, feels well, no tiredness, no recent weight gain or weight loss  Cardiovascular: No complaints of slow heart rate, no fast heart rate, no chest pain, no palpitations, no leg claudication, no lower extremity edema  Respiratory: No complaints of shortness of breath, no wheezing, no cough, no SOB on exertion, no orthopnea, no PND  Gastrointestinal: No complaints of abdominal pain, no constipation, no nausea or vomiting, no diarrhea, no bloody stools  Psychiatric: Not suicidal, no sleep disturbance, no anxiety or depression, no change in personality, no emotional problems  Hematologic/Lymphatic: No complaints of swollen glands, no swollen glands in the neck, does not bleed easily, does not bruise easily  Active Problems    1  Allergic rhinitis (477 9) (J30 9)   2  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)   3  Benign essential hypertension (401 1) (I10)   4  Disc degeneration, lumbar (722 52) (M51 36)   5  Dysphagia (787 20) (R13 10)   6  Encounter for routine gynecological examination (V72 31) (Z01 419)   7  Encounter for screening mammogram for malignant neoplasm of breast (V76 12) (Z12 31)   8  Glossitis (529 0) (K14 0)   9  Hepatic cyst (573 8) (K76 89)   10  Hiatal hernia (553 3) (K44 9)   11  Hyperlipidemia (272 4) (E78 5)   12  Hypertension (401 9) (I10)   13  Hypothyroidism (244 9) (E03 9)   14   Incisional hernia, without obstruction or gangrene (553 21) (K43 2)   15  Infection, upper respiratory (465 9) (J06 9)   16  Insomnia (780 52) (G47 00)   17  Iron deficiency anemia due to chronic blood loss (280 0) (D50 0)   18  Limb pain (729 5) (M79 609)   19  Liver enlargement (789 1) (R16 0)   20  Lumbar radiculopathy (724 4) (M54 16)   21  Need for pneumococcal vaccination (V03 82) (Z23)   22  Need for prophylactic vaccination and inoculation against influenza (V04 81) (Z23)   23  Need for vaccination with 13-polyvalent pneumococcal conjugate vaccine (V03 82) (Z23)   24  Osteoporosis (733 00) (M81 0)   25  Pain in joint of right hip (719 45) (M25 551)   26  Pain, joint, shoulder (719 41) (M25 519)   27  Port-site hernia (553 21) (K45 8)   28  Severe obstructive sleep apnea (327 23) (G47 33)   29  Shoulder impingement (726 2) (M75 40)   30  Shoulder pain (719 41) (M25 519)   31  Tinea corporis (110 5) (B35 4)   32  Visit for screening mammogram (V76 12) (Z12 31)   33  Vulvar atrophy (624 1) (N90 5)   34  Vulvar itching (698 1) (L29 2)    Past Medical History    1  History of Cystic breast (610 1) (N60 19)   2  History of Dizziness of unknown cause (780 4) (R42)   3  History of Fall at home (B324 5,I769 1) ([address]  XXXA,Y92 099)   4  History of dyspareunia (V13 29)   5  History of esophagogastroduodenoscopy (EGD) (V15 29) (Z98 890)   6  History of gastroesophageal reflux (GERD) (V12 79) (Z87 19)   7  History of hyperlipidemia (V12 29) (Z86 39)   8  History of thyroid disease (V12 29) (Z86 39)   9  Hypertension (401 9) (I10)   10  Insomnia (780 52) (G47 00)   11  History of Varicose Veins Of Lower Extremities (454 9)   12  History of Visit for screening mammogram (V76 12) (Z12 31)    The active problems and past medical history were reviewed and updated today  Surgical History    1  History of Complete Colonoscopy   2  History of Esophagogastric Fundoplasty Fundoplication Laparoscopic   3  History of Excision Of Neuroma   4   History of Hernia Repair    The surgical Negative for abdominal distention, abdominal pain, blood in stool, constipation, diarrhea, nausea, rectal pain and vomiting.   Endocrine: Negative.  Negative for cold intolerance, heat intolerance, polydipsia, polyphagia and polyuria.   Genitourinary: Negative.  Negative for decreased urine volume, difficulty urinating, dysuria, enuresis, flank pain, frequency, genital sores, hematuria and urgency.   Musculoskeletal:  Positive for arthralgias, back pain and gait problem. Negative for joint swelling, myalgias, neck pain and neck stiffness.   Skin: Negative.    Allergic/Immunologic: Negative.  Negative for environmental allergies, food allergies and immunocompromised state.   Neurological:  Negative for dizziness, tremors, seizures, syncope, facial asymmetry, speech difficulty, weakness, light-headedness, numbness and headaches.   Hematological: Negative.  Negative for adenopathy. Does not bruise/bleed easily.   Psychiatric/Behavioral: Negative.  Negative for agitation, behavioral problems, confusion, decreased concentration, dysphoric mood, hallucinations, self-injury, sleep disturbance and suicidal ideas. The patient is not nervous/anxious.          Objective   Vitals:    06/20/25 1447   BP: 130/60   Resp: 18   SpO2: 98%   Height: 5' 5\" (1.651 m)   PainSc:  0     Physical Exam  Vitals and nursing note reviewed.   Constitutional:       Appearance: She is well-developed.   HENT:      Head: Normocephalic and atraumatic.      Right Ear: External ear normal.      Left Ear: External ear normal.      Nose: Nose normal. No congestion or rhinorrhea.      Mouth/Throat:      Mouth: Mucous membranes are moist.      Neck: Neck supple.   Eyes:      General: No scleral icterus.     Conjunctiva/sclera: Conjunctivae normal.      Pupils: Pupils are equal, round, and reactive to light.   Neck:      Thyroid: No thyromegaly.      Trachea: No tracheal deviation.   Cardiovascular:      Rate and Rhythm: Normal rate and regular rhythm.       Pulses: Normal pulses.      Heart sounds: Normal heart sounds. No murmur heard.     No friction rub. No gallop.   Pulmonary:      Effort: Pulmonary effort is normal. No respiratory distress.      Breath sounds: Normal breath sounds. No stridor. No wheezing or rales.   Chest:      Chest wall: No tenderness.   Abdominal:      General: There is no distension.      Palpations: Abdomen is soft. There is no mass.      Tenderness: There is no abdominal tenderness. There is no guarding or rebound.   Musculoskeletal:         General: No tenderness or deformity. Normal range of motion.   Lymphadenopathy:      Cervical: No cervical adenopathy.   Skin:     Coloration: Skin is not pale.      Findings: No erythema or rash.   Neurological:      General: No focal deficit present.      Mental Status: She is alert and oriented to person, place, and time.      Motor: No abnormal muscle tone.      Coordination: Coordination normal.   Psychiatric:         Mood and Affect: Mood normal.         Behavior: Behavior normal.         Thought Content: Thought content normal.         Extremities: Gait abnormality present, walks with help of walker due to neuropathy in lower extremity and back problems. Surgical wound on left knee completely healed. No pain reported on right knee. Palpable crepitus in right knee, no calf muscle tenderness, range of movement on knee joint normal.  Other: Blood pressure adequately controlled.        Assessment & Plan   1. Diabetes.  - No polyuria, polydipsia, or polyphagia reported.  - Hemoglobin A1c test ordered.  - Lab work ordered to monitor condition.  - Follow-up in 3 months.    2. Chronic kidney disease.  - No change in urine output reported.  - Renal function tests and urine microalbumin test ordered.  - Advised to avoid NSAIDs and maintain adequate hydration.  - Follow-up in 3 months.    3. Hypertension.  - No headaches, chest pain, pressure, or shortness of breath reported.  - Blood pressure is adequately  history was reviewed and updated today  Family History  Mother    1  Family history of cerebral aneurysm (V17 1) (Z82 49)  Father    2  Family history of Congestive Heart Failure   3  Family history of Hypertension (V17 49)  Family History    4  Family history of Cancer    The family history was reviewed and updated today  Social History     · Being A Social Drinker   · Coffee   · Exercise: Walking   ·    · Never A Smoker   · No drug use  The social history was reviewed and updated today  Current Meds   1  AmLODIPine Besylate 5 MG Oral Tablet; Take 1 tablet daily; Therapy: 30KJJ5860 to ([phone])  Requested for: 72Qwj5697; Last Rx:89Gbl1591 Ordered   2  Atorvastatin Calcium 10 MG Oral Tablet; Take 1 tablet daily; Therapy: 88FAL7390 to (Evaluate:11Rwy2655)  Requested for: 94NHV9657; Last Rx:25Jan2017 Ordered   3  B-Complex Balanced TABS; TAKE 1 TABLET DAILY AS DIRECTED; Therapy: (Recorded:28Nov2016) to Recorded   4  Calcium + D3 TABS; 1 tab daily, D3- 2000 units; Therapy: (Recorded:85Uol0517) to Recorded   5  Centrum Silver Oral Tablet; TAKE 1 TABLET DAILY; Therapy: (Recorded:28Nov2016) to Recorded   6  Clotrimazole-Betamethasone 1-0 05 % External Cream; APPLY SPARINGLY TO THE AFFECTED AREA(S) TWICE DAILY; Therapy: 46Zdx2010 to (Renew:91Gvi3523)  Requested for: 40Jnl9348; Last Rx:82Zyv7774 Ordered   7  CoQ-10 200 MG CAPS; Take 1 tablet daily; Therapy: (Recorded:17Mar2017) to Recorded   8  Estrace 0 1 MG/GM Vaginal Cream; APPLY A PEA-SIZED AMOUNT TO VAGINAL OPENING EVERY MONDAY, WEDNESDAY, AND FRIDAY EVENING; Therapy: 10Dmp7356 to (Last Rx:52Xwc7676)  Requested for: 53Dxy7379 Ordered   9  HydroCHLOROthiazide 12 5 MG Oral Capsule; TAKE 1 OR 2 CAPSULES DAILY WITH AMLODIPINE; Therapy: 20CIE9139 to (Evaluate:88Gbb5868)  Requested for: 66RJO4702; Last Rx:38Ddb1958 Ordered   10  Levothyroxine Sodium 125 MCG Oral Tablet; take 1 tablet by mouth once daily;   Therapy: 69OLP6986 to (Evaluate:51Njj8253)  Requested for: 78KKB0403; Last  Rx:52Eje2561 Ordered    Allergies  1  No Known Drug Allergies  2  Latex    Vitals  Vital Signs    Recorded: 61JJE5966 08:30AM Recorded: 22WSZ8266 08:11AM   Temperature  97 5 F   Heart Rate  82   Systolic 733 891   Diastolic 80 86   Height  5 ft 6 in   Weight  214 lb 8 oz   BMI Calculated  34 62   BSA Calculated  2 06       Physical Exam   Constitutional  General appearance: No acute distress, well appearing and well nourished  Pulmonary  Respiratory effort: No increased work of breathing or signs of respiratory distress  Auscultation of lungs: Clear to auscultation  Cardiovascular  Auscultation of heart: Normal rate and rhythm, normal S1 and S2, without murmurs  The heart rate was normal  The rhythm was regular  Heart sounds: normal S1-- and-- normal S2 -- Possible very faint diastolic murmur, heard when patient is upright, disappears with laying down  Examination of extremities for edema and/or varicosities: Normal    Carotid pulses: Normal    Abdomen  Abdomen: Non-tender, no masses  Musculoskeletal  Gait and station: Normal    Neurologic  Cranial nerves: Cranial nerves 2-12 intact  Psychiatric  Orientation to person, place, and time: Normal    Mood and affect: Normal          Health Management  History of Cervical cancer screening   (1) THIN PREP PAP WITH IMAGING; every 5 years; Last 05UIO6763; Next Due: 60Ksa4148; Overdue  Visit for screening mammogram   Digital Bilateral Screening Mammogram With CAD; every 1 year; Last 05Aug2016; Next Due:37Tfs6148; Overdue  Health Maintenance   Medicare Annual Wellness Visit; every 1 year; Next Due: 22PBK4968; Overdue  PELVIC EXAM; every 1 year; Next Due: 25Cad6271; Overdue    Future Appointments    Date/Time Provider Specialty Site   03/15/2018 09:30 AM LINDA Owusu  [facility] [address] [place] [address]       Signatures   Electronically signed by :  LINDA Almaraz ; Dec  5 2017 controlled.  - Advised to adhere to a low sodium diet and continue current treatment regimen.  - Follow-up appointment for blood pressure check in 3 months.    4. Right knee pain.  - Significant pain reported when walking.  - Palpable crepitus, no calf muscle tenderness, normal range of movement.  - Advised to schedule follow-up with orthopedic specialist.  - Follow-up in 3 months.    5. Polyneuropathy.  - Gait abnormality noted, uses walker.  - Reviewed records on [place] prescription management program.  - Refills for hydrocodone provided.  - Due for opioid agreement and health screening.    6. Lumbar spinal stenosis.  - Gait abnormality noted, uses walker.  - Reviewed records on [place] prescription management program.  - Refills for hydrocodone provided.  - Due for opioid agreement and health screening.    Follow-up in 3 months, sooner if needed.    1. Primary hypertension  2. Hyperglycemia  -     Basic Metabolic Panel  -     Glycohemoglobin; Future  3. S/P total knee arthroplasty, left  -     HYDROcodone-acetaminophen (NORCO)  MG per tablet; Take 1 tablet by mouth every 8 hours as needed for Pain.  4. Other polyneuropathy  -     gabapentin (NEURONTIN) 300 MG capsule; Take 1 capsule by mouth in the morning and 1 capsule at noon and 1 capsule in the evening.  5. Chronic pain of right knee  6. Essential hypertension  -     losartan (COZAAR) 50 MG tablet; Take 1 tablet by mouth daily.  -     metoPROLOL succinate (TOPROL-XL) 25 MG 24 hr tablet; Take 1 tablet by mouth daily.  7. Severe obesity (BMI 35.0-39.9) with comorbidity  (CMD)  Medical compliance with plan discussed and risks of non-compliance reviewed.  Patient education completed on disease process, etiology & prognosis.  Proper usage and side effects of medications reviewed & discussed    Return to clinic for follow-up as advised during visit  sooner if needed if any new symptoms or worsening of the present symptoms worrisome symptoms discussed with  5:52AM EST                       (Author) patient  Plan was a discussed with the patient who verbalized understanding the plan and is in agreement with the plan      Dictation Disclaimer  Please note that computer voice recognition software was used for dictation in the composition of this patient's chart. While every effort is made to ensure correction of grammatical,  other interpretive errors, occasional dictation errors may be missed during proofreading.

## 2025-06-26 ENCOUNTER — TELEPHONE (OUTPATIENT)
Dept: GASTROENTEROLOGY | Facility: CLINIC | Age: 75
End: 2025-06-26

## 2025-06-26 NOTE — TELEPHONE ENCOUNTER
Lmm for pt to call back and r/s appt with Dr Harry on 9/24/25 due to a provider schedule change. Sb

## (undated) DEVICE — 3000CC GUARDIAN II: Brand: GUARDIAN

## (undated) DEVICE — UMBILICAL TAPE: Brand: DEROYAL

## (undated) DEVICE — SUT MONOCRYL 4-0 PS-2 18 IN Y496G

## (undated) DEVICE — PLUMEPEN PRO 10FT

## (undated) DEVICE — REM POLYHESIVE ADULT PATIENT RETURN ELECTRODE: Brand: VALLEYLAB

## (undated) DEVICE — VIAL DECANTER

## (undated) DEVICE — GLOVE INDICATOR PI UNDERGLOVE SZ 8 BLUE

## (undated) DEVICE — SUT PROLENE 3-0 SH 36 IN 8522H

## (undated) DEVICE — SUT PROLENE 4-0 SH 36 IN 8521H

## (undated) DEVICE — LIGACLIP APPLIER SMALL

## (undated) DEVICE — THERMOFLECT BLANKET, L, 25EA                               TS THERMOFLECT BLANKET, 48" X 84", SILVER, 5/BG, 5 BG/CS NW: Brand: THERMOFLECT

## (undated) DEVICE — SCD SEQUENTIAL COMPRESSION COMFORT SLEEVE MEDIUM KNEE LENGTH: Brand: KENDALL SCD

## (undated) DEVICE — 32 FR STRAIGHT – SOFT PVC CATHETER: Brand: PVC THORACIC CATHETERS

## (undated) DEVICE — STRL COTTON TIP APPLCTR 6IN PK: Brand: CARDINAL HEALTH

## (undated) DEVICE — ENDOPATH XCEL BLADELESS TROCARS WITH STABILITY SLEEVES: Brand: ENDOPATH XCEL

## (undated) DEVICE — SUT CHROMIC 0 BP-1 27 IN 47T

## (undated) DEVICE — ANTI-FOG SOLUTION WITH FOAM PAD: Brand: DEVON

## (undated) DEVICE — CHLORAPREP HI-LITE 26ML ORANGE

## (undated) DEVICE — SUT MONOCRYL 4-0 PS-2 27 IN Y426H

## (undated) DEVICE — DRESSING ALLEVYN LIFE SACRAL 6.75 X 6.5 IN

## (undated) DEVICE — ELECTRODE BLADE E-Z CLEAN 2.75IN 7CM -0012A

## (undated) DEVICE — SUTURE GUIDE

## (undated) DEVICE — INTENDED FOR TISSUE SEPARATION, AND OTHER PROCEDURES THAT REQUIRE A SHARP SURGICAL BLADE TO PUNCTURE OR CUT.: Brand: BARD-PARKER SAFETY BLADES SIZE 11, STERILE

## (undated) DEVICE — CATH STRAIGHT RED RUBBER 20 FR

## (undated) DEVICE — FLOSEAL HEMOSTATIC MATRIX, 5 ML: Brand: FLOSEAL

## (undated) DEVICE — HARMONIC 1100 SHEARS, 36CM SHAFT LENGTH: Brand: HARMONIC

## (undated) DEVICE — SUT VICRYL 0 UR-6 27 IN J603H

## (undated) DEVICE — PACK VALVE PBDS

## (undated) DEVICE — FILTER SMOKE EVAC VIROSAFE

## (undated) DEVICE — ENDOPATH XCEL UNIVERSAL TROCAR STABLILITY SLEEVES: Brand: ENDOPATH XCEL

## (undated) DEVICE — BONE WAX WHITE: Brand: BONE WAX WHITE

## (undated) DEVICE — NEEDLE 22 G X 1 1/2 SAFETY

## (undated) DEVICE — 40601 PROLONGED POSITIONING SYSTEM: Brand: 40601 PROLONGED POSITIONING SYSTEM

## (undated) DEVICE — TOWEL SET X-RAY

## (undated) DEVICE — ALLENTOWN LAP CHOLE APP PACK: Brand: CARDINAL HEALTH

## (undated) DEVICE — GLOVE SRG BIOGEL ECLIPSE 8

## (undated) DEVICE — INTENDED FOR TISSUE SEPARATION, AND OTHER PROCEDURES THAT REQUIRE A SHARP SURGICAL BLADE TO PUNCTURE OR CUT.: Brand: BARD-PARKER ® CARBON RIB-BACK BLADES

## (undated) DEVICE — OASIS DRAIN, SINGLE, INLINE & ATS COMPATIBLE: Brand: OASIS

## (undated) DEVICE — TRAY FOLEY 16FR URIMETER SURESTEP

## (undated) DEVICE — ADHESIVE SKN CLSR HISTOACRYL FLEX 0.5ML LF

## (undated) DEVICE — RECIP.STERNUM SAW BLADE 34/7.5/0.7MM: Brand: AESCULAP

## (undated) DEVICE — SUT VICRYL 2-0 SH 27 IN UNDYED J417H

## (undated) DEVICE — INSUFLATION TUBING INSUFLOW (LEXION)

## (undated) DEVICE — SUT SILK 2 60 IN SA8H

## (undated) DEVICE — SILVER-COATED ANTIBACTERIAL BARRIER DRESSING: Brand: ACTICOAT SURGIC 10X25CM 5PK US

## (undated) DEVICE — EVERGRIP INSERT SET 61MM: Brand: FOGARTY EVERGRIP

## (undated) DEVICE — ANTIBACTERIAL UNDYED BRAIDED (POLYGLACTIN 910), SYNTHETIC ABSORBABLE SUTURE: Brand: COATED VICRYL

## (undated) DEVICE — SUT SILK 0 CT-1 30 IN 424H

## (undated) DEVICE — ENDOPOUCH RETRIEVER SPECIMEN RETRIEVAL BAGS: Brand: ENDOPOUCH RETRIEVER

## (undated) DEVICE — STRL UNIVERSAL MINOR GENERAL: Brand: CARDINAL HEALTH

## (undated) DEVICE — Device

## (undated) DEVICE — SUCTION CATH 18 FR

## (undated) DEVICE — LIGHT HANDLE COVER SLEEVE DISP BLUE STELLAR

## (undated) DEVICE — SURGICEL FIBRILLAR 1 X 2

## (undated) DEVICE — COTTON TIP APPLICTOR 2 PK

## (undated) DEVICE — SUT PDS PLUS 1 CTB 36 IN PDPB359T

## (undated) DEVICE — IRRIG ENDO FLO TUBING

## (undated) DEVICE — SUT PROLENE 4-0 RB-1/RB-1 36 IN 8557H

## (undated) DEVICE — INTENDED FOR TISSUE SEPARATION, AND OTHER PROCEDURES THAT REQUIRE A SHARP SURGICAL BLADE TO PUNCTURE OR CUT.: Brand: BARD-PARKER SAFETY BLADES SIZE 15, STERILE

## (undated) DEVICE — GAUZE SPONGES,16 PLY: Brand: CURITY

## (undated) DEVICE — PLEDGET CARDIO PTFE 9.5 X 4.8 SOFT LF (6EA/PK)

## (undated) DEVICE — SUT MONOCRYL PLUS 3-0 PS-2 27 IN MCP427H

## (undated) DEVICE — CATHETER PLUG WITH CAP: Brand: DOVER

## (undated) DEVICE — BLANKET HYPOTHERMIA ADULT GAYMAR

## (undated) DEVICE — BETHLEHEM MAJOR GENERAL PACK: Brand: CARDINAL HEALTH

## (undated) DEVICE — FIXATION SECURE STRAP 5MM W/12 ABSORABLE STRAPS

## (undated) DEVICE — BLADE ELECTRODE: Brand: EDGE

## (undated) DEVICE — STERNAL WIRE

## (undated) DEVICE — STERILE SURGICAL LUBRICANT,  TUBE: Brand: SURGILUBE

## (undated) DEVICE — Device: Brand: OMNICLOSE TROCAR SITE CLOSURE DEVICE

## (undated) DEVICE — MEDI-VAC YANK SUCT HNDL W/TPRD BULBOUS TIP: Brand: CARDINAL HEALTH

## (undated) DEVICE — SUT ETHIBOND 2-0 SH-1/SH-1 30 IN X763H

## (undated) DEVICE — SORBAFIX ABSORBABLE FIXATION SYSTEM 30 ABSORBABLE FASTENERS: Brand: SORBAFIX ABSORBABLE FIXATION SYSTEM

## (undated) DEVICE — SUT SILK 3-0 SH CR/8 18 IN C013D

## (undated) DEVICE — GLOVE SRG BIOGEL ORTHOPEDIC 8

## (undated) DEVICE — PVC URETHRAL CATHETER: Brand: DOVER

## (undated) DEVICE — TRAY FOLEY 16FR SURESTEP TEMP SENS URIMETER STAT LOK

## (undated) DEVICE — GLOVE SRG BIOGEL ECLIPSE 7.5

## (undated) DEVICE — DRESSING ALLEVYN LIFE HEEL 25 X 25.2CM

## (undated) DEVICE — TUBING SUCTION 5MM X 12 FT

## (undated) DEVICE — 32 FR RIGHT ANGLE – SOFT PVC CATHETER: Brand: PVC THORACIC CATHETERS

## (undated) RX ORDER — NEOMYCIN SULFATE, POLYMYXIN B SULFATE AND DEXAMETHASONE 3.5; 10000; 1 MG/G; [USP'U]/G; MG/G: 1/4 OINTMENT OPHTHALMIC